# Patient Record
Sex: MALE | Race: WHITE | NOT HISPANIC OR LATINO | Employment: OTHER | URBAN - METROPOLITAN AREA
[De-identification: names, ages, dates, MRNs, and addresses within clinical notes are randomized per-mention and may not be internally consistent; named-entity substitution may affect disease eponyms.]

---

## 2017-04-19 ENCOUNTER — TELEPHONE (OUTPATIENT)
Dept: HEPATOLOGY | Facility: CLINIC | Age: 72
End: 2017-04-19

## 2017-04-19 DIAGNOSIS — B18.2 CHRONIC HEPATITIS C WITHOUT HEPATIC COMA: Primary | ICD-10-CM

## 2017-04-19 NOTE — TELEPHONE ENCOUNTER
"Received call from pt. He states that he tested positive for Hep C antibody and would like to schedule consult. Pt states, "My wife has it, so I probably have it too."  Informed pt that additional lab is needed to confirm active virus. HCV genotype scheduled on 4/24.    Pt's wife, Irene, has also been referred to clinic (No show for consult).   "

## 2017-05-01 ENCOUNTER — TELEPHONE (OUTPATIENT)
Dept: HEPATOLOGY | Facility: CLINIC | Age: 72
End: 2017-05-01

## 2017-05-01 NOTE — TELEPHONE ENCOUNTER
Labs confirm HCV infection - Linda 1a    Please schedule rest of labs / imaging / visit in HCV  Clinic per protocol    thanks

## 2017-05-02 DIAGNOSIS — B18.2 CHRONIC HEPATITIS C WITHOUT HEPATIC COMA: Primary | ICD-10-CM

## 2017-05-02 NOTE — TELEPHONE ENCOUNTER
Attempted to speak with pt. Left message requesting pt call back to schedule. Also tried wife's number.

## 2017-05-05 ENCOUNTER — TELEPHONE (OUTPATIENT)
Dept: HEPATOLOGY | Facility: CLINIC | Age: 72
End: 2017-05-05

## 2017-05-05 NOTE — TELEPHONE ENCOUNTER
----- Message from Terra Camacho sent at 5/4/2017  3:55 PM CDT -----  Contact: pt  Calling to get appt had labs done already and wants results please call him @ # [phone]

## 2017-05-05 NOTE — TELEPHONE ENCOUNTER
Attempt made to reach patient.  LVM asking that he call us back for results and to schedule testing/f/u ordered by provider.

## 2017-05-18 ENCOUNTER — TELEPHONE (OUTPATIENT)
Dept: HEPATOLOGY | Facility: CLINIC | Age: 72
End: 2017-05-18

## 2017-06-16 ENCOUNTER — OFFICE VISIT (OUTPATIENT)
Dept: HEPATOLOGY | Facility: CLINIC | Age: 72
End: 2017-06-16
Payer: MEDICARE

## 2017-06-16 ENCOUNTER — PROCEDURE VISIT (OUTPATIENT)
Dept: HEPATOLOGY | Facility: CLINIC | Age: 72
End: 2017-06-16
Attending: INTERNAL MEDICINE
Payer: MEDICARE

## 2017-06-16 VITALS
TEMPERATURE: 97 F | OXYGEN SATURATION: 97 % | WEIGHT: 178.13 LBS | RESPIRATION RATE: 18 BRPM | HEART RATE: 53 BPM | SYSTOLIC BLOOD PRESSURE: 122 MMHG | BODY MASS INDEX: 25.5 KG/M2 | DIASTOLIC BLOOD PRESSURE: 62 MMHG | HEIGHT: 70 IN

## 2017-06-16 DIAGNOSIS — B18.2 CHRONIC HEPATITIS C WITHOUT HEPATIC COMA: Primary | ICD-10-CM

## 2017-06-16 DIAGNOSIS — R74.8 ABNORMAL TRANSAMINASES: ICD-10-CM

## 2017-06-16 DIAGNOSIS — K74.60 CIRRHOSIS OF LIVER WITHOUT ASCITES, UNSPECIFIED HEPATIC CIRRHOSIS TYPE: ICD-10-CM

## 2017-06-16 DIAGNOSIS — D64.9 ANEMIA, UNSPECIFIED TYPE: ICD-10-CM

## 2017-06-16 DIAGNOSIS — Z86.010 HISTORY OF COLON POLYPS: ICD-10-CM

## 2017-06-16 DIAGNOSIS — B18.2 CHRONIC HEPATITIS C WITHOUT HEPATIC COMA: ICD-10-CM

## 2017-06-16 DIAGNOSIS — Z11.4 ENCOUNTER FOR SCREENING FOR HIV: ICD-10-CM

## 2017-06-16 DIAGNOSIS — D69.6 THROMBOCYTOPENIA: ICD-10-CM

## 2017-06-16 DIAGNOSIS — D64.9 ANEMIA, UNSPECIFIED TYPE: Primary | ICD-10-CM

## 2017-06-16 PROCEDURE — 1126F AMNT PAIN NOTED NONE PRSNT: CPT | Mod: S$GLB,,, | Performed by: PHYSICIAN ASSISTANT

## 2017-06-16 PROCEDURE — 91200 LIVER ELASTOGRAPHY: CPT | Mod: S$GLB,,, | Performed by: PHYSICIAN ASSISTANT

## 2017-06-16 PROCEDURE — 99999 PR PBB SHADOW E&M-EST. PATIENT-LVL IV: CPT | Mod: PBBFAC,,, | Performed by: PHYSICIAN ASSISTANT

## 2017-06-16 PROCEDURE — 99205 OFFICE O/P NEW HI 60 MIN: CPT | Mod: S$GLB,,, | Performed by: PHYSICIAN ASSISTANT

## 2017-06-16 PROCEDURE — 1159F MED LIST DOCD IN RCRD: CPT | Mod: S$GLB,,, | Performed by: PHYSICIAN ASSISTANT

## 2017-06-16 RX ORDER — LOSARTAN POTASSIUM AND HYDROCHLOROTHIAZIDE 25; 100 MG/1; MG/1
1 TABLET ORAL DAILY
COMMUNITY
Start: 2017-03-22 | End: 2020-01-14 | Stop reason: SDUPTHER

## 2017-06-16 RX ORDER — ATORVASTATIN CALCIUM 80 MG/1
80 TABLET, FILM COATED ORAL DAILY
Status: ON HOLD | COMMUNITY
Start: 2017-03-22 | End: 2020-01-10 | Stop reason: SDUPTHER

## 2017-06-16 RX ORDER — METOPROLOL SUCCINATE 100 MG/1
100 TABLET, EXTENDED RELEASE ORAL DAILY
Status: ON HOLD | COMMUNITY
Start: 2017-03-22 | End: 2020-01-02 | Stop reason: HOSPADM

## 2017-06-16 RX ORDER — CLOPIDOGREL BISULFATE 75 MG/1
75 TABLET ORAL DAILY
COMMUNITY
Start: 2017-03-22 | End: 2020-01-14 | Stop reason: SDUPTHER

## 2017-06-16 RX ORDER — LEVOTHYROXINE SODIUM 50 UG/1
50 TABLET ORAL DAILY
COMMUNITY
Start: 2017-03-22 | End: 2020-01-14 | Stop reason: SDUPTHER

## 2017-06-16 NOTE — PROCEDURES
Procedures     Name: Junaid Muñoz  Date of Procedure : 2017   :: Jennifer B Scheuermann, PA  Diagnosis: HCV  Probe: M    Fibroscan readin.9 KPa    IQR/med:13 %    Fibrosis:F 0-1     *Noted pt has liver biopsy documenting cirrhosis from . Since then he has undergone successful HCV treatment in . He was recently reinfected w/ HCV.

## 2017-06-16 NOTE — PROGRESS NOTES
HEPATOLOGY CLINIC VISIT NOTE - HCV clinic    REFERRING PROVIDER: self referral    CHIEF COMPLAINT: Hepatitis C   (here w/ wife - who is also a pt)    HISTORY: This is a 72 y.o. White male who I last saw in 2011. He has a history of biopsy proven HCV Cirrhosis, genotype 3, that was sucessfully treated in 2009, but has returned today due to HCV reinfection. (Regarding his cirrhosis monitoring, he has been lost to f/u since 2011.)    Pt reports hx of IVDA starting in 2015  Underwent medical detox in [place] in early 2/2017  Insurance would not cover inpt rehab so he participated in voluntary outpt rehab program x 3 months at [facility] he has remained clean since his detox in 2/2017.   Now attending AA meetings twice per week with wife (with whom he used drugs).     Feels well   Denies jaundice, dark urine, abdominal distention, hematemesis, melena, slowed mentation.   No abnormal skin rashes. No generalized joint pain.       HCV history:  -- s/p 28 weeks of PegIFN + RBV for Genotype 3a HCV w/ documented SVR in 6/2009    -- requested HCV testing 4/2017 b/c his wife (with whom he used drugs) was diagnosed w/ HCV  -- Genotype 1a (4/24/17)  -- HCV ,000 IU/mL (5/15/17)       Liver staging:  -- Liver biopsy 9/2007 - mild activity and cirrhosis (2 and 2.2cm specimen)    2007 cirrhosis diagnosis was supported by imaging / lab / endoscopy findings of portal hypertensive gastropathy on 2008 EGD, spleen up to 14cm & nodular liver on U/S in 2008, thrombocytopenia in 90s    -- FibroScan today 6/16/17 - kPa 6.9 - F1-2  ?understaging,   ?possible improvement in fibrosis following IFN Rx and SVR    Current labs reveal thrombocytopenia 80s-90s  CMP, INR normal other than intermittent ALT elevation up to 53  Albumin low normal 3.5  U/S scheduled today not done b/c pt didn't arrive in time.      HCC screening - no recent imaging. No recent AFP      Feels well  Denies jaundice, dark urine, abdominal  distention, hematemesis, melena, slowed mentation.   No abnormal skin rashes. No generalized joint pain.                     Past Medical History:   Diagnosis Date    CAD (coronary artery disease)     s/p stent 2005, on plavix    Chronic hepatitis C     Cirrhosis     biopsy proven - 2007    History of colon polyps 06/2008    1 polyp - tubular adenoma    HTN (hypertension)     Hyperlipidemia     Hypothyroidism        Past Surgical History:   Procedure Laterality Date    COLONOSCOPY W/ POLYPECTOMY  06/2008    Dr Wagoner: fair prep, 3mm polyp - tubular adenoma    hydrocele repair  teenager    pyloric stenosis repair  age 1    TONSILLECTOMY         FAMILY HISTORY: Negative for liver disease    SOCIAL HISTORY:     History   Smoking Status    Current Every Day Smoker   Smokeless Tobacco    Not on file     Alcohol - denies  Drugs - IVDA 8968-9028. See above      ROS:   No fever, chills, weight loss, fatigue  No chest pain, palpitations, dyspnea, cough  No abdominal pain, change in bowel pattern, nausea, vomiting, rectal bleeding, GERD, dysphagia  No dysuria, hematuria   No skin rashes   No headaches  No lower extremity edema  No depression or anxiety      PHYSICAL EXAM:  Friendly White male, in no acute distress; alert and oriented to person, place and time  VITALS: reviewed  HEENT: Sclerae anicteric.   NECK: Supple  CVS: Regular rate and rhythm. No murmurs  LUNGS: Normal respiratory effort. Clear bilaterally  ABDOMEN: Flat, soft, nontender. No organomegaly or masses. No ascites or hernias. Good bowel sounds.    SKIN: Warm and dry. No jaundice, No obvious rashes.   EXTREMITIES: No lower extremity edema  NEURO/PSYCH: Normal gate. Memory intact. Thought and speech pattern appropriate. Behavior normal. No depression or anxiety noted.    RECENT LABS:  Lab Results   Component Value Date    WBC 6.30 05/15/2017    HGB 12.7 (L) 05/15/2017    PLT 90 (L) 05/15/2017     Lab Results   Component Value Date    INR  1.0 05/15/2017     Lab Results   Component Value Date    AST 46 05/15/2017    ALT 53 (H) 05/15/2017    BILITOT 0.6 05/15/2017    ALBUMIN 3.7 05/15/2017    ALKPHOS 93 05/15/2017    CREATININE 1.00 05/15/2017    BUN 20 05/15/2017     05/15/2017    K 4.5 05/15/2017         RECENT IMAGING:  U/S abdomen - none to review    ASSESSMENT  72 y.o. White male with Hx of HCV Linda 3a, s/p successful Rx w/ PegIFN + RBV in 2009 w/ documented SVR. Now has been reinfected w/ HCV following IVDA. Had documented cirrhosis (biopsy, labs, imaging and EGD findings) noted prior to his previous HCV rx but current fibroscan suggests more mild F1-2 disease. Still needs updated U/S. Unclear at this time if he experienced some fibrosis regression following SVR and IFN therapy or if fibroscan has understaged his current disease. Regardless, at this time it would be best to follow him for presumed cirrhosis.     1. RECENTLY DIAGNOSED HEPATITIS C, GENOTYPE 1a  -- likely a new infection given recent risk factors (IVDA 3837-2145)  -- FibroScan today 6/16/17 - F1-2 (kPa 6.9)  -- Unknown Immunity to HAV & HBV  2. PREVIOUS HCV INFECTION, GENOTYPE 3A - s/p successful Rx w/ SVR 2009  -- s/p 28 wks PegIFN + RBV  3. WELL COMPENSATED CIRRHOSIS  -- biopsy proven 2007  -- HCC screening - overdue  -- EGD varices screening - overdue. portal hypertensive gastropathy noted on 2008 EGD  -- thrombocytopenia  4. IVDA  -- last use 2/2017 - s/p detox and outpt rehab.   5. NORMOCYTIC ANEMIA  6. HISTORY OF COLON POLYP   -- tubular adenoma - 6/208      EDUCATION:  Discussed need for longterm liver monitoring and liver cancer screening due to prior dx of cirrhosis.   Discussed concern for more rapid HCV progression due to advanced age at time of current infection.    We discussed the increased progression of liver disease secondary to alcohol use; patient was advised to avoid alcohol completely.     Transmission of Hepatitis C was reviewed, including possible sexual  transmission. Sexual contacts should be screened - wife has already screened positive  Patient should avoid sharing personal products such as razors, toothbrushes, etc.     We reviewed that vaccination against Hepatitis A and Hepatitis B is recommended if patient does not already have immunity.    Recommend avoiding raw seafood.  Limit acetaminophen to 2000mg daily.    Discussed ultimate goal of antiviral therapy for HCV        PLAN:  1. Labs today:  · AFP  · HAVAB, HBsAb, HBsAg, HBcAb - vaccinate accordingly  · HIV  · Ferritin, Fe/TIBC, B12, Folate  · Tox screen  2. U/S abdomen  3. EGD (varices screen, anemia) / Colonoscopy (hx of colon polyps, anemia)  4. F/u visit 1-2 months  5. Remain off drugs. Continue AA or attend NA. Return to rehab if needed

## 2017-06-22 ENCOUNTER — TELEPHONE (OUTPATIENT)
Dept: HEPATOLOGY | Facility: CLINIC | Age: 72
End: 2017-06-22

## 2017-06-22 NOTE — TELEPHONE ENCOUNTER
----- Message from Herb Smith sent at 6/20/2017  4:13 PM CDT -----  Contact: jerrell   Would like to change location of lab to [facility] its closer to his house and he doesn't have transportation please call  or

## 2017-06-26 ENCOUNTER — TELEPHONE (OUTPATIENT)
Dept: HEPATOLOGY | Facility: CLINIC | Age: 72
End: 2017-06-26

## 2017-06-26 NOTE — TELEPHONE ENCOUNTER
Wilner Lopez who is the Fibroscan [place] would like to repeat patient Fibroscan Just to see if we are able to get the same score from Medium Probe to XL.     His results showed he had No Fibrosis.   His fibroscan pictured showed white shadows on it that is why he want to repeat it.     He is coming back to see you 7/21/17 and we can repeat his scan that day.     We are not going to schedule the patient for appointment so he won't get charged.     THANK YOU

## 2017-06-30 ENCOUNTER — TELEPHONE (OUTPATIENT)
Dept: HEPATOLOGY | Facility: CLINIC | Age: 72
End: 2017-06-30

## 2017-06-30 NOTE — TELEPHONE ENCOUNTER
6/26/17 labs reviewed:  HIV neg  AFP 10.5 - stable  Vit B12, Folate - normal  Ferritin 67  Fe sat 8% (low)  Fe 26 (low)      Colonoscopy and EGD already ordered  Has f/u scheduled w/ me in 7/2017  _____________________________________________________________  pls tell pt blood work shows his iron levels are low  It is important he proceed w/ EGD and colonoscopy as we discussed in office  Doesn't look like they're scheduled yet  pls give him number to call Endoscopy   Keep f/u visit w/ me    thanks

## 2017-07-03 NOTE — TELEPHONE ENCOUNTER
Spoke to pt's wife. Given message from provider.   She states that she is the the grocery store and cannot take down number for endoscopy. She has clinic number and will call back.   Reminded to keep upcoming visits.

## 2017-07-10 DIAGNOSIS — K74.60 CIRRHOSIS OF LIVER WITHOUT ASCITES, UNSPECIFIED HEPATIC CIRRHOSIS TYPE: Primary | ICD-10-CM

## 2017-07-21 ENCOUNTER — OFFICE VISIT (OUTPATIENT)
Dept: HEPATOLOGY | Facility: CLINIC | Age: 72
End: 2017-07-21
Payer: MEDICARE

## 2017-07-21 VITALS
TEMPERATURE: 98 F | OXYGEN SATURATION: 97 % | HEIGHT: 70 IN | HEART RATE: 63 BPM | WEIGHT: 176.56 LBS | BODY MASS INDEX: 25.28 KG/M2 | SYSTOLIC BLOOD PRESSURE: 157 MMHG | DIASTOLIC BLOOD PRESSURE: 72 MMHG | RESPIRATION RATE: 18 BRPM

## 2017-07-21 DIAGNOSIS — K74.60 CIRRHOSIS OF LIVER WITHOUT ASCITES, UNSPECIFIED HEPATIC CIRRHOSIS TYPE: Primary | ICD-10-CM

## 2017-07-21 PROCEDURE — 99214 OFFICE O/P EST MOD 30 MIN: CPT | Mod: S$GLB,,, | Performed by: PHYSICIAN ASSISTANT

## 2017-07-21 PROCEDURE — 1126F AMNT PAIN NOTED NONE PRSNT: CPT | Mod: S$GLB,,, | Performed by: PHYSICIAN ASSISTANT

## 2017-07-21 PROCEDURE — 99999 PR PBB SHADOW E&M-EST. PATIENT-LVL IV: CPT | Mod: PBBFAC,,, | Performed by: PHYSICIAN ASSISTANT

## 2017-07-21 PROCEDURE — 1159F MED LIST DOCD IN RCRD: CPT | Mod: S$GLB,,, | Performed by: PHYSICIAN ASSISTANT

## 2017-07-21 NOTE — TELEPHONE ENCOUNTER
Name: Junaid Muñoz  Date of Procedure : 2017   :: Jennifer B Scheuermann, PA  Diagnosis: HCV  Probe: XL    Fibroscan readin.8 KPa    IQR/med:10 %    Fibrosis:F 0-1     *again concerned about understaging due to prior evidence of cirrhosis on biopsy, imaging, labs, EGD

## 2017-07-21 NOTE — PROGRESS NOTES
"HEPATOLOGY CLINIC VISIT NOTE - HCV clinic    CHIEF COMPLAINT: HCV Cirrhosis  (here w/ wife - who is also a pt)    HISTORY: This is a 72 y.o. White male who returns for f/u.    Interval history:  Labs to eval anemia revealed Fe deficiency w/ low Fe Sat 8% and low Serum Fe 26  - EGD and Colonoscopy were ordered after last visit but haven't been done yet    AFP stable at 10.5  U/S abdomen 7/10/17 w/ no liver lesions    Labs reveal immunity to HAV   Lacking immunity to HBV - needs vaccine    6/26 - tox screen pos for opiates / morphine  At last visit pt stated he had been clean since 2/2017 when he went through detox & a  4 month outpt rehab program, but tox screen from 6/2017 indicates otherwise.  Today pt tells me his last use (IV morphine) was 6/2017 b/c he "slipped up," although he is attending NA classes 1-2x per week.  Pt states needle was clean & he does not need repeat HIV / HBV screening    Denies jaundice, dark urine, abdominal distention, hematemesis, melena, slowed mentation.   No abnormal skin rashes. No generalized joint pain.       HCV history:  -- Original infection:  Genotype 3a - s/p 28 weeks of PegIFN + RBV w/ documented SVR in 6/2009    -- Recent reinfection through drug use:  Genotype 1a (4/24/17)  HCV ,180IU/mL (6/2017)       Liver staging / Cirrhosis monitoring:  -- Liver biopsy 9/2007 - mild activity and cirrhosis (2 and 2.2cm specimens)    2007 cirrhosis diagnosis was supported by imaging / lab / endoscopy findings of portal hypertensive gastropathy on 2008 EGD, spleen up to 14cm & nodular liver on U/S in 2008, thrombocytopenia in 90s    -- FibroScan today 6/16/17 - kPa 6.9 - F1-2  ?understaging,   ?possible improvement in fibrosis following IFN Rx and SVR    Current labs reveal thrombocytopenia 80s-90s  CMP, INR normal other than intermittent ALT elevation up to 53  Albumin low normal 3.5      HCC screening - up to date w/ U/S 7/2017 and AFP 6/2017  Varices screening - overdue            "           Past Medical History:   Diagnosis Date    CAD (coronary artery disease)     s/p stent 2005, on plavix    Chronic hepatitis C     Cirrhosis     biopsy proven - 2007    History of colon polyps 06/2008    1 polyp - tubular adenoma    HTN (hypertension)     Hyperlipidemia     Hypothyroidism        Past Surgical History:   Procedure Laterality Date    COLONOSCOPY W/ POLYPECTOMY  06/2008    Dr Wagoner: fair prep, 3mm polyp - tubular adenoma    hydrocele repair  teenager    pyloric stenosis repair  age 1    TONSILLECTOMY         FAMILY HISTORY: Negative for liver disease    SOCIAL HISTORY:     History   Smoking Status    Current Every Day Smoker   Smokeless Tobacco    Not on file     Alcohol - denies  Drugs - IVDA 5140-7840.   S/p detox in [place] in early 2/2017  Insurance would not cover inpt rehab so he participated in voluntary outpt rehab program x 3 months at [facility]  Now attending NA meetings 1-2x per week with wife (with whom he used drugs).   Last use 6/2017  (see above)      ROS:   No fever, chills, weight loss, fatigue  No chest pain, palpitations, dyspnea, cough  No abdominal pain, change in bowel pattern, nausea, vomiting, rectal bleeding, GERD, dysphagia  No skin rashes   No lower extremity edema  No depression or anxiety      PHYSICAL EXAM:  Friendly White male, in no acute distress; alert and oriented to person, place and time  VITALS: reviewed  HEENT: Sclerae anicteric.   NECK: Supple  LUNGS: Normal respiratory effort.   ABDOMEN: Flat, soft, nontender.  SKIN: Warm and dry. No jaundice, No obvious rashes.   EXTREMITIES: No lower extremity edema  NEURO/PSYCH: Normal gate. Memory intact. Thought and speech pattern appropriate. Behavior normal. No depression or anxiety noted.    RECENT LABS:  Lab Results   Component Value Date    WBC 8.18 06/16/2017    HGB 12.9 (L) 06/16/2017    PLT 87 (L) 06/16/2017     Lab Results   Component Value Date    INR 1.0  06/16/2017     Lab Results   Component Value Date    AST 36 06/16/2017    ALT 36 06/16/2017    BILITOT 0.7 06/16/2017    ALBUMIN 3.5 06/16/2017    ALKPHOS 124 06/16/2017    CREATININE 1.1 06/16/2017    BUN 22 06/16/2017     06/16/2017    K 3.9 06/16/2017    AFP 10.5 (H) 06/26/2017         RECENT IMAGING:  U/S abdomen - 7/10/17  Narrative   Comparison: April 4, 2012 study demonstrated mildly enlarged spleen stable right renal cyst.    On present study, the visualized pancreas appeared unremarkable and the liver appeared of normal size and texture with no mass nor further abnormality detected and main portal vein appeared patent with velocity of about 19 cm/s obtained.    The gallbladder grossly appears unremarkable with no stones, wall thickening, pericholecystic fluid, nor bile duct dilatation detected with CBD measuring 4.4 mm in diameter.    Both kidneys appeared of normal size,, right measuring 9.6 x 4 cm and left measuring 9.8 x 4.9 cm with a 1.7 x 1.6 per 1.3 cm anechoic cystic lesion seen over the medial lower pole the right kidney consistent with a benign cyst appears unchanged from prior study.    The spleen appeared upper limits in size, being of uncertain significance. The visualized aorta appeared unremarkable.   Impression   Except for benign-appearing right renal cyst as described, and borderline splenic size, of uncertain significance, no definite further abnormality could be detected.    However, clinical correlation and follow up is recommended including CT, etc., if clinically indicated         ASSESSMENT  72 y.o. White male with Hx of HCV Linda 3a, s/p successful Rx w/ PegIFN + RBV in 2009 w/ documented SVR. Now has been reinfected w/ HCV genotype 1a following IVDA.     Had documented cirrhosis (biopsy, labs, imaging and EGD findings) noted prior to his previous HCV rx but current fibroscan suggests more mild F1-2 disease. Although it is possible he had some fibrosis regression following his  "prior IFN therapy, it is also possible his current fibroscan has understaged his fibrosis. At this time it would be best to follow him for presumed cirrhosis.     1. HEPATITIS C, GENOTYPE 1a - REINFECTION  -- likely a "new" infection given recent risk factors (IVDA 8181-5207)  -- (+) Immunity to HAV  -- lacking immunity to HBV  2. PREVIOUS HCV INFECTION, GENOTYPE 3A - s/p successful Rx w/ SVR 2009  -- s/p 28 wks PegIFN + RBV  3. WELL COMPENSATED CIRRHOSIS  -- biopsy proven 2007  -- HCC screening - up to date 7/2017  -- EGD varices screening - overdue. portal hypertensive gastropathy noted on 2008 EGD  -- thrombocytopenia  4. IVDA  -- last use 6/2017   5. NORMOCYTIC ANEMIA / FE DEFICIENCY  -- EGD / Colonoscopy were ordered but not done  6. HISTORY OF COLON POLYP   -- tubular adenoma - 6/2008      EDUCATION:  Discussed importance of discontinuing drug use for overall health, to prevent transmission of other infectious diseases, and so HCV can be treated.  Encouraged pt to continue NA but to also return to rehab.      PLAN:  1. HBV vaccine series - pharmacy states this requires auth. Rx sent to [facility] so they can work on this  2. HCC screening w/ U/S and AFP due 1/2018  3. Proceed w/ EGD (varices screen, anemia) / Colonoscopy (hx of colon polyps, anemia).   4. F/u visit 3 months  5. Remain off drugs. Continue NA. Recommended repeat drug rehab.    Discussed goal of antiviral therapy but pt needs to be off drugs.  Discussed need for long term liver monitoring and HCC Screening every 6 months indefinitely due to cirrhosis    "

## 2017-07-21 NOTE — Clinical Note
I have given pt your office number to call for EGD/Colonoscopy. He has Fe def anemia and hx of colon polyps in 2008. Also has cirrhosis and needs varices screen. Thanks!

## 2017-07-28 ENCOUNTER — TELEPHONE (OUTPATIENT)
Dept: HEPATOLOGY | Facility: CLINIC | Age: 72
End: 2017-07-28

## 2017-07-28 NOTE — TELEPHONE ENCOUNTER
PA Scheuermann not in the office on 10-23-17.  Attempt made to reach patient.  LVM stating that scheduled appt with provider had to be moved to 10-16-17.  A new appt notice mailed along with a note stating change.

## 2017-08-01 ENCOUNTER — TELEPHONE (OUTPATIENT)
Dept: HEPATOLOGY | Facility: CLINIC | Age: 72
End: 2017-08-01

## 2017-08-01 NOTE — TELEPHONE ENCOUNTER
I spoke with patient's wife and provided her with contact info for Dr. Smyth.  Wife states that they need to reschedule colonoscopy appt scheduled with provider.

## 2017-08-01 NOTE — TELEPHONE ENCOUNTER
----- Message from Herb Smith sent at 8/1/2017  8:30 AM CDT -----  Contact: Mrs Muñoz  Requesting return call regarding pt's missing appt

## 2017-08-04 ENCOUNTER — TELEPHONE (OUTPATIENT)
Dept: HEPATOLOGY | Facility: CLINIC | Age: 72
End: 2017-08-04

## 2017-08-04 NOTE — TELEPHONE ENCOUNTER
----- Message from Naima Barrera, PharmD sent at 8/3/2017  6:54 PM CDT -----  Regarding: Recombivax  Good Evening,  I contacted the patient this afternoon regarding the vaccination, and he stated he will be going to a pharmacy closer to home to receive it.  Thanks,  Naima

## 2018-01-11 ENCOUNTER — TELEPHONE (OUTPATIENT)
Dept: HEPATOLOGY | Facility: CLINIC | Age: 73
End: 2018-01-11

## 2018-01-11 DIAGNOSIS — K74.69 OTHER CIRRHOSIS OF LIVER: Primary | ICD-10-CM

## 2018-01-11 DIAGNOSIS — B18.2 CHRONIC HEPATITIS C WITHOUT HEPATIC COMA: ICD-10-CM

## 2018-01-11 NOTE — TELEPHONE ENCOUNTER
1/10/18 U/S abdomen - no liver lesions    pls call pt:  1. U/S showed cirrhosis but no masses in liver  2. Should have had blood work done but I don't see results - please schedule CBC, CMP, INR, AFP  3. He is overdue for visit w/ me - pls schedule visit    He likely needs another u/s in 6 months but I don't know this for certain until after he does the blood work

## 2018-01-11 NOTE — TELEPHONE ENCOUNTER
I spoke with patient and message from PA Scheuermann relayed.  Lab and f/u scheduled 1/18/18; appt notice mailed.

## 2018-01-18 ENCOUNTER — TELEPHONE (OUTPATIENT)
Dept: HEPATOLOGY | Facility: CLINIC | Age: 73
End: 2018-01-18

## 2018-01-18 NOTE — TELEPHONE ENCOUNTER
----- Message from Naima Wang sent at 1/16/2018  5:30 PM CST -----  Contact: Self   Pt is requesting a call back in regards to rescheduling his appt       Pt can be contacted at [phone]

## 2018-01-26 ENCOUNTER — TELEPHONE (OUTPATIENT)
Dept: HEPATOLOGY | Facility: CLINIC | Age: 73
End: 2018-01-26

## 2018-01-26 NOTE — TELEPHONE ENCOUNTER
Patient sent a msg on yesterday stating that he is having transportation issues and would like to be followed by a gastro/hepatologist closer to home.  I left him a VM stating that I would send a referral to Dr. Smyth.  Clinicals with stated request for consult faxed to provider's office.

## 2018-01-31 ENCOUNTER — TELEPHONE (OUTPATIENT)
Dept: HEPATOLOGY | Facility: CLINIC | Age: 73
End: 2018-01-31

## 2018-01-31 NOTE — TELEPHONE ENCOUNTER
S/w pt today he did not have transportation to his appt today he will contact us [facility] when he gets a ride to come in for his appt.

## 2019-09-05 PROBLEM — R07.9 CHEST PAIN: Status: ACTIVE | Noted: 2019-09-05

## 2019-09-05 PROBLEM — R07.9 CHEST PAIN: Status: RESOLVED | Noted: 2019-09-05 | Resolved: 2019-09-05

## 2019-12-06 ENCOUNTER — HOSPITAL ENCOUNTER (INPATIENT)
Facility: HOSPITAL | Age: 74
LOS: 36 days | Discharge: HOME HEALTH | DRG: 459 | End: 2020-01-11
Attending: EMERGENCY MEDICINE | Admitting: EMERGENCY MEDICINE
Payer: MEDICARE

## 2019-12-06 DIAGNOSIS — G06.1 SPINAL EPIDURAL ABSCESS: Primary | ICD-10-CM

## 2019-12-06 DIAGNOSIS — G06.2 EPIDURAL ABSCESS: ICD-10-CM

## 2019-12-06 DIAGNOSIS — R07.9 CHEST PAIN: ICD-10-CM

## 2019-12-06 DIAGNOSIS — I25.10 CAD (CORONARY ARTERY DISEASE): ICD-10-CM

## 2019-12-06 DIAGNOSIS — M46.22 OSTEOMYELITIS OF CERVICAL SPINE: ICD-10-CM

## 2019-12-06 PROBLEM — E78.5 HYPERLIPIDEMIA: Status: ACTIVE | Noted: 2019-12-06

## 2019-12-06 PROBLEM — B18.2 CHRONIC HEPATITIS C WITHOUT HEPATIC COMA: Status: ACTIVE | Noted: 2019-12-06

## 2019-12-06 PROBLEM — I10 ESSENTIAL HYPERTENSION: Status: ACTIVE | Noted: 2019-12-06

## 2019-12-06 PROBLEM — M46.24 ACUTE OSTEOMYELITIS OF THORACIC SPINE: Status: ACTIVE | Noted: 2019-12-06

## 2019-12-06 PROBLEM — E03.8 OTHER SPECIFIED HYPOTHYROIDISM: Status: ACTIVE | Noted: 2019-12-06

## 2019-12-06 PROCEDURE — 99285 EMERGENCY DEPT VISIT HI MDM: CPT | Mod: ,,, | Performed by: PHYSICIAN ASSISTANT

## 2019-12-06 PROCEDURE — 99223 PR INITIAL HOSPITAL CARE,LEVL III: ICD-10-PCS | Mod: ,,, | Performed by: NEUROLOGICAL SURGERY

## 2019-12-06 PROCEDURE — 99223 1ST HOSP IP/OBS HIGH 75: CPT | Mod: ,,, | Performed by: NEUROLOGICAL SURGERY

## 2019-12-06 PROCEDURE — 99285 EMERGENCY DEPT VISIT HI MDM: CPT | Mod: 25

## 2019-12-06 PROCEDURE — 12000002 HC ACUTE/MED SURGE SEMI-PRIVATE ROOM

## 2019-12-06 PROCEDURE — 99285 PR EMERGENCY DEPT VISIT,LEVEL V: ICD-10-PCS | Mod: ,,, | Performed by: PHYSICIAN ASSISTANT

## 2019-12-06 RX ORDER — ONDANSETRON 8 MG/1
8 TABLET, ORALLY DISINTEGRATING ORAL EVERY 8 HOURS PRN
Status: DISCONTINUED | OUTPATIENT
Start: 2019-12-07 | End: 2019-12-21

## 2019-12-06 RX ORDER — ATORVASTATIN CALCIUM 20 MG/1
80 TABLET, FILM COATED ORAL DAILY
Status: DISCONTINUED | OUTPATIENT
Start: 2019-12-07 | End: 2019-12-17

## 2019-12-06 RX ORDER — VANCOMYCIN HCL IN 5 % DEXTROSE 1G/250ML
1000 PLASTIC BAG, INJECTION (ML) INTRAVENOUS
Status: DISCONTINUED | OUTPATIENT
Start: 2019-12-07 | End: 2019-12-07

## 2019-12-06 RX ORDER — NALOXONE HCL 0.4 MG/ML
0.4 VIAL (ML) INJECTION
Status: DISCONTINUED | OUTPATIENT
Start: 2019-12-07 | End: 2020-01-11 | Stop reason: HOSPADM

## 2019-12-06 RX ORDER — ACETAMINOPHEN 325 MG/1
650 TABLET ORAL EVERY 4 HOURS PRN
Status: DISCONTINUED | OUTPATIENT
Start: 2019-12-07 | End: 2019-12-06

## 2019-12-06 RX ORDER — IBUPROFEN 200 MG
1 TABLET ORAL DAILY
Status: DISCONTINUED | OUTPATIENT
Start: 2019-12-07 | End: 2019-12-16

## 2019-12-06 RX ORDER — IBUPROFEN 200 MG
24 TABLET ORAL
Status: DISCONTINUED | OUTPATIENT
Start: 2019-12-07 | End: 2019-12-21

## 2019-12-06 RX ORDER — OXYCODONE HYDROCHLORIDE 5 MG/1
5 TABLET ORAL EVERY 6 HOURS PRN
Status: DISCONTINUED | OUTPATIENT
Start: 2019-12-07 | End: 2019-12-10

## 2019-12-06 RX ORDER — LEVOTHYROXINE SODIUM 50 UG/1
50 TABLET ORAL
Status: DISCONTINUED | OUTPATIENT
Start: 2019-12-07 | End: 2019-12-17

## 2019-12-06 RX ORDER — LOSARTAN POTASSIUM AND HYDROCHLOROTHIAZIDE 25; 100 MG/1; MG/1
1 TABLET ORAL DAILY
Status: DISCONTINUED | OUTPATIENT
Start: 2019-12-07 | End: 2019-12-17

## 2019-12-06 RX ORDER — AMOXICILLIN 250 MG
1 CAPSULE ORAL 2 TIMES DAILY
Status: DISCONTINUED | OUTPATIENT
Start: 2019-12-07 | End: 2019-12-19

## 2019-12-06 RX ORDER — IBUPROFEN 200 MG
16 TABLET ORAL
Status: DISCONTINUED | OUTPATIENT
Start: 2019-12-07 | End: 2019-12-21

## 2019-12-06 RX ORDER — SODIUM CHLORIDE 0.9 % (FLUSH) 0.9 %
10 SYRINGE (ML) INJECTION
Status: DISCONTINUED | OUTPATIENT
Start: 2019-12-07 | End: 2019-12-16

## 2019-12-06 RX ORDER — GLUCAGON 1 MG
1 KIT INJECTION
Status: DISCONTINUED | OUTPATIENT
Start: 2019-12-07 | End: 2019-12-27

## 2019-12-06 RX ORDER — METOPROLOL SUCCINATE 25 MG/1
100 TABLET, EXTENDED RELEASE ORAL DAILY
Status: DISCONTINUED | OUTPATIENT
Start: 2019-12-07 | End: 2019-12-19

## 2019-12-06 RX ORDER — ACETAMINOPHEN 500 MG
1000 TABLET ORAL EVERY 8 HOURS
Status: DISCONTINUED | OUTPATIENT
Start: 2019-12-06 | End: 2019-12-16

## 2019-12-06 RX ORDER — HYDROMORPHONE HYDROCHLORIDE 1 MG/ML
1 INJECTION, SOLUTION INTRAMUSCULAR; INTRAVENOUS; SUBCUTANEOUS EVERY 6 HOURS PRN
Status: DISCONTINUED | OUTPATIENT
Start: 2019-12-07 | End: 2019-12-09

## 2019-12-07 PROBLEM — R74.8 ELEVATED ALKALINE PHOSPHATASE LEVEL: Status: ACTIVE | Noted: 2019-12-07

## 2019-12-07 PROBLEM — Z72.0 TOBACCO ABUSE: Status: ACTIVE | Noted: 2019-12-07

## 2019-12-07 LAB
ALBUMIN SERPL BCP-MCNC: 2.2 G/DL (ref 3.5–5.2)
ALP SERPL-CCNC: 226 U/L (ref 55–135)
ALT SERPL W/O P-5'-P-CCNC: 13 U/L (ref 10–44)
AMPHET+METHAMPHET UR QL: NEGATIVE
ANION GAP SERPL CALC-SCNC: 6 MMOL/L (ref 8–16)
APTT BLDCRRT: 26.5 SEC (ref 21–32)
AST SERPL-CCNC: 24 U/L (ref 10–40)
BARBITURATES UR QL SCN>200 NG/ML: NEGATIVE
BASOPHILS # BLD AUTO: 0.04 K/UL (ref 0–0.2)
BASOPHILS NFR BLD: 0.5 % (ref 0–1.9)
BENZODIAZ UR QL SCN>200 NG/ML: NEGATIVE
BILIRUB SERPL-MCNC: 0.3 MG/DL (ref 0.1–1)
BUN SERPL-MCNC: 17 MG/DL (ref 8–23)
BZE UR QL SCN: NEGATIVE
CALCIUM SERPL-MCNC: 8.2 MG/DL (ref 8.7–10.5)
CANNABINOIDS UR QL SCN: NORMAL
CHLORIDE SERPL-SCNC: 102 MMOL/L (ref 95–110)
CO2 SERPL-SCNC: 26 MMOL/L (ref 23–29)
CREAT SERPL-MCNC: 0.8 MG/DL (ref 0.5–1.4)
CREAT UR-MCNC: 93 MG/DL (ref 23–375)
CRP SERPL-MCNC: 73.2 MG/L (ref 0–8.2)
DIFFERENTIAL METHOD: ABNORMAL
EOSINOPHIL # BLD AUTO: 0.5 K/UL (ref 0–0.5)
EOSINOPHIL NFR BLD: 5.5 % (ref 0–8)
ERYTHROCYTE [DISTWIDTH] IN BLOOD BY AUTOMATED COUNT: 16.5 % (ref 11.5–14.5)
ERYTHROCYTE [SEDIMENTATION RATE] IN BLOOD BY WESTERGREN METHOD: 32 MM/HR (ref 0–23)
EST. GFR  (AFRICAN AMERICAN): >60 ML/MIN/1.73 M^2
EST. GFR  (NON AFRICAN AMERICAN): >60 ML/MIN/1.73 M^2
ETHANOL UR-MCNC: <10 MG/DL
GLUCOSE SERPL-MCNC: 118 MG/DL (ref 70–110)
HCT VFR BLD AUTO: 31.7 % (ref 40–54)
HGB BLD-MCNC: 9.5 G/DL (ref 14–18)
IMM GRANULOCYTES # BLD AUTO: 0.02 K/UL (ref 0–0.04)
IMM GRANULOCYTES NFR BLD AUTO: 0.2 % (ref 0–0.5)
LYMPHOCYTES # BLD AUTO: 1.2 K/UL (ref 1–4.8)
LYMPHOCYTES NFR BLD: 13.8 % (ref 18–48)
MAGNESIUM SERPL-MCNC: 1.8 MG/DL (ref 1.6–2.6)
MCH RBC QN AUTO: 25.9 PG (ref 27–31)
MCHC RBC AUTO-ENTMCNC: 30 G/DL (ref 32–36)
MCV RBC AUTO: 86 FL (ref 82–98)
METHADONE UR QL SCN>300 NG/ML: NEGATIVE
MONOCYTES # BLD AUTO: 0.8 K/UL (ref 0.3–1)
MONOCYTES NFR BLD: 8.9 % (ref 4–15)
NEUTROPHILS # BLD AUTO: 6.2 K/UL (ref 1.8–7.7)
NEUTROPHILS NFR BLD: 71.1 % (ref 38–73)
NRBC BLD-RTO: 0 /100 WBC
OPIATES UR QL SCN: NORMAL
PCP UR QL SCN>25 NG/ML: NEGATIVE
PHOSPHATE SERPL-MCNC: 2.5 MG/DL (ref 2.7–4.5)
PLATELET # BLD AUTO: 148 K/UL (ref 150–350)
PMV BLD AUTO: 10.4 FL (ref 9.2–12.9)
POTASSIUM SERPL-SCNC: 3 MMOL/L (ref 3.5–5.1)
PROCALCITONIN SERPL IA-MCNC: 0.06 NG/ML
PROT SERPL-MCNC: 5.9 G/DL (ref 6–8.4)
RBC # BLD AUTO: 3.67 M/UL (ref 4.6–6.2)
SODIUM SERPL-SCNC: 134 MMOL/L (ref 136–145)
TOXICOLOGY INFORMATION: NORMAL
WBC # BLD AUTO: 8.72 K/UL (ref 3.9–12.7)

## 2019-12-07 PROCEDURE — 25000003 PHARM REV CODE 250: Performed by: STUDENT IN AN ORGANIZED HEALTH CARE EDUCATION/TRAINING PROGRAM

## 2019-12-07 PROCEDURE — 84145 PROCALCITONIN (PCT): CPT

## 2019-12-07 PROCEDURE — 99223 1ST HOSP IP/OBS HIGH 75: CPT | Mod: AI,GC,, | Performed by: HOSPITALIST

## 2019-12-07 PROCEDURE — 80307 DRUG TEST PRSMV CHEM ANLYZR: CPT

## 2019-12-07 PROCEDURE — 99223 PR INITIAL HOSPITAL CARE,LEVL III: ICD-10-PCS | Mod: AI,GC,, | Performed by: HOSPITALIST

## 2019-12-07 PROCEDURE — S4991 NICOTINE PATCH NONLEGEND: HCPCS | Performed by: STUDENT IN AN ORGANIZED HEALTH CARE EDUCATION/TRAINING PROGRAM

## 2019-12-07 PROCEDURE — 85730 THROMBOPLASTIN TIME PARTIAL: CPT

## 2019-12-07 PROCEDURE — 85652 RBC SED RATE AUTOMATED: CPT

## 2019-12-07 PROCEDURE — 85025 COMPLETE CBC W/AUTO DIFF WBC: CPT

## 2019-12-07 PROCEDURE — C9285 PATCH, LIDOCAINE/TETRACAINE: HCPCS | Performed by: STUDENT IN AN ORGANIZED HEALTH CARE EDUCATION/TRAINING PROGRAM

## 2019-12-07 PROCEDURE — A9585 GADOBUTROL INJECTION: HCPCS | Performed by: EMERGENCY MEDICINE

## 2019-12-07 PROCEDURE — 11000001 HC ACUTE MED/SURG PRIVATE ROOM

## 2019-12-07 PROCEDURE — 63600175 PHARM REV CODE 636 W HCPCS: Performed by: STUDENT IN AN ORGANIZED HEALTH CARE EDUCATION/TRAINING PROGRAM

## 2019-12-07 PROCEDURE — 36415 COLL VENOUS BLD VENIPUNCTURE: CPT

## 2019-12-07 PROCEDURE — 80053 COMPREHEN METABOLIC PANEL: CPT

## 2019-12-07 PROCEDURE — 84100 ASSAY OF PHOSPHORUS: CPT

## 2019-12-07 PROCEDURE — 25500020 PHARM REV CODE 255: Performed by: EMERGENCY MEDICINE

## 2019-12-07 PROCEDURE — 86140 C-REACTIVE PROTEIN: CPT

## 2019-12-07 PROCEDURE — 83735 ASSAY OF MAGNESIUM: CPT

## 2019-12-07 RX ORDER — LANOLIN ALCOHOL/MO/W.PET/CERES
400 CREAM (GRAM) TOPICAL ONCE
Status: COMPLETED | OUTPATIENT
Start: 2019-12-07 | End: 2019-12-07

## 2019-12-07 RX ORDER — SODIUM CHLORIDE 0.9 % (FLUSH) 0.9 %
10 SYRINGE (ML) INJECTION
Status: DISCONTINUED | OUTPATIENT
Start: 2019-12-07 | End: 2019-12-16

## 2019-12-07 RX ORDER — POTASSIUM CHLORIDE 20 MEQ/1
40 TABLET, EXTENDED RELEASE ORAL
Status: COMPLETED | OUTPATIENT
Start: 2019-12-07 | End: 2019-12-07

## 2019-12-07 RX ORDER — SODIUM,POTASSIUM PHOSPHATES 280-250MG
2 POWDER IN PACKET (EA) ORAL EVERY 4 HOURS
Status: COMPLETED | OUTPATIENT
Start: 2019-12-07 | End: 2019-12-07

## 2019-12-07 RX ORDER — GADOBUTROL 604.72 MG/ML
9 INJECTION INTRAVENOUS
Status: COMPLETED | OUTPATIENT
Start: 2019-12-07 | End: 2019-12-07

## 2019-12-07 RX ADMIN — LIDOCAINE AND TETRACAINE 1 EACH: 70; 70 PATCH CUTANEOUS at 03:12

## 2019-12-07 RX ADMIN — ACETAMINOPHEN 1000 MG: 500 TABLET ORAL at 02:12

## 2019-12-07 RX ADMIN — POTASSIUM & SODIUM PHOSPHATES POWDER PACK 280-160-250 MG 2 PACKET: 280-160-250 PACK at 02:12

## 2019-12-07 RX ADMIN — CEFTRIAXONE 2 G: 2 INJECTION, SOLUTION INTRAVENOUS at 02:12

## 2019-12-07 RX ADMIN — SENNOSIDES AND DOCUSATE SODIUM 1 TABLET: 8.6; 5 TABLET ORAL at 09:12

## 2019-12-07 RX ADMIN — ACETAMINOPHEN 1000 MG: 500 TABLET ORAL at 09:12

## 2019-12-07 RX ADMIN — SENNOSIDES AND DOCUSATE SODIUM 1 TABLET: 8.6; 5 TABLET ORAL at 08:12

## 2019-12-07 RX ADMIN — GADOBUTROL 9 ML: 604.72 INJECTION INTRAVENOUS at 01:12

## 2019-12-07 RX ADMIN — POTASSIUM & SODIUM PHOSPHATES POWDER PACK 280-160-250 MG 2 PACKET: 280-160-250 PACK at 09:12

## 2019-12-07 RX ADMIN — POTASSIUM CHLORIDE 40 MEQ: 1500 TABLET, EXTENDED RELEASE ORAL at 09:12

## 2019-12-07 RX ADMIN — VANCOMYCIN HYDROCHLORIDE 1000 MG: 1 INJECTION, POWDER, LYOPHILIZED, FOR SOLUTION INTRAVENOUS at 03:12

## 2019-12-07 RX ADMIN — ACETAMINOPHEN 1000 MG: 500 TABLET ORAL at 06:12

## 2019-12-07 RX ADMIN — METOPROLOL SUCCINATE 100 MG: 100 TABLET, EXTENDED RELEASE ORAL at 08:12

## 2019-12-07 RX ADMIN — NICOTINE 1 PATCH: 21 PATCH, EXTENDED RELEASE TRANSDERMAL at 08:12

## 2019-12-07 RX ADMIN — LEVOTHYROXINE SODIUM 50 MCG: 50 TABLET ORAL at 06:12

## 2019-12-07 RX ADMIN — POTASSIUM CHLORIDE 40 MEQ: 1500 TABLET, EXTENDED RELEASE ORAL at 10:12

## 2019-12-07 RX ADMIN — ATORVASTATIN CALCIUM 80 MG: 20 TABLET, FILM COATED ORAL at 08:12

## 2019-12-07 RX ADMIN — OXYCODONE HYDROCHLORIDE 5 MG: 5 TABLET ORAL at 02:12

## 2019-12-07 RX ADMIN — Medication 400 MG: at 09:12

## 2019-12-07 RX ADMIN — LOSARTAN POTASSIUM AND HYDROCHLOROTHIAZIDE 1 TABLET: 100; 25 TABLET, FILM COATED ORAL at 08:12

## 2019-12-07 RX ADMIN — OXYCODONE HYDROCHLORIDE 5 MG: 5 TABLET ORAL at 11:12

## 2019-12-07 RX ADMIN — OXYCODONE HYDROCHLORIDE 5 MG: 5 TABLET ORAL at 09:12

## 2019-12-07 NOTE — SUBJECTIVE & OBJECTIVE
Past Medical History:   Diagnosis Date    CAD (coronary artery disease)     s/p stent 2005, on plavix    Chronic hepatitis C     Cirrhosis     biopsy proven - 2007    History of colon polyps 06/2008    1 polyp - tubular adenoma    HTN (hypertension)     Hyperlipidemia     Hypothyroidism        Past Surgical History:   Procedure Laterality Date    COLONOSCOPY W/ POLYPECTOMY  06/2008    Dr Wagoner: fair prep, 3mm polyp - tubular adenoma    CORONARY ANGIOPLASTY WITH STENT PLACEMENT      hydrocele repair  teenager    pyloric stenosis repair  age 1    TONSILLECTOMY         Review of patient's allergies indicates:   Allergen Reactions    Penicillins Rash       Current Facility-Administered Medications on File Prior to Encounter   Medication    [COMPLETED] HYDROmorphone injection 1 mg    [COMPLETED] sodium chloride 0.9% bolus 1,000 mL    [COMPLETED] vancomycin (VANCOCIN) 2,000 mg in dextrose 5 % 500 mL IVPB    [DISCONTINUED] cefTRIAXone (ROCEPHIN) 2 g in dextrose 5 % 50 mL IVPB    [DISCONTINUED] vancomycin (VANCOCIN) 1000 mg in dextrose 5 % 200 mL IVPB     Current Outpatient Medications on File Prior to Encounter   Medication Sig    atorvastatin (LIPITOR) 80 MG tablet Take 80 mg by mouth once daily at 6am.    clopidogrel (PLAVIX) 75 mg tablet Take 75 mg by mouth once daily at 6am.    ibuprofen (ADVIL,MOTRIN) 600 MG tablet Take 1 tablet (600 mg total) by mouth every 6 (six) hours as needed for Pain.    levothyroxine (SYNTHROID) 50 MCG tablet Take 50 mcg by mouth once daily at 6am.    losartan-hydrochlorothiazide 100-25 mg (HYZAAR) 100-25 mg per tablet Take 1 tablet by mouth once daily at 6am.     metoprolol succinate (TOPROL-XL) 100 MG 24 hr tablet Take 100 mg by mouth once daily at 6am.    traMADol (ULTRAM) 50 mg tablet Take 50 mg by mouth every 6 (six) hours as needed for Pain.     Family History     None        Tobacco Use    Smoking status: Current Every Day Smoker   Substance and Sexual  Activity    Alcohol use: Not on file    Drug use: Yes     Types: IV     Comment: MORPHINE    Sexual activity: Not on file     Review of Systems   Constitutional: Negative for activity change, appetite change, chills, diaphoresis, fatigue, fever and unexpected weight change.   HENT: Negative for congestion, sore throat and trouble swallowing.    Eyes: Negative for photophobia, pain and discharge.   Respiratory: Negative for cough and shortness of breath.    Cardiovascular: Negative for chest pain and palpitations.   Gastrointestinal: Negative for abdominal pain, constipation, diarrhea, nausea, rectal pain and vomiting.   Genitourinary: Negative for decreased urine volume, difficulty urinating and dysuria.   Musculoskeletal: Positive for back pain. Negative for arthralgias, joint swelling, myalgias, neck pain and neck stiffness.   Skin: Negative for pallor and rash.   Neurological: Negative for dizziness, weakness, light-headedness, numbness and headaches.     Objective:     Vital Signs (Most Recent):  Temp: 98.6 °F (37 °C) (12/06/19 1940)  Pulse: 65 (12/06/19 2037)  Resp: 19 (12/06/19 2037)  BP: (!) 165/72 (12/06/19 2037)  SpO2: 96 % (12/06/19 2037) Vital Signs (24h Range):  Temp:  [97.8 °F (36.6 °C)-98.6 °F (37 °C)] 98.6 °F (37 °C)  Pulse:  [57-83] 65  Resp:  [12-19] 19  SpO2:  [96 %-98 %] 96 %  BP: (115-187)/(66-86) 165/72        There is no height or weight on file to calculate BMI.    Physical Exam   Constitutional: He is oriented to person, place, and time. He appears well-developed and well-nourished. No distress.   HENT:   Head: Normocephalic and atraumatic.   Mouth/Throat: Oropharynx is clear and moist and mucous membranes are normal.   Eyes: Pupils are equal, round, and reactive to light. Conjunctivae are normal. No scleral icterus.   Neck: No JVD present.   Cardiovascular: Normal rate, regular rhythm, normal heart sounds and normal pulses.   No murmur heard.  Pulmonary/Chest: Effort normal and breath  sounds normal. No respiratory distress.   Abdominal: Soft. Normal appearance and bowel sounds are normal. He exhibits no distension. There is no tenderness.   Large well healed pyloromyotomy scar on right side of abdomen.    Musculoskeletal: He exhibits no edema.        Cervical back: He exhibits tenderness, bony tenderness and pain. He exhibits normal range of motion, no swelling, no edema and no spasm.        Thoracic back: He exhibits tenderness, bony tenderness and pain. He exhibits no swelling, no edema, no laceration and no spasm.   Neurological: He is alert and oriented to person, place, and time. He has normal strength. He displays no tremor. No cranial nerve deficit or sensory deficit. He exhibits normal muscle tone. He displays no Babinski's sign on the right side. He displays no Babinski's sign on the left side.   Skin: Skin is warm and dry. No bruising and no rash noted.         CRANIAL NERVES     CN III, IV, VI   Pupils are equal, round, and reactive to light.       Significant Labs: All pertinent labs within the past 24 hours have been reviewed.    Significant Imaging: I have reviewed all pertinent imaging results/findings within the past 24 hours.

## 2019-12-07 NOTE — H&P
[facility] Hospital Medicine  History & Physical    Patient Name: Junaid Muñoz  MRN: [id number]  Admission Date: 12/6/2019  Attending Physician: Shannon Priest MD   Primary Care Provider: Oskar Whitman MD         Patient information was obtained from patient, past medical records and ER records.     Subjective:     Principal Problem:Spinal epidural abscess    Chief Complaint:   Chief Complaint   Patient presents with    [place] transfer     presents via EMS for neurosx services r/t osteomyelitis of C6-T2, epidural abscess displacing spinal cord. Pt also has bed bugs per EMS        HPI: This is a 74 year old male presenting with chief complaint of back pain. He has a PMH notable for previous IVDU. He reports one month duration of progressively worsening non-radiating upper back pain radiating between the bilateral shoulders described as intense achiness and 8/10 at its worse. He denies symptom association with fevers, chills, numbness or tingling of extremities, bowel or bladder incontinence, headache, blurry vision, back trauma, prior history of spinal surgeries, chest pain, SOB, or pain with inspiration. Due to symptom progression, he was evaluated by outpatient orthopedist on 12/06 and MRI of thoracic spine was ordered. It was suggestive of osteomyelitis of C6 to T2 associated with epidural abscess with posterior spinal cord displacement. Patient was notified and instructed to report to ED.     He adamantly denies preceding or current IVDU. However, per chart review, outpatient hepatology notes from 2017 mention history of relapsing use of IV morphine.     ED course: He initially presented to [facility] on 12/06. Vital signs were unremarkable. Labs were significant for normal WBC, negative lactate, and elevated alkaline phosphatase (284). UA was unremarkable. He was initiated on Ceftriaxone and Vancomycin and transferred to [facility] for neurosurgery evaluation.  Neurosurgery recommended obtaining CT of thoracic and lumbar spine and cervical MRI. Recommended against intervention as without neurological deficits on exam.     Past Medical History:   Diagnosis Date   · Cirrhosis  · Coronary artery disease  · Hepatitis C  · HTN  · HLD  · Hypothyroidism  · MI (approximately 2004 or 2005)    Past Surgical History:   Procedure Laterality Date    COLONOSCOPY W/ POLYPECTOMY  06/2008    Dr Wagoner: fair prep, 3mm polyp - tubular adenoma    CORONARY ANGIOPLASTY WITH STENT PLACEMENT      Hydrocele repair  teenager    Pyloromyotomy  age 1    TONSILLECTOMY       Review of patient's allergies indicates:   Allergen Reactions    Penicillins Rash     Current Outpatient Medications on File Prior to Encounter   Medication Sig    atorvastatin (LIPITOR) 80 MG tablet Take 80 mg by mouth once daily at 6am.    clopidogrel (PLAVIX) 75 mg tablet Take 75 mg by mouth once daily at 6am.    ibuprofen (ADVIL,MOTRIN) 600 MG tablet Take 1 tablet (600 mg total) by mouth every 6 (six) hours as needed for Pain.    levothyroxine (SYNTHROID) 50 MCG tablet Take 50 mcg by mouth once daily at 6am.    losartan-hydrochlorothiazide 100-25 mg (HYZAAR) 100-25 mg per tablet Take 1 tablet by mouth once daily at 6am.     metoprolol succinate (TOPROL-XL) 100 MG 24 hr tablet Take 100 mg by mouth once daily at 6am.    traMADol (ULTRAM) 50 mg tablet Take 50 mg by mouth every 6 (six) hours as needed for Pain.     Family History     None        Tobacco Use    Smoking status: Current Every Day Smoker   Substance and Sexual Activity    Alcohol use: Not on file    Drug use: Yes     Types: IV     Comment: MORPHINE    Sexual activity: Not on file     Review of Systems   Constitutional: Negative for activity change, appetite change, chills, diaphoresis, fatigue, fever and unexpected weight change.   HENT: Negative for congestion, sore throat and trouble swallowing.    Eyes: Negative for photophobia, pain and discharge.    Respiratory: Negative for cough and shortness of breath.    Cardiovascular: Negative for chest pain and palpitations.   Gastrointestinal: Negative for abdominal pain, constipation, diarrhea, nausea, rectal pain and vomiting.   Genitourinary: Negative for decreased urine volume, difficulty urinating and dysuria.   Musculoskeletal: Positive for back pain. Negative for arthralgias, joint swelling, myalgias, neck pain and neck stiffness.   Skin: Negative for pallor and rash.   Neurological: Negative for dizziness, weakness, light-headedness, numbness and headaches.     Objective:     Vital Signs (Most Recent):  Temp: 98.6 °F (37 °C) (12/06/19 1940)  Pulse: 65 (12/06/19 2037)  Resp: 19 (12/06/19 2037)  BP: (!) 165/72 (12/06/19 2037)  SpO2: 96 % (12/06/19 2037) Vital Signs (24h Range):  Temp:  [97.8 °F (36.6 °C)-98.6 °F (37 °C)] 98.6 °F (37 °C)  Pulse:  [57-83] 65  Resp:  [12-19] 19  SpO2:  [96 %-98 %] 96 %  BP: (115-187)/(66-86) 165/72        There is no height or weight on file to calculate BMI.    Physical Exam   Constitutional: He is oriented to person, place, and time. He appears well-developed and well-nourished. No distress.   HENT:   Head: Normocephalic and atraumatic.   Mouth/Throat: Oropharynx is clear and moist and mucous membranes are normal.   Eyes: Pupils are equal, round, and reactive to light. Conjunctivae are normal. No scleral icterus.   Neck: No JVD present.   Cardiovascular: Normal rate, regular rhythm, normal heart sounds and normal pulses.   No murmur heard.  Pulmonary/Chest: Effort normal and breath sounds normal. No respiratory distress.   Abdominal: Soft. Normal appearance and bowel sounds are normal. He exhibits no distension. There is no tenderness.   Large well healed pyloromyotomy scar on right side of abdomen.    Musculoskeletal: He exhibits no edema.        Cervical back: He exhibits tenderness, bony tenderness and pain. He exhibits normal range of motion, no swelling, no edema and no  spasm.        Thoracic back: He exhibits tenderness, bony tenderness and pain. He exhibits no swelling, no edema, no laceration and no spasm.   Neurological: He is alert and oriented to person, place, and time. He has normal strength. He displays no tremor. No cranial nerve deficit or sensory deficit. He exhibits normal muscle tone. He displays no Babinski's sign on the right side. He displays no Babinski's sign on the left side.   Skin: Skin is warm and dry. No bruising and no rash noted.     CRANIAL NERVES     CN III, IV, VI   Pupils are equal, round, and reactive to light.       Significant Labs: All pertinent labs within the past 24 hours have been reviewed.    Significant Imaging: I have reviewed all pertinent imaging results/findings within the past 24 hours.    Assessment/Plan:     * Spinal epidural abscess  This is a 74 year old  male with PMH notable for prior IVDU who presented on 12/06 with one month duration of upper back pain with work-up at outside hospital significant for MRI suggestive of osteomyelitis of C6-T2 with associated epidural abscess with posterior spinal cord displacement. He has no neurological deficits on exam and without evidence of developing sepsis. He is status post evaluation by neurosurgery on arrival, with no plans for emergent intervention. Etiology of presentation unclear; could be secondary to relapse of IVDU.     Plan:   -Continue empiric coverage with Ceftriaxone and Vancomycin.   -Hold off on initiating Dexamethasone as without neurological deficits on exam.   -Follow-up MRI cervical spine and CT of cervical and thoracic spine ordered here.   -Follow-up neurosurgery recommendations regarding timing of intervention.   -ID consulted for antibiotic recommendations.   -Follow-up blood cultures.   -Follow-up urine toxicology.   -Pain control ordered PRN.   -Neuro checks ordered Q4H.       Elevated alkaline phosphatase level  -Likely secondary to osteomyelitis.        Tobacco abuse  Plan:   -Counseled on importance of smoking cessation.   -Nicotine patch ordered for admission.       Hyperlipidemia  -Continue home Statin.       Other specified hypothyroidism  -Continue home Synthroid.       Essential hypertension  -Home regimen: Metoprolol and combination ARB-HCTZ.     Plan:   -Continue home regimen.   -Trending renal function daily.       Coronary artery disease involving native coronary artery of native heart without angina pectoris  Plan:   -Continue home ASA and Statin.       Chronic hepatitis C without hepatic coma  -Follows with hepatology here; suspected secondary to prior history of IVDU.   -Initially diagnosed with genotype 3a; status post treatment in 2009 with remission achieved.   -Developed genotype 1a in 04/2017 attributed to relapse of IVDU; not treated.   -Most recent abdominal U/S WNL in 07/2017.   -No stigmata of cirrhosis on exam.   -Liver function on admission.       Acute osteomyelitis of thoracic spine  -See assessment for epidural abscess.       Acute osteomyelitis of cervical spine  -See assessment for epidural abscess.         VTE Risk Mitigation (From admission, onward)         Ordered     IP VTE HIGH RISK PATIENT  Once      12/07/19 0219     Place sequential compression device  Until discontinued      12/07/19 0219                   Reg Spears MD  Department of Hospital Medicine   [facility]

## 2019-12-07 NOTE — ED PROVIDER NOTES
Encounter Date: 12/6/2019       History     Chief Complaint   Patient presents with    [facility] transfer     presents via EMS for neurosx services r/t osteomyelitis of C6-T2, epidural abscess displacing spinal cord. Pt also has bed bugs per EMS     Patient is a 74 year old male with PMHX of hepatitis C, CAD on plavix 75 mg, HTN, HLD, hypothyroidism, and cirrhosis. He presents to the ED via EMS for back pain. [place] [facility] [place] He reports having back pain for approximately one month. Describes pain as constant and aching. Rates pain 8/10. Denies urinary/fecal incontinence, paresthesias, or lower extremity weakness. He denies fever,chills, nausea, vomiting, sob, chest pain, abd pain, dysuria, diarrhea, or constipation. He is a non smoker and denies alcohol use. Denies hx of IVDU.    The history is provided by the patient and medical records. No  was used.     Review of patient's allergies indicates:   Allergen Reactions    Penicillins Rash     Past Medical History:   Diagnosis Date    CAD (coronary artery disease)     s/p stent 2005, on plavix    Chronic hepatitis C     Cirrhosis     biopsy proven - 2007    History of colon polyps 06/2008    1 polyp - tubular adenoma    HTN (hypertension)     Hyperlipidemia     Hypothyroidism      Past Surgical History:   Procedure Laterality Date    COLONOSCOPY W/ POLYPECTOMY  06/2008    Dr Wagoner: fair prep, 3mm polyp - tubular adenoma    CORONARY ANGIOPLASTY WITH STENT PLACEMENT      hydrocele repair  teenager    pyloric stenosis repair  age 1    TONSILLECTOMY       History reviewed. No pertinent family history.  Social History     Tobacco Use    Smoking status: Current Every Day Smoker   Substance Use Topics    Alcohol use: Not on file    Drug use: Yes     Types: IV     Comment: MORPHINE     Review of Systems   Constitutional: Negative for fever.   HENT: Negative for sore throat.    Respiratory:  Negative for shortness of breath.    Cardiovascular: Negative for chest pain.   Gastrointestinal: Negative for abdominal pain, nausea and vomiting.   Genitourinary: Negative for dysuria.   Musculoskeletal: Positive for back pain.   Skin: Negative for rash.   Neurological: Negative for weakness.   Hematological: Does not bruise/bleed easily.       Physical Exam     Initial Vitals [12/06/19 1940]   BP Pulse Resp Temp SpO2   121/80 83 18 98.6 °F (37 °C) 98 %      MAP       --         Physical Exam    Vitals reviewed.  Constitutional: He appears well-developed and well-nourished. No distress.   HENT:   Head: Normocephalic.   Eyes: Conjunctivae are normal.   Neck: Normal range of motion.   Cardiovascular: Normal rate and regular rhythm.   No murmur heard.  Pulmonary/Chest: Breath sounds normal. No respiratory distress. He has no wheezes. He has no rales.   Abdominal: Soft. Bowel sounds are normal. He exhibits no distension. There is no tenderness.   Musculoskeletal: Normal range of motion.   Cervical and thoracic midline spinal tenderness.    Neurological: He is alert and oriented to person, place, and time. He has normal strength. No sensory deficit.   Motor strength of b/l UE and LE 5/5. Heel to shin negative.   Skin: Skin is warm and dry. No erythema.         ED Course   Procedures  Labs Reviewed   SEDIMENTATION RATE - Abnormal; Notable for the following components:       Result Value    Sed Rate 32 (*)     All other components within normal limits   C-REACTIVE PROTEIN - Abnormal; Notable for the following components:    CRP 73.2 (*)     All other components within normal limits   PROCALCITONIN   APTT          Imaging Results           MRI Cervical Spine W WO Cont (Final result)  Result time 12/07/19 02:39:08    Final result by Bartolome Jordan MD (12/07/19 02:39:08)                 Impression:      Known discitis osteomyelitis C6 through T3 with epidural phlegmon and abscess producing moderate spinal canal stenosis  C6 through T1.  Epidural phlegmon measures measures 6 x 0.9 x 1.9 cm posteriorly.  Largest abscess component measures 1.9 x 0.6 by 0.6 cm.    Anterior prevertebral abscess and phlegmon as detailed above.    Additional details as above.    This report was flagged in Epic as abnormal.    Electronically signed by resident: Tom Andujar  Date:    12/07/2019  Time:    01:35    Electronically signed by: Bartolome Jordan MD  Date:    12/07/2019  Time:    02:39             Narrative:    EXAMINATION:  MRI CERVICAL SPINE W WO CONTRAST    CLINICAL HISTORY:  epidural abscess;    TECHNIQUE:  Multiplanar, multisequence MR images of the cervical spine were performed without the administration of contrast and also after the administration of 9 mL of Gadavist IV contrast.    COMPARISON:  MRI thoracic spine 12/06/2019.  CT thoracic and cervical spine 12/06/2019.    FINDINGS:  Alignment: Reversal of the normal cervical lordosis C5 through T2 as result of infectious change.    Vertebrae: There is diffuse T1 hypointensity C6 through T3 concerning for discitis/osteomyelitis at these levels.  There is vertebral body height loss C6 through T2.  The endplates appears significantly irregular throughout these levels.  There is extension of T1 hypointensity and abnormal postcontrast enhancement into the pedicles C5 through T3.    Discs: Disc height loss and distal fluid C5 through T3.  Other disc levels appear better preserved.    Cord, epidural space, and anterior prevertebral soft tissues: No STIR hyperintensity to specifically suggest severe spinal canal stenosis.  There is a epidural abscess and phlegmon along the posterior margin of C6 through the inferior margin of T2 resulting in a moderate degree of stenosis at C6-T1.  The abscess component appears multiloculated.  The abscess and phlegmon area measures 6.0  Cm by 0.9 cm by 1.9 cm posteriorly, as seen on postcontrast imaging series 15 image 9.  The largest component of nonenhancing  peripherally encapsulated abscess appears about the C7 level and measures 1.9 x 0.6 by 0.6 cm.    Additionally, there is a anterior component of abscess and phlegmon which extends from the inferior margin of C5 through the anterior margin of T3.    Skull base and craniocervical junction: Normal.    Degenerative findings: Moderate spinal canal stenosis posteriorly from the inferior margin of C6 to the posterior margin of T1.  Mild spinal canal stenosis T1-2.  Mild spinal canal stenosis about the posterior margin of C5-6.    Other degenerative findings concordant with the prior CT report of the same date.    Posterior soft tissues: STIR hyperintensity along the paraspinous musculature with some abnormal enhancement.  This is favored to relate to muscular edema and strain.  No nonenhancing fluid collection is seen.  No osseous abnormality.                                CT Cervical Spine Without Contrast (Final result)  Result time 12/07/19 00:28:46    Final result by Bartolome Jordan MD (12/07/19 00:28:46)                 Impression:      Findings of osteomyelitis as detailed above C5 through T2.  This produces at least moderate spinal canal stenosis at multiple levels, however this process was better evaluated on the prior MRI of the same date.    Large paravertebral phlegmon or abscess extending from C5 through T2.    Left lung base consolidation favored to represent aspiration or pneumonia.    This report was flagged in Epic as abnormal.    Electronically signed by resident: Tom Andujar  Date:    12/06/2019  Time:    23:36    Electronically signed by: Bartolome Jordan MD  Date:    12/07/2019  Time:    00:28             Narrative:    EXAMINATION:  CT THORACIC SPINE WITHOUT CONTRAST; CT CERVICAL SPINE WITHOUT CONTRAST    CLINICAL HISTORY:  Abnormal xray, thoracic spine, DJD;; Abnormal xray, cervical spine, DJD;    TECHNIQUE:  Low-dose axial CT images of the cervical, thoracic, spine were performed without the  administration of contrast.  Multiplanar reformats were reconstructed.    COMPARISON:  MRI spine 12/06/2019    FINDINGS:  CERVICAL and thoracic    Alignment: Cervicothoracic kyphosis related to osseous destructive changes C6 through T2..  Thoracic spinal alignment is preserved.    Vertebrae: Osseous destructive changes likely related to osteomyelitis C5 through T2.    Discs: Disc height loss C5 through T2.  Thoracic discs are otherwise preserved.  No other cervical disc height loss.    Skull base and craniocervical junction: Normal.    Degenerative findings:    C2-C3: Facet arthropathy and uncovertebral joint hypertrophy without high-grade spinal canal stenosis or neural foraminal narrowing.    C3-C4: Facet arthropathy and uncovertebral joint hypertrophy produces no spinal canal stenosis or neural foraminal narrowing    C4-C5: Facet arthropathy and uncovertebral joint hypertrophy produce moderate left neural foraminal narrowing.  No spinal canal stenosis.    C5-C6: Osseous destructive change with posterior soft tissue component produces moderate spinal canal stenosis at this level.    C6-C7: Uncovertebral joint hypertrophy and facet arthropathy produce moderate right and severe left neural foraminal narrowing moderate to severe spinal canal stenosis.    C7-T1: Degenerative and infectious changes combine to produce likely mild spinal canal stenosis.  No neural foraminal narrowing.    T1-2: Definite no spinal canal stenosis or neural foraminal narrowing.  Exam is somewhat degraded by beam hardening artifact.    T2-T12: No definite spinal canal stenosis.    Paraspinous soft tissues: Large paravertebral phlegmon or abscess C5 through T2.    Mild paraseptal emphysema.  Thyroid within normal limits.  Calcific atherosclerosis of the carotid bifurcations bilaterally.    Consolidation with air bronchograms in the left lung base, concerning for aspiration or pneumonia.                                CT Thoracic Spine Without  Contrast (Final result)  Result time 12/07/19 00:28:46    Final result by Bartolome Jordan MD (12/07/19 00:28:46)                 Impression:      Findings of osteomyelitis as detailed above C5 through T2.  This produces at least moderate spinal canal stenosis at multiple levels, however this process was better evaluated on the prior MRI of the same date.    Large paravertebral phlegmon or abscess extending from C5 through T2.    Left lung base consolidation favored to represent aspiration or pneumonia.    This report was flagged in Epic as abnormal.    Electronically signed by resident: Tom Andujar  Date:    12/06/2019  Time:    23:36    Electronically signed by: Bartolome Jordan MD  Date:    12/07/2019  Time:    00:28             Narrative:    EXAMINATION:  CT THORACIC SPINE WITHOUT CONTRAST; CT CERVICAL SPINE WITHOUT CONTRAST    CLINICAL HISTORY:  Abnormal xray, thoracic spine, DJD;; Abnormal xray, cervical spine, DJD;    TECHNIQUE:  Low-dose axial CT images of the cervical, thoracic, spine were performed without the administration of contrast.  Multiplanar reformats were reconstructed.    COMPARISON:  MRI spine 12/06/2019    FINDINGS:  CERVICAL and thoracic    Alignment: Cervicothoracic kyphosis related to osseous destructive changes C6 through T2..  Thoracic spinal alignment is preserved.    Vertebrae: Osseous destructive changes likely related to osteomyelitis C5 through T2.    Discs: Disc height loss C5 through T2.  Thoracic discs are otherwise preserved.  No other cervical disc height loss.    Skull base and craniocervical junction: Normal.    Degenerative findings:    C2-C3: Facet arthropathy and uncovertebral joint hypertrophy without high-grade spinal canal stenosis or neural foraminal narrowing.    C3-C4: Facet arthropathy and uncovertebral joint hypertrophy produces no spinal canal stenosis or neural foraminal narrowing    C4-C5: Facet arthropathy and uncovertebral joint hypertrophy produce moderate  left neural foraminal narrowing.  No spinal canal stenosis.    C5-C6: Osseous destructive change with posterior soft tissue component produces moderate spinal canal stenosis at this level.    C6-C7: Uncovertebral joint hypertrophy and facet arthropathy produce moderate right and severe left neural foraminal narrowing moderate to severe spinal canal stenosis.    C7-T1: Degenerative and infectious changes combine to produce likely mild spinal canal stenosis.  No neural foraminal narrowing.    T1-2: Definite no spinal canal stenosis or neural foraminal narrowing.  Exam is somewhat degraded by beam hardening artifact.    T2-T12: No definite spinal canal stenosis.    Paraspinous soft tissues: Large paravertebral phlegmon or abscess C5 through T2.    Mild paraseptal emphysema.  Thyroid within normal limits.  Calcific atherosclerosis of the carotid bifurcations bilaterally.    Consolidation with air bronchograms in the left lung base, concerning for aspiration or pneumonia.                                 Medical Decision Making:   History:   Old Medical Records: I decided to obtain old medical records.  Old Records Summarized: records from another hospital.       <> Summary of Records: Patient underwent MRI thoracic spine outpatient found to have discovertebral osteomyelitis of C6-T2 with epidural abscess that is posteriorly displacing the cord. Patient received vanc and rocephin IV at previous facility.  Other:   I have discussed this case with another health care provider.       <> Summary of the Discussion: Case discussed with neurosurgery and hospital medicine for admission.        APC / Resident Notes:   Patient is a 74 year old male presents to the ED for emergent evaluation of back pain.     Will consult neurosurgery, awaiting recs. Will continue to monitor.     Differential diagnoses include, but are not limited to: spinal epidural abscess, discitis, cord compression, or electrolyte imbalance.     Appreciate  neurosurgery consult.  They believe there is no emergent neurosurgical intervention    Will admit to medicine.     I have discussed and reviewed with my supervising physician.         Attending Attestation:     Physician Attestation Statement for NP/PA:   I discussed this assessment and plan of this patient with the NP/PA, but I did not personally examine the patient. The face to face encounter was performed by the NP/PA.              Clinical Impression:       ICD-10-CM ICD-9-CM   1. Epidural abscess G06.2 324.9   2. Osteomyelitis of cervical spine M46.22 730.28         Disposition:   Disposition: Admitted  Condition: Serious                     Sonal Dunn PA-C  12/07/19 0820       Sher Mae MD  12/08/19 8219

## 2019-12-07 NOTE — PROGRESS NOTES
[facility]  Neurosurgery  Progress Note    Subjective:     History of Present Illness: Junaid Muñoz is a 74 y.o. male with PMH of HTN, HLD, Hepatitis C, and CAD s/p two stents on plavix who presents with 1 month history of back pain between his shoulders. MRI at OSH demonstrates likely epidural abscess. Pt denies hx of trauma and reports slow onset of symptoms.     Post-Op Info:  * No surgery found *         Interval History: naeon. Pt resting comfortably in bed with only complaint of some subscapular pan unchanged from yesterday    Medications Prior to Admission   Medication Sig Dispense Refill Last Dose    atorvastatin (LIPITOR) 80 MG tablet Take 80 mg by mouth once daily at 6am.   12/6/2019 at Unknown time    clopidogrel (PLAVIX) 75 mg tablet Take 75 mg by mouth once daily at 6am.   12/6/2019 at Unknown time    ibuprofen (ADVIL,MOTRIN) 600 MG tablet Take 1 tablet (600 mg total) by mouth every 6 (six) hours as needed for Pain. 20 tablet 0 Past Week at Unknown time    levothyroxine (SYNTHROID) 50 MCG tablet Take 50 mcg by mouth once daily at 6am.   12/6/2019 at Unknown time    losartan-hydrochlorothiazide 100-25 mg (HYZAAR) 100-25 mg per tablet Take 1 tablet by mouth once daily at 6am.    12/6/2019 at Unknown time    metoprolol succinate (TOPROL-XL) 100 MG 24 hr tablet Take 100 mg by mouth once daily at 6am.   12/6/2019 at Unknown time    traMADol (ULTRAM) 50 mg tablet Take 50 mg by mouth every 6 (six) hours as needed for Pain.   12/6/2019 at Unknown time       Review of patient's allergies indicates:   Allergen Reactions    Penicillins Rash       Past Medical History:   Diagnosis Date    CAD (coronary artery disease)     s/p stent 2005, on plavix    Chronic hepatitis C     Cirrhosis     biopsy proven - 2007    History of colon polyps 06/2008    1 polyp - tubular adenoma    HTN (hypertension)     Hyperlipidemia     Hypothyroidism      Past Surgical History:   Procedure  Laterality Date    COLONOSCOPY W/ POLYPECTOMY  06/2008    Dr Waogner: fair prep, 3mm polyp - tubular adenoma    CORONARY ANGIOPLASTY WITH STENT PLACEMENT      hydrocele repair  teenager    pyloric stenosis repair  age 1    TONSILLECTOMY       Family History     None        Tobacco Use    Smoking status: Current Every Day Smoker   Substance and Sexual Activity    Alcohol use: Not on file    Drug use: Yes     Types: IV     Comment: MORPHINE    Sexual activity: Not on file     Review of Systems   Constitutional: Negative for chills and fever.   HENT: Negative for sinus pressure and sinus pain.    Eyes: Negative for photophobia and visual disturbance.   Respiratory: Negative for cough and shortness of breath.    Cardiovascular: Negative for chest pain and palpitations.   Gastrointestinal: Negative for constipation, diarrhea, nausea and vomiting.   Endocrine: Negative for polydipsia and polyuria.   Genitourinary: Negative for difficulty urinating, frequency and urgency.   Musculoskeletal: Positive for back pain. Negative for myalgias, neck pain and neck stiffness.   Skin: Negative for color change and rash.   Neurological: Negative for tremors, seizures, syncope, facial asymmetry, speech difficulty, weakness, light-headedness, numbness and headaches.   Psychiatric/Behavioral: Negative for agitation and sleep disturbance.     Objective:     Weight: 79.4 kg (175 lb 0.7 oz)  Body mass index is 25.12 kg/m².  Vital Signs (Most Recent):  Temp: 98.1 °F (36.7 °C) (12/07/19 1205)  Pulse: (!) 59 (12/07/19 1205)  Resp: 18 (12/07/19 1205)  BP: (!) 141/71 (12/07/19 1205)  SpO2: (!) 94 % (12/07/19 1205) Vital Signs (24h Range):  Temp:  [98.1 °F (36.7 °C)-98.6 °F (37 °C)] 98.1 °F (36.7 °C)  Pulse:  [51-83] 59  Resp:  [15-19] 18  SpO2:  [94 %-98 %] 94 %  BP: (121-187)/(71-86) 141/71     Date 12/07/19 0700 - 12/08/19 0659   Shift 3336-0710 0577-9479 4721-7117 24 Hour Total   INTAKE   P.O. 120   120   Shift Total(mL/kg) 120(1.5)    120(1.5)   OUTPUT   Shift Total(mL/kg)       Weight (kg) 79.4 79.4 79.4 79.4                        Physical Exam:    Constitutional: He appears well-developed and well-nourished.     Eyes: Pupils are equal, round, and reactive to light. Conjunctivae and EOM are normal.     Cardiovascular: Normal rate, regular rhythm and normal pulses.     Abdominal: Soft.     Psych/Behavior: He is alert. He is oriented to person, place, and time. He has a normal mood and affect.     Musculoskeletal:   Kyphotic cervicothoracic spine  Appropriate tone, no evidence of spasm     Neurological:   GCS [id number]  CN II-XII grossly intact  Strength 5/5 throughout  Sensation intact to light touch  DTRs 2+ and symmetric  Neg Damian's, no clonus       Significant Labs:  Recent Labs   Lab 12/06/19  1235 12/07/19  0643   * 118*    134*   K 3.5 3.0*    102   CO2 26 26   BUN 23* 17   CREATININE 0.90 0.8   CALCIUM 9.1 8.2*   MG 2.0 1.8     Recent Labs   Lab 12/06/19  1235 12/07/19  0643   WBC 9.70 8.72   HGB 11.2* 9.5*   HCT 34.7* 31.7*    148*     Recent Labs   Lab 12/06/19  1235 12/07/19  0013   LABPT 14.4  --    INR 1.1  --    APTT  --  26.5     Microbiology Results (last 7 days)     ** No results found for the last 168 hours. **        All pertinent labs from the last 24 hours have been reviewed.    Significant Diagnostics:    CT cervical and thoracic spine  Findings of osteomyelitis as detailed above C5 through T2.  This produces at least moderate spinal canal stenosis at multiple levels, however this process was better evaluated on the prior MRI of the same date.    Large paravertebral phlegmon or abscess extending from C5 through T2.    Left lung base consolidation favored to represent aspiration or pneumonia.    This report was flagged in Epic as abnormal.    MRI Cervical spine with and without contrast  Known discitis osteomyelitis C6 through T3 with epidural phlegmon and abscess producing moderate spinal canal  stenosis C6 through T1.  Epidural phlegmon measures measures 6 x 0.9 x 1.9 cm posteriorly.  Largest abscess component measures 1.9 x 0.6 by 0.6 cm.    Anterior prevertebral abscess and phlegmon as detailed above.          Assessment/Plan:     * Spinal epidural abscess  74 y.o. male with PMH of HTN, HLD, Hepatitis C, and CAD s/p two stents on plavix who presents with C6-T2 osteomyelitis with suspected epidural abscess.    - No emergent neurosurgical intervention  - MRI and CT spine imaging reviewed.   - Recommend IR consult for biopsy of lesion today  - Recommend  brace fitted by [facility].  - Recommend admission to hospital medicine for infectious workup  - Pain control per primay  - infectious workup per primary        Paul Castellanos MD  Neurosurgery  [facility]

## 2019-12-07 NOTE — ASSESSMENT & PLAN NOTE
-Follows with hepatology here; suspected secondary to prior history of IVDU.   -Initially diagnosed with genotype 3a; status post treatment in 2009 with remission achieved.   -Developed genotype 1a in 04/2017 attributed to relapse of IVDU; not treated.   -Most recent abdominal U/S WNL in 07/2017.   -No stigmata of cirrhosis on exam.   -Liver function on admission.

## 2019-12-07 NOTE — SUBJECTIVE & OBJECTIVE
Interval History: naeon. Pt resting comfortably in bed with only complaint of some subscapular pan unchanged from yesterday    Medications Prior to Admission   Medication Sig Dispense Refill Last Dose    atorvastatin (LIPITOR) 80 MG tablet Take 80 mg by mouth once daily at 6am.   12/6/2019 at Unknown time    clopidogrel (PLAVIX) 75 mg tablet Take 75 mg by mouth once daily at 6am.   12/6/2019 at Unknown time    ibuprofen (ADVIL,MOTRIN) 600 MG tablet Take 1 tablet (600 mg total) by mouth every 6 (six) hours as needed for Pain. 20 tablet 0 Past Week at Unknown time    levothyroxine (SYNTHROID) 50 MCG tablet Take 50 mcg by mouth once daily at 6am.   12/6/2019 at Unknown time    losartan-hydrochlorothiazide 100-25 mg (HYZAAR) 100-25 mg per tablet Take 1 tablet by mouth once daily at 6am.    12/6/2019 at Unknown time    metoprolol succinate (TOPROL-XL) 100 MG 24 hr tablet Take 100 mg by mouth once daily at 6am.   12/6/2019 at Unknown time    traMADol (ULTRAM) 50 mg tablet Take 50 mg by mouth every 6 (six) hours as needed for Pain.   12/6/2019 at Unknown time       Review of patient's allergies indicates:   Allergen Reactions    Penicillins Rash       Past Medical History:   Diagnosis Date    CAD (coronary artery disease)     s/p stent 2005, on plavix    Chronic hepatitis C     Cirrhosis     biopsy proven - 2007    History of colon polyps 06/2008    1 polyp - tubular adenoma    HTN (hypertension)     Hyperlipidemia     Hypothyroidism      Past Surgical History:   Procedure Laterality Date    COLONOSCOPY W/ POLYPECTOMY  06/2008    Dr Wagoner: fair prep, 3mm polyp - tubular adenoma    CORONARY ANGIOPLASTY WITH STENT PLACEMENT      hydrocele repair  teenager    pyloric stenosis repair  age 1    TONSILLECTOMY       Family History     None        Tobacco Use    Smoking status: Current Every Day Smoker   Substance and Sexual Activity    Alcohol use: Not on file    Drug use: Yes     Types: IV     Comment:  MORPHINE    Sexual activity: Not on file     Review of Systems   Constitutional: Negative for chills and fever.   HENT: Negative for sinus pressure and sinus pain.    Eyes: Negative for photophobia and visual disturbance.   Respiratory: Negative for cough and shortness of breath.    Cardiovascular: Negative for chest pain and palpitations.   Gastrointestinal: Negative for constipation, diarrhea, nausea and vomiting.   Endocrine: Negative for polydipsia and polyuria.   Genitourinary: Negative for difficulty urinating, frequency and urgency.   Musculoskeletal: Positive for back pain. Negative for myalgias, neck pain and neck stiffness.   Skin: Negative for color change and rash.   Neurological: Negative for tremors, seizures, syncope, facial asymmetry, speech difficulty, weakness, light-headedness, numbness and headaches.   Psychiatric/Behavioral: Negative for agitation and sleep disturbance.     Objective:     Weight: 79.4 kg (175 lb 0.7 oz)  Body mass index is 25.12 kg/m².  Vital Signs (Most Recent):  Temp: 98.1 °F (36.7 °C) (12/07/19 1205)  Pulse: (!) 59 (12/07/19 1205)  Resp: 18 (12/07/19 1205)  BP: (!) 141/71 (12/07/19 1205)  SpO2: (!) 94 % (12/07/19 1205) Vital Signs (24h Range):  Temp:  [98.1 °F (36.7 °C)-98.6 °F (37 °C)] 98.1 °F (36.7 °C)  Pulse:  [51-83] 59  Resp:  [15-19] 18  SpO2:  [94 %-98 %] 94 %  BP: (121-187)/(71-86) 141/71     Date 12/07/19 0700 - 12/08/19 0659   Shift 3899-2055 3047-6412 6997-8274 24 Hour Total   INTAKE   P.O. 120   120   Shift Total(mL/kg) 120(1.5)   120(1.5)   OUTPUT   Shift Total(mL/kg)       Weight (kg) 79.4 79.4 79.4 79.4                        Physical Exam:    Constitutional: He appears well-developed and well-nourished.     Eyes: Pupils are equal, round, and reactive to light. Conjunctivae and EOM are normal.     Cardiovascular: Normal rate, regular rhythm and normal pulses.     Abdominal: Soft.     Psych/Behavior: He is alert. He is oriented to person, place, and time. He  has a normal mood and affect.     Musculoskeletal:   Kyphotic cervicothoracic spine  Appropriate tone, no evidence of spasm     Neurological:   GCS E4V5M6  CN II-XII grossly intact  Strength 5/5 throughout  Sensation intact to light touch  DTRs 2+ and symmetric  Neg Damian's, no clonus       Significant Labs:  Recent Labs   Lab 12/06/19  1235 12/07/19  0643   * 118*    134*   K 3.5 3.0*    102   CO2 26 26   BUN 23* 17   CREATININE 0.90 0.8   CALCIUM 9.1 8.2*   MG 2.0 1.8     Recent Labs   Lab 12/06/19  1235 12/07/19  0643   WBC 9.70 8.72   HGB 11.2* 9.5*   HCT 34.7* 31.7*    148*     Recent Labs   Lab 12/06/19  1235 12/07/19  0013   LABPT 14.4  --    INR 1.1  --    APTT  --  26.5     Microbiology Results (last 7 days)     ** No results found for the last 168 hours. **        All pertinent labs from the last 24 hours have been reviewed.    Significant Diagnostics:    CT cervical and thoracic spine  Findings of osteomyelitis as detailed above C5 through T2.  This produces at least moderate spinal canal stenosis at multiple levels, however this process was better evaluated on the prior MRI of the same date.    Large paravertebral phlegmon or abscess extending from C5 through T2.    Left lung base consolidation favored to represent aspiration or pneumonia.    This report was flagged in Epic as abnormal.    MRI Cervical spine with and without contrast  Known discitis osteomyelitis C6 through T3 with epidural phlegmon and abscess producing moderate spinal canal stenosis C6 through T1.  Epidural phlegmon measures measures 6 x 0.9 x 1.9 cm posteriorly.  Largest abscess component measures 1.9 x 0.6 by 0.6 cm.    Anterior prevertebral abscess and phlegmon as detailed above.

## 2019-12-07 NOTE — PLAN OF CARE
Recv'd pt from MRI via stretcher.  Transferred pt to the bed without incident.  VS and physical assessment obtained.  See flowsheet.  Food provided per pt request.  Scheduled medication and pain medications given.  Oriented pt to the room and to floor routines.  POC discussed with the pt.  Pt verbalizes agreement and understanding of teaching performed.  Pt is in bed AAOx3, tv on and waiting on pharmacy to bring his medication patches.  Will continue to monitor.     Problem: Adult Inpatient Plan of Care  Goal: Plan of Care Review  Outcome: Ongoing, Progressing  Goal: Patient-Specific Goal (Individualization)  Outcome: Ongoing, Progressing

## 2019-12-07 NOTE — ASSESSMENT & PLAN NOTE
This is a 74 year old  male with PMH notable for prior IVDU who presented on 12/06 with one month duration of upper back pain with work-up at outside hospital significant for MRI suggestive of osteomyelitis of C6-T2 with associated epidural abscess with posterior spinal cord displacement. He has no neurological deficits on exam and without evidence of developing sepsis. He is status post evaluation by neurosurgery on arrival, with no plans for emergent intervention. Etiology of presentation unclear; could be secondary to relapse of IVDU.     Plan:   -Continue empiric coverage with Ceftriaxone and Vancomycin.   -Hold off on initiating Dexamethasone as without neurological deficits on exam.   -Follow-up MRI cervical spine and CT of cervical and thoracic spine ordered here.   -Follow-up neurosurgery recommendations regarding timing of intervention.   -ID consulted for antibiotic recommendations.   -Follow-up blood cultures.   -Follow-up urine toxicology.   -Pain control ordered PRN.   -Neuro checks ordered Q4H.

## 2019-12-07 NOTE — ASSESSMENT & PLAN NOTE
74 y.o. male with PMH of HTN, HLD, Hepatitis C, and CAD s/p two stents on plavix who presents with C6-T2 osteomyelitis with suspected epidural abscess.    - No emergent neurosurgical intervention  - CT C/T spine ordered for further evaluation of bony anatomy  - MRI cervical spine w/wo contrast ordered for evaluation of suspected epidural abscess  - Recommend admission to hospital medicine for infectious workup  - Pain control per primary team  - Further recommendations pending above imaging

## 2019-12-07 NOTE — ASSESSMENT & PLAN NOTE
Plan:   -Counseled on importance of smoking cessation.   -Nicotine patch ordered for admission.

## 2019-12-07 NOTE — ASSESSMENT & PLAN NOTE
-Home regimen: Metoprolol and combination ARB-HCTZ.     Plan:   -Continue home regimen.   -Trending renal function daily.

## 2019-12-07 NOTE — PROGRESS NOTES
Pharmacokinetic Initial Assessment: IV Vancomycin    Assessment/Plan:    Vancomycin 2000 mg given at outside facility.  Continue with vancomycin 1000 mg IVPB every 12 hours.  Desired empiric serum trough concentration is 10 to 20 mcg/mL.  Draw vancomycin trough level prior to 4th dose on 12/08/2019 at 0230.  Pharmacy will continue to follow and monitor vancomycin.      Please contact pharmacy at extension [phone] with any questions regarding this assessment.     Thank you for the consult,   Arian Barajas       Patient brief summary:  Junaid Muñoz is a 74 y.o. male initiated on antimicrobial therapy with IV Vancomycin for treatment of suspected bone/joint infection.    Drug Allergies:   Review of patient's allergies indicates:   Allergen Reactions    Penicillins Rash     Actual Body Weight:   79.4 kg    Renal Function:   Estimated Creatinine Clearance: 74.4 mL/min (based on SCr of 0.9 mg/dL).    CBC (last 72 hours):  Recent Labs   Lab Result Units 12/06/19  1235   WBC K/uL 9.70   Hemoglobin g/dL 11.2*   Hematocrit % 34.7*   Platelets K/uL 186   Gran% % 77.0*   Lymph% % 11.9*   Mono% % 7.0   Eosinophil% % 3.5   Basophil% % 0.6   Differential Method  Automated       Metabolic Panel (last 72 hours):  Recent Labs   Lab Result Units 12/06/19  1235   Sodium mmol/L 139   Potassium mmol/L 3.5   Chloride mmol/L 104   CO2 mmol/L 26   Glucose mg/dL 117*   BUN, Bld mg/dL 23*   Creatinine mg/dL 0.90   Albumin g/dL 3.2*   Total Bilirubin mg/dL 0.8   Alkaline Phosphatase U/L 284*   AST U/L 42   ALT U/L 19   Magnesium mg/dL 2.0   Phosphorus mg/dL 3.00       Drug levels (last 3 results):  No results for input(s): VANCOMYCINRA, VANCOMYCINPE, VANCOMYCINTR in the last 72 hours.    Microbiologic Results:  Microbiology Results (last 7 days)     ** No results found for the last 168 hours. **

## 2019-12-07 NOTE — SUBJECTIVE & OBJECTIVE
(Not in a hospital admission)    Review of patient's allergies indicates:   Allergen Reactions    Penicillins Rash       Past Medical History:   Diagnosis Date    CAD (coronary artery disease)     s/p stent 2005, on plavix    Chronic hepatitis C     Cirrhosis     biopsy proven - 2007    History of colon polyps 06/2008    1 polyp - tubular adenoma    HTN (hypertension)     Hyperlipidemia     Hypothyroidism      Past Surgical History:   Procedure Laterality Date    COLONOSCOPY W/ POLYPECTOMY  06/2008    Dr Wagoner: fair prep, 3mm polyp - tubular adenoma    CORONARY ANGIOPLASTY WITH STENT PLACEMENT      hydrocele repair  teenager    pyloric stenosis repair  age 1    TONSILLECTOMY       Family History     None        Tobacco Use    Smoking status: Current Every Day Smoker   Substance and Sexual Activity    Alcohol use: Not on file    Drug use: Yes     Types: IV     Comment: MORPHINE    Sexual activity: Not on file     Review of Systems   Constitutional: Negative for chills and fever.   HENT: Negative for sinus pressure and sinus pain.    Eyes: Negative for photophobia and visual disturbance.   Respiratory: Negative for cough and shortness of breath.    Cardiovascular: Negative for chest pain and palpitations.   Gastrointestinal: Negative for constipation, diarrhea, nausea and vomiting.   Endocrine: Negative for polydipsia and polyuria.   Genitourinary: Negative for difficulty urinating, frequency and urgency.   Musculoskeletal: Positive for back pain. Negative for myalgias, neck pain and neck stiffness.   Skin: Negative for color change and rash.   Neurological: Negative for tremors, seizures, syncope, facial asymmetry, speech difficulty, weakness, light-headedness, numbness and headaches.   Psychiatric/Behavioral: Negative for agitation and sleep disturbance.     Objective:        There is no height or weight on file to calculate BMI.  Vital Signs (Most Recent):  Temp: 98.6 °F (37 °C) (12/06/19  1940)  Pulse: 65 (12/06/19 2037)  Resp: 19 (12/06/19 2037)  BP: (!) 165/72 (12/06/19 2037)  SpO2: 96 % (12/06/19 2037) Vital Signs (24h Range):  Temp:  [97.8 °F (36.6 °C)-98.6 °F (37 °C)] 98.6 °F (37 °C)  Pulse:  [57-83] 65  Resp:  [12-19] 19  SpO2:  [96 %-98 %] 96 %  BP: (115-187)/(66-86) 165/72                          Physical Exam:    Constitutional: He appears well-developed and well-nourished.     Eyes: Pupils are equal, round, and reactive to light. Conjunctivae and EOM are normal.     Cardiovascular: Normal rate, regular rhythm and normal pulses.     Abdominal: Soft.     Psych/Behavior: He is alert. He is oriented to person, place, and time. He has a normal mood and affect.     Musculoskeletal:   Kyphotic cervicothoracic spine  Appropriate tone, no evidence of spasm     Neurological:   GCS E4V5M6  CN II-XII grossly intact  Strength 5/5 throughout  Sensation intact to light touch  DTRs 2+ and symmetric  Neg Damian's, no clonus       Significant Labs:  Recent Labs   Lab 12/06/19  1235   *      K 3.5      CO2 26   BUN 23*   CREATININE 0.90   CALCIUM 9.1   MG 2.0     Recent Labs   Lab 12/06/19  1235   WBC 9.70   HGB 11.2*   HCT 34.7*        Recent Labs   Lab 12/06/19  1235   LABPT 14.4   INR 1.1     Microbiology Results (last 7 days)     ** No results found for the last 168 hours. **        All pertinent labs from the last 24 hours have been reviewed.    Significant Diagnostics:  I have reviewed all pertinent imaging results/findings within the past 24 hours.

## 2019-12-07 NOTE — CONSULTS
[facility]  Neurosurgery  Consult Note    Consults  Subjective:     Chief Complaint/Reason for Admission: back pain    History of Present Illness: Junaid Muñoz is a 74 y.o. male with PMH of HTN, HLD, Hepatitis C, and CAD s/p two stents on plavix who presents with 1 month history of back pain between his shoulders. MRI at OSH demonstrates likely epidural abscess. Pt denies hx of trauma and reports slow onset of symptoms.       (Not in a hospital admission)    Review of patient's allergies indicates:   Allergen Reactions    Penicillins Rash       Past Medical History:   Diagnosis Date    CAD (coronary artery disease)     s/p stent 2005, on plavix    Chronic hepatitis C     Cirrhosis     biopsy proven - 2007    History of colon polyps 06/2008    1 polyp - tubular adenoma    HTN (hypertension)     Hyperlipidemia     Hypothyroidism      Past Surgical History:   Procedure Laterality Date    COLONOSCOPY W/ POLYPECTOMY  06/2008    Dr Wagoner: fair prep, 3mm polyp - tubular adenoma    CORONARY ANGIOPLASTY WITH STENT PLACEMENT      hydrocele repair  teenager    pyloric stenosis repair  age 1    TONSILLECTOMY       Family History     None        Tobacco Use    Smoking status: Current Every Day Smoker   Substance and Sexual Activity    Alcohol use: Not on file    Drug use: Yes     Types: IV     Comment: MORPHINE    Sexual activity: Not on file     Review of Systems   Constitutional: Negative for chills and fever.   HENT: Negative for sinus pressure and sinus pain.    Eyes: Negative for photophobia and visual disturbance.   Respiratory: Negative for cough and shortness of breath.    Cardiovascular: Negative for chest pain and palpitations.   Gastrointestinal: Negative for constipation, diarrhea, nausea and vomiting.   Endocrine: Negative for polydipsia and polyuria.   Genitourinary: Negative for difficulty urinating, frequency and urgency.   Musculoskeletal: Positive for back pain.  Negative for myalgias, neck pain and neck stiffness.   Skin: Negative for color change and rash.   Neurological: Negative for tremors, seizures, syncope, facial asymmetry, speech difficulty, weakness, light-headedness, numbness and headaches.   Psychiatric/Behavioral: Negative for agitation and sleep disturbance.     Objective:        There is no height or weight on file to calculate BMI.  Vital Signs (Most Recent):  Temp: 98.6 °F (37 °C) (12/06/19 1940)  Pulse: 65 (12/06/19 2037)  Resp: 19 (12/06/19 2037)  BP: (!) 165/72 (12/06/19 2037)  SpO2: 96 % (12/06/19 2037) Vital Signs (24h Range):  Temp:  [97.8 °F (36.6 °C)-98.6 °F (37 °C)] 98.6 °F (37 °C)  Pulse:  [57-83] 65  Resp:  [12-19] 19  SpO2:  [96 %-98 %] 96 %  BP: (115-187)/(66-86) 165/72                          Physical Exam:    Constitutional: He appears well-developed and well-nourished.     Eyes: Pupils are equal, round, and reactive to light. Conjunctivae and EOM are normal.     Cardiovascular: Normal rate, regular rhythm and normal pulses.     Abdominal: Soft.     Psych/Behavior: He is alert. He is oriented to person, place, and time. He has a normal mood and affect.     Musculoskeletal:   Kyphotic cervicothoracic spine  Appropriate tone, no evidence of spasm     Neurological:   GCS E4V5M6  CN II-XII grossly intact  Strength 5/5 throughout  Sensation intact to light touch  DTRs 2+ and symmetric  Neg Damian's, no clonus       Significant Labs:  Recent Labs   Lab 12/06/19  1235   *      K 3.5      CO2 26   BUN 23*   CREATININE 0.90   CALCIUM 9.1   MG 2.0     Recent Labs   Lab 12/06/19  1235   WBC 9.70   HGB 11.2*   HCT 34.7*        Recent Labs   Lab 12/06/19  1235   LABPT 14.4   INR 1.1     Microbiology Results (last 7 days)     ** No results found for the last 168 hours. **        All pertinent labs from the last 24 hours have been reviewed.    Significant Diagnostics:  I have reviewed all pertinent imaging results/findings within  the past 24 hours.    Assessment/Plan:     * Spinal epidural abscess  74 y.o. male with PMH of HTN, HLD, Hepatitis C, and CAD s/p two stents on plavix who presents with C6-T2 osteomyelitis with suspected epidural abscess.    - No emergent neurosurgical intervention  - CT C/T spine ordered for further evaluation of bony anatomy  - MRI cervical spine w/wo contrast ordered for evaluation of suspected epidural abscess  - Recommend admission to hospital medicine for infectious workup  - Pain control per primary team  - Further recommendations pending above imaging        Thank you for your consult. I will follow-up with patient. Please contact us if you have any additional questions.    Korey Umanzor MD  Neurosurgery  [facility]

## 2019-12-07 NOTE — HPI
Junaid Muñoz is a 74 y.o. male with PMH of HTN, HLD, Hepatitis C, and CAD s/p two stents on plavix who presents with 1 month history of back pain between his shoulders. MRI at OSH demonstrates likely epidural abscess. Pt denies hx of trauma and reports slow onset of symptoms.

## 2019-12-07 NOTE — HPI
Mr. Muñoz is a 74yM who was admitted to neurosurgery for spinal epidural abscess and vertebral osteomyelitis. He has a PMH notable for previous IVDU and Hep C (treated) as well as biopsy proven cirrhosis, anemia, HTN, CAD (with stents), HLD, and hypothyroidism. He reported one month duration of progressively worsening upper back pain radiating between the bilateral shoulders. He denies symptom association with fevers, chills, numbness or tingling of extremities, bowel or bladder incontinence, headache, blurry vision, back trauma, prior history of spinal surgeries, chest pain, SOB, or pain with inspiration. Due to symptom progression, he was evaluated by outpatient orthopedist on 12/06 and MRI of thoracic spine was ordered. It was suggestive of osteomyelitis of C6 to T2 associated with epidural abscess with posterior spinal cord displacement. Patient was notified and instructed to report to ED.     He [place] on 12[place]06. Labs were significant for normal WBC, negative lactate, and elevated alkaline phosphatase (284). UA was unremarkable. He was initiated on Ceftriaxone and Vancomycin and transferred to [facility] for neurosurgery evaluation. Neurosurgery recommended obtaining CT of thoracic and lumbar spine and cervical MRI. He was taken to OR for C7-T1 corpectomy on 12/10 and again on 12/17 for posterior fusion with stay in Neuro ICU following surgery and drain management. Stepped down to floor on 12/26. Currently on cefepime 2g Q8 hours for pseudomonas growing in aerobic culture from spine on 12/10 with end date between 1/28 and 2/11/20 with ID follow-up. Blood cultures have remained negative. Also with left toe wound that was being managed by podiatry and wound care. Per neurosurgery notes, patient is currently awaiting SNF placement. Hospital medicine was consulted on 1/2/20 for assistance with new onset elevated ALT/AST with history of Hep C. Per chart review, patient  had been following with Hepatology in 2017 but was lost to follow-up. At that time, Mr. Muñoz appeared to still be actively using IV drugs. Throughout this admission, AST and ALT were wnl until 1/2/20 when they bumped to 54 and 45. His home meds include plavix, amlodipine, losartan-hctz, metoprolol, atorvastatin, levothyroxine however pt states he has not been taking any of them for some time.

## 2019-12-07 NOTE — ASSESSMENT & PLAN NOTE
74 y.o. male with PMH of HTN, HLD, Hepatitis C, and CAD s/p two stents on plavix who presents with C6-T2 osteomyelitis with suspected epidural abscess.    - No emergent neurosurgical intervention  - MRI and CT spine imaging reviewed.   - Recommend IR consult for biopsy of lesion today  - Recommend  brace fitted by [facility].  - Recommend admission to hospital medicine for infectious workup  - Pain control per primay  - infectious workup per primary

## 2019-12-08 LAB
ALBUMIN SERPL BCP-MCNC: 2.3 G/DL (ref 3.5–5.2)
ALP SERPL-CCNC: 247 U/L (ref 55–135)
ALT SERPL W/O P-5'-P-CCNC: 14 U/L (ref 10–44)
ANION GAP SERPL CALC-SCNC: 6 MMOL/L (ref 8–16)
AST SERPL-CCNC: 24 U/L (ref 10–40)
BASOPHILS # BLD AUTO: 0.05 K/UL (ref 0–0.2)
BASOPHILS NFR BLD: 0.7 % (ref 0–1.9)
BILIRUB SERPL-MCNC: 0.4 MG/DL (ref 0.1–1)
BUN SERPL-MCNC: 19 MG/DL (ref 8–23)
CALCIUM SERPL-MCNC: 8.4 MG/DL (ref 8.7–10.5)
CHLORIDE SERPL-SCNC: 110 MMOL/L (ref 95–110)
CO2 SERPL-SCNC: 24 MMOL/L (ref 23–29)
CREAT SERPL-MCNC: 1 MG/DL (ref 0.5–1.4)
DIFFERENTIAL METHOD: ABNORMAL
EOSINOPHIL # BLD AUTO: 0.4 K/UL (ref 0–0.5)
EOSINOPHIL NFR BLD: 5.6 % (ref 0–8)
ERYTHROCYTE [DISTWIDTH] IN BLOOD BY AUTOMATED COUNT: 16.5 % (ref 11.5–14.5)
EST. GFR  (AFRICAN AMERICAN): >60 ML/MIN/1.73 M^2
EST. GFR  (NON AFRICAN AMERICAN): >60 ML/MIN/1.73 M^2
GLUCOSE SERPL-MCNC: 111 MG/DL (ref 70–110)
HCT VFR BLD AUTO: 35.8 % (ref 40–54)
HGB BLD-MCNC: 10.8 G/DL (ref 14–18)
IMM GRANULOCYTES # BLD AUTO: 0.03 K/UL (ref 0–0.04)
IMM GRANULOCYTES NFR BLD AUTO: 0.4 % (ref 0–0.5)
LYMPHOCYTES # BLD AUTO: 1.1 K/UL (ref 1–4.8)
LYMPHOCYTES NFR BLD: 14.9 % (ref 18–48)
MAGNESIUM SERPL-MCNC: 1.7 MG/DL (ref 1.6–2.6)
MCH RBC QN AUTO: 26 PG (ref 27–31)
MCHC RBC AUTO-ENTMCNC: 30.2 G/DL (ref 32–36)
MCV RBC AUTO: 86 FL (ref 82–98)
MONOCYTES # BLD AUTO: 0.6 K/UL (ref 0.3–1)
MONOCYTES NFR BLD: 8.3 % (ref 4–15)
NEUTROPHILS # BLD AUTO: 5 K/UL (ref 1.8–7.7)
NEUTROPHILS NFR BLD: 70.1 % (ref 38–73)
NRBC BLD-RTO: 0 /100 WBC
PHOSPHATE SERPL-MCNC: 3.2 MG/DL (ref 2.7–4.5)
PLATELET # BLD AUTO: 141 K/UL (ref 150–350)
PMV BLD AUTO: 10.2 FL (ref 9.2–12.9)
POTASSIUM SERPL-SCNC: 3.8 MMOL/L (ref 3.5–5.1)
PROT SERPL-MCNC: 6 G/DL (ref 6–8.4)
RBC # BLD AUTO: 4.15 M/UL (ref 4.6–6.2)
SODIUM SERPL-SCNC: 140 MMOL/L (ref 136–145)
VANCOMYCIN TROUGH SERPL-MCNC: 12.6 UG/ML (ref 10–22)
WBC # BLD AUTO: 7.1 K/UL (ref 3.9–12.7)

## 2019-12-08 PROCEDURE — 80202 ASSAY OF VANCOMYCIN: CPT

## 2019-12-08 PROCEDURE — 83735 ASSAY OF MAGNESIUM: CPT

## 2019-12-08 PROCEDURE — 99223 PR INITIAL HOSPITAL CARE,LEVL III: ICD-10-PCS | Mod: ,,, | Performed by: NEUROLOGICAL SURGERY

## 2019-12-08 PROCEDURE — 84100 ASSAY OF PHOSPHORUS: CPT

## 2019-12-08 PROCEDURE — 80053 COMPREHEN METABOLIC PANEL: CPT

## 2019-12-08 PROCEDURE — 11000001 HC ACUTE MED/SURG PRIVATE ROOM

## 2019-12-08 PROCEDURE — 36415 COLL VENOUS BLD VENIPUNCTURE: CPT

## 2019-12-08 PROCEDURE — 25000003 PHARM REV CODE 250: Performed by: STUDENT IN AN ORGANIZED HEALTH CARE EDUCATION/TRAINING PROGRAM

## 2019-12-08 PROCEDURE — 99232 PR SUBSEQUENT HOSPITAL CARE,LEVL II: ICD-10-PCS | Mod: GC,,, | Performed by: HOSPITALIST

## 2019-12-08 PROCEDURE — 99232 SBSQ HOSP IP/OBS MODERATE 35: CPT | Mod: GC,,, | Performed by: HOSPITALIST

## 2019-12-08 PROCEDURE — 99223 1ST HOSP IP/OBS HIGH 75: CPT | Mod: ,,, | Performed by: NEUROLOGICAL SURGERY

## 2019-12-08 PROCEDURE — S4991 NICOTINE PATCH NONLEGEND: HCPCS | Performed by: STUDENT IN AN ORGANIZED HEALTH CARE EDUCATION/TRAINING PROGRAM

## 2019-12-08 PROCEDURE — 85025 COMPLETE CBC W/AUTO DIFF WBC: CPT

## 2019-12-08 RX ADMIN — ACETAMINOPHEN 1000 MG: 500 TABLET ORAL at 02:12

## 2019-12-08 RX ADMIN — LEVOTHYROXINE SODIUM 50 MCG: 50 TABLET ORAL at 05:12

## 2019-12-08 RX ADMIN — SENNOSIDES AND DOCUSATE SODIUM 1 TABLET: 8.6; 5 TABLET ORAL at 10:12

## 2019-12-08 RX ADMIN — METOPROLOL SUCCINATE 100 MG: 100 TABLET, EXTENDED RELEASE ORAL at 08:12

## 2019-12-08 RX ADMIN — NICOTINE 1 PATCH: 21 PATCH, EXTENDED RELEASE TRANSDERMAL at 08:12

## 2019-12-08 RX ADMIN — LOSARTAN POTASSIUM AND HYDROCHLOROTHIAZIDE 1 TABLET: 100; 25 TABLET, FILM COATED ORAL at 08:12

## 2019-12-08 RX ADMIN — ACETAMINOPHEN 1000 MG: 500 TABLET ORAL at 10:12

## 2019-12-08 RX ADMIN — SENNOSIDES AND DOCUSATE SODIUM 1 TABLET: 8.6; 5 TABLET ORAL at 08:12

## 2019-12-08 RX ADMIN — ATORVASTATIN CALCIUM 80 MG: 20 TABLET, FILM COATED ORAL at 08:12

## 2019-12-08 RX ADMIN — OXYCODONE HYDROCHLORIDE 5 MG: 5 TABLET ORAL at 08:12

## 2019-12-08 NOTE — NURSING
POC reviewed with patient.  Discussed with patient needs to be NPO after midnight tonight for procedure tomorrow.  Verbalized understanding.  Bed in lowest position, call light within reach.   Will continue to monitor.

## 2019-12-08 NOTE — SUBJECTIVE & OBJECTIVE
Interval History: naeon. Pt resting comfortably. No complaints this morning    Medications Prior to Admission   Medication Sig Dispense Refill Last Dose    atorvastatin (LIPITOR) 80 MG tablet Take 80 mg by mouth once daily at 6am.   12/6/2019 at Unknown time    clopidogrel (PLAVIX) 75 mg tablet Take 75 mg by mouth once daily at 6am.   12/6/2019 at Unknown time    ibuprofen (ADVIL,MOTRIN) 600 MG tablet Take 1 tablet (600 mg total) by mouth every 6 (six) hours as needed for Pain. 20 tablet 0 Past Week at Unknown time    levothyroxine (SYNTHROID) 50 MCG tablet Take 50 mcg by mouth once daily at 6am.   12/6/2019 at Unknown time    losartan-hydrochlorothiazide 100-25 mg (HYZAAR) 100-25 mg per tablet Take 1 tablet by mouth once daily at 6am.    12/6/2019 at Unknown time    metoprolol succinate (TOPROL-XL) 100 MG 24 hr tablet Take 100 mg by mouth once daily at 6am.   12/6/2019 at Unknown time    traMADol (ULTRAM) 50 mg tablet Take 50 mg by mouth every 6 (six) hours as needed for Pain.   12/6/2019 at Unknown time       Review of patient's allergies indicates:   Allergen Reactions    Penicillins Rash       Past Medical History:   Diagnosis Date    CAD (coronary artery disease)     s/p stent 2005, on plavix    Chronic hepatitis C     Cirrhosis     biopsy proven - 2007    History of colon polyps 06/2008    1 polyp - tubular adenoma    HTN (hypertension)     Hyperlipidemia     Hypothyroidism      Past Surgical History:   Procedure Laterality Date    COLONOSCOPY W/ POLYPECTOMY  06/2008    Dr Wagoner: fair prep, 3mm polyp - tubular adenoma    CORONARY ANGIOPLASTY WITH STENT PLACEMENT      hydrocele repair  teenager    pyloric stenosis repair  age 1    TONSILLECTOMY       Family History     None        Tobacco Use    Smoking status: Current Every Day Smoker   Substance and Sexual Activity    Alcohol use: Not on file    Drug use: Yes     Types: IV     Comment: MORPHINE    Sexual activity: Not on file      Review of Systems   Constitutional: Negative for chills and fever.   HENT: Negative for sinus pressure and sinus pain.    Eyes: Negative for photophobia and visual disturbance.   Respiratory: Negative for cough and shortness of breath.    Cardiovascular: Negative for chest pain and palpitations.   Gastrointestinal: Negative for constipation, diarrhea, nausea and vomiting.   Endocrine: Negative for polydipsia and polyuria.   Genitourinary: Negative for difficulty urinating, frequency and urgency.   Musculoskeletal: Positive for back pain. Negative for myalgias, neck pain and neck stiffness.   Skin: Negative for color change and rash.   Neurological: Negative for tremors, seizures, syncope, facial asymmetry, speech difficulty, weakness, light-headedness, numbness and headaches.   Psychiatric/Behavioral: Negative for agitation and sleep disturbance.     Objective:     Weight: 79.4 kg (175 lb 0.7 oz)  Body mass index is 25.12 kg/m².  Vital Signs (Most Recent):  Temp: 98 °F (36.7 °C) (12/08/19 0400)  Pulse: 60 (12/08/19 0400)  Resp: 14 (12/08/19 0400)  BP: (!) 152/70 (12/08/19 0400)  SpO2: 96 % (12/08/19 0400) Vital Signs (24h Range):  Temp:  [97.4 °F (36.3 °C)-98.8 °F (37.1 °C)] 98 °F (36.7 °C)  Pulse:  [51-66] 60  Resp:  [12-18] 14  SpO2:  [94 %-97 %] 96 %  BP: (141-183)/(69-85) 152/70                          Physical Exam:    Constitutional: He appears well-developed and well-nourished.     Eyes: Pupils are equal, round, and reactive to light. Conjunctivae and EOM are normal.     Cardiovascular: Normal rate, regular rhythm and normal pulses.     Abdominal: Soft.     Psych/Behavior: He is alert. He is oriented to person, place, and time. He has a normal mood and affect.     Musculoskeletal:   Kyphotic cervicothoracic spine  Appropriate tone, no evidence of spasm     Neurological:   GCS [id number]  CN II-XII grossly intact  Strength 5/5 throughout  Sensation intact to light touch  DTRs 2+ and symmetric  Neg Damian's,  no clonus       Significant Labs:  Recent Labs   Lab 12/06/19  1235 12/07/19  0643 12/08/19  0556   * 118* 111*    134* 140   K 3.5 3.0* 3.8    102 110   CO2 26 26 24   BUN 23* 17 19   CREATININE 0.90 0.8 1.0   CALCIUM 9.1 8.2* 8.4*   MG 2.0 1.8 1.7     Recent Labs   Lab 12/06/19  1235 12/07/19  0643 12/08/19  0556   WBC 9.70 8.72 7.10   HGB 11.2* 9.5* 10.8*   HCT 34.7* 31.7* 35.8*    148* 141*     Recent Labs   Lab 12/06/19  1235 12/07/19  0013   LABPT 14.4  --    INR 1.1  --    APTT  --  26.5     Microbiology Results (last 7 days)     ** No results found for the last 168 hours. **        All pertinent labs from the last 24 hours have been reviewed.    Significant Diagnostics:    CT cervical and thoracic spine  Findings of osteomyelitis as detailed above C5 through T2.  This produces at least moderate spinal canal stenosis at multiple levels, however this process was better evaluated on the prior MRI of the same date.    Large paravertebral phlegmon or abscess extending from C5 through T2.    Left lung base consolidation favored to represent aspiration or pneumonia.    This report was flagged in Epic as abnormal.    MRI Cervical spine with and without contrast  Known discitis osteomyelitis C6 through T3 with epidural phlegmon and abscess producing moderate spinal canal stenosis C6 through T1.  Epidural phlegmon measures measures 6 x 0.9 x 1.9 cm posteriorly.  Largest abscess component measures 1.9 x 0.6 by 0.6 cm.    Anterior prevertebral abscess and phlegmon as detailed above.

## 2019-12-08 NOTE — PROGRESS NOTES
[facility]  Neurosurgery  Progress Note    Subjective:     History of Present Illness: Junaid Muñoz is a 74 y.o. male with PMH of HTN, HLD, Hepatitis C, and CAD s/p two stents on plavix who presents with 1 month history of back pain between his shoulders. MRI at OSH demonstrates likely epidural abscess. Pt denies hx of trauma and reports slow onset of symptoms.     Post-Op Info:  * No surgery found *         Interval History: naeon. Pt resting comfortably. No complaints this morning    Medications Prior to Admission   Medication Sig Dispense Refill Last Dose    atorvastatin (LIPITOR) 80 MG tablet Take 80 mg by mouth once daily at 6am.   12/6/2019 at Unknown time    clopidogrel (PLAVIX) 75 mg tablet Take 75 mg by mouth once daily at 6am.   12/6/2019 at Unknown time    ibuprofen (ADVIL,MOTRIN) 600 MG tablet Take 1 tablet (600 mg total) by mouth every 6 (six) hours as needed for Pain. 20 tablet 0 Past Week at Unknown time    levothyroxine (SYNTHROID) 50 MCG tablet Take 50 mcg by mouth once daily at 6am.   12/6/2019 at Unknown time    losartan-hydrochlorothiazide 100-25 mg (HYZAAR) 100-25 mg per tablet Take 1 tablet by mouth once daily at 6am.    12/6/2019 at Unknown time    metoprolol succinate (TOPROL-XL) 100 MG 24 hr tablet Take 100 mg by mouth once daily at 6am.   12/6/2019 at Unknown time    traMADol (ULTRAM) 50 mg tablet Take 50 mg by mouth every 6 (six) hours as needed for Pain.   12/6/2019 at Unknown time       Review of patient's allergies indicates:   Allergen Reactions    Penicillins Rash       Past Medical History:   Diagnosis Date    CAD (coronary artery disease)     s/p stent 2005, on plavix    Chronic hepatitis C     Cirrhosis     biopsy proven - 2007    History of colon polyps 06/2008    1 polyp - tubular adenoma    HTN (hypertension)     Hyperlipidemia     Hypothyroidism      Past Surgical History:   Procedure Laterality Date    COLONOSCOPY W/ POLYPECTOMY   06/2008    Dr Wagoner: fair prep, 3mm polyp - tubular adenoma    CORONARY ANGIOPLASTY WITH STENT PLACEMENT      hydrocele repair  teenager    pyloric stenosis repair  age 1    TONSILLECTOMY       Family History     None        Tobacco Use    Smoking status: Current Every Day Smoker   Substance and Sexual Activity    Alcohol use: Not on file    Drug use: Yes     Types: IV     Comment: MORPHINE    Sexual activity: Not on file     Review of Systems   Constitutional: Negative for chills and fever.   HENT: Negative for sinus pressure and sinus pain.    Eyes: Negative for photophobia and visual disturbance.   Respiratory: Negative for cough and shortness of breath.    Cardiovascular: Negative for chest pain and palpitations.   Gastrointestinal: Negative for constipation, diarrhea, nausea and vomiting.   Endocrine: Negative for polydipsia and polyuria.   Genitourinary: Negative for difficulty urinating, frequency and urgency.   Musculoskeletal: Positive for back pain. Negative for myalgias, neck pain and neck stiffness.   Skin: Negative for color change and rash.   Neurological: Negative for tremors, seizures, syncope, facial asymmetry, speech difficulty, weakness, light-headedness, numbness and headaches.   Psychiatric/Behavioral: Negative for agitation and sleep disturbance.     Objective:     Weight: 79.4 kg (175 lb 0.7 oz)  Body mass index is 25.12 kg/m².  Vital Signs (Most Recent):  Temp: 98 °F (36.7 °C) (12/08/19 0400)  Pulse: 60 (12/08/19 0400)  Resp: 14 (12/08/19 0400)  BP: (!) 152/70 (12/08/19 0400)  SpO2: 96 % (12/08/19 0400) Vital Signs (24h Range):  Temp:  [97.4 °F (36.3 °C)-98.8 °F (37.1 °C)] 98 °F (36.7 °C)  Pulse:  [51-66] 60  Resp:  [12-18] 14  SpO2:  [94 %-97 %] 96 %  BP: (141-183)/(69-85) 152/70                          Physical Exam:    Constitutional: He appears well-developed and well-nourished.     Eyes: Pupils are equal, round, and reactive to light. Conjunctivae and EOM are normal.      Cardiovascular: Normal rate, regular rhythm and normal pulses.     Abdominal: Soft.     Psych/Behavior: He is alert. He is oriented to person, place, and time. He has a normal mood and affect.     Musculoskeletal:   Kyphotic cervicothoracic spine  Appropriate tone, no evidence of spasm     Neurological:   GCS E4V5M6  CN II-XII grossly intact  Strength 5/5 throughout  Sensation intact to light touch  DTRs 2+ and symmetric  Neg Damian's, no clonus       Significant Labs:  Recent Labs   Lab 12/06/19  1235 12/07/19  0643 12/08/19  0556   * 118* 111*    134* 140   K 3.5 3.0* 3.8    102 110   CO2 26 26 24   BUN 23* 17 19   CREATININE 0.90 0.8 1.0   CALCIUM 9.1 8.2* 8.4*   MG 2.0 1.8 1.7     Recent Labs   Lab 12/06/19  1235 12/07/19  0643 12/08/19  0556   WBC 9.70 8.72 7.10   HGB 11.2* 9.5* 10.8*   HCT 34.7* 31.7* 35.8*    148* 141*     Recent Labs   Lab 12/06/19  1235 12/07/19  0013   LABPT 14.4  --    INR 1.1  --    APTT  --  26.5     Microbiology Results (last 7 days)     ** No results found for the last 168 hours. **        All pertinent labs from the last 24 hours have been reviewed.    Significant Diagnostics:    CT cervical and thoracic spine  Findings of osteomyelitis as detailed above C5 through T2.  This produces at least moderate spinal canal stenosis at multiple levels, however this process was better evaluated on the prior MRI of the same date.    Large paravertebral phlegmon or abscess extending from C5 through T2.    Left lung base consolidation favored to represent aspiration or pneumonia.    This report was flagged in Epic as abnormal.    MRI Cervical spine with and without contrast  Known discitis osteomyelitis C6 through T3 with epidural phlegmon and abscess producing moderate spinal canal stenosis C6 through T1.  Epidural phlegmon measures measures 6 x 0.9 x 1.9 cm posteriorly.  Largest abscess component measures 1.9 x 0.6 by 0.6 cm.    Anterior prevertebral abscess and  phlegmon as detailed above.          Assessment/Plan:     * Spinal epidural abscess  74 y.o. male with PMH of HTN, HLD, Hepatitis C, and CAD s/p two stents on plavix who presents with C6-T2 osteomyelitis with suspected epidural abscess.    - No emergent neurosurgical intervention  - MRI and CT spine imaging reviewed.   - Recommend IR consult for biopsy of lesion today  - Recommend  brace fitted by [facility]. Ordered and discussed yesterday.  - Pain control per primay  - infectious workup per primary        Paul Castellanos MD  Neurosurgery  [facility]

## 2019-12-08 NOTE — PROGRESS NOTES
Therapy with vancomycin complete and/or consult discontinued by provider.  Pharmacy will sign off, please re-consult as needed.    Jahaira Jackman, PharmD, BCPS  [id number]

## 2019-12-08 NOTE — ASSESSMENT & PLAN NOTE
-Follows with hepatology here; suspected secondary to prior history of IVDU. Pt states it has been over a year since last IVDU  -Initially diagnosed with genotype 3a; status post treatment in 2009 with remission achieved.   -Developed genotype 1a in 04/2017 attributed to relapse of IVDU; not treated.   -Most recent abdominal U/S WNL in 07/2017.   -No stigmata of cirrhosis on exam.   -Liver function on admission.

## 2019-12-08 NOTE — ASSESSMENT & PLAN NOTE
74 y.o. male with PMH of HTN, HLD, Hepatitis C, and CAD s/p two stents on plavix who presents with C6-T2 osteomyelitis with suspected epidural abscess.    - No emergent neurosurgical intervention  - MRI and CT spine imaging reviewed.   - Recommend IR consult for biopsy of lesion today  - Recommend  brace fitted by [facility]. Ordered and discussed yesterday.  - Pain control per primay  - infectious workup per primary

## 2019-12-08 NOTE — CONSULTS
Radiology Consult    Junaid Muñoz is a 74 y.o. male with a history of IVDU who presents with C6-T2 osteomyelitis with associated epidural abscess. IR has been consulted for bone/disc sampling/biopsy.    Past Medical History:   Diagnosis Date    CAD (coronary artery disease)     s/p stent 2005, on plavix    Chronic hepatitis C     Cirrhosis     biopsy proven - 2007    History of colon polyps 06/2008    1 polyp - tubular adenoma    HTN (hypertension)     Hyperlipidemia     Hypothyroidism      Past Surgical History:   Procedure Laterality Date    COLONOSCOPY W/ POLYPECTOMY  06/2008    Dr Wagoner: fair prep, 3mm polyp - tubular adenoma    CORONARY ANGIOPLASTY WITH STENT PLACEMENT      hydrocele repair  teenager    pyloric stenosis repair  age 1    TONSILLECTOMY         Scheduled Meds:    acetaminophen  1,000 mg Oral Q8H    atorvastatin  80 mg Oral Daily    levothyroxine  50 mcg Oral Before breakfast    losartan-hydrochlorothiazide 100-25 mg  1 tablet Oral Daily    metoprolol succinate  100 mg Oral Daily    nicotine  1 patch Transdermal Daily    senna-docusate 8.6-50 mg  1 tablet Oral BID     Continuous Infusions:   PRN Meds:Dextrose 10% Bolus, Dextrose 10% Bolus, glucagon (human recombinant), glucose, glucose, HYDROmorphone, naloxone, ondansetron, oxyCODONE, sodium chloride 0.9%, sodium chloride 0.9%    Allergies:   Review of patient's allergies indicates:   Allergen Reactions    Penicillins Rash       Labs:  Recent Labs   Lab 12/06/19  1235   INR 1.1       Recent Labs   Lab 12/08/19  0556   WBC 7.10   HGB 10.8*   HCT 35.8*   MCV 86   *      Recent Labs   Lab 12/08/19  0556   *      K 3.8      CO2 24   BUN 19   CREATININE 1.0   CALCIUM 8.4*   MG 1.7   ALT 14   AST 24   ALBUMIN 2.3*   BILITOT 0.4         Vitals (Most Recent):  Temp: 98.2 °F (36.8 °C) (12/08/19 1045)  Pulse: 64 (12/08/19 1045)  Resp: 18 (12/08/19 1045)  BP: (!) 189/90 (12/08/19 1045)  SpO2: 96 % (12/08/19  1045)    Plan:   1. NPO after midnight.  2. Hold anticoagulants.  3. Will plan for bone/disc biopsy at T1-T2 level tomorrow, 12/9/19.    Taj Sanches M.D.  PGY-3  Radiology

## 2019-12-08 NOTE — SUBJECTIVE & OBJECTIVE
Interval History: No acute events overnight. NSGY not offering intervention and recommending IR consult for bone biopsy of lesion.     Review of Systems   Constitutional: Negative for activity change, appetite change, chills, diaphoresis, fatigue, fever and unexpected weight change.   HENT: Negative for congestion, sore throat and trouble swallowing.    Eyes: Negative for photophobia, pain and discharge.   Respiratory: Negative for cough and shortness of breath.    Cardiovascular: Negative for chest pain and palpitations.   Gastrointestinal: Negative for abdominal pain, constipation, diarrhea, nausea, rectal pain and vomiting.   Genitourinary: Negative for decreased urine volume, difficulty urinating and dysuria.   Musculoskeletal: Positive for back pain. Negative for arthralgias, joint swelling, myalgias, neck pain and neck stiffness.   Skin: Negative for pallor and rash.   Neurological: Negative for dizziness, weakness, light-headedness, numbness and headaches.     Objective:     Vital Signs (Most Recent):  Temp: 98.2 °F (36.8 °C) (12/08/19 1045)  Pulse: 64 (12/08/19 1045)  Resp: 18 (12/08/19 1045)  BP: (!) 189/90 (12/08/19 1045)  SpO2: 96 % (12/08/19 1045) Vital Signs (24h Range):  Temp:  [97.4 °F (36.3 °C)-98.8 °F (37.1 °C)] 98.2 °F (36.8 °C)  Pulse:  [59-66] 64  Resp:  [12-18] 18  SpO2:  [94 %-97 %] 96 %  BP: (141-189)/(69-90) 189/90     Weight: 79.4 kg (175 lb 0.7 oz)  Body mass index is 25.12 kg/m².    Intake/Output Summary (Last 24 hours) at 12/8/2019 1120  Last data filed at 12/8/2019 0539  Gross per 24 hour   Intake 960 ml   Output 1100 ml   Net -140 ml      Physical Exam   Constitutional: He is oriented to person, place, and time. He appears well-developed and well-nourished. No distress.   HENT:   Head: Normocephalic and atraumatic.   Mouth/Throat: Oropharynx is clear and moist and mucous membranes are normal.   Eyes: Pupils are equal, round, and reactive to light. Conjunctivae are normal. No scleral  icterus.   Neck: No JVD present.   Cardiovascular: Normal rate, regular rhythm, normal heart sounds and normal pulses.   No murmur heard.  Pulmonary/Chest: Effort normal and breath sounds normal. No respiratory distress.   Abdominal: Soft. Normal appearance and bowel sounds are normal. He exhibits no distension. There is no tenderness.   Large well healed pyloromyotomy scar on right side of abdomen.    Musculoskeletal: He exhibits no edema.        Cervical back: He exhibits tenderness, bony tenderness and pain. He exhibits normal range of motion, no swelling, no edema and no spasm.        Thoracic back: He exhibits tenderness, bony tenderness and pain. He exhibits no swelling, no edema, no laceration and no spasm.   Neurological: He is alert and oriented to person, place, and time. He has normal strength. He displays no tremor. No cranial nerve deficit or sensory deficit. He exhibits normal muscle tone. He displays no Babinski's sign on the right side. He displays no Babinski's sign on the left side.   Skin: Skin is warm and dry. No bruising and no rash noted.       Significant Labs: All pertinent labs within the past 24 hours have been reviewed.    Significant Imaging: I have reviewed and interpreted all pertinent imaging results/findings within the past 24 hours.

## 2019-12-08 NOTE — ASSESSMENT & PLAN NOTE
This is a 74 year old  male with PMH notable for prior IVDU who presented on 12/06 with one month duration of upper back pain with work-up at outside hospital significant for MRI suggestive of osteomyelitis of C6-T2 with associated epidural abscess with posterior spinal cord displacement. He has no neurological deficits on exam and without evidence of developing sepsis. He is status post evaluation by neurosurgery on arrival, with no plans for emergent intervention. Etiology of presentation unclear; could be secondary to relapse of IVDU.     Plan:   -NSGY not offering intervention and recommending IR consult for bone biopsy of lesion  -Hold off on antibiotics for now as pt is not septic. We will wait until cx results from biopsy and treat appropriately  -Hold off on initiating Dexamethasone as without neurological deficits on exam.   -Follow-up blood cultures - NGTD  -Pain control ordered PRN.   -Neuro checks ordered Q4H.

## 2019-12-08 NOTE — PLAN OF CARE
Recv'd pt AAOx4.  Pt c/o minimal pain throughout the day.  Pt requests pain medication with is PM medications.  Pt had a restful evening and remained free from injury  or incidents this shift.  See flow sheets for further information.  Will continue to monitor.    Problem: Fall Injury Risk  Goal: Absence of Fall and Fall-Related Injury  Outcome: Ongoing, Progressing     Problem: Pain Acute  Goal: Optimal Pain Control  Outcome: Ongoing, Progressing

## 2019-12-08 NOTE — SUBJECTIVE & OBJECTIVE
Interval History: naeon. Pt resting comfortably. No complaints this morning. Pt found to have bedbugs and on precautions.    Medications Prior to Admission   Medication Sig Dispense Refill Last Dose    atorvastatin (LIPITOR) 80 MG tablet Take 80 mg by mouth once daily at 6am.   12/6/2019 at Unknown time    clopidogrel (PLAVIX) 75 mg tablet Take 75 mg by mouth once daily at 6am.   12/6/2019 at Unknown time    ibuprofen (ADVIL,MOTRIN) 600 MG tablet Take 1 tablet (600 mg total) by mouth every 6 (six) hours as needed for Pain. 20 tablet 0 Past Week at Unknown time    levothyroxine (SYNTHROID) 50 MCG tablet Take 50 mcg by mouth once daily at 6am.   12/6/2019 at Unknown time    losartan-hydrochlorothiazide 100-25 mg (HYZAAR) 100-25 mg per tablet Take 1 tablet by mouth once daily at 6am.    12/6/2019 at Unknown time    metoprolol succinate (TOPROL-XL) 100 MG 24 hr tablet Take 100 mg by mouth once daily at 6am.   12/6/2019 at Unknown time    traMADol (ULTRAM) 50 mg tablet Take 50 mg by mouth every 6 (six) hours as needed for Pain.   12/6/2019 at Unknown time       Review of patient's allergies indicates:   Allergen Reactions    Penicillins Rash       Past Medical History:   Diagnosis Date    CAD (coronary artery disease)     s/p stent 2005, on plavix    Chronic hepatitis C     Cirrhosis     biopsy proven - 2007    History of colon polyps 06/2008    1 polyp - tubular adenoma    HTN (hypertension)     Hyperlipidemia     Hypothyroidism      Past Surgical History:   Procedure Laterality Date    COLONOSCOPY W/ POLYPECTOMY  06/2008    Dr Wagoner: fair prep, 3mm polyp - tubular adenoma    CORONARY ANGIOPLASTY WITH STENT PLACEMENT      hydrocele repair  teenager    pyloric stenosis repair  age 1    TONSILLECTOMY       Family History     None        Tobacco Use    Smoking status: Current Every Day Smoker   Substance and Sexual Activity    Alcohol use: Not on file    Drug use: Yes     Types: IV     Comment:  MORPHINE    Sexual activity: Not on file     Review of Systems   Constitutional: Negative for chills and fever.   HENT: Negative for sinus pressure and sinus pain.    Eyes: Negative for photophobia and visual disturbance.   Respiratory: Negative for cough and shortness of breath.    Cardiovascular: Negative for chest pain and palpitations.   Gastrointestinal: Negative for constipation, diarrhea, nausea and vomiting.   Endocrine: Negative for polydipsia and polyuria.   Genitourinary: Negative for difficulty urinating, frequency and urgency.   Musculoskeletal: Positive for back pain. Negative for myalgias, neck pain and neck stiffness.   Skin: Negative for color change and rash.   Neurological: Negative for tremors, seizures, syncope, facial asymmetry, speech difficulty, weakness, light-headedness, numbness and headaches.   Psychiatric/Behavioral: Negative for agitation and sleep disturbance.     Objective:     Weight: 79.4 kg (175 lb 0.7 oz)  Body mass index is 25.12 kg/m².  Vital Signs (Most Recent):  Temp: 98.3 °F (36.8 °C) (12/09/19 0900)  Pulse: (!) 56 (12/09/19 0900)  Resp: 18 (12/09/19 0900)  BP: (!) 180/80(morning meds given) (12/09/19 0900)  SpO2: 95 % (12/09/19 0900) Vital Signs (24h Range):  Temp:  [98.2 °F (36.8 °C)-98.6 °F (37 °C)] 98.3 °F (36.8 °C)  Pulse:  [56-65] 56  Resp:  [18] 18  SpO2:  [95 %-97 %] 95 %  BP: (146-180)/(70-88) 180/80     Date 12/09/19 0700 - 12/10/19 0659   Shift 6990-2076 [id number] [id number] 24 Hour Total   INTAKE   P.O. 480   480   Shift Total(mL/kg) 480(6)   480(6)   OUTPUT   Urine(mL/kg/hr) 300   300   Shift Total(mL/kg) 300(3.8)   300(3.8)   Weight (kg) 79.4 79.4 79.4 79.4                        Physical Exam:    Constitutional: He appears well-developed and well-nourished.     Eyes: Pupils are equal, round, and reactive to light. Conjunctivae and EOM are normal.     Cardiovascular: Normal rate, regular rhythm and normal pulses.     Abdominal: Soft.     Psych/Behavior: He is  alert. He is oriented to person, place, and time. He has a normal mood and affect.     Musculoskeletal:   Kyphotic cervicothoracic spine  Appropriate tone, no evidence of spasm     Neurological:   GCS [id number]  CN II-XII grossly intact  Strength 5/5 throughout  Sensation intact to light touch  DTRs 2+ and symmetric  Neg Damian's, no clonus       Significant Labs:  Recent Labs   Lab 12/08/19  0556 12/09/19  0612   * 103    140   K 3.8 3.8    106   CO2 24 26   BUN 19 18   CREATININE 1.0 0.9   CALCIUM 8.4* 9.0   MG 1.7 1.7     Recent Labs   Lab 12/08/19  0556 12/09/19  0613   WBC 7.10 9.39   HGB 10.8* 11.7*   HCT 35.8* 38.5*   * 197     No results for input(s): LABPT, INR, APTT in the last 48 hours.  Microbiology Results (last 7 days)     ** No results found for the last 168 hours. **        All pertinent labs from the last 24 hours have been reviewed.    Significant Diagnostics:    CT cervical and thoracic spine  Findings of osteomyelitis as detailed above C5 through T2.  This produces at least moderate spinal canal stenosis at multiple levels, however this process was better evaluated on the prior MRI of the same date.    Large paravertebral phlegmon or abscess extending from C5 through T2.    Left lung base consolidation favored to represent aspiration or pneumonia.    This report was flagged in Epic as abnormal.    MRI Cervical spine with and without contrast  Known discitis osteomyelitis C6 through T3 with epidural phlegmon and abscess producing moderate spinal canal stenosis C6 through T1.  Epidural phlegmon measures measures 6 x 0.9 x 1.9 cm posteriorly.  Largest abscess component measures 1.9 x 0.6 by 0.6 cm.    Anterior prevertebral abscess and phlegmon as detailed above.

## 2019-12-08 NOTE — PROGRESS NOTES
[facility] Hospital Medicine  Progress Note    Patient Name: Junaid Muñoz  MRN: [id number]  Patient Class: IP- Inpatient   Admission Date: 12/6/2019  Length of Stay: 2 days  Attending Physician: Shannon Priest MD  Primary Care Provider: Oskar Whitman MD    Subjective:     Principal Problem:Spinal epidural abscess    HPI:  This is a 74 year old male presenting with chief complaint of back pain. He has a PMH notable for previous IVDU. He reports one month duration of progressively worsening non-radiating upper back pain radiating between the bilateral shoulders described as intense achiness and 8/10 at its worse. He denies symptom association with fevers, chills, numbness or tingling of extremities, bowel or bladder incontinence, headache, blurry vision, back trauma, prior history of spinal surgeries, chest pain, SOB, or pain with inspiration. Due to symptom progression, he was evaluated by outpatient orthopedist on 12/06 and MRI of thoracic spine was ordered. It was suggestive of osteomyelitis of C6 to T2 associated with epidural abscess with posterior spinal cord displacement. Patient was notified and instructed to report to ED.     He adamantly denies preceding or current IVDU. However, per chart review, outpatient hepatology notes from 2017 mention history of relapsing use of IV morphine.     ED course: He initially presented to [facility] on 12/06. Vital signs were unremarkable. Labs were significant for normal WBC, negative lactate, and elevated alkaline phosphatase (284). UA was unremarkable. He was initiated on Ceftriaxone and Vancomycin and transferred to [facility] for neurosurgery evaluation. Neurosurgery recommended obtaining CT of thoracic and lumbar spine and cervical MRI. Recommended against intervention as without neurological deficits on exam.     Interval History: No acute events overnight. NSGY not offering intervention and recommending IR consult for  bone biopsy of lesion.     Review of Systems   Constitutional: Negative for activity change, appetite change, chills, diaphoresis, fatigue, fever and unexpected weight change.   HENT: Negative for congestion, sore throat and trouble swallowing.    Eyes: Negative for photophobia, pain and discharge.   Respiratory: Negative for cough and shortness of breath.    Cardiovascular: Negative for chest pain and palpitations.   Gastrointestinal: Negative for abdominal pain, constipation, diarrhea, nausea, rectal pain and vomiting.   Genitourinary: Negative for decreased urine volume, difficulty urinating and dysuria.   Musculoskeletal: Positive for back pain. Negative for arthralgias, joint swelling, myalgias, neck pain and neck stiffness.   Skin: Negative for pallor and rash.   Neurological: Negative for dizziness, weakness, light-headedness, numbness and headaches.     Objective:     Vital Signs (Most Recent):  Temp: 98.2 °F (36.8 °C) (12/08/19 1045)  Pulse: 64 (12/08/19 1045)  Resp: 18 (12/08/19 1045)  BP: (!) 189/90 (12/08/19 1045)  SpO2: 96 % (12/08/19 1045) Vital Signs (24h Range):  Temp:  [97.4 °F (36.3 °C)-98.8 °F (37.1 °C)] 98.2 °F (36.8 °C)  Pulse:  [59-66] 64  Resp:  [12-18] 18  SpO2:  [94 %-97 %] 96 %  BP: (141-189)/(69-90) 189/90     Weight: 79.4 kg (175 lb 0.7 oz)  Body mass index is 25.12 kg/m².    Intake/Output Summary (Last 24 hours) at 12/8/2019 1120  Last data filed at 12/8/2019 0539  Gross per 24 hour   Intake 960 ml   Output 1100 ml   Net -140 ml      Physical Exam   Constitutional: He is oriented to person, place, and time. He appears well-developed and well-nourished. No distress.   HENT:   Head: Normocephalic and atraumatic.   Mouth/Throat: Oropharynx is clear and moist and mucous membranes are normal.   Eyes: Pupils are equal, round, and reactive to light. Conjunctivae are normal. No scleral icterus.   Neck: No JVD present.   Cardiovascular: Normal rate, regular rhythm, normal heart sounds and normal  pulses.   No murmur heard.  Pulmonary/Chest: Effort normal and breath sounds normal. No respiratory distress.   Abdominal: Soft. Normal appearance and bowel sounds are normal. He exhibits no distension. There is no tenderness.   Large well healed pyloromyotomy scar on right side of abdomen.    Musculoskeletal: He exhibits no edema.        Cervical back: He exhibits tenderness, bony tenderness and pain. He exhibits normal range of motion, no swelling, no edema and no spasm.        Thoracic back: He exhibits tenderness, bony tenderness and pain. He exhibits no swelling, no edema, no laceration and no spasm.   Neurological: He is alert and oriented to person, place, and time. He has normal strength. He displays no tremor. No cranial nerve deficit or sensory deficit. He exhibits normal muscle tone. He displays no Babinski's sign on the right side. He displays no Babinski's sign on the left side.   Skin: Skin is warm and dry. No bruising and no rash noted.       Significant Labs: All pertinent labs within the past 24 hours have been reviewed.    Significant Imaging: I have reviewed and interpreted all pertinent imaging results/findings within the past 24 hours.      Assessment/Plan:      * Spinal epidural abscess  This is a 74 year old  male with PMH notable for prior IVDU who presented on 12/06 with one month duration of upper back pain with work-up at outside hospital significant for MRI suggestive of osteomyelitis of C6-T2 with associated epidural abscess with posterior spinal cord displacement. He has no neurological deficits on exam and without evidence of developing sepsis. He is status post evaluation by neurosurgery on arrival, with no plans for emergent intervention. Etiology of presentation unclear; could be secondary to relapse of IVDU.     Plan:   -NSGY not offering intervention and recommending IR consult for bone biopsy of lesion  -Hold off on antibiotics for now as pt is not septic. We will wait  until cx results from biopsy and treat appropriately  -Hold off on initiating Dexamethasone as without neurological deficits on exam.   -Follow-up blood cultures - NGTD  -Pain control ordered PRN.   -Neuro checks ordered Q4H.     Elevated alkaline phosphatase level  -Likely secondary to osteomyelitis.       Tobacco abuse  Plan:   -Counseled on importance of smoking cessation.   -Nicotine patch ordered for admission.       Hyperlipidemia  -Continue home Statin.       Other specified hypothyroidism  -Continue home Synthroid.       Essential hypertension  -Home regimen: Metoprolol and combination ARB-HCTZ.     Plan:   -Continue home regimen.   -Trending renal function daily.       Coronary artery disease involving native coronary artery of native heart without angina pectoris  Plan:   -Continue home ASA and Statin.       Chronic hepatitis C without hepatic coma  -Follows with hepatology here; suspected secondary to prior history of IVDU. Pt states it has been over a year since last IVDU  -Initially diagnosed with genotype 3a; status post treatment in 2009 with remission achieved.   -Developed genotype 1a in 04/2017 attributed to relapse of IVDU; not treated.   -Most recent abdominal U/S WNL in 07/2017.   -No stigmata of cirrhosis on exam.   -Liver function on admission.       Acute osteomyelitis of thoracic spine  -See assessment for epidural abscess.       Acute osteomyelitis of cervical spine  -See assessment for epidural abscess.       VTE Risk Mitigation (From admission, onward)         Ordered     IP VTE HIGH RISK PATIENT  Once      12/07/19 0219     Place sequential compression device  Until discontinued      12/07/19 0219                      Wiliam Key MD  Department of Hospital Medicine   [facility]

## 2019-12-09 ENCOUNTER — ANESTHESIA EVENT (OUTPATIENT)
Dept: SURGERY | Facility: HOSPITAL | Age: 74
DRG: 459 | End: 2019-12-09
Payer: MEDICARE

## 2019-12-09 PROBLEM — Z01.818 PRE-OP EVALUATION: Status: ACTIVE | Noted: 2019-12-09

## 2019-12-09 LAB
ABO + RH BLD: NORMAL
ALBUMIN SERPL BCP-MCNC: 2.5 G/DL (ref 3.5–5.2)
ALP SERPL-CCNC: 275 U/L (ref 55–135)
ALT SERPL W/O P-5'-P-CCNC: 15 U/L (ref 10–44)
ANION GAP SERPL CALC-SCNC: 8 MMOL/L (ref 8–16)
AST SERPL-CCNC: 25 U/L (ref 10–40)
BASOPHILS # BLD AUTO: 0.06 K/UL (ref 0–0.2)
BASOPHILS NFR BLD: 0.6 % (ref 0–1.9)
BILIRUB SERPL-MCNC: 0.4 MG/DL (ref 0.1–1)
BLD GP AB SCN CELLS X3 SERPL QL: NORMAL
BUN SERPL-MCNC: 18 MG/DL (ref 8–23)
CALCIUM SERPL-MCNC: 9 MG/DL (ref 8.7–10.5)
CHLORIDE SERPL-SCNC: 106 MMOL/L (ref 95–110)
CO2 SERPL-SCNC: 26 MMOL/L (ref 23–29)
CREAT SERPL-MCNC: 0.9 MG/DL (ref 0.5–1.4)
DIFFERENTIAL METHOD: ABNORMAL
EOSINOPHIL # BLD AUTO: 0.4 K/UL (ref 0–0.5)
EOSINOPHIL NFR BLD: 3.8 % (ref 0–8)
ERYTHROCYTE [DISTWIDTH] IN BLOOD BY AUTOMATED COUNT: 16.5 % (ref 11.5–14.5)
EST. GFR  (AFRICAN AMERICAN): >60 ML/MIN/1.73 M^2
EST. GFR  (NON AFRICAN AMERICAN): >60 ML/MIN/1.73 M^2
GLUCOSE SERPL-MCNC: 103 MG/DL (ref 70–110)
HCT VFR BLD AUTO: 38.5 % (ref 40–54)
HGB BLD-MCNC: 11.7 G/DL (ref 14–18)
IMM GRANULOCYTES # BLD AUTO: 0.04 K/UL (ref 0–0.04)
IMM GRANULOCYTES NFR BLD AUTO: 0.4 % (ref 0–0.5)
LYMPHOCYTES # BLD AUTO: 2.1 K/UL (ref 1–4.8)
LYMPHOCYTES NFR BLD: 22.3 % (ref 18–48)
MAGNESIUM SERPL-MCNC: 1.7 MG/DL (ref 1.6–2.6)
MCH RBC QN AUTO: 26.2 PG (ref 27–31)
MCHC RBC AUTO-ENTMCNC: 30.4 G/DL (ref 32–36)
MCV RBC AUTO: 86 FL (ref 82–98)
MONOCYTES # BLD AUTO: 0.8 K/UL (ref 0.3–1)
MONOCYTES NFR BLD: 8.3 % (ref 4–15)
NEUTROPHILS # BLD AUTO: 6.1 K/UL (ref 1.8–7.7)
NEUTROPHILS NFR BLD: 64.6 % (ref 38–73)
NRBC BLD-RTO: 0 /100 WBC
PHOSPHATE SERPL-MCNC: 3.1 MG/DL (ref 2.7–4.5)
PLATELET # BLD AUTO: 197 K/UL (ref 150–350)
PMV BLD AUTO: 10.3 FL (ref 9.2–12.9)
POTASSIUM SERPL-SCNC: 3.8 MMOL/L (ref 3.5–5.1)
PROT SERPL-MCNC: 6.7 G/DL (ref 6–8.4)
RBC # BLD AUTO: 4.47 M/UL (ref 4.6–6.2)
SODIUM SERPL-SCNC: 140 MMOL/L (ref 136–145)
WBC # BLD AUTO: 9.39 K/UL (ref 3.9–12.7)

## 2019-12-09 PROCEDURE — 11000001 HC ACUTE MED/SURG PRIVATE ROOM

## 2019-12-09 PROCEDURE — 36415 COLL VENOUS BLD VENIPUNCTURE: CPT

## 2019-12-09 PROCEDURE — 80053 COMPREHEN METABOLIC PANEL: CPT

## 2019-12-09 PROCEDURE — 25000003 PHARM REV CODE 250: Performed by: STUDENT IN AN ORGANIZED HEALTH CARE EDUCATION/TRAINING PROGRAM

## 2019-12-09 PROCEDURE — 63600175 PHARM REV CODE 636 W HCPCS: Performed by: STUDENT IN AN ORGANIZED HEALTH CARE EDUCATION/TRAINING PROGRAM

## 2019-12-09 PROCEDURE — 99233 SBSQ HOSP IP/OBS HIGH 50: CPT | Mod: GC,,, | Performed by: HOSPITALIST

## 2019-12-09 PROCEDURE — 99233 PR SUBSEQUENT HOSPITAL CARE,LEVL III: ICD-10-PCS | Mod: GC,,, | Performed by: HOSPITALIST

## 2019-12-09 PROCEDURE — 86850 RBC ANTIBODY SCREEN: CPT

## 2019-12-09 PROCEDURE — 85025 COMPLETE CBC W/AUTO DIFF WBC: CPT

## 2019-12-09 PROCEDURE — 84100 ASSAY OF PHOSPHORUS: CPT

## 2019-12-09 PROCEDURE — S4991 NICOTINE PATCH NONLEGEND: HCPCS | Performed by: STUDENT IN AN ORGANIZED HEALTH CARE EDUCATION/TRAINING PROGRAM

## 2019-12-09 PROCEDURE — 83735 ASSAY OF MAGNESIUM: CPT

## 2019-12-09 RX ORDER — TALC
9 POWDER (GRAM) TOPICAL ONCE
Status: COMPLETED | OUTPATIENT
Start: 2019-12-09 | End: 2019-12-09

## 2019-12-09 RX ORDER — DIAZEPAM 10 MG/2ML
5 INJECTION INTRAMUSCULAR EVERY 4 HOURS PRN
Status: DISCONTINUED | OUTPATIENT
Start: 2019-12-09 | End: 2019-12-09

## 2019-12-09 RX ORDER — AMLODIPINE BESYLATE 5 MG/1
5 TABLET ORAL DAILY
Status: DISCONTINUED | OUTPATIENT
Start: 2019-12-09 | End: 2019-12-17

## 2019-12-09 RX ORDER — HYDROMORPHONE HYDROCHLORIDE 1 MG/ML
1 INJECTION, SOLUTION INTRAMUSCULAR; INTRAVENOUS; SUBCUTANEOUS
Status: DISCONTINUED | OUTPATIENT
Start: 2019-12-09 | End: 2019-12-11

## 2019-12-09 RX ORDER — DIAZEPAM 10 MG/2ML
10 INJECTION INTRAMUSCULAR
Status: DISCONTINUED | OUTPATIENT
Start: 2019-12-09 | End: 2019-12-11

## 2019-12-09 RX ORDER — HYDROMORPHONE HYDROCHLORIDE 1 MG/ML
1 INJECTION, SOLUTION INTRAMUSCULAR; INTRAVENOUS; SUBCUTANEOUS
Status: DISCONTINUED | OUTPATIENT
Start: 2019-12-09 | End: 2019-12-09

## 2019-12-09 RX ORDER — HYDROMORPHONE HYDROCHLORIDE 1 MG/ML
1 INJECTION, SOLUTION INTRAMUSCULAR; INTRAVENOUS; SUBCUTANEOUS EVERY 4 HOURS PRN
Status: DISCONTINUED | OUTPATIENT
Start: 2019-12-09 | End: 2019-12-09

## 2019-12-09 RX ORDER — CHLORHEXIDINE GLUCONATE ORAL RINSE 1.2 MG/ML
15 SOLUTION DENTAL 2 TIMES DAILY
Status: DISCONTINUED | OUTPATIENT
Start: 2019-12-09 | End: 2019-12-10

## 2019-12-09 RX ORDER — FENTANYL CITRATE 50 UG/ML
25 INJECTION, SOLUTION INTRAMUSCULAR; INTRAVENOUS
Status: DISCONTINUED | OUTPATIENT
Start: 2019-12-09 | End: 2019-12-11

## 2019-12-09 RX ORDER — FENTANYL CITRATE 50 UG/ML
25 INJECTION, SOLUTION INTRAMUSCULAR; INTRAVENOUS
Status: DISCONTINUED | OUTPATIENT
Start: 2019-12-09 | End: 2019-12-09

## 2019-12-09 RX ORDER — BACITRACIN 500 [USP'U]/G
OINTMENT TOPICAL 3 TIMES DAILY
Status: DISCONTINUED | OUTPATIENT
Start: 2019-12-09 | End: 2019-12-30

## 2019-12-09 RX ORDER — LIDOCAINE HYDROCHLORIDE AND EPINEPHRINE 10; 10 MG/ML; UG/ML
1 INJECTION, SOLUTION INFILTRATION; PERINEURAL ONCE
Status: COMPLETED | OUTPATIENT
Start: 2019-12-09 | End: 2019-12-09

## 2019-12-09 RX ORDER — HYDROMORPHONE HYDROCHLORIDE 1 MG/ML
1 INJECTION, SOLUTION INTRAMUSCULAR; INTRAVENOUS; SUBCUTANEOUS ONCE
Status: COMPLETED | OUTPATIENT
Start: 2019-12-09 | End: 2019-12-09

## 2019-12-09 RX ORDER — SODIUM CHLORIDE 9 MG/ML
INJECTION, SOLUTION INTRAVENOUS CONTINUOUS
Status: DISCONTINUED | OUTPATIENT
Start: 2019-12-09 | End: 2019-12-24

## 2019-12-09 RX ADMIN — DIAZEPAM 10 MG: 5 INJECTION, SOLUTION INTRAMUSCULAR; INTRAVENOUS at 11:12

## 2019-12-09 RX ADMIN — Medication: at 11:12

## 2019-12-09 RX ADMIN — NICOTINE 1 PATCH: 21 PATCH, EXTENDED RELEASE TRANSDERMAL at 08:12

## 2019-12-09 RX ADMIN — ACETAMINOPHEN 1000 MG: 500 TABLET ORAL at 11:12

## 2019-12-09 RX ADMIN — FENTANYL CITRATE 25 MCG: 50 INJECTION INTRAMUSCULAR; INTRAVENOUS at 05:12

## 2019-12-09 RX ADMIN — ATORVASTATIN CALCIUM 80 MG: 20 TABLET, FILM COATED ORAL at 08:12

## 2019-12-09 RX ADMIN — Medication 9 MG: at 11:12

## 2019-12-09 RX ADMIN — ACETAMINOPHEN 1000 MG: 500 TABLET ORAL at 06:12

## 2019-12-09 RX ADMIN — HYDROMORPHONE HYDROCHLORIDE 1 MG: 1 INJECTION, SOLUTION INTRAMUSCULAR; INTRAVENOUS; SUBCUTANEOUS at 02:12

## 2019-12-09 RX ADMIN — HYDROMORPHONE HYDROCHLORIDE 1 MG: 1 INJECTION, SOLUTION INTRAMUSCULAR; INTRAVENOUS; SUBCUTANEOUS at 05:12

## 2019-12-09 RX ADMIN — LIDOCAINE HYDROCHLORIDE AND EPINEPHRINE 1 ML: 10; 10 INJECTION, SOLUTION INFILTRATION; PERINEURAL at 10:12

## 2019-12-09 RX ADMIN — SENNOSIDES AND DOCUSATE SODIUM 1 TABLET: 8.6; 5 TABLET ORAL at 08:12

## 2019-12-09 RX ADMIN — CHLORHEXIDINE GLUCONATE 0.12% ORAL RINSE 15 ML: 1.2 LIQUID ORAL at 01:12

## 2019-12-09 RX ADMIN — DIAZEPAM 5 MG: 5 INJECTION, SOLUTION INTRAMUSCULAR; INTRAVENOUS at 03:12

## 2019-12-09 RX ADMIN — ACETAMINOPHEN 1000 MG: 500 TABLET ORAL at 01:12

## 2019-12-09 RX ADMIN — AMLODIPINE BESYLATE 5 MG: 5 TABLET ORAL at 11:12

## 2019-12-09 RX ADMIN — METOPROLOL SUCCINATE 100 MG: 100 TABLET, EXTENDED RELEASE ORAL at 08:12

## 2019-12-09 RX ADMIN — LEVOTHYROXINE SODIUM 50 MCG: 50 TABLET ORAL at 06:12

## 2019-12-09 RX ADMIN — LOSARTAN POTASSIUM AND HYDROCHLOROTHIAZIDE 1 TABLET: 100; 25 TABLET, FILM COATED ORAL at 08:12

## 2019-12-09 NOTE — SUBJECTIVE & OBJECTIVE
Interval History: No acute events overnight. IR cancelled biopsy today. NSGY will perform corpectomy tomorrow. NPO at midnight. Cervical traction in place.     Review of Systems   Constitutional: Negative for activity change, appetite change, chills, diaphoresis, fatigue, fever and unexpected weight change.   HENT: Negative for congestion, sore throat and trouble swallowing.    Eyes: Negative for photophobia, pain and discharge.   Respiratory: Negative for cough and shortness of breath.    Cardiovascular: Negative for chest pain and palpitations.   Gastrointestinal: Negative for abdominal pain, constipation, diarrhea, nausea, rectal pain and vomiting.   Genitourinary: Negative for decreased urine volume, difficulty urinating and dysuria.   Musculoskeletal: Positive for back pain. Negative for arthralgias, joint swelling, myalgias, neck pain and neck stiffness.   Skin: Negative for pallor and rash.   Neurological: Negative for dizziness, weakness, light-headedness, numbness and headaches.     Objective:     Vital Signs (Most Recent):  Temp: 98.3 °F (36.8 °C) (12/09/19 1224)  Pulse: (!) 59 (12/09/19 1224)  Resp: 18 (12/09/19 1224)  BP: (!) 163/74 (12/09/19 1224)  SpO2: 98 % (12/09/19 1224) Vital Signs (24h Range):  Temp:  [98.2 °F (36.8 °C)-98.6 °F (37 °C)] 98.3 °F (36.8 °C)  Pulse:  [56-65] 59  Resp:  [18] 18  SpO2:  [95 %-98 %] 98 %  BP: (146-180)/(70-88) 163/74     Weight: 79.4 kg (175 lb 0.7 oz)  Body mass index is 25.12 kg/m².    Intake/Output Summary (Last 24 hours) at 12/9/2019 1508  Last data filed at 12/9/2019 1000  Gross per 24 hour   Intake 600 ml   Output 800 ml   Net -200 ml      Physical Exam   Constitutional: He is oriented to person, place, and time. He appears well-developed and well-nourished. No distress.   HENT:   Head: Normocephalic and atraumatic.   Mouth/Throat: Oropharynx is clear and moist and mucous membranes are normal.   Eyes: Pupils are equal, round, and reactive to light. Conjunctivae are  normal. No scleral icterus.   Neck: No JVD present.   Cardiovascular: Normal rate, regular rhythm, normal heart sounds and normal pulses.   No murmur heard.  Pulmonary/Chest: Effort normal and breath sounds normal. No respiratory distress.   Abdominal: Soft. Normal appearance and bowel sounds are normal. He exhibits no distension. There is no tenderness.   Large well healed pyloromyotomy scar on right side of abdomen.    Musculoskeletal: He exhibits no edema.        Cervical back: He exhibits tenderness, bony tenderness and pain. He exhibits normal range of motion, no swelling, no edema and no spasm.        Thoracic back: He exhibits tenderness, bony tenderness and pain. He exhibits no swelling, no edema, no laceration and no spasm.   Neurological: He is alert and oriented to person, place, and time. He has normal strength. He displays no tremor. No cranial nerve deficit or sensory deficit. He exhibits normal muscle tone. He displays no Babinski's sign on the right side. He displays no Babinski's sign on the left side.   Skin: Skin is warm and dry. No bruising and no rash noted.       Significant Labs: All pertinent labs within the past 24 hours have been reviewed.    Significant Imaging: I have reviewed and interpreted all pertinent imaging results/findings within the past 24 hours.

## 2019-12-09 NOTE — ASSESSMENT & PLAN NOTE
74 y.o. male with PMH of HTN, HLD, Hepatitis C, and CAD s/p two stents on plavix who presents with C6-T2 osteomyelitis with suspected epidural abscess.    - Plan for cervical traction today, OR tomorrow for C7 T1 corpectomy  - MRI and CT spine imaging reviewed.   - brace  - Pain control per primay  - infectious workup per primary  - NPO midnight, preop fluids, T&S, coags

## 2019-12-09 NOTE — ASSESSMENT & PLAN NOTE
-Home regimen: Metoprolol and combination ARB-HCTZ.     Plan:   - add norvasc 5 mg OD  -Continue home regimen.   -Trending renal function daily.

## 2019-12-09 NOTE — ASSESSMENT & PLAN NOTE
This is a 74 year old  male with PMH notable for prior IVDU who presented on 12/06 with one month duration of upper back pain with work-up at outside hospital significant for MRI suggestive of osteomyelitis of C6-T2 with associated epidural abscess with posterior spinal cord displacement. He has no neurological deficits on exam and without evidence of developing sepsis. He is status post evaluation by neurosurgery on arrival, with no plans for emergent intervention. Etiology of presentation unclear; could be secondary to relapse of IVDU.     Plan:   -NSGY will perform C7-T1 corpectomy on 12/10.  -Hold off on antibiotics for now as pt is not septic. We will wait until cx results from biopsy and treat appropriately  -Hold off on initiating Dexamethasone as without neurological deficits on exam.   -Follow-up blood cultures - NGTD  -Pain control ordered PRN.   -Neuro checks ordered Q4H.

## 2019-12-09 NOTE — PHARMACY MED REC
"  Admission Medication Reconciliation - Pharmacy Consult Note    The home medication history was taken by Alisia Pascal.  Based on information gathered and subsequent review by the clinical pharmacist, the items below may need attention.     You may go to "Admission" then "Reconcile Home Medications" tabs to review and/or act upon these items.     Potentially problematic discrepancies with current MAR  o Patient IS taking the following which was not ordered upon admit  o Clopidogrel 75mg qdaily    Potential issues to be addressed PRIOR TO DISCHARGE  o Recommend to resume clopidogrel after corpectomy    Please address this information as you see fit.  Feel free to contact us if you have any questions or require assistance.    Clarke Lord, PharmD  [id number]                .    .            "

## 2019-12-09 NOTE — ASSESSMENT & PLAN NOTE
-- 10 METs at baseline  -- RCRI Class II, which suggests a 0.9% risk of major cardiac event based on the presence of Hx ischemic heart disease  -- Given the above, this patient is 0.9% risk of major cardiac event  risk for the planned procedure. No further testing or optimization is needed at present, other than routine management of the conditions listed below     +1 for each of the following:  High risk surgery?  Hx ischemic heart disease  Hx CHF?  Hx TIA?  Pre-operative treatment with insulin?  Pre-operative creatinine >2?    Class I: 0 points - 0.4% risk of major cardiac event  Class II: 1 points - 0.9% risk of major cardiac event  Class III: 2 points - 6.6% risk of major cardiac event  Class IV: >/= 3 points - 11% risk of major cardiac event    Can take care of self, such as eat, dress, or use the toilet (1 MET)  ?Can walk up a flight of steps or a hill or walk on level ground at 3 to 4 mph (4 METs)  ?Can do heavy work around the house, such as scrubbing floors or lifting or moving heavy furniture, or climb two flights of stairs (between 4 and 10 METs)  ?Can participate in strenuous sports such as swimming, singles tennis, football, basketball, and skiing (>10 METs)    Low vs. high risk: 1% of death    [email or web address]%[email or web address]=see_link

## 2019-12-09 NOTE — PROGRESS NOTES
[facility]  Neurosurgery  Progress Note    Subjective:     History of Present Illness: Junaid Muñoz is a 74 y.o. male with PMH of HTN, HLD, Hepatitis C, and CAD s/p two stents on plavix who presents with 1 month history of back pain between his shoulders. MRI at OSH demonstrates likely epidural abscess. Pt denies hx of trauma and reports slow onset of symptoms.     Post-Op Info:  Procedure(s) (LRB):  CORPECTOMY, SPINE, CERVICAL AND THORACIC, C7 and T1 with Globus (N/A)         Interval History: naeon. Pt resting comfortably. No complaints this morning. Pt found to have bedbugs and on precautions.    Medications Prior to Admission   Medication Sig Dispense Refill Last Dose    atorvastatin (LIPITOR) 80 MG tablet Take 80 mg by mouth once daily at 6am.   12/6/2019 at Unknown time    clopidogrel (PLAVIX) 75 mg tablet Take 75 mg by mouth once daily at 6am.   12/6/2019 at Unknown time    ibuprofen (ADVIL,MOTRIN) 600 MG tablet Take 1 tablet (600 mg total) by mouth every 6 (six) hours as needed for Pain. 20 tablet 0 Past Week at Unknown time    levothyroxine (SYNTHROID) 50 MCG tablet Take 50 mcg by mouth once daily at 6am.   12/6/2019 at Unknown time    losartan-hydrochlorothiazide 100-25 mg (HYZAAR) 100-25 mg per tablet Take 1 tablet by mouth once daily at 6am.    12/6/2019 at Unknown time    metoprolol succinate (TOPROL-XL) 100 MG 24 hr tablet Take 100 mg by mouth once daily at 6am.   12/6/2019 at Unknown time    traMADol (ULTRAM) 50 mg tablet Take 50 mg by mouth every 6 (six) hours as needed for Pain.   12/6/2019 at Unknown time       Review of patient's allergies indicates:   Allergen Reactions    Penicillins Rash       Past Medical History:   Diagnosis Date    CAD (coronary artery disease)     s/p stent 2005, on plavix    Chronic hepatitis C     Cirrhosis     biopsy proven - 2007    History of colon polyps 06/2008    1 polyp - tubular adenoma    HTN (hypertension)      Hyperlipidemia     Hypothyroidism      Past Surgical History:   Procedure Laterality Date    COLONOSCOPY W/ POLYPECTOMY  06/2008    Dr Wagoner: fair prep, 3mm polyp - tubular adenoma    CORONARY ANGIOPLASTY WITH STENT PLACEMENT      hydrocele repair  teenager    pyloric stenosis repair  age 1    TONSILLECTOMY       Family History     None        Tobacco Use    Smoking status: Current Every Day Smoker   Substance and Sexual Activity    Alcohol use: Not on file    Drug use: Yes     Types: IV     Comment: MORPHINE    Sexual activity: Not on file     Review of Systems   Constitutional: Negative for chills and fever.   HENT: Negative for sinus pressure and sinus pain.    Eyes: Negative for photophobia and visual disturbance.   Respiratory: Negative for cough and shortness of breath.    Cardiovascular: Negative for chest pain and palpitations.   Gastrointestinal: Negative for constipation, diarrhea, nausea and vomiting.   Endocrine: Negative for polydipsia and polyuria.   Genitourinary: Negative for difficulty urinating, frequency and urgency.   Musculoskeletal: Positive for back pain. Negative for myalgias, neck pain and neck stiffness.   Skin: Negative for color change and rash.   Neurological: Negative for tremors, seizures, syncope, facial asymmetry, speech difficulty, weakness, light-headedness, numbness and headaches.   Psychiatric/Behavioral: Negative for agitation and sleep disturbance.     Objective:     Weight: 79.4 kg (175 lb 0.7 oz)  Body mass index is 25.12 kg/m².  Vital Signs (Most Recent):  Temp: 98.3 °F (36.8 °C) (12/09/19 0900)  Pulse: (!) 56 (12/09/19 0900)  Resp: 18 (12/09/19 0900)  BP: (!) 180/80(morning meds given) (12/09/19 0900)  SpO2: 95 % (12/09/19 0900) Vital Signs (24h Range):  Temp:  [98.2 °F (36.8 °C)-98.6 °F (37 °C)] 98.3 °F (36.8 °C)  Pulse:  [56-65] 56  Resp:  [18] 18  SpO2:  [95 %-97 %] 95 %  BP: (146-180)/(70-88) 180/80     Date 12/09/19 0700 - 12/10/19 0659   Shift 7071-3999  8646-3389 6980-5957 24 Hour Total   INTAKE   P.O. 480   480   Shift Total(mL/kg) 480(6)   480(6)   OUTPUT   Urine(mL/kg/hr) 300   300   Shift Total(mL/kg) 300(3.8)   300(3.8)   Weight (kg) 79.4 79.4 79.4 79.4                        Physical Exam:    Constitutional: He appears well-developed and well-nourished.     Eyes: Pupils are equal, round, and reactive to light. Conjunctivae and EOM are normal.     Cardiovascular: Normal rate, regular rhythm and normal pulses.     Abdominal: Soft.     Psych/Behavior: He is alert. He is oriented to person, place, and time. He has a normal mood and affect.     Musculoskeletal:   Kyphotic cervicothoracic spine  Appropriate tone, no evidence of spasm     Neurological:   GCS [id number]  CN II-XII grossly intact  Strength 5/5 throughout  Sensation intact to light touch  DTRs 2+ and symmetric  Neg Damian's, no clonus       Significant Labs:  Recent Labs   Lab 12/08/19  0556 12/09/19  0612   * 103    140   K 3.8 3.8    106   CO2 24 26   BUN 19 18   CREATININE 1.0 0.9   CALCIUM 8.4* 9.0   MG 1.7 1.7     Recent Labs   Lab 12/08/19  0556 12/09/19  0613   WBC 7.10 9.39   HGB 10.8* 11.7*   HCT 35.8* 38.5*   * 197     No results for input(s): LABPT, INR, APTT in the last 48 hours.  Microbiology Results (last 7 days)     ** No results found for the last 168 hours. **        All pertinent labs from the last 24 hours have been reviewed.    Significant Diagnostics:    CT cervical and thoracic spine  Findings of osteomyelitis as detailed above C5 through T2.  This produces at least moderate spinal canal stenosis at multiple levels, however this process was better evaluated on the prior MRI of the same date.    Large paravertebral phlegmon or abscess extending from C5 through T2.    Left lung base consolidation favored to represent aspiration or pneumonia.    This report was flagged in Epic as abnormal.    MRI Cervical spine with and without contrast  Known discitis  osteomyelitis C6 through T3 with epidural phlegmon and abscess producing moderate spinal canal stenosis C6 through T1.  Epidural phlegmon measures measures 6 x 0.9 x 1.9 cm posteriorly.  Largest abscess component measures 1.9 x 0.6 by 0.6 cm.    Anterior prevertebral abscess and phlegmon as detailed above.        Assessment/Plan:     * Spinal epidural abscess  74 y.o. male with PMH of HTN, HLD, Hepatitis C, and CAD s/p two stents on plavix who presents with C6-T2 osteomyelitis with suspected epidural abscess.    - Plan for cervical traction today, OR tomorrow for C7 T1 corpectomy  - MRI and CT spine imaging reviewed.   - brace  - Pain control per primay  - infectious workup per primary  - NPO midnight, preop fluids, T&S, coags      Paul Castellanos MD  Neurosurgery  [facility]

## 2019-12-09 NOTE — PROGRESS NOTES
[facility]  Progress Note    Patient Name: Junaid Muñoz  MRN: [id number]  Patient Class: IP- Inpatient   Admission Date: 12/6/2019  Length of Stay: 3 days  Attending Physician: Shannon Priest MD  Primary Care Provider: Oskar Whitman MD        Subjective:     Principal Problem:Spinal epidural abscess        HPI:  This is a 74 year old male presenting with chief complaint of back pain. He has a PMH notable for previous IVDU. He reports one month duration of progressively worsening non-radiating upper back pain radiating between the bilateral shoulders described as intense achiness and 8/10 at its worse. He denies symptom association with fevers, chills, numbness or tingling of extremities, bowel or bladder incontinence, headache, blurry vision, back trauma, prior history of spinal surgeries, chest pain, SOB, or pain with inspiration. Due to symptom progression, he was evaluated by outpatient orthopedist on 12/06 and MRI of thoracic spine was ordered. It was suggestive of osteomyelitis of C6 to T2 associated with epidural abscess with posterior spinal cord displacement. Patient was notified and instructed to report to ED.     He adamantly denies preceding or current IVDU. However, per chart review, outpatient hepatology notes from 2017 mention history of relapsing use of IV morphine.     ED course: He initially presented to [facility] on 12/06. Vital signs were unremarkable. Labs were significant for normal WBC, negative lactate, and elevated alkaline phosphatase (284). UA was unremarkable. He was initiated on Ceftriaxone and Vancomycin and transferred to [facility] for neurosurgery evaluation. Neurosurgery recommended obtaining CT of thoracic and lumbar spine and cervical MRI. Recommended against intervention as without neurological deficits on exam.     Overview/Hospital Course:  Mr Muñoz admitted to 4 for evaluation of mid-back pain x 1 month, found  to have C6-T3 osteomyelitis with epidural abscess.  Pt reports last IVDU was 1 year ago. Pt is hemodynamically stable without neurologic deficits. Given that patient was stable, abx withheld until specimen is obtained for culture. NSGY will do C7-T1 corpectomy  on 12/10/19.     Interval History: No acute events overnight. IR cancelled biopsy today. NSGY will perform corpectomy tomorrow. NPO at midnight. Cervical traction in place.     Review of Systems   Constitutional: Negative for activity change, appetite change, chills, diaphoresis, fatigue, fever and unexpected weight change.   HENT: Negative for congestion, sore throat and trouble swallowing.    Eyes: Negative for photophobia, pain and discharge.   Respiratory: Negative for cough and shortness of breath.    Cardiovascular: Negative for chest pain and palpitations.   Gastrointestinal: Negative for abdominal pain, constipation, diarrhea, nausea, rectal pain and vomiting.   Genitourinary: Negative for decreased urine volume, difficulty urinating and dysuria.   Musculoskeletal: Positive for back pain. Negative for arthralgias, joint swelling, myalgias, neck pain and neck stiffness.   Skin: Negative for pallor and rash.   Neurological: Negative for dizziness, weakness, light-headedness, numbness and headaches.     Objective:     Vital Signs (Most Recent):  Temp: 98.3 °F (36.8 °C) (12/09/19 1224)  Pulse: (!) 59 (12/09/19 1224)  Resp: 18 (12/09/19 1224)  BP: (!) 163/74 (12/09/19 1224)  SpO2: 98 % (12/09/19 1224) Vital Signs (24h Range):  Temp:  [98.2 °F (36.8 °C)-98.6 °F (37 °C)] 98.3 °F (36.8 °C)  Pulse:  [56-65] 59  Resp:  [18] 18  SpO2:  [95 %-98 %] 98 %  BP: (146-180)/(70-88) 163/74     Weight: 79.4 kg (175 lb 0.7 oz)  Body mass index is 25.12 kg/m².    Intake/Output Summary (Last 24 hours) at 12/9/2019 1508  Last data filed at 12/9/2019 1000  Gross per 24 hour   Intake 600 ml   Output 800 ml   Net -200 ml      Physical Exam   Constitutional: He is oriented to  person, place, and time. He appears well-developed and well-nourished. No distress.   HENT:   Head: Normocephalic and atraumatic.   Mouth/Throat: Oropharynx is clear and moist and mucous membranes are normal.   Eyes: Pupils are equal, round, and reactive to light. Conjunctivae are normal. No scleral icterus.   Neck: No JVD present.   Cardiovascular: Normal rate, regular rhythm, normal heart sounds and normal pulses.   No murmur heard.  Pulmonary/Chest: Effort normal and breath sounds normal. No respiratory distress.   Abdominal: Soft. Normal appearance and bowel sounds are normal. He exhibits no distension. There is no tenderness.   Large well healed pyloromyotomy scar on right side of abdomen.    Musculoskeletal: He exhibits no edema.        Cervical back: He exhibits tenderness, bony tenderness and pain. He exhibits normal range of motion, no swelling, no edema and no spasm.        Thoracic back: He exhibits tenderness, bony tenderness and pain. He exhibits no swelling, no edema, no laceration and no spasm.   Neurological: He is alert and oriented to person, place, and time. He has normal strength. He displays no tremor. No cranial nerve deficit or sensory deficit. He exhibits normal muscle tone. He displays no Babinski's sign on the right side. He displays no Babinski's sign on the left side.   Skin: Skin is warm and dry. No bruising and no rash noted.       Significant Labs: All pertinent labs within the past 24 hours have been reviewed.    Significant Imaging: I have reviewed and interpreted all pertinent imaging results/findings within the past 24 hours.      Assessment/Plan:      * Spinal epidural abscess  This is a 74 year old  male with PMH notable for prior IVDU who presented on 12/06 with one month duration of upper back pain with work-up at outside hospital significant for MRI suggestive of osteomyelitis of C6-T2 with associated epidural abscess with posterior spinal cord displacement. He has no  neurological deficits on exam and without evidence of developing sepsis. He is status post evaluation by neurosurgery on arrival, with no plans for emergent intervention. Etiology of presentation unclear; could be secondary to relapse of IVDU.     Plan:   -NSGY will perform C7-T1 corpectomy on 12/10.  -Hold off on antibiotics for now as pt is not septic. We will wait until cx results from biopsy and treat appropriately  -Hold off on initiating Dexamethasone as without neurological deficits on exam.   -Follow-up blood cultures - NGTD  -Pain control ordered PRN.   -Neuro checks ordered Q4H.     Pre-op evaluation  -- 10 METs at baseline  -- RCRI Class II, which suggests a 0.9% risk of major cardiac event based on the presence of Hx ischemic heart disease  -- Given the above, this patient is 0.9% risk of major cardiac event  risk for the planned procedure. No further testing or optimization is needed at present, other than routine management of the conditions listed below     +1 for each of the following:  High risk surgery?  Hx ischemic heart disease  Hx CHF?  Hx TIA?  Pre-operative treatment with insulin?  Pre-operative creatinine >2?    Class I: 0 points - 0.4% risk of major cardiac event  Class II: 1 points - 0.9% risk of major cardiac event  Class III: 2 points - 6.6% risk of major cardiac event  Class IV: >/= 3 points - 11% risk of major cardiac event    Can take care of self, such as eat, dress, or use the toilet (1 MET)  ?Can walk up a flight of steps or a hill or walk on level ground at 3 to 4 mph (4 METs)  ?Can do heavy work around the house, such as scrubbing floors or lifting or moving heavy furniture, or climb two flights of stairs (between 4 and 10 METs)  ?Can participate in strenuous sports such as swimming, singles tennis, football, basketball, and skiing (>10 METs)    Low vs. high risk: 1% of  death    [email or web address]&rank=1~[email or web address]&source=see_link      Elevated alkaline phosphatase level  -Likely secondary to osteomyelitis.       Tobacco abuse  Plan:   -Counseled on importance of smoking cessation.   -Nicotine patch ordered for admission.       Hyperlipidemia  -Continue home Statin.       Other specified hypothyroidism  -Continue home Synthroid.       Essential hypertension  -Home regimen: Metoprolol and combination ARB-HCTZ.     Plan:   - add norvasc 5 mg OD  -Continue home regimen.   -Trending renal function daily.       Coronary artery disease involving native coronary artery of native heart without angina pectoris  Plan:   -Continue home  Statin.   - hold statin till surgery      Chronic hepatitis C without hepatic coma  -Follows with hepatology here; suspected secondary to prior history of IVDU. Pt states it has been over a year since last IVDU  -Initially diagnosed with genotype 3a; status post treatment in 2009 with remission achieved.   -Developed genotype 1a in 04/2017 attributed to relapse of IVDU; not treated.   -Most recent abdominal U/S WNL in 07/2017.   -No stigmata of cirrhosis on exam.   -Liver function on admission.       Acute osteomyelitis of thoracic spine  -See assessment for epidural abscess.       Acute osteomyelitis of cervical spine  -See assessment for epidural abscess.         VTE Risk Mitigation (From admission, onward)         Ordered     IP VTE HIGH RISK PATIENT  Once      12/07/19 0219     Place sequential compression device  Until discontinued      12/07/19 0219                      Shaji Barone MD  Department of Hospital Medicine   [facility]

## 2019-12-09 NOTE — ANESTHESIA PREPROCEDURE EVALUATION
[facility]  Anesthesia Pre-Operative Evaluation         Patient Name: Junaid Muñoz  YOB: 1945  MRN: [id number]    SUBJECTIVE:     Pre-operative evaluation for Procedure(s) (LRB):  CORPECTOMY, SPINE, CERVICAL AND THORACIC, C7 and T1 with Globus (N/A)     12/09/2019    Junaid Muñoz is a 74 y.o. male w/ a significant PMHx of spinal epidural abscess, osteomyelitis of cervical and thoracic spine, chronic hep C, HTN, CAD (s/p 2 stents on plavix), HLD, IVDU and current smoker. Pt presented c/o 1 month hx of upper back pain.    Patient now presents for the above procedure(s).      LDA:        Peripheral IV - Single Lumen 12/06/19 1232 20 G Right Hand (Active)   Site Assessment Clean;Dry;Intact 12/9/2019  8:00 AM   Line Status Saline locked 12/9/2019  8:00 AM   Dressing Status Clean;Dry;Intact 12/9/2019  8:00 AM   Dressing Intervention New dressing 12/6/2019 12:35 PM   Number of days: 3            Peripheral IV - Single Lumen 12/07/19 0013 18 G Left Antecubital (Active)   Site Assessment Clean;Dry;Intact 12/9/2019  8:00 AM   Line Status Saline locked 12/9/2019  8:00 AM   Dressing Status Clean;Dry;Intact 12/9/2019  8:00 AM   Number of days: 2       Prev airway: None documented.    Drips: None documented.      Patient Active Problem List   Diagnosis    Spinal epidural abscess    Acute osteomyelitis of cervical spine    Acute osteomyelitis of thoracic spine    Chronic hepatitis C without hepatic coma    Coronary artery disease involving native coronary artery of native heart without angina pectoris    Essential hypertension    Other specified hypothyroidism    Hyperlipidemia    Tobacco abuse    Elevated alkaline phosphatase level       Review of patient's allergies indicates:   Allergen Reactions    Penicillins Rash       Current Inpatient Medications:   acetaminophen  1,000 mg Oral Q8H    amLODIPine  5 mg Oral Daily    atorvastatin  80 mg Oral Daily    bacitracin    Topical (Top) TID    chlorhexidine  15 mL Mouth/Throat BID    levothyroxine  50 mcg Oral Before breakfast    lidocaine-EPINEPHrine 1%-1:100,000  1 mL Intradermal Once    losartan-hydrochlorothiazide 100-25 mg  1 tablet Oral Daily    metoprolol succinate  100 mg Oral Daily    nicotine  1 patch Transdermal Daily    senna-docusate 8.6-50 mg  1 tablet Oral BID       No current facility-administered medications on file prior to encounter.      Current Outpatient Medications on File Prior to Encounter   Medication Sig Dispense Refill    atorvastatin (LIPITOR) 80 MG tablet Take 80 mg by mouth once daily at 6am.      clopidogrel (PLAVIX) 75 mg tablet Take 75 mg by mouth once daily at 6am.      ibuprofen (ADVIL,MOTRIN) 600 MG tablet Take 1 tablet (600 mg total) by mouth every 6 (six) hours as needed for Pain. 20 tablet 0    levothyroxine (SYNTHROID) 50 MCG tablet Take 50 mcg by mouth once daily at 6am.      losartan-hydrochlorothiazide 100-25 mg (HYZAAR) 100-25 mg per tablet Take 1 tablet by mouth once daily at 6am.       metoprolol succinate (TOPROL-XL) 100 MG 24 hr tablet Take 100 mg by mouth once daily at 6am.      traMADol (ULTRAM) 50 mg tablet Take 50 mg by mouth every 6 (six) hours as needed for Pain.         Past Surgical History:   Procedure Laterality Date    COLONOSCOPY W/ POLYPECTOMY  06/2008    Dr Wagoner: fair prep, 3mm polyp - tubular adenoma    CORONARY ANGIOPLASTY WITH STENT PLACEMENT      hydrocele repair  teenager    pyloric stenosis repair  age 1    TONSILLECTOMY         Social History     Socioeconomic History    Marital status:      Spouse name: Not on file    Number of children: Not on file    Years of education: Not on file    Highest education level: Not on file   Occupational History    Not on file   Social Needs    Financial resource strain: Not on file    Food insecurity:     Worry: Not on file     Inability: Not on file    Transportation needs:     Medical: Not on  file     Non-medical: Not on file   Tobacco Use    Smoking status: Current Every Day Smoker   Substance and Sexual Activity    Alcohol use: Not on file    Drug use: Yes     Types: IV     Comment: MORPHINE    Sexual activity: Not on file   Lifestyle    Physical activity:     Days per week: Not on file     Minutes per session: Not on file    Stress: Not on file   Relationships    Social connections:     Talks on phone: Not on file     Gets together: Not on file     Attends Orthodox service: Not on file     Active member of club or organization: Not on file     Attends meetings of clubs or organizations: Not on file     Relationship status: Not on file   Other Topics Concern    Not on file   Social History Narrative    Not on file       OBJECTIVE:     Vital Signs Range (Last 24H):  Temp:  [36.8 °C (98.2 °F)-37 °C (98.6 °F)]   Pulse:  [56-65]   Resp:  [18]   BP: (146-180)/(70-88)   SpO2:  [95 %-98 %]       Significant Labs:  Lab Results   Component Value Date    WBC 9.39 12/09/2019    HGB 11.7 (L) 12/09/2019    HCT 38.5 (L) 12/09/2019     12/09/2019    ALT 15 12/09/2019    AST 25 12/09/2019     12/09/2019    K 3.8 12/09/2019     12/09/2019    CREATININE 0.9 12/09/2019    BUN 18 12/09/2019    CO2 26 12/09/2019    TSH 2.96 09/18/2008    INR 1.1 12/06/2019       Diagnostic Studies: No relevant studies.    EKG:   Results for orders placed or performed during the hospital encounter of 09/05/19   EKG 12-lead    Collection Time: 09/05/19  1:28 PM    Narrative    Test Reason : R07.9,    Vent. Rate : 060 BPM     Atrial Rate : 060 BPM     P-R Int : 170 ms          QRS Dur : 090 ms      QT Int : 426 ms       P-R-T Axes : 022 -01 007 degrees     QTc Int : 426 ms    Normal sinus rhythm  Normal ECG  When compared with ECG of 23-AUG-2019 17:51,  ST no longer depressed in Anterior-lateral leads  Confirmed by Heladio Snyder MD ([id number]) on 9/5/2019 10:57:36 PM    Referred By: MIRZA BUSH MD           Confirmed  By:Heladio Snyder MD       2D ECHO:  TTE:  No results found for this or any previous visit.    RAMY:  No results found for this or any previous visit.    ASSESSMENT/PLAN:                                                                                                                 12/09/2019  Junaid Muñoz is a 74 y.o., male.    Anesthesia Evaluation    I have reviewed the Patient Summary Reports.     I have reviewed the Medications.     Review of Systems  Anesthesia Hx:  No problems with previous Anesthesia Denies Hx of Anesthetic complications  Denies Family Hx of Anesthesia complications.   Denies Personal Hx of Anesthesia complications.   Social:  Alcohol Use, Smoker IV morphine use   Cardiovascular:   Hypertension CAD      Hepatic/GI:   Liver Disease, Hepatitis, C    Endocrine:   Hypothyroidism        Physical Exam  General:  Well nourished    Airway/Jaw/Neck:  Airway Findings: Mouth Opening: Normal Tongue: Normal  General Airway Assessment: Adult  Mallampati: III  Improves to II with phonation.      Dental:  Dental Findings: Periodontal disease, Severe   Chest/Lungs:  Chest/Lungs Clear    Heart/Vascular:  Heart Findings: Normal Vascular Findings: Normal    Abdomen:  Abdomen Findings: Normal    Musculoskeletal:  Musculoskeletal Findings: Normal    Mental Status:  Mental Status Findings:  Alert and Oriented, Cooperative         Anesthesia Plan  Type of Anesthesia, risks & benefits discussed:  Anesthesia Type:  general  Patient's Preference:   Intra-op Monitoring Plan: arterial line and standard ASA monitors  Intra-op Monitoring Plan Comments:   Post Op Pain Control Plan: IV/PO Opioids PRN, per primary service following discharge from PACU and multimodal analgesia  Post Op Pain Control Plan Comments:   Induction:   IV  Beta Blocker:  Patient is on a Beta-Blocker and has received one dose within the past 24 hours (No further documentation required).       Informed Consent: Patient understands risks and agrees  with Anesthesia plan.  Questions answered. Anesthesia consent signed with patient.  ASA Score: 3     Day of Surgery Review of History & Physical:    H&P update referred to the surgeon.     Anesthesia Plan Notes: Chronic opiate user.        Ready For Surgery From Anesthesia Perspective.

## 2019-12-09 NOTE — DISCHARGE INSTRUCTIONS
Neurosurgery Patient Information. Please follow ONLY the instructions that are checked below.    Activity Restrictions:  [x]  Return to work will be determined on an individual basis.  [x]  No lifting greater than 5-10 pounds.  [x]  Avoid bending and twisting the area of your surgery more than 45 degrees from neutral position in any direction.  [x]  No driving or operating machinery:  [x]  until cleared by your surgeon.  [x]  while taking narcotic pain medications or muscle relaxants.  [x]  Wear your brace at all times except when showering.  [x]  Increase ambulation over the next 2 weeks so that you are walking 2 miles per day at 2 weeks post-operatively.  [x]  Walk on paved surfaces only. It is okay to walk up and down stairs while holding onto a side rail.  [x]  No sexual activity for 6 weeks.    Discharge Medication/Follow-up:  [x]  Please refer to discharge medication reconciliation form.  [x]  Do not take ANY non-steroidal anti-inflammatory drugs (NSAIDS), including the following: ibuprofen, naprosyn, Aleve, Advil, Indocin, Mobic, or Celebrex for 6 weeks after surgery.   [x]  Prescriptions for appropriate medication will be given upon discharge.  [x]  Take docusate (Colace 100 mg): take one capsule a day as needed for constipation. You can get this over the counter.  [x]  Follow-up appointment:  [x]  4-6 weeks with MD:  [x]  with x-rays  [x]  An appointment will be mailed to you.    Wound Care:  [x] OK to shower and get incision wet. OK to wash incision with soap. No vigorous rubbing or scrubbing. Pat incision dry once finished.   [x]  You cannot take a bath or submerge the incision under water until 6 weeks after surgery.      Call your doctor or go to the Emergency Room for any signs of infection, including: increased redness, drainage, pain, or fever (temperature ?101.5 for 24 hours). Call your doctor or go to the Emergency Room if there are any localized neurological changes; problems with speech, vision,  numbness, tingling, weakness, or severe headache; or for other concerns.    Special Instructions:  [x]  No use of tobacco products.  [x]  Diet: Please eat a regular diet as tolerated.      Physicians need 3 days' notice for pain medicine to be refilled. Pain medicine will only be refilled between 8 AM and 5 PM, Monday through Friday, due to Food and Drug Administration regulation of documentation.        [facility] Neurosurgery Office: [phone]              [facility] Neurosurgery Office: [phone]   [place] Neurosurgery Office: [phone]

## 2019-12-09 NOTE — PROCEDURES
Primary Surgeon: Paul Castellanos    Indications, risks, and benefits explained to patient/surrogate decision maker who gave verbal consent to being placed into cervical traction. A time-out was completed verifying correct patient, procedure, and site. All pre-operative labs and imaging were reviewed, and the patient was examined prior to beginning. The traction frame with pulley system was attached to the patient's bed and evaluated for structural integrity. So were placed bilaterally, three fingerbreaths above the external acoustic meatus for placement of pins.The pin sites were prepped with ChloraPrep and infiltrated with 1% Xylocaine with epinephrine [id number]. [employer] tong pins were advanced until the pressure sensor indicated sufficient tightening. An initial 5lb was hung on the pulley-system and the patient was examined directly after weight was applied. An additional 5lb was applied in 60minute intervals and the patient was re-examined after each additional weight was placed for a total of 20lb. No neurological symptoms were described and the patient's exam was consistent with baseline. Pre-procedural, as well as interval X-rays were taken to evaluate cervical spine alignment with each additional weight placed. Adequate pain and muscle spasm medications were used throughout the procedure. Blood loss was minimal, the patient tolerated the procedure well, and there were no complications.

## 2019-12-09 NOTE — HOSPITAL COURSE
Hospital Medicine consulted by Neurosurgery 1/2/20 for rising LFTs. Pt has history of Hep C and was on high intensity statin (atorvastain 80 mg). Advised holding statin in setting of rising AST/ALT. Overnight, LFTs trending down. Hep C serologies pending. Per chart review, pt waiting on SNF placement.

## 2019-12-10 ENCOUNTER — ANESTHESIA (OUTPATIENT)
Dept: SURGERY | Facility: HOSPITAL | Age: 74
DRG: 459 | End: 2019-12-10
Payer: MEDICARE

## 2019-12-10 LAB
ALBUMIN SERPL BCP-MCNC: 2.5 G/DL (ref 3.5–5.2)
ALP SERPL-CCNC: 283 U/L (ref 55–135)
ALT SERPL W/O P-5'-P-CCNC: 18 U/L (ref 10–44)
ANION GAP SERPL CALC-SCNC: 7 MMOL/L (ref 8–16)
AST SERPL-CCNC: 33 U/L (ref 10–40)
BASOPHILS # BLD AUTO: 0.04 K/UL (ref 0–0.2)
BASOPHILS NFR BLD: 0.4 % (ref 0–1.9)
BILIRUB SERPL-MCNC: 0.5 MG/DL (ref 0.1–1)
BUN SERPL-MCNC: 15 MG/DL (ref 8–23)
CALCIUM SERPL-MCNC: 8.9 MG/DL (ref 8.7–10.5)
CHLORIDE SERPL-SCNC: 104 MMOL/L (ref 95–110)
CLARITY CSF: CLEAR
CO2 SERPL-SCNC: 25 MMOL/L (ref 23–29)
COLOR CSF: ABNORMAL
CREAT SERPL-MCNC: 0.8 MG/DL (ref 0.5–1.4)
DIFFERENTIAL METHOD: ABNORMAL
EOSINOPHIL # BLD AUTO: 0.2 K/UL (ref 0–0.5)
EOSINOPHIL NFR BLD: 1.6 % (ref 0–8)
ERYTHROCYTE [DISTWIDTH] IN BLOOD BY AUTOMATED COUNT: 16.3 % (ref 11.5–14.5)
EST. GFR  (AFRICAN AMERICAN): >60 ML/MIN/1.73 M^2
EST. GFR  (NON AFRICAN AMERICAN): >60 ML/MIN/1.73 M^2
GLUCOSE CSF-MCNC: 56 MG/DL (ref 40–70)
GLUCOSE SERPL-MCNC: 118 MG/DL (ref 70–110)
GRAM STN SPEC: NORMAL
HCT VFR BLD AUTO: 36.7 % (ref 40–54)
HGB BLD-MCNC: 11.1 G/DL (ref 14–18)
IMM GRANULOCYTES # BLD AUTO: 0.03 K/UL (ref 0–0.04)
IMM GRANULOCYTES NFR BLD AUTO: 0.3 % (ref 0–0.5)
LYMPHOCYTES # BLD AUTO: 1.3 K/UL (ref 1–4.8)
LYMPHOCYTES NFR BLD: 13.8 % (ref 18–48)
LYMPHOCYTES NFR CSF MANUAL: 100 % (ref 40–80)
MAGNESIUM SERPL-MCNC: 1.6 MG/DL (ref 1.6–2.6)
MCH RBC QN AUTO: 25.7 PG (ref 27–31)
MCHC RBC AUTO-ENTMCNC: 30.2 G/DL (ref 32–36)
MCV RBC AUTO: 85 FL (ref 82–98)
MONOCYTES # BLD AUTO: 0.8 K/UL (ref 0.3–1)
MONOCYTES NFR BLD: 8.7 % (ref 4–15)
NEUTROPHILS # BLD AUTO: 6.9 K/UL (ref 1.8–7.7)
NEUTROPHILS NFR BLD: 75.2 % (ref 38–73)
NRBC BLD-RTO: 0 /100 WBC
PHOSPHATE SERPL-MCNC: 3.6 MG/DL (ref 2.7–4.5)
PLATELET # BLD AUTO: 159 K/UL (ref 150–350)
PMV BLD AUTO: 10 FL (ref 9.2–12.9)
POTASSIUM SERPL-SCNC: 3.9 MMOL/L (ref 3.5–5.1)
PROT CSF-MCNC: 682 MG/DL (ref 15–40)
PROT SERPL-MCNC: 6.6 G/DL (ref 6–8.4)
RBC # BLD AUTO: 4.32 M/UL (ref 4.6–6.2)
RBC # CSF: 14 /CU MM
SODIUM SERPL-SCNC: 136 MMOL/L (ref 136–145)
SPECIMEN VOL CSF: 2 ML
WBC # BLD AUTO: 9.2 K/UL (ref 3.9–12.7)
WBC # CSF: 4 /CU MM (ref 0–5)

## 2019-12-10 PROCEDURE — 36000711: Performed by: NEUROLOGICAL SURGERY

## 2019-12-10 PROCEDURE — 25000003 PHARM REV CODE 250: Performed by: STUDENT IN AN ORGANIZED HEALTH CARE EDUCATION/TRAINING PROGRAM

## 2019-12-10 PROCEDURE — 22854 INSJ BIOMECHANICAL DEVICE: CPT | Mod: ,,, | Performed by: NEUROLOGICAL SURGERY

## 2019-12-10 PROCEDURE — 87206 SMEAR FLUORESCENT/ACID STAI: CPT

## 2019-12-10 PROCEDURE — 87075 CULTR BACTERIA EXCEPT BLOOD: CPT

## 2019-12-10 PROCEDURE — 22554 PR ARTHRODESIS ANT INTERBODY MIN DISCECTOMY, CERVICAL BELOW C2: ICD-10-PCS | Mod: 51,22,, | Performed by: NEUROLOGICAL SURGERY

## 2019-12-10 PROCEDURE — 63600175 PHARM REV CODE 636 W HCPCS: Performed by: NURSE ANESTHETIST, CERTIFIED REGISTERED

## 2019-12-10 PROCEDURE — C1769 GUIDE WIRE: HCPCS | Performed by: NEUROLOGICAL SURGERY

## 2019-12-10 PROCEDURE — 71000039 HC RECOVERY, EACH ADD'L HOUR: Performed by: NEUROLOGICAL SURGERY

## 2019-12-10 PROCEDURE — D9220A PRA ANESTHESIA: Mod: ANES,,, | Performed by: ANESTHESIOLOGY

## 2019-12-10 PROCEDURE — 87205 SMEAR GRAM STAIN: CPT

## 2019-12-10 PROCEDURE — 36415 COLL VENOUS BLD VENIPUNCTURE: CPT

## 2019-12-10 PROCEDURE — 22845 INSERT SPINE FIXATION DEVICE: CPT | Mod: 59,,, | Performed by: NEUROLOGICAL SURGERY

## 2019-12-10 PROCEDURE — 63600175 PHARM REV CODE 636 W HCPCS: Performed by: NEUROLOGICAL SURGERY

## 2019-12-10 PROCEDURE — 27201037 HC PRESSURE MONITORING SET UP

## 2019-12-10 PROCEDURE — 87102 FUNGUS ISOLATION CULTURE: CPT

## 2019-12-10 PROCEDURE — 87116 MYCOBACTERIA CULTURE: CPT | Mod: 59

## 2019-12-10 PROCEDURE — 20000000 HC ICU ROOM

## 2019-12-10 PROCEDURE — 36620 PR INSERT CATH,ART,PERCUT,SHORTTERM: ICD-10-PCS | Mod: 59,,, | Performed by: ANESTHESIOLOGY

## 2019-12-10 PROCEDURE — 71000033 HC RECOVERY, INTIAL HOUR: Performed by: NEUROLOGICAL SURGERY

## 2019-12-10 PROCEDURE — 36620 INSERTION CATHETER ARTERY: CPT | Mod: 59,,, | Performed by: ANESTHESIOLOGY

## 2019-12-10 PROCEDURE — 63300 REMOVE VERT XDRL BODY CRVCL: CPT | Mod: 22,,, | Performed by: NEUROLOGICAL SURGERY

## 2019-12-10 PROCEDURE — 99223 1ST HOSP IP/OBS HIGH 75: CPT | Mod: AI,ICN,, | Performed by: PHYSICIAN ASSISTANT

## 2019-12-10 PROCEDURE — 99223 PR INITIAL HOSPITAL CARE,LEVL III: ICD-10-PCS | Mod: AI,ICN,, | Performed by: PHYSICIAN ASSISTANT

## 2019-12-10 PROCEDURE — 63600175 PHARM REV CODE 636 W HCPCS

## 2019-12-10 PROCEDURE — 22585 PR ARTHRODESIS ANT INTERBODY MIN DISCECTOMY,EA ADDL: ICD-10-PCS | Mod: ,,, | Performed by: NEUROLOGICAL SURGERY

## 2019-12-10 PROCEDURE — 22585 ARTHRD ANT NTRBD MIN DSC EA: CPT | Mod: ,,, | Performed by: NEUROLOGICAL SURGERY

## 2019-12-10 PROCEDURE — C1729 CATH, DRAINAGE: HCPCS | Performed by: NEUROLOGICAL SURGERY

## 2019-12-10 PROCEDURE — 84157 ASSAY OF PROTEIN OTHER: CPT

## 2019-12-10 PROCEDURE — 20930 PR ALLOGRAFT FOR SPINE SURGERY ONLY MORSELIZED: ICD-10-PCS | Mod: ,,, | Performed by: NEUROLOGICAL SURGERY

## 2019-12-10 PROCEDURE — 85025 COMPLETE CBC W/AUTO DIFF WBC: CPT

## 2019-12-10 PROCEDURE — D9220A PRA ANESTHESIA: ICD-10-PCS | Mod: CRNA,,, | Performed by: NURSE ANESTHETIST, CERTIFIED REGISTERED

## 2019-12-10 PROCEDURE — 87070 CULTURE OTHR SPECIMN AEROBIC: CPT | Mod: 59

## 2019-12-10 PROCEDURE — 63600175 PHARM REV CODE 636 W HCPCS: Performed by: STUDENT IN AN ORGANIZED HEALTH CARE EDUCATION/TRAINING PROGRAM

## 2019-12-10 PROCEDURE — 22554 ARTHRD ANT NTRBD MIN DSC CRV: CPT | Mod: 51,22,, | Performed by: NEUROLOGICAL SURGERY

## 2019-12-10 PROCEDURE — 82945 GLUCOSE OTHER FLUID: CPT

## 2019-12-10 PROCEDURE — 87205 SMEAR GRAM STAIN: CPT | Mod: 59

## 2019-12-10 PROCEDURE — 87070 CULTURE OTHR SPECIMN AEROBIC: CPT

## 2019-12-10 PROCEDURE — 84100 ASSAY OF PHOSPHORUS: CPT

## 2019-12-10 PROCEDURE — 36000710: Performed by: NEUROLOGICAL SURGERY

## 2019-12-10 PROCEDURE — 25000003 PHARM REV CODE 250: Performed by: NEUROLOGICAL SURGERY

## 2019-12-10 PROCEDURE — 80053 COMPREHEN METABOLIC PANEL: CPT

## 2019-12-10 PROCEDURE — D9220A PRA ANESTHESIA: Mod: CRNA,,, | Performed by: NURSE ANESTHETIST, CERTIFIED REGISTERED

## 2019-12-10 PROCEDURE — 63600175 PHARM REV CODE 636 W HCPCS: Performed by: HOSPITALIST

## 2019-12-10 PROCEDURE — 25500020 PHARM REV CODE 255: Performed by: NEUROLOGICAL SURGERY

## 2019-12-10 PROCEDURE — 94761 N-INVAS EAR/PLS OXIMETRY MLT: CPT

## 2019-12-10 PROCEDURE — C1762 CONN TISS, HUMAN(INC FASCIA): HCPCS | Performed by: NEUROLOGICAL SURGERY

## 2019-12-10 PROCEDURE — 99233 SBSQ HOSP IP/OBS HIGH 50: CPT | Mod: GC,,, | Performed by: HOSPITALIST

## 2019-12-10 PROCEDURE — C1713 ANCHOR/SCREW BN/BN,TIS/BN: HCPCS | Performed by: NEUROLOGICAL SURGERY

## 2019-12-10 PROCEDURE — D9220A PRA ANESTHESIA: ICD-10-PCS | Mod: ANES,,, | Performed by: ANESTHESIOLOGY

## 2019-12-10 PROCEDURE — 63300 PR REMV VERT,EXDUR,CERV TUMOR: ICD-10-PCS | Mod: 22,,, | Performed by: NEUROLOGICAL SURGERY

## 2019-12-10 PROCEDURE — 27201423 OPTIME MED/SURG SUP & DEVICES STERILE SUPPLY: Performed by: NEUROLOGICAL SURGERY

## 2019-12-10 PROCEDURE — 63308 REMOVE VERTEBRAL BODY ADD-ON: CPT | Mod: ,,, | Performed by: NEUROLOGICAL SURGERY

## 2019-12-10 PROCEDURE — 63308 PR REMV VERT,EACH ADDNL SEGMENT: ICD-10-PCS | Mod: ,,, | Performed by: NEUROLOGICAL SURGERY

## 2019-12-10 PROCEDURE — 62272 THER SPI PNXR DRG CSF: CPT | Mod: 51,,, | Performed by: NEUROLOGICAL SURGERY

## 2019-12-10 PROCEDURE — 87186 SC STD MICRODIL/AGAR DIL: CPT | Mod: 59

## 2019-12-10 PROCEDURE — 89051 BODY FLUID CELL COUNT: CPT

## 2019-12-10 PROCEDURE — 25000003 PHARM REV CODE 250: Performed by: NURSE ANESTHETIST, CERTIFIED REGISTERED

## 2019-12-10 PROCEDURE — 37000008 HC ANESTHESIA 1ST 15 MINUTES: Performed by: NEUROLOGICAL SURGERY

## 2019-12-10 PROCEDURE — 99233 PR SUBSEQUENT HOSPITAL CARE,LEVL III: ICD-10-PCS | Mod: GC,,, | Performed by: HOSPITALIST

## 2019-12-10 PROCEDURE — 62272 PR SPINAL PUNCTURE,THERAPEUTIC DRAINAGE: ICD-10-PCS | Mod: 51,,, | Performed by: NEUROLOGICAL SURGERY

## 2019-12-10 PROCEDURE — 27800903 OPTIME MED/SURG SUP & DEVICES OTHER IMPLANTS: Performed by: NEUROLOGICAL SURGERY

## 2019-12-10 PROCEDURE — 20930 SP BONE ALGRFT MORSEL ADD-ON: CPT | Mod: ,,, | Performed by: NEUROLOGICAL SURGERY

## 2019-12-10 PROCEDURE — 37000009 HC ANESTHESIA EA ADD 15 MINS: Performed by: NEUROLOGICAL SURGERY

## 2019-12-10 PROCEDURE — 83735 ASSAY OF MAGNESIUM: CPT

## 2019-12-10 PROCEDURE — 63600175 PHARM REV CODE 636 W HCPCS: Performed by: ANESTHESIOLOGY

## 2019-12-10 PROCEDURE — 22845 PR ANTERIOR INSTRUMENTATION 2-3 VERTEBRAL SEGMENTS: ICD-10-PCS | Mod: 59,,, | Performed by: NEUROLOGICAL SURGERY

## 2019-12-10 PROCEDURE — 22854 PR INS BIOMECH DEV, VERT CORPECTOMY W/INTRBODY ARTHRODESIS EA INTERSPC: ICD-10-PCS | Mod: ,,, | Performed by: NEUROLOGICAL SURGERY

## 2019-12-10 PROCEDURE — 87077 CULTURE AEROBIC IDENTIFY: CPT | Mod: 59

## 2019-12-10 DEVICE — DURA MATRIX ONLAY PLUS 2X2: Type: IMPLANTABLE DEVICE | Site: SPINE CERVICAL | Status: FUNCTIONAL

## 2019-12-10 DEVICE — SCREW XTEND VA SD 4.2X16MM: Type: IMPLANTABLE DEVICE | Site: SPINE CERVICAL | Status: FUNCTIONAL

## 2019-12-10 DEVICE — SPACER FORTIFY 12MM 33-53MM: Type: IMPLANTABLE DEVICE | Site: SPINE CERVICAL | Status: FUNCTIONAL

## 2019-12-10 RX ORDER — ONDANSETRON 2 MG/ML
4 INJECTION INTRAMUSCULAR; INTRAVENOUS ONCE AS NEEDED
Status: DISCONTINUED | OUTPATIENT
Start: 2019-12-10 | End: 2019-12-10 | Stop reason: HOSPADM

## 2019-12-10 RX ORDER — VANCOMYCIN HYDROCHLORIDE 1 G/20ML
INJECTION, POWDER, LYOPHILIZED, FOR SOLUTION INTRAVENOUS
Status: DISCONTINUED | OUTPATIENT
Start: 2019-12-10 | End: 2019-12-10 | Stop reason: HOSPADM

## 2019-12-10 RX ORDER — CLOPIDOGREL BISULFATE 75 MG/1
75 TABLET ORAL DAILY
Status: DISCONTINUED | OUTPATIENT
Start: 2019-12-11 | End: 2019-12-10

## 2019-12-10 RX ORDER — MEPERIDINE HYDROCHLORIDE 50 MG/ML
12.5 INJECTION INTRAMUSCULAR; INTRAVENOUS; SUBCUTANEOUS ONCE AS NEEDED
Status: DISCONTINUED | OUTPATIENT
Start: 2019-12-10 | End: 2019-12-10 | Stop reason: HOSPADM

## 2019-12-10 RX ORDER — LIDOCAINE HCL/PF 100 MG/5ML
SYRINGE (ML) INTRAVENOUS
Status: DISCONTINUED | OUTPATIENT
Start: 2019-12-10 | End: 2019-12-10

## 2019-12-10 RX ORDER — FENTANYL CITRATE 50 UG/ML
INJECTION, SOLUTION INTRAMUSCULAR; INTRAVENOUS
Status: DISCONTINUED | OUTPATIENT
Start: 2019-12-10 | End: 2019-12-10

## 2019-12-10 RX ORDER — DEXAMETHASONE SODIUM PHOSPHATE 4 MG/ML
INJECTION, SOLUTION INTRA-ARTICULAR; INTRALESIONAL; INTRAMUSCULAR; INTRAVENOUS; SOFT TISSUE
Status: DISCONTINUED | OUTPATIENT
Start: 2019-12-10 | End: 2019-12-10

## 2019-12-10 RX ORDER — FENTANYL CITRATE 50 UG/ML
25 INJECTION, SOLUTION INTRAMUSCULAR; INTRAVENOUS EVERY 5 MIN PRN
Status: DISCONTINUED | OUTPATIENT
Start: 2019-12-10 | End: 2019-12-10 | Stop reason: HOSPADM

## 2019-12-10 RX ORDER — LABETALOL HYDROCHLORIDE 5 MG/ML
INJECTION, SOLUTION INTRAVENOUS
Status: DISCONTINUED | OUTPATIENT
Start: 2019-12-10 | End: 2019-12-10

## 2019-12-10 RX ORDER — BACITRACIN 50000 [IU]/1
INJECTION, POWDER, FOR SOLUTION INTRAMUSCULAR
Status: DISCONTINUED | OUTPATIENT
Start: 2019-12-10 | End: 2019-12-10 | Stop reason: HOSPADM

## 2019-12-10 RX ORDER — HYDRALAZINE HYDROCHLORIDE 20 MG/ML
10 INJECTION INTRAMUSCULAR; INTRAVENOUS ONCE
Status: COMPLETED | OUTPATIENT
Start: 2019-12-10 | End: 2019-12-10

## 2019-12-10 RX ORDER — PROPOFOL 10 MG/ML
VIAL (ML) INTRAVENOUS
Status: DISCONTINUED | OUTPATIENT
Start: 2019-12-10 | End: 2019-12-10

## 2019-12-10 RX ORDER — HYDRALAZINE HYDROCHLORIDE 20 MG/ML
INJECTION INTRAMUSCULAR; INTRAVENOUS
Status: COMPLETED
Start: 2019-12-10 | End: 2019-12-10

## 2019-12-10 RX ORDER — SUCCINYLCHOLINE CHLORIDE 20 MG/ML
INJECTION INTRAMUSCULAR; INTRAVENOUS
Status: DISCONTINUED | OUTPATIENT
Start: 2019-12-10 | End: 2019-12-10

## 2019-12-10 RX ORDER — OXYCODONE HYDROCHLORIDE 5 MG/1
10 TABLET ORAL EVERY 6 HOURS PRN
Status: DISCONTINUED | OUTPATIENT
Start: 2019-12-10 | End: 2019-12-19

## 2019-12-10 RX ORDER — PROPOFOL 10 MG/ML
VIAL (ML) INTRAVENOUS CONTINUOUS PRN
Status: DISCONTINUED | OUTPATIENT
Start: 2019-12-10 | End: 2019-12-10

## 2019-12-10 RX ORDER — LIDOCAINE HYDROCHLORIDE AND EPINEPHRINE 10; 10 MG/ML; UG/ML
INJECTION, SOLUTION INFILTRATION; PERINEURAL
Status: DISCONTINUED | OUTPATIENT
Start: 2019-12-10 | End: 2019-12-10 | Stop reason: HOSPADM

## 2019-12-10 RX ORDER — EPHEDRINE SULFATE 50 MG/ML
INJECTION, SOLUTION INTRAVENOUS
Status: DISCONTINUED | OUTPATIENT
Start: 2019-12-10 | End: 2019-12-10

## 2019-12-10 RX ORDER — HYDROMORPHONE HYDROCHLORIDE 1 MG/ML
0.2 INJECTION, SOLUTION INTRAMUSCULAR; INTRAVENOUS; SUBCUTANEOUS EVERY 5 MIN PRN
Status: COMPLETED | OUTPATIENT
Start: 2019-12-10 | End: 2019-12-10

## 2019-12-10 RX ORDER — PHENYLEPHRINE HYDROCHLORIDE 10 MG/ML
INJECTION INTRAVENOUS
Status: DISCONTINUED | OUTPATIENT
Start: 2019-12-10 | End: 2019-12-10

## 2019-12-10 RX ORDER — ONDANSETRON 2 MG/ML
INJECTION INTRAMUSCULAR; INTRAVENOUS
Status: DISCONTINUED | OUTPATIENT
Start: 2019-12-10 | End: 2019-12-10

## 2019-12-10 RX ORDER — MIDAZOLAM HYDROCHLORIDE 1 MG/ML
INJECTION, SOLUTION INTRAMUSCULAR; INTRAVENOUS
Status: DISCONTINUED | OUTPATIENT
Start: 2019-12-10 | End: 2019-12-10

## 2019-12-10 RX ORDER — DIPHENHYDRAMINE HYDROCHLORIDE 50 MG/ML
25 INJECTION INTRAMUSCULAR; INTRAVENOUS EVERY 6 HOURS PRN
Status: DISCONTINUED | OUTPATIENT
Start: 2019-12-10 | End: 2019-12-10 | Stop reason: HOSPADM

## 2019-12-10 RX ORDER — LANOLIN ALCOHOL/MO/W.PET/CERES
800 CREAM (GRAM) TOPICAL ONCE
Status: DISCONTINUED | OUTPATIENT
Start: 2019-12-10 | End: 2019-12-16

## 2019-12-10 RX ORDER — KETAMINE HCL IN 0.9 % NACL 50 MG/5 ML
SYRINGE (ML) INTRAVENOUS
Status: DISCONTINUED | OUTPATIENT
Start: 2019-12-10 | End: 2019-12-10

## 2019-12-10 RX ADMIN — SODIUM CHLORIDE, SODIUM GLUCONATE, SODIUM ACETATE, POTASSIUM CHLORIDE, MAGNESIUM CHLORIDE, SODIUM PHOSPHATE, DIBASIC, AND POTASSIUM PHOSPHATE: .53; .5; .37; .037; .03; .012; .00082 INJECTION, SOLUTION INTRAVENOUS at 10:12

## 2019-12-10 RX ADMIN — HYDROMORPHONE HYDROCHLORIDE 0.2 MG: 1 INJECTION, SOLUTION INTRAMUSCULAR; INTRAVENOUS; SUBCUTANEOUS at 01:12

## 2019-12-10 RX ADMIN — HYDRALAZINE HYDROCHLORIDE 10 MG: 20 INJECTION INTRAMUSCULAR; INTRAVENOUS at 12:12

## 2019-12-10 RX ADMIN — SODIUM CHLORIDE 0.25 MCG/KG/MIN: 9 INJECTION, SOLUTION INTRAVENOUS at 07:12

## 2019-12-10 RX ADMIN — VANCOMYCIN HYDROCHLORIDE 1 G: 1 INJECTION, POWDER, LYOPHILIZED, FOR SOLUTION INTRAVENOUS at 08:12

## 2019-12-10 RX ADMIN — MIDAZOLAM HYDROCHLORIDE 2 MG: 1 INJECTION, SOLUTION INTRAMUSCULAR; INTRAVENOUS at 07:12

## 2019-12-10 RX ADMIN — DIAZEPAM 10 MG: 5 INJECTION, SOLUTION INTRAMUSCULAR; INTRAVENOUS at 08:12

## 2019-12-10 RX ADMIN — HYDROMORPHONE HYDROCHLORIDE 0.2 MG: 1 INJECTION, SOLUTION INTRAMUSCULAR; INTRAVENOUS; SUBCUTANEOUS at 12:12

## 2019-12-10 RX ADMIN — GENTAMICIN SULFATE 240 MG: 40 INJECTION, SOLUTION INTRAMUSCULAR; INTRAVENOUS at 08:12

## 2019-12-10 RX ADMIN — ACETAMINOPHEN 1000 MG: 500 TABLET ORAL at 01:12

## 2019-12-10 RX ADMIN — FENTANYL CITRATE 50 MCG: 50 INJECTION, SOLUTION INTRAMUSCULAR; INTRAVENOUS at 07:12

## 2019-12-10 RX ADMIN — HYDROMORPHONE HYDROCHLORIDE 0.2 MG: 1 INJECTION, SOLUTION INTRAMUSCULAR; INTRAVENOUS; SUBCUTANEOUS at 03:12

## 2019-12-10 RX ADMIN — LABETALOL HYDROCHLORIDE 5 MG: 5 INJECTION, SOLUTION INTRAVENOUS at 10:12

## 2019-12-10 RX ADMIN — LIDOCAINE HYDROCHLORIDE 100 MG: 20 INJECTION, SOLUTION INTRAVENOUS at 07:12

## 2019-12-10 RX ADMIN — DEXAMETHASONE SODIUM PHOSPHATE 10 MG: 4 INJECTION, SOLUTION INTRAMUSCULAR; INTRAVENOUS at 08:12

## 2019-12-10 RX ADMIN — OXYCODONE HYDROCHLORIDE 10 MG: 5 TABLET ORAL at 01:12

## 2019-12-10 RX ADMIN — PHENYLEPHRINE HYDROCHLORIDE 100 MCG: 10 INJECTION INTRAVENOUS at 07:12

## 2019-12-10 RX ADMIN — SODIUM CHLORIDE: 0.9 INJECTION, SOLUTION INTRAVENOUS at 07:12

## 2019-12-10 RX ADMIN — EPHEDRINE SULFATE 5 MG: 50 INJECTION, SOLUTION INTRAMUSCULAR; INTRAVENOUS; SUBCUTANEOUS at 07:12

## 2019-12-10 RX ADMIN — PROPOFOL 125 MCG/KG/MIN: 10 INJECTION, EMULSION INTRAVENOUS at 07:12

## 2019-12-10 RX ADMIN — SODIUM CHLORIDE, SODIUM GLUCONATE, SODIUM ACETATE, POTASSIUM CHLORIDE, MAGNESIUM CHLORIDE, SODIUM PHOSPHATE, DIBASIC, AND POTASSIUM PHOSPHATE: .53; .5; .37; .037; .03; .012; .00082 INJECTION, SOLUTION INTRAVENOUS at 07:12

## 2019-12-10 RX ADMIN — ACETAMINOPHEN 1000 MG: 500 TABLET ORAL at 09:12

## 2019-12-10 RX ADMIN — REMIFENTANIL HYDROCHLORIDE 0.1 MCG/KG/MIN: 1 INJECTION, POWDER, LYOPHILIZED, FOR SOLUTION INTRAVENOUS at 07:12

## 2019-12-10 RX ADMIN — CEFTRIAXONE 2 G: 2 INJECTION, SOLUTION INTRAVENOUS at 09:12

## 2019-12-10 RX ADMIN — FENTANYL CITRATE 50 MCG: 50 INJECTION, SOLUTION INTRAMUSCULAR; INTRAVENOUS at 10:12

## 2019-12-10 RX ADMIN — SENNOSIDES AND DOCUSATE SODIUM 1 TABLET: 8.6; 5 TABLET ORAL at 09:12

## 2019-12-10 RX ADMIN — VANCOMYCIN HYDROCHLORIDE 1500 MG: 1.5 INJECTION, POWDER, LYOPHILIZED, FOR SOLUTION INTRAVENOUS at 05:12

## 2019-12-10 RX ADMIN — Medication 30 MG: at 08:12

## 2019-12-10 RX ADMIN — FENTANYL CITRATE 50 MCG: 50 INJECTION, SOLUTION INTRAMUSCULAR; INTRAVENOUS at 11:12

## 2019-12-10 RX ADMIN — PROPOFOL 140 MG: 10 INJECTION, EMULSION INTRAVENOUS at 07:12

## 2019-12-10 RX ADMIN — LABETALOL HYDROCHLORIDE 5 MG: 5 INJECTION, SOLUTION INTRAVENOUS at 11:12

## 2019-12-10 RX ADMIN — FENTANYL CITRATE 50 MCG: 50 INJECTION, SOLUTION INTRAMUSCULAR; INTRAVENOUS at 08:12

## 2019-12-10 RX ADMIN — DIAZEPAM 10 MG: 5 INJECTION, SOLUTION INTRAMUSCULAR; INTRAVENOUS at 03:12

## 2019-12-10 RX ADMIN — ONDANSETRON 4 MG: 2 INJECTION INTRAMUSCULAR; INTRAVENOUS at 10:12

## 2019-12-10 RX ADMIN — SUCCINYLCHOLINE CHLORIDE 120 MG: 20 INJECTION, SOLUTION INTRAMUSCULAR; INTRAVENOUS at 07:12

## 2019-12-10 NOTE — PROGRESS NOTES
AL and NIBP were originally coorelating with SBP >190. Pain treated with IV dilaudid and IV hydralazing given. NIBP responded with SBP <180 but AL has not changed. Shirley hoskins/ [facility] came to see pt and said ok to go by NIBP to treat BP to keep SBP <180.

## 2019-12-10 NOTE — TRANSFER OF CARE
"Anesthesia Transfer of Care Note    Patient: Junaid Muñoz    Procedure(s) Performed: Procedure(s) (LRB):  CORPECTOMY, SPINE, CERVICAL AND THORACIC, C7 and T1 with Globus (N/A)    Patient location: PACU    Anesthesia Type: general    Transport from OR: Transported from OR on 6-10 L/min O2 by face mask with adequate spontaneous ventilation    Post pain: adequate analgesia    Post assessment: no apparent anesthetic complications and tolerated procedure well    Level of consciousness: sedated    Nausea/Vomiting: no nausea/vomiting    Complications: none    Transfer of care protocol was followed      Last vitals:   Visit Vitals  BP (!) 192/95 (BP Location: Left arm, Patient Position: Lying)   Pulse (P) 70   Temp 36.9 °C (98.5 °F) (Oral)   Resp (P) 16   Ht 5' 10" (1.778 m)   Wt 79.4 kg (175 lb 0.7 oz)   SpO2 (P) 100%   BMI 25.12 kg/m²     "

## 2019-12-10 NOTE — ASSESSMENT & PLAN NOTE
This is a 74 year old  male with PMH notable for prior IVDU who presented on 12/06 with one month duration of upper back pain with work-up at outside hospital significant for MRI suggestive of osteomyelitis of C6-T2 with associated epidural abscess with posterior spinal cord displacement. He has no neurological deficits on exam and without evidence of developing sepsis. He is status post evaluation by neurosurgery on arrival, with no plans for emergent intervention. Etiology of presentation unclear; could be secondary to relapse of IVDU.     Plan:   -NSGY did C7-T1 corpectomy on 12/10.  - Vancomycin and Rocephin resumed.  - will start plavix tomorrow.  -Hold off on initiating Dexamethasone as without neurological deficits on exam.   -Follow-up blood cultures - NGTD  -Pain control ordered PRN.   -Neuro checks ordered Q4H.

## 2019-12-10 NOTE — SIGNIFICANT EVENT
JODI paged at 2000 that patient had unscrewed his own traction. Upon exam patient was neurologically stable, resting on his side, no bleeding from either pin site.    Discussed w/Dr. Deluca. Informed patient that traction would be required in order for him to proceed with surgery. Patient refused replacement of traction, in addition to option of using less weight.    Patient agreeable to lying flat with rolled towel under C spine. Complete bed rest, may not get up. Witnessed by RN.    Kaelyn Nichole MD  Neurosurgery  [facility]    ------------    X-ray Spine 1 View Any Level    Result Date: 12/9/2019  Poor visualization of the lower cervical spine. No obvious detrimental change when compared with 17:33. Electronically signed by: Avel Kamara MD Date:    12/09/2019 Time:    21:11    X-ray Spine 1 View Any Level    Result Date: 12/9/2019  Incomplete visualization of the lower cervical spine and cervicothoracic junction as detailed above. Probable prevertebral soft tissue swelling within the lower cervical spine. Electronically signed by: Wiliam Muniz MD Date:    12/09/2019 Time:    18:18    X-ray Spine 1 View Any Level    Result Date: 12/9/2019  As above. Electronically signed by: Chidi Lopez Date:    12/09/2019 Time:    15:18.

## 2019-12-10 NOTE — PROGRESS NOTES
[facility] Hospital Medicine  Progress Note    Patient Name: Junaid Muñoz  MRN: [id number]  Patient Class: IP- Inpatient   Admission Date: 12/6/2019  Length of Stay: 4 days  Attending Physician: Junaid Aponte MD  Primary Care Provider: Oskar Whitman MD    [facility] Medicine Team: [facility] MED 4 Shaji Barone MD    Subjective:     Principal Problem:Spinal epidural abscess        HPI:  This is a 74 year old male presenting with chief complaint of back pain. He has a PMH notable for previous IVDU. He reports one month duration of progressively worsening non-radiating upper back pain radiating between the bilateral shoulders described as intense achiness and 8/10 at its worse. He denies symptom association with fevers, chills, numbness or tingling of extremities, bowel or bladder incontinence, headache, blurry vision, back trauma, prior history of spinal surgeries, chest pain, SOB, or pain with inspiration. Due to symptom progression, he was evaluated by outpatient orthopedist on 12/06 and MRI of thoracic spine was ordered. It was suggestive of osteomyelitis of C6 to T2 associated with epidural abscess with posterior spinal cord displacement. Patient was notified and instructed to report to ED.     He adamantly denies preceding or current IVDU. However, per chart review, outpatient hepatology notes from 2017 mention history of relapsing use of IV morphine.     ED course: He initially presented to [facility] on 12/06. Vital signs were unremarkable. Labs were significant for normal WBC, negative lactate, and elevated alkaline phosphatase (284). UA was unremarkable. He was initiated on Ceftriaxone and Vancomycin and transferred to [facility] for neurosurgery evaluation. Neurosurgery recommended obtaining CT of thoracic and lumbar spine and cervical MRI. Recommended against intervention as without neurological deficits on exam.     Overview/Hospital Course:  Mr Muñoz  admitted to 4 for evaluation of mid-back pain x 1 month, found to have C6-T3 osteomyelitis with epidural abscess.  Pt reports last IVDU was 1 year ago. Pt is hemodynamically stable without neurologic deficits. Given that patient was stable, abx withheld until specimen is obtained for culture. NSGY did C7-T1 corpectomy  on 12/10/19. Specimens sent to microbiology. Vanc and rocephin resumed. Patient will be started back on plavix tomorrow.    Interval History: No acute events overnight. IR cancelled biopsy today. NSGY did corpectomy today. Specimens sent to microbiology. Antibiotics and plavix resumed.    Review of Systems   Constitutional: Negative for activity change, appetite change, chills, diaphoresis, fatigue, fever and unexpected weight change.   HENT: Negative for congestion, sore throat and trouble swallowing.    Eyes: Negative for photophobia, pain and discharge.   Respiratory: Negative for cough and shortness of breath.    Cardiovascular: Negative for chest pain and palpitations.   Gastrointestinal: Negative for abdominal pain, constipation, diarrhea, nausea, rectal pain and vomiting.   Genitourinary: Negative for decreased urine volume, difficulty urinating and dysuria.   Musculoskeletal: Positive for back pain. Negative for arthralgias, joint swelling, myalgias, neck pain and neck stiffness.   Skin: Negative for pallor and rash.   Neurological: Negative for dizziness, weakness, light-headedness, numbness and headaches.     Objective:     Vital Signs (Most Recent):  Temp: 97.8 °F (36.6 °C) (12/10/19 1400)  Pulse: 84 (12/10/19 1400)  Resp: 16 (12/10/19 1400)  BP: (!) 155/87 (12/10/19 1400)  SpO2: 98 % (12/10/19 1400) Vital Signs (24h Range):  Temp:  [97.5 °F (36.4 °C)-98.5 °F (36.9 °C)] 97.8 °F (36.6 °C)  Pulse:  [65-85] 84  Resp:  [13-23] 16  SpO2:  [94 %-100 %] 98 %  BP: (152-208)/(74-95) 155/87  Arterial Line BP: (209-221)/(81-89) 218/84     Weight: 79.4 kg (175 lb 0.7 oz)  Body mass index is 25.12  kg/m².    Intake/Output Summary (Last 24 hours) at 12/10/2019 1500  Last data filed at 12/10/2019 1432  Gross per 24 hour   Intake 2260 ml   Output 930 ml   Net 1330 ml      Physical Exam   Constitutional: He is oriented to person, place, and time. He appears well-developed and well-nourished. No distress.   HENT:   Head: Normocephalic and atraumatic.   Mouth/Throat: Oropharynx is clear and moist and mucous membranes are normal.   Eyes: Pupils are equal, round, and reactive to light. Conjunctivae are normal. No scleral icterus.   Neck: No JVD present.   Cardiovascular: Normal rate, regular rhythm, normal heart sounds and normal pulses.   No murmur heard.  Pulmonary/Chest: Effort normal and breath sounds normal. No respiratory distress.   Abdominal: Soft. Normal appearance and bowel sounds are normal. He exhibits no distension. There is no tenderness.   Large well healed pyloromyotomy scar on right side of abdomen.    Musculoskeletal: He exhibits no edema.        Cervical back: He exhibits tenderness, bony tenderness and pain. He exhibits normal range of motion, no swelling, no edema and no spasm.        Thoracic back: He exhibits tenderness, bony tenderness and pain. He exhibits no swelling, no edema, no laceration and no spasm.   Neurological: He is alert and oriented to person, place, and time. He has normal strength. He displays no tremor. No cranial nerve deficit or sensory deficit. He exhibits normal muscle tone. He displays no Babinski's sign on the right side. He displays no Babinski's sign on the left side.   Skin: Skin is warm and dry. No bruising and no rash noted.       Significant Labs: All pertinent labs within the past 24 hours have been reviewed.    Significant Imaging: I have reviewed and interpreted all pertinent imaging results/findings within the past 24 hours.      Assessment/Plan:      * Spinal epidural abscess  This is a 74 year old  male with PMH notable for prior IVDU who presented  on 12/06 with one month duration of upper back pain with work-up at outside hospital significant for MRI suggestive of osteomyelitis of C6-T2 with associated epidural abscess with posterior spinal cord displacement. He has no neurological deficits on exam and without evidence of developing sepsis. He is status post evaluation by neurosurgery on arrival, with no plans for emergent intervention. Etiology of presentation unclear; could be secondary to relapse of IVDU.     Plan:   -NSGY did C7-T1 corpectomy on 12/10.  - Vancomycin and Rocephin resumed.  - will start plavix tomorrow.  -Hold off on initiating Dexamethasone as without neurological deficits on exam.   -Follow-up blood cultures - NGTD  -Pain control ordered PRN.   -Neuro checks ordered Q4H.     Pre-op evaluation  -- 10 METs at baseline  -- RCRI Class II, which suggests a 0.9% risk of major cardiac event based on the presence of Hx ischemic heart disease  -- Given the above, this patient is 0.9% risk of major cardiac event  risk for the planned procedure. No further testing or optimization is needed at present, other than routine management of the conditions listed below     +1 for each of the following:  High risk surgery?  Hx ischemic heart disease  Hx CHF?  Hx TIA?  Pre-operative treatment with insulin?  Pre-operative creatinine >2?    Class I: 0 points - 0.4% risk of major cardiac event  Class II: 1 points - 0.9% risk of major cardiac event  Class III: 2 points - 6.6% risk of major cardiac event  Class IV: >/= 3 points - 11% risk of major cardiac event    Can take care of self, such as eat, dress, or use the toilet (1 MET)  ?Can walk up a flight of steps or a hill or walk on level ground at 3 to 4 mph (4 METs)  ?Can do heavy work around the house, such as scrubbing floors or lifting or moving heavy furniture, or climb two flights of stairs (between 4 and 10 METs)  ?Can participate in strenuous sports such as swimming, singles tennis, football, basketball,  and skiing (>10 METs)    Low vs. high risk: 1% of death    https://[email or web address]/image?imageKey=[email or web address]&topicKey=[email or web address]&search=preoperative%20evaluation&rank=1~150&source=see_link      Elevated alkaline phosphatase level  -Likely secondary to osteomyelitis.       Tobacco abuse  Plan:   -Counseled on importance of smoking cessation.   -Nicotine patch ordered for admission.       Hyperlipidemia  -Continue home Statin.       Other specified hypothyroidism  -Continue home Synthroid.       Essential hypertension  -Home regimen: Metoprolol and combination ARB-HCTZ.     Plan:   - add norvasc 5 mg OD  -Continue home regimen.   -Trending renal function daily.       Coronary artery disease involving native coronary artery of native heart without angina pectoris  Plan:   -Continue home  Statin.   - hold statin till surgery      Chronic hepatitis C without hepatic coma  -Follows with hepatology here; suspected secondary to prior history of IVDU. Pt states it has been over a year since last IVDU  -Initially diagnosed with genotype 3a; status post treatment in 2009 with remission achieved.   -Developed genotype 1a in 04/2017 attributed to relapse of IVDU; not treated.   -Most recent abdominal U/S WNL in 07/2017.   -No stigmata of cirrhosis on exam.   -Liver function on admission.       Acute osteomyelitis of thoracic spine  -See assessment for epidural abscess.       Acute osteomyelitis of cervical spine  -See assessment for epidural abscess.         VTE Risk Mitigation (From admission, onward)         Ordered     IP VTE HIGH RISK PATIENT  Once      12/07/19 0219     Place sequential compression device  Until discontinued      12/07/19 0219                      Shaji Barone MD  Department of Hospital Medicine   [facility]

## 2019-12-10 NOTE — ANESTHESIA PROCEDURE NOTES
Arterial    Diagnosis: back pain    Patient location during procedure: done in OR  Procedure start time: 12/10/2019 8:03 AM  Timeout: 12/10/2019 8:03 AM  Procedure end time: 12/10/2019 8:03 AM    Staffing  Authorizing Provider: Maverick Ceja MD  Performing Provider: Maverick Ceja MD    Anesthesiologist was present at the time of the procedure.    Preanesthetic Checklist  Completed: patient identified, site marked, surgical consent, pre-op evaluation, timeout performed, IV checked, risks and benefits discussed, monitors and equipment checked and anesthesia consent givenArterial  Skin Prep: chlorhexidine gluconate and isopropyl alcohol  Local Infiltration: none  Orientation: left  Location: radial  Catheter Size: 20 G  Catheter placement by Anatomical landmarks. Heme positive aspiration all ports.Insertion Attempts: 1  Assessment  Dressing: secured with tape and tegaderm  Patient: Tolerated well

## 2019-12-10 NOTE — NURSING
Patient removed traction and condom cath per self@ 2000 Neuro called, neuro surgeon notified, visit with patient. Instruct patient to lay straight, supine with neck roll behind neck for support. Portable x-ray ordered and performed. No distress noted. No bleeding. No c/o pain. Isolation maintained. NPO after MN. Cont to monitor.Neuro checks q 2hr.Patient complied until transported to  @ [facility].

## 2019-12-10 NOTE — PROGRESS NOTES
Pharmacokinetic Initial Assessment: IV Vancomycin    Assessment/Plan:    Initiate intravenous vancomycin with loading dose of 1500 mg once followed by a maintenance dose of vancomycin 1250mg IV every 24 hours  Desired empiric serum trough concentration is 15 to 20 mcg/mL  Draw vancomycin trough level 30 min prior to third dose on 12/12 at approximately 1730   Pharmacy will continue to follow and monitor vancomycin.      Please contact pharmacy at extension [phone] with any questions regarding this assessment.     Thank you for the consult,   Clarke Lord       Patient brief summary:  Junaid Muñoz is a 74 y.o. male initiated on antimicrobial therapy with IV Vancomycin for treatment of suspected bone/joint infection    Drug Allergies:   Review of patient's allergies indicates:   Allergen Reactions    Penicillins Rash       Actual Body Weight:   79.4kg    Renal Function:   Estimated Creatinine Clearance: 83.6 mL/min (based on SCr of 0.8 mg/dL).,     Dialysis Method (if applicable):  N/A    CBC (last 72 hours):  Recent Labs   Lab Result Units 12/08/19  0556 12/09/19  0613 12/10/19  0417   WBC K/uL 7.10 9.39 9.20   Hemoglobin g/dL 10.8* 11.7* 11.1*   Hematocrit % 35.8* 38.5* 36.7*   Platelets K/uL 141* 197 159   Gran% % 70.1 64.6 75.2*   Lymph% % 14.9* 22.3 13.8*   Mono% % 8.3 8.3 8.7   Eosinophil% % 5.6 3.8 1.6   Basophil% % 0.7 0.6 0.4   Differential Method  Automated Automated Automated       Metabolic Panel (last 72 hours):  Recent Labs   Lab Result Units 12/08/19  0556 12/09/19  0612 12/10/19  0417 12/10/19  1156   Sodium mmol/L 140 140 136  --    Potassium mmol/L 3.8 3.8 3.9  --    Chloride mmol/L 110 106 104  --    CO2 mmol/L 24 26 25  --    Glucose mg/dL 111* 103 118*  --    Glucose, CSF mg/dL  --   --   --  56   BUN, Bld mg/dL 19 18 15  --    Creatinine mg/dL 1.0 0.9 0.8  --    Albumin g/dL 2.3* 2.5* 2.5*  --    Total Bilirubin mg/dL 0.4 0.4 0.5  --    Alkaline Phosphatase U/L 247* 275* 283*  --    AST U/L  24 25 33  --    ALT U/L 14 15 18  --    Magnesium mg/dL 1.7 1.7 1.6  --    Phosphorus mg/dL 3.2 3.1 3.6  --        Drug levels (last 3 results):  Recent Labs   Lab Result Units 12/08/19  0556   Vancomycin-Trough ug/mL 12.6       Microbiologic Results:  Microbiology Results (last 7 days)       Procedure Component Value Units Date/Time    CSF culture [[id number]] Collected:  12/10/19 1158    Order Status:  Completed Specimen:  CSF (Spinal Fluid) from CSF Tap, Tube 1 Updated:  12/10/19 1412     Gram Stain Result No WBC's      No organisms seen    Narrative:       3) 3) CSF for cell count, differential, culture, gramstain,  protein, and glucose    Gram stain [[id number]] Collected:  12/10/19 1038    Order Status:  Completed Specimen:  Body Fluid from Neck Updated:  12/10/19 1245     Gram Stain Result Rare No WBC's      No organisms seen    Narrative:       2) Free vertebral abscess #2    Gram stain [[id number]] Collected:  12/10/19 1038    Order Status:  Completed Specimen:  Body Fluid from Neck Updated:  12/10/19 1241     Gram Stain Result No WBC's      No organisms seen    Narrative:       1) Free vertebral abscess #1    Fungus culture [[id number]] Collected:  12/10/19 1038    Order Status:  Sent Specimen:  Body Fluid from Neck Updated:  12/10/19 1125    AFB Culture & Smear [[id number]] Collected:  12/10/19 1038    Order Status:  Sent Specimen:  Body Fluid from Neck Updated:  12/10/19 1125    Aerobic culture [[id number]] Collected:  12/10/19 1038    Order Status:  Sent Specimen:  Body Fluid from Neck Updated:  12/10/19 1124    Culture, Anaerobe [[id number]] Collected:  12/10/19 1038    Order Status:  Sent Specimen:  Body Fluid from Neck Updated:  12/10/19 1124    Fungus culture [[id number]] Collected:  12/10/19 1038    Order Status:  Sent Specimen:  Body Fluid from Neck Updated:  12/10/19 1122    AFB Culture & Smear [[id number]] Collected:  12/10/19 1038    Order Status:  Sent Specimen:  Body Fluid from Neck Updated:  12/10/19  1121    Aerobic culture [[id number]] Collected:  12/10/19 1038    Order Status:  Sent Specimen:  Body Fluid from Neck Updated:  12/10/19 1120    Culture, Anaerobe [[id number]] Collected:  12/10/19 1038    Order Status:  Sent Specimen:  Body Fluid from Neck Updated:  12/10/19 1120

## 2019-12-10 NOTE — SUBJECTIVE & OBJECTIVE
Interval History: No acute events overnight. IR cancelled biopsy today. NSGY did corpectomy today. Specimens sent to microbiology. Antibiotics and plavix resumed.    Review of Systems   Constitutional: Negative for activity change, appetite change, chills, diaphoresis, fatigue, fever and unexpected weight change.   HENT: Negative for congestion, sore throat and trouble swallowing.    Eyes: Negative for photophobia, pain and discharge.   Respiratory: Negative for cough and shortness of breath.    Cardiovascular: Negative for chest pain and palpitations.   Gastrointestinal: Negative for abdominal pain, constipation, diarrhea, nausea, rectal pain and vomiting.   Genitourinary: Negative for decreased urine volume, difficulty urinating and dysuria.   Musculoskeletal: Positive for back pain. Negative for arthralgias, joint swelling, myalgias, neck pain and neck stiffness.   Skin: Negative for pallor and rash.   Neurological: Negative for dizziness, weakness, light-headedness, numbness and headaches.     Objective:     Vital Signs (Most Recent):  Temp: 97.8 °F (36.6 °C) (12/10/19 1400)  Pulse: 84 (12/10/19 1400)  Resp: 16 (12/10/19 1400)  BP: (!) 155/87 (12/10/19 1400)  SpO2: 98 % (12/10/19 1400) Vital Signs (24h Range):  Temp:  [97.5 °F (36.4 °C)-98.5 °F (36.9 °C)] 97.8 °F (36.6 °C)  Pulse:  [65-85] 84  Resp:  [13-23] 16  SpO2:  [94 %-100 %] 98 %  BP: (152-208)/(74-95) 155/87  Arterial Line BP: (209-221)/(81-89) 218/84     Weight: 79.4 kg (175 lb 0.7 oz)  Body mass index is 25.12 kg/m².    Intake/Output Summary (Last 24 hours) at 12/10/2019 1500  Last data filed at 12/10/2019 1432  Gross per 24 hour   Intake 2260 ml   Output 930 ml   Net 1330 ml      Physical Exam   Constitutional: He is oriented to person, place, and time. He appears well-developed and well-nourished. No distress.   HENT:   Head: Normocephalic and atraumatic.   Mouth/Throat: Oropharynx is clear and moist and mucous membranes are normal.   Eyes: Pupils  are equal, round, and reactive to light. Conjunctivae are normal. No scleral icterus.   Neck: No JVD present.   Cardiovascular: Normal rate, regular rhythm, normal heart sounds and normal pulses.   No murmur heard.  Pulmonary/Chest: Effort normal and breath sounds normal. No respiratory distress.   Abdominal: Soft. Normal appearance and bowel sounds are normal. He exhibits no distension. There is no tenderness.   Large well healed pyloromyotomy scar on right side of abdomen.    Musculoskeletal: He exhibits no edema.        Cervical back: He exhibits tenderness, bony tenderness and pain. He exhibits normal range of motion, no swelling, no edema and no spasm.        Thoracic back: He exhibits tenderness, bony tenderness and pain. He exhibits no swelling, no edema, no laceration and no spasm.   Neurological: He is alert and oriented to person, place, and time. He has normal strength. He displays no tremor. No cranial nerve deficit or sensory deficit. He exhibits normal muscle tone. He displays no Babinski's sign on the right side. He displays no Babinski's sign on the left side.   Skin: Skin is warm and dry. No bruising and no rash noted.       Significant Labs: All pertinent labs within the past 24 hours have been reviewed.    Significant Imaging: I have reviewed and interpreted all pertinent imaging results/findings within the past 24 hours.

## 2019-12-10 NOTE — H&P
[facility]  Neurocritical Care  History & Physical    Admit Date: 12/6/2019  Service Date: 12/10/2019  Length of Stay: 4    Subjective:     Chief Complaint: Spinal epidural abscess    History of Present Illness: Junaid Muñoz is a 74 year old male with CAD, HTN, HLD, hypothyroidism, tobacco use, and prior history of IVDU who was found to have C6-T3 osteomyelitis with epidural abscess. Patient was neurologically intact and hemodynamically stable on admission.  Now immediately status post phase one of a two part surgery.  Today he had a C7-T1 corpectomy and fusion.  Lumbar drain was placed secondary to intra op CSF leak, and patient admitted to [facility] for post op monitoring and drain maintenance.     Past Medical History:   Diagnosis Date    CAD (coronary artery disease)     s/p stent 2005, on plavix    Chronic hepatitis C     Cirrhosis     biopsy proven - 2007    History of colon polyps 06/2008    1 polyp - tubular adenoma    HTN (hypertension)     Hyperlipidemia     Hypothyroidism      Past Surgical History:   Procedure Laterality Date    COLONOSCOPY W/ POLYPECTOMY  06/2008    Dr Wagoner: fair prep, 3mm polyp - tubular adenoma    CORONARY ANGIOPLASTY WITH STENT PLACEMENT      hydrocele repair  teenager    pyloric stenosis repair  age 1    TONSILLECTOMY         No current facility-administered medications on file prior to encounter.      Current Outpatient Medications on File Prior to Encounter   Medication Sig Dispense Refill    atorvastatin (LIPITOR) 80 MG tablet Take 80 mg by mouth once daily at 6am.      clopidogrel (PLAVIX) 75 mg tablet Take 75 mg by mouth once daily at 6am.      ibuprofen (ADVIL,MOTRIN) 600 MG tablet Take 1 tablet (600 mg total) by mouth every 6 (six) hours as needed for Pain. 20 tablet 0    levothyroxine (SYNTHROID) 50 MCG tablet Take 50 mcg by mouth once daily at 6am.      losartan-hydrochlorothiazide 100-25 mg (HYZAAR) 100-25 mg per tablet Take 1 tablet by  mouth once daily at 6am.       metoprolol succinate (TOPROL-XL) 100 MG 24 hr tablet Take 100 mg by mouth once daily at 6am.       Allergies: Penicillins    History reviewed. No pertinent family history.    Social History     Tobacco Use    Smoking status: Current Every Day Smoker   Substance Use Topics    Alcohol use: Not on file    Drug use: Yes     Types: IV     Comment: MORPHINE      Review of Systems:  Unable to obtain a complete ROS due to level of consciousness.  Examined immediately post op.     Vitals:   Temp: 97.8 °F (36.6 °C)  Pulse: 89  Rhythm: sinus tachycardia  BP: (!) 154/74  MAP (mmHg): 107  Resp: 16  SpO2: 99 %  O2 Device (Oxygen Therapy): room air    Temp  Min: 97.5 °F (36.4 °C)  Max: 98.5 °F (36.9 °C)  Pulse  Min: 65  Max: 89  BP  Min: 152/85  Max: 208/93  MAP (mmHg)  Min: 107  Max: 137  Resp  Min: 13  Max: 23  SpO2  Min: 94 %  Max: 100 %    12/09 0701 - 12/10 0700  In: 480 [P.O.:480]  Out: 500 [Urine:500]   Unmeasured Output  Urine Occurrence: 1  Stool Occurrence: 0     Examination:   Constitutional: Well-nourished and -developed. No apparent distress.   Eyes: Conjunctiva clear, anicteric. Lids no lesions.  Head/Ears/Nose/Mouth/Throat/Neck: Moist mucous membranes. External ears, nose atraumatic.  Anterior cervical incision CDI.  Cardiovascular: Regular rhythm. No murmurs. No leg edema.  Respiratory: Comfortable respirations. Clear to auscultation.  Gastrointestinal: No hernia. Soft, nondistended, nontender. + bowel sounds.    Neurologic:   -[id number]  -Sedated post op.    -Cranial nerves intact  -OSHEA spontaneously but does not follow commands     Today I independently reviewed pertinent medications, lines/drains/airways, imaging, cardiology results, laboratory results, microbiology results,      Assessment/Plan:     Cardiac/Vascular  Hyperlipidemia  Atorvastatin 40 mg daily    Essential hypertension  SBP < 180  Amlodipine 5 mg daily  Losartan-HCTZ 100 mg - 25 mg  Metoprolol  mg  daily    Coronary artery disease involving native coronary artery of native heart without angina pectoris  Clopidogrel 75 mg daily     ID  * Spinal epidural abscess  Cultures sent  Continue vanc and ceftriaxone  Serial neuro exams     Acute osteomyelitis of thoracic spine  See osteo of cervical spine    Acute osteomyelitis of cervical spine  C6-T3 osteomyelitis  POD 0 s/p C7-T1 corpectomy  Hemovac clamped  LD to drain 10 cc/hour   Continue broad spectrum antibiotics    Endocrine  Other specified hypothyroidism  Levothyroxine 50 mcg daily    GI  Chronic hepatitis C without hepatic coma  Monitor LFTs    Other  Tobacco abuse  Nicotine patch          The patient is being Prophylaxed for:  Venous Thromboembolism with: Mechanical  Stress Ulcer with: Not Applicable   Ventilator Pneumonia with: not applicable    Activity Orders          Diet Adult Regular (IDDSI Level 7): Regular starting at 12/10 1136        Full Code    PERRY Schuler-C  Neurocritical Care  [facility]

## 2019-12-10 NOTE — HPI
Junaid Muñoz is a 74 year old male with CAD, HTN, HLD, hypothyroidism, tobacco use, and prior history of IVDU who was found to have C6-T3 osteomyelitis with epidural abscess. Patient was neurologically intact and hemodynamically stable on admission.  Now immediately status post phase one of a two part surgery.  Today he had a C7-T1 corpectomy and C6-T2 anterior fusion.  Lumbar drain was placed secondary to intra op CSF leak, and patient admitted to [facility] for post op monitoring and drain maintenance.

## 2019-12-10 NOTE — PLAN OF CARE
12/10/19 0831   Post-Acute Status   Post-Acute Authorization Home Health/Hospice   Home Health/Hospice Status Awaiting Therapy Documentation

## 2019-12-10 NOTE — OP NOTE
DATE OF SURGERY: 12/10/19 (Stage 1 of 2 planned surgeries)     PREOPERATIVE DIAGNOSIS:  1. C7 and T1 spondylodiscitis and epidural phlegmon  2. Severe spinal cord compression, kyphotic deformity and spinal instability  3. IV drug abuse history, coronary stents on plavix, and hepatitis C     POSTOPERATIVE DIAGNOSIS:  Same     PROCEDURE PERFORMED:  1. Cervical corpectomy, C7 and T1  2. Application of expandable titanium corpectomy cage, C7 and T1 (Globus)  3. Anterior interbody fusion, C6 to T2  4. Application of anterior cervical plate, C6 to T1 (Globus)  5. Use of intraoperative microscope for microdissection  6. Use of neuromonitoring with MEPs  7. Use of intraoperative fluoroscopy  8. Vivigen bone grafting  9. Application and removal of Simba cotto     SURGEON: Elian Deluca M.D.     ASSISTANT: Peter Dela Cruz D.O.     ANESTHESIA: GETA     ESTIMATED BLOOD LOSS: 100mL     COMPLICATIONS: Durotomy over left C8 nerve root s/p water tight repair     DRAINS: One deep     SPECIMENS SENT: Cultures of prevertebral phlegmon     INDICATIONS:     74M with IV drug abuse history presents with progressive neck pain. On exam he was neurologically intact. Imaging shows spondylodiscitis at C7 and T1 with an associated ventral epidural phlegmon compressing the cord. He also had severe kyphotic deformity and myelomalacia. My recommendation was for preop cervical traction, followed by a two stage front-back cervical surgery to decompress, realign and stabilize the cervical spine. Below is the op note for stage 1 which involved a C7 and T1 corpectomy. The R/B/A/I/M of the surgery were reviewed in detail and he wished to proceed.     PROCEDURE:     The patient was brought into the operating room where he was intubated and placed under general anesthesia without difficulty. All lines were placed. The patient was positioned supine onto the operating table with appropriate padding of all pressure points. The head and neck were  placed in extension, resting on a surgical pillow, and Cottrell Wells tongs were placed for 20 pounds of cervical traction. Lateral x-rays were taken for localization and to assess cervical lordosis. Baseline MEPs were run and found to be absent in BLE. The right neck was marked, prepped and draped in the usual sterile fashion. A timeout was performed prior to the procedure. Ten mL of Lidocaine with epinephrine were injected into the skin.     A carotid type linear skin incision was made at the low right neck and Weitlaner retractors were used to widen the incision. The platysma was divided sharply with Metzenbaum scissors. A sub-plastysmal dissection was carried out with Bovie electrocautery. A Wang Pimentel approach to the the anterior cervical spine was performed in the usual fashion. The carotid artery was palpated lateral to the working corridor. The prevertebral soft tissues were bluntly dissected with Kitner dissectors. We cutlured the prevertebral phlegmon that we encountered. A spinal needle was placed into the presumed C6-7 disc space, and was confirmed on lateral x-ray. Blunt dissection with the Kitner was carried distally to the top of T2.AP xrays confirmed levels. Subperiosteal dissection was carried out with Bovie electrocautery from C6 to T2. Two orthogonal Trimline retractors were put into place.     An annulotomy at C6-7 was performed with the 15 blade. Pituitary rongeurs and curettes were used to remove infected disc material. Anterior osteophytes were removed with the rongeur and Kerrison punches. The C6 and T2 end plates were prepared with straight curettes. Next we performed a corpectomy at C7 and T1. During this procedure we encountered a durotomy over the left C8 nerve root which we repaired with Duragen and Eveseal in watertight fashion. Omnipaque was placed into the defect and an AP xray showed that our corpectomy defect was too far left of midline. The microscope was brought into the field  and we widened our defect to the right with the high speed drill. We removed the posterior wall and PLL of C7 and T1 with Kerrison punches. At the end of our corpectomies greater than 50% of each C7 and T1 bodies had been removed. There was residual epidural phlegmon that couldn't be peeled off of the thecal sac but we debrided it with a curette. We finalized our preparation of the C6 and T2 endplates. We irrigated the defect with dilute Betadine solution and normal saline. We placed Vancomycin powder in the defect. Next, we sized and placed an expandable titanium corpectomy cage into the defect, spanning from C6 to T2. We expanded the cage until press fit. AP and lateral xrays showed excellent cage position. We placed an anterior cervical kick plate, with screws into the C6 body. Xrays showed good plate position. Screws were tightened and locked. The cage was packed before placement with Vivigen and packed around the cage in situ with more Vivigen for anterior arthrodesis from C6 to T2.     The wound was copiously irrigated with normal saline and hemostasis was achieved with bipolar electrocautery. Depomedrol was placed over the esophagus and the retropharyngeal space. One gram of Vancomycin powder was placed into the wound. A deep Hemovac was placed. The platysma was reapproximated with interrupted inverted 3.0 Vicryl sutures and a subcuticular stitch was placed with 4.0 Monocryl. Dermabond was placed at the skin.    We elected to place a lumbar drain to assure good durotomy repair. With the patient still intubated but with the GW tongs removed, we repositioned him into right lateral decubitus with the left side up. Entering the mid lumbar region with the spinal needle we placed a lumbar drain in standard fashion using sterile technique. CSF drainage was confirmed and the distal tubing was attached to the lumbar drain bag.     The patient tolerated the procedure well from a hemodynamic and neuromonitoring  standpoint. MEPs were recovered at the end of the case in the LLE. I was present for all critical portions of the case, and at the end of the case all counts were correct. The patient was extubated and sent to the ICU in stable condition for recovery.      JUSTIFICATION OF MODIFIER 22: This was a complex multi-stage operation for cervical spondylodiscitis and cervical deformity in a patient with IV drug abuse, coronary stents on plavix, and a history of bed bugs. It required significantly increased preop planning and management, mental effort, technical skill, and time to perform every aspect.

## 2019-12-10 NOTE — ASSESSMENT & PLAN NOTE
C6-T3 osteomyelitis  POD 0 s/p C7-T1 corpectomy  Hemovac clamped  LD to drain 10 cc/hour   Continue broad spectrum antibiotics

## 2019-12-11 PROBLEM — F12.10 MILD TETRAHYDROCANNABINOL (THC) ABUSE: Status: ACTIVE | Noted: 2019-12-11

## 2019-12-11 PROBLEM — I10 MALIGNANT HYPERTENSION REQUIRING ACUTE INTENSIVE MANAGEMENT: Status: ACTIVE | Noted: 2019-12-11

## 2019-12-11 PROBLEM — A41.50 GRAM NEGATIVE SEPSIS: Status: ACTIVE | Noted: 2019-12-11

## 2019-12-11 LAB
ALBUMIN SERPL BCP-MCNC: 2.2 G/DL (ref 3.5–5.2)
ALLENS TEST: NORMAL
ALP SERPL-CCNC: 225 U/L (ref 55–135)
ALT SERPL W/O P-5'-P-CCNC: 16 U/L (ref 10–44)
ANION GAP SERPL CALC-SCNC: 9 MMOL/L (ref 8–16)
ASCENDING AORTA: 3.36 CM
AST SERPL-CCNC: 26 U/L (ref 10–40)
AV INDEX (PROSTH): 0.53
AV MEAN GRADIENT: 11 MMHG
AV PEAK GRADIENT: 19 MMHG
AV VALVE AREA: 1.77 CM2
AV VELOCITY RATIO: 0.44
BASOPHILS # BLD AUTO: 0.02 K/UL (ref 0–0.2)
BASOPHILS NFR BLD: 0.2 % (ref 0–1.9)
BILIRUB SERPL-MCNC: 0.3 MG/DL (ref 0.1–1)
BSA FOR ECHO PROCEDURE: 1.97 M2
BUN SERPL-MCNC: 12 MG/DL (ref 8–23)
CALCIUM SERPL-MCNC: 8.1 MG/DL (ref 8.7–10.5)
CHLORIDE SERPL-SCNC: 108 MMOL/L (ref 95–110)
CLARITY CSF: CLEAR
CO2 SERPL-SCNC: 20 MMOL/L (ref 23–29)
COLOR CSF: ABNORMAL
CREAT SERPL-MCNC: 0.7 MG/DL (ref 0.5–1.4)
CV ECHO LV RWT: 0.52 CM
DELSYS: NORMAL
DIFFERENTIAL METHOD: ABNORMAL
DOP CALC AO PEAK VEL: 2.19 M/S
DOP CALC AO VTI: 38.15 CM
DOP CALC LVOT AREA: 3.3 CM2
DOP CALC LVOT DIAMETER: 2.06 CM
DOP CALC LVOT PEAK VEL: 0.97 M/S
DOP CALC LVOT STROKE VOLUME: 67.46 CM3
DOP CALCLVOT PEAK VEL VTI: 20.25 CM
E WAVE DECELERATION TIME: 349.37 MSEC
E/A RATIO: 0.64
E/E' RATIO: 7.63 M/S
ECHO LV POSTERIOR WALL: 1.13 CM (ref 0.6–1.1)
EOSINOPHIL # BLD AUTO: 0 K/UL (ref 0–0.5)
EOSINOPHIL NFR BLD: 0 % (ref 0–8)
ERYTHROCYTE [DISTWIDTH] IN BLOOD BY AUTOMATED COUNT: 16.3 % (ref 11.5–14.5)
EST. GFR  (AFRICAN AMERICAN): >60 ML/MIN/1.73 M^2
EST. GFR  (NON AFRICAN AMERICAN): >60 ML/MIN/1.73 M^2
FRACTIONAL SHORTENING: 32 % (ref 28–44)
GLUCOSE CSF-MCNC: 47 MG/DL (ref 40–70)
GLUCOSE SERPL-MCNC: 117 MG/DL (ref 70–110)
HCO3 UR-SCNC: 24.2 MMOL/L (ref 24–28)
HCT VFR BLD AUTO: 31.6 % (ref 40–54)
HGB BLD-MCNC: 9.9 G/DL (ref 14–18)
IMM GRANULOCYTES # BLD AUTO: 0.06 K/UL (ref 0–0.04)
IMM GRANULOCYTES NFR BLD AUTO: 0.5 % (ref 0–0.5)
INTERVENTRICULAR SEPTUM: 1.07 CM (ref 0.6–1.1)
LA MAJOR: 4.97 CM
LA MINOR: 5.27 CM
LA WIDTH: 2.98 CM
LEFT ATRIUM SIZE: 3.25 CM
LEFT ATRIUM VOLUME INDEX: 21.6 ML/M2
LEFT ATRIUM VOLUME: 42.11 CM3
LEFT INTERNAL DIMENSION IN SYSTOLE: 2.94 CM (ref 2.1–4)
LEFT VENTRICLE DIASTOLIC VOLUME INDEX: 43.13 ML/M2
LEFT VENTRICLE DIASTOLIC VOLUME: 84.17 ML
LEFT VENTRICLE MASS INDEX: 84 G/M2
LEFT VENTRICLE SYSTOLIC VOLUME INDEX: 17.1 ML/M2
LEFT VENTRICLE SYSTOLIC VOLUME: 33.43 ML
LEFT VENTRICULAR INTERNAL DIMENSION IN DIASTOLE: 4.32 CM (ref 3.5–6)
LEFT VENTRICULAR MASS: 164.13 G
LV LATERAL E/E' RATIO: 6.78 M/S
LV SEPTAL E/E' RATIO: 8.71 M/S
LYMPHOCYTES # BLD AUTO: 0.9 K/UL (ref 1–4.8)
LYMPHOCYTES NFR BLD: 7.1 % (ref 18–48)
LYMPHOCYTES NFR CSF MANUAL: 26 % (ref 40–80)
MAGNESIUM SERPL-MCNC: 1.6 MG/DL (ref 1.6–2.6)
MCH RBC QN AUTO: 25.9 PG (ref 27–31)
MCHC RBC AUTO-ENTMCNC: 31.3 G/DL (ref 32–36)
MCV RBC AUTO: 83 FL (ref 82–98)
MODE: NORMAL
MONOCYTES # BLD AUTO: 1 K/UL (ref 0.3–1)
MONOCYTES NFR BLD: 8 % (ref 4–15)
MV PEAK A VEL: 0.95 M/S
MV PEAK E VEL: 0.61 M/S
NEUTROPHILS # BLD AUTO: 10.2 K/UL (ref 1.8–7.7)
NEUTROPHILS NFR BLD: 84.2 % (ref 38–73)
NEUTROPHILS NFR CSF MANUAL: 74 % (ref 0–6)
NRBC BLD-RTO: 0 /100 WBC
PCO2 BLDA: 36.1 MMHG (ref 35–45)
PH SMN: 7.43 [PH] (ref 7.35–7.45)
PHOSPHATE SERPL-MCNC: 3.5 MG/DL (ref 2.7–4.5)
PLATELET # BLD AUTO: 167 K/UL (ref 150–350)
PMV BLD AUTO: 9.9 FL (ref 9.2–12.9)
PO2 BLDA: 82 MMHG (ref 80–100)
POC BE: 0 MMOL/L
POC SATURATED O2: 97 % (ref 95–100)
POC TCO2: 25 MMOL/L (ref 23–27)
POTASSIUM SERPL-SCNC: 3.5 MMOL/L (ref 3.5–5.1)
PROT CSF-MCNC: 341 MG/DL (ref 15–40)
PROT SERPL-MCNC: 5.9 G/DL (ref 6–8.4)
RA MAJOR: 3.52 CM
RA PRESSURE: 3 MMHG
RA WIDTH: 3.02 CM
RBC # BLD AUTO: 3.82 M/UL (ref 4.6–6.2)
RBC # CSF: 1070 /CU MM
RIGHT VENTRICULAR END-DIASTOLIC DIMENSION: 3.17 CM
SAMPLE: NORMAL
SINUS: 3.21 CM
SITE: NORMAL
SODIUM SERPL-SCNC: 137 MMOL/L (ref 136–145)
SP02: 98
SPECIMEN VOL CSF: 2 ML
STJ: 3.07 CM
TDI LATERAL: 0.09 M/S
TDI SEPTAL: 0.07 M/S
TDI: 0.08 M/S
TRICUSPID ANNULAR PLANE SYSTOLIC EXCURSION: 2.02 CM
WBC # BLD AUTO: 12.08 K/UL (ref 3.9–12.7)
WBC # CSF: 11 /CU MM (ref 0–5)

## 2019-12-11 PROCEDURE — 85025 COMPLETE CBC W/AUTO DIFF WBC: CPT

## 2019-12-11 PROCEDURE — 87040 BLOOD CULTURE FOR BACTERIA: CPT

## 2019-12-11 PROCEDURE — 99499 NO LOS: ICD-10-PCS | Mod: ,,, | Performed by: PHYSICIAN ASSISTANT

## 2019-12-11 PROCEDURE — 99291 CRITICAL CARE FIRST HOUR: CPT | Mod: GC,,, | Performed by: ANESTHESIOLOGY

## 2019-12-11 PROCEDURE — 99223 PR INITIAL HOSPITAL CARE,LEVL III: ICD-10-PCS | Mod: ,,, | Performed by: INTERNAL MEDICINE

## 2019-12-11 PROCEDURE — 25000003 PHARM REV CODE 250: Performed by: STUDENT IN AN ORGANIZED HEALTH CARE EDUCATION/TRAINING PROGRAM

## 2019-12-11 PROCEDURE — 63600175 PHARM REV CODE 636 W HCPCS: Performed by: STUDENT IN AN ORGANIZED HEALTH CARE EDUCATION/TRAINING PROGRAM

## 2019-12-11 PROCEDURE — 82945 GLUCOSE OTHER FLUID: CPT

## 2019-12-11 PROCEDURE — 83735 ASSAY OF MAGNESIUM: CPT

## 2019-12-11 PROCEDURE — 84100 ASSAY OF PHOSPHORUS: CPT

## 2019-12-11 PROCEDURE — 99223 1ST HOSP IP/OBS HIGH 75: CPT | Mod: ,,, | Performed by: INTERNAL MEDICINE

## 2019-12-11 PROCEDURE — 20000000 HC ICU ROOM

## 2019-12-11 PROCEDURE — S4991 NICOTINE PATCH NONLEGEND: HCPCS | Performed by: STUDENT IN AN ORGANIZED HEALTH CARE EDUCATION/TRAINING PROGRAM

## 2019-12-11 PROCEDURE — 89051 BODY FLUID CELL COUNT: CPT

## 2019-12-11 PROCEDURE — 99291 PR CRITICAL CARE, E/M 30-74 MINUTES: ICD-10-PCS | Mod: GC,,, | Performed by: ANESTHESIOLOGY

## 2019-12-11 PROCEDURE — 63600175 PHARM REV CODE 636 W HCPCS: Performed by: PHYSICIAN ASSISTANT

## 2019-12-11 PROCEDURE — 87205 SMEAR GRAM STAIN: CPT

## 2019-12-11 PROCEDURE — 25000003 PHARM REV CODE 250: Performed by: ANESTHESIOLOGY

## 2019-12-11 PROCEDURE — 99499 UNLISTED E&M SERVICE: CPT | Mod: ,,, | Performed by: PHYSICIAN ASSISTANT

## 2019-12-11 PROCEDURE — 63600175 PHARM REV CODE 636 W HCPCS: Performed by: NURSE PRACTITIONER

## 2019-12-11 PROCEDURE — 84157 ASSAY OF PROTEIN OTHER: CPT

## 2019-12-11 PROCEDURE — 25000003 PHARM REV CODE 250: Performed by: PHYSICIAN ASSISTANT

## 2019-12-11 PROCEDURE — 63600175 PHARM REV CODE 636 W HCPCS: Performed by: ANESTHESIOLOGY

## 2019-12-11 PROCEDURE — 87070 CULTURE OTHR SPECIMN AEROBIC: CPT

## 2019-12-11 PROCEDURE — 80053 COMPREHEN METABOLIC PANEL: CPT

## 2019-12-11 RX ORDER — VANCOMYCIN HCL IN 5 % DEXTROSE 1G/250ML
1000 PLASTIC BAG, INJECTION (ML) INTRAVENOUS
Status: DISCONTINUED | OUTPATIENT
Start: 2019-12-11 | End: 2019-12-12

## 2019-12-11 RX ORDER — DIAZEPAM 10 MG/2ML
10 INJECTION INTRAMUSCULAR EVERY 6 HOURS
Status: DISCONTINUED | OUTPATIENT
Start: 2019-12-11 | End: 2019-12-12

## 2019-12-11 RX ORDER — POLYETHYLENE GLYCOL 3350 17 G/17G
17 POWDER, FOR SOLUTION ORAL DAILY
Status: DISCONTINUED | OUTPATIENT
Start: 2019-12-11 | End: 2019-12-19

## 2019-12-11 RX ORDER — LABETALOL HCL 20 MG/4 ML
10 SYRINGE (ML) INTRAVENOUS EVERY 4 HOURS PRN
Status: DISCONTINUED | OUTPATIENT
Start: 2019-12-11 | End: 2020-01-11 | Stop reason: HOSPADM

## 2019-12-11 RX ORDER — HEPARIN SODIUM 5000 [USP'U]/ML
5000 INJECTION, SOLUTION INTRAVENOUS; SUBCUTANEOUS EVERY 8 HOURS
Status: DISCONTINUED | OUTPATIENT
Start: 2019-12-11 | End: 2019-12-12

## 2019-12-11 RX ORDER — NICARDIPINE HYDROCHLORIDE 0.2 MG/ML
2.5 INJECTION INTRAVENOUS CONTINUOUS
Status: DISCONTINUED | OUTPATIENT
Start: 2019-12-11 | End: 2019-12-12

## 2019-12-11 RX ORDER — MORPHINE SULFATE 2 MG/ML
1 INJECTION, SOLUTION INTRAMUSCULAR; INTRAVENOUS EVERY 6 HOURS PRN
Status: DISCONTINUED | OUTPATIENT
Start: 2019-12-11 | End: 2019-12-19

## 2019-12-11 RX ORDER — CEFEPIME HYDROCHLORIDE 2 G/1
2 INJECTION, POWDER, FOR SOLUTION INTRAVENOUS
Status: DISCONTINUED | OUTPATIENT
Start: 2019-12-11 | End: 2020-01-11 | Stop reason: HOSPADM

## 2019-12-11 RX ADMIN — HEPARIN SODIUM 5000 UNITS: 5000 INJECTION, SOLUTION INTRAVENOUS; SUBCUTANEOUS at 12:12

## 2019-12-11 RX ADMIN — LOSARTAN POTASSIUM AND HYDROCHLOROTHIAZIDE 1 TABLET: 100; 25 TABLET, FILM COATED ORAL at 08:12

## 2019-12-11 RX ADMIN — DIAZEPAM 10 MG: 5 INJECTION, SOLUTION INTRAMUSCULAR; INTRAVENOUS at 06:12

## 2019-12-11 RX ADMIN — DIAZEPAM 10 MG: 5 INJECTION, SOLUTION INTRAMUSCULAR; INTRAVENOUS at 04:12

## 2019-12-11 RX ADMIN — MORPHINE SULFATE 1 MG: 2 INJECTION, SOLUTION INTRAMUSCULAR; INTRAVENOUS at 10:12

## 2019-12-11 RX ADMIN — AMLODIPINE BESYLATE 5 MG: 5 TABLET ORAL at 08:12

## 2019-12-11 RX ADMIN — NICOTINE 1 PATCH: 21 PATCH, EXTENDED RELEASE TRANSDERMAL at 08:12

## 2019-12-11 RX ADMIN — SENNOSIDES AND DOCUSATE SODIUM 1 TABLET: 8.6; 5 TABLET ORAL at 09:12

## 2019-12-11 RX ADMIN — HUMAN ALBUMIN MICROSPHERES AND PERFLUTREN 0.66 MG: 10; .22 INJECTION, SOLUTION INTRAVENOUS at 02:12

## 2019-12-11 RX ADMIN — ACETAMINOPHEN 1000 MG: 500 TABLET ORAL at 09:12

## 2019-12-11 RX ADMIN — LABETALOL HCL IV SOLN PREFILLED SYRINGE 20 MG/4ML (5 MG/ML) 10 MG: 20/4 SOLUTION PREFILLED SYRINGE at 09:12

## 2019-12-11 RX ADMIN — SENNOSIDES AND DOCUSATE SODIUM 1 TABLET: 8.6; 5 TABLET ORAL at 08:12

## 2019-12-11 RX ADMIN — VANCOMYCIN HYDROCHLORIDE 1000 MG: 1 INJECTION, POWDER, LYOPHILIZED, FOR SOLUTION INTRAVENOUS at 02:12

## 2019-12-11 RX ADMIN — ACETAMINOPHEN 1000 MG: 500 TABLET ORAL at 02:12

## 2019-12-11 RX ADMIN — ACETAMINOPHEN 1000 MG: 500 TABLET ORAL at 05:12

## 2019-12-11 RX ADMIN — CEFEPIME 2 G: 2 INJECTION, POWDER, FOR SOLUTION INTRAVENOUS at 05:12

## 2019-12-11 RX ADMIN — POLYETHYLENE GLYCOL 3350 17 G: 17 POWDER, FOR SOLUTION ORAL at 02:12

## 2019-12-11 RX ADMIN — HEPARIN SODIUM 5000 UNITS: 5000 INJECTION, SOLUTION INTRAVENOUS; SUBCUTANEOUS at 09:12

## 2019-12-11 RX ADMIN — DIAZEPAM 10 MG: 5 INJECTION, SOLUTION INTRAMUSCULAR; INTRAVENOUS at 12:12

## 2019-12-11 RX ADMIN — LEVOTHYROXINE SODIUM 50 MCG: 50 TABLET ORAL at 05:12

## 2019-12-11 RX ADMIN — ATORVASTATIN CALCIUM 80 MG: 20 TABLET, FILM COATED ORAL at 08:12

## 2019-12-11 RX ADMIN — DIAZEPAM 10 MG: 5 INJECTION, SOLUTION INTRAMUSCULAR; INTRAVENOUS at 08:12

## 2019-12-11 RX ADMIN — FENTANYL CITRATE 25 MCG: 50 INJECTION INTRAMUSCULAR; INTRAVENOUS at 09:12

## 2019-12-11 RX ADMIN — LABETALOL HCL IV SOLN PREFILLED SYRINGE 20 MG/4ML (5 MG/ML) 10 MG: 20/4 SOLUTION PREFILLED SYRINGE at 05:12

## 2019-12-11 RX ADMIN — OXYCODONE HYDROCHLORIDE 10 MG: 5 TABLET ORAL at 05:12

## 2019-12-11 RX ADMIN — Medication: at 09:12

## 2019-12-11 RX ADMIN — Medication: at 02:12

## 2019-12-11 RX ADMIN — CEFTRIAXONE 2 G: 2 INJECTION, SOLUTION INTRAVENOUS at 08:12

## 2019-12-11 RX ADMIN — METOPROLOL SUCCINATE 100 MG: 100 TABLET, EXTENDED RELEASE ORAL at 08:12

## 2019-12-11 NOTE — PLAN OF CARE
12/11/19 0852   Post-Acute Status   Post-Acute Authorization Placement   Home Health/Hospice Status Awaiting Internal Medical Clearance

## 2019-12-11 NOTE — PLAN OF CARE
POC reviewed with pt and family at 1400. Pt and family verbalized understanding. Questions and concerns addressed. No acute events today. CSF collected by neurosurgery. Pt Karly intermittently works with power flush. Pt progressing toward goals. Will continue to monitor. See flowsheets for full assessment and VS info.

## 2019-12-11 NOTE — PLAN OF CARE
POC reviewed with pt at 0530. Pt verbalized understanding. Questions and concerns addressed. No acute events overnight. Pt progressing toward goals. Will continue to monitor. See flowsheets for full assessment and VS info

## 2019-12-11 NOTE — SUBJECTIVE & OBJECTIVE
Past Medical History:   Diagnosis Date    CAD (coronary artery disease)     s/p stent 2005, on plavix    Chronic hepatitis C     Cirrhosis     biopsy proven - 2007    History of colon polyps 06/2008    1 polyp - tubular adenoma    HTN (hypertension)     Hyperlipidemia     Hypothyroidism        Past Surgical History:   Procedure Laterality Date    COLONOSCOPY W/ POLYPECTOMY  06/2008    Dr Wagoner: fair prep, 3mm polyp - tubular adenoma    CORONARY ANGIOPLASTY WITH STENT PLACEMENT      hydrocele repair  teenager    pyloric stenosis repair  age 1    SURGICAL REMOVAL OF VERTEBRAL BODY OF THORACIC SPINE N/A 12/10/2019    Procedure: CORPECTOMY, SPINE, CERVICAL AND THORACIC, C7 and T1 with Globus;  Surgeon: Elian Deluca MD;  Location: [facility] OR [address] [place] [address];  Service: Neurosurgery;  Laterality: N/A;    TONSILLECTOMY         Review of patient's allergies indicates:   Allergen Reactions    Penicillins Rash       Medications:  Medications Prior to Admission   Medication Sig    atorvastatin (LIPITOR) 80 MG tablet Take 80 mg by mouth once daily at 6am.    clopidogrel (PLAVIX) 75 mg tablet Take 75 mg by mouth once daily at 6am.    ibuprofen (ADVIL,MOTRIN) 600 MG tablet Take 1 tablet (600 mg total) by mouth every 6 (six) hours as needed for Pain.    levothyroxine (SYNTHROID) 50 MCG tablet Take 50 mcg by mouth once daily at 6am.    losartan-hydrochlorothiazide 100-25 mg (HYZAAR) 100-25 mg per tablet Take 1 tablet by mouth once daily at 6am.     metoprolol succinate (TOPROL-XL) 100 MG 24 hr tablet Take 100 mg by mouth once daily at 6am.     Antibiotics (From admission, onward)    Start     Stop Route Frequency Ordered    12/11/19 1800  ceFEPIme injection 2 g      -- IV Every 8 hours (non-standard times) 12/11/19 1657    12/11/19 1330  vancomycin in dextrose 5 % 1 gram/250 mL IVPB 1,000 mg      -- IV Every 12 hours (non-standard times) 12/11/19 1300    12/09/19 1500  bacitracin ointment      -- Top 3 times daily  12/09/19 0914        Antifungals (From admission, onward)    None        Antivirals (From admission, onward)    None           Immunization History   Administered Date(s) Administered    Tdap 06/07/2019       Family History     None        Social History     Socioeconomic History    Marital status:      Spouse name: Not on file    Number of children: Not on file    Years of education: Not on file    Highest education level: Not on file   Occupational History    Not on file   Social Needs    Financial resource strain: Not on file    Food insecurity:     Worry: Not on file     Inability: Not on file    Transportation needs:     Medical: Not on file     Non-medical: Not on file   Tobacco Use    Smoking status: Current Every Day Smoker   Substance and Sexual Activity    Alcohol use: Not on file    Drug use: Yes     Types: IV     Comment: MORPHINE    Sexual activity: Not on file   Lifestyle    Physical activity:     Days per week: Not on file     Minutes per session: Not on file    Stress: Not on file   Relationships    Social connections:     Talks on phone: Not on file     Gets together: Not on file     Attends Sabianism service: Not on file     Active member of club or organization: Not on file     Attends meetings of clubs or organizations: Not on file     Relationship status: Not on file   Other Topics Concern    Not on file   Social History Narrative    Not on file     Review of Systems   Constitutional: Negative for chills, diaphoresis, fatigue and fever.   HENT: Negative for congestion, sore throat and trouble swallowing.    Eyes: Negative for photophobia.   Respiratory: Negative for cough and shortness of breath.    Cardiovascular: Negative for chest pain and palpitations.   Gastrointestinal: Negative for abdominal pain, nausea and vomiting.   Genitourinary: Negative for decreased urine volume, difficulty urinating and dysuria.   Musculoskeletal: Positive for back pain. Negative for  arthralgias, joint swelling, myalgias, neck pain and neck stiffness.   Neurological: Negative for headaches.     Objective:     Vital Signs (Most Recent):  Temp: 97.8 °F (36.6 °C) (12/11/19 1505)  Pulse: 63 (12/11/19 1505)  Resp: 19 (12/11/19 1505)  BP: (!) 183/84 (12/11/19 1505)  SpO2: 98 % (12/11/19 1505) Vital Signs (24h Range):  Temp:  [97.8 °F (36.6 °C)-99.1 °F (37.3 °C)] 97.8 °F (36.6 °C)  Pulse:  [] 63  Resp:  [10-37] 19  SpO2:  [97 %-99 %] 98 %  BP: (147-204)/() 183/84  Arterial Line BP: (152-218)/(77-90) 152/90     Weight: 79.4 kg (175 lb)  Body mass index is 25.84 kg/m².    Estimated Creatinine Clearance: 92.6 mL/min (based on SCr of 0.7 mg/dL).    Physical Exam   Constitutional: He appears well-developed and well-nourished.   HENT:   Head: Normocephalic and atraumatic.   Neck: Normal range of motion.   Cardiovascular: Normal rate and regular rhythm.   No murmur heard.  Pulmonary/Chest: Effort normal. No respiratory distress.   Abdominal: Soft. He exhibits no distension. There is no tenderness.   Neurological: He is alert.       Significant Labs: All pertinent labs within the past 24 hours have been reviewed.    Significant Imaging: I have reviewed all pertinent imaging results/findings within the past 24 hours.

## 2019-12-11 NOTE — CONSULTS
[facility]  Infectious Disease  Consult Note    Patient Name: Junaid Muñoz  MRN: [id number]  Admission Date: 12/6/2019  Hospital Length of Stay: 5 days  Attending Physician: Roque Zhang MD  Primary Care Provider: Oskar Whitman MD     Isolation Status: Contact    Patient information was obtained from patient, past medical records and ER records.      Inpatient consult to Infectious Diseases  Consult performed by: Adilson Spence PA-C  Consult ordered by: Grace Johnson NP        Assessment/Plan:     * Spinal epidural abscess  Pt is a 74 year old male with CAD, HTN, HLD, hypothyroidism, tobacco use, and prior history of IVDU who initially presented for back pain.  Pt was seen in clinic where MRI concerning for C6-T3 osteomyelitis with epidural abscess. CRP-73 ESR-32.  Patient was neurologically intact and hemodynamically stable on admission.  S/P phase one of a two part surgery on 12/10 when he underwent  C7-T1 corpectomy and fusion w/ lumbar drain placed secondary to intra op CSF leak.   Cxs from surgery growing  Pseudomonas Aeruginosa (susceptibility pending)  CSF cxs taken-NGTD.  CSF- Count w/ WBC-11, RBC 1070, 74% segmented neutrophils, glucose 74  Pt afebrile w/o white count.    Plan  -Continue Vancomycin.  -Would discontinue Ceftriaxone and start on Cefepime 2g q8h  -Will follow cxs and susceptibility from Pseudomonas.  -Will follow CSF cxs.  -Anticipate pt will need prolonged course of abx 6-8 weeks.  -ID will continue to follow.      Pt discussed with staff and primary team.  Thank you for your consult. ID will follow-up with patient. Please contact us if you have any additional questions.    Adilson Spence PA-C  Infectious Disease  [facility]    Subjective:     Principal Problem: Spinal epidural abscess    HPI: Pt is a 74 year old male with CAD, HTN, HLD, hypothyroidism, tobacco use, and prior history of IVDU who initially presented for  back pain.  Pt was seen in clinic where MRI concerning for C6-T3 osteomyelitis with epidural abscess. Patient was neurologically intact and hemodynamically stable on admission.  S/P phase one of a two part surgery on 12/10 when he underwent  C7-T1 corpectomy and fusion w/ lumbar drain placed secondary to intra op CSF leak.  Cxs from surgery growing  Pseudomonas Aeruginosa (susceptibility pending)  CSF cxs taken-NGTD.  CSF- Count w/ WBC-11, RBC 1070, 74% segmented neutrophils, glucose 74    Past Medical History:   Diagnosis Date    CAD (coronary artery disease)     s/p stent 2005, on plavix    Chronic hepatitis C     Cirrhosis     biopsy proven - 2007    History of colon polyps 06/2008    1 polyp - tubular adenoma    HTN (hypertension)     Hyperlipidemia     Hypothyroidism        Past Surgical History:   Procedure Laterality Date    COLONOSCOPY W/ POLYPECTOMY  06/2008    Dr Wagoner: fair prep, 3mm polyp - tubular adenoma    CORONARY ANGIOPLASTY WITH STENT PLACEMENT      hydrocele repair  teenager    pyloric stenosis repair  age 1    SURGICAL REMOVAL OF VERTEBRAL BODY OF THORACIC SPINE N/A 12/10/2019    Procedure: CORPECTOMY, SPINE, CERVICAL AND THORACIC, C7 and T1 with Globus;  Surgeon: Elian Deluca MD;  Location: [facility] OR [address] [place] [address];  Service: Neurosurgery;  Laterality: N/A;    TONSILLECTOMY         Review of patient's allergies indicates:   Allergen Reactions    Penicillins Rash       Medications:  Medications Prior to Admission   Medication Sig    atorvastatin (LIPITOR) 80 MG tablet Take 80 mg by mouth once daily at 6am.    clopidogrel (PLAVIX) 75 mg tablet Take 75 mg by mouth once daily at 6am.    ibuprofen (ADVIL,MOTRIN) 600 MG tablet Take 1 tablet (600 mg total) by mouth every 6 (six) hours as needed for Pain.    levothyroxine (SYNTHROID) 50 MCG tablet Take 50 mcg by mouth once daily at 6am.    losartan-hydrochlorothiazide 100-25 mg (HYZAAR) 100-25 mg per tablet Take 1 tablet by mouth once  daily at 6am.     metoprolol succinate (TOPROL-XL) 100 MG 24 hr tablet Take 100 mg by mouth once daily at 6am.     Antibiotics (From admission, onward)    Start     Stop Route Frequency Ordered    12/11/19 1800  ceFEPIme injection 2 g      -- IV Every 8 hours (non-standard times) 12/11/19 1657    12/11/19 1330  vancomycin in dextrose 5 % 1 gram/250 mL IVPB 1,000 mg      -- IV Every 12 hours (non-standard times) 12/11/19 1300    12/09/19 1500  bacitracin ointment      -- Top 3 times daily 12/09/19 0914        Antifungals (From admission, onward)    None        Antivirals (From admission, onward)    None           Immunization History   Administered Date(s) Administered    Tdap 06/07/2019       Family History     None        Social History     Socioeconomic History    Marital status:      Spouse name: Not on file    Number of children: Not on file    Years of education: Not on file    Highest education level: Not on file   Occupational History    Not on file   Social Needs    Financial resource strain: Not on file    Food insecurity:     Worry: Not on file     Inability: Not on file    Transportation needs:     Medical: Not on file     Non-medical: Not on file   Tobacco Use    Smoking status: Current Every Day Smoker   Substance and Sexual Activity    Alcohol use: Not on file    Drug use: Yes     Types: IV     Comment: MORPHINE    Sexual activity: Not on file   Lifestyle    Physical activity:     Days per week: Not on file     Minutes per session: Not on file    Stress: Not on file   Relationships    Social connections:     Talks on phone: Not on file     Gets together: Not on file     Attends Church service: Not on file     Active member of club or organization: Not on file     Attends meetings of clubs or organizations: Not on file     Relationship status: Not on file   Other Topics Concern    Not on file   Social History Narrative    Not on file     Review of Systems   Constitutional:  Negative for chills, diaphoresis, fatigue and fever.   HENT: Negative for congestion, sore throat and trouble swallowing.    Eyes: Negative for photophobia.   Respiratory: Negative for cough and shortness of breath.    Cardiovascular: Negative for chest pain and palpitations.   Gastrointestinal: Negative for abdominal pain, nausea and vomiting.   Genitourinary: Negative for decreased urine volume, difficulty urinating and dysuria.   Musculoskeletal: Positive for back pain. Negative for arthralgias, joint swelling, myalgias, neck pain and neck stiffness.   Neurological: Negative for headaches.     Objective:     Vital Signs (Most Recent):  Temp: 97.8 °F (36.6 °C) (12/11/19 1505)  Pulse: 63 (12/11/19 1505)  Resp: 19 (12/11/19 1505)  BP: (!) 183/84 (12/11/19 1505)  SpO2: 98 % (12/11/19 1505) Vital Signs (24h Range):  Temp:  [97.8 °F (36.6 °C)-99.1 °F (37.3 °C)] 97.8 °F (36.6 °C)  Pulse:  [] 63  Resp:  [10-37] 19  SpO2:  [97 %-99 %] 98 %  BP: (147-204)/() 183/84  Arterial Line BP: (152-218)/(77-90) 152/90     Weight: 79.4 kg (175 lb)  Body mass index is 25.84 kg/m².    Estimated Creatinine Clearance: 92.6 mL/min (based on SCr of 0.7 mg/dL).    Physical Exam   Constitutional: He appears well-developed and well-nourished.   HENT:   Head: Normocephalic and atraumatic.   Neck: Normal range of motion.   Cardiovascular: Normal rate and regular rhythm.   No murmur heard.  Pulmonary/Chest: Effort normal. No respiratory distress.   Abdominal: Soft. He exhibits no distension. There is no tenderness.   Neurological: He is alert.       Significant Labs: All pertinent labs within the past 24 hours have been reviewed.    Significant Imaging: I have reviewed all pertinent imaging results/findings within the past 24 hours.

## 2019-12-11 NOTE — ASSESSMENT & PLAN NOTE
Pt is a 74 year old male with CAD, HTN, HLD, hypothyroidism, tobacco use, and prior history of IVDU who initially presented for back pain.  Pt was seen in clinic where MRI concerning for C6-T3 osteomyelitis with epidural abscess. CRP-73 ESR-32.  Patient was neurologically intact and hemodynamically stable on admission.  S/P phase one of a two part surgery on 12/10 when he underwent  C7-T1 corpectomy and fusion w/ lumbar drain placed secondary to intra op CSF leak.   Cxs from surgery growing  Pseudomonas Aeruginosa (susceptibility pending)  CSF cxs taken-NGTD.  CSF- Count w/ WBC-11, RBC 1070, 74% segmented neutrophils, glucose 74  Pt afebrile w/o white count.    Plan  -Continue Vancomycin.  -Would discontinue Ceftriaxone and start on Cefepime 2g q8h  -Will follow cxs and susceptibility from Pseudomonas.  -Will follow CSF cxs.  -Anticipate pt will need prolonged course of abx 6-8 weeks.  -ID will continue to follow.

## 2019-12-11 NOTE — PROGRESS NOTES
[facility]  Neurosurgery  Progress Note    Subjective:     History of Present Illness: Junaid Muñoz is a 74 y.o. male with PMH of HTN, HLD, Hepatitis C, and CAD s/p two stents on plavix who presents with 1 month history of back pain between his shoulders. MRI at OSH demonstrates likely epidural abscess. Pt denies hx of trauma and reports slow onset of symptoms.     Post-Op Info:  Procedure(s) (LRB):  CORPECTOMY, SPINE, CERVICAL AND THORACIC, C7 and T1 with Globus (N/A)   1 Day Post-Op     Interval History: bi, denies parasthesias in his extremities this am.    Medications:  Continuous Infusions:   sodium chloride 0.9% 100 mL/hr at 12/10/19 2005     Scheduled Meds:   acetaminophen  1,000 mg Oral Q8H    amLODIPine  5 mg Oral Daily    atorvastatin  80 mg Oral Daily    bacitracin   Topical (Top) TID    cefTRIAXone (ROCEPHIN) IVPB  2 g Intravenous Q12H    diazePAM  10 mg Intravenous 6 times per day    levothyroxine  50 mcg Oral Before breakfast    losartan-hydrochlorothiazide 100-25 mg  1 tablet Oral Daily    magnesium oxide  800 mg Oral Once    metoprolol succinate  100 mg Oral Daily    nicotine  1 patch Transdermal Daily    senna-docusate 8.6-50 mg  1 tablet Oral BID    vancomycin (VANCOCIN) IVPB  15 mg/kg Intravenous Q24H     PRN Meds:Dextrose 10% Bolus, Dextrose 10% Bolus, fentaNYL, glucagon (human recombinant), glucose, glucose, HYDROmorphone, labetalol, naloxone, ondansetron, oxyCODONE, sodium chloride 0.9%, sodium chloride 0.9%     Review of Systems  Objective:     Weight: 79.4 kg (175 lb 0.7 oz)  Body mass index is 25.12 kg/m².  Vital Signs (Most Recent):  Temp: 98.9 °F (37.2 °C) (12/11/19 0305)  Pulse: 78 (12/11/19 0505)  Resp: 14 (12/11/19 0505)  BP: (!) 192/88 (12/11/19 0505)  SpO2: 98 % (12/11/19 0505) Vital Signs (24h Range):  Temp:  [97.5 °F (36.4 °C)-99.1 °F (37.3 °C)] 98.9 °F (37.2 °C)  Pulse:  [] 78  Resp:  [10-37] 14  SpO2:  [97 %-100 %] 98 %  BP:  "(147-208)/() 192/88  Arterial Line BP: (203-221)/(77-89) 218/85                          Closed/Suction Drain 12/10/19 1018 Anterior Neck Accordion 10 Fr. (Active)   Site Description Healing 12/11/2019  3:05 AM   Dressing Type No dressing 12/11/2019  3:05 AM   Drainage Serosanguineous 12/10/2019  6:30 PM   Status Clamped 12/11/2019  3:05 AM   Output (mL) 1500 mL 12/11/2019  6:05 AM            Urethral Catheter 12/10/19 0730 Non-latex 16 Fr. (Active)   Site Assessment Clean;Intact 12/11/2019  3:05 AM   Collection Container Urimeter 12/11/2019  3:05 AM   Securement Method secured to top of thigh w/ adhesive device 12/11/2019  3:05 AM   Catheter Care Performed yes 12/11/2019  3:05 AM   Reason for Continuing Urinary Catheterization Post operative 12/11/2019  3:05 AM   CAUTI Prevention Bundle StatLock in place w 1" slack;Intact seal between catheter & drainage tubing;Drainage bag/urimeter off the floor;Green sheeting clip in use;No dependent loops or kinks;Drainage bag/urimeter not overfilled (<2/3 full);Drainage bag/urimeter below bladder 12/10/2019  7:05 PM   Output (mL) 75 mL 12/10/2019  3:30 PM            Lumbar Drain 12/10/19 1056 (Active)   Drain Status Other (Comment) 12/11/2019  3:05 AM   Level to other (see comment) 12/10/2019  2:32 PM   Dressing Status Clean;Dry;Intact 12/11/2019  3:05 AM   Interventions HOB degrees (see comment) 12/10/2019  2:32 PM   Output (mL) 2 mL 12/11/2019  4:05 AM       Neurosurgery Physical Exam   AOX3  5/5 in BUE except 4/5 in left HI  5/5 in BLE  SILT  Incision c/d/i    Significant Labs:  Recent Labs   Lab 12/10/19  0417 12/11/19  0346   * 117*    137   K 3.9 3.5    108   CO2 25 20*   BUN 15 12   CREATININE 0.8 0.7   CALCIUM 8.9 8.1*   MG 1.6 1.6     Recent Labs   Lab 12/10/19  0417 12/11/19  0346   WBC 9.20 12.08   HGB 11.1* 9.9*   HCT 36.7* 31.6*    167     No results for input(s): LABPT, INR, APTT in the last 48 hours.  Microbiology Results (last 7 " days)     Procedure Component Value Units Date/Time    Culture, Anaerobe [[id number]] Collected:  12/10/19 1038    Order Status:  Completed Specimen:  Body Fluid from Neck Updated:  12/11/19 0712     Anaerobic Culture Culture in progress    Narrative:       2) Free vertebral abscess #2    Culture, Anaerobe [[id number]] Collected:  12/10/19 1038    Order Status:  Completed Specimen:  Body Fluid from Neck Updated:  12/11/19 0712     Anaerobic Culture Culture in progress    Narrative:       1) Free vertebral abscess #1    CSF culture [[id number]] Collected:  12/10/19 1158    Order Status:  Completed Specimen:  CSF (Spinal Fluid) from CSF Tap, Tube 1 Updated:  12/10/19 1412     Gram Stain Result No WBC's      No organisms seen    Narrative:       3) 3) CSF for cell count, differential, culture, gramstain,  protein, and glucose    Gram stain [[id number]] Collected:  12/10/19 1038    Order Status:  Completed Specimen:  Body Fluid from Neck Updated:  12/10/19 1245     Gram Stain Result Rare No WBC's      No organisms seen    Narrative:       2) Free vertebral abscess #2    Gram stain [[id number]] Collected:  12/10/19 1038    Order Status:  Completed Specimen:  Body Fluid from Neck Updated:  12/10/19 1241     Gram Stain Result No WBC's      No organisms seen    Narrative:       1) Free vertebral abscess #1    Fungus culture [[id number]] Collected:  12/10/19 1038    Order Status:  Sent Specimen:  Body Fluid from Neck Updated:  12/10/19 1125    AFB Culture & Smear [[id number]] Collected:  12/10/19 1038    Order Status:  Sent Specimen:  Body Fluid from Neck Updated:  12/10/19 1125    Aerobic culture [[id number]] Collected:  12/10/19 1038    Order Status:  Sent Specimen:  Body Fluid from Neck Updated:  12/10/19 1124    Fungus culture [[id number]] Collected:  12/10/19 1038    Order Status:  Sent Specimen:  Body Fluid from Neck Updated:  12/10/19 1122    AFB Culture & Smear [[id number]] Collected:  12/10/19 1038    Order Status:  Sent  Specimen:  Body Fluid from Neck Updated:  12/10/19 1121    Aerobic culture [[id number]] Collected:  12/10/19 1038    Order Status:  Sent Specimen:  Body Fluid from Neck Updated:  12/10/19 1120            Significant Diagnostics:      Assessment/Plan:     * Spinal epidural abscess  74 y.o. male with PMH of HTN, HLD, Hepatitis C, and CAD s/p two stents on plavix who presents with C6-T2 osteomyelitis with suspected epidural abscess.  Now s/p C7/T1 corpectomies.    -q 1 hr neuro checks  -LD open to drain 10cc/hr, no minimum drainage  -Clamp HV drain for 3 days total  -HOB flat for 3 days  -Fu cultures, send CSF daily  - brace when upright  -Ok for SQH today  -Further care per NCC.        Peter Dela Cruz, DO  Neurosurgery  [facility]

## 2019-12-11 NOTE — NURSING TRANSFER
Nursing Transfer Note      12/10/2019     Transfer To: [id number]    Transfer via bed    Transfer with cardiac monitoring    Transported by nurse and pct    Medicines sent: no    Chart send with patient: Yes    Notified: daughter    Upon arrival to floor: cardiac monitor applied, patient oriented to room, call bell in reach and bed in lowest position

## 2019-12-11 NOTE — NURSING
Neck circumference measured q1hr for 4 hrs per MD Yosi. Neck marked. Neck circumference was 49cms for each measurement.

## 2019-12-11 NOTE — ASSESSMENT & PLAN NOTE
74 y.o. male with PMH of HTN, HLD, Hepatitis C, and CAD s/p two stents on plavix who presents with C6-T2 osteomyelitis with suspected epidural abscess.  Now s/p C7/T1 corpectomies.    -q 1 hr neuro checks  -LD open to drain 10cc/hr, no minimum drainage  -Clamp HV drain for 3 days total  -HOB flat for 3 days  -Fu cultures, send CSF daily  - brace when upright  -Ok for SQH today  -Further care per NCC.

## 2019-12-11 NOTE — ANESTHESIA POSTPROCEDURE EVALUATION
Anesthesia Post Evaluation    Patient: Junaid Muñoz    Procedure(s) Performed: Procedure(s) (LRB):  CORPECTOMY, SPINE, CERVICAL AND THORACIC, C7 and T1 with Globus (N/A)    Final Anesthesia Type: general    Patient location during evaluation: PACU  Patient participation: Yes- Able to Participate  Level of consciousness: awake  Post-procedure vital signs: reviewed and stable  Pain management: adequate  Airway patency: patent    PONV status at discharge: No PONV  Anesthetic complications: no      Cardiovascular status: blood pressure returned to baseline  Respiratory status: unassisted  Hydration status: euvolemic  Follow-up not needed.          Vitals Value Taken Time   /89 12/10/2019 11:02 PM   Temp 37.3 °C (99.1 °F) 12/10/2019  7:05 PM   Pulse 93 12/10/2019 11:10 PM   Resp 18 12/10/2019 11:10 PM   SpO2 97 % 12/10/2019 11:10 PM   Vitals shown include unvalidated device data.      Event Time     Out of Recovery 12/10/2019 19:12:42          Pain/Saturnino Score: Pain Rating Prior to Med Admin: 2 (12/10/2019  9:06 PM)  Pain Rating Post Med Admin: 5 (12/10/2019 12:45 PM)  Saturnino Score: 10 (12/10/2019  6:30 PM)

## 2019-12-11 NOTE — PROGRESS NOTES
Neuro Sx paged about amount draining from Lumbar drain. 6 ml of output over 1st 2 hours since returning from surgery. Neurosurgery MD states to leave drain open for 10 min every hour and document output. Output is not to exceed 10ml/hr. Will continue to monitor closely.

## 2019-12-11 NOTE — SUBJECTIVE & OBJECTIVE
Interval History: bi, denies parasthesias in his extremities this am.    Medications:  Continuous Infusions:   sodium chloride 0.9% 100 mL/hr at 12/10/19 2005     Scheduled Meds:   acetaminophen  1,000 mg Oral Q8H    amLODIPine  5 mg Oral Daily    atorvastatin  80 mg Oral Daily    bacitracin   Topical (Top) TID    cefTRIAXone (ROCEPHIN) IVPB  2 g Intravenous Q12H    diazePAM  10 mg Intravenous 6 times per day    levothyroxine  50 mcg Oral Before breakfast    losartan-hydrochlorothiazide 100-25 mg  1 tablet Oral Daily    magnesium oxide  800 mg Oral Once    metoprolol succinate  100 mg Oral Daily    nicotine  1 patch Transdermal Daily    senna-docusate 8.6-50 mg  1 tablet Oral BID    vancomycin (VANCOCIN) IVPB  15 mg/kg Intravenous Q24H     PRN Meds:Dextrose 10% Bolus, Dextrose 10% Bolus, fentaNYL, glucagon (human recombinant), glucose, glucose, HYDROmorphone, labetalol, naloxone, ondansetron, oxyCODONE, sodium chloride 0.9%, sodium chloride 0.9%     Review of Systems  Objective:     Weight: 79.4 kg (175 lb 0.7 oz)  Body mass index is 25.12 kg/m².  Vital Signs (Most Recent):  Temp: 98.9 °F (37.2 °C) (12/11/19 0305)  Pulse: 78 (12/11/19 0505)  Resp: 14 (12/11/19 0505)  BP: (!) 192/88 (12/11/19 0505)  SpO2: 98 % (12/11/19 0505) Vital Signs (24h Range):  Temp:  [97.5 °F (36.4 °C)-99.1 °F (37.3 °C)] 98.9 °F (37.2 °C)  Pulse:  [] 78  Resp:  [10-37] 14  SpO2:  [97 %-100 %] 98 %  BP: (147-208)/() 192/88  Arterial Line BP: (203-221)/(77-89) 218/85                          Closed/Suction Drain 12/10/19 1018 Anterior Neck Accordion 10 Fr. (Active)   Site Description Healing 12/11/2019  3:05 AM   Dressing Type No dressing 12/11/2019  3:05 AM   Drainage Serosanguineous 12/10/2019  6:30 PM   Status Clamped 12/11/2019  3:05 AM   Output (mL) 1500 mL 12/11/2019  6:05 AM            Urethral Catheter 12/10/19 0730 Non-latex 16 Fr. (Active)   Site Assessment Clean;Intact 12/11/2019  3:05 AM   Collection  "Container Urimeter 12/11/2019  3:05 AM   Securement Method secured to top of thigh w/ adhesive device 12/11/2019  3:05 AM   Catheter Care Performed yes 12/11/2019  3:05 AM   Reason for Continuing Urinary Catheterization Post operative 12/11/2019  3:05 AM   CAUTI Prevention Bundle StatLock in place w 1" slack;Intact seal between catheter & drainage tubing;Drainage bag/urimeter off the floor;Green sheeting clip in use;No dependent loops or kinks;Drainage bag/urimeter not overfilled (<2/3 full);Drainage bag/urimeter below bladder 12/10/2019  7:05 PM   Output (mL) 75 mL 12/10/2019  3:30 PM            Lumbar Drain 12/10/19 1056 (Active)   Drain Status Other (Comment) 12/11/2019  3:05 AM   Level to other (see comment) 12/10/2019  2:32 PM   Dressing Status Clean;Dry;Intact 12/11/2019  3:05 AM   Interventions HOB degrees (see comment) 12/10/2019  2:32 PM   Output (mL) 2 mL 12/11/2019  4:05 AM       Neurosurgery Physical Exam   AOX3  5/5 in BUE except 4/5 in left HI  5/5 in BLE  SILT  Incision c/d/i    Significant Labs:  Recent Labs   Lab 12/10/19  0417 12/11/19  0346   * 117*    137   K 3.9 3.5    108   CO2 25 20*   BUN 15 12   CREATININE 0.8 0.7   CALCIUM 8.9 8.1*   MG 1.6 1.6     Recent Labs   Lab 12/10/19  0417 12/11/19  0346   WBC 9.20 12.08   HGB 11.1* 9.9*   HCT 36.7* 31.6*    167     No results for input(s): LABPT, INR, APTT in the last 48 hours.  Microbiology Results (last 7 days)     Procedure Component Value Units Date/Time    Culture, Anaerobe [[id number]] Collected:  12/10/19 1038    Order Status:  Completed Specimen:  Body Fluid from Neck Updated:  12/11/19 0712     Anaerobic Culture Culture in progress    Narrative:       2) Free vertebral abscess #2    Culture, Anaerobe [[id number]] Collected:  12/10/19 1038    Order Status:  Completed Specimen:  Body Fluid from Neck Updated:  12/11/19 0712     Anaerobic Culture Culture in progress    Narrative:       1) Free vertebral abscess #1    " CSF culture [[id number]] Collected:  12/10/19 1158    Order Status:  Completed Specimen:  CSF (Spinal Fluid) from CSF Tap, Tube 1 Updated:  12/10/19 1412     Gram Stain Result No WBC's      No organisms seen    Narrative:       3) 3) CSF for cell count, differential, culture, gramstain,  protein, and glucose    Gram stain [[id number]] Collected:  12/10/19 1038    Order Status:  Completed Specimen:  Body Fluid from Neck Updated:  12/10/19 1245     Gram Stain Result Rare No WBC's      No organisms seen    Narrative:       2) Free vertebral abscess #2    Gram stain [[id number]] Collected:  12/10/19 1038    Order Status:  Completed Specimen:  Body Fluid from Neck Updated:  12/10/19 1241     Gram Stain Result No WBC's      No organisms seen    Narrative:       1) Free vertebral abscess #1    Fungus culture [[id number]] Collected:  12/10/19 1038    Order Status:  Sent Specimen:  Body Fluid from Neck Updated:  12/10/19 1125    AFB Culture & Smear [[id number]] Collected:  12/10/19 1038    Order Status:  Sent Specimen:  Body Fluid from Neck Updated:  12/10/19 1125    Aerobic culture [[id number]] Collected:  12/10/19 1038    Order Status:  Sent Specimen:  Body Fluid from Neck Updated:  12/10/19 1124    Fungus culture [[id number]] Collected:  12/10/19 1038    Order Status:  Sent Specimen:  Body Fluid from Neck Updated:  12/10/19 1122    AFB Culture & Smear [[id number]] Collected:  12/10/19 1038    Order Status:  Sent Specimen:  Body Fluid from Neck Updated:  12/10/19 1121    Aerobic culture [[id number]] Collected:  12/10/19 1038    Order Status:  Sent Specimen:  Body Fluid from Neck Updated:  12/10/19 1120            Significant Diagnostics:

## 2019-12-11 NOTE — PLAN OF CARE
Patient in the Neuro ICU S/p CORPECTOMY, SPINE, CERVICAL AND THORACIC, C7 and T1 with Globus  1 Day Post-Op  Lumbar drain per Neurosurgery.  Not medically stable for discharge.   Awaiting therapy recs for needs.       Payor: [employer] MEDICARE / Plan: Poll Everywhere 65 / Product Type: Medicare Advantage /     Extended Emergency Contact Information  Primary Emergency Contact: Irene Pisano   [place]  Mobile Phone: [phone]  Relation: Spouse         12/11/19 1510   Discharge Reassessment   Assessment Type Discharge Planning Reassessment   Provided patient/caregiver education on the expected discharge date and the discharge plan No   Do you have any problems affording any of your prescribed medications? No   Discharge Plan A Rehab   Discharge Plan B Skilled Nursing Facility   DME Needed Upon Discharge  other (see comments)  (tbd)   Patient choice form signed by patient/caregiver No   Anticipated Discharge Disposition Rehab   Can the patient answer the patient profile reliably? Yes, cognitively intact   How does the patient rate their overall health at the present time? Fair   Describe the patient's ability to walk at the present time. Major restrictions/daily assistance from another person   How often would a person be available to care for the patient? Occasionally   Number of comorbid conditions (as recorded on the chart) Four   Post-Acute Status   Post-Acute Authorization Placement;Home Health/Hospice   Post-Acute Placement Status Awaiting Internal Medical Clearance  (s/p CORPECTOMY, SPINE, CERVICAL AND THORACIC, C7 and T1 with Globus (N/A) )   Discharge Delays None known at this time       Kimberly Carver RN, CCRN-K, [facility]  Neuro-Critical Care   [phone]

## 2019-12-11 NOTE — PROGRESS NOTES
ICU Progress Note  Neurocritical Care    Admit Date: 12/6/2019  LOS: 5    CC: Spinal epidural abscess    Code Status: Full Code     SUBJECTIVE:     Interval History/Significant Events:   Cervical corpectomy for epidural phegmon  resp insuff post-poa  Gram negative sepsis-poa  Epidural abscess  thc abuse  Hypokalemia-poa  hypohnatremia-poa  Malignant hypertension requiring acute intensive management          Medications:  Continuous Infusions:   sodium chloride 0.9% 100 mL/hr at 12/11/19 0805     Scheduled Meds:   acetaminophen  1,000 mg Oral Q8H    amLODIPine  5 mg Oral Daily    atorvastatin  80 mg Oral Daily    bacitracin   Topical (Top) TID    cefTRIAXone (ROCEPHIN) IVPB  2 g Intravenous Q12H    diazePAM  10 mg Intravenous 6 times per day    heparin (porcine)  5,000 Units Subcutaneous Q8H    levothyroxine  50 mcg Oral Before breakfast    losartan-hydrochlorothiazide 100-25 mg  1 tablet Oral Daily    magnesium oxide  800 mg Oral Once    metoprolol succinate  100 mg Oral Daily    nicotine  1 patch Transdermal Daily    senna-docusate 8.6-50 mg  1 tablet Oral BID    vancomycin (VANCOCIN) IVPB  15 mg/kg Intravenous Q24H     PRN Meds:.Dextrose 10% Bolus, Dextrose 10% Bolus, fentaNYL, glucagon (human recombinant), glucose, glucose, HYDROmorphone, labetalol, naloxone, ondansetron, oxyCODONE, sodium chloride 0.9%, sodium chloride 0.9%    OBJECTIVE:   Vital Signs (Most Recent):   Temp: 98.4 °F (36.9 °C) (12/11/19 0705)  Pulse: 72 (12/11/19 0805)  Resp: 17 (12/11/19 0805)  BP: (!) 189/85 (12/11/19 0805)  SpO2: 97 % (12/11/19 0805)    Vital Signs (24h Range):   Temp:  [97.5 °F (36.4 °C)-99.1 °F (37.3 °C)] 98.4 °F (36.9 °C)  Pulse:  [] 72  Resp:  [10-37] 17  SpO2:  [97 %-100 %] 97 %  BP: (147-208)/() 189/85  Arterial Line BP: (203-221)/(77-89) 218/85    ICP/CPP (Last 24h):        I & O (Last 24h):     Intake/Output Summary (Last 24 hours) at 12/11/2019 0942  Last data filed at 12/11/2019 0809  Gross  "per 24 hour   Intake 2360 ml   Output 2239 ml   Net 121 ml     Physical Exam:  GA: Alert, comfortable, no acute distress.   HEENT: No scleral icterus or JVD.   Pulmonary: Clear to auscultation A/P/L. No wheezing, crackles, or rhonchi.  Cardiac: RRR S1 & S2 w/o rubs/murmurs/gallops.   Abdominal: Bowel sounds present x 4. No appreciable hepatosplenomegaly.  Skin: No jaundice, rashes, or visible lesions.  Neuro:    Awake  Alert  ox3  Moves all 4 exts  no change in exam         Lines/Drains/Airway:       a1       Arterial Line 12/10/19 0803 Left Radial (Active)   Site Assessment Clean;Dry;Intact;No redness;No swelling 12/11/2019  7:05 AM   Line Status Pulsatile blood flow 12/11/2019  7:05 AM   Art Line Waveform Whip 12/11/2019  7:05 AM   Arterial Line Interventions Connections checked and tightened;Flushed per protocol;Line pulled back 12/11/2019  7:05 AM   Color/Movement/Sensation Capillary refill less than 3 sec 12/11/2019  7:05 AM   Dressing Type Transparent 12/11/2019  7:05 AM   Dressing Status Clean;Dry;Intact 12/11/2019  7:05 AM   Dressing Intervention Dressing reinforced 12/11/2019  7:05 AM           Closed/Suction Drain 12/10/19 1018 Anterior Neck Accordion 10 Fr. (Active)   Site Description Healing 12/11/2019  7:05 AM   Dressing Type No dressing 12/11/2019  7:05 AM   Drainage Serosanguineous 12/10/2019  6:30 PM   Status Clamped 12/11/2019  7:05 AM   Output (mL) 0 mL 12/10/2019  2:32 PM            Urethral Catheter 12/10/19 0730 Non-latex 16 Fr. (Active)   Site Assessment Clean;Intact 12/11/2019  7:05 AM   Collection Container Urimeter 12/11/2019  7:05 AM   Securement Method secured to top of thigh w/ adhesive device 12/11/2019  7:05 AM   Catheter Care Performed yes 12/11/2019  7:05 AM   Reason for Continuing Urinary Catheterization Post operative 12/11/2019  7:05 AM   CAUTI Prevention Bundle StatLock in place w 1" slack 12/11/2019  7:05 AM   Output (mL) 1500 mL 12/11/2019  6:05 AM            Lumbar Drain " 12/10/19 1056 (Active)   Drain Status Other (Comment) 12/11/2019  7:05 AM   Level to iliac crest 12/11/2019  7:05 AM   Dressing Status Clean;Dry;Intact 12/11/2019  7:05 AM   Interventions Bed controls locked 12/11/2019  7:05 AM   Output (mL) 2 mL 12/11/2019  8:05 AM     Nutrition/Tube Feeds (if NPO state why):   npo    Labs:  ABG: No results for input(s): PH, PO2, PCO2, HCO3, POCSATURATED, BE in the last 24 hours.  BMP:  Recent Labs   Lab 12/11/19  0346      K 3.5      CO2 20*   BUN 12   CREATININE 0.7   *   MG 1.6   PHOS 3.5     LFT:   Lab Results   Component Value Date    AST 26 12/11/2019    ALT 16 12/11/2019    ALKPHOS 225 (H) 12/11/2019    BILITOT 0.3 12/11/2019    ALBUMIN 2.2 (L) 12/11/2019    PROT 5.9 (L) 12/11/2019     CBC:   Lab Results   Component Value Date    WBC 12.08 12/11/2019    HGB 9.9 (L) 12/11/2019    HCT 31.6 (L) 12/11/2019    MCV 83 12/11/2019     12/11/2019     Microbiology x 7d:   Microbiology Results (last 7 days)     Procedure Component Value Units Date/Time    Aerobic culture [[id number]]  (Abnormal) Collected:  12/10/19 1038    Order Status:  Completed Specimen:  Body Fluid from Neck Updated:  12/11/19 0851     Aerobic Bacterial Culture GRAM NEGATIVE DEVIKA  Few  Identification and susceptibility pending      Narrative:       2) Free vertebral abscess #2    Gram stain [[id number]]     Order Status:  No result Specimen:  CSF (Spinal Fluid) from CSF Tap, Tube 3     CSF culture [[id number]]     Order Status:  No result Specimen:  CSF (Spinal Fluid) from CSF Tap, Tube 3     CSF culture [[id number]] Collected:  12/10/19 1158    Order Status:  Completed Specimen:  CSF (Spinal Fluid) from CSF Tap, Tube 1 Updated:  12/11/19 0727     CSF CULTURE No Growth to date     Gram Stain Result No WBC's      No organisms seen    Narrative:       3) 3) CSF for cell count, differential, culture, gramstain,  protein, and glucose    Culture, Anaerobe [[id number]] Collected:  12/10/19 1038     Order Status:  Completed Specimen:  Body Fluid from Neck Updated:  12/11/19 0712     Anaerobic Culture Culture in progress    Narrative:       2) Free vertebral abscess #2    Culture, Anaerobe [[id number]] Collected:  12/10/19 1038    Order Status:  Completed Specimen:  Body Fluid from Neck Updated:  12/11/19 0712     Anaerobic Culture Culture in progress    Narrative:       1) Free vertebral abscess #1    Gram stain [[id number]] Collected:  12/10/19 1038    Order Status:  Completed Specimen:  Body Fluid from Neck Updated:  12/10/19 1245     Gram Stain Result Rare No WBC's      No organisms seen    Narrative:       2) Free vertebral abscess #2    Gram stain [[id number]] Collected:  12/10/19 1038    Order Status:  Completed Specimen:  Body Fluid from Neck Updated:  12/10/19 1241     Gram Stain Result No WBC's      No organisms seen    Narrative:       1) Free vertebral abscess #1    Fungus culture [[id number]] Collected:  12/10/19 1038    Order Status:  Sent Specimen:  Body Fluid from Neck Updated:  12/10/19 1125    AFB Culture & Smear [[id number]] Collected:  12/10/19 1038    Order Status:  Sent Specimen:  Body Fluid from Neck Updated:  12/10/19 1125    Fungus culture [[id number]] Collected:  12/10/19 1038    Order Status:  Sent Specimen:  Body Fluid from Neck Updated:  12/10/19 1122    AFB Culture & Smear [[id number]] Collected:  12/10/19 1038    Order Status:  Sent Specimen:  Body Fluid from Neck Updated:  12/10/19 1121    Aerobic culture [[id number]] Collected:  12/10/19 1038    Order Status:  Sent Specimen:  Body Fluid from Neck Updated:  12/10/19 1120        Imaging:  cxr  Echo    I personally reviewed the above image.    ASSESSMENT/PLAN:     Active Hospital Problems    Diagnosis    *Spinal epidural abscess    Gram negative sepsis    Mild tetrahydrocannabinol (THC) abuse    Pre-op evaluation    Tobacco abuse    Elevated alkaline phosphatase level    Acute osteomyelitis of cervical spine    Acute osteomyelitis  of thoracic spine    Chronic hepatitis C without hepatic coma    Coronary artery disease involving native coronary artery of native heart without angina pectoris    Essential hypertension    Other specified hypothyroidism    Hyperlipidemia          Plan:  abx  Blood cs  cardene gtt  [place]  Follow uo  Duplex ue's    Critical secondary to Patient has a condition that poses threat to life and bodily function: titration of cardene/follow exam/dw allied services/follow abg    Cc time 32 mins     Critical care was time spent personally by me on the following activities: development of treatment plan with patient or surrogate and bedside caregivers, discussions with consultants, evaluation of patient's response to treatment, examination of patient, ordering and performing treatments and interventions, ordering and review of laboratory studies, ordering and review of radiographic studies, pulse oximetry, re-evaluation of patient's condition. This critical care time did not overlap with that of any other provider or involve time for any procedures.

## 2019-12-11 NOTE — HPI
Pt is a 74 year old male with CAD, HTN, HLD, hypothyroidism, tobacco use, and prior history of IVDU who initially presented for back pain.  Pt was seen in clinic where MRI concerning for C6-T3 osteomyelitis with epidural abscess. Patient was neurologically intact and hemodynamically stable on admission.  S/P phase one of a two part surgery on 12/10 when he underwent  C7-T1 corpectomy and fusion w/ lumbar drain placed secondary to intra op CSF leak.  Cxs from surgery growing  Pseudomonas Aeruginosa (susceptibility pending)  CSF cxs taken-NGTD.  CSF- Count w/ WBC-11, RBC 1070, 74% segmented neutrophils, glucose 74

## 2019-12-11 NOTE — PT/OT/SLP PROGRESS
Occupational Therapy      Patient Name:  Junaid Muñoz   MRN:  [id number]    Patient not seen today secondary to Unavailable: Pt remains on bed rest, HOB flat per MD orders. OT to follow up for eval when appropriate. Will follow-up 12/12.    Jacquelyn Sotelo, OT  12/11/2019

## 2019-12-11 NOTE — PT/OT/SLP PROGRESS
Physical Therapy  Consult    Patient Name:  Junaid Muñoz   MRN:  [id number]  Admitting Diagnosis:  Spinal epidural abscess   Recent Surgery: Procedure(s) (LRB):  CORPECTOMY, SPINE, CERVICAL AND THORACIC, C7 and T1 with Globus (N/A) 1 Day Post-Op  Admit Date: 12/6/2019  Length of Stay: 5 days    Physical Therapy orders received and acknowledged. Patient is currently onbed rest with HOB flat orders per MD.  PT to attempt evaluation when Junaid Muñoz is medically appropriate for progressive mobility.    Seda Thornton, PT, DPT  12/11/2019

## 2019-12-12 LAB
ALBUMIN SERPL BCP-MCNC: 2.1 G/DL (ref 3.5–5.2)
ALLENS TEST: ABNORMAL
ALP SERPL-CCNC: 228 U/L (ref 55–135)
ALT SERPL W/O P-5'-P-CCNC: 16 U/L (ref 10–44)
ANION GAP SERPL CALC-SCNC: 5 MMOL/L (ref 8–16)
AST SERPL-CCNC: 27 U/L (ref 10–40)
BACTERIA SPEC AEROBE CULT: ABNORMAL
BASOPHILS # BLD AUTO: 0.06 K/UL (ref 0–0.2)
BASOPHILS NFR BLD: 0.6 % (ref 0–1.9)
BILIRUB SERPL-MCNC: 0.4 MG/DL (ref 0.1–1)
BUN SERPL-MCNC: 12 MG/DL (ref 8–23)
CALCIUM SERPL-MCNC: 8 MG/DL (ref 8.7–10.5)
CHLORIDE SERPL-SCNC: 108 MMOL/L (ref 95–110)
CO2 SERPL-SCNC: 23 MMOL/L (ref 23–29)
CREAT SERPL-MCNC: 0.8 MG/DL (ref 0.5–1.4)
DELSYS: ABNORMAL
DIFFERENTIAL METHOD: ABNORMAL
EOSINOPHIL # BLD AUTO: 0.1 K/UL (ref 0–0.5)
EOSINOPHIL NFR BLD: 1.1 % (ref 0–8)
ERYTHROCYTE [DISTWIDTH] IN BLOOD BY AUTOMATED COUNT: 16.8 % (ref 11.5–14.5)
EST. GFR  (AFRICAN AMERICAN): >60 ML/MIN/1.73 M^2
EST. GFR  (NON AFRICAN AMERICAN): >60 ML/MIN/1.73 M^2
GLUCOSE SERPL-MCNC: 135 MG/DL (ref 70–110)
HCO3 UR-SCNC: 23.6 MMOL/L (ref 24–28)
HCT VFR BLD AUTO: 31.6 % (ref 40–54)
HGB BLD-MCNC: 9.9 G/DL (ref 14–18)
IMM GRANULOCYTES # BLD AUTO: 0.05 K/UL (ref 0–0.04)
IMM GRANULOCYTES NFR BLD AUTO: 0.5 % (ref 0–0.5)
INR PPP: 1.1 (ref 0.8–1.2)
LYMPHOCYTES # BLD AUTO: 1.4 K/UL (ref 1–4.8)
LYMPHOCYTES NFR BLD: 13.4 % (ref 18–48)
MAGNESIUM SERPL-MCNC: 1.5 MG/DL (ref 1.6–2.6)
MAGNESIUM SERPL-MCNC: 1.6 MG/DL (ref 1.6–2.6)
MCH RBC QN AUTO: 26.3 PG (ref 27–31)
MCHC RBC AUTO-ENTMCNC: 31.3 G/DL (ref 32–36)
MCV RBC AUTO: 84 FL (ref 82–98)
MODE: ABNORMAL
MONOCYTES # BLD AUTO: 0.9 K/UL (ref 0.3–1)
MONOCYTES NFR BLD: 8.4 % (ref 4–15)
NEUTROPHILS # BLD AUTO: 7.9 K/UL (ref 1.8–7.7)
NEUTROPHILS NFR BLD: 76 % (ref 38–73)
NRBC BLD-RTO: 0 /100 WBC
PCO2 BLDA: 34.1 MMHG (ref 35–45)
PH SMN: 7.45 [PH] (ref 7.35–7.45)
PHOSPHATE SERPL-MCNC: 2.9 MG/DL (ref 2.7–4.5)
PLATELET # BLD AUTO: 150 K/UL (ref 150–350)
PMV BLD AUTO: 10.1 FL (ref 9.2–12.9)
PO2 BLDA: 82 MMHG (ref 80–100)
POC BE: 0 MMOL/L
POC SATURATED O2: 97 % (ref 95–100)
POC TCO2: 25 MMOL/L (ref 23–27)
POTASSIUM SERPL-SCNC: 3.6 MMOL/L (ref 3.5–5.1)
POTASSIUM SERPL-SCNC: 3.6 MMOL/L (ref 3.5–5.1)
POTASSIUM SERPL-SCNC: 3.7 MMOL/L (ref 3.5–5.1)
PROT SERPL-MCNC: 5.5 G/DL (ref 6–8.4)
PROTHROMBIN TIME: 10.8 SEC (ref 9–12.5)
RBC # BLD AUTO: 3.77 M/UL (ref 4.6–6.2)
SAMPLE: ABNORMAL
SITE: ABNORMAL
SODIUM SERPL-SCNC: 136 MMOL/L (ref 136–145)
VANCOMYCIN TROUGH SERPL-MCNC: 17 UG/ML (ref 10–22)
WBC # BLD AUTO: 10.45 K/UL (ref 3.9–12.7)

## 2019-12-12 PROCEDURE — S4991 NICOTINE PATCH NONLEGEND: HCPCS | Performed by: STUDENT IN AN ORGANIZED HEALTH CARE EDUCATION/TRAINING PROGRAM

## 2019-12-12 PROCEDURE — 82803 BLOOD GASES ANY COMBINATION: CPT

## 2019-12-12 PROCEDURE — 84132 ASSAY OF SERUM POTASSIUM: CPT | Mod: 91

## 2019-12-12 PROCEDURE — 80202 ASSAY OF VANCOMYCIN: CPT

## 2019-12-12 PROCEDURE — 80053 COMPREHEN METABOLIC PANEL: CPT

## 2019-12-12 PROCEDURE — 99233 SBSQ HOSP IP/OBS HIGH 50: CPT | Mod: ,,, | Performed by: NURSE PRACTITIONER

## 2019-12-12 PROCEDURE — 25000003 PHARM REV CODE 250: Performed by: STUDENT IN AN ORGANIZED HEALTH CARE EDUCATION/TRAINING PROGRAM

## 2019-12-12 PROCEDURE — 63600175 PHARM REV CODE 636 W HCPCS: Performed by: NURSE PRACTITIONER

## 2019-12-12 PROCEDURE — 25000003 PHARM REV CODE 250: Performed by: PHYSICIAN ASSISTANT

## 2019-12-12 PROCEDURE — 63600175 PHARM REV CODE 636 W HCPCS: Performed by: PHYSICIAN ASSISTANT

## 2019-12-12 PROCEDURE — 83735 ASSAY OF MAGNESIUM: CPT | Mod: 91

## 2019-12-12 PROCEDURE — 63600175 PHARM REV CODE 636 W HCPCS: Performed by: ANESTHESIOLOGY

## 2019-12-12 PROCEDURE — 36410 VNPNXR 3YR/> PHY/QHP DX/THER: CPT

## 2019-12-12 PROCEDURE — 76937 US GUIDE VASCULAR ACCESS: CPT

## 2019-12-12 PROCEDURE — 63600175 PHARM REV CODE 636 W HCPCS: Performed by: STUDENT IN AN ORGANIZED HEALTH CARE EDUCATION/TRAINING PROGRAM

## 2019-12-12 PROCEDURE — 85025 COMPLETE CBC W/AUTO DIFF WBC: CPT

## 2019-12-12 PROCEDURE — 99233 PR SUBSEQUENT HOSPITAL CARE,LEVL III: ICD-10-PCS | Mod: ,,, | Performed by: NURSE PRACTITIONER

## 2019-12-12 PROCEDURE — 20000000 HC ICU ROOM

## 2019-12-12 PROCEDURE — 99233 PR SUBSEQUENT HOSPITAL CARE,LEVL III: ICD-10-PCS | Mod: ,,, | Performed by: PHYSICIAN ASSISTANT

## 2019-12-12 PROCEDURE — 36600 WITHDRAWAL OF ARTERIAL BLOOD: CPT

## 2019-12-12 PROCEDURE — 99900035 HC TECH TIME PER 15 MIN (STAT)

## 2019-12-12 PROCEDURE — 99233 SBSQ HOSP IP/OBS HIGH 50: CPT | Mod: ,,, | Performed by: PHYSICIAN ASSISTANT

## 2019-12-12 PROCEDURE — C1751 CATH, INF, PER/CENT/MIDLINE: HCPCS

## 2019-12-12 PROCEDURE — 25000003 PHARM REV CODE 250: Performed by: ANESTHESIOLOGY

## 2019-12-12 PROCEDURE — 87040 BLOOD CULTURE FOR BACTERIA: CPT | Mod: 59

## 2019-12-12 PROCEDURE — 84100 ASSAY OF PHOSPHORUS: CPT

## 2019-12-12 PROCEDURE — 85610 PROTHROMBIN TIME: CPT

## 2019-12-12 PROCEDURE — 94761 N-INVAS EAR/PLS OXIMETRY MLT: CPT

## 2019-12-12 RX ORDER — SODIUM,POTASSIUM PHOSPHATES 280-250MG
2 POWDER IN PACKET (EA) ORAL
Status: DISCONTINUED | OUTPATIENT
Start: 2019-12-12 | End: 2019-12-17

## 2019-12-12 RX ORDER — POTASSIUM CHLORIDE 20 MEQ/15ML
40 SOLUTION ORAL
Status: DISCONTINUED | OUTPATIENT
Start: 2019-12-12 | End: 2019-12-17

## 2019-12-12 RX ORDER — LANOLIN ALCOHOL/MO/W.PET/CERES
800 CREAM (GRAM) TOPICAL
Status: DISCONTINUED | OUTPATIENT
Start: 2019-12-12 | End: 2019-12-17

## 2019-12-12 RX ORDER — DIAZEPAM 10 MG/2ML
5 INJECTION INTRAMUSCULAR EVERY 6 HOURS
Status: DISCONTINUED | OUTPATIENT
Start: 2019-12-12 | End: 2019-12-15

## 2019-12-12 RX ADMIN — HEPARIN SODIUM 5000 UNITS: 5000 INJECTION, SOLUTION INTRAVENOUS; SUBCUTANEOUS at 05:12

## 2019-12-12 RX ADMIN — POTASSIUM CHLORIDE 40 MEQ: 20 SOLUTION ORAL at 12:12

## 2019-12-12 RX ADMIN — OXYCODONE HYDROCHLORIDE 10 MG: 5 TABLET ORAL at 10:12

## 2019-12-12 RX ADMIN — DIAZEPAM 10 MG: 5 INJECTION, SOLUTION INTRAMUSCULAR; INTRAVENOUS at 12:12

## 2019-12-12 RX ADMIN — Medication 800 MG: at 08:12

## 2019-12-12 RX ADMIN — CEFEPIME 2 G: 2 INJECTION, POWDER, FOR SOLUTION INTRAVENOUS at 09:12

## 2019-12-12 RX ADMIN — LEVOTHYROXINE SODIUM 50 MCG: 50 TABLET ORAL at 05:12

## 2019-12-12 RX ADMIN — ATORVASTATIN CALCIUM 80 MG: 20 TABLET, FILM COATED ORAL at 08:12

## 2019-12-12 RX ADMIN — VANCOMYCIN HYDROCHLORIDE 1000 MG: 1 INJECTION, POWDER, LYOPHILIZED, FOR SOLUTION INTRAVENOUS at 02:12

## 2019-12-12 RX ADMIN — NICOTINE 1 PATCH: 21 PATCH, EXTENDED RELEASE TRANSDERMAL at 08:12

## 2019-12-12 RX ADMIN — ACETAMINOPHEN 1000 MG: 500 TABLET ORAL at 01:12

## 2019-12-12 RX ADMIN — CEFEPIME 2 G: 2 INJECTION, POWDER, FOR SOLUTION INTRAVENOUS at 01:12

## 2019-12-12 RX ADMIN — VANCOMYCIN HYDROCHLORIDE 1000 MG: 1 INJECTION, POWDER, LYOPHILIZED, FOR SOLUTION INTRAVENOUS at 01:12

## 2019-12-12 RX ADMIN — HEPARIN SODIUM 5000 UNITS: 5000 INJECTION, SOLUTION INTRAVENOUS; SUBCUTANEOUS at 01:12

## 2019-12-12 RX ADMIN — POLYETHYLENE GLYCOL 3350 17 G: 17 POWDER, FOR SOLUTION ORAL at 08:12

## 2019-12-12 RX ADMIN — LOSARTAN POTASSIUM AND HYDROCHLOROTHIAZIDE 1 TABLET: 100; 25 TABLET, FILM COATED ORAL at 08:12

## 2019-12-12 RX ADMIN — METOPROLOL SUCCINATE 100 MG: 100 TABLET, EXTENDED RELEASE ORAL at 08:12

## 2019-12-12 RX ADMIN — Medication: at 10:12

## 2019-12-12 RX ADMIN — DIAZEPAM 10 MG: 5 INJECTION, SOLUTION INTRAMUSCULAR; INTRAVENOUS at 01:12

## 2019-12-12 RX ADMIN — LABETALOL HCL IV SOLN PREFILLED SYRINGE 20 MG/4ML (5 MG/ML) 10 MG: 20/4 SOLUTION PREFILLED SYRINGE at 10:12

## 2019-12-12 RX ADMIN — CEFEPIME 2 G: 2 INJECTION, POWDER, FOR SOLUTION INTRAVENOUS at 05:12

## 2019-12-12 RX ADMIN — Medication: at 08:12

## 2019-12-12 RX ADMIN — DIAZEPAM 5 MG: 5 INJECTION, SOLUTION INTRAMUSCULAR; INTRAVENOUS at 05:12

## 2019-12-12 RX ADMIN — Medication: at 03:12

## 2019-12-12 RX ADMIN — Medication 800 MG: at 05:12

## 2019-12-12 RX ADMIN — ACETAMINOPHEN 1000 MG: 500 TABLET ORAL at 05:12

## 2019-12-12 RX ADMIN — SENNOSIDES AND DOCUSATE SODIUM 1 TABLET: 8.6; 5 TABLET ORAL at 08:12

## 2019-12-12 RX ADMIN — POTASSIUM CHLORIDE 40 MEQ: 20 SOLUTION ORAL at 05:12

## 2019-12-12 RX ADMIN — OXYCODONE HYDROCHLORIDE 10 MG: 5 TABLET ORAL at 05:12

## 2019-12-12 RX ADMIN — MORPHINE SULFATE 1 MG: 2 INJECTION, SOLUTION INTRAMUSCULAR; INTRAVENOUS at 02:12

## 2019-12-12 RX ADMIN — AMLODIPINE BESYLATE 5 MG: 5 TABLET ORAL at 08:12

## 2019-12-12 RX ADMIN — DIAZEPAM 10 MG: 5 INJECTION, SOLUTION INTRAMUSCULAR; INTRAVENOUS at 05:12

## 2019-12-12 RX ADMIN — ACETAMINOPHEN 1000 MG: 500 TABLET ORAL at 10:12

## 2019-12-12 NOTE — PLAN OF CARE
Per MD, patient not medically ready for discharge.  Awaiting post op therapy notes for recs.        12/12/19 5554   Discharge Reassessment   Assessment Type Discharge Planning Reassessment   Provided patient/caregiver education on the expected discharge date and the discharge plan No   Do you have any problems affording any of your prescribed medications? No   Discharge Plan A Rehab   Discharge Plan B Skilled Nursing Facility   DME Needed Upon Discharge  other (see comments)  (vasquez)   Patient choice form signed by patient/caregiver No   Anticipated Discharge Disposition Rehab   Can the patient answer the patient profile reliably? No, cognitively impaired   Describe the patient's ability to walk at the present time. Major restrictions/daily assistance from another person   How often would a person be available to care for the patient? Occasionally   Number of comorbid conditions (as recorded on the chart) Four   Post-Acute Status   Post-Acute Authorization Placement;Home Health/Hospice   Post-Acute Placement Status Awaiting Internal Medical Clearance   Discharge Delays None known at this time       Kimberly Carver RN, CCRN-K, [facility]  Neuro-Critical Care   [phone]

## 2019-12-12 NOTE — PLAN OF CARE
POC reviewed with pt and family at 1700. Pt verbalized understanding. Questions and concerns addressed. No acute events today. Pt progressing toward goals. Patient still complaining of back pain, PRN medications was given. Patient still has the lumbar drain. Patient on NPO and on Normal Saline running at 75ml/hour. Will continue to monitor. See flowsheets for full assessment and VS info.

## 2019-12-12 NOTE — PROGRESS NOTES
Pharmacokinetic Assessment Follow Up: IV Vancomycin    Assessment/Plan:    Vancomycin trough drawn appropriately before third dose, resulted at 17 mcg/mL  Within goal range of 15-20 mcg/mL  Not yet at steady state, anticipate accumulation  Empirically decrease regimen to 750 mg Q12H  Draw trough before fourth dose on 12/14 at 15:00    Drug levels (last 3 results):  Recent Labs   Lab Result Units 12/12/19  1334   Vancomycin-Trough ug/mL 17.0       Pharmacy will continue to follow and monitor vancomycin. Please call for questions regarding this assessment.    Thank you for the consult,   Conchita Mccoy, PharmD, [facility]  Neurocritical Care Pharmacist  [phone]       Patient brief summary:  Junaid Muñoz is a 74 y.o. male initiated on antimicrobial therapy with IV Vancomycin for treatment of bone/joint infection. S/p epidural abscess evac, cultures pending, ID on board    Drug Allergies:   Review of patient's allergies indicates:   Allergen Reactions    Penicillins Rash       Actual Body Weight:   79.4 kg    Renal Function:   Estimated Creatinine Clearance: 81 mL/min (based on SCr of 0.8 mg/dL).,     Dialysis Method (if applicable):  N/A    Microbiologic Results:  Microbiology Results (last 7 days)     Procedure Component Value Units Date/Time    Blood culture [[id number]] Collected:  12/11/19 1005    Order Status:  Completed Specimen:  Blood from Peripheral, Forearm, Right Updated:  12/12/19 1212     Blood Culture, Routine No Growth to date      No Growth to date    Narrative:       Blood cultures x 2 different sites. 4 bottles total. Please  draw cultures before administering antibiotics.    Blood culture [[id number]] Collected:  12/11/19 1000    Order Status:  Completed Specimen:  Blood from Peripheral, Foot, Right Updated:  12/12/19 1212     Blood Culture, Routine No Growth to date      No Growth to date    Narrative:       Blood cultures from 2 different sites. 4 bottles total.  Please draw before starting  antibiotics.    Aerobic culture [[id number]]  (Abnormal) Collected:  12/10/19 1038    Order Status:  Completed Specimen:  Body Fluid from Neck Updated:  12/12/19 1151     Aerobic Bacterial Culture PSEUDOMONAS AERUGINOSA  Rare  Susceptibility pending      Narrative:       1) Free vertebral abscess #1    Aerobic culture [[id number]]  (Abnormal)  (Susceptibility) Collected:  12/10/19 1038    Order Status:  Completed Specimen:  Body Fluid from Neck Updated:  12/12/19 1132     Aerobic Bacterial Culture PSEUDOMONAS AERUGINOSA  Few      Narrative:       2) Free vertebral abscess #2    Gram stain [[id number]]     Order Status:  No result Specimen:  CSF (Spinal Fluid) from CSF Tap, Tube 3     CSF culture [[id number]]     Order Status:  No result Specimen:  CSF (Spinal Fluid) from CSF Tap, Tube 3     Blood culture [[id number]] Collected:  12/12/19 0147    Order Status:  Completed Specimen:  Blood from Wrist, Right Updated:  12/12/19 1115     Blood Culture, Routine No Growth to date    Narrative:       Blood cultures from 2 different sites. 4 bottles total.  Please draw before starting antibiotics.    Blood culture [[id number]] Collected:  12/12/19 0213    Order Status:  Completed Specimen:  Blood from Peripheral, Antecubital, Right Updated:  12/12/19 1115     Blood Culture, Routine No Growth to date    Narrative:       Blood cultures x 2 different sites. 4 bottles total. Please  draw cultures before administering antibiotics.    CSF culture [[id number]]  (Abnormal) Collected:  12/10/19 1158    Order Status:  Completed Specimen:  CSF (Spinal Fluid) from CSF Tap, Tube 1 Updated:  12/12/19 1037     CSF CULTURE Culture has Staphylococcus.      Results called to and read back by:  Leonardo Cook RN 12/12/2019  10:36      STAPHYLOCOCCUS EPIDERMIDIS  Susceptibility pending       Gram Stain Result No WBC's      No organisms seen    Narrative:       3) 3) CSF for cell count, differential, culture, gramstain,  protein, and glucose    Culture,  Anaerobe [[id number]] Collected:  12/10/19 1038    Order Status:  Completed Specimen:  Body Fluid from Neck Updated:  12/12/19 0856     Anaerobic Culture Culture in progress    Narrative:       2) Free vertebral abscess #2    Culture, Anaerobe [[id number]] Collected:  12/10/19 1038    Order Status:  Completed Specimen:  Body Fluid from Neck Updated:  12/12/19 0855     Anaerobic Culture Culture in progress    Narrative:       1) Free vertebral abscess #1    CSF culture [[id number]] Collected:  12/11/19 1247    Order Status:  Completed Specimen:  CSF (Spinal Fluid) from CSF Tap, Tube 3 Updated:  12/12/19 0654     CSF CULTURE No Growth to date     Gram Stain Result Cytospin indicates:      Rare WBC's      No organisms seen    AFB Culture & Smear [[id number]] Collected:  12/10/19 1038    Order Status:  Completed Specimen:  Body Fluid from Neck Updated:  12/11/19 2127     AFB Culture & Smear Culture in progress     AFB CULTURE STAIN No acid fast bacilli seen.    Narrative:       1) Free vertebral abscess #1    AFB Culture & Smear [[id number]] Collected:  12/10/19 1038    Order Status:  Completed Specimen:  Body Fluid from Neck Updated:  12/11/19 2127     AFB Culture & Smear Culture in progress     AFB CULTURE STAIN No acid fast bacilli seen.    Narrative:       2) Free vertebral abscess #2    Gram stain [[id number]] Collected:  12/11/19 1247    Order Status:  Canceled Specimen:  CSF (Spinal Fluid) from CSF Tap, Tube 3     Gram stain [[id number]] Collected:  12/10/19 1038    Order Status:  Completed Specimen:  Body Fluid from Neck Updated:  12/10/19 1245     Gram Stain Result Rare No WBC's      No organisms seen    Narrative:       2) Free vertebral abscess #2    Gram stain [[id number]] Collected:  12/10/19 1038    Order Status:  Completed Specimen:  Body Fluid from Neck Updated:  12/10/19 1241     Gram Stain Result No WBC's      No organisms seen    Narrative:       1) Free vertebral abscess #1    Fungus culture [[id number]]  Collected:  12/10/19 1038    Order Status:  Sent Specimen:  Body Fluid from Neck Updated:  12/10/19 1125    Fungus culture [[id number]] Collected:  12/10/19 1038    Order Status:  Sent Specimen:  Body Fluid from Neck Updated:  12/10/19 1122

## 2019-12-12 NOTE — PT/OT/SLP PROGRESS
Occupational Therapy      Patient Name:  Junaid Muñoz   MRN:  [id number]    Patient not seen today secondary to Unavailable: Pt remains on HOB flat orders, Lumbar drain to be removed tomorrow. OT to follow up when bed rest lifted and drain removed. Will follow-up 12/12.    Jacquelyn Sotelo, OT  12/12/2019

## 2019-12-12 NOTE — PT/OT/SLP PROGRESS
Physical Therapy      Patient Name:  Junaid Muñoz   MRN:  [id number]    Patient not seen today secondary to Unavailable (Comment)(HOB flat orders remain. Medical team plans for lumbar drain removal tomorrow.). Will follow-up 12/13/2019 per pt availability and as medically appropriate..    Seda Thornton, PT

## 2019-12-12 NOTE — ASSESSMENT & PLAN NOTE
Pt is a 74 year old male with CAD, HTN, HLD, hypothyroidism, tobacco use, and prior history of IVDU who initially presented for back pain.  Pt was seen in clinic where MRI concerning for C6-T3 osteomyelitis with epidural abscess. CRP-73 ESR-32.  Patient was neurologically intact and hemodynamically stable on admission.  S/P phase one of a two part surgery on 12/10 when he underwent  C7-T1 corpectomy and fusion w/ lumbar drain placed secondary to intra op CSF leak.     Cxs from surgery growing  Pseudomonas Aeruginosa  CSF cxs taken-+Staph epi. susceptibilities pending.  CSF- Count w/ WBC-11, RBC 1070, 74% segmented neutrophils, glucose 74  Repeat CSF cultures NGTD  Pt afebrile w/o white count.    Plan  -Continue Vancomycin as with Staph epi from CSF fluid. susceptibilities pending. vanc trough goal 15-20. Trough today 17.   -Continue Cefepime 2g q8h  -Will need long term IV abx  -scheduled for fusion 12/17.   -ID will continue to follow.

## 2019-12-12 NOTE — PLAN OF CARE
Plan of care reviewed with Mr. Junaid Muñoz at 0400. Patient verbalized understanding and all questions and concerns addressed. No acute events overnight. Lumbar drain remains in place, draining 1-2 mL/hr. Full bath completed. Patient progressing toward goals. Will continue to monitor. See flowsheets for full assessment and VS info.

## 2019-12-12 NOTE — SUBJECTIVE & OBJECTIVE
Interval History:   CSF cultures +Staph epi  Repeat CSF cultures NGTD  Surgical cultures +pseudomonas   On vancomycin and cefepime      Review of Systems   Constitutional: Negative for chills, diaphoresis, fatigue and fever.   HENT: Negative for congestion, sore throat and trouble swallowing.    Eyes: Negative for photophobia.   Respiratory: Negative for cough and shortness of breath.    Cardiovascular: Negative for chest pain and palpitations.   Gastrointestinal: Negative for abdominal pain, nausea and vomiting.   Genitourinary: Negative for decreased urine volume, difficulty urinating and dysuria.   Musculoskeletal: Positive for back pain. Negative for arthralgias, joint swelling, myalgias, neck pain and neck stiffness.   Neurological: Negative for headaches.     Objective:     Vital Signs (Most Recent):  Temp: 97 °F (36.1 °C) (12/12/19 1101)  Pulse: 66 (12/12/19 1501)  Resp: (!) 26 (12/12/19 1501)  BP: (!) 160/74 (12/12/19 1501)  SpO2: 97 % (12/12/19 1501) Vital Signs (24h Range):  Temp:  [97 °F (36.1 °C)-98.5 °F (36.9 °C)] 97 °F (36.1 °C)  Pulse:  [61-93] 66  Resp:  [13-26] 26  SpO2:  [95 %-98 %] 97 %  BP: (123-190)/() 160/74  Arterial Line BP: (104-185)/() 145/94     Weight: 79.4 kg (175 lb)  Body mass index is 25.84 kg/m².    Estimated Creatinine Clearance: 81 mL/min (based on SCr of 0.8 mg/dL).    Physical Exam   Constitutional: He appears well-developed and well-nourished.   HENT:   Head: Normocephalic and atraumatic.   Surgical site c/d/i  Drains in place   Neck: Normal range of motion.   Cardiovascular: Normal rate and regular rhythm.   No murmur heard.  Pulmonary/Chest: Effort normal. No respiratory distress.   Abdominal: Soft. He exhibits no distension. There is no tenderness.   Musculoskeletal: He exhibits no edema or tenderness.   Neurological: He is alert.   Skin: Skin is warm. No rash noted. No erythema.   Psychiatric: He has a normal mood and affect.       Significant Labs: All pertinent  labs within the past 24 hours have been reviewed.    Significant Imaging: I have reviewed all pertinent imaging results/findings within the past 24 hours.

## 2019-12-12 NOTE — NURSING
0830H- Patient lumbar drain is 1-2ml/hour. Dressing looks soaked. RN paged Neurosurgery.    1050H- Patient was seen by Paul VILLAFUERTE (neuro surgery) examined patient and checked the dressing and stated will check it again this afternoon and patient can elevate head of bed no more than 15 degree when eating meal. Will monitor patient.

## 2019-12-12 NOTE — CONSULTS
Midline placed  in right brachial size [id number] Lot# [id number] Removal date on or before 1/10/2020. Needle advanced into vessel with real time ultrasound guidance. Image recorded and saved. Ou about 2cm.

## 2019-12-12 NOTE — PROGRESS NOTES
[facility]  Infectious Disease  Progress Note    Patient Name: Junaid Muñoz  MRN: [id number]  Admission Date: 12/6/2019  Length of Stay: 6 days  Attending Physician: Roqeu Zhang MD  Primary Care Provider: Oskar Whitman MD    Isolation Status: Contact  Assessment/Plan:      * Spinal epidural abscess  Pt is a 74 year old male with CAD, HTN, HLD, hypothyroidism, tobacco use, and prior history of IVDU who initially presented for back pain.  Pt was seen in clinic where MRI concerning for C6-T3 osteomyelitis with epidural abscess. CRP-73 ESR-32.  Patient was neurologically intact and hemodynamically stable on admission.  S/P phase one of a two part surgery on 12/10 when he underwent  C7-T1 corpectomy and fusion w/ lumbar drain placed secondary to intra op CSF leak.     Cxs from surgery growing  Pseudomonas Aeruginosa  CSF cxs taken-+Staph epi. susceptibilities pending.  CSF- Count w/ WBC-11, RBC 1070, 74% segmented neutrophils, glucose 74  Repeat CSF cultures NGTD  Pt afebrile w/o white count.    Plan  -Continue Vancomycin as with Staph epi from CSF fluid. susceptibilities pending. vanc trough goal 15-20. Trough today 17.   -Continue Cefepime 2g q8h  -Will need long term IV abx  -scheduled for fusion 12/17.   -ID will continue to follow.          Thank you for your consult. I will follow-up with patient. Please contact us if you have any additional questions.    Rubén Guzman PA-C  Infectious Disease  [facility]    Subjective:     Principal Problem:Spinal epidural abscess    HPI: Pt is a 74 year old male with CAD, HTN, HLD, hypothyroidism, tobacco use, and prior history of IVDU who initially presented for back pain.  Pt was seen in clinic where MRI concerning for C6-T3 osteomyelitis with epidural abscess. Patient was neurologically intact and hemodynamically stable on admission.  S/P phase one of a two part surgery on 12/10 when he underwent  C7-T1 corpectomy and fusion  w/ lumbar drain placed secondary to intra op CSF leak.  Cxs from surgery growing  Pseudomonas Aeruginosa (susceptibility pending)  CSF cxs taken-NGTD.  CSF- Count w/ WBC-11, RBC 1070, 74% segmented neutrophils, glucose 74  Interval History:   CSF cultures +Staph epi  Repeat CSF cultures NGTD  Surgical cultures +pseudomonas   On vancomycin and cefepime      Review of Systems   Constitutional: Negative for chills, diaphoresis, fatigue and fever.   HENT: Negative for congestion, sore throat and trouble swallowing.    Eyes: Negative for photophobia.   Respiratory: Negative for cough and shortness of breath.    Cardiovascular: Negative for chest pain and palpitations.   Gastrointestinal: Negative for abdominal pain, nausea and vomiting.   Genitourinary: Negative for decreased urine volume, difficulty urinating and dysuria.   Musculoskeletal: Positive for back pain. Negative for arthralgias, joint swelling, myalgias, neck pain and neck stiffness.   Neurological: Negative for headaches.     Objective:     Vital Signs (Most Recent):  Temp: 97 °F (36.1 °C) (12/12/19 1101)  Pulse: 66 (12/12/19 1501)  Resp: (!) 26 (12/12/19 1501)  BP: (!) 160/74 (12/12/19 1501)  SpO2: 97 % (12/12/19 1501) Vital Signs (24h Range):  Temp:  [97 °F (36.1 °C)-98.5 °F (36.9 °C)] 97 °F (36.1 °C)  Pulse:  [61-93] 66  Resp:  [13-26] 26  SpO2:  [95 %-98 %] 97 %  BP: (123-190)/() 160/74  Arterial Line BP: (104-185)/() 145/94     Weight: 79.4 kg (175 lb)  Body mass index is 25.84 kg/m².    Estimated Creatinine Clearance: 81 mL/min (based on SCr of 0.8 mg/dL).    Physical Exam   Constitutional: He appears well-developed and well-nourished.   HENT:   Head: Normocephalic and atraumatic.   Surgical site c/d/i  Drains in place   Neck: Normal range of motion.   Cardiovascular: Normal rate and regular rhythm.   No murmur heard.  Pulmonary/Chest: Effort normal. No respiratory distress.   Abdominal: Soft. He exhibits no distension. There is no  tenderness.   Musculoskeletal: He exhibits no edema or tenderness.   Neurological: He is alert.   Skin: Skin is warm. No rash noted. No erythema.   Psychiatric: He has a normal mood and affect.       Significant Labs: All pertinent labs within the past 24 hours have been reviewed.    Significant Imaging: I have reviewed all pertinent imaging results/findings within the past 24 hours.

## 2019-12-12 NOTE — ASSESSMENT & PLAN NOTE
74 y.o. male with PMH of HTN, HLD, Hepatitis C, and CAD s/p two stents on plavix who presents with C6-T2 osteomyelitis with suspected epidural abscess.  Now s/p C7/T1 corpectomies.    Continue ICU care  q 1 hr neuro checks  LD open to drain 10cc/hr, no minimum drainage  Clamp HV drain for 3 days total   HOB flat for 3 days  Planning for posterior cervical instrumentation next week  Fu cultures, send CSF daily:  CSF no growth today   brace when upright  SQH  Further care per NCC

## 2019-12-12 NOTE — SUBJECTIVE & OBJECTIVE
Interval History: No acute events overnight.  Exam is stable from yesterday.  Lumbar drain in place with 4 1 cc output.    Medications:  Continuous Infusions:   sodium chloride 0.9% 100 mL/hr at 12/12/19 0701    niCARdipine       Scheduled Meds:   acetaminophen  1,000 mg Oral Q8H    amLODIPine  5 mg Oral Daily    atorvastatin  80 mg Oral Daily    bacitracin   Topical (Top) TID    ceFEPime (MAXIPIME) IVPB  2 g Intravenous Q8H    diazePAM  10 mg Intravenous Q6H    heparin (porcine)  5,000 Units Subcutaneous Q8H    levothyroxine  50 mcg Oral Before breakfast    losartan-hydrochlorothiazide 100-25 mg  1 tablet Oral Daily    magnesium oxide  800 mg Oral Once    metoprolol succinate  100 mg Oral Daily    nicotine  1 patch Transdermal Daily    polyethylene glycol  17 g Oral Daily    senna-docusate 8.6-50 mg  1 tablet Oral BID    vancomycin (VANCOCIN) IVPB  1,000 mg Intravenous Q12H     PRN Meds:Dextrose 10% Bolus, Dextrose 10% Bolus, glucagon (human recombinant), glucose, glucose, labetalol, magnesium oxide, magnesium oxide, morphine, naloxone, ondansetron, oxyCODONE, potassium chloride 10%, potassium chloride 10%, potassium chloride 10%, potassium, sodium phosphates, potassium, sodium phosphates, potassium, sodium phosphates, sodium chloride 0.9%, sodium chloride 0.9%     Review of Systems    Objective:     Weight: 79.4 kg (175 lb)  Body mass index is 25.84 kg/m².  Vital Signs (Most Recent):  Temp: 97.9 °F (36.6 °C) (12/12/19 0701)  Pulse: 65 (12/12/19 0701)  Resp: 18 (12/12/19 0701)  BP: (!) 178/77 (12/12/19 0701)  SpO2: 96 % (12/12/19 0701) Vital Signs (24h Range):  Temp:  [97.8 °F (36.6 °C)-98.5 °F (36.9 °C)] 97.9 °F (36.6 °C)  Pulse:  [61-93] 65  Resp:  [13-26] 18  SpO2:  [96 %-98 %] 96 %  BP: (123-204)/() 178/77  Arterial Line BP: (117-185)/() 137/116     Date 12/12/19 0700 - 12/13/19 0659   Shift 8065-0518 2356-0584 6517-9345 24 Hour Total   INTAKE   I.V.(mL/kg) 100(1.3)   100(1.3)  "  Shift Total(mL/kg) 100(1.3)   100(1.3)   OUTPUT   Urine(mL/kg/hr) 100   100   Drains 3   3   Shift Total(mL/kg) 103(1.3)   103(1.3)   Weight (kg) 79.4 79.4 79.4 79.4                        Closed/Suction Drain 12/10/19 1018 Anterior Neck Accordion 10 Fr. (Active)   Site Description Healing 12/11/2019  3:05 AM   Dressing Type No dressing 12/11/2019  3:05 AM   Drainage Serosanguineous 12/10/2019  6:30 PM   Status Clamped 12/11/2019  3:05 AM   Output (mL) 1500 mL 12/11/2019  6:05 AM            Urethral Catheter 12/10/19 0730 Non-latex 16 Fr. (Active)   Site Assessment Clean;Intact 12/11/2019  3:05 AM   Collection Container Urimeter 12/11/2019  3:05 AM   Securement Method secured to top of thigh w/ adhesive device 12/11/2019  3:05 AM   Catheter Care Performed yes 12/11/2019  3:05 AM   Reason for Continuing Urinary Catheterization Post operative 12/11/2019  3:05 AM   CAUTI Prevention Bundle StatLock in place w 1" slack;Intact seal between catheter & drainage tubing;Drainage bag/urimeter off the floor;Green sheeting clip in use;No dependent loops or kinks;Drainage bag/urimeter not overfilled (<2/3 full);Drainage bag/urimeter below bladder 12/10/2019  7:05 PM   Output (mL) 75 mL 12/10/2019  3:30 PM            Lumbar Drain 12/10/19 1056 (Active)   Drain Status Other (Comment) 12/11/2019  3:05 AM   Level to other (see comment) 12/10/2019  2:32 PM   Dressing Status Clean;Dry;Intact 12/11/2019  3:05 AM   Interventions HOB degrees (see comment) 12/10/2019  2:32 PM   Output (mL) 2 mL 12/11/2019  4:05 AM       Neurosurgery Physical Exam  AOX3  5/5 in BUE except 4/5 in left HI  5/5 in BLE  SILT  Incision c/d/i    Significant Labs:  Recent Labs   Lab 12/11/19  0346 12/12/19  0202   * 135*    136   K 3.5 3.6    108   CO2 20* 23   BUN 12 12   CREATININE 0.7 0.8   CALCIUM 8.1* 8.0*   MG 1.6 1.5*     Recent Labs   Lab 12/11/19  0346 12/12/19  0202   WBC 12.08 10.45   HGB 9.9* 9.9*   HCT 31.6* 31.6*    150 "     Recent Labs   Lab 12/12/19  0202   INR 1.1     Microbiology Results (last 7 days)     Procedure Component Value Units Date/Time    CSF culture [[id number]] Collected:  12/11/19 1247    Order Status:  Completed Specimen:  CSF (Spinal Fluid) from CSF Tap, Tube 3 Updated:  12/12/19 0654     CSF CULTURE No Growth to date     Gram Stain Result Cytospin indicates:      Rare WBC's      No organisms seen    Blood culture [[id number]] Collected:  12/12/19 0147    Order Status:  Sent Specimen:  Blood from Wrist, Right Updated:  12/12/19 0233    Blood culture [[id number]] Collected:  12/12/19 0213    Order Status:  Sent Specimen:  Blood from Peripheral, Antecubital, Right Updated:  12/12/19 0232    AFB Culture & Smear [[id number]] Collected:  12/10/19 1038    Order Status:  Completed Specimen:  Body Fluid from Neck Updated:  12/11/19 2127     AFB Culture & Smear Culture in progress     AFB CULTURE STAIN No acid fast bacilli seen.    Narrative:       1) Free vertebral abscess #1    AFB Culture & Smear [[id number]] Collected:  12/10/19 1038    Order Status:  Completed Specimen:  Body Fluid from Neck Updated:  12/11/19 2127     AFB Culture & Smear Culture in progress     AFB CULTURE STAIN No acid fast bacilli seen.    Narrative:       2) Free vertebral abscess #2    Blood culture [[id number]] Collected:  12/11/19 1000    Order Status:  Completed Specimen:  Blood from Peripheral, Foot, Right Updated:  12/11/19 1715     Blood Culture, Routine No Growth to date    Narrative:       Blood cultures from 2 different sites. 4 bottles total.  Please draw before starting antibiotics.    Blood culture [[id number]] Collected:  12/11/19 1005    Order Status:  Completed Specimen:  Blood from Peripheral, Forearm, Right Updated:  12/11/19 1715     Blood Culture, Routine No Growth to date    Narrative:       Blood cultures x 2 different sites. 4 bottles total. Please  draw cultures before administering antibiotics.    Aerobic culture [[id number]]   (Abnormal) Collected:  12/10/19 1038    Order Status:  Completed Specimen:  Body Fluid from Neck Updated:  12/11/19 1421     Aerobic Bacterial Culture PSEUDOMONAS AERUGINOSA  Rare  Susceptibility pending      Narrative:       1) Free vertebral abscess #1    Aerobic culture [[id number]]  (Abnormal) Collected:  12/10/19 1038    Order Status:  Completed Specimen:  Body Fluid from Neck Updated:  12/11/19 1401     Aerobic Bacterial Culture PSEUDOMONAS AERUGINOSA  Few  Susceptibility pending      Narrative:       2) Free vertebral abscess #2    Gram stain [[id number]] Collected:  12/11/19 1247    Order Status:  Canceled Specimen:  CSF (Spinal Fluid) from CSF Tap, Tube 3     CSF culture [[id number]] Collected:  12/10/19 1158    Order Status:  Completed Specimen:  CSF (Spinal Fluid) from CSF Tap, Tube 1 Updated:  12/11/19 0727     CSF CULTURE No Growth to date     Gram Stain Result No WBC's      No organisms seen    Narrative:       3) 3) CSF for cell count, differential, culture, gramstain,  protein, and glucose    Culture, Anaerobe [[id number]] Collected:  12/10/19 1038    Order Status:  Completed Specimen:  Body Fluid from Neck Updated:  12/11/19 0712     Anaerobic Culture Culture in progress    Narrative:       2) Free vertebral abscess #2    Culture, Anaerobe [[id number]] Collected:  12/10/19 1038    Order Status:  Completed Specimen:  Body Fluid from Neck Updated:  12/11/19 0712     Anaerobic Culture Culture in progress    Narrative:       1) Free vertebral abscess #1    Gram stain [[id number]] Collected:  12/10/19 1038    Order Status:  Completed Specimen:  Body Fluid from Neck Updated:  12/10/19 1245     Gram Stain Result Rare No WBC's      No organisms seen    Narrative:       2) Free vertebral abscess #2    Gram stain [[id number]] Collected:  12/10/19 1038    Order Status:  Completed Specimen:  Body Fluid from Neck Updated:  12/10/19 1241     Gram Stain Result No WBC's      No organisms seen    Narrative:       1) Free  vertebral abscess #1    Fungus culture [[id number]] Collected:  12/10/19 1038    Order Status:  Sent Specimen:  Body Fluid from Neck Updated:  12/10/19 1125    Fungus culture [[id number]] Collected:  12/10/19 1038    Order Status:  Sent Specimen:  Body Fluid from Neck Updated:  12/10/19 1122

## 2019-12-12 NOTE — PROGRESS NOTES
[facility]  Neurosurgery  Progress Note    Subjective:     History of Present Illness: Junaid Muñoz is a 74 y.o. male with PMH of HTN, HLD, Hepatitis C, and CAD s/p two stents on plavix who presents with 1 month history of back pain between his shoulders. MRI at OSH demonstrates likely epidural abscess. Pt denies hx of trauma and reports slow onset of symptoms.     Post-Op Info:  Procedure(s) (LRB):  CORPECTOMY, SPINE, CERVICAL AND THORACIC, C7 and T1 with Globus (N/A)   2 Days Post-Op     Interval History: No acute events overnight.  Exam is stable from yesterday.  Lumbar drain in place with 4 1 cc output.    Medications:  Continuous Infusions:   sodium chloride 0.9% 100 mL/hr at 12/12/19 0701    niCARdipine       Scheduled Meds:   acetaminophen  1,000 mg Oral Q8H    amLODIPine  5 mg Oral Daily    atorvastatin  80 mg Oral Daily    bacitracin   Topical (Top) TID    ceFEPime (MAXIPIME) IVPB  2 g Intravenous Q8H    diazePAM  10 mg Intravenous Q6H    heparin (porcine)  5,000 Units Subcutaneous Q8H    levothyroxine  50 mcg Oral Before breakfast    losartan-hydrochlorothiazide 100-25 mg  1 tablet Oral Daily    magnesium oxide  800 mg Oral Once    metoprolol succinate  100 mg Oral Daily    nicotine  1 patch Transdermal Daily    polyethylene glycol  17 g Oral Daily    senna-docusate 8.6-50 mg  1 tablet Oral BID    vancomycin (VANCOCIN) IVPB  1,000 mg Intravenous Q12H     PRN Meds:Dextrose 10% Bolus, Dextrose 10% Bolus, glucagon (human recombinant), glucose, glucose, labetalol, magnesium oxide, magnesium oxide, morphine, naloxone, ondansetron, oxyCODONE, potassium chloride 10%, potassium chloride 10%, potassium chloride 10%, potassium, sodium phosphates, potassium, sodium phosphates, potassium, sodium phosphates, sodium chloride 0.9%, sodium chloride 0.9%     Review of Systems    Objective:     Weight: 79.4 kg (175 lb)  Body mass index is 25.84 kg/m².  Vital Signs (Most  "Recent):  Temp: 97.9 °F (36.6 °C) (12/12/19 0701)  Pulse: 65 (12/12/19 0701)  Resp: 18 (12/12/19 0701)  BP: (!) 178/77 (12/12/19 0701)  SpO2: 96 % (12/12/19 0701) Vital Signs (24h Range):  Temp:  [97.8 °F (36.6 °C)-98.5 °F (36.9 °C)] 97.9 °F (36.6 °C)  Pulse:  [61-93] 65  Resp:  [13-26] 18  SpO2:  [96 %-98 %] 96 %  BP: (123-204)/() 178/77  Arterial Line BP: (117-185)/() 137/116     Date 12/12/19 0700 - 12/13/19 0659   Shift 8527-9619 7644-6356 2378-5143 24 Hour Total   INTAKE   I.V.(mL/kg) 100(1.3)   100(1.3)   Shift Total(mL/kg) 100(1.3)   100(1.3)   OUTPUT   Urine(mL/kg/hr) 100   100   Drains 3   3   Shift Total(mL/kg) 103(1.3)   103(1.3)   Weight (kg) 79.4 79.4 79.4 79.4                        Closed/Suction Drain 12/10/19 1018 Anterior Neck Accordion 10 Fr. (Active)   Site Description Healing 12/11/2019  3:05 AM   Dressing Type No dressing 12/11/2019  3:05 AM   Drainage Serosanguineous 12/10/2019  6:30 PM   Status Clamped 12/11/2019  3:05 AM   Output (mL) 1500 mL 12/11/2019  6:05 AM            Urethral Catheter 12/10/19 0730 Non-latex 16 Fr. (Active)   Site Assessment Clean;Intact 12/11/2019  3:05 AM   Collection Container Urimeter 12/11/2019  3:05 AM   Securement Method secured to top of thigh w/ adhesive device 12/11/2019  3:05 AM   Catheter Care Performed yes 12/11/2019  3:05 AM   Reason for Continuing Urinary Catheterization Post operative 12/11/2019  3:05 AM   CAUTI Prevention Bundle StatLock in place w 1" slack;Intact seal between catheter & drainage tubing;Drainage bag/urimeter off the floor;Green sheeting clip in use;No dependent loops or kinks;Drainage bag/urimeter not overfilled (<2/3 full);Drainage bag/urimeter below bladder 12/10/2019  7:05 PM   Output (mL) 75 mL 12/10/2019  3:30 PM            Lumbar Drain 12/10/19 1056 (Active)   Drain Status Other (Comment) 12/11/2019  3:05 AM   Level to other (see comment) 12/10/2019  2:32 PM   Dressing Status Clean;Dry;Intact 12/11/2019  3:05 AM "   Interventions HOB degrees (see comment) 12/10/2019  2:32 PM   Output (mL) 2 mL 12/11/2019  4:05 AM       Neurosurgery Physical Exam  AOX3  5/5 in BUE except 4/5 in left HI  5/5 in BLE  SILT  Incision c/d/i    Significant Labs:  Recent Labs   Lab 12/11/19  0346 12/12/19  0202   * 135*    136   K 3.5 3.6    108   CO2 20* 23   BUN 12 12   CREATININE 0.7 0.8   CALCIUM 8.1* 8.0*   MG 1.6 1.5*     Recent Labs   Lab 12/11/19  0346 12/12/19  0202   WBC 12.08 10.45   HGB 9.9* 9.9*   HCT 31.6* 31.6*    150     Recent Labs   Lab 12/12/19  0202   INR 1.1     Microbiology Results (last 7 days)     Procedure Component Value Units Date/Time    CSF culture [[id number]] Collected:  12/11/19 1247    Order Status:  Completed Specimen:  CSF (Spinal Fluid) from CSF Tap, Tube 3 Updated:  12/12/19 0654     CSF CULTURE No Growth to date     Gram Stain Result Cytospin indicates:      Rare WBC's      No organisms seen    Blood culture [[id number]] Collected:  12/12/19 0147    Order Status:  Sent Specimen:  Blood from Wrist, Right Updated:  12/12/19 0233    Blood culture [[id number]] Collected:  12/12/19 0213    Order Status:  Sent Specimen:  Blood from Peripheral, Antecubital, Right Updated:  12/12/19 0232    AFB Culture & Smear [[id number]] Collected:  12/10/19 1038    Order Status:  Completed Specimen:  Body Fluid from Neck Updated:  12/11/19 2127     AFB Culture & Smear Culture in progress     AFB CULTURE STAIN No acid fast bacilli seen.    Narrative:       1) Free vertebral abscess #1    AFB Culture & Smear [[id number]] Collected:  12/10/19 1038    Order Status:  Completed Specimen:  Body Fluid from Neck Updated:  12/11/19 2127     AFB Culture & Smear Culture in progress     AFB CULTURE STAIN No acid fast bacilli seen.    Narrative:       2) Free vertebral abscess #2    Blood culture [[id number]] Collected:  12/11/19 1000    Order Status:  Completed Specimen:  Blood from Peripheral, Foot, Right Updated:  12/11/19  1715     Blood Culture, Routine No Growth to date    Narrative:       Blood cultures from 2 different sites. 4 bottles total.  Please draw before starting antibiotics.    Blood culture [[id number]] Collected:  12/11/19 1005    Order Status:  Completed Specimen:  Blood from Peripheral, Forearm, Right Updated:  12/11/19 1715     Blood Culture, Routine No Growth to date    Narrative:       Blood cultures x 2 different sites. 4 bottles total. Please  draw cultures before administering antibiotics.    Aerobic culture [[id number]]  (Abnormal) Collected:  12/10/19 1038    Order Status:  Completed Specimen:  Body Fluid from Neck Updated:  12/11/19 1421     Aerobic Bacterial Culture PSEUDOMONAS AERUGINOSA  Rare  Susceptibility pending      Narrative:       1) Free vertebral abscess #1    Aerobic culture [[id number]]  (Abnormal) Collected:  12/10/19 1038    Order Status:  Completed Specimen:  Body Fluid from Neck Updated:  12/11/19 1401     Aerobic Bacterial Culture PSEUDOMONAS AERUGINOSA  Few  Susceptibility pending      Narrative:       2) Free vertebral abscess #2    Gram stain [[id number]] Collected:  12/11/19 1247    Order Status:  Canceled Specimen:  CSF (Spinal Fluid) from CSF Tap, Tube 3     CSF culture [[id number]] Collected:  12/10/19 1158    Order Status:  Completed Specimen:  CSF (Spinal Fluid) from CSF Tap, Tube 1 Updated:  12/11/19 0727     CSF CULTURE No Growth to date     Gram Stain Result No WBC's      No organisms seen    Narrative:       3) 3) CSF for cell count, differential, culture, gramstain,  protein, and glucose    Culture, Anaerobe [[id number]] Collected:  12/10/19 1038    Order Status:  Completed Specimen:  Body Fluid from Neck Updated:  12/11/19 0712     Anaerobic Culture Culture in progress    Narrative:       2) Free vertebral abscess #2    Culture, Anaerobe [[id number]] Collected:  12/10/19 1038    Order Status:  Completed Specimen:  Body Fluid from Neck Updated:  12/11/19 0712     Anaerobic Culture  Culture in progress    Narrative:       1) Free vertebral abscess #1    Gram stain [[id number]] Collected:  12/10/19 1038    Order Status:  Completed Specimen:  Body Fluid from Neck Updated:  12/10/19 1245     Gram Stain Result Rare No WBC's      No organisms seen    Narrative:       2) Free vertebral abscess #2    Gram stain [[id number]] Collected:  12/10/19 1038    Order Status:  Completed Specimen:  Body Fluid from Neck Updated:  12/10/19 1241     Gram Stain Result No WBC's      No organisms seen    Narrative:       1) Free vertebral abscess #1    Fungus culture [[id number]] Collected:  12/10/19 1038    Order Status:  Sent Specimen:  Body Fluid from Neck Updated:  12/10/19 1125    Fungus culture [[id number]] Collected:  12/10/19 1038    Order Status:  Sent Specimen:  Body Fluid from Neck Updated:  12/10/19 1122              Assessment/Plan:     * Spinal epidural abscess  74 y.o. male with PMH of HTN, HLD, Hepatitis C, and CAD s/p two stents on plavix who presents with C6-T2 osteomyelitis with suspected epidural abscess.  Now s/p C7/T1 corpectomies.    Continue ICU care  q 1 hr neuro checks  LD open to drain 10cc/hr, no minimum drainage  Clamp HV drain for 3 days total   HOB flat for 3 days  Planning for posterior cervical instrumentation next week  Fu cultures, send CSF daily:  CSF no growth today   brace when upright  SQH  Further care per NCC        Isreal Hawley MD  Neurosurgery  [facility]

## 2019-12-12 NOTE — HOSPITAL COURSE
12/6: [place] [facility] NSGY consulted. MRI cervical spine w/wo contrast ordered for evaluation of suspected epidural abscess. CT C/T spine ordered for further evaluation of bony anatomy. Hospital medicine consulted.   12/7: Admit to HM service. ID consulted for antibiotic recommendations. BCx and toxicology ordered. Home meds restarted.   12/8: No acute events overnight. NSGY not recommending surgery. IR consulted for bone biopsy of lesion. Plan for IR bone biopsy/abscess aspiration tomorrow; pending results, will consult ID for long-term antibiotic management.  12/9: No acute events overnight. IR cancelled biopsy today. NSGY will perform corpectomy tomorrow. NPO at midnight. Cervical traction in place.   12/10: OR for C7-T1 corpectomy and C6-T2 anterior fusion with lumbar drain placed 2/2 CSF leak intra-op. Patient tolerated procedure well. Recovered in the neuro ICU.   12/12:  No significant events over night, per nsgy will keep lumbar drain another day. 2nd stage of sugery to occur next Tuesday. Remains NPO while having to lie flat with  lumbar drain. Lumbar drain in place with 41 cc output. Getting confused after receiving valium decreased dose.  12/13: Restless o/n, c/o back pain. LD dropped to floor by NSGY, 8-10cc/hr. HV drain in place as well. Mild bloody drainage from base of drain site. Lumbar drain with slow flow. Will drop drain to ground level to encourage flow with maximum 10cc/hr and no minimum. Will attempt to resend CSF today. Neurologically stable. AF, no leukocytosis, H/H stable.   12/14: calm at present, slight agitation overnight, can elevate head for swallow trials and po today,pending picc placement for long term abx  12/15: less responsive this am, wean scheduled ativan, begin to advance diet and taper ivfs  12/16: NAEO. Stage II surgery tomorrow for posterior cervical fusion. Leukocytosis increasing, is on cefepime for pseudomonas in CSF, will reach out to ID to see if any  further abx pre-op should be given. Lumbar drain in place with minimal output. Increase normal saline to 100 cc/hr. NPO after midnight.   12/17: OR today for stage II posterior cervical instrumentation. Required 3 units PRBCs and 2 units FFP. 2 hemovacs in place to gravity with large output. Will repeat CBC. Patient still intubated on arrival to ICU with cervical and facial edema, will start decadron. ID recommended repeating blood cultures as patient's WBC increasing. Continue cefepime.   12/18: POD 1 C2-T3 posterior instrumentation. Patient is still intubated, will wean to extubate. Facial and cervical swelling greatly improved. Lumbar drain in place. Hemovac x 2. Hemodynamically stable.  12/19: subQ heparin,d/c howell, NSGY brace for neck, DAPT, HOB restrictions    12/20: HOB clarified with NSGY keep flat, neuro status drowsy, labetolol 2x overnight, hydralazine not effective, respiratory, weaning paramaters on SIMV, wean to extubate, d/c protime INR   12/21: Output is 16 cc and 6cc from HVs, 31cc from LD. Lumbar drain and hemovac pulled today without complication, no more HOB restrictions. Will attempt to wean ventilator and fentanyl. Scheduled oxycodone 5mg q 4h for fentanyl wean.   12/22: Continues to fail weaning trials. Off fentanyl. D/C scheduled valium. Oxycodone PRN. Weaning parameters daily.   12/23: Failed multiple spontaneous trials, attempts to wean ETT, hyponatremia-start salt tabs 1 gm q8h  12/24: no events, attempting SBT today, Na+ increased, NS weaned  12/25: NAEON. Extubated yesterday. Continue to monitor for 24 hours before step down.   12/26: NAEON. Stable for step down to NSGY.  12/27: PT/OT ordered for dispo recs. Diet advanced to dysphagia with thin liquids per ST. Na 138. Decrease Na tabs from TID to BID. Decrease frequency of labs to q72 hours. ESR and CRP ordered for tomorrow. Albumin 2.6. Jose ordered BID to promote wound healing. Alk phos 336 today, was 302. AST/ALT WNL. Will continue  to monitor closely.   12/28 - 12/29: NAEON. Exam stable. Pt pending SNF.  12/30: NAEON. Pain well controlled. Na 140. Salt tabs decreased to 2g daily. Wound care consulted for toe on left foot. Recs pending. Albumin and H&H slowly improving. Pending SNF. Stable for discharge.   12/31: NAEON. Pain remains controlled. Podiatry recommending wound care for left toe. Denies weakness, paresthesias, b/b dysfunction. Tolerating PO. Staples removed from posterior incision today. Pending SNF, medically stable for discharge.   1/1: NAEON. Pending SNF.  1/2: NAEON. LFTs trending up. HM consulted regarding management and work up. Recommend d/c high dose statin, Hep C antibody and viral load, INR, PTT, and ammonia pending. Appreciate their assistance. Pain well controlled. Denies weakness, paresthesias, or bowel/bladder issues.   1/3: NAEON. LFTs improving after d/c statin. Spoke with hepatology, which recommend outpatient follow up, they will schedule the patient. INR/PTT/ammonia WNL. Denies weakness, paresthesias, or bowel/bladder issues. Medically stable for discharge pending SNF  1/4: had fall overnight without brace. XR C spine showed no acute changes. Patient without complaints this morning.  1/5: naeon. Pt without complaint  1/6: NAEON. Patient resting in bed without complaints. Sat up to edge of bed with minimal assistance with therapy on assessment today. Pain controlled. Strength exam stable. Pending placement, medically stable for discharge.  1/7: NAEON. LFTs remain elevated.  Hospital Medicine recommending outpatient follow-up with hepatology.  No further inpatient workup required, patient is medically stable for discharge to SNF.  He has an appointment scheduled 1/29 for hepatology follow-up.  He has been compliant with his  brace.  He denies any pain at this time. BM yesterday, voiding appropriately.  1/8: NAEON. LFT's stable. Anterior and posterior incisions remain well healed. Pending SNF. Stable for discharge.    1/9: OLVIN. LFTs stable. Plt decreasing throughout admission, 93k today. Ranges 108-186k throughout admission, and previously down to 86k per chart review in 2017. Will monitor closely with CBC daily. No active signs of bleeding. Denies back pain. Patient found without  brace sitting upright in bed when I entered the room, states it is too painful to wear. He was agreeable to the  brace after adjustments made. Denies weakness, paresthesias, b/b dysfunction. Tolerating PO. Demonstrates good and independent bed mobility on assessment, medically stable for discharge to SNF.   1/10: OLVIN. LFTs stable. 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 Exam stable. Patient sitting up in chair on exam, tolerated walking in halls with therapy well however demonstrated gait imbalance and required minimum assistance.  brace on. Denies pain, weakness, paresthesias, b/b dysfunction.   1/11: OLVIN. Plan for discharge today w/home abx. Discharge pending delivery of medications to bedside.

## 2019-12-13 LAB
ALBUMIN SERPL BCP-MCNC: 2.3 G/DL (ref 3.5–5.2)
ALLENS TEST: ABNORMAL
ALP SERPL-CCNC: 262 U/L (ref 55–135)
ALT SERPL W/O P-5'-P-CCNC: 18 U/L (ref 10–44)
ANION GAP SERPL CALC-SCNC: 7 MMOL/L (ref 8–16)
AST SERPL-CCNC: 28 U/L (ref 10–40)
BACTERIA CSF CULT: ABNORMAL
BACTERIA SPEC AEROBE CULT: ABNORMAL
BASOPHILS # BLD AUTO: 0.05 K/UL (ref 0–0.2)
BASOPHILS NFR BLD: 0.6 % (ref 0–1.9)
BILIRUB SERPL-MCNC: 0.5 MG/DL (ref 0.1–1)
BUN SERPL-MCNC: 11 MG/DL (ref 8–23)
CALCIUM SERPL-MCNC: 8.7 MG/DL (ref 8.7–10.5)
CHLORIDE SERPL-SCNC: 107 MMOL/L (ref 95–110)
CO2 SERPL-SCNC: 20 MMOL/L (ref 23–29)
CREAT SERPL-MCNC: 0.8 MG/DL (ref 0.5–1.4)
DELSYS: ABNORMAL
DIFFERENTIAL METHOD: ABNORMAL
EOSINOPHIL # BLD AUTO: 0.3 K/UL (ref 0–0.5)
EOSINOPHIL NFR BLD: 3.8 % (ref 0–8)
ERYTHROCYTE [DISTWIDTH] IN BLOOD BY AUTOMATED COUNT: 16.7 % (ref 11.5–14.5)
ERYTHROCYTE [SEDIMENTATION RATE] IN BLOOD BY WESTERGREN METHOD: 14 MM/H
EST. GFR  (AFRICAN AMERICAN): >60 ML/MIN/1.73 M^2
EST. GFR  (NON AFRICAN AMERICAN): >60 ML/MIN/1.73 M^2
FIO2: 21
GLUCOSE SERPL-MCNC: 107 MG/DL (ref 70–110)
GRAM STN SPEC: ABNORMAL
GRAM STN SPEC: ABNORMAL
HCO3 UR-SCNC: 23.9 MMOL/L (ref 24–28)
HCT VFR BLD AUTO: 36.4 % (ref 40–54)
HGB BLD-MCNC: 10.9 G/DL (ref 14–18)
IMM GRANULOCYTES # BLD AUTO: 0.03 K/UL (ref 0–0.04)
IMM GRANULOCYTES NFR BLD AUTO: 0.4 % (ref 0–0.5)
INR PPP: 1.1 (ref 0.8–1.2)
LYMPHOCYTES # BLD AUTO: 1 K/UL (ref 1–4.8)
LYMPHOCYTES NFR BLD: 12 % (ref 18–48)
MAGNESIUM SERPL-MCNC: 1.7 MG/DL (ref 1.6–2.6)
MAGNESIUM SERPL-MCNC: 2 MG/DL (ref 1.6–2.6)
MCH RBC QN AUTO: 25.8 PG (ref 27–31)
MCHC RBC AUTO-ENTMCNC: 29.9 G/DL (ref 32–36)
MCV RBC AUTO: 86 FL (ref 82–98)
MODE: ABNORMAL
MONOCYTES # BLD AUTO: 0.6 K/UL (ref 0.3–1)
MONOCYTES NFR BLD: 6.8 % (ref 4–15)
NEUTROPHILS # BLD AUTO: 6.2 K/UL (ref 1.8–7.7)
NEUTROPHILS NFR BLD: 76.4 % (ref 38–73)
NRBC BLD-RTO: 0 /100 WBC
PCO2 BLDA: 36.7 MMHG (ref 35–45)
PH SMN: 7.42 [PH] (ref 7.35–7.45)
PHOSPHATE SERPL-MCNC: 2.7 MG/DL (ref 2.7–4.5)
PLATELET # BLD AUTO: 139 K/UL (ref 150–350)
PMV BLD AUTO: 9.7 FL (ref 9.2–12.9)
PO2 BLDA: 46 MMHG (ref 40–60)
POC BE: -1 MMOL/L
POC SATURATED O2: 83 % (ref 95–100)
POC TCO2: 25 MMOL/L (ref 24–29)
POTASSIUM SERPL-SCNC: 3.5 MMOL/L (ref 3.5–5.1)
POTASSIUM SERPL-SCNC: 4.1 MMOL/L (ref 3.5–5.1)
PROT SERPL-MCNC: 6.1 G/DL (ref 6–8.4)
PROTHROMBIN TIME: 11 SEC (ref 9–12.5)
RBC # BLD AUTO: 4.23 M/UL (ref 4.6–6.2)
SAMPLE: ABNORMAL
SITE: ABNORMAL
SODIUM SERPL-SCNC: 134 MMOL/L (ref 136–145)
SP02: 99
WBC # BLD AUTO: 8.08 K/UL (ref 3.9–12.7)

## 2019-12-13 PROCEDURE — 99900035 HC TECH TIME PER 15 MIN (STAT)

## 2019-12-13 PROCEDURE — 63600175 PHARM REV CODE 636 W HCPCS: Performed by: STUDENT IN AN ORGANIZED HEALTH CARE EDUCATION/TRAINING PROGRAM

## 2019-12-13 PROCEDURE — 85025 COMPLETE CBC W/AUTO DIFF WBC: CPT

## 2019-12-13 PROCEDURE — 25000003 PHARM REV CODE 250: Performed by: STUDENT IN AN ORGANIZED HEALTH CARE EDUCATION/TRAINING PROGRAM

## 2019-12-13 PROCEDURE — 84100 ASSAY OF PHOSPHORUS: CPT

## 2019-12-13 PROCEDURE — S4991 NICOTINE PATCH NONLEGEND: HCPCS | Performed by: STUDENT IN AN ORGANIZED HEALTH CARE EDUCATION/TRAINING PROGRAM

## 2019-12-13 PROCEDURE — 99233 PR SUBSEQUENT HOSPITAL CARE,LEVL III: ICD-10-PCS | Mod: ,,, | Performed by: PHYSICIAN ASSISTANT

## 2019-12-13 PROCEDURE — 83735 ASSAY OF MAGNESIUM: CPT | Mod: 91

## 2019-12-13 PROCEDURE — 63600175 PHARM REV CODE 636 W HCPCS: Performed by: NURSE PRACTITIONER

## 2019-12-13 PROCEDURE — 25000003 PHARM REV CODE 250: Performed by: ANESTHESIOLOGY

## 2019-12-13 PROCEDURE — 63600175 PHARM REV CODE 636 W HCPCS: Performed by: ANESTHESIOLOGY

## 2019-12-13 PROCEDURE — 20000000 HC ICU ROOM

## 2019-12-13 PROCEDURE — 63600175 PHARM REV CODE 636 W HCPCS: Performed by: PHYSICIAN ASSISTANT

## 2019-12-13 PROCEDURE — 94761 N-INVAS EAR/PLS OXIMETRY MLT: CPT

## 2019-12-13 PROCEDURE — 83735 ASSAY OF MAGNESIUM: CPT

## 2019-12-13 PROCEDURE — 99232 SBSQ HOSP IP/OBS MODERATE 35: CPT | Mod: GC,,, | Performed by: ANESTHESIOLOGY

## 2019-12-13 PROCEDURE — 82803 BLOOD GASES ANY COMBINATION: CPT

## 2019-12-13 PROCEDURE — 84132 ASSAY OF SERUM POTASSIUM: CPT

## 2019-12-13 PROCEDURE — 99233 SBSQ HOSP IP/OBS HIGH 50: CPT | Mod: ,,, | Performed by: PHYSICIAN ASSISTANT

## 2019-12-13 PROCEDURE — 85610 PROTHROMBIN TIME: CPT

## 2019-12-13 PROCEDURE — 25000003 PHARM REV CODE 250: Performed by: PHYSICIAN ASSISTANT

## 2019-12-13 PROCEDURE — 99232 PR SUBSEQUENT HOSPITAL CARE,LEVL II: ICD-10-PCS | Mod: GC,,, | Performed by: ANESTHESIOLOGY

## 2019-12-13 PROCEDURE — 80053 COMPREHEN METABOLIC PANEL: CPT

## 2019-12-13 RX ADMIN — ATORVASTATIN CALCIUM 80 MG: 20 TABLET, FILM COATED ORAL at 08:12

## 2019-12-13 RX ADMIN — ACETAMINOPHEN 1000 MG: 500 TABLET ORAL at 02:12

## 2019-12-13 RX ADMIN — DIAZEPAM 5 MG: 5 INJECTION, SOLUTION INTRAMUSCULAR; INTRAVENOUS at 12:12

## 2019-12-13 RX ADMIN — SENNOSIDES AND DOCUSATE SODIUM 1 TABLET: 8.6; 5 TABLET ORAL at 08:12

## 2019-12-13 RX ADMIN — LEVOTHYROXINE SODIUM 50 MCG: 50 TABLET ORAL at 05:12

## 2019-12-13 RX ADMIN — POLYETHYLENE GLYCOL 3350 17 G: 17 POWDER, FOR SOLUTION ORAL at 08:12

## 2019-12-13 RX ADMIN — Medication: at 08:12

## 2019-12-13 RX ADMIN — LABETALOL HCL IV SOLN PREFILLED SYRINGE 20 MG/4ML (5 MG/ML) 10 MG: 20/4 SOLUTION PREFILLED SYRINGE at 08:12

## 2019-12-13 RX ADMIN — Medication: at 02:12

## 2019-12-13 RX ADMIN — POTASSIUM CHLORIDE 40 MEQ: 20 SOLUTION ORAL at 05:12

## 2019-12-13 RX ADMIN — LABETALOL HCL IV SOLN PREFILLED SYRINGE 20 MG/4ML (5 MG/ML) 10 MG: 20/4 SOLUTION PREFILLED SYRINGE at 09:12

## 2019-12-13 RX ADMIN — LOSARTAN POTASSIUM AND HYDROCHLOROTHIAZIDE 1 TABLET: 100; 25 TABLET, FILM COATED ORAL at 08:12

## 2019-12-13 RX ADMIN — LABETALOL HCL IV SOLN PREFILLED SYRINGE 20 MG/4ML (5 MG/ML) 10 MG: 20/4 SOLUTION PREFILLED SYRINGE at 05:12

## 2019-12-13 RX ADMIN — DIAZEPAM 5 MG: 5 INJECTION, SOLUTION INTRAMUSCULAR; INTRAVENOUS at 05:12

## 2019-12-13 RX ADMIN — OXYCODONE HYDROCHLORIDE 10 MG: 5 TABLET ORAL at 04:12

## 2019-12-13 RX ADMIN — METOPROLOL SUCCINATE 100 MG: 100 TABLET, EXTENDED RELEASE ORAL at 08:12

## 2019-12-13 RX ADMIN — ACETAMINOPHEN 1000 MG: 500 TABLET ORAL at 11:12

## 2019-12-13 RX ADMIN — Medication 800 MG: at 05:12

## 2019-12-13 RX ADMIN — CEFEPIME 2 G: 2 INJECTION, POWDER, FOR SOLUTION INTRAVENOUS at 05:12

## 2019-12-13 RX ADMIN — CEFEPIME 2 G: 2 INJECTION, POWDER, FOR SOLUTION INTRAVENOUS at 10:12

## 2019-12-13 RX ADMIN — NICOTINE 1 PATCH: 21 PATCH, EXTENDED RELEASE TRANSDERMAL at 08:12

## 2019-12-13 RX ADMIN — AMLODIPINE BESYLATE 5 MG: 5 TABLET ORAL at 08:12

## 2019-12-13 RX ADMIN — VANCOMYCIN HYDROCHLORIDE 750 MG: 750 INJECTION, POWDER, LYOPHILIZED, FOR SOLUTION INTRAVENOUS at 03:12

## 2019-12-13 RX ADMIN — SODIUM CHLORIDE: 0.9 INJECTION, SOLUTION INTRAVENOUS at 12:12

## 2019-12-13 RX ADMIN — ACETAMINOPHEN 1000 MG: 500 TABLET ORAL at 05:12

## 2019-12-13 RX ADMIN — MORPHINE SULFATE 1 MG: 2 INJECTION, SOLUTION INTRAMUSCULAR; INTRAVENOUS at 08:12

## 2019-12-13 RX ADMIN — DIAZEPAM 5 MG: 5 INJECTION, SOLUTION INTRAMUSCULAR; INTRAVENOUS at 11:12

## 2019-12-13 RX ADMIN — Medication 800 MG: at 08:12

## 2019-12-13 RX ADMIN — OXYCODONE HYDROCHLORIDE 10 MG: 5 TABLET ORAL at 10:12

## 2019-12-13 RX ADMIN — CEFEPIME 2 G: 2 INJECTION, POWDER, FOR SOLUTION INTRAVENOUS at 01:12

## 2019-12-13 NOTE — SUBJECTIVE & OBJECTIVE
Interval History: Mild bloody drainage from base of drain site. Lumbar drain with slow flow, 43cc output in last 24hr. Will drop drain to ground level to encourage flow with maximum 10cc/hr and no minimum. Will attempt to resend CSF today.    Medications:  Continuous Infusions:   sodium chloride 0.9% 100 mL/hr at 12/13/19 1001     Scheduled Meds:   acetaminophen  1,000 mg Oral Q8H    amLODIPine  5 mg Oral Daily    atorvastatin  80 mg Oral Daily    bacitracin   Topical (Top) TID    ceFEPime (MAXIPIME) IVPB  2 g Intravenous Q8H    diazePAM  5 mg Intravenous Q6H    levothyroxine  50 mcg Oral Before breakfast    losartan-hydrochlorothiazide 100-25 mg  1 tablet Oral Daily    magnesium oxide  800 mg Oral Once    metoprolol succinate  100 mg Oral Daily    nicotine  1 patch Transdermal Daily    polyethylene glycol  17 g Oral Daily    senna-docusate 8.6-50 mg  1 tablet Oral BID    vancomycin (VANCOCIN) IVPB  750 mg Intravenous Q12H     PRN Meds:Dextrose 10% Bolus, Dextrose 10% Bolus, glucagon (human recombinant), glucose, glucose, labetalol, magnesium oxide, magnesium oxide, morphine, naloxone, ondansetron, oxyCODONE, potassium chloride 10%, potassium chloride 10%, potassium chloride 10%, potassium, sodium phosphates, potassium, sodium phosphates, potassium, sodium phosphates, sodium chloride 0.9%, sodium chloride 0.9%     Review of Systems    Objective:     Weight: 79.4 kg (175 lb)  Body mass index is 25.84 kg/m².  Vital Signs (Most Recent):  Temp: 97 °F (36.1 °C) (12/13/19 0701)  Pulse: 70 (12/13/19 1001)  Resp: 17 (12/13/19 1001)  BP: (!) 170/78 (12/13/19 1001)  SpO2: 100 % (12/13/19 1001) Vital Signs (24h Range):  Temp:  [97 °F (36.1 °C)-97.3 °F (36.3 °C)] 97 °F (36.1 °C)  Pulse:  [63-97] 70  Resp:  [12-28] 17  SpO2:  [96 %-100 %] 100 %  BP: (140-201)/() 170/78  Arterial Line BP: (110-145)/(80-94) 145/94     Date 12/13/19 0700 - 12/14/19 0659   Shift 8382-6880 3479-7287 1972-3095 24 Hour Total  "  INTAKE   I.V.(mL/kg) 423.3(5.3)   423.3(5.3)   Shift Total(mL/kg) 423.3(5.3)   423.3(5.3)   OUTPUT   Urine(mL/kg/hr) 350   350   Drains 23   23   Shift Total(mL/kg) 373(4.7)   373(4.7)   Weight (kg) 79.4 79.4 79.4 79.4                        Closed/Suction Drain 12/10/19 1018 Anterior Neck Accordion 10 Fr. (Active)   Site Description Healing 12/11/2019  3:05 AM   Dressing Type No dressing 12/11/2019  3:05 AM   Drainage Serosanguineous 12/10/2019  6:30 PM   Status Clamped 12/11/2019  3:05 AM   Output (mL) 1500 mL 12/11/2019  6:05 AM            Urethral Catheter 12/10/19 0730 Non-latex 16 Fr. (Active)   Site Assessment Clean;Intact 12/11/2019  3:05 AM   Collection Container Urimeter 12/11/2019  3:05 AM   Securement Method secured to top of thigh w/ adhesive device 12/11/2019  3:05 AM   Catheter Care Performed yes 12/11/2019  3:05 AM   Reason for Continuing Urinary Catheterization Post operative 12/11/2019  3:05 AM   CAUTI Prevention Bundle StatLock in place w 1" slack;Intact seal between catheter & drainage tubing;Drainage bag/urimeter off the floor;Green sheeting clip in use;No dependent loops or kinks;Drainage bag/urimeter not overfilled (<2/3 full);Drainage bag/urimeter below bladder 12/10/2019  7:05 PM   Output (mL) 75 mL 12/10/2019  3:30 PM            Lumbar Drain 12/10/19 1056 (Active)   Drain Status Other (Comment) 12/11/2019  3:05 AM   Level to other (see comment) 12/10/2019  2:32 PM   Dressing Status Clean;Dry;Intact 12/11/2019  3:05 AM   Interventions HOB degrees (see comment) 12/10/2019  2:32 PM   Output (mL) 2 mL 12/11/2019  4:05 AM       Neurosurgery Physical Exam  AOX3  5/5 in BUE except 4/5 in left HI  5/5 in BLE  SILT  Incision c/d/i    Significant Labs:  Recent Labs   Lab 12/12/19  0202  12/12/19  1723 12/13/19  0138 12/13/19  0807   *  --   --  107  --      --   --  134*  --    K 3.6   < > 3.6 3.5 4.1     --   --  107  --    CO2 23  --   --  20*  --    BUN 12  --   --  11  --  "   CREATININE 0.8  --   --  0.8  --    CALCIUM 8.0*  --   --  8.7  --    MG 1.5*  --  1.6 1.7  --     < > = values in this interval not displayed.     Recent Labs   Lab 12/12/19 0202 12/13/19 0138   WBC 10.45 8.08   HGB 9.9* 10.9*   HCT 31.6* 36.4*    139*     Recent Labs   Lab 12/12/19 0202 12/13/19 0138   INR 1.1 1.1     Microbiology Results (last 7 days)     Procedure Component Value Units Date/Time    CSF culture [[id number]] Collected:  12/11/19 1247    Order Status:  Completed Specimen:  CSF (Spinal Fluid) from CSF Tap, Tube 3 Updated:  12/13/19 0749     CSF CULTURE No Growth to date     Gram Stain Result Cytospin indicates:      Rare WBC's      No organisms seen    CSF culture [[id number]]  (Abnormal) Collected:  12/10/19 1158    Order Status:  Completed Specimen:  CSF (Spinal Fluid) from CSF Tap, Tube 1 Updated:  12/13/19 0746     CSF CULTURE Culture has Staphylococcus.      Results called to and read back by:  Leonardo Cook RN 12/12/2019  10:36      STAPHYLOCOCCUS EPIDERMIDIS  Susceptibility pending       Gram Stain Result No WBC's      No organisms seen    Narrative:       3) 3) CSF for cell count, differential, culture, gramstain,  protein, and glucose    Blood culture [[id number]] Collected:  12/12/19 0147    Order Status:  Completed Specimen:  Blood from Wrist, Right Updated:  12/13/19 0613     Blood Culture, Routine No Growth to date      No Growth to date    Narrative:       Blood cultures from 2 different sites. 4 bottles total.  Please draw before starting antibiotics.    Blood culture [[id number]] Collected:  12/12/19 0213    Order Status:  Completed Specimen:  Blood from Peripheral, Antecubital, Right Updated:  12/13/19 0613     Blood Culture, Routine No Growth to date      No Growth to date    Narrative:       Blood cultures x 2 different sites. 4 bottles total. Please  draw cultures before administering antibiotics.    Blood culture [[id number]] Collected:  12/11/19 1005    Order Status:   Completed Specimen:  Blood from Peripheral, Forearm, Right Updated:  12/12/19 1212     Blood Culture, Routine No Growth to date      No Growth to date    Narrative:       Blood cultures x 2 different sites. 4 bottles total. Please  draw cultures before administering antibiotics.    Blood culture [[id number]] Collected:  12/11/19 1000    Order Status:  Completed Specimen:  Blood from Peripheral, Foot, Right Updated:  12/12/19 1212     Blood Culture, Routine No Growth to date      No Growth to date    Narrative:       Blood cultures from 2 different sites. 4 bottles total.  Please draw before starting antibiotics.    Aerobic culture [[id number]]  (Abnormal) Collected:  12/10/19 1038    Order Status:  Completed Specimen:  Body Fluid from Neck Updated:  12/12/19 1151     Aerobic Bacterial Culture PSEUDOMONAS AERUGINOSA  Rare  Susceptibility pending      Narrative:       1) Free vertebral abscess #1    Aerobic culture [[id number]]  (Abnormal)  (Susceptibility) Collected:  12/10/19 1038    Order Status:  Completed Specimen:  Body Fluid from Neck Updated:  12/12/19 1132     Aerobic Bacterial Culture PSEUDOMONAS AERUGINOSA  Few      Narrative:       2) Free vertebral abscess #2    Gram stain [[id number]]     Order Status:  Canceled Specimen:  CSF (Spinal Fluid) from CSF Tap, Tube 3     CSF culture [[id number]]     Order Status:  Canceled Specimen:  CSF (Spinal Fluid) from CSF Tap, Tube 3     Culture, Anaerobe [[id number]] Collected:  12/10/19 1038    Order Status:  Completed Specimen:  Body Fluid from Neck Updated:  12/12/19 0856     Anaerobic Culture Culture in progress    Narrative:       2) Free vertebral abscess #2    Culture, Anaerobe [[id number]] Collected:  12/10/19 1038    Order Status:  Completed Specimen:  Body Fluid from Neck Updated:  12/12/19 0855     Anaerobic Culture Culture in progress    Narrative:       1) Free vertebral abscess #1    AFB Culture & Smear [[id number]] Collected:  12/10/19 1038    Order Status:   Completed Specimen:  Body Fluid from Neck Updated:  12/11/19 2127     AFB Culture & Smear Culture in progress     AFB CULTURE STAIN No acid fast bacilli seen.    Narrative:       1) Free vertebral abscess #1    AFB Culture & Smear [[id number]] Collected:  12/10/19 1038    Order Status:  Completed Specimen:  Body Fluid from Neck Updated:  12/11/19 2127     AFB Culture & Smear Culture in progress     AFB CULTURE STAIN No acid fast bacilli seen.    Narrative:       2) Free vertebral abscess #2    Gram stain [[id number]] Collected:  12/11/19 1247    Order Status:  Canceled Specimen:  CSF (Spinal Fluid) from CSF Tap, Tube 3     Gram stain [[id number]] Collected:  12/10/19 1038    Order Status:  Completed Specimen:  Body Fluid from Neck Updated:  12/10/19 1245     Gram Stain Result Rare No WBC's      No organisms seen    Narrative:       2) Free vertebral abscess #2    Gram stain [[id number]] Collected:  12/10/19 1038    Order Status:  Completed Specimen:  Body Fluid from Neck Updated:  12/10/19 1241     Gram Stain Result No WBC's      No organisms seen    Narrative:       1) Free vertebral abscess #1    Fungus culture [[id number]] Collected:  12/10/19 1038    Order Status:  Sent Specimen:  Body Fluid from Neck Updated:  12/10/19 1125    Fungus culture [[id number]] Collected:  12/10/19 1038    Order Status:  Sent Specimen:  Body Fluid from Neck Updated:  12/10/19 1122

## 2019-12-13 NOTE — SUBJECTIVE & OBJECTIVE
Interval History:  Restless o/n, c/o back pain. LD dropped to floor by NSGY, 8-10cc/hr. HV drain in place as well to suction. Neurologically stable. AF, no leukocytosis, H/H stable.     Review of Systems   Constitutional: Negative for chills and fever.   Respiratory: Negative for chest tightness and shortness of breath.    Cardiovascular: Negative for chest pain.   Gastrointestinal: Negative for abdominal pain.   Musculoskeletal: Positive for back pain and neck pain.   Psychiatric/Behavioral: Positive for confusion.     Objective:     Vitals:  Temp: 97 °F (36.1 °C)  Pulse: 66  Rhythm: normal sinus rhythm  BP: (!) 147/68  MAP (mmHg): 98  Resp: 17  SpO2: 99 %  O2 Device (Oxygen Therapy): room air    Temp  Min: 97 °F (36.1 °C)  Max: 97.3 °F (36.3 °C)  Pulse  Min: 63  Max: 97  BP  Min: 140/80  Max: 201/89  MAP (mmHg)  Min: 98  Max: 144  Resp  Min: 12  Max: 28  SpO2  Min: 96 %  Max: 99 %    12/12 0701 - 12/13 0700  In: 3336.7 [P.O.:250; I.V.:2586.7]  Out: 2246 [Urine:2195; Drains:51]   Unmeasured Output  Urine Occurrence: 1  Stool Occurrence: 1       Physical Exam  Constitutional: Well-nourished and -developed. No apparent distress.   Eyes: Conjunctiva clear, anicteric. Lids no lesions.  Head/Ears/Nose/Mouth/Throat/Neck: Moist mucous membranes. External ears, nose atraumatic.   Cardiovascular: Regular rhythm. No murmurs. No leg edema.  Respiratory: Comfortable respirations. Clear to auscultation.  Gastrointestinal: No hernia. Soft, nondistended, nontender. + bowel sounds.     Neurologic:  -GCS [id number]  -opens eyes to voice Oriented to person, place disoriented to time. Speech fluent. Follows commands.  -Cranial nerves II-XII grossly intact PERRL 3+ + cough, gag, corneals  -Motor OSHEA spon.  -Sensation intact       Medications:  Continuous  sodium chloride 0.9% Last Rate: 100 mL/hr at 12/13/19 0901   Scheduled  acetaminophen 1,000 mg Q8H   amLODIPine 5 mg Daily   atorvastatin 80 mg Daily   bacitracin  TID   ceFEPime  (MAXIPIME) IVPB 2 g Q8H   diazePAM 5 mg Q6H   levothyroxine 50 mcg Before breakfast   losartan-hydrochlorothiazide 100-25 mg 1 tablet Daily   magnesium oxide 800 mg Once   metoprolol succinate 100 mg Daily   nicotine 1 patch Daily   polyethylene glycol 17 g Daily   senna-docusate 8.6-50 mg 1 tablet BID   vancomycin (VANCOCIN) IVPB 750 mg Q12H   PRN  Dextrose 10% Bolus 12.5 g PRN   Dextrose 10% Bolus 25 g PRN   glucagon (human recombinant) 1 mg PRN   glucose 16 g PRN   glucose 24 g PRN   labetalol 10 mg Q4H PRN   magnesium oxide 800 mg PRN   magnesium oxide 800 mg PRN   morphine 1 mg Q6H PRN   naloxone 0.4 mg PRN   ondansetron 8 mg Q8H PRN   oxyCODONE 10 mg Q6H PRN   potassium chloride 10% 40 mEq PRN   potassium chloride 10% 40 mEq PRN   potassium chloride 10% 40 mEq PRN   potassium, sodium phosphates 2 packet PRN   potassium, sodium phosphates 2 packet PRN   potassium, sodium phosphates 2 packet PRN   sodium chloride 0.9% 10 mL PRN   sodium chloride 0.9% 10 mL PRN       Diet  Diet NPO  Diet NPO

## 2019-12-13 NOTE — ASSESSMENT & PLAN NOTE
74 y.o. male with PMH of HTN, HLD, Hepatitis C, and CAD s/p two stents on plavix who presents with C6-T2 osteomyelitis with suspected epidural abscess.  Now s/p C7/T1 corpectomies.    - Continue ICU care  - q1hr neuro checks  - LD open to drain maximum 10cc/hr, no minimum drainage  - HV drain clamped with mild bloody drainage around insertion site. Will monitor.  - HOB flat through today  - Planning for posterior cervical instrumentation next week  - Fu cultures, send CSF daily:  CSF no growth today  -  brace when upright  - SQH  - Further care per NCC

## 2019-12-13 NOTE — SUBJECTIVE & OBJECTIVE
Interval History:   Pt w/o complaints.  NAEON.  Afebrile w/o white count.  CSF cultures +Staph epi, Repeat CSF cultures NGTD.  Surgical cultures +pseudomonas at 2 sites.On vancomycin and cefepime      Review of Systems   Constitutional: Negative for chills, diaphoresis, fatigue and fever.   HENT: Negative for congestion, sore throat and trouble swallowing.    Eyes: Negative for photophobia.   Respiratory: Negative for cough and shortness of breath.    Cardiovascular: Negative for chest pain and palpitations.   Gastrointestinal: Negative for abdominal pain, nausea and vomiting.   Genitourinary: Negative for decreased urine volume, difficulty urinating and dysuria.   Musculoskeletal: Positive for back pain. Negative for arthralgias, joint swelling, myalgias, neck pain and neck stiffness.   Neurological: Negative for headaches.     Objective:     Vital Signs (Most Recent):  Temp: 97 °F (36.1 °C) (12/13/19 1101)  Pulse: 68 (12/13/19 1201)  Resp: 17 (12/13/19 1201)  BP: (!) 167/72 (12/13/19 1201)  SpO2: 100 % (12/13/19 1201) Vital Signs (24h Range):  Temp:  [97 °F (36.1 °C)-97.3 °F (36.3 °C)] 97 °F (36.1 °C)  Pulse:  [63-97] 68  Resp:  [12-28] 17  SpO2:  [96 %-100 %] 100 %  BP: (143-201)/() 167/72  Arterial Line BP: (117-145)/(85-94) 145/94     Weight: 79.4 kg (175 lb)  Body mass index is 25.84 kg/m².    Estimated Creatinine Clearance: 81 mL/min (based on SCr of 0.8 mg/dL).    Physical Exam   Constitutional: He appears well-developed and well-nourished.   HENT:   Head: Normocephalic and atraumatic.   Surgical site c/d/i  Drains in place   Neck: Normal range of motion.   Cardiovascular: Normal rate and regular rhythm.   No murmur heard.  Pulmonary/Chest: Effort normal. No respiratory distress.   Abdominal: Soft. He exhibits no distension. There is no tenderness.   Musculoskeletal: He exhibits no edema or tenderness.   Neurological: He is alert.   Skin: Skin is warm. No rash noted. No erythema.   Psychiatric: He has  a normal mood and affect.       Significant Labs: All pertinent labs within the past 24 hours have been reviewed.    Significant Imaging: I have reviewed all pertinent imaging results/findings within the past 24 hours.

## 2019-12-13 NOTE — NURSING
0900H-Patient's drain is between 5-8cc per hour since neurosurgery team dropped the drain. Goal is atleast 10cc/hour. RN paged Neurosurgery    0905H-Received call from Mary Nguyen (neurosurgery) was notified no new order. Will continue to monitor drain

## 2019-12-13 NOTE — PROGRESS NOTES
Notified MD Berkley with neurosurgery of pt's dressing around accordion drain in neck saturated w/ serosanguinous drainage. He stated they'll address it when they round in the AM. WCALL.

## 2019-12-13 NOTE — ASSESSMENT & PLAN NOTE
Cultures: CSF shows staph epidermidis                   Neck abscess shows pseudomonas  Continue cefepime 2g q8h                  Vancomycin 750mg q12h  ID following

## 2019-12-13 NOTE — PLAN OF CARE
POC reviewed with pt and family at 1200. Pt verbalized understanding. Questions and concerns addressed. No acute events today. Pt progressing toward goals. Lumbar drain was placed down to the floor by neurosurgery. Drain has been between 5-8 ml per hour. Neck circumference has been 50.5 cm. Patient still confuse. Patient maintained flat on bed and NPO because of the lumber drain. Patient is still on Normal Saline running at 100 ml per hour. Will continue to monitor. See flowsheets for full assessment and VS info.

## 2019-12-13 NOTE — ASSESSMENT & PLAN NOTE
C6-T3 osteomyelitis  POD 2 s/p C7-T1 corpectomy  Hemovac clamped  LD to drain 10 cc/hour , continue lumbar drain one more day  Continue broad spectrum antibiotics

## 2019-12-13 NOTE — PROGRESS NOTES
[facility]  Neurosurgery  Progress Note    Subjective:     History of Present Illness: Junaid Muñoz is a 74 y.o. male with PMH of HTN, HLD, Hepatitis C, and CAD s/p two stents on plavix who presents with 1 month history of back pain between his shoulders. MRI at OSH demonstrates likely epidural abscess. Pt denies hx of trauma and reports slow onset of symptoms.     Post-Op Info:  Procedure(s) (LRB):  CORPECTOMY, SPINE, CERVICAL AND THORACIC, C7 and T1 with Globus (N/A)   3 Days Post-Op     Interval History: Mild bloody drainage from base of drain site. Lumbar drain with slow flow, 43cc output in last 24hr. Will drop drain to ground level to encourage flow with maximum 10cc/hr and no minimum. Will attempt to resend CSF today.    Medications:  Continuous Infusions:   sodium chloride 0.9% 100 mL/hr at 12/13/19 1001     Scheduled Meds:   acetaminophen  1,000 mg Oral Q8H    amLODIPine  5 mg Oral Daily    atorvastatin  80 mg Oral Daily    bacitracin   Topical (Top) TID    ceFEPime (MAXIPIME) IVPB  2 g Intravenous Q8H    diazePAM  5 mg Intravenous Q6H    levothyroxine  50 mcg Oral Before breakfast    losartan-hydrochlorothiazide 100-25 mg  1 tablet Oral Daily    magnesium oxide  800 mg Oral Once    metoprolol succinate  100 mg Oral Daily    nicotine  1 patch Transdermal Daily    polyethylene glycol  17 g Oral Daily    senna-docusate 8.6-50 mg  1 tablet Oral BID    vancomycin (VANCOCIN) IVPB  750 mg Intravenous Q12H     PRN Meds:Dextrose 10% Bolus, Dextrose 10% Bolus, glucagon (human recombinant), glucose, glucose, labetalol, magnesium oxide, magnesium oxide, morphine, naloxone, ondansetron, oxyCODONE, potassium chloride 10%, potassium chloride 10%, potassium chloride 10%, potassium, sodium phosphates, potassium, sodium phosphates, potassium, sodium phosphates, sodium chloride 0.9%, sodium chloride 0.9%     Review of Systems    Objective:     Weight: 79.4 kg (175 lb)  Body mass index  "is 25.84 kg/m².  Vital Signs (Most Recent):  Temp: 97 °F (36.1 °C) (12/13/19 0701)  Pulse: 70 (12/13/19 1001)  Resp: 17 (12/13/19 1001)  BP: (!) 170/78 (12/13/19 1001)  SpO2: 100 % (12/13/19 1001) Vital Signs (24h Range):  Temp:  [97 °F (36.1 °C)-97.3 °F (36.3 °C)] 97 °F (36.1 °C)  Pulse:  [63-97] 70  Resp:  [12-28] 17  SpO2:  [96 %-100 %] 100 %  BP: (140-201)/() 170/78  Arterial Line BP: (110-145)/(80-94) 145/94     Date 12/13/19 0700 - 12/14/19 0659   Shift 5827-4708 8563-8036 5545-7105 24 Hour Total   INTAKE   I.V.(mL/kg) 423.3(5.3)   423.3(5.3)   Shift Total(mL/kg) 423.3(5.3)   423.3(5.3)   OUTPUT   Urine(mL/kg/hr) 350   350   Drains 23   23   Shift Total(mL/kg) 373(4.7)   373(4.7)   Weight (kg) 79.4 79.4 79.4 79.4                        Closed/Suction Drain 12/10/19 1018 Anterior Neck Accordion 10 Fr. (Active)   Site Description Healing 12/11/2019  3:05 AM   Dressing Type No dressing 12/11/2019  3:05 AM   Drainage Serosanguineous 12/10/2019  6:30 PM   Status Clamped 12/11/2019  3:05 AM   Output (mL) 1500 mL 12/11/2019  6:05 AM            Urethral Catheter 12/10/19 0730 Non-latex 16 Fr. (Active)   Site Assessment Clean;Intact 12/11/2019  3:05 AM   Collection Container Urimeter 12/11/2019  3:05 AM   Securement Method secured to top of thigh w/ adhesive device 12/11/2019  3:05 AM   Catheter Care Performed yes 12/11/2019  3:05 AM   Reason for Continuing Urinary Catheterization Post operative 12/11/2019  3:05 AM   CAUTI Prevention Bundle StatLock in place w 1" slack;Intact seal between catheter & drainage tubing;Drainage bag/urimeter off the floor;Green sheeting clip in use;No dependent loops or kinks;Drainage bag/urimeter not overfilled (<2/3 full);Drainage bag/urimeter below bladder 12/10/2019  7:05 PM   Output (mL) 75 mL 12/10/2019  3:30 PM            Lumbar Drain 12/10/19 1056 (Active)   Drain Status Other (Comment) 12/11/2019  3:05 AM   Level to other (see comment) 12/10/2019  2:32 PM   Dressing Status " Clean;Dry;Intact 12/11/2019  3:05 AM   Interventions HOB degrees (see comment) 12/10/2019  2:32 PM   Output (mL) 2 mL 12/11/2019  4:05 AM       Neurosurgery Physical Exam  AOX3  5/5 in BUE except 4/5 in left HI  5/5 in BLE  SILT  Incision c/d/i    Significant Labs:  Recent Labs   Lab 12/12/19  0202 12/12/19  1723 12/13/19 0138 12/13/19  0807   *  --   --  107  --      --   --  134*  --    K 3.6   < > 3.6 3.5 4.1     --   --  107  --    CO2 23  --   --  20*  --    BUN 12  --   --  11  --    CREATININE 0.8  --   --  0.8  --    CALCIUM 8.0*  --   --  8.7  --    MG 1.5*  --  1.6 1.7  --     < > = values in this interval not displayed.     Recent Labs   Lab 12/12/19 0202 12/13/19 0138   WBC 10.45 8.08   HGB 9.9* 10.9*   HCT 31.6* 36.4*    139*     Recent Labs   Lab 12/12/19 0202 12/13/19 0138   INR 1.1 1.1     Microbiology Results (last 7 days)     Procedure Component Value Units Date/Time    CSF culture [[id number]] Collected:  12/11/19 1247    Order Status:  Completed Specimen:  CSF (Spinal Fluid) from CSF Tap, Tube 3 Updated:  12/13/19 0749     CSF CULTURE No Growth to date     Gram Stain Result Cytospin indicates:      Rare WBC's      No organisms seen    CSF culture [[id number]]  (Abnormal) Collected:  12/10/19 1158    Order Status:  Completed Specimen:  CSF (Spinal Fluid) from CSF Tap, Tube 1 Updated:  12/13/19 0746     CSF CULTURE Culture has Staphylococcus.      Results called to and read back by:  Leonardo Cook RN 12/12/2019  10:36      STAPHYLOCOCCUS EPIDERMIDIS  Susceptibility pending       Gram Stain Result No WBC's      No organisms seen    Narrative:       3) 3) CSF for cell count, differential, culture, gramstain,  protein, and glucose    Blood culture [[id number]] Collected:  12/12/19 0147    Order Status:  Completed Specimen:  Blood from Wrist, Right Updated:  12/13/19 0613     Blood Culture, Routine No Growth to date      No Growth to date    Narrative:       Blood cultures from  2 different sites. 4 bottles total.  Please draw before starting antibiotics.    Blood culture [[id number]] Collected:  12/12/19 0213    Order Status:  Completed Specimen:  Blood from Peripheral, Antecubital, Right Updated:  12/13/19 0613     Blood Culture, Routine No Growth to date      No Growth to date    Narrative:       Blood cultures x 2 different sites. 4 bottles total. Please  draw cultures before administering antibiotics.    Blood culture [[id number]] Collected:  12/11/19 1005    Order Status:  Completed Specimen:  Blood from Peripheral, Forearm, Right Updated:  12/12/19 1212     Blood Culture, Routine No Growth to date      No Growth to date    Narrative:       Blood cultures x 2 different sites. 4 bottles total. Please  draw cultures before administering antibiotics.    Blood culture [[id number]] Collected:  12/11/19 1000    Order Status:  Completed Specimen:  Blood from Peripheral, Foot, Right Updated:  12/12/19 1212     Blood Culture, Routine No Growth to date      No Growth to date    Narrative:       Blood cultures from 2 different sites. 4 bottles total.  Please draw before starting antibiotics.    Aerobic culture [[id number]]  (Abnormal) Collected:  12/10/19 1038    Order Status:  Completed Specimen:  Body Fluid from Neck Updated:  12/12/19 1151     Aerobic Bacterial Culture PSEUDOMONAS AERUGINOSA  Rare  Susceptibility pending      Narrative:       1) Free vertebral abscess #1    Aerobic culture [[id number]]  (Abnormal)  (Susceptibility) Collected:  12/10/19 1038    Order Status:  Completed Specimen:  Body Fluid from Neck Updated:  12/12/19 1132     Aerobic Bacterial Culture PSEUDOMONAS AERUGINOSA  Few      Narrative:       2) Free vertebral abscess #2    Gram stain [[id number]]     Order Status:  Canceled Specimen:  CSF (Spinal Fluid) from CSF Tap, Tube 3     CSF culture [[id number]]     Order Status:  Canceled Specimen:  CSF (Spinal Fluid) from CSF Tap, Tube 3     Culture, Anaerobe [[id number]]  Collected:  12/10/19 1038    Order Status:  Completed Specimen:  Body Fluid from Neck Updated:  12/12/19 0856     Anaerobic Culture Culture in progress    Narrative:       2) Free vertebral abscess #2    Culture, Anaerobe [[id number]] Collected:  12/10/19 1038    Order Status:  Completed Specimen:  Body Fluid from Neck Updated:  12/12/19 0855     Anaerobic Culture Culture in progress    Narrative:       1) Free vertebral abscess #1    AFB Culture & Smear [[id number]] Collected:  12/10/19 1038    Order Status:  Completed Specimen:  Body Fluid from Neck Updated:  12/11/19 2127     AFB Culture & Smear Culture in progress     AFB CULTURE STAIN No acid fast bacilli seen.    Narrative:       1) Free vertebral abscess #1    AFB Culture & Smear [[id number]] Collected:  12/10/19 1038    Order Status:  Completed Specimen:  Body Fluid from Neck Updated:  12/11/19 2127     AFB Culture & Smear Culture in progress     AFB CULTURE STAIN No acid fast bacilli seen.    Narrative:       2) Free vertebral abscess #2    Gram stain [[id number]] Collected:  12/11/19 1247    Order Status:  Canceled Specimen:  CSF (Spinal Fluid) from CSF Tap, Tube 3     Gram stain [[id number]] Collected:  12/10/19 1038    Order Status:  Completed Specimen:  Body Fluid from Neck Updated:  12/10/19 1245     Gram Stain Result Rare No WBC's      No organisms seen    Narrative:       2) Free vertebral abscess #2    Gram stain [[id number]] Collected:  12/10/19 1038    Order Status:  Completed Specimen:  Body Fluid from Neck Updated:  12/10/19 1241     Gram Stain Result No WBC's      No organisms seen    Narrative:       1) Free vertebral abscess #1    Fungus culture [[id number]] Collected:  12/10/19 1038    Order Status:  Sent Specimen:  Body Fluid from Neck Updated:  12/10/19 1125    Fungus culture [[id number]] Collected:  12/10/19 1038    Order Status:  Sent Specimen:  Body Fluid from Neck Updated:  12/10/19 1122              Assessment/Plan:     * Spinal epidural  abscess  74 y.o. male with PMH of HTN, HLD, Hepatitis C, and CAD s/p two stents on plavix who presents with C6-T2 osteomyelitis with suspected epidural abscess.  Now s/p C7/T1 corpectomies.    - Continue ICU care  - q1hr neuro checks  - LD open to drain maximum 10cc/hr, no minimum drainage  - HV drain clamped with mild bloody drainage around insertion site. Will monitor.  - HOB flat through today  - Planning for posterior cervical instrumentation next week  - Fu cultures, send CSF daily:  CSF no growth today  -  brace when upright  - SQH  - Further care per NCC        Korey Umanzor MD  Neurosurgery  [facility]

## 2019-12-13 NOTE — PROGRESS NOTES
[facility]  Infectious Disease  Progress Note    Patient Name: Junaid Muñoz  MRN: [id number]  Admission Date: 12/6/2019  Length of Stay: 7 days  Attending Physician: Roque Zhang MD  Primary Care Provider: Oskar Whitman MD    Isolation Status: Contact  Assessment/Plan:      * Spinal epidural abscess  Pt is a 74 year old male with CAD, HTN, HLD, hypothyroidism, tobacco use, and prior history of IVDU who initially presented for back pain.  Pt was seen in clinic where MRI concerning for C6-T3 osteomyelitis with epidural abscess. CRP-73 ESR-32.  Patient was neurologically intact and hemodynamically stable on admission.  S/P phase one of a two part surgery on 12/10 when he underwent  C7-T1 corpectomy and fusion w/ lumbar drain placed secondary to intra op CSF leak.     Cxs from surgery growing  Pseudomonas Aeruginosa  CSF cxs taken-+Staph epi. susceptibilities pending.  CSF- Count w/ WBC-11, RBC 1070, 74% segmented neutrophils, glucose 74  Repeat CSF cultures NGTD  Pt afebrile w/o white count.    Plan  -Discontinue Vancomycin as Staph epi likely contaminant   -Continue Cefepime 2g q8h  -Recommend continue Cefepime for 6-8 weeks from last surgery.  Anticipate date of surgery 12/17/19 (Estimated end date of therapy 1/28/20-2/11/20).  Likely followed by po suppression as hardware placed while infection present.  -Pt will need ID f/u in 2-3 weeks    Please have the following outpatient labs drawn WEEKLY and faxed to Infectious Diseases clinic at [phone]:  - CBC with diff, CMP, ESR, CRP,     Prior to discharge, please ensure the patient's follow-up has been scheduled.  If there is still no follow-up scheduled in Infectious Diseases clinic, please send an EPIC message to the Infectious Diseases charge nurse Mare Ulloa.  -ID will sign off.        PT discussed with staff.  Thank you for your consult. I will sign off. Please contact us if you have any additional questions.    Adilson MCGRATH  ENIO Spence  Infectious Disease  [facility]    Subjective:     Principal Problem:Spinal epidural abscess    HPI: Pt is a 74 year old male with CAD, HTN, HLD, hypothyroidism, tobacco use, and prior history of IVDU who initially presented for back pain.  Pt was seen in clinic where MRI concerning for C6-T3 osteomyelitis with epidural abscess. Patient was neurologically intact and hemodynamically stable on admission.  S/P phase one of a two part surgery on 12/10 when he underwent  C7-T1 corpectomy and fusion w/ lumbar drain placed secondary to intra op CSF leak.  Cxs from surgery growing  Pseudomonas Aeruginosa (susceptibility pending)  CSF cxs taken-NGTD.  CSF- Count w/ WBC-11, RBC 1070, 74% segmented neutrophils, glucose 74  Interval History:   Pt w/o complaints.  NAEON.  Afebrile w/o white count.  CSF cultures +Staph epi, Repeat CSF cultures NGTD.  Surgical cultures +pseudomonas at 2 sites.On vancomycin and cefepime      Review of Systems   Constitutional: Negative for chills, diaphoresis, fatigue and fever.   HENT: Negative for congestion, sore throat and trouble swallowing.    Eyes: Negative for photophobia.   Respiratory: Negative for cough and shortness of breath.    Cardiovascular: Negative for chest pain and palpitations.   Gastrointestinal: Negative for abdominal pain, nausea and vomiting.   Genitourinary: Negative for decreased urine volume, difficulty urinating and dysuria.   Musculoskeletal: Positive for back pain. Negative for arthralgias, joint swelling, myalgias, neck pain and neck stiffness.   Neurological: Negative for headaches.     Objective:     Vital Signs (Most Recent):  Temp: 97 °F (36.1 °C) (12/13/19 1101)  Pulse: 68 (12/13/19 1201)  Resp: 17 (12/13/19 1201)  BP: (!) 167/72 (12/13/19 1201)  SpO2: 100 % (12/13/19 1201) Vital Signs (24h Range):  Temp:  [97 °F (36.1 °C)-97.3 °F (36.3 °C)] 97 °F (36.1 °C)  Pulse:  [63-97] 68  Resp:  [12-28] 17  SpO2:  [96 %-100 %] 100  %  BP: (143-201)/() 167/72  Arterial Line BP: (117-145)/(85-94) 145/94     Weight: 79.4 kg (175 lb)  Body mass index is 25.84 kg/m².    Estimated Creatinine Clearance: 81 mL/min (based on SCr of 0.8 mg/dL).    Physical Exam   Constitutional: He appears well-developed and well-nourished.   HENT:   Head: Normocephalic and atraumatic.   Surgical site c/d/i  Drains in place   Neck: Normal range of motion.   Cardiovascular: Normal rate and regular rhythm.   No murmur heard.  Pulmonary/Chest: Effort normal. No respiratory distress.   Abdominal: Soft. He exhibits no distension. There is no tenderness.   Musculoskeletal: He exhibits no edema or tenderness.   Neurological: He is alert.   Skin: Skin is warm. No rash noted. No erythema.   Psychiatric: He has a normal mood and affect.       Significant Labs: All pertinent labs within the past 24 hours have been reviewed.    Significant Imaging: I have reviewed all pertinent imaging results/findings within the past 24 hours.

## 2019-12-13 NOTE — PLAN OF CARE
POC reviewed with pt at 0500. Pt verbalized understanding but needs reinforcement. Questions and concerns addressed. Lumbar drain cont to drain only 1-4cc/hr. Accordion neck drain also remains in place. Pain mgmt w/ PRNs throughout shift. NS @ 100cc/hr. K and Mag replaced. Pt progressing toward goals. Will continue to monitor. See flowsheets for full assessment and VS info

## 2019-12-13 NOTE — PROGRESS NOTES
[facility]  Neurocritical Care  Progress Note    Admit Date: 12/6/2019  Service Date: 12/12/2019  Length of Stay: 6    Subjective:     Chief Complaint: Spinal epidural abscess    History of Present Illness: Junaid Muñoz is a 74 year old male with CAD, HTN, HLD, hypothyroidism, tobacco use, and prior history of IVDU who was found to have C6-T3 osteomyelitis with epidural abscess. Patient was neurologically intact and hemodynamically stable on admission.  Now immediately status post phase one of a two part surgery.  Today he had a C7-T1 corpectomy and fusion.  Lumbar drain was placed secondary to intra op CSF leak, and patient admitted to [facility] for post op monitoring and drain maintenance.     Hospital Course: 12/10 admitted post-op C7-T1 corpectomy and fusion with lumbar drain placed 2/2 CSF leak intra-op  12/12  No significant events over night, per nsgy will keep lumbar drain another day. 2nd stage of sugery to occur next Tuesday. Remains NPO while having to lie flat with  lumbar drain. Getting confused after receiving valium decreased dose.    Interval History:No significant events over night, per nsgy will keep lumbar drain another day. 2nd stage of sugery to occur next Tuesday. Remains NPO while having to lie flat with  lumbar drain. Getting confused after receiving valium decreased dose.    Review of Systems:   Constitutional:disoriented to time Denies fevers, weight loss, chills, or weakness.  Eyes: Denies changes in vision.  ENT: Denies dysphagia, nasal discharge, ear pain or discharge.  Cardiovascular: Denies chest pain, palpitations, orthopnea, or claudication.  Respiratory: Denies shortness of breath, cough, hemoptysis, or wheezing.  GI: Denies nausea/vomitting, hematochezia, melena, abd pain, or changes in appetite.  : Denies dysuria, incontinence, or hematuria.  Musculoskeletal: neck and back pain  Denies joint pain or myalgias.  Skin/breast: Denies rashes, lumps, lesions, or  discharge.  Neurologic: Denies headache, dizziness, vertigo, or paresthesias.  Psychiatric: Denies changes in mood or hallucinations.  Endocrine: Denies polyuria, polydipsia, heat/cold intolerance.  Hematologic/Lymph: Denies lymphadenopathy, easy bruising or easy bleeding.  Allergic/Immunologic: Denies rash, rhinitis.   Vitals:   Temp: 97 °F (36.1 °C)  Pulse: 67  Rhythm: normal sinus rhythm  BP: (!) 156/75  MAP (mmHg): 108  Resp: (!) 28  SpO2: 97 %  O2 Device (Oxygen Therapy): room air    Temp  Min: 97 °F (36.1 °C)  Max: 98.5 °F (36.9 °C)  Pulse  Min: 62  Max: 93  BP  Min: 123/81  Max: 190/80  MAP (mmHg)  Min: 98  Max: 141  Resp  Min: 13  Max: 28  SpO2  Min: 95 %  Max: 99 %    12/11 0701 - 12/12 0700  In: 3250 [P.O.:300; I.V.:2400]  Out: 2949 [Urine:2905; Drains:44]   Unmeasured Output  Urine Occurrence: 1  Stool Occurrence: 1     Examination:   Constitutional: Well-nourished and -developed. No apparent distress.   Eyes: Conjunctiva clear, anicteric. Lids no lesions.  Head/Ears/Nose/Mouth/Throat/Neck: Moist mucous membranes. External ears, nose atraumatic.   Cardiovascular: Regular rhythm. No murmurs. No leg edema.  Respiratory: Comfortable respirations. Clear to auscultation.  Gastrointestinal: No hernia. Soft, nondistended, nontender. + bowel sounds.    Neurologic:  -GCS [id number]  -opens eyes to voice Oriented to person, place disoriented to time. Speech fluent. Follows commands.  -Cranial nerves II-XII grossly intact PERRL 3+ + cough, gag, corneals  -Motor OSHEA spon.  -Sensation intact      Medications:   Continuous  sodium chloride 0.9% Last Rate: 100 mL/hr at 12/12/19 1801   Scheduled  acetaminophen 1,000 mg Q8H   amLODIPine 5 mg Daily   atorvastatin 80 mg Daily   bacitracin  TID   ceFEPime (MAXIPIME) IVPB 2 g Q8H   diazePAM 5 mg Q6H   levothyroxine 50 mcg Before breakfast   losartan-hydrochlorothiazide 100-25 mg 1 tablet Daily   magnesium oxide 800 mg Once   metoprolol succinate 100 mg Daily   nicotine 1 patch  Daily   polyethylene glycol 17 g Daily   senna-docusate 8.6-50 mg 1 tablet BID   [START ON 12/13/2019] vancomycin (VANCOCIN) IVPB 750 mg Q12H   PRN  Dextrose 10% Bolus 12.5 g PRN   Dextrose 10% Bolus 25 g PRN   glucagon (human recombinant) 1 mg PRN   glucose 16 g PRN   glucose 24 g PRN   labetalol 10 mg Q4H PRN   magnesium oxide 800 mg PRN   magnesium oxide 800 mg PRN   morphine 1 mg Q6H PRN   naloxone 0.4 mg PRN   ondansetron 8 mg Q8H PRN   oxyCODONE 10 mg Q6H PRN   potassium chloride 10% 40 mEq PRN   potassium chloride 10% 40 mEq PRN   potassium chloride 10% 40 mEq PRN   potassium, sodium phosphates 2 packet PRN   potassium, sodium phosphates 2 packet PRN   potassium, sodium phosphates 2 packet PRN   sodium chloride 0.9% 10 mL PRN   sodium chloride 0.9% 10 mL PRN      Today I independently reviewed pertinent medications, lines/drains/airways, imaging, laboratory results, microbiology results, notably:     ISTAT: Recent Labs   Lab 12/11/19  2352   PH 7.434   PCO2 36.1   PO2 82   POCSATURATED 97   HCO3 24.2   BE 0   POCTCO2 25   SAMPLE ARTERIAL      Chem: Recent Labs   Lab 12/12/19  0202  12/12/19  1723     --   --    K 3.6   < > 3.6     --   --    CO2 23  --   --    *  --   --    BUN 12  --   --    CREATININE 0.8  --   --    ESTGFRAFRICA >60.0  --   --    EGFRNONAA >60.0  --   --    CALCIUM 8.0*  --   --    MG 1.5*  --  1.6   PHOS 2.9  --   --    ANIONGAP 5*  --   --    PROT 5.5*  --   --    ALBUMIN 2.1*  --   --    BILITOT 0.4  --   --    ALKPHOS 228*  --   --    AST 27  --   --    ALT 16  --   --     < > = values in this interval not displayed.     Heme: Recent Labs   Lab 12/12/19  0202   WBC 10.45   HGB 9.9*   HCT 31.6*      INR 1.1     Endo: No results for input(s): POCTGLUCOSE in the last 24 hours.   Assessment/Plan:     Cardiac/Vascular  Hyperlipidemia  Atorvastatin 40 mg daily    Essential hypertension  SBP < 180  Amlodipine 5 mg daily  Losartan-HCTZ 100 mg - 25 mg  Metoprolol XL  100 mg daily    · Echo:Normal left ventricular systolic function. The estimated ejection fraction is 63%  · Concentric left ventricular remodeling.  · No wall motion abnormalities.  · Normal LV diastolic function.  · Normal right ventricular systolic function.  · Mild aortic valve stenosis.  · Aortic valve area is 1.77 cm2; peak velocity is 2.19 m/s; mean gradient is 11 mmHg.  · Normal central venous pressure (3 mm Hg).         ID  * Spinal epidural abscess  Cultures: CSF shows staph epidermidis                   Neck abscess shows pseudomonas  Continue cefepime 2g q8h                  Vancomycin 750mg q12h  ID following    Acute osteomyelitis of cervical spine  C6-T3 osteomyelitis  POD 2 s/p C7-T1 corpectomy  Hemovac clamped  LD to drain 10 cc/hour , continue lumbar drain one more day  Continue broad spectrum antibiotics    Endocrine  Other specified hypothyroidism  Levothyroxine 50 mcg daily    GI  Chronic hepatitis C without hepatic coma  Monitor LFTs    Other  Tobacco abuse  Nicotine patch 21mg daily          The patient is being Prophylaxed for:  Venous Thromboembolism with: Mechanical  Stress Ulcer with: None  Ventilator Pneumonia with: none    Activity Orders          Diet NPO: NPO starting at 12/12 1409        Full Code     I have spent 35 min with this patient, with over 50% of this time spent coordinating care and speaking with the family    Grace Johnson NP  Neurocritical Care  [facility]

## 2019-12-13 NOTE — PT/OT/SLP PROGRESS
Physical Therapy      Patient Name:  Junaid Muñoz   MRN:  [id number]    PT HOLD today 2/2 pt's lumbar drain remains in place, orders for HOB flat. Will follow-up once drain is removed and pt cleared for mobility per NS and NCC.     Margie Loya, PT, DPT   12/13/2019

## 2019-12-13 NOTE — ASSESSMENT & PLAN NOTE
C6-T3 osteomyelitis  POD 3 s/p C7-T1 corpectomy  Hemovac per NSGY  LD to drain 10 cc/hour, continue lumbar drain per NSGY  Continue broad spectrum antibiotics per ID

## 2019-12-13 NOTE — ASSESSMENT & PLAN NOTE
SBP < 180  Amlodipine 5 mg daily  Losartan-HCTZ 100 mg - 25 mg  Metoprolol  mg daily    · Echo:Normal left ventricular systolic function. The estimated ejection fraction is 63%  · Concentric left ventricular remodeling.  · No wall motion abnormalities.  · Normal LV diastolic function.  · Normal right ventricular systolic function.  · Mild aortic valve stenosis.  · Aortic valve area is 1.77 cm2; peak velocity is 2.19 m/s; mean gradient is 11 mmHg.  · Normal central venous pressure (3 mm Hg).

## 2019-12-13 NOTE — PROGRESS NOTES
[facility]  Neurocritical Care  Progress Note    Admit Date: 12/6/2019  Service Date: 12/13/2019  Length of Stay: 7    Subjective:     Chief Complaint: Spinal epidural abscess    History of Present Illness: Junaid Muñoz is a 74 year old male with CAD, HTN, HLD, hypothyroidism, tobacco use, and prior history of IVDU who was found to have C6-T3 osteomyelitis with epidural abscess. Patient was neurologically intact and hemodynamically stable on admission.  Now immediately status post phase one of a two part surgery.  Today he had a C7-T1 corpectomy and fusion.  Lumbar drain was placed secondary to intra op CSF leak, and patient admitted to [facility] for post op monitoring and drain maintenance.     Hospital Course: 12/10 admitted post-op C7-T1 corpectomy and fusion with lumbar drain placed 2/2 CSF leak intra-op  12/12  No significant events over night, per nsgy will keep lumbar drain another day. 2nd stage of sugery to occur next Tuesday. Remains NPO while having to lie flat with  lumbar drain. Getting confused after receiving valium decreased dose.  12/13: Restless o/n, c/o back pain. LD dropped to floor by NSGY, 8-10cc/hr. HV drain in place as well. Neurologically stable. AF, no leukocytosis, H/H stable.     Interval History:  Restless o/n, c/o back pain. LD dropped to floor by NSGY, 8-10cc/hr. HV drain in place as well to suction. Neurologically stable. AF, no leukocytosis, H/H stable.     Review of Systems   Constitutional: Negative for chills and fever.   Respiratory: Negative for chest tightness and shortness of breath.    Cardiovascular: Negative for chest pain.   Gastrointestinal: Negative for abdominal pain.   Musculoskeletal: Positive for back pain and neck pain.   Psychiatric/Behavioral: Positive for confusion.     Objective:     Vitals:  Temp: 97 °F (36.1 °C)  Pulse: 66  Rhythm: normal sinus rhythm  BP: (!) 147/68  MAP (mmHg): 98  Resp: 17  SpO2: 99 %  O2 Device (Oxygen Therapy): room  air    Temp  Min: 97 °F (36.1 °C)  Max: 97.3 °F (36.3 °C)  Pulse  Min: 63  Max: 97  BP  Min: 140/80  Max: 201/89  MAP (mmHg)  Min: 98  Max: 144  Resp  Min: 12  Max: 28  SpO2  Min: 96 %  Max: 99 %    12/12 0701 - 12/13 0700  In: 3336.7 [P.O.:250; I.V.:2586.7]  Out: 2246 [Urine:2195; Drains:51]   Unmeasured Output  Urine Occurrence: 1  Stool Occurrence: 1       Physical Exam  Constitutional: Well-nourished and -developed. No apparent distress.   Eyes: Conjunctiva clear, anicteric. Lids no lesions.  Head/Ears/Nose/Mouth/Throat/Neck: Moist mucous membranes. External ears, nose atraumatic.   Cardiovascular: Regular rhythm. No murmurs. No leg edema.  Respiratory: Comfortable respirations. Clear to auscultation.  Gastrointestinal: No hernia. Soft, nondistended, nontender. + bowel sounds.     Neurologic:  -GCS E3V4M6  -opens eyes to voice Oriented to person, place disoriented to time. Speech fluent. Follows commands.  -Cranial nerves II-XII grossly intact PERRL 3+ + cough, gag, corneals  -Motor OSHEA spon.  -Sensation intact       Medications:  Continuous  sodium chloride 0.9% Last Rate: 100 mL/hr at 12/13/19 0901   Scheduled  acetaminophen 1,000 mg Q8H   amLODIPine 5 mg Daily   atorvastatin 80 mg Daily   bacitracin  TID   ceFEPime (MAXIPIME) IVPB 2 g Q8H   diazePAM 5 mg Q6H   levothyroxine 50 mcg Before breakfast   losartan-hydrochlorothiazide 100-25 mg 1 tablet Daily   magnesium oxide 800 mg Once   metoprolol succinate 100 mg Daily   nicotine 1 patch Daily   polyethylene glycol 17 g Daily   senna-docusate 8.6-50 mg 1 tablet BID   vancomycin (VANCOCIN) IVPB 750 mg Q12H   PRN  Dextrose 10% Bolus 12.5 g PRN   Dextrose 10% Bolus 25 g PRN   glucagon (human recombinant) 1 mg PRN   glucose 16 g PRN   glucose 24 g PRN   labetalol 10 mg Q4H PRN   magnesium oxide 800 mg PRN   magnesium oxide 800 mg PRN   morphine 1 mg Q6H PRN   naloxone 0.4 mg PRN   ondansetron 8 mg Q8H PRN   oxyCODONE 10 mg Q6H PRN   potassium chloride 10% 40 mEq  PRN   potassium chloride 10% 40 mEq PRN   potassium chloride 10% 40 mEq PRN   potassium, sodium phosphates 2 packet PRN   potassium, sodium phosphates 2 packet PRN   potassium, sodium phosphates 2 packet PRN   sodium chloride 0.9% 10 mL PRN   sodium chloride 0.9% 10 mL PRN       Diet  Diet NPO  Diet NPO        Assessment/Plan:     Cardiac/Vascular  Hyperlipidemia  Atorvastatin 40 mg daily    Essential hypertension  SBP < 180  Amlodipine 5 mg daily  Losartan-HCTZ 100 mg - 25 mg  Metoprolol  mg daily    · Echo:Normal left ventricular systolic function. The estimated ejection fraction is 63%  · Concentric left ventricular remodeling.  · No wall motion abnormalities.  · Normal LV diastolic function.  · Normal right ventricular systolic function.  · Mild aortic valve stenosis.  · Aortic valve area is 1.77 cm2; peak velocity is 2.19 m/s; mean gradient is 11 mmHg.  · Normal central venous pressure (3 mm Hg).         Coronary artery disease involving native coronary artery of native heart without angina pectoris  Clopidogrel 75 mg daily     ID  * Spinal epidural abscess  Cultures: CSF shows staph epidermidis                   Neck abscess shows pseudomonas  Continue cefepime 2g q8h                  Vancomycin 750mg q12h  ID following    Acute osteomyelitis of thoracic spine  See osteo of cervical spine    Acute osteomyelitis of cervical spine  C6-T3 osteomyelitis  POD 3 s/p C7-T1 corpectomy  Hemovac per NSGY  LD to drain 10 cc/hour, continue lumbar drain per NSGY  Continue broad spectrum antibiotics per ID    Endocrine  Other specified hypothyroidism  Levothyroxine 50 mcg daily    GI  Chronic hepatitis C without hepatic coma  Monitor LFTs    Other  Tobacco abuse  Nicotine patch 21mg daily          The patient is being Prophylaxed for:  Venous Thromboembolism with: Mechanical  Stress Ulcer with: Not Applicable   Ventilator Pneumonia with: not applicable    Activity Orders          Diet NPO: NPO starting at 12/12 6730         Full Code    Kaelyn Sparrow MD  Neurocritical Care  [facility]

## 2019-12-13 NOTE — CONSULTS
ROXANES consulted for PVA    Unable to obtain due to consult volume    NIAS consulted to replace MIDLINE    Please refer to last note by ID: patient will most likely need a PICC     Please order according

## 2019-12-13 NOTE — PT/OT/SLP PROGRESS
Occupational Therapy      Patient Name:  Junaid Muñoz   MRN:  [id number]    Patient not seen today secondary to Unavailable:Pt's lumbar drain remains in place, RN requesting hold on OT eval today 12/13. Will follow-up 12/14.    Jacquelyn Sotelo, OT  12/13/2019

## 2019-12-13 NOTE — HOSPITAL COURSE
12/10 admitted post-op C7-T1 corpectomy and C6-T2 anterior fusion with lumbar drain placed 2/2 CSF leak intra-op  12/12  No significant events over night, per nsgy will keep lumbar drain another day. 2nd stage of sugery to occur next Tuesday. Remains NPO while having to lie flat with  lumbar drain. Getting confused after receiving valium decreased dose.  12/13: Restless o/n, c/o back pain. LD dropped to floor by NSGY, 8-10cc/hr. HV drain in place as well. Neurologically stable. AF, no leukocytosis, H/H stable.   12/14: calm at present, slight agitation overnight, can elevate head for swallow trials and po today,pending picc placement for long term abx  12/15: less responsive this am, wean scheduled ativan, begin to advance diet and taper ivfs  12/16: NAEO. Stage II surgery tomorrow for posterior cervical fusion. Leukocytosis increasing, is on cefepime for pseudomonas in CSF, will reach out to ID to see if any further abx pre-op should be given. Lumbar drain in place. Increase normal saline to 100 cc/hr. NPO after midnight.   12/17: OR today for stage II posterior cervical instrumentation. Required 3 units PRBCs and 2 units FFP. 2 hemovacs in place to gravity with large output. Will repeat CBC. Patient still intubated on arrival to ICU with cervical and facial edema, will start decadron. ID recommended repeating blood cultures as patient's WBC increasing. Continue cefepime.   12/18: POD 1 C2-T3 posterior instrumentation. Patient is still intubated, will wean to extubate. Facial and cervical swelling greatly improved. Lumbar drain in place. Hemovac x 2. Hemodynamically stable.  12/19/2019 subQ heparin,d/c JODI howell brace for neck, DAPT, HOB restrictions    12/20/2019 HOB clarified with NSGY keep flat, neuro status drowsy, labetolol 2x overnight, hydralazine not effective, respiratory, weaning paramaters on SIMV, wean to extubate, d/c protime INR   12/21/2019 Lumbar drain and hemovac pulled today per neurosurgery, no  more HOB restrictions. Will attempt to wean ventilator and fentanyl. Scheduled oxycodone 5mg q 4h for fentanyl wean.   12/22/2019 Continues to fail weaning trials. Off fentanyl. D/C scheduled valium. Oxycodone PRN. Weaning parameters daily.   12/23/19: Failed multiple spontaneous trials, attempts to wean ETT, hyponatremia-start salt tabs 1 gm q8h  12/24: no events, attempting SBT today, Na+ increased, NS weaned  12/25: NAEON. Extubated yesterday. Continue to monitor for 24 hours before step down.   12/26: NAEON. Stable for step down to

## 2019-12-13 NOTE — ASSESSMENT & PLAN NOTE
Pt is a 74 year old male with CAD, HTN, HLD, hypothyroidism, tobacco use, and prior history of IVDU who initially presented for back pain.  Pt was seen in clinic where MRI concerning for C6-T3 osteomyelitis with epidural abscess. CRP-73 ESR-32.  Patient was neurologically intact and hemodynamically stable on admission.  S/P phase one of a two part surgery on 12/10 when he underwent  C7-T1 corpectomy and fusion w/ lumbar drain placed secondary to intra op CSF leak.     Cxs from surgery growing  Pseudomonas Aeruginosa  CSF cxs taken-+Staph epi. susceptibilities pending.  CSF- Count w/ WBC-11, RBC 1070, 74% segmented neutrophils, glucose 74  Repeat CSF cultures NGTD  Pt afebrile w/o white count.    Plan  -Discontinue Vancomycin as Staph epi likely contaminant   -Continue Cefepime 2g q8h  -Recommend continue Cefepime for 6-8 weeks from last surgery.  Anticipate date of surgery 12/17/19 (Estimated end date of therapy 1/28/20-2/11/20)  -Pt will need ID f/u in 2-3 weeks    Please have the following outpatient labs drawn WEEKLY and faxed to Infectious Diseases clinic at [phone]:  - CBC with diff, CMP, ESR, CRP,     Prior to discharge, please ensure the patient's follow-up has been scheduled.  If there is still no follow-up scheduled in Infectious Diseases clinic, please send an EPIC message to the Infectious Diseases charge nurse Mare Ulloa.  -ID will sign off.

## 2019-12-13 NOTE — PROGRESS NOTES
Therapy with vancomycin complete and/or consult discontinued by provider.  Pharmacy will sign off, please re-consult as needed.    Thanks,      Christopher Mckeon, ElmerD

## 2019-12-14 PROBLEM — R45.1 RESTLESSNESS AND AGITATION: Status: ACTIVE | Noted: 2019-12-14

## 2019-12-14 LAB
ALBUMIN SERPL BCP-MCNC: 2.1 G/DL (ref 3.5–5.2)
ALLENS TEST: ABNORMAL
ALP SERPL-CCNC: 236 U/L (ref 55–135)
ALT SERPL W/O P-5'-P-CCNC: 14 U/L (ref 10–44)
ANION GAP SERPL CALC-SCNC: 6 MMOL/L (ref 8–16)
AST SERPL-CCNC: 25 U/L (ref 10–40)
BASOPHILS # BLD AUTO: 0.05 K/UL (ref 0–0.2)
BASOPHILS NFR BLD: 0.5 % (ref 0–1.9)
BILIRUB SERPL-MCNC: 0.5 MG/DL (ref 0.1–1)
BUN SERPL-MCNC: 11 MG/DL (ref 8–23)
CALCIUM SERPL-MCNC: 8.3 MG/DL (ref 8.7–10.5)
CHLORIDE SERPL-SCNC: 109 MMOL/L (ref 95–110)
CO2 SERPL-SCNC: 22 MMOL/L (ref 23–29)
CREAT SERPL-MCNC: 0.7 MG/DL (ref 0.5–1.4)
DELSYS: ABNORMAL
DIFFERENTIAL METHOD: ABNORMAL
EOSINOPHIL # BLD AUTO: 0.5 K/UL (ref 0–0.5)
EOSINOPHIL NFR BLD: 4.9 % (ref 0–8)
ERYTHROCYTE [DISTWIDTH] IN BLOOD BY AUTOMATED COUNT: 16.5 % (ref 11.5–14.5)
ERYTHROCYTE [SEDIMENTATION RATE] IN BLOOD BY WESTERGREN METHOD: 18 MM/H
EST. GFR  (AFRICAN AMERICAN): >60 ML/MIN/1.73 M^2
EST. GFR  (NON AFRICAN AMERICAN): >60 ML/MIN/1.73 M^2
FIO2: 21
GLUCOSE SERPL-MCNC: 94 MG/DL (ref 70–110)
HCO3 UR-SCNC: 20.3 MMOL/L (ref 24–28)
HCT VFR BLD AUTO: 29.3 % (ref 40–54)
HGB BLD-MCNC: 8.8 G/DL (ref 14–18)
IMM GRANULOCYTES # BLD AUTO: 0.05 K/UL (ref 0–0.04)
IMM GRANULOCYTES NFR BLD AUTO: 0.5 % (ref 0–0.5)
INR PPP: 1.1 (ref 0.8–1.2)
LYMPHOCYTES # BLD AUTO: 1.2 K/UL (ref 1–4.8)
LYMPHOCYTES NFR BLD: 12.7 % (ref 18–48)
MCH RBC QN AUTO: 25.6 PG (ref 27–31)
MCHC RBC AUTO-ENTMCNC: 30 G/DL (ref 32–36)
MCV RBC AUTO: 85 FL (ref 82–98)
MODE: ABNORMAL
MONOCYTES # BLD AUTO: 0.6 K/UL (ref 0.3–1)
MONOCYTES NFR BLD: 6.4 % (ref 4–15)
NEUTROPHILS # BLD AUTO: 6.9 K/UL (ref 1.8–7.7)
NEUTROPHILS NFR BLD: 75 % (ref 38–73)
NRBC BLD-RTO: 0 /100 WBC
PCO2 BLDA: 31.6 MMHG (ref 35–45)
PH SMN: 7.42 [PH] (ref 7.35–7.45)
PLATELET # BLD AUTO: 156 K/UL (ref 150–350)
PMV BLD AUTO: 10.1 FL (ref 9.2–12.9)
PO2 BLDA: 81 MMHG (ref 80–100)
POC BE: -4 MMOL/L
POC SATURATED O2: 96 % (ref 95–100)
POC TCO2: 21 MMOL/L (ref 23–27)
POTASSIUM SERPL-SCNC: 3.5 MMOL/L (ref 3.5–5.1)
PROT SERPL-MCNC: 5.5 G/DL (ref 6–8.4)
PROTHROMBIN TIME: 11.3 SEC (ref 9–12.5)
RBC # BLD AUTO: 3.44 M/UL (ref 4.6–6.2)
SAMPLE: ABNORMAL
SITE: ABNORMAL
SODIUM SERPL-SCNC: 137 MMOL/L (ref 136–145)
SP02: 99
WBC # BLD AUTO: 9.15 K/UL (ref 3.9–12.7)

## 2019-12-14 PROCEDURE — 36600 WITHDRAWAL OF ARTERIAL BLOOD: CPT

## 2019-12-14 PROCEDURE — 82803 BLOOD GASES ANY COMBINATION: CPT

## 2019-12-14 PROCEDURE — 80053 COMPREHEN METABOLIC PANEL: CPT

## 2019-12-14 PROCEDURE — 63600175 PHARM REV CODE 636 W HCPCS: Performed by: PHYSICIAN ASSISTANT

## 2019-12-14 PROCEDURE — A4216 STERILE WATER/SALINE, 10 ML: HCPCS | Performed by: ANESTHESIOLOGY

## 2019-12-14 PROCEDURE — 20000000 HC ICU ROOM

## 2019-12-14 PROCEDURE — S4991 NICOTINE PATCH NONLEGEND: HCPCS | Performed by: STUDENT IN AN ORGANIZED HEALTH CARE EDUCATION/TRAINING PROGRAM

## 2019-12-14 PROCEDURE — 25000003 PHARM REV CODE 250: Performed by: STUDENT IN AN ORGANIZED HEALTH CARE EDUCATION/TRAINING PROGRAM

## 2019-12-14 PROCEDURE — 99900035 HC TECH TIME PER 15 MIN (STAT)

## 2019-12-14 PROCEDURE — 82800 BLOOD PH: CPT

## 2019-12-14 PROCEDURE — 85610 PROTHROMBIN TIME: CPT

## 2019-12-14 PROCEDURE — 63600175 PHARM REV CODE 636 W HCPCS: Performed by: ANESTHESIOLOGY

## 2019-12-14 PROCEDURE — 99233 SBSQ HOSP IP/OBS HIGH 50: CPT | Mod: GC,,, | Performed by: PSYCHIATRY & NEUROLOGY

## 2019-12-14 PROCEDURE — 76937 US GUIDE VASCULAR ACCESS: CPT

## 2019-12-14 PROCEDURE — C1751 CATH, INF, PER/CENT/MIDLINE: HCPCS

## 2019-12-14 PROCEDURE — 94761 N-INVAS EAR/PLS OXIMETRY MLT: CPT

## 2019-12-14 PROCEDURE — 63600175 PHARM REV CODE 636 W HCPCS: Performed by: NURSE PRACTITIONER

## 2019-12-14 PROCEDURE — 63600175 PHARM REV CODE 636 W HCPCS: Performed by: STUDENT IN AN ORGANIZED HEALTH CARE EDUCATION/TRAINING PROGRAM

## 2019-12-14 PROCEDURE — 36573 INSJ PICC RS&I 5 YR+: CPT

## 2019-12-14 PROCEDURE — 99233 PR SUBSEQUENT HOSPITAL CARE,LEVL III: ICD-10-PCS | Mod: GC,,, | Performed by: PSYCHIATRY & NEUROLOGY

## 2019-12-14 PROCEDURE — 25000003 PHARM REV CODE 250: Performed by: ANESTHESIOLOGY

## 2019-12-14 PROCEDURE — 85025 COMPLETE CBC W/AUTO DIFF WBC: CPT

## 2019-12-14 PROCEDURE — 25000003 PHARM REV CODE 250: Performed by: PHYSICIAN ASSISTANT

## 2019-12-14 PROCEDURE — 63600175 PHARM REV CODE 636 W HCPCS: Performed by: UROLOGY

## 2019-12-14 RX ORDER — SODIUM CHLORIDE 0.9 % (FLUSH) 0.9 %
10 SYRINGE (ML) INJECTION
Status: DISCONTINUED | OUTPATIENT
Start: 2019-12-14 | End: 2020-01-11 | Stop reason: HOSPADM

## 2019-12-14 RX ORDER — HYDRALAZINE HYDROCHLORIDE 20 MG/ML
10 INJECTION INTRAMUSCULAR; INTRAVENOUS EVERY 4 HOURS PRN
Status: DISCONTINUED | OUTPATIENT
Start: 2019-12-14 | End: 2019-12-26

## 2019-12-14 RX ORDER — SODIUM CHLORIDE 0.9 % (FLUSH) 0.9 %
10 SYRINGE (ML) INJECTION EVERY 6 HOURS
Status: DISCONTINUED | OUTPATIENT
Start: 2019-12-14 | End: 2020-01-11 | Stop reason: HOSPADM

## 2019-12-14 RX ORDER — HEPARIN SODIUM 5000 [USP'U]/ML
5000 INJECTION, SOLUTION INTRAVENOUS; SUBCUTANEOUS EVERY 8 HOURS
Status: DISCONTINUED | OUTPATIENT
Start: 2019-12-14 | End: 2019-12-16

## 2019-12-14 RX ADMIN — ACETAMINOPHEN 1000 MG: 500 TABLET ORAL at 11:12

## 2019-12-14 RX ADMIN — HEPARIN SODIUM 5000 UNITS: 5000 INJECTION, SOLUTION INTRAVENOUS; SUBCUTANEOUS at 04:12

## 2019-12-14 RX ADMIN — CEFEPIME 2 G: 2 INJECTION, POWDER, FOR SOLUTION INTRAVENOUS at 02:12

## 2019-12-14 RX ADMIN — LABETALOL HCL IV SOLN PREFILLED SYRINGE 20 MG/4ML (5 MG/ML) 10 MG: 20/4 SOLUTION PREFILLED SYRINGE at 10:12

## 2019-12-14 RX ADMIN — Medication: at 09:12

## 2019-12-14 RX ADMIN — ACETAMINOPHEN 1000 MG: 500 TABLET ORAL at 04:12

## 2019-12-14 RX ADMIN — DIAZEPAM 5 MG: 5 INJECTION, SOLUTION INTRAMUSCULAR; INTRAVENOUS at 11:12

## 2019-12-14 RX ADMIN — ATORVASTATIN CALCIUM 80 MG: 20 TABLET, FILM COATED ORAL at 08:12

## 2019-12-14 RX ADMIN — SODIUM CHLORIDE: 0.9 INJECTION, SOLUTION INTRAVENOUS at 09:12

## 2019-12-14 RX ADMIN — METOPROLOL SUCCINATE 100 MG: 100 TABLET, EXTENDED RELEASE ORAL at 08:12

## 2019-12-14 RX ADMIN — Medication: at 08:12

## 2019-12-14 RX ADMIN — LEVOTHYROXINE SODIUM 50 MCG: 50 TABLET ORAL at 05:12

## 2019-12-14 RX ADMIN — CEFEPIME 2 G: 2 INJECTION, POWDER, FOR SOLUTION INTRAVENOUS at 06:12

## 2019-12-14 RX ADMIN — HEPARIN SODIUM 5000 UNITS: 5000 INJECTION, SOLUTION INTRAVENOUS; SUBCUTANEOUS at 09:12

## 2019-12-14 RX ADMIN — MORPHINE SULFATE 1 MG: 2 INJECTION, SOLUTION INTRAMUSCULAR; INTRAVENOUS at 09:12

## 2019-12-14 RX ADMIN — POLYETHYLENE GLYCOL 3350 17 G: 17 POWDER, FOR SOLUTION ORAL at 08:12

## 2019-12-14 RX ADMIN — Medication 10 ML: at 11:12

## 2019-12-14 RX ADMIN — HYDRALAZINE HYDROCHLORIDE 10 MG: 20 INJECTION INTRAMUSCULAR; INTRAVENOUS at 09:12

## 2019-12-14 RX ADMIN — NICOTINE 1 PATCH: 21 PATCH, EXTENDED RELEASE TRANSDERMAL at 08:12

## 2019-12-14 RX ADMIN — ACETAMINOPHEN 1000 MG: 500 TABLET ORAL at 06:12

## 2019-12-14 RX ADMIN — DIAZEPAM 5 MG: 5 INJECTION, SOLUTION INTRAMUSCULAR; INTRAVENOUS at 05:12

## 2019-12-14 RX ADMIN — SENNOSIDES AND DOCUSATE SODIUM 1 TABLET: 8.6; 5 TABLET ORAL at 08:12

## 2019-12-14 RX ADMIN — DIAZEPAM 5 MG: 5 INJECTION, SOLUTION INTRAMUSCULAR; INTRAVENOUS at 06:12

## 2019-12-14 RX ADMIN — Medication 10 ML: at 05:12

## 2019-12-14 RX ADMIN — Medication: at 04:12

## 2019-12-14 RX ADMIN — LOSARTAN POTASSIUM AND HYDROCHLOROTHIAZIDE 1 TABLET: 100; 25 TABLET, FILM COATED ORAL at 08:12

## 2019-12-14 RX ADMIN — AMLODIPINE BESYLATE 5 MG: 5 TABLET ORAL at 08:12

## 2019-12-14 RX ADMIN — OXYCODONE HYDROCHLORIDE 10 MG: 5 TABLET ORAL at 08:12

## 2019-12-14 RX ADMIN — CEFEPIME 2 G: 2 INJECTION, POWDER, FOR SOLUTION INTRAVENOUS at 09:12

## 2019-12-14 RX ADMIN — SENNOSIDES AND DOCUSATE SODIUM 1 TABLET: 8.6; 5 TABLET ORAL at 09:12

## 2019-12-14 NOTE — ASSESSMENT & PLAN NOTE
C6-T3 osteomyelitis  POD 3 s/p C7-T1 corpectomy  Hemovac per NSGY  LD to drain 10 cc/hour, continue lumbar drain per NSGY  Continue broad spectrum antibiotics per ID  12/14: continues with lumbar drain at 10cc/hr

## 2019-12-14 NOTE — PROCEDURES
"Junaid Muñoz is a 74 y.o. male patient.    Temp: 97.2 °F (36.2 °C) (12/14/19 1115)  Pulse: 72 (12/14/19 1115)  Resp: (!) 21 (12/14/19 1115)  BP: (!) 181/85 (12/14/19 1115)  SpO2: 100 % (12/14/19 1115)  Weight: 79.4 kg (175 lb) (12/11/19 1405)  Height: 5' 9" (175.3 cm) (12/11/19 1405)    PICC  Date/Time: 12/14/2019 12:27 PM  Performed by: Fracisco Anders RN  Consent Done: Yes  Time out: Immediately prior to procedure a time out was called to verify the correct patient, procedure, equipment, support staff and site/side marked as required  Indications: med administration and vascular access  Anesthesia: local infiltration  Local anesthetic: lidocaine 1% without epinephrine  Anesthetic Total (mL): 3  Preparation: skin prepped with ChloraPrep  Skin prep agent dried: skin prep agent completely dried prior to procedure  Sterile barriers: all five maximum sterile barriers used - cap, mask, sterile gown, sterile gloves, and large sterile sheet  Hand hygiene: hand hygiene performed prior to central venous catheter insertion  Location details: right basilic  Catheter type: double lumen  Catheter size: 5 Fr  Catheter Length: 37cm    Ultrasound guidance: yes  Vessel Caliber: medium and patent, compressibility normal  Vascular Doppler: not done  Needle advanced into vessel with real time Ultrasound guidance.  Guidewire confirmed in vessel.  Image recorded and saved.  Sterile sheath used.  Number of attempts: 1  Post-procedure: blood return through all ports, chlorhexidine patch and sterile dressing applied  Technical procedures used: 3cg  Specimens: No  Implants: No  Assessment: placement verified by x-ray  Complications: none          Naima Aguillon  12/14/2019  "

## 2019-12-14 NOTE — ASSESSMENT & PLAN NOTE
Cultures: CSF shows staph epidermidis                   Neck abscess shows pseudomonas  Continue cefepime 2g q8h                  Vancomycin 750mg q12h  ID following  12/14: vanc d/c'd, on cefepime 6 week duration of therapy

## 2019-12-14 NOTE — PLAN OF CARE
POC reviewed with pt and family. Pt verbalized understanding. Questions and concerns addressed. No acute events overnight. Lumbar and hemovac drains remain. NS @ 100. Pt progressing toward goals. Will continue to monitor. See flowsheets for full assessment and VS info.

## 2019-12-14 NOTE — ASSESSMENT & PLAN NOTE
74 y.o. male with PMH of HTN, HLD, Hepatitis C, and CAD s/p two stents on plavix who presents with C6-T2 osteomyelitis with suspected epidural abscess.  Now s/p C7/T1 corpectomies.    - Continue ICU care  - q1hr neuro checks  - LD open to drain maximum 10cc/hr, no minimum drainage  - HV drain clamped with mild bloody drainage around insertion site. Will monitor.  - may sit upright today  - Planning for posterior cervical instrumentation next week  - ID consulted: csf sent 12/10 w/ staph epi, 12/11 neg; surgical cx with pseudomonas- cefepime x 8 weeks   -  brace when upright  - SQH  - Further care per NCC

## 2019-12-14 NOTE — SUBJECTIVE & OBJECTIVE
Interval History:      Review of Systems   Constitutional: Negative for chills and fever.   Respiratory: Negative for chest tightness and shortness of breath.    Cardiovascular: Negative for chest pain.   Gastrointestinal: Negative for abdominal pain.   Musculoskeletal: Positive for back pain and neck pain.   Psychiatric/Behavioral: Positive for confusion.     Objective:     Vitals:  Temp: 97.2 °F (36.2 °C)  Pulse: 72  Rhythm: normal sinus rhythm  BP: (!) 181/85  MAP (mmHg): 122  Resp: (!) 21  SpO2: 100 %  O2 Device (Oxygen Therapy): room air    Temp  Min: 96.5 °F (35.8 °C)  Max: 97.3 °F (36.3 °C)  Pulse  Min: 66  Max: 86  BP  Min: 155/73  Max: 186/88  MAP (mmHg)  Min: 105  Max: 127  Resp  Min: 13  Max: 30  SpO2  Min: 97 %  Max: 100 %    12/13 0701 - 12/14 0700  In: 2523.3 [I.V.:2523.3]  Out: 2137 [Urine:2035; Drains:102]   Unmeasured Output  Urine Occurrence: 1  Stool Occurrence: 1       Physical Exam    Constitutional: Well-nourished and -developed. No apparent distress.   Eyes: Conjunctiva clear, anicteric. Lids no lesions.  Head/Ears/Nose/Mouth/Throat/Neck: Moist mucous membranes. External ears, nose atraumatic.   Cardiovascular: Regular rhythm. No murmurs. No leg edema.  Respiratory: Comfortable respirations. Clear to auscultation.  Gastrointestinal: No hernia. Soft, nondistended, nontender. + bowel sounds.     Neurologic:  -GCS E3V4M6  -opens eyes to voice Oriented to person, place disoriented to time. Speech fluent. Follows commands.  -Cranial nerves II-XII grossly intact PERRL 3+ + cough, gag, corneals  -Motor OSHEA spon.  -Sensation intact       Medications:  Continuous    sodium chloride 0.9% Last Rate: 100 mL/hr at 12/14/19 0902   Scheduled    acetaminophen 1,000 mg Q8H   amLODIPine 5 mg Daily   atorvastatin 80 mg Daily   bacitracin  TID   ceFEPime (MAXIPIME) IVPB 2 g Q8H   diazePAM 5 mg Q6H   heparin (porcine) 5,000 Units Q8H   levothyroxine 50 mcg Before breakfast   losartan-hydrochlorothiazide 100-25 mg  1 tablet Daily   magnesium oxide 800 mg Once   metoprolol succinate 100 mg Daily   nicotine 1 patch Daily   polyethylene glycol 17 g Daily   senna-docusate 8.6-50 mg 1 tablet BID   sodium chloride 0.9% 10 mL Q6H   PRN    Dextrose 10% Bolus 12.5 g PRN   Dextrose 10% Bolus 25 g PRN   glucagon (human recombinant) 1 mg PRN   glucose 16 g PRN   glucose 24 g PRN   labetalol 10 mg Q4H PRN   magnesium oxide 800 mg PRN   magnesium oxide 800 mg PRN   morphine 1 mg Q6H PRN   naloxone 0.4 mg PRN   ondansetron 8 mg Q8H PRN   oxyCODONE 10 mg Q6H PRN   potassium chloride 10% 40 mEq PRN   potassium chloride 10% 40 mEq PRN   potassium chloride 10% 40 mEq PRN   potassium, sodium phosphates 2 packet PRN   potassium, sodium phosphates 2 packet PRN   potassium, sodium phosphates 2 packet PRN   sodium chloride 0.9% 10 mL PRN   sodium chloride 0.9% 10 mL PRN   sodium chloride 0.9% 10 mL PRN       Diet  Diet NPO  Diet NPO

## 2019-12-14 NOTE — PROGRESS NOTES
[facility]  Neurocritical Care  Progress Note    Admit Date: 12/6/2019  Service Date: 12/14/2019  Length of Stay: 8    Subjective:     Chief Complaint: Spinal epidural abscess    History of Present Illness: Junaid Muñoz is a 74 year old male with CAD, HTN, HLD, hypothyroidism, tobacco use, and prior history of IVDU who was found to have C6-T3 osteomyelitis with epidural abscess. Patient was neurologically intact and hemodynamically stable on admission.  Now immediately status post phase one of a two part surgery.  Today he had a C7-T1 corpectomy and fusion.  Lumbar drain was placed secondary to intra op CSF leak, and patient admitted to [facility] for post op monitoring and drain maintenance.     Hospital Course: 12/10 admitted post-op C7-T1 corpectomy and fusion with lumbar drain placed 2/2 CSF leak intra-op  12/12  No significant events over night, per nsgy will keep lumbar drain another day. 2nd stage of sugery to occur next Tuesday. Remains NPO while having to lie flat with  lumbar drain. Getting confused after receiving valium decreased dose.  12/13: Restless o/n, c/o back pain. LD dropped to floor by NSGY, 8-10cc/hr. HV drain in place as well. Neurologically stable. AF, no leukocytosis, H/H stable.   12/14: calm at present, slight agitation overnight, can elevate head for swallow trials and po today,pending picc placement for long term abx    Interval History:      Review of Systems   Constitutional: Negative for chills and fever.   Respiratory: Negative for chest tightness and shortness of breath.    Cardiovascular: Negative for chest pain.   Gastrointestinal: Negative for abdominal pain.   Musculoskeletal: Positive for back pain and neck pain.   Psychiatric/Behavioral: Positive for confusion.     Objective:     Vitals:  Temp: 97.2 °F (36.2 °C)  Pulse: 72  Rhythm: normal sinus rhythm  BP: (!) 181/85  MAP (mmHg): 122  Resp: (!) 21  SpO2: 100 %  O2 Device (Oxygen Therapy): room air    Temp  Min:  96.5 °F (35.8 °C)  Max: 97.3 °F (36.3 °C)  Pulse  Min: 66  Max: 86  BP  Min: 155/73  Max: 186/88  MAP (mmHg)  Min: 105  Max: 127  Resp  Min: 13  Max: 30  SpO2  Min: 97 %  Max: 100 %    12/13 0701 - 12/14 0700  In: 2523.3 [I.V.:2523.3]  Out: 2137 [Urine:2035; Drains:102]   Unmeasured Output  Urine Occurrence: 1  Stool Occurrence: 1       Physical Exam    Constitutional: Well-nourished and -developed. No apparent distress.   Eyes: Conjunctiva clear, anicteric. Lids no lesions.  Head/Ears/Nose/Mouth/Throat/Neck: Moist mucous membranes. External ears, nose atraumatic.   Cardiovascular: Regular rhythm. No murmurs. No leg edema.  Respiratory: Comfortable respirations. Clear to auscultation.  Gastrointestinal: No hernia. Soft, nondistended, nontender. + bowel sounds.     Neurologic:  -GCS E3V4M6  -opens eyes to voice Oriented to person, place disoriented to time. Speech fluent. Follows commands.  -Cranial nerves II-XII grossly intact PERRL 3+ + cough, gag, corneals  -Motor OSHEA spon.  -Sensation intact       Medications:  Continuous    sodium chloride 0.9% Last Rate: 100 mL/hr at 12/14/19 0902   Scheduled    acetaminophen 1,000 mg Q8H   amLODIPine 5 mg Daily   atorvastatin 80 mg Daily   bacitracin  TID   ceFEPime (MAXIPIME) IVPB 2 g Q8H   diazePAM 5 mg Q6H   heparin (porcine) 5,000 Units Q8H   levothyroxine 50 mcg Before breakfast   losartan-hydrochlorothiazide 100-25 mg 1 tablet Daily   magnesium oxide 800 mg Once   metoprolol succinate 100 mg Daily   nicotine 1 patch Daily   polyethylene glycol 17 g Daily   senna-docusate 8.6-50 mg 1 tablet BID   sodium chloride 0.9% 10 mL Q6H   PRN    Dextrose 10% Bolus 12.5 g PRN   Dextrose 10% Bolus 25 g PRN   glucagon (human recombinant) 1 mg PRN   glucose 16 g PRN   glucose 24 g PRN   labetalol 10 mg Q4H PRN   magnesium oxide 800 mg PRN   magnesium oxide 800 mg PRN   morphine 1 mg Q6H PRN   naloxone 0.4 mg PRN   ondansetron 8 mg Q8H PRN   oxyCODONE 10 mg Q6H PRN   potassium chloride  10% 40 mEq PRN   potassium chloride 10% 40 mEq PRN   potassium chloride 10% 40 mEq PRN   potassium, sodium phosphates 2 packet PRN   potassium, sodium phosphates 2 packet PRN   potassium, sodium phosphates 2 packet PRN   sodium chloride 0.9% 10 mL PRN   sodium chloride 0.9% 10 mL PRN   sodium chloride 0.9% 10 mL PRN       Diet  Diet NPO  Diet NPO        Assessment/Plan:     Cardiac/Vascular  Essential hypertension  SBP < 180  Amlodipine 5 mg daily  Losartan-HCTZ 100 mg - 25 mg  Metoprolol  mg daily    · Echo:Normal left ventricular systolic function. The estimated ejection fraction is 63%  · Concentric left ventricular remodeling.  · No wall motion abnormalities.  · Normal LV diastolic function.  · Normal right ventricular systolic function.  · Mild aortic valve stenosis.  · Aortic valve area is 1.77 cm2; peak velocity is 2.19 m/s; mean gradient is 11 mmHg.  · Normal central venous pressure (3 mm Hg).         Coronary artery disease involving native coronary artery of native heart without angina pectoris  Clopidogrel 75 mg daily     ID  * Spinal epidural abscess  Cultures: CSF shows staph epidermidis                   Neck abscess shows pseudomonas  Continue cefepime 2g q8h                  Vancomycin 750mg q12h  ID following  12/14: vanc d/c'd, on cefepime 6 week duration of therapy    Acute osteomyelitis of cervical spine  C6-T3 osteomyelitis  POD 3 s/p C7-T1 corpectomy  Hemovac per NSGY  LD to drain 10 cc/hour, continue lumbar drain per NSGY  Continue broad spectrum antibiotics per ID  12/14: continues with lumbar drain at 10cc/hr    Other  Tobacco abuse  Nicotine patch 21mg daily          The patient is being Prophylaxed for:  Venous Thromboembolism with: Chemical  Stress Ulcer with: None  Ventilator Pneumonia with: not applicable    Activity Orders          Diet NPO: NPO starting at 12/12 1409        Full Code    Denys Mcdermott MD  Neurocritical Care  [facility]

## 2019-12-14 NOTE — PT/OT/SLP PROGRESS
Occupational Therapy      Patient Name:  Junaid Muñoz   MRN:  [id number]    Patient not seen today secondary lumbar drain remains in place, continued hold on OT eval. Will follow-up as scheduled and appropriate for evaluation.    KASIA Emmanuel  12/14/2019

## 2019-12-14 NOTE — PROGRESS NOTES
[facility]  Neurosurgery  Progress Note    Subjective:     History of Present Illness: Junaid Muñoz is a 74 y.o. male with PMH of HTN, HLD, Hepatitis C, and CAD s/p two stents on plavix who presents with 1 month history of back pain between his shoulders. MRI at OSH demonstrates likely epidural abscess. Pt denies hx of trauma and reports slow onset of symptoms.     Post-Op Info:  Procedure(s) (LRB):  CORPECTOMY, SPINE, CERVICAL AND THORACIC, C7 and T1 with Globus (N/A)   4 Days Post-Op     Interval History: naeon    Medications:  Continuous Infusions:   sodium chloride 0.9% 100 mL/hr at 12/14/19 0600     Scheduled Meds:   acetaminophen  1,000 mg Oral Q8H    amLODIPine  5 mg Oral Daily    atorvastatin  80 mg Oral Daily    bacitracin   Topical (Top) TID    ceFEPime (MAXIPIME) IVPB  2 g Intravenous Q8H    diazePAM  5 mg Intravenous Q6H    levothyroxine  50 mcg Oral Before breakfast    losartan-hydrochlorothiazide 100-25 mg  1 tablet Oral Daily    magnesium oxide  800 mg Oral Once    metoprolol succinate  100 mg Oral Daily    nicotine  1 patch Transdermal Daily    polyethylene glycol  17 g Oral Daily    senna-docusate 8.6-50 mg  1 tablet Oral BID     PRN Meds:Dextrose 10% Bolus, Dextrose 10% Bolus, glucagon (human recombinant), glucose, glucose, labetalol, magnesium oxide, magnesium oxide, morphine, naloxone, ondansetron, oxyCODONE, potassium chloride 10%, potassium chloride 10%, potassium chloride 10%, potassium, sodium phosphates, potassium, sodium phosphates, potassium, sodium phosphates, sodium chloride 0.9%, sodium chloride 0.9%     Review of Systems    Objective:     Weight: 79.4 kg (175 lb)  Body mass index is 25.84 kg/m².  Vital Signs (Most Recent):  Temp: 96.6 °F (35.9 °C) (12/14/19 0800)  Pulse: 73 (12/14/19 0800)  Resp: (!) 25 (12/14/19 0800)  BP: (!) 175/80 (12/14/19 0800)  SpO2: 99 % (12/14/19 0800) Vital Signs (24h Range):  Temp:  [96.5 °F (35.8 °C)-97.3 °F (36.3 °C)]  "96.6 °F (35.9 °C)  Pulse:  [66-86] 73  Resp:  [13-30] 25  SpO2:  [97 %-100 %] 99 %  BP: (141-186)/(63-90) 175/80     Date 12/14/19 0700 - 12/15/19 0659   Shift 3814-1165 9747-4731 5083-1512 24 Hour Total   INTAKE   I.V.(mL/kg) 200(2.5)   200(2.5)   Shift Total(mL/kg) 200(2.5)   200(2.5)   OUTPUT   Urine(mL/kg/hr) 200   200   Shift Total(mL/kg) 200(2.5)   200(2.5)   Weight (kg) 79.4 79.4 79.4 79.4                        Closed/Suction Drain 12/10/19 1018 Anterior Neck Accordion 10 Fr. (Active)   Site Description Healing 12/11/2019  3:05 AM   Dressing Type No dressing 12/11/2019  3:05 AM   Drainage Serosanguineous 12/10/2019  6:30 PM   Status Clamped 12/11/2019  3:05 AM   Output (mL) 1500 mL 12/11/2019  6:05 AM            Urethral Catheter 12/10/19 0730 Non-latex 16 Fr. (Active)   Site Assessment Clean;Intact 12/11/2019  3:05 AM   Collection Container Urimeter 12/11/2019  3:05 AM   Securement Method secured to top of thigh w/ adhesive device 12/11/2019  3:05 AM   Catheter Care Performed yes 12/11/2019  3:05 AM   Reason for Continuing Urinary Catheterization Post operative 12/11/2019  3:05 AM   CAUTI Prevention Bundle StatLock in place w 1" slack;Intact seal between catheter & drainage tubing;Drainage bag/urimeter off the floor;Green sheeting clip in use;No dependent loops or kinks;Drainage bag/urimeter not overfilled (<2/3 full);Drainage bag/urimeter below bladder 12/10/2019  7:05 PM   Output (mL) 75 mL 12/10/2019  3:30 PM            Lumbar Drain 12/10/19 1056 (Active)   Drain Status Other (Comment) 12/11/2019  3:05 AM   Level to other (see comment) 12/10/2019  2:32 PM   Dressing Status Clean;Dry;Intact 12/11/2019  3:05 AM   Interventions HOB degrees (see comment) 12/10/2019  2:32 PM   Output (mL) 2 mL 12/11/2019  4:05 AM       Neurosurgery Physical Exam  AOX3  5/5 in BUE except 4/5 in left HI  5/5 in BLE  SILT  Incision c/d/i    Significant Labs:  Recent Labs   Lab 12/12/19  1723 12/13/19  0138 12/13/19  0807 " 12/13/19 1748 12/14/19 0228   GLU  --  107  --   --  94   NA  --  134*  --   --  137   K 3.6 3.5 4.1  --  3.5   CL  --  107  --   --  109   CO2  --  20*  --   --  22*   BUN  --  11  --   --  11   CREATININE  --  0.8  --   --  0.7   CALCIUM  --  8.7  --   --  8.3*   MG 1.6 1.7  --  2.0  --      Recent Labs   Lab 12/13/19 0138 12/14/19 0228   WBC 8.08 9.15   HGB 10.9* 8.8*   HCT 36.4* 29.3*   * 156     Recent Labs   Lab 12/13/19 0138 12/14/19 0228   INR 1.1 1.1     Microbiology Results (last 7 days)     Procedure Component Value Units Date/Time    CSF culture [[id number]] Collected:  12/11/19 1247    Order Status:  Completed Specimen:  CSF (Spinal Fluid) from CSF Tap, Tube 3 Updated:  12/14/19 0733     CSF CULTURE No Growth to date     Gram Stain Result Cytospin indicates:      Rare WBC's      No organisms seen    Blood culture [[id number]] Collected:  12/12/19 0213    Order Status:  Completed Specimen:  Blood from Peripheral, Antecubital, Right Updated:  12/14/19 0612     Blood Culture, Routine No Growth to date      No Growth to date      No Growth to date    Narrative:       Blood cultures x 2 different sites. 4 bottles total. Please  draw cultures before administering antibiotics.    Blood culture [[id number]] Collected:  12/12/19 0147    Order Status:  Completed Specimen:  Blood from Wrist, Right Updated:  12/14/19 0612     Blood Culture, Routine No Growth to date      No Growth to date      No Growth to date    Narrative:       Blood cultures from 2 different sites. 4 bottles total.  Please draw before starting antibiotics.    CSF culture [[id number]]  (Abnormal)  (Susceptibility) Collected:  12/10/19 1158    Order Status:  Completed Specimen:  CSF (Spinal Fluid) from CSF Tap, Tube 1 Updated:  12/13/19 1350     CSF CULTURE Culture has Staphylococcus.      Results called to and read back by:  Leonardo Cook RN 12/12/2019  10:36      STAPHYLOCOCCUS EPIDERMIDIS     Gram Stain Result No WBC's      No organisms  seen    Narrative:       3) 3) CSF for cell count, differential, culture, gramstain,  protein, and glucose    Aerobic culture [[id number]]  (Abnormal)  (Susceptibility) Collected:  12/10/19 1038    Order Status:  Completed Specimen:  Body Fluid from Neck Updated:  12/13/19 1240     Aerobic Bacterial Culture PSEUDOMONAS AERUGINOSA  Rare      Narrative:       1) Free vertebral abscess #1    Blood culture [[id number]] Collected:  12/11/19 1000    Order Status:  Completed Specimen:  Blood from Peripheral, Foot, Right Updated:  12/13/19 1212     Blood Culture, Routine No Growth to date      No Growth to date      No Growth to date    Narrative:       Blood cultures from 2 different sites. 4 bottles total.  Please draw before starting antibiotics.    Blood culture [[id number]] Collected:  12/11/19 1005    Order Status:  Completed Specimen:  Blood from Peripheral, Forearm, Right Updated:  12/13/19 1212     Blood Culture, Routine No Growth to date      No Growth to date      No Growth to date    Narrative:       Blood cultures x 2 different sites. 4 bottles total. Please  draw cultures before administering antibiotics.    Fungus culture [[id number]] Collected:  12/10/19 1038    Order Status:  Completed Specimen:  Body Fluid from Neck Updated:  12/13/19 1059     Fungus (Mycology) Culture Culture in progress    Narrative:       2) Free vertebral abscess #2    Fungus culture [[id number]] Collected:  12/10/19 1038    Order Status:  Completed Specimen:  Body Fluid from Neck Updated:  12/13/19 1059     Fungus (Mycology) Culture Culture in progress    Narrative:       1) Free vertebral abscess #1    Aerobic culture [[id number]]  (Abnormal)  (Susceptibility) Collected:  12/10/19 1038    Order Status:  Completed Specimen:  Body Fluid from Neck Updated:  12/12/19 1132     Aerobic Bacterial Culture PSEUDOMONAS AERUGINOSA  Few      Narrative:       2) Free vertebral abscess #2    Gram stain [[id number]]     Order Status:  Canceled Specimen:   CSF (Spinal Fluid) from CSF Tap, Tube 3     CSF culture [[id number]]     Order Status:  Canceled Specimen:  CSF (Spinal Fluid) from CSF Tap, Tube 3     Culture, Anaerobe [[id number]] Collected:  12/10/19 1038    Order Status:  Completed Specimen:  Body Fluid from Neck Updated:  12/12/19 0856     Anaerobic Culture Culture in progress    Narrative:       2) Free vertebral abscess #2    Culture, Anaerobe [[id number]] Collected:  12/10/19 1038    Order Status:  Completed Specimen:  Body Fluid from Neck Updated:  12/12/19 0855     Anaerobic Culture Culture in progress    Narrative:       1) Free vertebral abscess #1    AFB Culture & Smear [[id number]] Collected:  12/10/19 1038    Order Status:  Completed Specimen:  Body Fluid from Neck Updated:  12/11/19 2127     AFB Culture & Smear Culture in progress     AFB CULTURE STAIN No acid fast bacilli seen.    Narrative:       1) Free vertebral abscess #1    AFB Culture & Smear [[id number]] Collected:  12/10/19 1038    Order Status:  Completed Specimen:  Body Fluid from Neck Updated:  12/11/19 2127     AFB Culture & Smear Culture in progress     AFB CULTURE STAIN No acid fast bacilli seen.    Narrative:       2) Free vertebral abscess #2    Gram stain [[id number]] Collected:  12/11/19 1247    Order Status:  Canceled Specimen:  CSF (Spinal Fluid) from CSF Tap, Tube 3     Gram stain [[id number]] Collected:  12/10/19 1038    Order Status:  Completed Specimen:  Body Fluid from Neck Updated:  12/10/19 1245     Gram Stain Result Rare No WBC's      No organisms seen    Narrative:       2) Free vertebral abscess #2    Gram stain [[id number]] Collected:  12/10/19 1038    Order Status:  Completed Specimen:  Body Fluid from Neck Updated:  12/10/19 1241     Gram Stain Result No WBC's      No organisms seen    Narrative:       1) Free vertebral abscess #1              Assessment/Plan:     * Spinal epidural abscess  74 y.o. male with PMH of HTN, HLD, Hepatitis C, and CAD s/p two stents on plavix who  presents with C6-T2 osteomyelitis with suspected epidural abscess.  Now s/p C7/T1 corpectomies.    - Continue ICU care  - q1hr neuro checks  - LD open to drain maximum 10cc/hr, no minimum drainage  - HV drain clamped with mild bloody drainage around insertion site. Will monitor.  - may sit upright today  - Planning for posterior cervical instrumentation next week  - ID consulted: csf sent 12/10 w/ staph epi, 12/11 neg; surgical cx with pseudomonas- cefepime x 8 weeks   -  brace when upright  - SQH  - Further care per [facility]        Damon Pressley MD  Neurosurgery  [facility]

## 2019-12-14 NOTE — SUBJECTIVE & OBJECTIVE
Interval History: naeon    Medications:  Continuous Infusions:   sodium chloride 0.9% 100 mL/hr at 12/14/19 0600     Scheduled Meds:   acetaminophen  1,000 mg Oral Q8H    amLODIPine  5 mg Oral Daily    atorvastatin  80 mg Oral Daily    bacitracin   Topical (Top) TID    ceFEPime (MAXIPIME) IVPB  2 g Intravenous Q8H    diazePAM  5 mg Intravenous Q6H    levothyroxine  50 mcg Oral Before breakfast    losartan-hydrochlorothiazide 100-25 mg  1 tablet Oral Daily    magnesium oxide  800 mg Oral Once    metoprolol succinate  100 mg Oral Daily    nicotine  1 patch Transdermal Daily    polyethylene glycol  17 g Oral Daily    senna-docusate 8.6-50 mg  1 tablet Oral BID     PRN Meds:Dextrose 10% Bolus, Dextrose 10% Bolus, glucagon (human recombinant), glucose, glucose, labetalol, magnesium oxide, magnesium oxide, morphine, naloxone, ondansetron, oxyCODONE, potassium chloride 10%, potassium chloride 10%, potassium chloride 10%, potassium, sodium phosphates, potassium, sodium phosphates, potassium, sodium phosphates, sodium chloride 0.9%, sodium chloride 0.9%     Review of Systems    Objective:     Weight: 79.4 kg (175 lb)  Body mass index is 25.84 kg/m².  Vital Signs (Most Recent):  Temp: 96.6 °F (35.9 °C) (12/14/19 0800)  Pulse: 73 (12/14/19 0800)  Resp: (!) 25 (12/14/19 0800)  BP: (!) 175/80 (12/14/19 0800)  SpO2: 99 % (12/14/19 0800) Vital Signs (24h Range):  Temp:  [96.5 °F (35.8 °C)-97.3 °F (36.3 °C)] 96.6 °F (35.9 °C)  Pulse:  [66-86] 73  Resp:  [13-30] 25  SpO2:  [97 %-100 %] 99 %  BP: (141-186)/(63-90) 175/80     Date 12/14/19 0700 - 12/15/19 0659   Shift 3998-9419 9072-9665 5086-6645 24 Hour Total   INTAKE   I.V.(mL/kg) 200(2.5)   200(2.5)   Shift Total(mL/kg) 200(2.5)   200(2.5)   OUTPUT   Urine(mL/kg/hr) 200   200   Shift Total(mL/kg) 200(2.5)   200(2.5)   Weight (kg) 79.4 79.4 79.4 79.4                        Closed/Suction Drain 12/10/19 1018 Anterior Neck Accordion 10 Fr. (Active)   Site Description  "Healing 12/11/2019  3:05 AM   Dressing Type No dressing 12/11/2019  3:05 AM   Drainage Serosanguineous 12/10/2019  6:30 PM   Status Clamped 12/11/2019  3:05 AM   Output (mL) 1500 mL 12/11/2019  6:05 AM            Urethral Catheter 12/10/19 0730 Non-latex 16 Fr. (Active)   Site Assessment Clean;Intact 12/11/2019  3:05 AM   Collection Container Urimeter 12/11/2019  3:05 AM   Securement Method secured to top of thigh w/ adhesive device 12/11/2019  3:05 AM   Catheter Care Performed yes 12/11/2019  3:05 AM   Reason for Continuing Urinary Catheterization Post operative 12/11/2019  3:05 AM   CAUTI Prevention Bundle StatLock in place w 1" slack;Intact seal between catheter & drainage tubing;Drainage bag/urimeter off the floor;Green sheeting clip in use;No dependent loops or kinks;Drainage bag/urimeter not overfilled (<2/3 full);Drainage bag/urimeter below bladder 12/10/2019  7:05 PM   Output (mL) 75 mL 12/10/2019  3:30 PM            Lumbar Drain 12/10/19 1056 (Active)   Drain Status Other (Comment) 12/11/2019  3:05 AM   Level to other (see comment) 12/10/2019  2:32 PM   Dressing Status Clean;Dry;Intact 12/11/2019  3:05 AM   Interventions HOB degrees (see comment) 12/10/2019  2:32 PM   Output (mL) 2 mL 12/11/2019  4:05 AM       Neurosurgery Physical Exam  AOX3  5/5 in BUE except 4/5 in left HI  5/5 in BLE  SILT  Incision c/d/i    Significant Labs:  Recent Labs   Lab 12/12/19  1723 12/13/19  0138 12/13/19  0807 12/13/19  1748 12/14/19  0228   GLU  --  107  --   --  94   NA  --  134*  --   --  137   K 3.6 3.5 4.1  --  3.5   CL  --  107  --   --  109   CO2  --  20*  --   --  22*   BUN  --  11  --   --  11   CREATININE  --  0.8  --   --  0.7   CALCIUM  --  8.7  --   --  8.3*   MG 1.6 1.7  --  2.0  --      Recent Labs   Lab 12/13/19 0138 12/14/19 0228   WBC 8.08 9.15   HGB 10.9* 8.8*   HCT 36.4* 29.3*   * 156     Recent Labs   Lab 12/13/19 0138 12/14/19 0228   INR 1.1 1.1     Microbiology Results (last 7 days)     " Procedure Component Value Units Date/Time    CSF culture [[id number]] Collected:  12/11/19 1247    Order Status:  Completed Specimen:  CSF (Spinal Fluid) from CSF Tap, Tube 3 Updated:  12/14/19 0733     CSF CULTURE No Growth to date     Gram Stain Result Cytospin indicates:      Rare WBC's      No organisms seen    Blood culture [[id number]] Collected:  12/12/19 0213    Order Status:  Completed Specimen:  Blood from Peripheral, Antecubital, Right Updated:  12/14/19 0612     Blood Culture, Routine No Growth to date      No Growth to date      No Growth to date    Narrative:       Blood cultures x 2 different sites. 4 bottles total. Please  draw cultures before administering antibiotics.    Blood culture [[id number]] Collected:  12/12/19 0147    Order Status:  Completed Specimen:  Blood from Wrist, Right Updated:  12/14/19 0612     Blood Culture, Routine No Growth to date      No Growth to date      No Growth to date    Narrative:       Blood cultures from 2 different sites. 4 bottles total.  Please draw before starting antibiotics.    CSF culture [[id number]]  (Abnormal)  (Susceptibility) Collected:  12/10/19 1158    Order Status:  Completed Specimen:  CSF (Spinal Fluid) from CSF Tap, Tube 1 Updated:  12/13/19 1350     CSF CULTURE Culture has Staphylococcus.      Results called to and read back by:  Leonardo Cook RN 12/12/2019  10:36      STAPHYLOCOCCUS EPIDERMIDIS     Gram Stain Result No WBC's      No organisms seen    Narrative:       3) 3) CSF for cell count, differential, culture, gramstain,  protein, and glucose    Aerobic culture [[id number]]  (Abnormal)  (Susceptibility) Collected:  12/10/19 1038    Order Status:  Completed Specimen:  Body Fluid from Neck Updated:  12/13/19 1240     Aerobic Bacterial Culture PSEUDOMONAS AERUGINOSA  Rare      Narrative:       1) Free vertebral abscess #1    Blood culture [[id number]] Collected:  12/11/19 1000    Order Status:  Completed Specimen:  Blood from Peripheral, Foot, Right  Updated:  12/13/19 1212     Blood Culture, Routine No Growth to date      No Growth to date      No Growth to date    Narrative:       Blood cultures from 2 different sites. 4 bottles total.  Please draw before starting antibiotics.    Blood culture [[id number]] Collected:  12/11/19 1005    Order Status:  Completed Specimen:  Blood from Peripheral, Forearm, Right Updated:  12/13/19 1212     Blood Culture, Routine No Growth to date      No Growth to date      No Growth to date    Narrative:       Blood cultures x 2 different sites. 4 bottles total. Please  draw cultures before administering antibiotics.    Fungus culture [[id number]] Collected:  12/10/19 1038    Order Status:  Completed Specimen:  Body Fluid from Neck Updated:  12/13/19 1059     Fungus (Mycology) Culture Culture in progress    Narrative:       2) Free vertebral abscess #2    Fungus culture [[id number]] Collected:  12/10/19 1038    Order Status:  Completed Specimen:  Body Fluid from Neck Updated:  12/13/19 1059     Fungus (Mycology) Culture Culture in progress    Narrative:       1) Free vertebral abscess #1    Aerobic culture [[id number]]  (Abnormal)  (Susceptibility) Collected:  12/10/19 1038    Order Status:  Completed Specimen:  Body Fluid from Neck Updated:  12/12/19 1132     Aerobic Bacterial Culture PSEUDOMONAS AERUGINOSA  Few      Narrative:       2) Free vertebral abscess #2    Gram stain [[id number]]     Order Status:  Canceled Specimen:  CSF (Spinal Fluid) from CSF Tap, Tube 3     CSF culture [[id number]]     Order Status:  Canceled Specimen:  CSF (Spinal Fluid) from CSF Tap, Tube 3     Culture, Anaerobe [[id number]] Collected:  12/10/19 1038    Order Status:  Completed Specimen:  Body Fluid from Neck Updated:  12/12/19 0856     Anaerobic Culture Culture in progress    Narrative:       2) Free vertebral abscess #2    Culture, Anaerobe [[id number]] Collected:  12/10/19 1038    Order Status:  Completed Specimen:  Body Fluid from Neck Updated:   12/12/19 0855     Anaerobic Culture Culture in progress    Narrative:       1) Free vertebral abscess #1    AFB Culture & Smear [[id number]] Collected:  12/10/19 1038    Order Status:  Completed Specimen:  Body Fluid from Neck Updated:  12/11/19 2127     AFB Culture & Smear Culture in progress     AFB CULTURE STAIN No acid fast bacilli seen.    Narrative:       1) Free vertebral abscess #1    AFB Culture & Smear [[id number]] Collected:  12/10/19 1038    Order Status:  Completed Specimen:  Body Fluid from Neck Updated:  12/11/19 2127     AFB Culture & Smear Culture in progress     AFB CULTURE STAIN No acid fast bacilli seen.    Narrative:       2) Free vertebral abscess #2    Gram stain [[id number]] Collected:  12/11/19 1247    Order Status:  Canceled Specimen:  CSF (Spinal Fluid) from CSF Tap, Tube 3     Gram stain [[id number]] Collected:  12/10/19 1038    Order Status:  Completed Specimen:  Body Fluid from Neck Updated:  12/10/19 1245     Gram Stain Result Rare No WBC's      No organisms seen    Narrative:       2) Free vertebral abscess #2    Gram stain [[id number]] Collected:  12/10/19 1038    Order Status:  Completed Specimen:  Body Fluid from Neck Updated:  12/10/19 1241     Gram Stain Result No WBC's      No organisms seen    Narrative:       1) Free vertebral abscess #1

## 2019-12-14 NOTE — NURSING
1000H- Patient pulled 1 IV on left arm. Bleeding was controlled.Mittens was ordered by the team. Patient remained restless. Will monitor patient.

## 2019-12-15 LAB
ALBUMIN SERPL BCP-MCNC: 2.1 G/DL (ref 3.5–5.2)
ALLENS TEST: ABNORMAL
ALP SERPL-CCNC: 285 U/L (ref 55–135)
ALT SERPL W/O P-5'-P-CCNC: 16 U/L (ref 10–44)
ANION GAP SERPL CALC-SCNC: 5 MMOL/L (ref 8–16)
AST SERPL-CCNC: 23 U/L (ref 10–40)
BASOPHILS # BLD AUTO: 0.06 K/UL (ref 0–0.2)
BASOPHILS NFR BLD: 0.5 % (ref 0–1.9)
BILIRUB SERPL-MCNC: 0.5 MG/DL (ref 0.1–1)
BUN SERPL-MCNC: 14 MG/DL (ref 8–23)
CALCIUM SERPL-MCNC: 8.3 MG/DL (ref 8.7–10.5)
CHLORIDE SERPL-SCNC: 106 MMOL/L (ref 95–110)
CO2 SERPL-SCNC: 22 MMOL/L (ref 23–29)
CREAT SERPL-MCNC: 0.7 MG/DL (ref 0.5–1.4)
DELSYS: ABNORMAL
DIFFERENTIAL METHOD: ABNORMAL
EOSINOPHIL # BLD AUTO: 0.2 K/UL (ref 0–0.5)
EOSINOPHIL NFR BLD: 1.3 % (ref 0–8)
ERYTHROCYTE [DISTWIDTH] IN BLOOD BY AUTOMATED COUNT: 16.5 % (ref 11.5–14.5)
ERYTHROCYTE [SEDIMENTATION RATE] IN BLOOD BY WESTERGREN METHOD: 16 MM/H
EST. GFR  (AFRICAN AMERICAN): >60 ML/MIN/1.73 M^2
EST. GFR  (NON AFRICAN AMERICAN): >60 ML/MIN/1.73 M^2
FIO2: 21
GLUCOSE SERPL-MCNC: 86 MG/DL (ref 70–110)
HCO3 UR-SCNC: 20.4 MMOL/L (ref 24–28)
HCT VFR BLD AUTO: 31.2 % (ref 40–54)
HGB BLD-MCNC: 9.5 G/DL (ref 14–18)
IMM GRANULOCYTES # BLD AUTO: 0.05 K/UL (ref 0–0.04)
IMM GRANULOCYTES NFR BLD AUTO: 0.4 % (ref 0–0.5)
INR PPP: 1.1 (ref 0.8–1.2)
LYMPHOCYTES # BLD AUTO: 1.3 K/UL (ref 1–4.8)
LYMPHOCYTES NFR BLD: 11.7 % (ref 18–48)
MAGNESIUM SERPL-MCNC: 1.7 MG/DL (ref 1.6–2.6)
MCH RBC QN AUTO: 25.7 PG (ref 27–31)
MCHC RBC AUTO-ENTMCNC: 30.4 G/DL (ref 32–36)
MCV RBC AUTO: 85 FL (ref 82–98)
MODE: ABNORMAL
MONOCYTES # BLD AUTO: 0.6 K/UL (ref 0.3–1)
MONOCYTES NFR BLD: 5.2 % (ref 4–15)
NEUTROPHILS # BLD AUTO: 9 K/UL (ref 1.8–7.7)
NEUTROPHILS NFR BLD: 80.9 % (ref 38–73)
NRBC BLD-RTO: 0 /100 WBC
PCO2 BLDA: 29.8 MMHG (ref 35–45)
PH SMN: 7.44 [PH] (ref 7.35–7.45)
PHOSPHATE SERPL-MCNC: 3 MG/DL (ref 2.7–4.5)
PLATELET # BLD AUTO: 162 K/UL (ref 150–350)
PMV BLD AUTO: 9.6 FL (ref 9.2–12.9)
PO2 BLDA: 52 MMHG (ref 40–60)
POC BE: -4 MMOL/L
POC SATURATED O2: 88 % (ref 95–100)
POC TCO2: 21 MMOL/L (ref 24–29)
POTASSIUM SERPL-SCNC: 3.6 MMOL/L (ref 3.5–5.1)
PROT SERPL-MCNC: 5.5 G/DL (ref 6–8.4)
PROTHROMBIN TIME: 11.4 SEC (ref 9–12.5)
RBC # BLD AUTO: 3.69 M/UL (ref 4.6–6.2)
SAMPLE: ABNORMAL
SITE: ABNORMAL
SODIUM SERPL-SCNC: 133 MMOL/L (ref 136–145)
SP02: 100
WBC # BLD AUTO: 11.18 K/UL (ref 3.9–12.7)

## 2019-12-15 PROCEDURE — 94761 N-INVAS EAR/PLS OXIMETRY MLT: CPT

## 2019-12-15 PROCEDURE — 25000003 PHARM REV CODE 250: Performed by: STUDENT IN AN ORGANIZED HEALTH CARE EDUCATION/TRAINING PROGRAM

## 2019-12-15 PROCEDURE — 20000000 HC ICU ROOM

## 2019-12-15 PROCEDURE — 80053 COMPREHEN METABOLIC PANEL: CPT

## 2019-12-15 PROCEDURE — 63600175 PHARM REV CODE 636 W HCPCS: Performed by: UROLOGY

## 2019-12-15 PROCEDURE — 63600175 PHARM REV CODE 636 W HCPCS: Performed by: STUDENT IN AN ORGANIZED HEALTH CARE EDUCATION/TRAINING PROGRAM

## 2019-12-15 PROCEDURE — 92610 EVALUATE SWALLOWING FUNCTION: CPT

## 2019-12-15 PROCEDURE — A4216 STERILE WATER/SALINE, 10 ML: HCPCS | Performed by: ANESTHESIOLOGY

## 2019-12-15 PROCEDURE — 99024 PR POST-OP FOLLOW-UP VISIT: ICD-10-PCS | Mod: GC,,, | Performed by: NEUROLOGICAL SURGERY

## 2019-12-15 PROCEDURE — 84100 ASSAY OF PHOSPHORUS: CPT

## 2019-12-15 PROCEDURE — 63600175 PHARM REV CODE 636 W HCPCS: Performed by: ANESTHESIOLOGY

## 2019-12-15 PROCEDURE — 85610 PROTHROMBIN TIME: CPT

## 2019-12-15 PROCEDURE — 82803 BLOOD GASES ANY COMBINATION: CPT

## 2019-12-15 PROCEDURE — 63600175 PHARM REV CODE 636 W HCPCS: Performed by: NURSE PRACTITIONER

## 2019-12-15 PROCEDURE — S4991 NICOTINE PATCH NONLEGEND: HCPCS | Performed by: STUDENT IN AN ORGANIZED HEALTH CARE EDUCATION/TRAINING PROGRAM

## 2019-12-15 PROCEDURE — 83735 ASSAY OF MAGNESIUM: CPT

## 2019-12-15 PROCEDURE — 99024 POSTOP FOLLOW-UP VISIT: CPT | Mod: GC,,, | Performed by: NEUROLOGICAL SURGERY

## 2019-12-15 PROCEDURE — 85025 COMPLETE CBC W/AUTO DIFF WBC: CPT

## 2019-12-15 PROCEDURE — 63600175 PHARM REV CODE 636 W HCPCS: Performed by: PSYCHIATRY & NEUROLOGY

## 2019-12-15 PROCEDURE — 63600175 PHARM REV CODE 636 W HCPCS: Performed by: PHYSICIAN ASSISTANT

## 2019-12-15 PROCEDURE — 25000003 PHARM REV CODE 250: Performed by: ANESTHESIOLOGY

## 2019-12-15 PROCEDURE — 99232 PR SUBSEQUENT HOSPITAL CARE,LEVL II: ICD-10-PCS | Mod: GC,,, | Performed by: PSYCHIATRY & NEUROLOGY

## 2019-12-15 PROCEDURE — 99232 SBSQ HOSP IP/OBS MODERATE 35: CPT | Mod: GC,,, | Performed by: PSYCHIATRY & NEUROLOGY

## 2019-12-15 PROCEDURE — 99900035 HC TECH TIME PER 15 MIN (STAT)

## 2019-12-15 PROCEDURE — 82800 BLOOD PH: CPT

## 2019-12-15 RX ORDER — DIAZEPAM 10 MG/2ML
5 INJECTION INTRAMUSCULAR EVERY 8 HOURS
Status: DISCONTINUED | OUTPATIENT
Start: 2019-12-15 | End: 2019-12-20

## 2019-12-15 RX ADMIN — HYDRALAZINE HYDROCHLORIDE 10 MG: 20 INJECTION INTRAMUSCULAR; INTRAVENOUS at 04:12

## 2019-12-15 RX ADMIN — OXYCODONE HYDROCHLORIDE 10 MG: 5 TABLET ORAL at 07:12

## 2019-12-15 RX ADMIN — Medication: at 08:12

## 2019-12-15 RX ADMIN — METOPROLOL SUCCINATE 100 MG: 100 TABLET, EXTENDED RELEASE ORAL at 08:12

## 2019-12-15 RX ADMIN — DIAZEPAM 5 MG: 5 INJECTION, SOLUTION INTRAMUSCULAR; INTRAVENOUS at 09:12

## 2019-12-15 RX ADMIN — CEFEPIME 2 G: 2 INJECTION, POWDER, FOR SOLUTION INTRAVENOUS at 10:12

## 2019-12-15 RX ADMIN — HEPARIN SODIUM 5000 UNITS: 5000 INJECTION, SOLUTION INTRAVENOUS; SUBCUTANEOUS at 05:12

## 2019-12-15 RX ADMIN — NICOTINE 1 PATCH: 21 PATCH, EXTENDED RELEASE TRANSDERMAL at 08:12

## 2019-12-15 RX ADMIN — MORPHINE SULFATE 1 MG: 2 INJECTION, SOLUTION INTRAMUSCULAR; INTRAVENOUS at 06:12

## 2019-12-15 RX ADMIN — ACETAMINOPHEN 1000 MG: 500 TABLET ORAL at 11:12

## 2019-12-15 RX ADMIN — ACETAMINOPHEN 1000 MG: 500 TABLET ORAL at 06:12

## 2019-12-15 RX ADMIN — HEPARIN SODIUM 5000 UNITS: 5000 INJECTION, SOLUTION INTRAVENOUS; SUBCUTANEOUS at 09:12

## 2019-12-15 RX ADMIN — LEVOTHYROXINE SODIUM 50 MCG: 50 TABLET ORAL at 05:12

## 2019-12-15 RX ADMIN — Medication 800 MG: at 05:12

## 2019-12-15 RX ADMIN — Medication 10 ML: at 05:12

## 2019-12-15 RX ADMIN — Medication: at 09:12

## 2019-12-15 RX ADMIN — CEFEPIME 2 G: 2 INJECTION, POWDER, FOR SOLUTION INTRAVENOUS at 01:12

## 2019-12-15 RX ADMIN — ATORVASTATIN CALCIUM 80 MG: 20 TABLET, FILM COATED ORAL at 08:12

## 2019-12-15 RX ADMIN — Medication: at 02:12

## 2019-12-15 RX ADMIN — HEPARIN SODIUM 5000 UNITS: 5000 INJECTION, SOLUTION INTRAVENOUS; SUBCUTANEOUS at 02:12

## 2019-12-15 RX ADMIN — POTASSIUM CHLORIDE 40 MEQ: 20 SOLUTION ORAL at 05:12

## 2019-12-15 RX ADMIN — ACETAMINOPHEN 1000 MG: 500 TABLET ORAL at 02:12

## 2019-12-15 RX ADMIN — DIAZEPAM 5 MG: 5 INJECTION, SOLUTION INTRAMUSCULAR; INTRAVENOUS at 02:12

## 2019-12-15 RX ADMIN — CEFEPIME 2 G: 2 INJECTION, POWDER, FOR SOLUTION INTRAVENOUS at 05:12

## 2019-12-15 RX ADMIN — DIAZEPAM 5 MG: 5 INJECTION, SOLUTION INTRAMUSCULAR; INTRAVENOUS at 05:12

## 2019-12-15 RX ADMIN — LOSARTAN POTASSIUM AND HYDROCHLOROTHIAZIDE 1 TABLET: 100; 25 TABLET, FILM COATED ORAL at 08:12

## 2019-12-15 RX ADMIN — SENNOSIDES AND DOCUSATE SODIUM 1 TABLET: 8.6; 5 TABLET ORAL at 09:12

## 2019-12-15 RX ADMIN — AMLODIPINE BESYLATE 5 MG: 5 TABLET ORAL at 08:12

## 2019-12-15 RX ADMIN — Medication 10 ML: at 11:12

## 2019-12-15 NOTE — SUBJECTIVE & OBJECTIVE
Interval History:      Review of Systems   Constitutional: Negative for chills and fever.   Respiratory: Negative for chest tightness and shortness of breath.    Cardiovascular: Negative for chest pain.   Gastrointestinal: Negative for abdominal pain.   Musculoskeletal: Positive for back pain and neck pain.   Psychiatric/Behavioral: Positive for confusion.     Objective:     Vitals:  Temp: 97.1 °F (36.2 °C)  Pulse: 83  Rhythm: normal sinus rhythm  BP: (!) 161/84  MAP (mmHg): 116  Resp: (!) 21  SpO2: 100 %  O2 Device (Oxygen Therapy): room air    Temp  Min: 96.7 °F (35.9 °C)  Max: 97.1 °F (36.2 °C)  Pulse  Min: 70  Max: 102  BP  Min: 149/71  Max: 200/152  MAP (mmHg)  Min: 99  Max: 172  Resp  Min: 14  Max: 29  SpO2  Min: 100 %  Max: 100 %    12/14 0701 - 12/15 0700  In: 2308.3 [I.V.:2308.3]  Out: 1647 [Urine:1615; Drains:32]   Unmeasured Output  Urine Occurrence: 1  Stool Occurrence: 1       Physical Exam    Constitutional: Well-nourished and -developed. No apparent distress.   Eyes: Conjunctiva clear, anicteric. Lids no lesions.  Head/Ears/Nose/Mouth/Throat/Neck: Moist mucous membranes. External ears, nose atraumatic.   Cardiovascular: Regular rhythm. No murmurs. No leg edema.  Respiratory: Comfortable respirations. Clear to auscultation.  Gastrointestinal: No hernia. Soft, nondistended, nontender. + bowel sounds.     Neurologic:  -GCS [id number]  -opens eyes to voice Oriented to person, place disoriented to time. Speech fluent. Follows commands.  -Cranial nerves II-XII grossly intact PERRL 3+ + cough, gag, corneals  -Motor OSHEA spon.  -Sensation intact       Medications:  Continuous    sodium chloride 0.9% Last Rate: 30 mL/hr at 12/15/19 1105   Scheduled    acetaminophen 1,000 mg Q8H   amLODIPine 5 mg Daily   atorvastatin 80 mg Daily   bacitracin  TID   ceFEPime (MAXIPIME) IVPB 2 g Q8H   diazePAM 5 mg Q8H   heparin (porcine) 5,000 Units Q8H   levothyroxine 50 mcg Before breakfast   losartan-hydrochlorothiazide 100-25 mg  1 tablet Daily   magnesium oxide 800 mg Once   metoprolol succinate 100 mg Daily   nicotine 1 patch Daily   polyethylene glycol 17 g Daily   senna-docusate 8.6-50 mg 1 tablet BID   sodium chloride 0.9% 10 mL Q6H   PRN    Dextrose 10% Bolus 12.5 g PRN   Dextrose 10% Bolus 25 g PRN   glucagon (human recombinant) 1 mg PRN   glucose 16 g PRN   glucose 24 g PRN   hydrALAZINE 10 mg Q4H PRN   labetalol 10 mg Q4H PRN   magnesium oxide 800 mg PRN   magnesium oxide 800 mg PRN   morphine 1 mg Q6H PRN   naloxone 0.4 mg PRN   ondansetron 8 mg Q8H PRN   oxyCODONE 10 mg Q6H PRN   potassium chloride 10% 40 mEq PRN   potassium chloride 10% 40 mEq PRN   potassium chloride 10% 40 mEq PRN   potassium, sodium phosphates 2 packet PRN   potassium, sodium phosphates 2 packet PRN   potassium, sodium phosphates 2 packet PRN   sodium chloride 0.9% 10 mL PRN   sodium chloride 0.9% 10 mL PRN   sodium chloride 0.9% 10 mL PRN       Diet  Diet Dysphagia Mechanical Soft (IDDSI Level 5) Thin  Diet Dysphagia Mechanical Soft (IDDSI Level 5) Thin

## 2019-12-15 NOTE — PLAN OF CARE
Clinical Swallow Evaluation completed.  REC:  pt resume po intake with puree diet with thin liquids, oral meds crushed in puree, No Straws, feeding assistance for meals, aspiration precautions.  Recs reviewed w/ RN.  Bienvenido ST per POC.    Norma Cottrell CCC-SLP  12/15/2019

## 2019-12-15 NOTE — PT/OT/SLP EVAL
Speech Language Pathology Evaluation  Bedside Swallow    Patient Name:  Junaid Muñoz   MRN:  [id number]  Admitting Diagnosis: Spinal epidural abscess    Recommendations:                 General Recommendations:  Dysphagia therapy  Diet recommendations:  Puree, Thin   Aspiration Precautions: 1 bite/sip at a time, Alternating bites/sips, Assistance with meals, Feed only when awake/alert, HOB to 90 degrees, Meds crushed in puree, Monitor for s/s of aspiration, No straws, Small bites/sips and Standard aspiration precautions   General Precautions: Standard, aspiration, fall  Communication strategies:  provide increased time to answer    History:     Past Medical History:   Diagnosis Date    CAD (coronary artery disease)     s/p stent 2005, on plavix    Chronic hepatitis C     Cirrhosis     biopsy proven - 2007    History of colon polyps 06/2008    1 polyp - tubular adenoma    HTN (hypertension)     Hyperlipidemia     Hypothyroidism        Past Surgical History:   Procedure Laterality Date    COLONOSCOPY W/ POLYPECTOMY  06/2008    Dr Wagoner: fair prep, 3mm polyp - tubular adenoma    CORONARY ANGIOPLASTY WITH STENT PLACEMENT      hydrocele repair  teenager    pyloric stenosis repair  age 1    SURGICAL REMOVAL OF VERTEBRAL BODY OF THORACIC SPINE N/A 12/10/2019    Procedure: CORPECTOMY, SPINE, CERVICAL AND THORACIC, C7 and T1 with Globus;  Surgeon: Elian Deluca MD;  Location: [facility] OR [address] [place] [address];  Service: Neurosurgery;  Laterality: N/A;    TONSILLECTOMY         Social History: unknown.    Prior Intubation HX:  Pt intubated and extubated 12/10/19    Modified Barium Swallow: none this admission    Chest X-Rays: 12/14/19:  Right PICC catheter tip projects over the distal SVC.  The cardiomediastinal silhouette is not enlarged, noting calcification and some tortuosity of the aorta..  There is no pleural effusion.  The trachea is midline.  The lungs are symmetrically expanded bilaterally with minimally coarse  central hilar interstitial attenuation, likely accentuated by shallow inspiratory effort.  There is bandlike atelectasis or scarring projected over the right mid lung zone..  No large focal consolidation seen.  There is no pneumothorax.  The osseous structures are remarkable for degenerative changes noting cervical spinal fusion..    Prior diet: unknown.    Occupation/hobbies/homemaking: none expressed.    Subjective     Pt was resting upon SLP presentation. He was easily arousable and agreed to evaluation.  Patient goals: none expressed     Pain/Comfort:  · Pain Rating 1: 0/10  · Pain Rating Post-Intervention 1: 0/10    Objective:     Oral Musculature Evaluation  · Oral Musculature: general weakness  · Dentition: edentulous  · Secretion Management: adequate  · Mucosal Quality: adequate  · Mandibular Strength and Mobility: WFL  · Oral Labial Strength and Mobility: WFL  · Lingual Strength and Mobility: WFL  · Buccal Strength and Mobility: WFL  · Volitional Cough: adequate  · Volitional Swallow: timely  · Voice Prior to PO Intake: clear    Bedside Swallow Eval:   Consistencies Assessed:  · Thin liquids tsp, cup and straw sip  · Puree tsp bites  · Solids clinician fed bite of solid moistened in water     Oral Phase:   · Prolonged mastication:  D/t missing dentition  · Lingual residue : Mild-moderate on tongue body with soft solids cleared with multiple liquid washes    Pharyngeal Phase:   · Coughing/choking:  With straw sip only  · decreased hyolaryngeal excursion to palpation:  Pt with anterior edema below larynx    Compensatory Strategies  · alternating bites/sips    Treatment: Education was provided to pt re: SLP role, evaluation results, diet recs, aspiration precautions and SLP POC.  He indicated limited understanding.  Pt;s white board was updated.    Assessment:     Junaid Muñoz is a 74 y.o. male with an SLP diagnosis of Dysphagia.  He presents with mild oral dysphagia characterized by prolonged mastication  d/t limited/missing dentition.  He would benefit from ongoing monitoring of diet tolerance with Skilled Speech services for assessment of readiness for diet advancement.    Goals:   Multidisciplinary Problems     SLP Goals        Problem: SLP Goal    Goal Priority Disciplines Outcome   SLP Goal     SLP Ongoing, Progressing   Description:  Speech Language Pathology Goals  Goals expected to be met by 12/22/19:    1.  Pt will tolerate puree diet with thin liquids with adequate oral clearance and no overt signs of aspiration.  2.  Pt will tolerate trials of soft solids with adequate oral clearance and no overt signs of aspiration.                        Plan:     · Patient to be seen:  4 x/week   · Plan of Care expires:  01/13/20  · Plan of Care reviewed with:  patient   · SLP Follow-Up:  Yes       Discharge recommendations:  other (see comments)(pending pt/ot recs)   Barriers to Discharge:  Inaccessible Home Environment    Time Tracking:     SLP Treatment Date:   12/15/19  Speech Start Time:  1243  Speech Stop Time:  1255     Speech Total Time (min):  12 min    Billable Minutes: Eval Swallow and Oral Function 12    Norma Cottrell CCC-SLP  12/15/2019

## 2019-12-15 NOTE — PROGRESS NOTES
[facility]  Neurosurgery  Progress Note    Subjective:     History of Present Illness: Junaid Muñoz is a 74 y.o. male with PMH of HTN, HLD, Hepatitis C, and CAD s/p two stents on plavix who presents with 1 month history of back pain between his shoulders. MRI at OSH demonstrates likely epidural abscess. Pt denies hx of trauma and reports slow onset of symptoms.     Post-Op Info:  Procedure(s) (LRB):  CORPECTOMY, SPINE, CERVICAL AND THORACIC, C7 and T1 with Globus (N/A)   5 Days Post-Op     Interval History: naeon    Medications:  Continuous Infusions:   sodium chloride 0.9% 100 mL/hr at 12/15/19 0905     Scheduled Meds:   acetaminophen  1,000 mg Oral Q8H    amLODIPine  5 mg Oral Daily    atorvastatin  80 mg Oral Daily    bacitracin   Topical (Top) TID    ceFEPime (MAXIPIME) IVPB  2 g Intravenous Q8H    diazePAM  5 mg Intravenous Q6H    heparin (porcine)  5,000 Units Subcutaneous Q8H    levothyroxine  50 mcg Oral Before breakfast    losartan-hydrochlorothiazide 100-25 mg  1 tablet Oral Daily    magnesium oxide  800 mg Oral Once    metoprolol succinate  100 mg Oral Daily    nicotine  1 patch Transdermal Daily    polyethylene glycol  17 g Oral Daily    senna-docusate 8.6-50 mg  1 tablet Oral BID    sodium chloride 0.9%  10 mL Intravenous Q6H     PRN Meds:Dextrose 10% Bolus, Dextrose 10% Bolus, glucagon (human recombinant), glucose, glucose, hydrALAZINE, labetalol, magnesium oxide, magnesium oxide, morphine, naloxone, ondansetron, oxyCODONE, potassium chloride 10%, potassium chloride 10%, potassium chloride 10%, potassium, sodium phosphates, potassium, sodium phosphates, potassium, sodium phosphates, sodium chloride 0.9%, sodium chloride 0.9%, Flushing PICC Protocol **AND** sodium chloride 0.9% **AND** sodium chloride 0.9%     Review of Systems    Objective:     Weight: 79.4 kg (175 lb)  Body mass index is 25.84 kg/m².  Vital Signs (Most Recent):  Temp: 96.9 °F (36.1 °C)  "(12/15/19 0705)  Pulse: 95 (12/15/19 0900)  Resp: (!) 25 (12/15/19 0900)  BP: (!) 173/85 (12/15/19 0900)  SpO2: 100 % (12/15/19 0900) Vital Signs (24h Range):  Temp:  [96.7 °F (35.9 °C)-97.2 °F (36.2 °C)] 96.9 °F (36.1 °C)  Pulse:  [] 95  Resp:  [14-29] 25  SpO2:  [100 %] 100 %  BP: (149-200)/() 173/85     Date 12/15/19 0700 - 12/16/19 0659   Shift 1181-7312 9508-7832 5870-1660 24 Hour Total   INTAKE   I.V.(mL/kg) 300(3.8)   300(3.8)   Shift Total(mL/kg) 300(3.8)   300(3.8)   OUTPUT   Urine(mL/kg/hr) 185   185   Drains 0   0   Shift Total(mL/kg) 185(2.3)   185(2.3)   Weight (kg) 79.4 79.4 79.4 79.4                        Closed/Suction Drain 12/10/19 1018 Anterior Neck Accordion 10 Fr. (Active)   Site Description Healing 12/11/2019  3:05 AM   Dressing Type No dressing 12/11/2019  3:05 AM   Drainage Serosanguineous 12/10/2019  6:30 PM   Status Clamped 12/11/2019  3:05 AM   Output (mL) 1500 mL 12/11/2019  6:05 AM            Urethral Catheter 12/10/19 0730 Non-latex 16 Fr. (Active)   Site Assessment Clean;Intact 12/11/2019  3:05 AM   Collection Container Urimeter 12/11/2019  3:05 AM   Securement Method secured to top of thigh w/ adhesive device 12/11/2019  3:05 AM   Catheter Care Performed yes 12/11/2019  3:05 AM   Reason for Continuing Urinary Catheterization Post operative 12/11/2019  3:05 AM   CAUTI Prevention Bundle StatLock in place w 1" slack;Intact seal between catheter & drainage tubing;Drainage bag/urimeter off the floor;Green sheeting clip in use;No dependent loops or kinks;Drainage bag/urimeter not overfilled (<2/3 full);Drainage bag/urimeter below bladder 12/10/2019  7:05 PM   Output (mL) 75 mL 12/10/2019  3:30 PM            Lumbar Drain 12/10/19 1056 (Active)   Drain Status Other (Comment) 12/11/2019  3:05 AM   Level to other (see comment) 12/10/2019  2:32 PM   Dressing Status Clean;Dry;Intact 12/11/2019  3:05 AM   Interventions HOB degrees (see comment) 12/10/2019  2:32 PM   Output (mL) 2 mL " 12/11/2019  4:05 AM       Neurosurgery Physical Exam  AOX3  5/5 in BUE except 4/5 in left HF  5/5 in BLE  SILT  Incision c/d/i    Significant Labs:  Recent Labs   Lab 12/13/19  1748 12/14/19  0228 12/15/19  0130   GLU  --  94 86   NA  --  137 133*   K  --  3.5 3.6   CL  --  109 106   CO2  --  22* 22*   BUN  --  11 14   CREATININE  --  0.7 0.7   CALCIUM  --  8.3* 8.3*   MG 2.0  --  1.7     Recent Labs   Lab 12/14/19  0228 12/15/19  0130   WBC 9.15 11.18   HGB 8.8* 9.5*   HCT 29.3* 31.2*    162     Recent Labs   Lab 12/14/19  0228 12/15/19  0130   INR 1.1 1.1     Microbiology Results (last 7 days)     Procedure Component Value Units Date/Time    CSF culture [[id number]] Collected:  12/11/19 1247    Order Status:  Completed Specimen:  CSF (Spinal Fluid) from CSF Tap, Tube 3 Updated:  12/15/19 0711     CSF CULTURE No Growth to date     Gram Stain Result Cytospin indicates:      Rare WBC's      No organisms seen    Blood culture [[id number]] Collected:  12/12/19 0147    Order Status:  Completed Specimen:  Blood from Wrist, Right Updated:  12/15/19 0612     Blood Culture, Routine No Growth to date      No Growth to date      No Growth to date      No Growth to date    Narrative:       Blood cultures from 2 different sites. 4 bottles total.  Please draw before starting antibiotics.    Blood culture [[id number]] Collected:  12/12/19 0213    Order Status:  Completed Specimen:  Blood from Peripheral, Antecubital, Right Updated:  12/15/19 0612     Blood Culture, Routine No Growth to date      No Growth to date      No Growth to date      No Growth to date    Narrative:       Blood cultures x 2 different sites. 4 bottles total. Please  draw cultures before administering antibiotics.    Blood culture [[id number]] Collected:  12/11/19 1005    Order Status:  Completed Specimen:  Blood from Peripheral, Forearm, Right Updated:  12/14/19 1212     Blood Culture, Routine No Growth to date      No Growth to date      No Growth to  date      No Growth to date    Narrative:       Blood cultures x 2 different sites. 4 bottles total. Please  draw cultures before administering antibiotics.    Blood culture [[id number]] Collected:  12/11/19 1000    Order Status:  Completed Specimen:  Blood from Peripheral, Foot, Right Updated:  12/14/19 1212     Blood Culture, Routine No Growth to date      No Growth to date      No Growth to date      No Growth to date    Narrative:       Blood cultures from 2 different sites. 4 bottles total.  Please draw before starting antibiotics.    CSF culture [[id number]]  (Abnormal)  (Susceptibility) Collected:  12/10/19 1158    Order Status:  Completed Specimen:  CSF (Spinal Fluid) from CSF Tap, Tube 1 Updated:  12/13/19 1350     CSF CULTURE Culture has Staphylococcus.      Results called to and read back by:  Leonardo Cook RN 12/12/2019  10:36      STAPHYLOCOCCUS EPIDERMIDIS     Gram Stain Result No WBC's      No organisms seen    Narrative:       3) 3) CSF for cell count, differential, culture, gramstain,  protein, and glucose    Aerobic culture [[id number]]  (Abnormal)  (Susceptibility) Collected:  12/10/19 1038    Order Status:  Completed Specimen:  Body Fluid from Neck Updated:  12/13/19 1240     Aerobic Bacterial Culture PSEUDOMONAS AERUGINOSA  Rare      Narrative:       1) Free vertebral abscess #1    Fungus culture [[id number]] Collected:  12/10/19 1038    Order Status:  Completed Specimen:  Body Fluid from Neck Updated:  12/13/19 1059     Fungus (Mycology) Culture Culture in progress    Narrative:       2) Free vertebral abscess #2    Fungus culture [[id number]] Collected:  12/10/19 1038    Order Status:  Completed Specimen:  Body Fluid from Neck Updated:  12/13/19 1059     Fungus (Mycology) Culture Culture in progress    Narrative:       1) Free vertebral abscess #1    Aerobic culture [[id number]]  (Abnormal)  (Susceptibility) Collected:  12/10/19 1038    Order Status:  Completed Specimen:  Body Fluid from Neck Updated:   12/12/19 1132     Aerobic Bacterial Culture PSEUDOMONAS AERUGINOSA  Few      Narrative:       2) Free vertebral abscess #2    Gram stain [[id number]]     Order Status:  Canceled Specimen:  CSF (Spinal Fluid) from CSF Tap, Tube 3     CSF culture [[id number]]     Order Status:  Canceled Specimen:  CSF (Spinal Fluid) from CSF Tap, Tube 3     Culture, Anaerobe [[id number]] Collected:  12/10/19 1038    Order Status:  Completed Specimen:  Body Fluid from Neck Updated:  12/12/19 0856     Anaerobic Culture Culture in progress    Narrative:       2) Free vertebral abscess #2    Culture, Anaerobe [[id number]] Collected:  12/10/19 1038    Order Status:  Completed Specimen:  Body Fluid from Neck Updated:  12/12/19 0855     Anaerobic Culture Culture in progress    Narrative:       1) Free vertebral abscess #1    AFB Culture & Smear [[id number]] Collected:  12/10/19 1038    Order Status:  Completed Specimen:  Body Fluid from Neck Updated:  12/11/19 2127     AFB Culture & Smear Culture in progress     AFB CULTURE STAIN No acid fast bacilli seen.    Narrative:       1) Free vertebral abscess #1    AFB Culture & Smear [[id number]] Collected:  12/10/19 1038    Order Status:  Completed Specimen:  Body Fluid from Neck Updated:  12/11/19 2127     AFB Culture & Smear Culture in progress     AFB CULTURE STAIN No acid fast bacilli seen.    Narrative:       2) Free vertebral abscess #2    Gram stain [[id number]] Collected:  12/11/19 1247    Order Status:  Canceled Specimen:  CSF (Spinal Fluid) from CSF Tap, Tube 3     Gram stain [[id number]] Collected:  12/10/19 1038    Order Status:  Completed Specimen:  Body Fluid from Neck Updated:  12/10/19 1245     Gram Stain Result Rare No WBC's      No organisms seen    Narrative:       2) Free vertebral abscess #2    Gram stain [[id number]] Collected:  12/10/19 1038    Order Status:  Completed Specimen:  Body Fluid from Neck Updated:  12/10/19 1241     Gram Stain Result No WBC's      No organisms seen     Narrative:       1) Free vertebral abscess #1              Assessment/Plan:     * Spinal epidural abscess  74 y.o. male with PMH of HTN, HLD, Hepatitis C, and CAD s/p two stents on plavix who presents with C6-T2 osteomyelitis with suspected epidural abscess.  Now s/p C7/T1 corpectomies.    -still awaiting xrays   - Continue ICU care  - q1hr neuro checks  - LD open to drain maximum 10cc/hr, no minimum drainage  - HV drain clamped. Will monitor.  - may sit upright   - Planning for posterior cervical instrumentation this week  - ID consulted: csf sent 12/10 w/ staph epi, 12/11 neg; surgical cx with pseudomonas- cefepime x 8 weeks   -  brace when upright  - SQH  - Further care per [facility]        Damon Pressley MD  Neurosurgery  [facility]

## 2019-12-15 NOTE — SUBJECTIVE & OBJECTIVE
Interval History: naeon    Medications:  Continuous Infusions:   sodium chloride 0.9% 100 mL/hr at 12/15/19 0905     Scheduled Meds:   acetaminophen  1,000 mg Oral Q8H    amLODIPine  5 mg Oral Daily    atorvastatin  80 mg Oral Daily    bacitracin   Topical (Top) TID    ceFEPime (MAXIPIME) IVPB  2 g Intravenous Q8H    diazePAM  5 mg Intravenous Q6H    heparin (porcine)  5,000 Units Subcutaneous Q8H    levothyroxine  50 mcg Oral Before breakfast    losartan-hydrochlorothiazide 100-25 mg  1 tablet Oral Daily    magnesium oxide  800 mg Oral Once    metoprolol succinate  100 mg Oral Daily    nicotine  1 patch Transdermal Daily    polyethylene glycol  17 g Oral Daily    senna-docusate 8.6-50 mg  1 tablet Oral BID    sodium chloride 0.9%  10 mL Intravenous Q6H     PRN Meds:Dextrose 10% Bolus, Dextrose 10% Bolus, glucagon (human recombinant), glucose, glucose, hydrALAZINE, labetalol, magnesium oxide, magnesium oxide, morphine, naloxone, ondansetron, oxyCODONE, potassium chloride 10%, potassium chloride 10%, potassium chloride 10%, potassium, sodium phosphates, potassium, sodium phosphates, potassium, sodium phosphates, sodium chloride 0.9%, sodium chloride 0.9%, Flushing PICC Protocol **AND** sodium chloride 0.9% **AND** sodium chloride 0.9%     Review of Systems    Objective:     Weight: 79.4 kg (175 lb)  Body mass index is 25.84 kg/m².  Vital Signs (Most Recent):  Temp: 96.9 °F (36.1 °C) (12/15/19 0705)  Pulse: 95 (12/15/19 0900)  Resp: (!) 25 (12/15/19 0900)  BP: (!) 173/85 (12/15/19 0900)  SpO2: 100 % (12/15/19 0900) Vital Signs (24h Range):  Temp:  [96.7 °F (35.9 °C)-97.2 °F (36.2 °C)] 96.9 °F (36.1 °C)  Pulse:  [] 95  Resp:  [14-29] 25  SpO2:  [100 %] 100 %  BP: (149-200)/() 173/85     Date 12/15/19 0700 - 12/16/19 0659   Shift 7772-6338 1253-2189 1842-4660 24 Hour Total   INTAKE   I.V.(mL/kg) 300(3.8)   300(3.8)   Shift Total(mL/kg) 300(3.8)   300(3.8)   OUTPUT   Urine(mL/kg/hr) 185   185  "  Drains 0   0   Shift Total(mL/kg) 185(2.3)   185(2.3)   Weight (kg) 79.4 79.4 79.4 79.4                        Closed/Suction Drain 12/10/19 1018 Anterior Neck Accordion 10 Fr. (Active)   Site Description Healing 12/11/2019  3:05 AM   Dressing Type No dressing 12/11/2019  3:05 AM   Drainage Serosanguineous 12/10/2019  6:30 PM   Status Clamped 12/11/2019  3:05 AM   Output (mL) 1500 mL 12/11/2019  6:05 AM            Urethral Catheter 12/10/19 0730 Non-latex 16 Fr. (Active)   Site Assessment Clean;Intact 12/11/2019  3:05 AM   Collection Container Urimeter 12/11/2019  3:05 AM   Securement Method secured to top of thigh w/ adhesive device 12/11/2019  3:05 AM   Catheter Care Performed yes 12/11/2019  3:05 AM   Reason for Continuing Urinary Catheterization Post operative 12/11/2019  3:05 AM   CAUTI Prevention Bundle StatLock in place w 1" slack;Intact seal between catheter & drainage tubing;Drainage bag/urimeter off the floor;Green sheeting clip in use;No dependent loops or kinks;Drainage bag/urimeter not overfilled (<2/3 full);Drainage bag/urimeter below bladder 12/10/2019  7:05 PM   Output (mL) 75 mL 12/10/2019  3:30 PM            Lumbar Drain 12/10/19 1056 (Active)   Drain Status Other (Comment) 12/11/2019  3:05 AM   Level to other (see comment) 12/10/2019  2:32 PM   Dressing Status Clean;Dry;Intact 12/11/2019  3:05 AM   Interventions HOB degrees (see comment) 12/10/2019  2:32 PM   Output (mL) 2 mL 12/11/2019  4:05 AM       Neurosurgery Physical Exam  AOX3  5/5 in BUE except 4/5 in left HF  5/5 in BLE  SILT  Incision c/d/i    Significant Labs:  Recent Labs   Lab 12/13/19  1748 12/14/19  0228 12/15/19  0130   GLU  --  94 86   NA  --  137 133*   K  --  3.5 3.6   CL  --  109 106   CO2  --  22* 22*   BUN  --  11 14   CREATININE  --  0.7 0.7   CALCIUM  --  8.3* 8.3*   MG 2.0  --  1.7     Recent Labs   Lab 12/14/19  0228 12/15/19  0130   WBC 9.15 11.18   HGB 8.8* 9.5*   HCT 29.3* 31.2*    162     Recent Labs   Lab " 12/14/19  0228 12/15/19  0130   INR 1.1 1.1     Microbiology Results (last 7 days)     Procedure Component Value Units Date/Time    CSF culture [[id number]] Collected:  12/11/19 1247    Order Status:  Completed Specimen:  CSF (Spinal Fluid) from CSF Tap, Tube 3 Updated:  12/15/19 0711     CSF CULTURE No Growth to date     Gram Stain Result Cytospin indicates:      Rare WBC's      No organisms seen    Blood culture [[id number]] Collected:  12/12/19 0147    Order Status:  Completed Specimen:  Blood from Wrist, Right Updated:  12/15/19 0612     Blood Culture, Routine No Growth to date      No Growth to date      No Growth to date      No Growth to date    Narrative:       Blood cultures from 2 different sites. 4 bottles total.  Please draw before starting antibiotics.    Blood culture [[id number]] Collected:  12/12/19 0213    Order Status:  Completed Specimen:  Blood from Peripheral, Antecubital, Right Updated:  12/15/19 0612     Blood Culture, Routine No Growth to date      No Growth to date      No Growth to date      No Growth to date    Narrative:       Blood cultures x 2 different sites. 4 bottles total. Please  draw cultures before administering antibiotics.    Blood culture [[id number]] Collected:  12/11/19 1005    Order Status:  Completed Specimen:  Blood from Peripheral, Forearm, Right Updated:  12/14/19 1212     Blood Culture, Routine No Growth to date      No Growth to date      No Growth to date      No Growth to date    Narrative:       Blood cultures x 2 different sites. 4 bottles total. Please  draw cultures before administering antibiotics.    Blood culture [[id number]] Collected:  12/11/19 1000    Order Status:  Completed Specimen:  Blood from Peripheral, Foot, Right Updated:  12/14/19 1212     Blood Culture, Routine No Growth to date      No Growth to date      No Growth to date      No Growth to date    Narrative:       Blood cultures from 2 different sites. 4 bottles total.  Please draw before starting  antibiotics.    CSF culture [[id number]]  (Abnormal)  (Susceptibility) Collected:  12/10/19 1158    Order Status:  Completed Specimen:  CSF (Spinal Fluid) from CSF Tap, Tube 1 Updated:  12/13/19 1350     CSF CULTURE Culture has Staphylococcus.      Results called to and read back by:  Leonardo Cook RN 12/12/2019  10:36      STAPHYLOCOCCUS EPIDERMIDIS     Gram Stain Result No WBC's      No organisms seen    Narrative:       3) 3) CSF for cell count, differential, culture, gramstain,  protein, and glucose    Aerobic culture [[id number]]  (Abnormal)  (Susceptibility) Collected:  12/10/19 1038    Order Status:  Completed Specimen:  Body Fluid from Neck Updated:  12/13/19 1240     Aerobic Bacterial Culture PSEUDOMONAS AERUGINOSA  Rare      Narrative:       1) Free vertebral abscess #1    Fungus culture [[id number]] Collected:  12/10/19 1038    Order Status:  Completed Specimen:  Body Fluid from Neck Updated:  12/13/19 1059     Fungus (Mycology) Culture Culture in progress    Narrative:       2) Free vertebral abscess #2    Fungus culture [[id number]] Collected:  12/10/19 1038    Order Status:  Completed Specimen:  Body Fluid from Neck Updated:  12/13/19 1059     Fungus (Mycology) Culture Culture in progress    Narrative:       1) Free vertebral abscess #1    Aerobic culture [[id number]]  (Abnormal)  (Susceptibility) Collected:  12/10/19 1038    Order Status:  Completed Specimen:  Body Fluid from Neck Updated:  12/12/19 1132     Aerobic Bacterial Culture PSEUDOMONAS AERUGINOSA  Few      Narrative:       2) Free vertebral abscess #2    Gram stain [[id number]]     Order Status:  Canceled Specimen:  CSF (Spinal Fluid) from CSF Tap, Tube 3     CSF culture [[id number]]     Order Status:  Canceled Specimen:  CSF (Spinal Fluid) from CSF Tap, Tube 3     Culture, Anaerobe [[id number]] Collected:  12/10/19 1038    Order Status:  Completed Specimen:  Body Fluid from Neck Updated:  12/12/19 0856     Anaerobic Culture Culture in progress     Narrative:       2) Free vertebral abscess #2    Culture, Anaerobe [[id number]] Collected:  12/10/19 1038    Order Status:  Completed Specimen:  Body Fluid from Neck Updated:  12/12/19 0855     Anaerobic Culture Culture in progress    Narrative:       1) Free vertebral abscess #1    AFB Culture & Smear [[id number]] Collected:  12/10/19 1038    Order Status:  Completed Specimen:  Body Fluid from Neck Updated:  12/11/19 2127     AFB Culture & Smear Culture in progress     AFB CULTURE STAIN No acid fast bacilli seen.    Narrative:       1) Free vertebral abscess #1    AFB Culture & Smear [[id number]] Collected:  12/10/19 1038    Order Status:  Completed Specimen:  Body Fluid from Neck Updated:  12/11/19 2127     AFB Culture & Smear Culture in progress     AFB CULTURE STAIN No acid fast bacilli seen.    Narrative:       2) Free vertebral abscess #2    Gram stain [[id number]] Collected:  12/11/19 1247    Order Status:  Canceled Specimen:  CSF (Spinal Fluid) from CSF Tap, Tube 3     Gram stain [[id number]] Collected:  12/10/19 1038    Order Status:  Completed Specimen:  Body Fluid from Neck Updated:  12/10/19 1245     Gram Stain Result Rare No WBC's      No organisms seen    Narrative:       2) Free vertebral abscess #2    Gram stain [[id number]] Collected:  12/10/19 1038    Order Status:  Completed Specimen:  Body Fluid from Neck Updated:  12/10/19 1241     Gram Stain Result No WBC's      No organisms seen    Narrative:       1) Free vertebral abscess #1

## 2019-12-15 NOTE — PROGRESS NOTES
[facility]  Neurocritical Care  Progress Note    Admit Date: 12/6/2019  Service Date: 12/15/2019  Length of Stay: 9    Subjective:     Chief Complaint: Spinal epidural abscess    History of Present Illness: Junaid Muñoz is a 74 year old male with CAD, HTN, HLD, hypothyroidism, tobacco use, and prior history of IVDU who was found to have C6-T3 osteomyelitis with epidural abscess. Patient was neurologically intact and hemodynamically stable on admission.  Now immediately status post phase one of a two part surgery.  Today he had a C7-T1 corpectomy and fusion.  Lumbar drain was placed secondary to intra op CSF leak, and patient admitted to [facility] for post op monitoring and drain maintenance.     Hospital Course: 12/10 admitted post-op C7-T1 corpectomy and fusion with lumbar drain placed 2/2 CSF leak intra-op  12/12  No significant events over night, per nsgy will keep lumbar drain another day. 2nd stage of sugery to occur next Tuesday. Remains NPO while having to lie flat with  lumbar drain. Getting confused after receiving valium decreased dose.  12/13: Restless o/n, c/o back pain. LD dropped to floor by NSGY, 8-10cc/hr. HV drain in place as well. Neurologically stable. AF, no leukocytosis, H/H stable.   12/14: calm at present, slight agitation overnight, can elevate head for swallow trials and po today,pending picc placement for long term abx  12/15: less responsive this am, wean scheduled ativan, begin to advance diet and taper ivfs    Interval History:      Review of Systems   Constitutional: Negative for chills and fever.   Respiratory: Negative for chest tightness and shortness of breath.    Cardiovascular: Negative for chest pain.   Gastrointestinal: Negative for abdominal pain.   Musculoskeletal: Positive for back pain and neck pain.   Psychiatric/Behavioral: Positive for confusion.     Objective:     Vitals:  Temp: 97.1 °F (36.2 °C)  Pulse: 83  Rhythm: normal sinus rhythm  BP: (!)  161/84  MAP (mmHg): 116  Resp: (!) 21  SpO2: 100 %  O2 Device (Oxygen Therapy): room air    Temp  Min: 96.7 °F (35.9 °C)  Max: 97.1 °F (36.2 °C)  Pulse  Min: 70  Max: 102  BP  Min: 149/71  Max: 200/152  MAP (mmHg)  Min: 99  Max: 172  Resp  Min: 14  Max: 29  SpO2  Min: 100 %  Max: 100 %    12/14 0701 - 12/15 0700  In: 2308.3 [I.V.:2308.3]  Out: 1647 [Urine:1615; Drains:32]   Unmeasured Output  Urine Occurrence: 1  Stool Occurrence: 1       Physical Exam    Constitutional: Well-nourished and -developed. No apparent distress.   Eyes: Conjunctiva clear, anicteric. Lids no lesions.  Head/Ears/Nose/Mouth/Throat/Neck: Moist mucous membranes. External ears, nose atraumatic.   Cardiovascular: Regular rhythm. No murmurs. No leg edema.  Respiratory: Comfortable respirations. Clear to auscultation.  Gastrointestinal: No hernia. Soft, nondistended, nontender. + bowel sounds.     Neurologic:  -GCS [id number]  -opens eyes to voice Oriented to person, place disoriented to time. Speech fluent. Follows commands.  -Cranial nerves II-XII grossly intact PERRL 3+ + cough, gag, corneals  -Motor OSHEA spon.  -Sensation intact       Medications:  Continuous    sodium chloride 0.9% Last Rate: 30 mL/hr at 12/15/19 1105   Scheduled    acetaminophen 1,000 mg Q8H   amLODIPine 5 mg Daily   atorvastatin 80 mg Daily   bacitracin  TID   ceFEPime (MAXIPIME) IVPB 2 g Q8H   diazePAM 5 mg Q8H   heparin (porcine) 5,000 Units Q8H   levothyroxine 50 mcg Before breakfast   losartan-hydrochlorothiazide 100-25 mg 1 tablet Daily   magnesium oxide 800 mg Once   metoprolol succinate 100 mg Daily   nicotine 1 patch Daily   polyethylene glycol 17 g Daily   senna-docusate 8.6-50 mg 1 tablet BID   sodium chloride 0.9% 10 mL Q6H   PRN    Dextrose 10% Bolus 12.5 g PRN   Dextrose 10% Bolus 25 g PRN   glucagon (human recombinant) 1 mg PRN   glucose 16 g PRN   glucose 24 g PRN   hydrALAZINE 10 mg Q4H PRN   labetalol 10 mg Q4H PRN   magnesium oxide 800 mg PRN   magnesium oxide  800 mg PRN   morphine 1 mg Q6H PRN   naloxone 0.4 mg PRN   ondansetron 8 mg Q8H PRN   oxyCODONE 10 mg Q6H PRN   potassium chloride 10% 40 mEq PRN   potassium chloride 10% 40 mEq PRN   potassium chloride 10% 40 mEq PRN   potassium, sodium phosphates 2 packet PRN   potassium, sodium phosphates 2 packet PRN   potassium, sodium phosphates 2 packet PRN   sodium chloride 0.9% 10 mL PRN   sodium chloride 0.9% 10 mL PRN   sodium chloride 0.9% 10 mL PRN       Diet  Diet Dysphagia Mechanical Soft (IDDSI Level 5) Thin  Diet Dysphagia Mechanical Soft (IDDSI Level 5) Thin        Assessment/Plan:     Psychiatric  Restlessness and agitation  Decrease scheduled valium    Cardiac/Vascular  Essential hypertension  SBP < 180  Amlodipine 5 mg daily  Losartan-HCTZ 100 mg - 25 mg  Metoprolol  mg daily    · Echo:Normal left ventricular systolic function. The estimated ejection fraction is 63%  · Concentric left ventricular remodeling.  · No wall motion abnormalities.  · Normal LV diastolic function.  · Normal right ventricular systolic function.  · Mild aortic valve stenosis.  · Aortic valve area is 1.77 cm2; peak velocity is 2.19 m/s; mean gradient is 11 mmHg.  · Normal central venous pressure (3 mm Hg).         Coronary artery disease involving native coronary artery of native heart without angina pectoris  Clopidogrel 75 mg daily     ID  * Spinal epidural abscess  Cultures: CSF shows staph epidermidis                   Neck abscess shows pseudomonas  Continue cefepime 2g q8h                  Vancomycin 750mg q12h  ID following  12/14: vanc d/c'd, on cefepime 6 week duration of therapy    Acute osteomyelitis of cervical spine  C6-T3 osteomyelitis  POD 3 s/p C7-T1 corpectomy  Hemovac per NSGY  LD to drain 10 cc/hour, continue lumbar drain per NSGY  Continue broad spectrum antibiotics per ID  12/14: continues with lumbar drain at 10cc/hr          The patient is being Prophylaxed for:  Venous Thromboembolism with: Chemical  Stress  Ulcer with: None  Ventilator Pneumonia with: not applicable    Activity Orders          Diet Dysphagia Mechanical Soft (IDDSI Level 5) Thin: Dysphagia 2 (Mechanical Soft Ground) starting at 12/15 1016        Full Code    Denys Mcdermott MD  Neurocritical Care  [facility]

## 2019-12-15 NOTE — PLAN OF CARE
POC reviewed with pt at 0500. Questions and concerns addressed with wife over the phone. Lumbar drain open to drainage with little to no output overnight. Hemovac remains clamped, some drainage around site. HOB flat except with oral intake. Back pain controlled with prn meds. Bath given. Potassium and magnesium replaced. Wound care consult placed for breakdown to elbows. Will continue to monitor. See flowsheets for full assessment and VS info

## 2019-12-15 NOTE — ASSESSMENT & PLAN NOTE
74 y.o. male with PMH of HTN, HLD, Hepatitis C, and CAD s/p two stents on plavix who presents with C6-T2 osteomyelitis with suspected epidural abscess.  Now s/p C7/T1 corpectomies.    -still awaiting xrays   - Continue ICU care  - q1hr neuro checks  - LD open to drain maximum 10cc/hr, no minimum drainage  - HV drain clamped. Will monitor.  - may sit upright   - Planning for posterior cervical instrumentation this week  - ID consulted: csf sent 12/10 w/ staph epi, 12/11 neg; surgical cx with pseudomonas- cefepime x 8 weeks   -  brace when upright  - SQH  - Further care per NCC

## 2019-12-16 ENCOUNTER — ANESTHESIA EVENT (OUTPATIENT)
Dept: SURGERY | Facility: HOSPITAL | Age: 74
DRG: 459 | End: 2019-12-16
Payer: MEDICARE

## 2019-12-16 LAB
ABO + RH BLD: NORMAL
ALBUMIN SERPL BCP-MCNC: 2 G/DL (ref 3.5–5.2)
ALLENS TEST: ABNORMAL
ALP SERPL-CCNC: 268 U/L (ref 55–135)
ALT SERPL W/O P-5'-P-CCNC: 13 U/L (ref 10–44)
ANION GAP SERPL CALC-SCNC: 6 MMOL/L (ref 8–16)
APTT BLDCRRT: 30.7 SEC (ref 21–32)
AST SERPL-CCNC: 21 U/L (ref 10–40)
BACTERIA BLD CULT: NORMAL
BACTERIA BLD CULT: NORMAL
BACTERIA CSF CULT: NO GROWTH
BACTERIA SPEC ANAEROBE CULT: NORMAL
BACTERIA SPEC ANAEROBE CULT: NORMAL
BASOPHILS # BLD AUTO: 0.05 K/UL (ref 0–0.2)
BASOPHILS NFR BLD: 0.4 % (ref 0–1.9)
BILIRUB SERPL-MCNC: 0.5 MG/DL (ref 0.1–1)
BLD GP AB SCN CELLS X3 SERPL QL: NORMAL
BUN SERPL-MCNC: 15 MG/DL (ref 8–23)
CALCIUM SERPL-MCNC: 8.2 MG/DL (ref 8.7–10.5)
CHLORIDE SERPL-SCNC: 105 MMOL/L (ref 95–110)
CO2 SERPL-SCNC: 23 MMOL/L (ref 23–29)
CREAT SERPL-MCNC: 0.7 MG/DL (ref 0.5–1.4)
DELSYS: ABNORMAL
DIFFERENTIAL METHOD: ABNORMAL
EOSINOPHIL # BLD AUTO: 0.3 K/UL (ref 0–0.5)
EOSINOPHIL NFR BLD: 1.8 % (ref 0–8)
ERYTHROCYTE [DISTWIDTH] IN BLOOD BY AUTOMATED COUNT: 17 % (ref 11.5–14.5)
ERYTHROCYTE [SEDIMENTATION RATE] IN BLOOD BY WESTERGREN METHOD: 20 MM/H
EST. GFR  (AFRICAN AMERICAN): >60 ML/MIN/1.73 M^2
EST. GFR  (NON AFRICAN AMERICAN): >60 ML/MIN/1.73 M^2
FIO2: 21
GLUCOSE SERPL-MCNC: 80 MG/DL (ref 70–110)
GRAM STN SPEC: NORMAL
HCO3 UR-SCNC: 23 MMOL/L (ref 24–28)
HCT VFR BLD AUTO: 29.2 % (ref 40–54)
HGB BLD-MCNC: 9.1 G/DL (ref 14–18)
IMM GRANULOCYTES # BLD AUTO: 0.07 K/UL (ref 0–0.04)
IMM GRANULOCYTES NFR BLD AUTO: 0.5 % (ref 0–0.5)
INR PPP: 1.1 (ref 0.8–1.2)
INR PPP: 1.1 (ref 0.8–1.2)
LYMPHOCYTES # BLD AUTO: 1.4 K/UL (ref 1–4.8)
LYMPHOCYTES NFR BLD: 9.8 % (ref 18–48)
MAGNESIUM SERPL-MCNC: 1.7 MG/DL (ref 1.6–2.6)
MCH RBC QN AUTO: 26.1 PG (ref 27–31)
MCHC RBC AUTO-ENTMCNC: 31.2 G/DL (ref 32–36)
MCV RBC AUTO: 84 FL (ref 82–98)
MODE: ABNORMAL
MONOCYTES # BLD AUTO: 0.9 K/UL (ref 0.3–1)
MONOCYTES NFR BLD: 6.2 % (ref 4–15)
NEUTROPHILS # BLD AUTO: 11.5 K/UL (ref 1.8–7.7)
NEUTROPHILS NFR BLD: 81.3 % (ref 38–73)
NRBC BLD-RTO: 0 /100 WBC
PCO2 BLDA: 32.2 MMHG (ref 35–45)
PH SMN: 7.46 [PH] (ref 7.35–7.45)
PHOSPHATE SERPL-MCNC: 2.8 MG/DL (ref 2.7–4.5)
PLATELET # BLD AUTO: 172 K/UL (ref 150–350)
PMV BLD AUTO: 9.6 FL (ref 9.2–12.9)
PO2 BLDA: 83 MMHG (ref 80–100)
POC BE: -1 MMOL/L
POC SATURATED O2: 97 % (ref 95–100)
POC TCO2: 24 MMOL/L (ref 23–27)
POTASSIUM SERPL-SCNC: 3.7 MMOL/L (ref 3.5–5.1)
PROT SERPL-MCNC: 5.5 G/DL (ref 6–8.4)
PROTHROMBIN TIME: 11 SEC (ref 9–12.5)
PROTHROMBIN TIME: 11.2 SEC (ref 9–12.5)
RBC # BLD AUTO: 3.48 M/UL (ref 4.6–6.2)
SAMPLE: ABNORMAL
SITE: ABNORMAL
SODIUM SERPL-SCNC: 134 MMOL/L (ref 136–145)
SP02: 100
WBC # BLD AUTO: 14.13 K/UL (ref 3.9–12.7)

## 2019-12-16 PROCEDURE — 80053 COMPREHEN METABOLIC PANEL: CPT

## 2019-12-16 PROCEDURE — 84100 ASSAY OF PHOSPHORUS: CPT

## 2019-12-16 PROCEDURE — 63600175 PHARM REV CODE 636 W HCPCS: Performed by: PHYSICIAN ASSISTANT

## 2019-12-16 PROCEDURE — 99233 SBSQ HOSP IP/OBS HIGH 50: CPT | Mod: ,,, | Performed by: PHYSICIAN ASSISTANT

## 2019-12-16 PROCEDURE — A4216 STERILE WATER/SALINE, 10 ML: HCPCS | Performed by: ANESTHESIOLOGY

## 2019-12-16 PROCEDURE — 85730 THROMBOPLASTIN TIME PARTIAL: CPT

## 2019-12-16 PROCEDURE — 20000000 HC ICU ROOM

## 2019-12-16 PROCEDURE — 63600175 PHARM REV CODE 636 W HCPCS: Performed by: STUDENT IN AN ORGANIZED HEALTH CARE EDUCATION/TRAINING PROGRAM

## 2019-12-16 PROCEDURE — 25000003 PHARM REV CODE 250: Performed by: STUDENT IN AN ORGANIZED HEALTH CARE EDUCATION/TRAINING PROGRAM

## 2019-12-16 PROCEDURE — 36600 WITHDRAWAL OF ARTERIAL BLOOD: CPT

## 2019-12-16 PROCEDURE — 82803 BLOOD GASES ANY COMBINATION: CPT

## 2019-12-16 PROCEDURE — 83735 ASSAY OF MAGNESIUM: CPT

## 2019-12-16 PROCEDURE — 92526 ORAL FUNCTION THERAPY: CPT

## 2019-12-16 PROCEDURE — 85025 COMPLETE CBC W/AUTO DIFF WBC: CPT

## 2019-12-16 PROCEDURE — 86920 COMPATIBILITY TEST SPIN: CPT

## 2019-12-16 PROCEDURE — 85610 PROTHROMBIN TIME: CPT | Mod: 91

## 2019-12-16 PROCEDURE — 99233 PR SUBSEQUENT HOSPITAL CARE,LEVL III: ICD-10-PCS | Mod: ,,, | Performed by: PHYSICIAN ASSISTANT

## 2019-12-16 PROCEDURE — 63600175 PHARM REV CODE 636 W HCPCS: Performed by: ANESTHESIOLOGY

## 2019-12-16 PROCEDURE — 99900035 HC TECH TIME PER 15 MIN (STAT)

## 2019-12-16 PROCEDURE — 85610 PROTHROMBIN TIME: CPT

## 2019-12-16 PROCEDURE — 63600175 PHARM REV CODE 636 W HCPCS: Performed by: PSYCHIATRY & NEUROLOGY

## 2019-12-16 PROCEDURE — 25000003 PHARM REV CODE 250: Performed by: ANESTHESIOLOGY

## 2019-12-16 PROCEDURE — 86901 BLOOD TYPING SEROLOGIC RH(D): CPT

## 2019-12-16 PROCEDURE — S4991 NICOTINE PATCH NONLEGEND: HCPCS | Performed by: STUDENT IN AN ORGANIZED HEALTH CARE EDUCATION/TRAINING PROGRAM

## 2019-12-16 PROCEDURE — 82800 BLOOD PH: CPT

## 2019-12-16 PROCEDURE — 94761 N-INVAS EAR/PLS OXIMETRY MLT: CPT

## 2019-12-16 RX ORDER — ACETAMINOPHEN 500 MG
500 TABLET ORAL EVERY 8 HOURS PRN
Status: DISCONTINUED | OUTPATIENT
Start: 2019-12-16 | End: 2019-12-21

## 2019-12-16 RX ORDER — IBUPROFEN 200 MG
1 TABLET ORAL DAILY
Status: DISCONTINUED | OUTPATIENT
Start: 2019-12-17 | End: 2019-12-17

## 2019-12-16 RX ORDER — IBUPROFEN 200 MG
1 TABLET ORAL DAILY
Status: DISCONTINUED | OUTPATIENT
Start: 2019-12-17 | End: 2019-12-22

## 2019-12-16 RX ADMIN — POTASSIUM CHLORIDE 40 MEQ: 20 SOLUTION ORAL at 05:12

## 2019-12-16 RX ADMIN — DIAZEPAM 5 MG: 5 INJECTION, SOLUTION INTRAMUSCULAR; INTRAVENOUS at 05:12

## 2019-12-16 RX ADMIN — ATORVASTATIN CALCIUM 80 MG: 20 TABLET, FILM COATED ORAL at 09:12

## 2019-12-16 RX ADMIN — POLYETHYLENE GLYCOL 3350 17 G: 17 POWDER, FOR SOLUTION ORAL at 09:12

## 2019-12-16 RX ADMIN — SENNOSIDES AND DOCUSATE SODIUM 1 TABLET: 8.6; 5 TABLET ORAL at 09:12

## 2019-12-16 RX ADMIN — Medication 10 ML: at 11:12

## 2019-12-16 RX ADMIN — Medication 10 ML: at 05:12

## 2019-12-16 RX ADMIN — NICOTINE 1 PATCH: 21 PATCH, EXTENDED RELEASE TRANSDERMAL at 09:12

## 2019-12-16 RX ADMIN — AMLODIPINE BESYLATE 5 MG: 5 TABLET ORAL at 09:12

## 2019-12-16 RX ADMIN — MORPHINE SULFATE 1 MG: 2 INJECTION, SOLUTION INTRAMUSCULAR; INTRAVENOUS at 02:12

## 2019-12-16 RX ADMIN — DIAZEPAM 5 MG: 5 INJECTION, SOLUTION INTRAMUSCULAR; INTRAVENOUS at 09:12

## 2019-12-16 RX ADMIN — Medication 800 MG: at 09:12

## 2019-12-16 RX ADMIN — LEVOTHYROXINE SODIUM 50 MCG: 50 TABLET ORAL at 05:12

## 2019-12-16 RX ADMIN — DIAZEPAM 5 MG: 5 INJECTION, SOLUTION INTRAMUSCULAR; INTRAVENOUS at 02:12

## 2019-12-16 RX ADMIN — OXYCODONE HYDROCHLORIDE 10 MG: 5 TABLET ORAL at 05:12

## 2019-12-16 RX ADMIN — Medication: at 09:12

## 2019-12-16 RX ADMIN — HEPARIN SODIUM 5000 UNITS: 5000 INJECTION, SOLUTION INTRAVENOUS; SUBCUTANEOUS at 05:12

## 2019-12-16 RX ADMIN — ACETAMINOPHEN 1000 MG: 500 TABLET ORAL at 06:12

## 2019-12-16 RX ADMIN — Medication: at 02:12

## 2019-12-16 RX ADMIN — CEFEPIME 2 G: 2 INJECTION, POWDER, FOR SOLUTION INTRAVENOUS at 01:12

## 2019-12-16 RX ADMIN — METOPROLOL SUCCINATE 100 MG: 100 TABLET, EXTENDED RELEASE ORAL at 09:12

## 2019-12-16 RX ADMIN — CEFEPIME 2 G: 2 INJECTION, POWDER, FOR SOLUTION INTRAVENOUS at 05:12

## 2019-12-16 RX ADMIN — Medication 800 MG: at 05:12

## 2019-12-16 RX ADMIN — LOSARTAN POTASSIUM AND HYDROCHLOROTHIAZIDE 1 TABLET: 100; 25 TABLET, FILM COATED ORAL at 09:12

## 2019-12-16 RX ADMIN — ACETAMINOPHEN 1000 MG: 500 TABLET ORAL at 03:12

## 2019-12-16 RX ADMIN — CEFEPIME 2 G: 2 INJECTION, POWDER, FOR SOLUTION INTRAVENOUS at 09:12

## 2019-12-16 NOTE — PROGRESS NOTES
Dr. Nichole with neurosurgery notified that neck circumference increased from 51.5 inches to 54 inches and no drainage from lumbar drain for entire shift. Dr. Nichole reported to bedside to assess patient and drain. No new orders at present time. Will continue to monitor closely.

## 2019-12-16 NOTE — PROGRESS NOTES
NSNATALIA VILLAFUERTE notified of lumbar drain insertion site dressing saturated with blood. MD to come to bedside to assess. WCTM.

## 2019-12-16 NOTE — ANESTHESIA PREPROCEDURE EVALUATION
[facility]  Anesthesia Pre-Operative Evaluation         Patient Name: Junaid Muñoz  YOB: 1945  MRN: [id number]    SUBJECTIVE:     Pre-operative evaluation for Procedure(s) (LRB):  FUSION, SPINE, CERVICAL, POSTERIOR APPROACH C2-T4 posterior instrumented fusion, globus, neuromonitoring (N/A)     12/16/2019    Junaid Muñoz is a 74 y.o. male w/ a significant PMHx of spinal epidural abscess, osteomyelitis of cervical and thoracic spine, chronic Hep C, HTN, CAD (s/p 2 stents on Plavix), HLD, IVDU and current smoker. Pt presented c/o 1 month hx of upper back pain, found to have C6-T3 osteomyelitis with epidural abscess (s/p drain)    LDA:  Right PICC    Patient now presents for the above procedure(s).      LDA:        PICC Double Lumen 12/14/19 1226 right basilic (Active)   Site Assessment Clean;Dry;Intact;No redness;No swelling 12/16/2019  3:05 AM   Lumen 1 Status Infusing 12/16/2019  3:05 AM   Lumen 2 Status Normal saline locked 12/16/2019  3:05 AM   Length brianna (cm) 37 cm 12/14/2019 12:26 PM   Current Exposed Catheter (cm) 0 cm 12/14/2019 12:26 PM   Extremity Circumference (cm) 32 cm 12/14/2019 12:26 PM   Dressing Type Transparent 12/16/2019  3:05 AM   Dressing Status Biopatch in place;Clean;Dry;Intact 12/16/2019  3:05 AM   Dressing Intervention Dressing reinforced 12/16/2019  3:05 AM   Dressing Change Due 12/21/19 12/16/2019  3:05 AM   Daily Line Review Performed 12/16/2019  3:05 AM   Number of days: 1            Peripheral IV - Single Lumen 12/13/19 1134 20 G;1 3/4 in Left Forearm (Active)   Site Assessment Clean;Dry;Intact;No redness;No swelling 12/16/2019  3:05 AM   Line Status Flushed;Saline locked 12/16/2019  3:05 AM   Dressing Status Clean;Dry;Intact 12/16/2019  3:05 AM   Dressing Intervention Dressing reinforced 12/16/2019  3:05 AM   Dressing Change Due 12/17/19 12/16/2019  3:05 AM   Site Change Due 12/17/19 12/14/2019  7:05 PM   Reason Not Rotated Not due 12/16/2019   "3:05 AM   Number of days: 2            Closed/Suction Drain 12/10/19 1018 Anterior Neck Accordion 10 Fr. (Active)   Site Description Leaking at site 12/16/2019  3:05 AM   Dressing Type No dressing 12/16/2019  3:05 AM   Drainage Serosanguineous 12/16/2019  3:05 AM   Status Clamped 12/16/2019  3:05 AM   Output (mL) 0 mL 12/16/2019  6:05 AM   Number of days: 5            Urethral Catheter 12/10/19 0730 Non-latex 16 Fr. (Active)   Site Assessment Clean;Intact 12/16/2019  3:05 AM   Collection Container Urimeter 12/16/2019  3:05 AM   Securement Method secured to top of thigh w/ adhesive device 12/16/2019  3:05 AM   Catheter Care Performed yes 12/16/2019  3:05 AM   Reason for Continuing Urinary Catheterization Critically ill in ICU requiring intensive monitoring 12/16/2019  3:05 AM   CAUTI Prevention Bundle StatLock in place w 1" slack;Intact seal between catheter & drainage tubing;Drainage bag/urimeter off the floor;Green sheeting clip in use;No dependent loops or kinks;Drainage bag/urimeter not overfilled (<2/3 full);Drainage bag/urimeter below bladder 12/15/2019  7:05 PM   Output (mL) 30 mL 12/16/2019  8:00 AM   Number of days: 6            Lumbar Drain 12/10/19 1056 (Active)   Drain Status Open 12/16/2019  3:05 AM   Level to iliac crest 12/15/2019  3:05 PM   CSF Color Clear 12/16/2019  3:05 AM   Site Description Unable to view 12/16/2019  3:05 AM   Dressing Status New drainage 12/16/2019  3:05 AM   Interventions HOB degrees (see comment);Bed controls locked 12/15/2019  3:05 AM   Output (mL) 1 mL 12/16/2019  8:00 AM   Number of days: 5       Prev airway:  Placement Date: 12/10/19; Placement Time: 0734; Method of Intubation: Romano; Inserted by: CRNA; Airway Device: Endotracheal Tube; Mask Ventilation: Not Attempted; Intubated: Postinduction; Blade: Raheel #3; Airway Device Size: 7.5; Style: Cuffed; Cuff Inflation: Minimal occlusive pressure; Inflation Amount: 7; Placement Verified By: Auscultation, Capnometry, ETT " Condensation; Grade:  (Good view with Romano.  Head maintained in neutral position.); Complicating Factors: Poor neck/head extension; Intubation Findings: Positive EtCO2, Bilateral breath sounds, Atraumatic/Condition of teeth unchanged;  Depth of Insertion: 23; Securment: Lips; Complications: None; Breath Sounds: Equal Bilateral; Insertion Attempts: 1;    Drips: None documented.      Patient Active Problem List   Diagnosis    Spinal epidural abscess    Acute osteomyelitis of cervical spine    Acute osteomyelitis of thoracic spine    Chronic hepatitis C without hepatic coma    Coronary artery disease involving native coronary artery of native heart without angina pectoris    Essential hypertension    Other specified hypothyroidism    Hyperlipidemia    Tobacco abuse    Elevated alkaline phosphatase level    Pre-op evaluation    Gram negative sepsis    Mild tetrahydrocannabinol (THC) abuse    Malignant hypertension requiring acute intensive management    Restlessness and agitation       Review of patient's allergies indicates:   Allergen Reactions    Penicillins Rash       Current Inpatient Medications:      Current Facility-Administered Medications on File Prior to Encounter   Medication Dose Route Frequency Provider Last Rate Last Dose    0.9%  NaCl infusion   Intravenous Continuous Denys Cordero MD 30 mL/hr at 12/16/19 0800      acetaminophen tablet 1,000 mg  1,000 mg Oral Q8H Reg Spears MD   1,000 mg at 12/16/19 0601    amLODIPine tablet 5 mg  5 mg Oral Daily Shaji Barone MD   5 mg at 12/15/19 0852    atorvastatin tablet 80 mg  80 mg Oral Daily Reg Spears MD   80 mg at 12/15/19 0852    bacitracin ointment   Topical (Top) TID Paul Castellanos MD        ceFEPIme injection 2 g  2 g Intravenous Q8H Adilson Spence PA-C   2 g at 12/16/19 0119    dextrose 10% (D10W) Bolus  12.5 g Intravenous PRN Reg Spears MD        dextrose 10% (D10W) Bolus   25 g Intravenous PRN Reg Spears MD        diazePAM injection 5 mg  5 mg Intravenous Q8H Denys Cordero MD   5 mg at 12/16/19 0509    glucagon (human recombinant) injection 1 mg  1 mg Intramuscular PRN Reg Spears MD        glucose chewable tablet 16 g  16 g Oral PRN Reg Spears MD        glucose chewable tablet 24 g  24 g Oral PRN Reg Spears MD        heparin (porcine) injection 5,000 Units  5,000 Units Subcutaneous Q8H Damon Pressley MD   5,000 Units at 12/16/19 0510    hydrALAZINE injection 10 mg  10 mg Intravenous Q4H PRN Denny Nath MD   10 mg at 12/15/19 0405    labetalol 20 mg/4 mL (5 mg/mL) IV syring  10 mg Intravenous Q4H PRN Marily Brooks PA-C   10 mg at 12/14/19 1005    levothyroxine tablet 50 mcg  50 mcg Oral Before breakfast Reg Spears MD   50 mcg at 12/16/19 0510    losartan-hydrochlorothiazide 100-25 mg per tablet 1 tablet  1 tablet Oral Daily Reg Spears MD   1 tablet at 12/15/19 0852    magnesium oxide tablet 800 mg  800 mg Oral Once Shaji Barone MD        magnesium oxide tablet 800 mg  800 mg Oral PRN Roque Zhang MD   800 mg at 12/16/19 0509    magnesium oxide tablet 800 mg  800 mg Oral PRN Roque Zhang MD        metoprolol succinate (TOPROL-XL) 24 hr tablet 100 mg  100 mg Oral Daily Reg Spears MD   100 mg at 12/15/19 0852    morphine injection 1 mg  1 mg Intravenous Q6H PRN Roque Zhang MD   1 mg at 12/16/19 0245    naloxone 0.4 mg/mL injection 0.4 mg  0.4 mg Intravenous PRN Reg Spears MD        nicotine 21 mg/24 hr 1 patch  1 patch Transdermal Daily Reg Spears MD   1 patch at 12/15/19 0854    ondansetron disintegrating tablet 8 mg  8 mg Oral Q8H PRN Reg Spears MD        oxyCODONE immediate release tablet 10 mg  10 mg Oral Q6H PRN Peter Dela Cruz DO   10 mg at 12/16/19 0551    polyethylene glycol packet 17 g  17 g Oral Daily  Roque Zhang MD   17 g at 12/14/19 0808    potassium chloride 10% oral solution 40 mEq  40 mEq Oral PRN Roque Zhang MD   40 mEq at 12/16/19 0510    potassium chloride 10% oral solution 40 mEq  40 mEq Oral PRN Roque Zhang MD        potassium chloride 10% oral solution 40 mEq  40 mEq Oral PRN Roque Zhang MD        potassium, sodium phosphates 280-160-250 mg packet 2 packet  2 packet Oral PRN Roque Zhang MD        potassium, sodium phosphates 280-160-250 mg packet 2 packet  2 packet Oral PRN Roque Zhang MD        potassium, sodium phosphates 280-160-250 mg packet 2 packet  2 packet Oral PRN Roque Zhang MD        senna-docusate 8.6-50 mg per tablet 1 tablet  1 tablet Oral BID Reg Spears MD   1 tablet at 12/15/19 0900    sodium chloride 0.9% flush 10 mL  10 mL Intravenous PRN Sonal Dunn PA-C        sodium chloride 0.9% flush 10 mL  10 mL Intravenous PRN Reg Spears MD        sodium chloride 0.9% flush 10 mL  10 mL Intravenous Q6H Roque Zhang MD   10 mL at 12/16/19 0510    And    sodium chloride 0.9% flush 10 mL  10 mL Intravenous PRN Roque Zhang MD         Current Outpatient Medications on File Prior to Encounter   Medication Sig Dispense Refill    atorvastatin (LIPITOR) 80 MG tablet Take 80 mg by mouth once daily at 6am.      clopidogrel (PLAVIX) 75 mg tablet Take 75 mg by mouth once daily at 6am.      ibuprofen (ADVIL,MOTRIN) 600 MG tablet Take 1 tablet (600 mg total) by mouth every 6 (six) hours as needed for Pain. 20 tablet 0    levothyroxine (SYNTHROID) 50 MCG tablet Take 50 mcg by mouth once daily at 6am.      losartan-hydrochlorothiazide 100-25 mg (HYZAAR) 100-25 mg per tablet Take 1 tablet by mouth once daily at 6am.       metoprolol succinate (TOPROL-XL) 100 MG 24 hr tablet Take 100 mg by mouth once daily at 6am.         Past Surgical History:   Procedure Laterality Date    COLONOSCOPY W/ POLYPECTOMY  06/2008      Girgrah: fair prep, 3mm polyp - tubular adenoma    CORONARY ANGIOPLASTY WITH STENT PLACEMENT      hydrocele repair  teenager    pyloric stenosis repair  age 1    SURGICAL REMOVAL OF VERTEBRAL BODY OF THORACIC SPINE N/A 12/10/2019    Procedure: CORPECTOMY, SPINE, CERVICAL AND THORACIC, C7 and T1 with Globus;  Surgeon: Elian Deluca MD;  Location: [facility] OR [address] [place] [address];  Service: Neurosurgery;  Laterality: N/A;    TONSILLECTOMY         Social History     Socioeconomic History    Marital status:      Spouse name: Not on file    Number of children: Not on file    Years of education: Not on file    Highest education level: Not on file   Occupational History    Not on file   Social Needs    Financial resource strain: Not on file    Food insecurity:     Worry: Not on file     Inability: Not on file    Transportation needs:     Medical: Not on file     Non-medical: Not on file   Tobacco Use    Smoking status: Current Every Day Smoker   Substance and Sexual Activity    Alcohol use: Not on file    Drug use: Yes     Types: IV     Comment: MORPHINE    Sexual activity: Not on file   Lifestyle    Physical activity:     Days per week: Not on file     Minutes per session: Not on file    Stress: Not on file   Relationships    Social connections:     Talks on phone: Not on file     Gets together: Not on file     Attends Buddhism service: Not on file     Active member of club or organization: Not on file     Attends meetings of clubs or organizations: Not on file     Relationship status: Not on file   Other Topics Concern    Not on file   Social History Narrative    Not on file       OBJECTIVE:     Vital Signs Range (Last 24H):  Temp:  [36.1 °C (97 °F)-36.7 °C (98.1 °F)]   Pulse:  [77-99]   Resp:  [11-25]   BP: (129-203)/()   SpO2:  [99 %-100 %]       Significant Labs:  Lab Results   Component Value Date    WBC 14.13 (H) 12/16/2019    HGB 9.1 (L) 12/16/2019    HCT 29.2 (L) 12/16/2019      12/16/2019    ALT 13 12/16/2019    AST 21 12/16/2019     (L) 12/16/2019    K 3.7 12/16/2019     12/16/2019    CREATININE 0.7 12/16/2019    BUN 15 12/16/2019    CO2 23 12/16/2019    TSH 2.96 09/18/2008    INR 1.1 12/16/2019       Diagnostic Studies:   EXAMINATION:  XR CHEST 1 VIEW    CLINICAL HISTORY:  Evaluate PICC line placement;    TECHNIQUE:  Single frontal view of the chest was performed.    COMPARISON:  12/06/2019    FINDINGS:  Right PICC catheter tip projects over the distal SVC.  The cardiomediastinal silhouette is not enlarged, noting calcification and some tortuosity of the aorta..  There is no pleural effusion.  The trachea is midline.  The lungs are symmetrically expanded bilaterally with minimally coarse central hilar interstitial attenuation, likely accentuated by shallow inspiratory effort.  There is bandlike atelectasis or scarring projected over the right mid lung zone..  No large focal consolidation seen.  There is no pneumothorax.  The osseous structures are remarkable for degenerative changes noting cervical spinal fusion..      Impression       As above      Electronically signed by: Huey Gonzalez MD  Date: 12/14/2019  Time: 14:35         EKG:   Results for orders placed or performed during the hospital encounter of 09/05/19   EKG 12-lead    Collection Time: 09/05/19  1:28 PM    Narrative    Test Reason : R07.9,    Vent. Rate : 060 BPM     Atrial Rate : 060 BPM     P-R Int : 170 ms          QRS Dur : 090 ms      QT Int : 426 ms       P-R-T Axes : 022 -01 007 degrees     QTc Int : 426 ms    Normal sinus rhythm  Normal ECG  When compared with ECG of 23-AUG-2019 17:51,  ST no longer depressed in Anterior-lateral leads  Confirmed by Heladio Snyder MD ([id number]) on 9/5/2019 10:57:36 PM    Referred By: MIRZA BUSH MD           Confirmed By:Heladio Snyder MD       2D ECHO:  TTE:  Results for orders placed or performed during the hospital encounter of 12/06/19   Echo Color Flow Doppler? Yes;  Bubble Contrast? Yes   Result Value Ref Range    Ascending aorta 3.36 cm    STJ 3.07 cm    AV mean gradient 11 mmHg    Ao peak torito 2.19 m/s    Ao VTI 38.15 cm    IVS 1.07 0.6 - 1.1 cm    LA size 3.25 cm    Left Atrium Major Axis 4.97 cm    Left Atrium Minor Axis 5.27 cm    LVIDD 4.32 3.5 - 6.0 cm    LVIDS 2.94 2.1 - 4.0 cm    LVOT diameter 2.06 cm    LVOT peak VTI 20.25 cm    PW 1.13 (A) 0.6 - 1.1 cm    MV Peak A Torito 0.95 m/s    E wave decelartion time 349.37 msec    MV Peak E Torito 0.61 m/s    RA Major Axis 3.52 cm    RA Width 3.02 cm    RVDD 3.17 cm    Sinus 3.21 cm    TAPSE 2.02 cm    TDI LATERAL 0.09 m/s    TDI SEPTAL 0.07 m/s    LA WIDTH 2.98 cm    LV Diastolic Volume 84.17 mL    LV Systolic Volume 33.43 mL    LVOT peak torito 0.97 m/s    LV LATERAL E/E' RATIO 6.78 m/s    LV SEPTAL E/E' RATIO 8.71 m/s    FS 32 %    LA volume 42.11 cm3    LV mass 164.13 g    Left Ventricle Relative Wall Thickness 0.52 cm    AV valve area 1.77 cm2    AV Velocity Ratio 0.44     AV index (prosthetic) 0.53     E/A ratio 0.64     Mean e' 0.08 m/s    LVOT area 3.3 cm2    LVOT stroke volume 67.46 cm3    AV peak gradient 19 mmHg    E/E' ratio 7.63 m/s    LV Systolic Volume Index 17.1 mL/m2    LV Diastolic Volume Index 43.13 mL/m2    LA Volume Index 21.6 mL/m2    LV Mass Index 84 g/m2    BSA 1.97 m2    Right Atrial Pressure (from IVC) 3 mmHg    Narrative    · Normal left ventricular systolic function. The estimated ejection   fraction is 63%  · Concentric left ventricular remodeling.  · No wall motion abnormalities.  · Normal LV diastolic function.  · Normal right ventricular systolic function.  · Mild aortic valve stenosis.  · Aortic valve area is 1.77 cm2; peak velocity is 2.19 m/s; mean gradient   is 11 mmHg.  · Normal central venous pressure (3 mm Hg).          RAMY:  No results found for this or any previous visit.    ASSESSMENT/PLAN:                                                                                                                 12/16/2019  Junaid Muñoz is a 74 y.o., male.    Anesthesia Evaluation    I have reviewed the Patient Summary Reports.     I have reviewed the Medications.     Review of Systems  Anesthesia Hx:  No problems with previous Anesthesia Denies Hx of Anesthetic complications  Denies Family Hx of Anesthesia complications.   Denies Personal Hx of Anesthesia complications.   Social:  Alcohol Use, Smoker IV morphine use   Hematology/Oncology:         -- Anemia: Hematology Comments: 9.1 / 29.2     Cardiovascular:   Hypertension CAD      Hepatic/GI:   Liver Disease, Hepatitis, C    Endocrine:   Hypothyroidism        Physical Exam  General:  Well nourished    Airway/Jaw/Neck:  Airway Findings: Mouth Opening: Normal Tongue: Normal  General Airway Assessment: Adult  Mallampati: III  Improves to II with phonation.      Dental:  Dental Findings: Periodontal disease, Severe   Chest/Lungs:  Chest/Lungs Findings: Clear to auscultation, Normal Respiratory Rate     Heart/Vascular:  Heart Findings: Rate: Normal  Rhythm: Regular Rhythm  Vascular Findings: Normal    Abdomen:  Abdomen Findings: Normal    Musculoskeletal:  Musculoskeletal Findings: Normal    Mental Status:  Mental Status Findings:  Alert and Oriented, Cooperative         Anesthesia Plan  Type of Anesthesia, risks & benefits discussed:  Anesthesia Type:  general  Patient's Preference:   Intra-op Monitoring Plan: standard ASA monitors and arterial line  Intra-op Monitoring Plan Comments:   Post Op Pain Control Plan: IV/PO Opioids PRN, per primary service following discharge from PACU and multimodal analgesia  Post Op Pain Control Plan Comments:   Induction:   IV  Beta Blocker:  Patient is on a Beta-Blocker and has received one dose within the past 24 hours (No further documentation required).       Informed Consent: Patient representative understands risks and agrees with Anesthesia plan.  Questions answered. Anesthesia consent signed with patient representative.  ASA  Score: 4     Day of Surgery Review of History & Physical:    H&P update referred to the surgeon.         Ready For Surgery From Anesthesia Perspective.

## 2019-12-16 NOTE — PLAN OF CARE
POC reviewed with pt at 1400. Pt verbalized understanding. Questions and concerns addressed. No acute events today. Pt progressing toward goals. Continuing to taper benzos down per MD orders. Will continue to monitor. See flowsheets for full assessment and VS info.

## 2019-12-16 NOTE — PT/OT/SLP DISCHARGE
Occupational Therapy Discharge Summary    Junaid Muñoz  MRN: [id number]   Principal Problem: Spinal epidural abscess      Patient Discharged from acute Occupational Therapy on 12/16.  OT unable to complete referral for OT Nikko 2* MD order for bed rest, HOB flat and lumbar drain still in place.  Part II of patient's back surgery scheduled for tomorrow, please reconsult OT when patient is medically appropriate.       Plan:   Please reconsult when medically appropriate.        Jacquelyn Sotelo, OT  12/16/2019

## 2019-12-16 NOTE — CONSULTS
Wound consult received on patient's bilateral elbows. Right elbow intact. Left lateral elbow with blanchable redness, currently padded with foam dressing.  No wound care intervention needed. Nursing to continue care. Re-consult wound care if needed.      Left lateral elbow

## 2019-12-16 NOTE — ASSESSMENT & PLAN NOTE
Cultures: CSF +staph epidermidis, likely contaminant                    Neck abscess +pseudomonas  12/14: vanc d/c'd, on cefepime 2g q8h for 6 week duration of therapy  -ID signed off, will re-consult as WBC increasing and patient going for Stage II surgery tomorrow morning.

## 2019-12-16 NOTE — PLAN OF CARE
Per MD: Planning for posterior cervical instrumentation tomorrow.  Patient with lumbar drain.  Not medically stable for discharge.          12/16/19 1701   Discharge Reassessment   Assessment Type Discharge Planning Reassessment   Provided patient/caregiver education on the expected discharge date and the discharge plan No   Do you have any problems affording any of your prescribed medications? No   Discharge Plan A Rehab   Discharge Plan B Skilled Nursing Facility   DME Needed Upon Discharge  other (see comments)  (tbd)   Patient choice form signed by patient/caregiver No   Anticipated Discharge Disposition Rehab   Can the patient answer the patient profile reliably? No, cognitively impaired   How does the patient rate their overall health at the present time?   (vasquez)   Describe the patient's ability to walk at the present time. Does not walk or unable to take any steps at all   How often would a person be available to care for the patient? Occasionally   Number of comorbid conditions (as recorded on the chart) Four   During the past month, has the patient often been bothered by feeling down, depressed or hopeless?   (vasquez)   During the past month, has the patient often been bothered by little interest or pleasure in doing things?   (vasquez)   Post-Acute Status   Post-Acute Authorization Placement;Other   Post-Acute Placement Status Awaiting Internal Medical Clearance   Discharge Delays (!) Procedure Scheduling (IR, OR, Labs, Echo, Cath, Echo, EEG)  (Per MD:  Planning for posterior cervical instrumentation tomorrow)       Kimberly Carver RN, CCRN-K, [facility]  Neuro-Critical Care   X [phone]

## 2019-12-16 NOTE — PT/OT/SLP PROGRESS
"Speech Language Pathology Treatment    Patient Name:  Junaid Muñoz   MRN:  [id number]  Admitting Diagnosis: Spinal epidural abscess    Recommendations:                 General Recommendations:  Dysphagia therapy  Diet recommendations:  Mechanical soft, Liquid Diet Level: Thin   Aspiration Precautions: 1 bite/sip at a time, Alternating bites/sips, Assistance with meals, Feed only when awake/alert, HOB to 90 degrees, Bury or crush meds in puree if difficulty noted, Monitor for s/s of aspiration, No straws, Small bites/sips and Standard aspiration precautions .  General Precautions: Standard, aspiration, fall  Communication strategies:  provide increased time to answer and go to room if call light pushed    Subjective   "It's pretty good" (pt stated of breakfast tray this morning)    Pain/Comfort:  · Pain Rating 1: 0/10  · Pain Rating Post-Intervention 1: 0/10    Objective:     Has the patient been evaluated by SLP for swallowing?   Yes  Keep patient NPO? No   Current Respiratory Status: room air      Pt seen this am with no family present.  Nursing indicated no difficulty with po intake had been reported/observed.  ST recommendations post assessment on 12/15 were for a puree diet with thin liquids.  Mechanical Soft Diet is currently ordered.  [facility] soft breakfast tray was present at bedside and was utilized for ongoing assessment during session.  Mr. Muñoz was roused and participated in session.  Assistance with intake was required due to reduced pt initiation of self-feeding at times as well as reduced coordination of getting boluses on utensil and to mouth.  Observed pt with intake of scrambled eggs, chopped sausage, grits and thin liquid via cup.  Oral phase was c/b increased mastication time and mild oral stasis post swallow.  Given increased time for mastication and liquid rinse post bites, pt able to clear oral cavity effectively.  Initiation of pharyngeal phase was mildly delayed and laryngeal elevation " does appear mildly reduced.  Pt denied globus sensation or pain with swallowing.  Pt exhibited one cough throughout entire meal.  Cough occurred when pt allowed to take several bites in a row.  Cough was dry and no significant change in vocal quality evident.  Following, a liquid rinse was provided post each bite and no other s/s difficulty were observed.  Education provided to pt regarding ST role, ongoing swallow monitor, s/s aspiration, precautions/strategies to reduce risk and poc.  Nursing was present and recommendations were reviewed.  Whiteboard was updated.     Assessment:     Junaid Muñoz is a 74 y.o. male with an SLP diagnosis of Dysphagia.     Goals:   Multidisciplinary Problems     SLP Goals        Problem: SLP Goal    Goal Priority Disciplines Outcome   SLP Goal     SLP Ongoing, Progressing   Description:  Speech Language Pathology Goals  Goals expected to be met by 12/22/19:    1.  Pt will tolerate puree diet with thin liquids with adequate oral clearance and no overt signs of aspiration.  2.  Pt will tolerate trials of soft solids with adequate oral clearance and no overt signs of aspiration.                        Plan:     · Patient to be seen:  4 x/week   · Plan of Care expires:  01/13/20  · Plan of Care reviewed with:  patient   · SLP Follow-Up:  Yes       Discharge recommendations:  (Pending pt progress and any PT/OT recs)      Time Tracking:     SLP Treatment Date:   12/16/19  Speech Start Time:  0853  Speech Stop Time:  0922     Speech Total Time (min):  29 min    Billable Minutes: Treatment Swallowing Dysfunction 29    TAMMY Sandhu, CCC-SLP  Speech Language Pathologist  [phone]

## 2019-12-16 NOTE — PLAN OF CARE
Problem: SLP Goal  Goal: SLP Goal  Description  Speech Language Pathology Goals  Goals expected to be met by 12/22/19:    1.  Pt will tolerate puree diet with thin liquids with adequate oral clearance and no overt signs of aspiration.  2.  Pt will tolerate trials of soft solids with adequate oral clearance and no overt signs of aspiration.       Outcome: Ongoing, Progressing    Pt participated well with ST session.  Benefits from assistance with meals.  Full session note / recommendations to follow.     TAMMY Sandhu, CCC-SLP  Speech Language Pathologist  [phone]

## 2019-12-16 NOTE — PT/OT/SLP DISCHARGE
Physical Therapy Discharge Summary    Name: Junaid Muñoz  MRN: [id number]   Principal Problem: Spinal epidural abscess     PT orders D/C on 12/16/2019. PT Evaluation unable to be completed 2/2 lumbar drain remaining open and HOB flat orders. Spinal  Surgery planned for tomorrow. New orders needed post surgery when pt medically appropriate for progressive mobility.     Plan:     Spinal fusion surgery planned for tomorrow per neurosurgery note. New orders needed post surgery when pt medically appropriate for progressive mobility.    Seda Thornton, PT  12/16/2019

## 2019-12-16 NOTE — PROGRESS NOTES
"  [facility]  Adult Nutrition  Consult Note    SUMMARY     Recommendations    Continue regular diet; mechanical soft texture per SLP     Goals: Pt to continue meeting EEN and EPN by RD follow-up  Nutrition Goal Status: new  Communication of RD Recs: other (comment)(POC)    Reason for Assessment    Reason For Assessment: length of stay  Diagnosis: other (see comments)(Spinal epidural abscess)  Relevant Medical History: HTN, THC use, nicotine use, HLD, hypothyroidism, CAD, losartan  Interdisciplinary Rounds: did not attend  General Information Comments: Pt was lying in bed with no family members at bedside. Unable to report UBW but states no recent wt loss and fair appetite. Pt was able to eat 50% of breakfast per RN. NFPE completed at bedside, pt has mild fat/muscle depletion but does not meet criteria for malnutrition due to fair PO intake and stable wt.     Nutrition Risk Screen    Nutrition Risk Screen: no indicators present    Nutrition/Diet History    Spiritual, Cultural Beliefs, Congregation Practices, Values that Affect Care: no    Anthropometrics    Temp: 98.3 °F (36.8 °C)  Height: 5' 9" (175.3 cm)  Height (inches): 69 in  Weight Method: Bed Scale  Weight: 79.4 kg (175 lb 0.7 oz)  Weight (lb): 175.05 lb  Ideal Body Weight (IBW), Male: 160 lb  % Ideal Body Weight, Male (lb): 109.38 lb  BMI (Calculated): 25.8       Lab/Procedures/Meds    Pertinent Labs Reviewed: reviewed  Pertinent Labs Comments: Na 134, Ca 8.2, Alk phos 268, Alb 2  Pertinent Medications Reviewed: reviewed  Pertinent Medications Comments: statin, D10W, heparin, hydralazine, labetalol, levothyroxine, losartan, mg oxide, metaprolol, naloxone, nicotine, oxycodone         Estimated/Assessed Needs    Weight Used For Calorie Calculations: 79.4 kg (175 lb 0.7 oz)  Energy Calorie Requirements (kcal): 1,828  Energy Need Method: [place](x 1.2 PAL)  Protein Requirements: 64 - 80 g/day(0.8 - 1.0 g/kg)  Weight Used For Protein " Calculations: 79.4 kg (175 lb 0.7 oz)  Fluid Requirements (mL): 1 ml/kcal or per MD     RDA Method (mL): 1         Nutrition Prescription Ordered    Current Diet Order: Regular  Nutrition Order Comments: Mechanical Soft    Evaluation of Received Nutrient/Fluid Intake    Energy Calories Required: meeting needs  Protein Required: meeting needs  Fluid Required: meeting needs  Comments: LBM 12/15  Tolerance: tolerating  % Intake of Estimated Energy Needs: 50 - 75 %  % Meal Intake: 50 - 75 %    Nutrition Risk    Level of Risk/Frequency of Follow-up: low     Assessment and Plan    Nutrition Problem  Swallowing difficulty      Related to (etiology):   Dysphagia      Signs and Symptoms (as evidenced by):   Mechanical soft diet order  Assistant with intake required      Interventions(treatment strategy):  Collaboration of care with providers     Nutrition Diagnosis Status:   New       Monitor and Evaluation    Food and Nutrient Intake: energy intake, food and beverage intake  Food and Nutrient Adminstration: diet order  Knowledge/Beliefs/Attitudes: food and nutrition knowledge/skill  Anthropometric Measurements: weight change, weight, body mass index  Biochemical Data, Medical Tests and Procedures: gastrointestinal profile, glucose/endocrine profile, electrolyte and renal panel, inflammatory profile, lipid profile  Nutrition-Focused Physical Findings: overall appearance     Malnutrition Assessment  Energy Intake: moderate energy intake              Orbital Region (Subcutaneous Fat Loss): mild depletion  Upper Arm Region (Subcutaneous Fat Loss): mild depletion   [place] Region (Muscle Loss): mild depletion  Clavicle Bone Region (Muscle Loss): well nourished  Clavicle and Acromion Bone Region (Muscle Loss): well nourished  Dorsal Hand (Muscle Loss): mild depletion                 Nutrition Follow-Up    RD Follow-up?: Yes

## 2019-12-16 NOTE — ASSESSMENT & PLAN NOTE
74 y.o. male with PMH of HTN, HLD, Hepatitis C, and CAD s/p two stents on plavix who presents with C6-T2 osteomyelitis with suspected epidural abscess.  Now s/p C7/T1 corpectomies.    - Continue ICU care  - q1hr neuro checks  - LD open to drain maximum 10cc/hr, no minimum drainage. Will consult IR for lumbar drain replacement  - HV drain clamped. Will monitor.  - May sit upright  -  brace when upright  - Planning for posterior cervical instrumentation tomorrow  - Will obtain consent  - Keep NPO at midnight  - ID consulted: csf sent 12/10 w/ staph epi, 12/11 neg; surgical cx with pseudomonas- cefepime x 8 weeks   - SQH  - Further care per NCC

## 2019-12-16 NOTE — SUBJECTIVE & OBJECTIVE
Interval History: lumbar drain with minimal output.  Exam is stable.    Medications:  Continuous Infusions:   sodium chloride 0.9% 100 mL/hr at 12/16/19 1100     Scheduled Meds:   acetaminophen  1,000 mg Oral Q8H    amLODIPine  5 mg Oral Daily    atorvastatin  80 mg Oral Daily    bacitracin   Topical (Top) TID    ceFEPime (MAXIPIME) IVPB  2 g Intravenous Q8H    diazePAM  5 mg Intravenous Q8H    heparin (porcine)  5,000 Units Subcutaneous Q8H    levothyroxine  50 mcg Oral Before breakfast    losartan-hydrochlorothiazide 100-25 mg  1 tablet Oral Daily    magnesium oxide  800 mg Oral Once    metoprolol succinate  100 mg Oral Daily    nicotine  1 patch Transdermal Daily    polyethylene glycol  17 g Oral Daily    senna-docusate 8.6-50 mg  1 tablet Oral BID    sodium chloride 0.9%  10 mL Intravenous Q6H     PRN Meds:Dextrose 10% Bolus, Dextrose 10% Bolus, glucagon (human recombinant), glucose, glucose, hydrALAZINE, labetalol, magnesium oxide, magnesium oxide, morphine, naloxone, ondansetron, oxyCODONE, potassium chloride 10%, potassium chloride 10%, potassium chloride 10%, potassium, sodium phosphates, potassium, sodium phosphates, potassium, sodium phosphates, sodium chloride 0.9%, sodium chloride 0.9%, Flushing PICC Protocol **AND** sodium chloride 0.9% **AND** sodium chloride 0.9%     Review of Systems    Objective:     Weight: 79.4 kg (175 lb 0.7 oz)  Body mass index is 25.85 kg/m².  Vital Signs (Most Recent):  Temp: 98.3 °F (36.8 °C) (12/16/19 1101)  Pulse: 81 (12/16/19 1101)  Resp: 13 (12/16/19 1101)  BP: 138/74 (12/16/19 1101)  SpO2: 100 % (12/16/19 1101) Vital Signs (24h Range):  Temp:  [97 °F (36.1 °C)-98.3 °F (36.8 °C)] 98.3 °F (36.8 °C)  Pulse:  [80-99] 81  Resp:  [10-25] 13  SpO2:  [99 %-100 %] 100 %  BP: (129-203)/() 138/74     Date 12/16/19 0700 - 12/17/19 0659   Shift 8059-2907 1070-5228 1296-5429 24 Hour Total   INTAKE   I.V.(mL/kg) 150(1.9)   150(1.9)   Shift Total(mL/kg) 150(1.9)    "150(1.9)   OUTPUT   Urine(mL/kg/hr) 265   265   Drains 3   3   Shift Total(mL/kg) 268(3.4)   268(3.4)   Weight (kg) 79.4 79.4 79.4 79.4                        Closed/Suction Drain 12/10/19 1018 Anterior Neck Accordion 10 Fr. (Active)   Site Description Healing 12/11/2019  3:05 AM   Dressing Type No dressing 12/11/2019  3:05 AM   Drainage Serosanguineous 12/10/2019  6:30 PM   Status Clamped 12/11/2019  3:05 AM   Output (mL) 1500 mL 12/11/2019  6:05 AM            Urethral Catheter 12/10/19 0730 Non-latex 16 Fr. (Active)   Site Assessment Clean;Intact 12/11/2019  3:05 AM   Collection Container Urimeter 12/11/2019  3:05 AM   Securement Method secured to top of thigh w/ adhesive device 12/11/2019  3:05 AM   Catheter Care Performed yes 12/11/2019  3:05 AM   Reason for Continuing Urinary Catheterization Post operative 12/11/2019  3:05 AM   CAUTI Prevention Bundle StatLock in place w 1" slack;Intact seal between catheter & drainage tubing;Drainage bag/urimeter off the floor;Green sheeting clip in use;No dependent loops or kinks;Drainage bag/urimeter not overfilled (<2/3 full);Drainage bag/urimeter below bladder 12/10/2019  7:05 PM   Output (mL) 75 mL 12/10/2019  3:30 PM            Lumbar Drain 12/10/19 1056 (Active)   Drain Status Other (Comment) 12/11/2019  3:05 AM   Level to other (see comment) 12/10/2019  2:32 PM   Dressing Status Clean;Dry;Intact 12/11/2019  3:05 AM   Interventions HOB degrees (see comment) 12/10/2019  2:32 PM   Output (mL) 2 mL 12/11/2019  4:05 AM       Neurosurgery Physical Exam  AOX3  5/5 in BUE except 4/5 in left HF  5/5 in BLE  SILT  Incision c/d/i    Significant Labs:  Recent Labs   Lab 12/15/19  0130 12/16/19  0114   GLU 86 80   * 134*   K 3.6 3.7    105   CO2 22* 23   BUN 14 15   CREATININE 0.7 0.7   CALCIUM 8.3* 8.2*   MG 1.7 1.7     Recent Labs   Lab 12/15/19  0130 12/16/19  0114   WBC 11.18 14.13*   HGB 9.5* 9.1*   HCT 31.2* 29.2*    172     Recent Labs   Lab 12/15/19  0130 " 12/16/19  0114   INR 1.1 1.1     Microbiology Results (last 7 days)     Procedure Component Value Units Date/Time    Culture, Anaerobe [[id number]] Collected:  12/10/19 1038    Order Status:  Completed Specimen:  Body Fluid from Neck Updated:  12/16/19 0854     Anaerobic Culture No anaerobes isolated    Narrative:       2) Free vertebral abscess #2    Culture, Anaerobe [[id number]] Collected:  12/10/19 1038    Order Status:  Completed Specimen:  Body Fluid from Neck Updated:  12/16/19 0854     Anaerobic Culture No anaerobes isolated    Narrative:       1) Free vertebral abscess #1    CSF culture [[id number]] Collected:  12/11/19 1247    Order Status:  Completed Specimen:  CSF (Spinal Fluid) from CSF Tap, Tube 3 Updated:  12/16/19 0711     CSF CULTURE No Growth     Gram Stain Result Cytospin indicates:      Rare WBC's      No organisms seen    Blood culture [[id number]] Collected:  12/12/19 0147    Order Status:  Completed Specimen:  Blood from Wrist, Right Updated:  12/16/19 0612     Blood Culture, Routine No Growth to date      No Growth to date      No Growth to date      No Growth to date      No Growth to date    Narrative:       Blood cultures from 2 different sites. 4 bottles total.  Please draw before starting antibiotics.    Blood culture [[id number]] Collected:  12/12/19 0213    Order Status:  Completed Specimen:  Blood from Peripheral, Antecubital, Right Updated:  12/16/19 0612     Blood Culture, Routine No Growth to date      No Growth to date      No Growth to date      No Growth to date      No Growth to date    Narrative:       Blood cultures x 2 different sites. 4 bottles total. Please  draw cultures before administering antibiotics.    Blood culture [[id number]] Collected:  12/11/19 1000    Order Status:  Completed Specimen:  Blood from Peripheral, Foot, Right Updated:  12/15/19 1212     Blood Culture, Routine No Growth to date      No Growth to date      No Growth to date      No Growth to date      No  Growth to date    Narrative:       Blood cultures from 2 different sites. 4 bottles total.  Please draw before starting antibiotics.    Blood culture [[id number]] Collected:  12/11/19 1005    Order Status:  Completed Specimen:  Blood from Peripheral, Forearm, Right Updated:  12/15/19 1212     Blood Culture, Routine No Growth to date      No Growth to date      No Growth to date      No Growth to date      No Growth to date    Narrative:       Blood cultures x 2 different sites. 4 bottles total. Please  draw cultures before administering antibiotics.    CSF culture [[id number]]  (Abnormal)  (Susceptibility) Collected:  12/10/19 1158    Order Status:  Completed Specimen:  CSF (Spinal Fluid) from CSF Tap, Tube 1 Updated:  12/13/19 1350     CSF CULTURE Culture has Staphylococcus.      Results called to and read back by:  Leonardo Cook RN 12/12/2019  10:36      STAPHYLOCOCCUS EPIDERMIDIS     Gram Stain Result No WBC's      No organisms seen    Narrative:       3) 3) CSF for cell count, differential, culture, gramstain,  protein, and glucose    Aerobic culture [[id number]]  (Abnormal)  (Susceptibility) Collected:  12/10/19 1038    Order Status:  Completed Specimen:  Body Fluid from Neck Updated:  12/13/19 1240     Aerobic Bacterial Culture PSEUDOMONAS AERUGINOSA  Rare      Narrative:       1) Free vertebral abscess #1    Fungus culture [[id number]] Collected:  12/10/19 1038    Order Status:  Completed Specimen:  Body Fluid from Neck Updated:  12/13/19 1059     Fungus (Mycology) Culture Culture in progress    Narrative:       2) Free vertebral abscess #2    Fungus culture [[id number]] Collected:  12/10/19 1038    Order Status:  Completed Specimen:  Body Fluid from Neck Updated:  12/13/19 1059     Fungus (Mycology) Culture Culture in progress    Narrative:       1) Free vertebral abscess #1    Aerobic culture [[id number]]  (Abnormal)  (Susceptibility) Collected:  12/10/19 1038    Order Status:  Completed Specimen:  Body Fluid from  Neck Updated:  12/12/19 1132     Aerobic Bacterial Culture PSEUDOMONAS AERUGINOSA  Few      Narrative:       2) Free vertebral abscess #2    Gram stain [[id number]]     Order Status:  Canceled Specimen:  CSF (Spinal Fluid) from CSF Tap, Tube 3     CSF culture [[id number]]     Order Status:  Canceled Specimen:  CSF (Spinal Fluid) from CSF Tap, Tube 3     AFB Culture & Smear [[id number]] Collected:  12/10/19 1038    Order Status:  Completed Specimen:  Body Fluid from Neck Updated:  12/11/19 2127     AFB Culture & Smear Culture in progress     AFB CULTURE STAIN No acid fast bacilli seen.    Narrative:       1) Free vertebral abscess #1    AFB Culture & Smear [[id number]] Collected:  12/10/19 1038    Order Status:  Completed Specimen:  Body Fluid from Neck Updated:  12/11/19 2127     AFB Culture & Smear Culture in progress     AFB CULTURE STAIN No acid fast bacilli seen.    Narrative:       2) Free vertebral abscess #2    Gram stain [[id number]] Collected:  12/11/19 1247    Order Status:  Canceled Specimen:  CSF (Spinal Fluid) from CSF Tap, Tube 3     Gram stain [[id number]] Collected:  12/10/19 1038    Order Status:  Completed Specimen:  Body Fluid from Neck Updated:  12/10/19 1245     Gram Stain Result Rare No WBC's      No organisms seen    Narrative:       2) Free vertebral abscess #2    Gram stain [[id number]] Collected:  12/10/19 1038    Order Status:  Completed Specimen:  Body Fluid from Neck Updated:  12/10/19 1241     Gram Stain Result No WBC's      No organisms seen    Narrative:       1) Free vertebral abscess #1

## 2019-12-16 NOTE — PROGRESS NOTES
[facility]  Neurocritical Care  Progress Note    Admit Date: 12/6/2019  Service Date: 12/16/2019  Length of Stay: 10    Subjective:     Chief Complaint: Spinal epidural abscess    History of Present Illness: Junaid Muñoz is a 74 year old male with CAD, HTN, HLD, hypothyroidism, tobacco use, and prior history of IVDU who was found to have C6-T3 osteomyelitis with epidural abscess. Patient was neurologically intact and hemodynamically stable on admission.  Now immediately status post phase one of a two part surgery.  Today he had a C7-T1 corpectomy and C6-T2 anterior fusion.  Lumbar drain was placed secondary to intra op CSF leak, and patient admitted to [facility] for post op monitoring and drain maintenance.     Hospital Course: 12/10 admitted post-op C7-T1 corpectomy and C6-T2 anterior fusion with lumbar drain placed 2/2 CSF leak intra-op  12/12  No significant events over night, per nsgy will keep lumbar drain another day. 2nd stage of sugery to occur next Tuesday. Remains NPO while having to lie flat with  lumbar drain. Getting confused after receiving valium decreased dose.  12/13: Restless o/n, c/o back pain. LD dropped to floor by NSGY, 8-10cc/hr. HV drain in place as well. Neurologically stable. AF, no leukocytosis, H/H stable.   12/14: calm at present, slight agitation overnight, can elevate head for swallow trials and po today,pending picc placement for long term abx  12/15: less responsive this am, wean scheduled ativan, begin to advance diet and taper ivfs  12/16: NAEO. Stage II surgery tomorrow for posterior cervical fusion. Leukocytosis increasing, is on cefepime for pseudomonas in CSF, will reach out to ID to see if any further abx pre-op should be given. Lumbar drain in place. Increase normal saline to 100 cc/hr. NPO after midnight.     Interval History: NAEO. Stage II surgery tomorrow for posterior cervical fusion. Leukocytosis increasing, is on cefepime for pseudomonas in CSF, will  reach out to ID to see if any further abx pre-op should be given. Lumbar drain in place. Increase normal saline to 100 cc/hr. NPO after midnight.    Review of Systems:Review of Systems   Constitutional: Negative for chills and fever.   Eyes: Negative for photophobia.   Respiratory: Negative for cough and shortness of breath.    Cardiovascular: Negative for chest pain.   Gastrointestinal: Negative for nausea and vomiting.   Musculoskeletal: Positive for back pain and neck pain.   Neurological: Positive for headaches. Negative for tingling and weakness.         Vitals:   Temp: 98.7 °F (37.1 °C)  Pulse: 84  Rhythm: normal sinus rhythm  BP: (!) 181/80  MAP (mmHg): 115  Resp: 20  SpO2: 99 %  O2 Device (Oxygen Therapy): room air    Temp  Min: 97 °F (36.1 °C)  Max: 98.7 °F (37.1 °C)  Pulse  Min: 78  Max: 99  BP  Min: 129/70  Max: 189/120  MAP (mmHg)  Min: 84  Max: 140  Resp  Min: 10  Max: 25  SpO2  Min: 99 %  Max: 100 %    12/15 0701 - 12/16 0700  In: 1533.7 [P.O.:450; I.V.:1083.7]  Out: 2139 [Urine:2130; Drains:9]   Unmeasured Output  Urine Occurrence: 1  Stool Occurrence: 0     Examination:   Constitutional: No apparent distress.   Eyes: Conjunctiva clear  Head/Ears/Nose/Mouth/Throat/Neck: Moist mucous membranes. Surgical incision is C/D/I   Cardiovascular: Regular rhythm. .  Respiratory: Comfortable respirations. .  Gastrointestinal: Soft, nondistended, nontender. + bowel sounds.    Neurologic:  -Alert. Oriented to person, place, and time. Speech fluent. Follows commands.  -PERRL, EOMI, face symmetric   -Cranial nerves II-XII grossly intact  -left HF 4/5, otherwise 5/5 strength in BUE and BLE  -Sensation intact to light touch in all extremities  -Neg hoffmans; neg clonus       Medications:   Continuous  sodium chloride 0.9% Last Rate: 100 mL/hr at 12/16/19 1500   Scheduled  acetaminophen 1,000 mg Q8H   amLODIPine 5 mg Daily   atorvastatin 80 mg Daily   bacitracin  TID   ceFEPime (MAXIPIME) IVPB 2 g Q8H   diazePAM 5 mg  Q8H   levothyroxine 50 mcg Before breakfast   losartan-hydrochlorothiazide 100-25 mg 1 tablet Daily   magnesium oxide 800 mg Once   metoprolol succinate 100 mg Daily   nicotine 1 patch Daily   polyethylene glycol 17 g Daily   senna-docusate 8.6-50 mg 1 tablet BID   sodium chloride 0.9% 10 mL Q6H   PRN  Dextrose 10% Bolus 12.5 g PRN   Dextrose 10% Bolus 25 g PRN   glucagon (human recombinant) 1 mg PRN   glucose 16 g PRN   glucose 24 g PRN   hydrALAZINE 10 mg Q4H PRN   labetalol 10 mg Q4H PRN   magnesium oxide 800 mg PRN   magnesium oxide 800 mg PRN   morphine 1 mg Q6H PRN   naloxone 0.4 mg PRN   ondansetron 8 mg Q8H PRN   oxyCODONE 10 mg Q6H PRN   potassium chloride 10% 40 mEq PRN   potassium chloride 10% 40 mEq PRN   potassium chloride 10% 40 mEq PRN   potassium, sodium phosphates 2 packet PRN   potassium, sodium phosphates 2 packet PRN   potassium, sodium phosphates 2 packet PRN   sodium chloride 0.9% 10 mL PRN   sodium chloride 0.9% 10 mL PRN   sodium chloride 0.9% 10 mL PRN      Today I independently reviewed pertinent medications, lines/drains/airways, imaging, cardiology results, laboratory results, microbiology results,     ISTAT: Recent Labs   Lab 12/16/19  0516   PH 7.462*   PCO2 32.2*   PO2 83   POCSATURATED 97   HCO3 23.0*   BE -1   POCTCO2 24   SAMPLE ARTERIAL      Chem: Recent Labs   Lab 12/16/19  0114   *   K 3.7      CO2 23   GLU 80   BUN 15   CREATININE 0.7   ESTGFRAFRICA >60.0   EGFRNONAA >60.0   CALCIUM 8.2*   MG 1.7   PHOS 2.8   ANIONGAP 6*   PROT 5.5*   ALBUMIN 2.0*   BILITOT 0.5   ALKPHOS 268*   AST 21   ALT 13     Heme: Recent Labs   Lab 12/16/19  0114 12/16/19  1302   WBC 14.13*  --    HGB 9.1*  --    HCT 29.2*  --      --    INR 1.1 1.1     Endo: No results for input(s): POCTGLUCOSE in the last 24 hours.   Assessment/Plan:     Cardiac/Vascular  Hyperlipidemia  Atorvastatin 40 mg daily  Monitor LFTs    Essential hypertension  SBP < 180  Amlodipine 5 mg daily  Losartan-HCTZ  100 mg - 25 mg  Metoprolol  mg daily    · Echo:Normal left ventricular systolic function. The estimated ejection fraction is 63%  · Concentric left ventricular remodeling.  · No wall motion abnormalities.  · Normal LV diastolic function.  · Normal right ventricular systolic function.  · Mild aortic valve stenosis.  · Aortic valve area is 1.77 cm2; peak velocity is 2.19 m/s; mean gradient is 11 mmHg.  · Normal central venous pressure (3 mm Hg).         Coronary artery disease involving native coronary artery of native heart without angina pectoris  Clopidogrel 75 mg daily     ID  * Spinal epidural abscess  Cultures: CSF +staph epidermidis, likely contaminant                    Neck abscess +pseudomonas  12/14: vanc d/c'd, on cefepime 2g q8h for 6 week duration of therapy  -ID signed off, will re-consult as WBC increasing and patient going for Stage II surgery tomorrow morning.     Acute osteomyelitis of thoracic spine  See osteo of cervical spine    Acute osteomyelitis of cervical spine  C6-T3 osteomyelitis with epidural abscess    12/10: C7-T1 corpectomy with C6-T2 anterior fusion and lumbar drain placement   LD to drain 10 cc/hour, continue lumbar drain per NSGY  +pseudomanonas from surgical cultures  +staph epidermis from CSF cultures, most likely contaminant   Continue cefepime 2g q8h x 6 weeks per ID recommendations   Will go for stage II (posteror cervical instrumentation) 12/17 am      Endocrine  Other specified hypothyroidism  Levothyroxine 50 mcg daily    GI  Chronic hepatitis C without hepatic coma  Monitor LFTs    Other  Tobacco abuse  Nicotine patch 21mg daily          The patient is being Prophylaxed for:  Venous Thromboembolism with: Chemical  Stress Ulcer with: PPI  Ventilator Pneumonia with: not applicable    Activity Orders          Diet NPO: NPO starting at 12/17 0001    Diet Dysphagia Mechanical Soft (IDDSI Level 5) Thin: Dysphagia 2 (Mechanical Soft Ground) starting at 12/15 1016        Full  Code     Level 3 care    Marily Brooks PA-C  Neurocritical Care  [facility]

## 2019-12-16 NOTE — ASSESSMENT & PLAN NOTE
C6-T3 osteomyelitis with epidural abscess    12/10: C7-T1 corpectomy with C6-T2 anterior fusion and lumbar drain placement   LD to drain 10 cc/hour, continue lumbar drain per NSGY  +pseudomanonas from surgical cultures  +staph epidermis from CSF cultures, most likely contaminant   Continue cefepime 2g q8h x 6 weeks per ID recommendations   Will go for stage II (posteror cervical instrumentation) 12/17 am

## 2019-12-16 NOTE — PLAN OF CARE
POC reviewed with pt at 0430. Lumbar drain remains open with minimal hourly output. Hemovac clamped with moderate amount of drainage at insertion site. Neck circumference measured hourly. Bath given. Back pain managed with prn meds. NS @ 30 ml/hr. AvaSys telesitter in room. Pt progressing toward goals. Will continue to monitor. See flowsheets for full assessment and VS info

## 2019-12-16 NOTE — PROGRESS NOTES
[facility]  Neurosurgery  Progress Note    Subjective:     History of Present Illness: Junaid Muñoz is a 74 y.o. male with PMH of HTN, HLD, Hepatitis C, and CAD s/p two stents on plavix who presents with 1 month history of back pain between his shoulders. MRI at OSH demonstrates likely epidural abscess. Pt denies hx of trauma and reports slow onset of symptoms.     Post-Op Info:  Procedure(s) (LRB):  CORPECTOMY, SPINE, CERVICAL AND THORACIC, C7 and T1 with Globus (N/A)   6 Days Post-Op     Interval History: lumbar drain with minimal output.  Exam is stable.    Medications:  Continuous Infusions:   sodium chloride 0.9% 100 mL/hr at 12/16/19 1100     Scheduled Meds:   acetaminophen  1,000 mg Oral Q8H    amLODIPine  5 mg Oral Daily    atorvastatin  80 mg Oral Daily    bacitracin   Topical (Top) TID    ceFEPime (MAXIPIME) IVPB  2 g Intravenous Q8H    diazePAM  5 mg Intravenous Q8H    heparin (porcine)  5,000 Units Subcutaneous Q8H    levothyroxine  50 mcg Oral Before breakfast    losartan-hydrochlorothiazide 100-25 mg  1 tablet Oral Daily    magnesium oxide  800 mg Oral Once    metoprolol succinate  100 mg Oral Daily    nicotine  1 patch Transdermal Daily    polyethylene glycol  17 g Oral Daily    senna-docusate 8.6-50 mg  1 tablet Oral BID    sodium chloride 0.9%  10 mL Intravenous Q6H     PRN Meds:Dextrose 10% Bolus, Dextrose 10% Bolus, glucagon (human recombinant), glucose, glucose, hydrALAZINE, labetalol, magnesium oxide, magnesium oxide, morphine, naloxone, ondansetron, oxyCODONE, potassium chloride 10%, potassium chloride 10%, potassium chloride 10%, potassium, sodium phosphates, potassium, sodium phosphates, potassium, sodium phosphates, sodium chloride 0.9%, sodium chloride 0.9%, Flushing PICC Protocol **AND** sodium chloride 0.9% **AND** sodium chloride 0.9%     Review of Systems    Objective:     Weight: 79.4 kg (175 lb 0.7 oz)  Body mass index is 25.85 kg/m².  Vital  "Signs (Most Recent):  Temp: 98.3 °F (36.8 °C) (12/16/19 1101)  Pulse: 81 (12/16/19 1101)  Resp: 13 (12/16/19 1101)  BP: 138/74 (12/16/19 1101)  SpO2: 100 % (12/16/19 1101) Vital Signs (24h Range):  Temp:  [97 °F (36.1 °C)-98.3 °F (36.8 °C)] 98.3 °F (36.8 °C)  Pulse:  [80-99] 81  Resp:  [10-25] 13  SpO2:  [99 %-100 %] 100 %  BP: (129-203)/() 138/74     Date 12/16/19 0700 - 12/17/19 0659   Shift 8961-4424 3092-9855 9722-2523 24 Hour Total   INTAKE   I.V.(mL/kg) 150(1.9)   150(1.9)   Shift Total(mL/kg) 150(1.9)   150(1.9)   OUTPUT   Urine(mL/kg/hr) 265   265   Drains 3   3   Shift Total(mL/kg) 268(3.4)   268(3.4)   Weight (kg) 79.4 79.4 79.4 79.4                        Closed/Suction Drain 12/10/19 1018 Anterior Neck Accordion 10 Fr. (Active)   Site Description Healing 12/11/2019  3:05 AM   Dressing Type No dressing 12/11/2019  3:05 AM   Drainage Serosanguineous 12/10/2019  6:30 PM   Status Clamped 12/11/2019  3:05 AM   Output (mL) 1500 mL 12/11/2019  6:05 AM            Urethral Catheter 12/10/19 0730 Non-latex 16 Fr. (Active)   Site Assessment Clean;Intact 12/11/2019  3:05 AM   Collection Container Urimeter 12/11/2019  3:05 AM   Securement Method secured to top of thigh w/ adhesive device 12/11/2019  3:05 AM   Catheter Care Performed yes 12/11/2019  3:05 AM   Reason for Continuing Urinary Catheterization Post operative 12/11/2019  3:05 AM   CAUTI Prevention Bundle StatLock in place w 1" slack;Intact seal between catheter & drainage tubing;Drainage bag/urimeter off the floor;Green sheeting clip in use;No dependent loops or kinks;Drainage bag/urimeter not overfilled (<2/3 full);Drainage bag/urimeter below bladder 12/10/2019  7:05 PM   Output (mL) 75 mL 12/10/2019  3:30 PM            Lumbar Drain 12/10/19 1056 (Active)   Drain Status Other (Comment) 12/11/2019  3:05 AM   Level to other (see comment) 12/10/2019  2:32 PM   Dressing Status Clean;Dry;Intact 12/11/2019  3:05 AM   Interventions HOB degrees (see comment) " 12/10/2019  2:32 PM   Output (mL) 2 mL 12/11/2019  4:05 AM       Neurosurgery Physical Exam  AOX3  5/5 in BUE except 4/5 in left HF  5/5 in BLE  SILT  Incision c/d/i    Significant Labs:  Recent Labs   Lab 12/15/19  0130 12/16/19  0114   GLU 86 80   * 134*   K 3.6 3.7    105   CO2 22* 23   BUN 14 15   CREATININE 0.7 0.7   CALCIUM 8.3* 8.2*   MG 1.7 1.7     Recent Labs   Lab 12/15/19  0130 12/16/19  0114   WBC 11.18 14.13*   HGB 9.5* 9.1*   HCT 31.2* 29.2*    172     Recent Labs   Lab 12/15/19  0130 12/16/19  0114   INR 1.1 1.1     Microbiology Results (last 7 days)     Procedure Component Value Units Date/Time    Culture, Anaerobe [[id number]] Collected:  12/10/19 1038    Order Status:  Completed Specimen:  Body Fluid from Neck Updated:  12/16/19 0854     Anaerobic Culture No anaerobes isolated    Narrative:       2) Free vertebral abscess #2    Culture, Anaerobe [[id number]] Collected:  12/10/19 1038    Order Status:  Completed Specimen:  Body Fluid from Neck Updated:  12/16/19 0854     Anaerobic Culture No anaerobes isolated    Narrative:       1) Free vertebral abscess #1    CSF culture [[id number]] Collected:  12/11/19 1247    Order Status:  Completed Specimen:  CSF (Spinal Fluid) from CSF Tap, Tube 3 Updated:  12/16/19 0711     CSF CULTURE No Growth     Gram Stain Result Cytospin indicates:      Rare WBC's      No organisms seen    Blood culture [[id number]] Collected:  12/12/19 0147    Order Status:  Completed Specimen:  Blood from Wrist, Right Updated:  12/16/19 0612     Blood Culture, Routine No Growth to date      No Growth to date      No Growth to date      No Growth to date      No Growth to date    Narrative:       Blood cultures from 2 different sites. 4 bottles total.  Please draw before starting antibiotics.    Blood culture [[id number]] Collected:  12/12/19 0213    Order Status:  Completed Specimen:  Blood from Peripheral, Antecubital, Right Updated:  12/16/19 0612     Blood Culture,  Routine No Growth to date      No Growth to date      No Growth to date      No Growth to date      No Growth to date    Narrative:       Blood cultures x 2 different sites. 4 bottles total. Please  draw cultures before administering antibiotics.    Blood culture [[id number]] Collected:  12/11/19 1000    Order Status:  Completed Specimen:  Blood from Peripheral, Foot, Right Updated:  12/15/19 1212     Blood Culture, Routine No Growth to date      No Growth to date      No Growth to date      No Growth to date      No Growth to date    Narrative:       Blood cultures from 2 different sites. 4 bottles total.  Please draw before starting antibiotics.    Blood culture [[id number]] Collected:  12/11/19 1005    Order Status:  Completed Specimen:  Blood from Peripheral, Forearm, Right Updated:  12/15/19 1212     Blood Culture, Routine No Growth to date      No Growth to date      No Growth to date      No Growth to date      No Growth to date    Narrative:       Blood cultures x 2 different sites. 4 bottles total. Please  draw cultures before administering antibiotics.    CSF culture [[id number]]  (Abnormal)  (Susceptibility) Collected:  12/10/19 1158    Order Status:  Completed Specimen:  CSF (Spinal Fluid) from CSF Tap, Tube 1 Updated:  12/13/19 1350     CSF CULTURE Culture has Staphylococcus.      Results called to and read back by:  Leonardo Cook RN 12/12/2019  10:36      STAPHYLOCOCCUS EPIDERMIDIS     Gram Stain Result No WBC's      No organisms seen    Narrative:       3) 3) CSF for cell count, differential, culture, gramstain,  protein, and glucose    Aerobic culture [[id number]]  (Abnormal)  (Susceptibility) Collected:  12/10/19 1038    Order Status:  Completed Specimen:  Body Fluid from Neck Updated:  12/13/19 1240     Aerobic Bacterial Culture PSEUDOMONAS AERUGINOSA  Rare      Narrative:       1) Free vertebral abscess #1    Fungus culture [[id number]] Collected:  12/10/19 1038    Order Status:  Completed Specimen:  Body  Fluid from Neck Updated:  12/13/19 1059     Fungus (Mycology) Culture Culture in progress    Narrative:       2) Free vertebral abscess #2    Fungus culture [[id number]] Collected:  12/10/19 1038    Order Status:  Completed Specimen:  Body Fluid from Neck Updated:  12/13/19 1059     Fungus (Mycology) Culture Culture in progress    Narrative:       1) Free vertebral abscess #1    Aerobic culture [[id number]]  (Abnormal)  (Susceptibility) Collected:  12/10/19 1038    Order Status:  Completed Specimen:  Body Fluid from Neck Updated:  12/12/19 1132     Aerobic Bacterial Culture PSEUDOMONAS AERUGINOSA  Few      Narrative:       2) Free vertebral abscess #2    Gram stain [[id number]]     Order Status:  Canceled Specimen:  CSF (Spinal Fluid) from CSF Tap, Tube 3     CSF culture [[id number]]     Order Status:  Canceled Specimen:  CSF (Spinal Fluid) from CSF Tap, Tube 3     AFB Culture & Smear [[id number]] Collected:  12/10/19 1038    Order Status:  Completed Specimen:  Body Fluid from Neck Updated:  12/11/19 2127     AFB Culture & Smear Culture in progress     AFB CULTURE STAIN No acid fast bacilli seen.    Narrative:       1) Free vertebral abscess #1    AFB Culture & Smear [[id number]] Collected:  12/10/19 1038    Order Status:  Completed Specimen:  Body Fluid from Neck Updated:  12/11/19 2127     AFB Culture & Smear Culture in progress     AFB CULTURE STAIN No acid fast bacilli seen.    Narrative:       2) Free vertebral abscess #2    Gram stain [[id number]] Collected:  12/11/19 1247    Order Status:  Canceled Specimen:  CSF (Spinal Fluid) from CSF Tap, Tube 3     Gram stain [[id number]] Collected:  12/10/19 1038    Order Status:  Completed Specimen:  Body Fluid from Neck Updated:  12/10/19 1245     Gram Stain Result Rare No WBC's      No organisms seen    Narrative:       2) Free vertebral abscess #2    Gram stain [[id number]] Collected:  12/10/19 1038    Order Status:  Completed Specimen:  Body Fluid from Neck Updated:   12/10/19 1241     Gram Stain Result No WBC's      No organisms seen    Narrative:       1) Free vertebral abscess #1              Assessment/Plan:     * Spinal epidural abscess  74 y.o. male with PMH of HTN, HLD, Hepatitis C, and CAD s/p two stents on plavix who presents with C6-T2 osteomyelitis with suspected epidural abscess.  Now s/p C7/T1 corpectomies.    - Continue ICU care  - q1hr neuro checks  - LD open to drain maximum 10cc/hr, no minimum drainage. Will consult IR for lumbar drain replacement  - HV drain clamped. Will monitor.  - May sit upright  -  brace when upright  - Planning for posterior cervical instrumentation tomorrow  - Will obtain consent  - Keep NPO at midnight  - ID consulted: csf sent 12/10 w/ staph epi, 12/11 neg; surgical cx with pseudomonas- cefepime x 8 weeks   - SQH  - Further care per [employer]        Isreal Hawley MD  Neurosurgery  [facility]

## 2019-12-17 ENCOUNTER — ANESTHESIA (OUTPATIENT)
Dept: SURGERY | Facility: HOSPITAL | Age: 74
DRG: 459 | End: 2019-12-17
Payer: MEDICARE

## 2019-12-17 PROBLEM — D62 ACUTE BLOOD LOSS ANEMIA: Status: ACTIVE | Noted: 2019-12-17

## 2019-12-17 PROBLEM — D72.829 LEUKOCYTOSIS: Status: ACTIVE | Noted: 2019-12-17

## 2019-12-17 LAB
ALBUMIN SERPL BCP-MCNC: 2.2 G/DL (ref 3.5–5.2)
ALLENS TEST: ABNORMAL
ALP SERPL-CCNC: 338 U/L (ref 55–135)
ALT SERPL W/O P-5'-P-CCNC: 12 U/L (ref 10–44)
ANION GAP SERPL CALC-SCNC: 6 MMOL/L (ref 8–16)
AST SERPL-CCNC: 22 U/L (ref 10–40)
BACTERIA BLD CULT: NORMAL
BACTERIA BLD CULT: NORMAL
BASOPHILS # BLD AUTO: 0.04 K/UL (ref 0–0.2)
BASOPHILS # BLD AUTO: 0.06 K/UL (ref 0–0.2)
BASOPHILS # BLD AUTO: 0.08 K/UL (ref 0–0.2)
BASOPHILS NFR BLD: 0.3 % (ref 0–1.9)
BASOPHILS NFR BLD: 0.3 % (ref 0–1.9)
BASOPHILS NFR BLD: 0.5 % (ref 0–1.9)
BILIRUB SERPL-MCNC: 0.6 MG/DL (ref 0.1–1)
BLD PROD TYP BPU: NORMAL
BLD PROD TYP BPU: NORMAL
BLOOD UNIT EXPIRATION DATE: NORMAL
BLOOD UNIT EXPIRATION DATE: NORMAL
BLOOD UNIT TYPE CODE: 6200
BLOOD UNIT TYPE CODE: 6200
BLOOD UNIT TYPE: NORMAL
BLOOD UNIT TYPE: NORMAL
BNP SERPL-MCNC: 97 PG/ML (ref 0–99)
BUN SERPL-MCNC: 16 MG/DL (ref 8–23)
CALCIUM SERPL-MCNC: 8.2 MG/DL (ref 8.7–10.5)
CHLORIDE SERPL-SCNC: 102 MMOL/L (ref 95–110)
CO2 SERPL-SCNC: 23 MMOL/L (ref 23–29)
CODING SYSTEM: NORMAL
CODING SYSTEM: NORMAL
CREAT SERPL-MCNC: 0.7 MG/DL (ref 0.5–1.4)
DELSYS: ABNORMAL
DIFFERENTIAL METHOD: ABNORMAL
DISPENSE STATUS: NORMAL
DISPENSE STATUS: NORMAL
EOSINOPHIL # BLD AUTO: 0.1 K/UL (ref 0–0.5)
EOSINOPHIL # BLD AUTO: 0.4 K/UL (ref 0–0.5)
EOSINOPHIL # BLD AUTO: 0.4 K/UL (ref 0–0.5)
EOSINOPHIL NFR BLD: 1 % (ref 0–8)
EOSINOPHIL NFR BLD: 1.9 % (ref 0–8)
EOSINOPHIL NFR BLD: 2.3 % (ref 0–8)
ERYTHROCYTE [DISTWIDTH] IN BLOOD BY AUTOMATED COUNT: 15.3 % (ref 11.5–14.5)
ERYTHROCYTE [DISTWIDTH] IN BLOOD BY AUTOMATED COUNT: 15.5 % (ref 11.5–14.5)
ERYTHROCYTE [DISTWIDTH] IN BLOOD BY AUTOMATED COUNT: 17.2 % (ref 11.5–14.5)
EST. GFR  (AFRICAN AMERICAN): >60 ML/MIN/1.73 M^2
EST. GFR  (NON AFRICAN AMERICAN): >60 ML/MIN/1.73 M^2
FIBRINOGEN PPP-MCNC: 387 MG/DL (ref 182–366)
GLUCOSE SERPL-MCNC: 82 MG/DL (ref 70–110)
GLUCOSE SERPL-MCNC: 89 MG/DL (ref 70–110)
HCO3 UR-SCNC: 19.8 MMOL/L (ref 24–28)
HCO3 UR-SCNC: 22.2 MMOL/L (ref 24–28)
HCT VFR BLD AUTO: 30.8 % (ref 40–54)
HCT VFR BLD AUTO: 31.7 % (ref 40–54)
HCT VFR BLD AUTO: 33.1 % (ref 40–54)
HCT VFR BLD CALC: 27 %PCV (ref 36–54)
HGB BLD-MCNC: 10.2 G/DL (ref 14–18)
HGB BLD-MCNC: 10.6 G/DL (ref 14–18)
HGB BLD-MCNC: 9.5 G/DL (ref 14–18)
IMM GRANULOCYTES # BLD AUTO: 0.09 K/UL (ref 0–0.04)
IMM GRANULOCYTES # BLD AUTO: 0.13 K/UL (ref 0–0.04)
IMM GRANULOCYTES # BLD AUTO: 0.14 K/UL (ref 0–0.04)
IMM GRANULOCYTES NFR BLD AUTO: 0.5 % (ref 0–0.5)
IMM GRANULOCYTES NFR BLD AUTO: 0.7 % (ref 0–0.5)
IMM GRANULOCYTES NFR BLD AUTO: 0.9 % (ref 0–0.5)
INR PPP: 1.1 (ref 0.8–1.2)
INR PPP: 1.1 (ref 0.8–1.2)
INR PPP: 1.2 (ref 0.8–1.2)
LYMPHOCYTES # BLD AUTO: 0.8 K/UL (ref 1–4.8)
LYMPHOCYTES # BLD AUTO: 1.1 K/UL (ref 1–4.8)
LYMPHOCYTES # BLD AUTO: 1.9 K/UL (ref 1–4.8)
LYMPHOCYTES NFR BLD: 11 % (ref 18–48)
LYMPHOCYTES NFR BLD: 5.5 % (ref 18–48)
LYMPHOCYTES NFR BLD: 5.6 % (ref 18–48)
MAGNESIUM SERPL-MCNC: 1.7 MG/DL (ref 1.6–2.6)
MAGNESIUM SERPL-MCNC: 2.3 MG/DL (ref 1.6–2.6)
MCH RBC QN AUTO: 25.5 PG (ref 27–31)
MCH RBC QN AUTO: 27.5 PG (ref 27–31)
MCH RBC QN AUTO: 27.6 PG (ref 27–31)
MCHC RBC AUTO-ENTMCNC: 30.8 G/DL (ref 32–36)
MCHC RBC AUTO-ENTMCNC: 32 G/DL (ref 32–36)
MCHC RBC AUTO-ENTMCNC: 32.2 G/DL (ref 32–36)
MCV RBC AUTO: 83 FL (ref 82–98)
MCV RBC AUTO: 86 FL (ref 82–98)
MCV RBC AUTO: 86 FL (ref 82–98)
MONOCYTES # BLD AUTO: 0.9 K/UL (ref 0.3–1)
MONOCYTES # BLD AUTO: 1.2 K/UL (ref 0.3–1)
MONOCYTES # BLD AUTO: 1.2 K/UL (ref 0.3–1)
MONOCYTES NFR BLD: 6.5 % (ref 4–15)
MONOCYTES NFR BLD: 6.6 % (ref 4–15)
MONOCYTES NFR BLD: 7.1 % (ref 4–15)
NEUTROPHILS # BLD AUTO: 12.2 K/UL (ref 1.8–7.7)
NEUTROPHILS # BLD AUTO: 13.2 K/UL (ref 1.8–7.7)
NEUTROPHILS # BLD AUTO: 16 K/UL (ref 1.8–7.7)
NEUTROPHILS NFR BLD: 78.6 % (ref 38–73)
NEUTROPHILS NFR BLD: 84.9 % (ref 38–73)
NEUTROPHILS NFR BLD: 85.8 % (ref 38–73)
NRBC BLD-RTO: 0 /100 WBC
PCO2 BLDA: 32.3 MMHG (ref 35–45)
PCO2 BLDA: 43 MMHG (ref 35–45)
PH SMN: 7.27 [PH] (ref 7.35–7.45)
PH SMN: 7.45 [PH] (ref 7.35–7.45)
PHOSPHATE SERPL-MCNC: 2 MG/DL (ref 2.7–4.5)
PHOSPHATE SERPL-MCNC: 3.7 MG/DL (ref 2.7–4.5)
PLATELET # BLD AUTO: 108 K/UL (ref 150–350)
PLATELET # BLD AUTO: 115 K/UL (ref 150–350)
PLATELET # BLD AUTO: 183 K/UL (ref 150–350)
PMV BLD AUTO: 9.5 FL (ref 9.2–12.9)
PMV BLD AUTO: 9.6 FL (ref 9.2–12.9)
PMV BLD AUTO: 9.6 FL (ref 9.2–12.9)
PO2 BLDA: 379 MMHG (ref 80–100)
PO2 BLDA: 79 MMHG (ref 80–100)
POC BE: -2 MMOL/L
POC BE: -7 MMOL/L
POC IONIZED CALCIUM: 0.9 MMOL/L (ref 1.06–1.42)
POC SATURATED O2: 100 % (ref 95–100)
POC SATURATED O2: 96 % (ref 95–100)
POC TCO2: 21 MMOL/L (ref 23–27)
POC TCO2: 23 MMOL/L (ref 23–27)
POTASSIUM BLD-SCNC: 3.3 MMOL/L (ref 3.5–5.1)
POTASSIUM SERPL-SCNC: 3.4 MMOL/L (ref 3.5–5.1)
POTASSIUM SERPL-SCNC: 3.6 MMOL/L (ref 3.5–5.1)
PROT SERPL-MCNC: 6 G/DL (ref 6–8.4)
PROTHROMBIN TIME: 11.4 SEC (ref 9–12.5)
PROTHROMBIN TIME: 11.6 SEC (ref 9–12.5)
PROTHROMBIN TIME: 11.8 SEC (ref 9–12.5)
RBC # BLD AUTO: 3.69 M/UL (ref 4.6–6.2)
RBC # BLD AUTO: 3.72 M/UL (ref 4.6–6.2)
RBC # BLD AUTO: 3.86 M/UL (ref 4.6–6.2)
SAMPLE: ABNORMAL
SAMPLE: ABNORMAL
SITE: ABNORMAL
SODIUM BLD-SCNC: 137 MMOL/L (ref 136–145)
SODIUM SERPL-SCNC: 131 MMOL/L (ref 136–145)
TRANS ERYTHROCYTES VOL PATIENT: NORMAL ML
TRANS ERYTHROCYTES VOL PATIENT: NORMAL ML
WBC # BLD AUTO: 14.23 K/UL (ref 3.9–12.7)
WBC # BLD AUTO: 16.84 K/UL (ref 3.9–12.7)
WBC # BLD AUTO: 18.89 K/UL (ref 3.9–12.7)

## 2019-12-17 PROCEDURE — 85025 COMPLETE CBC W/AUTO DIFF WBC: CPT | Mod: 91

## 2019-12-17 PROCEDURE — P9021 RED BLOOD CELLS UNIT: HCPCS

## 2019-12-17 PROCEDURE — 99291 CRITICAL CARE FIRST HOUR: CPT | Mod: ,,, | Performed by: PHYSICIAN ASSISTANT

## 2019-12-17 PROCEDURE — 63600175 PHARM REV CODE 636 W HCPCS: Performed by: NURSE PRACTITIONER

## 2019-12-17 PROCEDURE — 25000003 PHARM REV CODE 250: Performed by: STUDENT IN AN ORGANIZED HEALTH CARE EDUCATION/TRAINING PROGRAM

## 2019-12-17 PROCEDURE — 83880 ASSAY OF NATRIURETIC PEPTIDE: CPT

## 2019-12-17 PROCEDURE — 37000009 HC ANESTHESIA EA ADD 15 MINS: Performed by: NEUROLOGICAL SURGERY

## 2019-12-17 PROCEDURE — 25000003 PHARM REV CODE 250: Performed by: PHYSICIAN ASSISTANT

## 2019-12-17 PROCEDURE — 22600 PR ARTHRODESIS, POST/POSTLAT, SNGL INTERSPACE, CERVICAL BELOW C2: ICD-10-PCS | Mod: 58,62,, | Performed by: ORTHOPAEDIC SURGERY

## 2019-12-17 PROCEDURE — 80053 COMPREHEN METABOLIC PANEL: CPT

## 2019-12-17 PROCEDURE — 22843 PR POSTERIOR SEGMENTAL INSTRUMENTATION 7-12 VRT SEG: ICD-10-PCS | Mod: ,,, | Performed by: NEUROLOGICAL SURGERY

## 2019-12-17 PROCEDURE — 63709 REPAIR SPINAL FLUID LEAKAGE: CPT | Mod: 58,62,59, | Performed by: NEUROLOGICAL SURGERY

## 2019-12-17 PROCEDURE — 63600175 PHARM REV CODE 636 W HCPCS: Performed by: STUDENT IN AN ORGANIZED HEALTH CARE EDUCATION/TRAINING PROGRAM

## 2019-12-17 PROCEDURE — 37000008 HC ANESTHESIA 1ST 15 MINUTES: Performed by: NEUROLOGICAL SURGERY

## 2019-12-17 PROCEDURE — 63600175 PHARM REV CODE 636 W HCPCS: Performed by: PHYSICIAN ASSISTANT

## 2019-12-17 PROCEDURE — 25000003 PHARM REV CODE 250: Performed by: NEUROLOGICAL SURGERY

## 2019-12-17 PROCEDURE — 63709 REPAIR SPINAL FLUID LEAKAGE: CPT | Mod: 58,62,59, | Performed by: ORTHOPAEDIC SURGERY

## 2019-12-17 PROCEDURE — 36600 WITHDRAWAL OF ARTERIAL BLOOD: CPT

## 2019-12-17 PROCEDURE — 22600 ARTHRD PST TQ 1NTRSPC CRV: CPT | Mod: 58,62,, | Performed by: NEUROLOGICAL SURGERY

## 2019-12-17 PROCEDURE — 22600 PR ARTHRODESIS, POST/POSTLAT, SNGL INTERSPACE, CERVICAL BELOW C2: ICD-10-PCS | Mod: 58,62,, | Performed by: NEUROLOGICAL SURGERY

## 2019-12-17 PROCEDURE — 27201423 OPTIME MED/SURG SUP & DEVICES STERILE SUPPLY: Performed by: NEUROLOGICAL SURGERY

## 2019-12-17 PROCEDURE — C1713 ANCHOR/SCREW BN/BN,TIS/BN: HCPCS | Performed by: NEUROLOGICAL SURGERY

## 2019-12-17 PROCEDURE — 63600175 PHARM REV CODE 636 W HCPCS: Performed by: PSYCHIATRY & NEUROLOGY

## 2019-12-17 PROCEDURE — 22843 INSERT SPINE FIXATION DEVICE: CPT | Mod: 82,,, | Performed by: ORTHOPAEDIC SURGERY

## 2019-12-17 PROCEDURE — 63709 PR REPR,DURAL/CSF LEAK,W/LAMINECTOMY: ICD-10-PCS | Mod: 58,62,59, | Performed by: NEUROLOGICAL SURGERY

## 2019-12-17 PROCEDURE — 94002 VENT MGMT INPAT INIT DAY: CPT

## 2019-12-17 PROCEDURE — 84100 ASSAY OF PHOSPHORUS: CPT | Mod: 91

## 2019-12-17 PROCEDURE — 22843 PR POSTERIOR SEGMENTAL INSTRUMENTATION 7-12 VRT SEG: ICD-10-PCS | Mod: 82,,, | Performed by: ORTHOPAEDIC SURGERY

## 2019-12-17 PROCEDURE — 36620 PR INSERT CATH,ART,PERCUT,SHORTTERM: ICD-10-PCS | Mod: 59,,, | Performed by: ANESTHESIOLOGY

## 2019-12-17 PROCEDURE — 22614 ARTHRD PST TQ 1NTRSPC EA ADD: CPT | Mod: 62,,, | Performed by: ORTHOPAEDIC SURGERY

## 2019-12-17 PROCEDURE — 22600 ARTHRD PST TQ 1NTRSPC CRV: CPT | Mod: 58,62,, | Performed by: ORTHOPAEDIC SURGERY

## 2019-12-17 PROCEDURE — C1762 CONN TISS, HUMAN(INC FASCIA): HCPCS | Performed by: NEUROLOGICAL SURGERY

## 2019-12-17 PROCEDURE — 20000000 HC ICU ROOM

## 2019-12-17 PROCEDURE — 36620 INSERTION CATHETER ARTERY: CPT | Mod: 59,,, | Performed by: ANESTHESIOLOGY

## 2019-12-17 PROCEDURE — 99900035 HC TECH TIME PER 15 MIN (STAT)

## 2019-12-17 PROCEDURE — D9220A PRA ANESTHESIA: ICD-10-PCS | Mod: ,,, | Performed by: ANESTHESIOLOGY

## 2019-12-17 PROCEDURE — 22843 INSERT SPINE FIXATION DEVICE: CPT | Mod: ,,, | Performed by: NEUROLOGICAL SURGERY

## 2019-12-17 PROCEDURE — 83735 ASSAY OF MAGNESIUM: CPT

## 2019-12-17 PROCEDURE — 27000221 HC OXYGEN, UP TO 24 HOURS

## 2019-12-17 PROCEDURE — 99291 PR CRITICAL CARE, E/M 30-74 MINUTES: ICD-10-PCS | Mod: ,,, | Performed by: PHYSICIAN ASSISTANT

## 2019-12-17 PROCEDURE — 27201037 HC PRESSURE MONITORING SET UP

## 2019-12-17 PROCEDURE — P9017 PLASMA 1 DONOR FRZ W/IN 8 HR: HCPCS

## 2019-12-17 PROCEDURE — 85384 FIBRINOGEN ACTIVITY: CPT

## 2019-12-17 PROCEDURE — 99900026 HC AIRWAY MAINTENANCE (STAT)

## 2019-12-17 PROCEDURE — 94761 N-INVAS EAR/PLS OXIMETRY MLT: CPT

## 2019-12-17 PROCEDURE — 63709 PR REPR,DURAL/CSF LEAK,W/LAMINECTOMY: ICD-10-PCS | Mod: 58,62,59, | Performed by: ORTHOPAEDIC SURGERY

## 2019-12-17 PROCEDURE — 85610 PROTHROMBIN TIME: CPT | Mod: 91

## 2019-12-17 PROCEDURE — 22614 ARTHRD PST TQ 1NTRSPC EA ADD: CPT | Mod: 62,,, | Performed by: NEUROLOGICAL SURGERY

## 2019-12-17 PROCEDURE — 84100 ASSAY OF PHOSPHORUS: CPT

## 2019-12-17 PROCEDURE — 27800903 OPTIME MED/SURG SUP & DEVICES OTHER IMPLANTS: Performed by: NEUROLOGICAL SURGERY

## 2019-12-17 PROCEDURE — S0028 INJECTION, FAMOTIDINE, 20 MG: HCPCS | Performed by: PHYSICIAN ASSISTANT

## 2019-12-17 PROCEDURE — 63600175 PHARM REV CODE 636 W HCPCS: Performed by: ANESTHESIOLOGY

## 2019-12-17 PROCEDURE — 63600175 PHARM REV CODE 636 W HCPCS: Performed by: NEUROLOGICAL SURGERY

## 2019-12-17 PROCEDURE — P9045 ALBUMIN (HUMAN), 5%, 250 ML: HCPCS | Mod: JG | Performed by: STUDENT IN AN ORGANIZED HEALTH CARE EDUCATION/TRAINING PROGRAM

## 2019-12-17 PROCEDURE — 83735 ASSAY OF MAGNESIUM: CPT | Mod: 91

## 2019-12-17 PROCEDURE — 36000711: Performed by: NEUROLOGICAL SURGERY

## 2019-12-17 PROCEDURE — 36000710: Performed by: NEUROLOGICAL SURGERY

## 2019-12-17 PROCEDURE — 25000003 PHARM REV CODE 250: Performed by: ANESTHESIOLOGY

## 2019-12-17 PROCEDURE — 25000003 PHARM REV CODE 250: Performed by: NURSE PRACTITIONER

## 2019-12-17 PROCEDURE — D9220A PRA ANESTHESIA: Mod: ,,, | Performed by: ANESTHESIOLOGY

## 2019-12-17 PROCEDURE — 22614 PR ARTHRODESIS, POST/POSTLAT, SNGL INTERSPACE, EA ADDTL: ICD-10-PCS | Mod: 62,,, | Performed by: NEUROLOGICAL SURGERY

## 2019-12-17 PROCEDURE — 85610 PROTHROMBIN TIME: CPT

## 2019-12-17 PROCEDURE — 63600175 PHARM REV CODE 636 W HCPCS: Performed by: UROLOGY

## 2019-12-17 PROCEDURE — A4216 STERILE WATER/SALINE, 10 ML: HCPCS | Performed by: ANESTHESIOLOGY

## 2019-12-17 PROCEDURE — 87040 BLOOD CULTURE FOR BACTERIA: CPT

## 2019-12-17 PROCEDURE — 20930 PR ALLOGRAFT FOR SPINE SURGERY ONLY MORSELIZED: ICD-10-PCS | Mod: ,,, | Performed by: NEUROLOGICAL SURGERY

## 2019-12-17 PROCEDURE — 20930 SP BONE ALGRFT MORSEL ADD-ON: CPT | Mod: ,,, | Performed by: NEUROLOGICAL SURGERY

## 2019-12-17 PROCEDURE — 84132 ASSAY OF SERUM POTASSIUM: CPT

## 2019-12-17 PROCEDURE — 22614 PR ARTHRODESIS, POST/POSTLAT, SNGL INTERSPACE, EA ADDTL: ICD-10-PCS | Mod: 62,,, | Performed by: ORTHOPAEDIC SURGERY

## 2019-12-17 DEVICE — NUT SPINAL OUTER CONNECTOR: Type: IMPLANTABLE DEVICE | Site: SPINE CERVICAL | Status: FUNCTIONAL

## 2019-12-17 DEVICE — IMPLANTABLE DEVICE: Type: IMPLANTABLE DEVICE | Site: SPINE CERVICAL | Status: FUNCTIONAL

## 2019-12-17 DEVICE — ROD 3.5 MOUNTAINEER 200MM: Type: IMPLANTABLE DEVICE | Site: SPINE CERVICAL | Status: FUNCTIONAL

## 2019-12-17 DEVICE — SET SCREW SPINAL INNER NUT: Type: IMPLANTABLE DEVICE | Site: SPINE CERVICAL | Status: FUNCTIONAL

## 2019-12-17 DEVICE — DURA MATRIX ONLAY PLUS 3X3: Type: IMPLANTABLE DEVICE | Site: SPINE CERVICAL | Status: FUNCTIONAL

## 2019-12-17 DEVICE — MATRIX BONE CELLR VIVIGEN 5CC: Type: IMPLANTABLE DEVICE | Site: SPINE CERVICAL | Status: FUNCTIONAL

## 2019-12-17 DEVICE — SCREW BONE SPINAL 4.35 X 30MM: Type: IMPLANTABLE DEVICE | Site: SPINE CERVICAL | Status: FUNCTIONAL

## 2019-12-17 DEVICE — PUTTY BONE GRAFTON DBM 10CC: Type: IMPLANTABLE DEVICE | Site: SPINE CERVICAL | Status: FUNCTIONAL

## 2019-12-17 DEVICE — DURAMATRIX SUTURABLE 3X3: Type: IMPLANTABLE DEVICE | Site: SPINE CERVICAL | Status: FUNCTIONAL

## 2019-12-17 DEVICE — SET SCREW BONE SPINAL CROSS: Type: IMPLANTABLE DEVICE | Site: SPINE CERVICAL | Status: FUNCTIONAL

## 2019-12-17 DEVICE — SCREW BONE SPINAL 3.5 X 14MM: Type: IMPLANTABLE DEVICE | Site: SPINE CERVICAL | Status: FUNCTIONAL

## 2019-12-17 RX ORDER — POTASSIUM CHLORIDE 29.8 MG/ML
40 INJECTION INTRAVENOUS
Status: DISCONTINUED | OUTPATIENT
Start: 2019-12-17 | End: 2019-12-27

## 2019-12-17 RX ORDER — LOSARTAN POTASSIUM AND HYDROCHLOROTHIAZIDE 25; 100 MG/1; MG/1
1 TABLET ORAL DAILY
Status: DISCONTINUED | OUTPATIENT
Start: 2019-12-18 | End: 2019-12-25

## 2019-12-17 RX ORDER — VANCOMYCIN HYDROCHLORIDE 1 G/20ML
INJECTION, POWDER, LYOPHILIZED, FOR SOLUTION INTRAVENOUS
Status: DISCONTINUED | OUTPATIENT
Start: 2019-12-17 | End: 2019-12-18

## 2019-12-17 RX ORDER — ROCURONIUM BROMIDE 10 MG/ML
INJECTION, SOLUTION INTRAVENOUS
Status: DISCONTINUED | OUTPATIENT
Start: 2019-12-17 | End: 2019-12-17

## 2019-12-17 RX ORDER — MAGNESIUM SULFATE HEPTAHYDRATE 40 MG/ML
4 INJECTION, SOLUTION INTRAVENOUS
Status: DISCONTINUED | OUTPATIENT
Start: 2019-12-17 | End: 2019-12-27

## 2019-12-17 RX ORDER — PROPOFOL 10 MG/ML
VIAL (ML) INTRAVENOUS CONTINUOUS PRN
Status: DISCONTINUED | OUTPATIENT
Start: 2019-12-17 | End: 2019-12-17

## 2019-12-17 RX ORDER — PROPOFOL 10 MG/ML
VIAL (ML) INTRAVENOUS
Status: DISCONTINUED | OUTPATIENT
Start: 2019-12-17 | End: 2019-12-17

## 2019-12-17 RX ORDER — KETAMINE HCL IN 0.9 % NACL 50 MG/5 ML
SYRINGE (ML) INTRAVENOUS
Status: DISCONTINUED | OUTPATIENT
Start: 2019-12-17 | End: 2019-12-17

## 2019-12-17 RX ORDER — LIDOCAINE HCL/PF 100 MG/5ML
SYRINGE (ML) INTRAVENOUS
Status: DISCONTINUED | OUTPATIENT
Start: 2019-12-17 | End: 2019-12-17

## 2019-12-17 RX ORDER — MIDAZOLAM HYDROCHLORIDE 1 MG/ML
INJECTION, SOLUTION INTRAMUSCULAR; INTRAVENOUS
Status: DISCONTINUED | OUTPATIENT
Start: 2019-12-17 | End: 2019-12-17

## 2019-12-17 RX ORDER — CHLORHEXIDINE GLUCONATE ORAL RINSE 1.2 MG/ML
15 SOLUTION DENTAL 2 TIMES DAILY
Status: DISCONTINUED | OUTPATIENT
Start: 2019-12-17 | End: 2019-12-25

## 2019-12-17 RX ORDER — ALBUMIN HUMAN 50 G/1000ML
SOLUTION INTRAVENOUS CONTINUOUS PRN
Status: DISCONTINUED | OUTPATIENT
Start: 2019-12-17 | End: 2019-12-17

## 2019-12-17 RX ORDER — PHENYLEPHRINE HYDROCHLORIDE 10 MG/ML
INJECTION INTRAVENOUS
Status: DISCONTINUED | OUTPATIENT
Start: 2019-12-17 | End: 2019-12-17

## 2019-12-17 RX ORDER — CEFAZOLIN SODIUM 1 G/3ML
INJECTION, POWDER, FOR SOLUTION INTRAMUSCULAR; INTRAVENOUS
Status: DISCONTINUED | OUTPATIENT
Start: 2019-12-17 | End: 2019-12-17

## 2019-12-17 RX ORDER — EPHEDRINE SULFATE 50 MG/ML
INJECTION, SOLUTION INTRAVENOUS
Status: DISCONTINUED | OUTPATIENT
Start: 2019-12-17 | End: 2019-12-17

## 2019-12-17 RX ORDER — FENTANYL CITRATE 50 UG/ML
INJECTION, SOLUTION INTRAMUSCULAR; INTRAVENOUS
Status: DISCONTINUED | OUTPATIENT
Start: 2019-12-17 | End: 2019-12-17

## 2019-12-17 RX ORDER — MAGNESIUM SULFATE HEPTAHYDRATE 40 MG/ML
2 INJECTION, SOLUTION INTRAVENOUS
Status: DISCONTINUED | OUTPATIENT
Start: 2019-12-17 | End: 2019-12-27

## 2019-12-17 RX ORDER — AMLODIPINE BESYLATE 5 MG/1
5 TABLET ORAL DAILY
Status: DISCONTINUED | OUTPATIENT
Start: 2019-12-18 | End: 2019-12-21

## 2019-12-17 RX ORDER — FENTANYL CITRATE 50 UG/ML
50 INJECTION, SOLUTION INTRAMUSCULAR; INTRAVENOUS
Status: COMPLETED | OUTPATIENT
Start: 2019-12-17 | End: 2019-12-18

## 2019-12-17 RX ORDER — REMIFENTANIL HYDROCHLORIDE 1 MG/ML
INJECTION, POWDER, LYOPHILIZED, FOR SOLUTION INTRAVENOUS CONTINUOUS PRN
Status: DISCONTINUED | OUTPATIENT
Start: 2019-12-17 | End: 2019-12-17

## 2019-12-17 RX ORDER — LEVOTHYROXINE SODIUM 50 UG/1
50 TABLET ORAL
Status: DISCONTINUED | OUTPATIENT
Start: 2019-12-18 | End: 2019-12-25

## 2019-12-17 RX ORDER — FAMOTIDINE 10 MG/ML
20 INJECTION INTRAVENOUS 2 TIMES DAILY
Status: DISCONTINUED | OUTPATIENT
Start: 2019-12-17 | End: 2019-12-21

## 2019-12-17 RX ORDER — ATORVASTATIN CALCIUM 20 MG/1
80 TABLET, FILM COATED ORAL DAILY
Status: DISCONTINUED | OUTPATIENT
Start: 2019-12-18 | End: 2019-12-25

## 2019-12-17 RX ORDER — POTASSIUM CHLORIDE 14.9 MG/ML
60 INJECTION INTRAVENOUS
Status: DISCONTINUED | OUTPATIENT
Start: 2019-12-17 | End: 2019-12-27

## 2019-12-17 RX ORDER — FENTANYL CITRATE 50 UG/ML
50 INJECTION, SOLUTION INTRAMUSCULAR; INTRAVENOUS
Status: DISCONTINUED | OUTPATIENT
Start: 2019-12-20 | End: 2019-12-19

## 2019-12-17 RX ORDER — LIDOCAINE HYDROCHLORIDE AND EPINEPHRINE 10; 10 MG/ML; UG/ML
INJECTION, SOLUTION INFILTRATION; PERINEURAL
Status: DISCONTINUED | OUTPATIENT
Start: 2019-12-17 | End: 2019-12-17 | Stop reason: HOSPADM

## 2019-12-17 RX ORDER — BACITRACIN ZINC 500 UNIT/G
OINTMENT (GRAM) TOPICAL
Status: DISCONTINUED | OUTPATIENT
Start: 2019-12-17 | End: 2019-12-17 | Stop reason: HOSPADM

## 2019-12-17 RX ORDER — POTASSIUM CHLORIDE 29.8 MG/ML
80 INJECTION INTRAVENOUS
Status: DISCONTINUED | OUTPATIENT
Start: 2019-12-17 | End: 2019-12-27

## 2019-12-17 RX ORDER — BACITRACIN 50000 [IU]/1
INJECTION, POWDER, FOR SOLUTION INTRAMUSCULAR
Status: DISCONTINUED | OUTPATIENT
Start: 2019-12-17 | End: 2019-12-17 | Stop reason: HOSPADM

## 2019-12-17 RX ADMIN — Medication 10 ML: at 06:12

## 2019-12-17 RX ADMIN — PROPOFOL 120 MG: 10 INJECTION, EMULSION INTRAVENOUS at 07:12

## 2019-12-17 RX ADMIN — PHENYLEPHRINE HYDROCHLORIDE 200 MCG: 10 INJECTION INTRAVENOUS at 10:12

## 2019-12-17 RX ADMIN — ROCURONIUM BROMIDE 5 MG: 10 INJECTION, SOLUTION INTRAVENOUS at 07:12

## 2019-12-17 RX ADMIN — EPHEDRINE SULFATE 15 MG: 50 INJECTION, SOLUTION INTRAMUSCULAR; INTRAVENOUS; SUBCUTANEOUS at 08:12

## 2019-12-17 RX ADMIN — CHLORHEXIDINE GLUCONATE 0.12% ORAL RINSE 15 ML: 1.2 LIQUID ORAL at 09:12

## 2019-12-17 RX ADMIN — MORPHINE SULFATE 1 MG: 2 INJECTION, SOLUTION INTRAMUSCULAR; INTRAVENOUS at 02:12

## 2019-12-17 RX ADMIN — EPHEDRINE SULFATE 10 MG: 50 INJECTION, SOLUTION INTRAMUSCULAR; INTRAVENOUS; SUBCUTANEOUS at 09:12

## 2019-12-17 RX ADMIN — DEXAMETHASONE SODIUM PHOSPHATE 4 MG: 4 INJECTION, SOLUTION INTRAMUSCULAR; INTRAVENOUS at 04:12

## 2019-12-17 RX ADMIN — SENNOSIDES AND DOCUSATE SODIUM 1 TABLET: 8.6; 5 TABLET ORAL at 09:12

## 2019-12-17 RX ADMIN — FAMOTIDINE 20 MG: 10 INJECTION, SOLUTION INTRAVENOUS at 09:12

## 2019-12-17 RX ADMIN — FENTANYL CITRATE 50 MCG: 50 INJECTION INTRAMUSCULAR; INTRAVENOUS at 04:12

## 2019-12-17 RX ADMIN — SODIUM PHOSPHATE, MONOBASIC, MONOHYDRATE 20 MMOL: 276; 142 INJECTION, SOLUTION INTRAVENOUS at 08:12

## 2019-12-17 RX ADMIN — Medication: at 03:12

## 2019-12-17 RX ADMIN — ROCURONIUM BROMIDE 10 MG: 10 INJECTION, SOLUTION INTRAVENOUS at 08:12

## 2019-12-17 RX ADMIN — PHENYLEPHRINE HYDROCHLORIDE 200 MCG: 10 INJECTION INTRAVENOUS at 07:12

## 2019-12-17 RX ADMIN — OXYCODONE HYDROCHLORIDE 10 MG: 5 TABLET ORAL at 01:12

## 2019-12-17 RX ADMIN — CEFEPIME 2 G: 2 INJECTION, POWDER, FOR SOLUTION INTRAVENOUS at 06:12

## 2019-12-17 RX ADMIN — EPHEDRINE SULFATE 10 MG: 50 INJECTION, SOLUTION INTRAMUSCULAR; INTRAVENOUS; SUBCUTANEOUS at 07:12

## 2019-12-17 RX ADMIN — DIAZEPAM 5 MG: 5 INJECTION, SOLUTION INTRAMUSCULAR; INTRAVENOUS at 05:12

## 2019-12-17 RX ADMIN — MIDAZOLAM HYDROCHLORIDE 2 MG: 1 INJECTION, SOLUTION INTRAMUSCULAR; INTRAVENOUS at 07:12

## 2019-12-17 RX ADMIN — SODIUM CHLORIDE: 0.9 INJECTION, SOLUTION INTRAVENOUS at 07:12

## 2019-12-17 RX ADMIN — SODIUM PHOSPHATE, MONOBASIC, MONOHYDRATE 20.01 MMOL: 276; 142 INJECTION, SOLUTION INTRAVENOUS at 04:12

## 2019-12-17 RX ADMIN — EPHEDRINE SULFATE 5 MG: 50 INJECTION, SOLUTION INTRAMUSCULAR; INTRAVENOUS; SUBCUTANEOUS at 09:12

## 2019-12-17 RX ADMIN — LEVOTHYROXINE SODIUM 50 MCG: 50 TABLET ORAL at 05:12

## 2019-12-17 RX ADMIN — Medication: at 09:12

## 2019-12-17 RX ADMIN — Medication 10 MG: at 11:12

## 2019-12-17 RX ADMIN — ROCURONIUM BROMIDE 30 MG: 10 INJECTION, SOLUTION INTRAVENOUS at 09:12

## 2019-12-17 RX ADMIN — Medication 20 MG: at 07:12

## 2019-12-17 RX ADMIN — MAGNESIUM SULFATE IN WATER 2 G: 40 INJECTION, SOLUTION INTRAVENOUS at 03:12

## 2019-12-17 RX ADMIN — PHENYLEPHRINE HYDROCHLORIDE 100 MCG: 10 INJECTION INTRAVENOUS at 09:12

## 2019-12-17 RX ADMIN — PROPOFOL 150 MCG/KG/MIN: 10 INJECTION, EMULSION INTRAVENOUS at 07:12

## 2019-12-17 RX ADMIN — PHENYLEPHRINE HYDROCHLORIDE 200 MCG: 10 INJECTION INTRAVENOUS at 08:12

## 2019-12-17 RX ADMIN — ROCURONIUM BROMIDE 200 MG: 10 INJECTION, SOLUTION INTRAVENOUS at 07:12

## 2019-12-17 RX ADMIN — FENTANYL CITRATE 50 MCG: 50 INJECTION INTRAMUSCULAR; INTRAVENOUS at 06:12

## 2019-12-17 RX ADMIN — POTASSIUM CHLORIDE 40 MEQ: 400 INJECTION, SOLUTION INTRAVENOUS at 04:12

## 2019-12-17 RX ADMIN — SUGAMMADEX 400 MG: 100 INJECTION, SOLUTION INTRAVENOUS at 09:12

## 2019-12-17 RX ADMIN — SODIUM CHLORIDE, SODIUM GLUCONATE, SODIUM ACETATE, POTASSIUM CHLORIDE, MAGNESIUM CHLORIDE, SODIUM PHOSPHATE, DIBASIC, AND POTASSIUM PHOSPHATE: .53; .5; .37; .037; .03; .012; .00082 INJECTION, SOLUTION INTRAVENOUS at 08:12

## 2019-12-17 RX ADMIN — EPHEDRINE SULFATE 15 MG: 50 INJECTION, SOLUTION INTRAMUSCULAR; INTRAVENOUS; SUBCUTANEOUS at 10:12

## 2019-12-17 RX ADMIN — FENTANYL CITRATE 100 MCG: 50 INJECTION, SOLUTION INTRAMUSCULAR; INTRAVENOUS at 07:12

## 2019-12-17 RX ADMIN — DIAZEPAM 5 MG: 5 INJECTION, SOLUTION INTRAMUSCULAR; INTRAVENOUS at 03:12

## 2019-12-17 RX ADMIN — LABETALOL HCL IV SOLN PREFILLED SYRINGE 20 MG/4ML (5 MG/ML) 10 MG: 20/4 SOLUTION PREFILLED SYRINGE at 03:12

## 2019-12-17 RX ADMIN — CEFEPIME 2 G: 2 INJECTION, POWDER, FOR SOLUTION INTRAVENOUS at 01:12

## 2019-12-17 RX ADMIN — Medication 10 MG: at 08:12

## 2019-12-17 RX ADMIN — Medication 10 ML: at 05:12

## 2019-12-17 RX ADMIN — LIDOCAINE HYDROCHLORIDE 60 MG: 20 INJECTION, SOLUTION INTRAVENOUS at 07:12

## 2019-12-17 RX ADMIN — SODIUM CHLORIDE, SODIUM GLUCONATE, SODIUM ACETATE, POTASSIUM CHLORIDE, MAGNESIUM CHLORIDE, SODIUM PHOSPHATE, DIBASIC, AND POTASSIUM PHOSPHATE: .53; .5; .37; .037; .03; .012; .00082 INJECTION, SOLUTION INTRAVENOUS at 09:12

## 2019-12-17 RX ADMIN — Medication 10 ML: at 12:12

## 2019-12-17 RX ADMIN — ALBUMIN (HUMAN): 12.5 SOLUTION INTRAVENOUS at 10:12

## 2019-12-17 RX ADMIN — DIAZEPAM 5 MG: 5 INJECTION, SOLUTION INTRAMUSCULAR; INTRAVENOUS at 09:12

## 2019-12-17 RX ADMIN — POTASSIUM CHLORIDE 60 MEQ: 14.9 INJECTION, SOLUTION INTRAVENOUS at 03:12

## 2019-12-17 RX ADMIN — Medication 0.1 MCG/KG/MIN: at 07:12

## 2019-12-17 RX ADMIN — SODIUM CHLORIDE: 0.9 INJECTION, SOLUTION INTRAVENOUS at 03:12

## 2019-12-17 RX ADMIN — CEFAZOLIN 2 G: 330 INJECTION, POWDER, FOR SOLUTION INTRAMUSCULAR; INTRAVENOUS at 08:12

## 2019-12-17 RX ADMIN — HYDRALAZINE HYDROCHLORIDE 10 MG: 20 INJECTION INTRAMUSCULAR; INTRAVENOUS at 02:12

## 2019-12-17 RX ADMIN — EPHEDRINE SULFATE 10 MG: 50 INJECTION, SOLUTION INTRAMUSCULAR; INTRAVENOUS; SUBCUTANEOUS at 10:12

## 2019-12-17 RX ADMIN — Medication 10 MG: at 10:12

## 2019-12-17 RX ADMIN — SODIUM CHLORIDE 0.5 MCG: 9 INJECTION, SOLUTION INTRAVENOUS at 08:12

## 2019-12-17 NOTE — ASSESSMENT & PLAN NOTE
74 y.o. male with PMH of HTN, HLD, Hepatitis C, and CAD s/p two stents on plavix who presents with C6-T2 osteomyelitis with suspected epidural abscess.  Now s/p C7/T1 corpectomies.    - Posterior cervical instrumented fusion today

## 2019-12-17 NOTE — ASSESSMENT & PLAN NOTE
C6-T3 osteomyelitis with epidural abscess    12/10: C7-T1 corpectomy with C6-T2 anterior fusion and lumbar drain placement   Cultures: CSF +staph epidermidis, likely contaminant                    Neck abscess +pseudomonas    12/14: vanc d/c'd, on cefepime 2g q8h for 6 week duration of therapy  -ID consulted for increasing WBC while on cefepime, recommended repeat blood cultures and repeat surgical cultures    12/17: OR with neurosurgery for stage II, posterior instrumentation C2-T3  -lumbar drain in place, drain 10cc/hr  -decadron 4mg q6h x 24h for cervical and facial edema  -transfused 3 units PRBCs and 2 units FFP intraoperatively, repeat CBC and transfuse <7  -intubated, WTE

## 2019-12-17 NOTE — ASSESSMENT & PLAN NOTE
Transfused 3 units PRBCs and 2 units FFP intraoperatively  Recheck CBC and transfuse < 7   Monitor hemovac x 2 output

## 2019-12-17 NOTE — PLAN OF CARE
POC reviewed with pt and family at 1400. Pt consistently oriented to self and following commands throughout the day. Lumbar drain remains open with 0-1 cc/hr clear CSF output. Hemovac drain remains clamped. Cervical x-ray complete. SBP <180 maintained today. Pt tolerating mechanical soft diet well. Pt to be NPO at midnight for surgery tomorrow. Pain controlled with scheduled medications and PRNs. NS infusing at 100 cc/hr. No acute events today. Pt progressing toward goals. Will continue to monitor. See flowsheets for full assessment and VS info.

## 2019-12-17 NOTE — CARE UPDATE
Received patient from OR intubated with a 7.5 ETT.  ETT is 25cm at the lip and is secure and patent. Patient is on ventilator at documented settings. Will continue to monitor.

## 2019-12-17 NOTE — ANESTHESIA POSTPROCEDURE EVALUATION
Anesthesia Post Evaluation    Patient: Junaid Muñoz    Procedure(s) Performed: Procedure(s) (LRB):  FUSION, SPINE, CERVICAL, POSTERIOR APPROACH C2-T3 posterior instrumented fusion (N/A)    Final Anesthesia Type: general    Patient location during evaluation: ICU  Patient participation: No - Unable to Participate, Intubation  Level of consciousness: sedated  Post-procedure vital signs: reviewed and stable  Pain management: adequate  Airway patency: patent    PONV status at discharge: No PONV  Anesthetic complications: no      Cardiovascular status: hemodynamically stable  Respiratory status: intubated, ETT and ventilator  Hydration status: euvolemic  Follow-up not needed.          Vitals Value Taken Time   /62 12/17/2019 12:32 PM   Temp 36.9 °C (98.5 °F) 12/17/2019  7:02 AM   Pulse 75 12/17/2019 12:43 PM   Resp 21 12/17/2019 12:33 PM   SpO2 99 % 12/17/2019 12:43 PM   Vitals shown include unvalidated device data.      No case tracking events are documented in the log.      Pain/Saturnino Score: Pain Rating Prior to Med Admin: 6 (12/17/2019  2:08 AM)  Pain Rating Post Med Admin: 0 (12/16/2019  6:16 PM)

## 2019-12-17 NOTE — SUBJECTIVE & OBJECTIVE
Interval History: naeon    Medications:  Continuous Infusions:   sodium chloride 0.9% 100 mL/hr at 12/17/19 0605     Scheduled Meds:   amLODIPine  5 mg Oral Daily    atorvastatin  80 mg Oral Daily    bacitracin   Topical (Top) TID    ceFEPime (MAXIPIME) IVPB  2 g Intravenous Q8H    diazePAM  5 mg Intravenous Q8H    levothyroxine  50 mcg Oral Before breakfast    losartan-hydrochlorothiazide 100-25 mg  1 tablet Oral Daily    metoprolol succinate  100 mg Oral Daily    nicotine  1 patch Transdermal Daily    nicotine  1 patch Transdermal Daily    polyethylene glycol  17 g Oral Daily    senna-docusate 8.6-50 mg  1 tablet Oral BID    sodium chloride 0.9%  10 mL Intravenous Q6H     PRN Meds:acetaminophen, Dextrose 10% Bolus, Dextrose 10% Bolus, glucagon (human recombinant), glucose, glucose, hydrALAZINE, labetalol, magnesium sulfate IVPB, magnesium sulfate IVPB, morphine, naloxone, ondansetron, oxyCODONE, potassium chloride in water **AND** potassium chloride in water **AND** potassium chloride in water, Flushing PICC Protocol **AND** sodium chloride 0.9% **AND** sodium chloride 0.9%, sodium phosphate IVPB, sodium phosphate IVPB, sodium phosphate IVPB     Review of Systems  Objective:     Weight: 79.4 kg (175 lb 0.7 oz)  Body mass index is 25.85 kg/m².  Vital Signs (Most Recent):  Temp: 98.9 °F (37.2 °C) (12/17/19 0305)  Pulse: 80 (12/17/19 0605)  Resp: 18 (12/17/19 0605)  BP: 135/62 (12/17/19 0605)  SpO2: 99 % (12/17/19 0605) Vital Signs (24h Range):  Temp:  [97.9 °F (36.6 °C)-98.9 °F (37.2 °C)] 98.9 °F (37.2 °C)  Pulse:  [] 80  Resp:  [10-22] 18  SpO2:  [99 %-100 %] 99 %  BP: (129-181)/(62-85) 135/62                          Closed/Suction Drain 12/10/19 1018 Anterior Neck Accordion 10 Fr. (Active)   Site Description Leaking at site 12/17/2019  3:05 AM   Dressing Type No dressing 12/17/2019  3:05 AM   Drainage Serosanguineous 12/17/2019  3:05 AM   Status Clamped 12/17/2019  3:05 AM   Output (mL) 0 mL  "12/16/2019  6:05 AM            Urethral Catheter 12/10/19 0730 Non-latex 16 Fr. (Active)   Site Assessment Clean;Intact 12/17/2019  3:05 AM   Collection Container Urimeter 12/17/2019  3:05 AM   Securement Method secured to top of thigh w/ adhesive device 12/17/2019  3:05 AM   Catheter Care Performed yes 12/17/2019  3:05 AM   Reason for Continuing Urinary Catheterization Critically ill in ICU requiring intensive monitoring 12/17/2019  3:05 AM   CAUTI Prevention Bundle StatLock in place w 1" slack;Intact seal between catheter & drainage tubing;Drainage bag/urimeter off the floor;Drainage bag/urimeter not overfilled (<2/3 full);Green sheeting clip in use;No dependent loops or kinks;Drainage bag/urimeter below bladder 12/17/2019  3:05 AM   Output (mL) 100 mL 12/17/2019  6:05 AM            Lumbar Drain 12/10/19 1056 (Active)   Drain Status Open 12/17/2019  3:05 AM   Level to iliac crest 12/15/2019  3:05 PM   CSF Color Clear 12/17/2019  3:05 AM   Site Description Unable to view 12/17/2019  3:05 AM   Dressing Status New drainage 12/17/2019  3:05 AM   Interventions HOB degrees (see comment);Other (see comment) 12/17/2019  3:05 AM   Output (mL) 0 mL 12/17/2019  6:05 AM       Neurosurgery Physical Exam   AOX3  5/5 in BUE except 4/5 in left HF  5/5 in BLE  SILT  Incision c/d/i    Significant Labs:  Recent Labs   Lab 12/16/19 0114 12/17/19 0143   GLU 80 82   * 131*   K 3.7 3.4*    102   CO2 23 23   BUN 15 16   CREATININE 0.7 0.7   CALCIUM 8.2* 8.2*   MG 1.7 1.7     Recent Labs   Lab 12/16/19  0114 12/17/19 0143   WBC 14.13* 16.84*   HGB 9.1* 9.5*   HCT 29.2* 30.8*    183     Recent Labs   Lab 12/16/19  0114 12/16/19  1302 12/17/19 0143   INR 1.1 1.1 1.2   APTT  --  30.7  --      Microbiology Results (last 7 days)     Procedure Component Value Units Date/Time    Blood culture [[id number]] Collected:  12/12/19 0147    Order Status:  Completed Specimen:  Blood from Wrist, Right Updated:  12/17/19 0612     " Blood Culture, Routine No growth after 5 days.    Narrative:       Blood cultures from 2 different sites. 4 bottles total.  Please draw before starting antibiotics.    Blood culture [[id number]] Collected:  12/12/19 0213    Order Status:  Completed Specimen:  Blood from Peripheral, Antecubital, Right Updated:  12/17/19 0612     Blood Culture, Routine No growth after 5 days.    Narrative:       Blood cultures x 2 different sites. 4 bottles total. Please  draw cultures before administering antibiotics.    Blood culture [[id number]] Collected:  12/11/19 1000    Order Status:  Completed Specimen:  Blood from Peripheral, Foot, Right Updated:  12/16/19 1212     Blood Culture, Routine No growth after 5 days.    Narrative:       Blood cultures from 2 different sites. 4 bottles total.  Please draw before starting antibiotics.    Blood culture [[id number]] Collected:  12/11/19 1005    Order Status:  Completed Specimen:  Blood from Peripheral, Forearm, Right Updated:  12/16/19 1212     Blood Culture, Routine No growth after 5 days.    Narrative:       Blood cultures x 2 different sites. 4 bottles total. Please  draw cultures before administering antibiotics.    Culture, Anaerobe [[id number]] Collected:  12/10/19 1038    Order Status:  Completed Specimen:  Body Fluid from Neck Updated:  12/16/19 0854     Anaerobic Culture No anaerobes isolated    Narrative:       2) Free vertebral abscess #2    Culture, Anaerobe [[id number]] Collected:  12/10/19 1038    Order Status:  Completed Specimen:  Body Fluid from Neck Updated:  12/16/19 0854     Anaerobic Culture No anaerobes isolated    Narrative:       1) Free vertebral abscess #1    CSF culture [[id number]] Collected:  12/11/19 1247    Order Status:  Completed Specimen:  CSF (Spinal Fluid) from CSF Tap, Tube 3 Updated:  12/16/19 0711     CSF CULTURE No Growth     Gram Stain Result Cytospin indicates:      Rare WBC's      No organisms seen    CSF culture [[id number]]  (Abnormal)   (Susceptibility) Collected:  12/10/19 1158    Order Status:  Completed Specimen:  CSF (Spinal Fluid) from CSF Tap, Tube 1 Updated:  12/13/19 1350     CSF CULTURE Culture has Staphylococcus.      Results called to and read back by:  Leonardo Cook RN 12/12/2019  10:36      STAPHYLOCOCCUS EPIDERMIDIS     Gram Stain Result No WBC's      No organisms seen    Narrative:       3) 3) CSF for cell count, differential, culture, gramstain,  protein, and glucose    Aerobic culture [[id number]]  (Abnormal)  (Susceptibility) Collected:  12/10/19 1038    Order Status:  Completed Specimen:  Body Fluid from Neck Updated:  12/13/19 1240     Aerobic Bacterial Culture PSEUDOMONAS AERUGINOSA  Rare      Narrative:       1) Free vertebral abscess #1    Fungus culture [[id number]] Collected:  12/10/19 1038    Order Status:  Completed Specimen:  Body Fluid from Neck Updated:  12/13/19 1059     Fungus (Mycology) Culture Culture in progress    Narrative:       2) Free vertebral abscess #2    Fungus culture [[id number]] Collected:  12/10/19 1038    Order Status:  Completed Specimen:  Body Fluid from Neck Updated:  12/13/19 1059     Fungus (Mycology) Culture Culture in progress    Narrative:       1) Free vertebral abscess #1    Aerobic culture [[id number]]  (Abnormal)  (Susceptibility) Collected:  12/10/19 1038    Order Status:  Completed Specimen:  Body Fluid from Neck Updated:  12/12/19 1132     Aerobic Bacterial Culture PSEUDOMONAS AERUGINOSA  Few      Narrative:       2) Free vertebral abscess #2    Gram stain [[id number]]     Order Status:  Canceled Specimen:  CSF (Spinal Fluid) from CSF Tap, Tube 3     CSF culture [[id number]]     Order Status:  Canceled Specimen:  CSF (Spinal Fluid) from CSF Tap, Tube 3     AFB Culture & Smear [[id number]] Collected:  12/10/19 1038    Order Status:  Completed Specimen:  Body Fluid from Neck Updated:  12/11/19 2127     AFB Culture & Smear Culture in progress     AFB CULTURE STAIN No acid fast bacilli seen.     Narrative:       1) Free vertebral abscess #1    AFB Culture & Smear [[id number]] Collected:  12/10/19 1038    Order Status:  Completed Specimen:  Body Fluid from Neck Updated:  12/11/19 2127     AFB Culture & Smear Culture in progress     AFB CULTURE STAIN No acid fast bacilli seen.    Narrative:       2) Free vertebral abscess #2    Gram stain [[id number]] Collected:  12/11/19 1247    Order Status:  Canceled Specimen:  CSF (Spinal Fluid) from CSF Tap, Tube 3     Gram stain [[id number]] Collected:  12/10/19 1038    Order Status:  Completed Specimen:  Body Fluid from Neck Updated:  12/10/19 1245     Gram Stain Result Rare No WBC's      No organisms seen    Narrative:       2) Free vertebral abscess #2    Gram stain [[id number]] Collected:  12/10/19 1038    Order Status:  Completed Specimen:  Body Fluid from Neck Updated:  12/10/19 1241     Gram Stain Result No WBC's      No organisms seen    Narrative:       1) Free vertebral abscess #1            Significant Diagnostics:  C spine xrays reviewed

## 2019-12-17 NOTE — CONSULTS
Attempted to see patient but he was off the floor in OR for fusion.    Given rising WBC count, recommend repeat blood cultures x 2.   If c/f infection in the OR, please repeat cultures.    Continue cefepime for now for prior pseudomonas infection.    Will follow up with pt.  Thanks Mine Edouard PA-C  Pager: [phone]

## 2019-12-17 NOTE — ANESTHESIA PROCEDURE NOTES
Arterial    Diagnosis: CAD, spinal abscess    Patient location during procedure: done in OR  Procedure start time: 12/17/2019 7:46 AM  Timeout: 12/17/2019 7:45 AM  Procedure end time: 12/17/2019 8:05 AM    Staffing  Authorizing Provider: Dm Lan Jr., MD  Performing Provider: Billie Padron MD    Anesthesiologist was present at the time of the procedure.    Preanesthetic Checklist  Completed: patient identified, site marked, surgical consent, pre-op evaluation, timeout performed, IV checked, risks and benefits discussed, monitors and equipment checked and anesthesia consent givenArterial  Skin Prep: chlorhexidine gluconate  Local Infiltration: none  Orientation: right  Location: radial  Catheter Size: 20 G  Catheter placement by Anatomical landmarks. Heme positive aspiration all ports.Insertion Attempts: 2  Assessment  Dressing: secured with tape and tegaderm  Patient: Tolerated well

## 2019-12-17 NOTE — PROGRESS NOTES
[facility]  Neurosurgery  Progress Note    Subjective:     History of Present Illness: Junaid Muñoz is a 74 y.o. male with PMH of HTN, HLD, Hepatitis C, and CAD s/p two stents on plavix who presents with 1 month history of back pain between his shoulders. MRI at OSH demonstrates likely epidural abscess. Pt denies hx of trauma and reports slow onset of symptoms.     Post-Op Info:  Procedure(s) (LRB):  FUSION, SPINE, CERVICAL, POSTERIOR APPROACH C2-T4 posterior instrumented fusion, globus, neuromonitoring (N/A)   Day of Surgery     Interval History: naeon    Medications:  Continuous Infusions:   sodium chloride 0.9% 100 mL/hr at 12/17/19 0605     Scheduled Meds:   amLODIPine  5 mg Oral Daily    atorvastatin  80 mg Oral Daily    bacitracin   Topical (Top) TID    ceFEPime (MAXIPIME) IVPB  2 g Intravenous Q8H    diazePAM  5 mg Intravenous Q8H    levothyroxine  50 mcg Oral Before breakfast    losartan-hydrochlorothiazide 100-25 mg  1 tablet Oral Daily    metoprolol succinate  100 mg Oral Daily    nicotine  1 patch Transdermal Daily    nicotine  1 patch Transdermal Daily    polyethylene glycol  17 g Oral Daily    senna-docusate 8.6-50 mg  1 tablet Oral BID    sodium chloride 0.9%  10 mL Intravenous Q6H     PRN Meds:acetaminophen, Dextrose 10% Bolus, Dextrose 10% Bolus, glucagon (human recombinant), glucose, glucose, hydrALAZINE, labetalol, magnesium sulfate IVPB, magnesium sulfate IVPB, morphine, naloxone, ondansetron, oxyCODONE, potassium chloride in water **AND** potassium chloride in water **AND** potassium chloride in water, Flushing PICC Protocol **AND** sodium chloride 0.9% **AND** sodium chloride 0.9%, sodium phosphate IVPB, sodium phosphate IVPB, sodium phosphate IVPB     Review of Systems  Objective:     Weight: 79.4 kg (175 lb 0.7 oz)  Body mass index is 25.85 kg/m².  Vital Signs (Most Recent):  Temp: 98.9 °F (37.2 °C) (12/17/19 0305)  Pulse: 80 (12/17/19 0605)  Resp: 18  "(12/17/19 0605)  BP: 135/62 (12/17/19 0605)  SpO2: 99 % (12/17/19 0605) Vital Signs (24h Range):  Temp:  [97.9 °F (36.6 °C)-98.9 °F (37.2 °C)] 98.9 °F (37.2 °C)  Pulse:  [] 80  Resp:  [10-22] 18  SpO2:  [99 %-100 %] 99 %  BP: (129-181)/(62-85) 135/62                          Closed/Suction Drain 12/10/19 1018 Anterior Neck Accordion 10 Fr. (Active)   Site Description Leaking at site 12/17/2019  3:05 AM   Dressing Type No dressing 12/17/2019  3:05 AM   Drainage Serosanguineous 12/17/2019  3:05 AM   Status Clamped 12/17/2019  3:05 AM   Output (mL) 0 mL 12/16/2019  6:05 AM            Urethral Catheter 12/10/19 0730 Non-latex 16 Fr. (Active)   Site Assessment Clean;Intact 12/17/2019  3:05 AM   Collection Container Urimeter 12/17/2019  3:05 AM   Securement Method secured to top of thigh w/ adhesive device 12/17/2019  3:05 AM   Catheter Care Performed yes 12/17/2019  3:05 AM   Reason for Continuing Urinary Catheterization Critically ill in ICU requiring intensive monitoring 12/17/2019  3:05 AM   CAUTI Prevention Bundle StatLock in place w 1" slack;Intact seal between catheter & drainage tubing;Drainage bag/urimeter off the floor;Drainage bag/urimeter not overfilled (<2/3 full);Green sheeting clip in use;No dependent loops or kinks;Drainage bag/urimeter below bladder 12/17/2019  3:05 AM   Output (mL) 100 mL 12/17/2019  6:05 AM            Lumbar Drain 12/10/19 1056 (Active)   Drain Status Open 12/17/2019  3:05 AM   Level to iliac crest 12/15/2019  3:05 PM   CSF Color Clear 12/17/2019  3:05 AM   Site Description Unable to view 12/17/2019  3:05 AM   Dressing Status New drainage 12/17/2019  3:05 AM   Interventions HOB degrees (see comment);Other (see comment) 12/17/2019  3:05 AM   Output (mL) 0 mL 12/17/2019  6:05 AM       Neurosurgery Physical Exam   AOX3  5/5 in BUE except 4/5 in left HF  5/5 in BLE  SILT  Incision c/d/i    Significant Labs:  Recent Labs   Lab 12/16/19  0114 12/17/19  0143   GLU 80 82   * 131* "   K 3.7 3.4*    102   CO2 23 23   BUN 15 16   CREATININE 0.7 0.7   CALCIUM 8.2* 8.2*   MG 1.7 1.7     Recent Labs   Lab 12/16/19  0114 12/17/19  0143   WBC 14.13* 16.84*   HGB 9.1* 9.5*   HCT 29.2* 30.8*    183     Recent Labs   Lab 12/16/19  0114 12/16/19  1302 12/17/19  0143   INR 1.1 1.1 1.2   APTT  --  30.7  --      Microbiology Results (last 7 days)     Procedure Component Value Units Date/Time    Blood culture [[id number]] Collected:  12/12/19 0147    Order Status:  Completed Specimen:  Blood from Wrist, Right Updated:  12/17/19 0612     Blood Culture, Routine No growth after 5 days.    Narrative:       Blood cultures from 2 different sites. 4 bottles total.  Please draw before starting antibiotics.    Blood culture [[id number]] Collected:  12/12/19 0213    Order Status:  Completed Specimen:  Blood from Peripheral, Antecubital, Right Updated:  12/17/19 0612     Blood Culture, Routine No growth after 5 days.    Narrative:       Blood cultures x 2 different sites. 4 bottles total. Please  draw cultures before administering antibiotics.    Blood culture [[id number]] Collected:  12/11/19 1000    Order Status:  Completed Specimen:  Blood from Peripheral, Foot, Right Updated:  12/16/19 1212     Blood Culture, Routine No growth after 5 days.    Narrative:       Blood cultures from 2 different sites. 4 bottles total.  Please draw before starting antibiotics.    Blood culture [[id number]] Collected:  12/11/19 1005    Order Status:  Completed Specimen:  Blood from Peripheral, Forearm, Right Updated:  12/16/19 1212     Blood Culture, Routine No growth after 5 days.    Narrative:       Blood cultures x 2 different sites. 4 bottles total. Please  draw cultures before administering antibiotics.    Culture, Anaerobe [[id number]] Collected:  12/10/19 1038    Order Status:  Completed Specimen:  Body Fluid from Neck Updated:  12/16/19 0854     Anaerobic Culture No anaerobes isolated    Narrative:       2) Free vertebral  abscess #2    Culture, Anaerobe [[id number]] Collected:  12/10/19 1038    Order Status:  Completed Specimen:  Body Fluid from Neck Updated:  12/16/19 0854     Anaerobic Culture No anaerobes isolated    Narrative:       1) Free vertebral abscess #1    CSF culture [[id number]] Collected:  12/11/19 1247    Order Status:  Completed Specimen:  CSF (Spinal Fluid) from CSF Tap, Tube 3 Updated:  12/16/19 0711     CSF CULTURE No Growth     Gram Stain Result Cytospin indicates:      Rare WBC's      No organisms seen    CSF culture [[id number]]  (Abnormal)  (Susceptibility) Collected:  12/10/19 1158    Order Status:  Completed Specimen:  CSF (Spinal Fluid) from CSF Tap, Tube 1 Updated:  12/13/19 1350     CSF CULTURE Culture has Staphylococcus.      Results called to and read back by:  Leonardo Cook RN 12/12/2019  10:36      STAPHYLOCOCCUS EPIDERMIDIS     Gram Stain Result No WBC's      No organisms seen    Narrative:       3) 3) CSF for cell count, differential, culture, gramstain,  protein, and glucose    Aerobic culture [[id number]]  (Abnormal)  (Susceptibility) Collected:  12/10/19 1038    Order Status:  Completed Specimen:  Body Fluid from Neck Updated:  12/13/19 1240     Aerobic Bacterial Culture PSEUDOMONAS AERUGINOSA  Rare      Narrative:       1) Free vertebral abscess #1    Fungus culture [[id number]] Collected:  12/10/19 1038    Order Status:  Completed Specimen:  Body Fluid from Neck Updated:  12/13/19 1059     Fungus (Mycology) Culture Culture in progress    Narrative:       2) Free vertebral abscess #2    Fungus culture [[id number]] Collected:  12/10/19 1038    Order Status:  Completed Specimen:  Body Fluid from Neck Updated:  12/13/19 1059     Fungus (Mycology) Culture Culture in progress    Narrative:       1) Free vertebral abscess #1    Aerobic culture [[id number]]  (Abnormal)  (Susceptibility) Collected:  12/10/19 1038    Order Status:  Completed Specimen:  Body Fluid from Neck Updated:  12/12/19 1132     Aerobic  Bacterial Culture PSEUDOMONAS AERUGINOSA  Few      Narrative:       2) Free vertebral abscess #2    Gram stain [[id number]]     Order Status:  Canceled Specimen:  CSF (Spinal Fluid) from CSF Tap, Tube 3     CSF culture [[id number]]     Order Status:  Canceled Specimen:  CSF (Spinal Fluid) from CSF Tap, Tube 3     AFB Culture & Smear [[id number]] Collected:  12/10/19 1038    Order Status:  Completed Specimen:  Body Fluid from Neck Updated:  12/11/19 2127     AFB Culture & Smear Culture in progress     AFB CULTURE STAIN No acid fast bacilli seen.    Narrative:       1) Free vertebral abscess #1    AFB Culture & Smear [[id number]] Collected:  12/10/19 1038    Order Status:  Completed Specimen:  Body Fluid from Neck Updated:  12/11/19 2127     AFB Culture & Smear Culture in progress     AFB CULTURE STAIN No acid fast bacilli seen.    Narrative:       2) Free vertebral abscess #2    Gram stain [[id number]] Collected:  12/11/19 1247    Order Status:  Canceled Specimen:  CSF (Spinal Fluid) from CSF Tap, Tube 3     Gram stain [[id number]] Collected:  12/10/19 1038    Order Status:  Completed Specimen:  Body Fluid from Neck Updated:  12/10/19 1245     Gram Stain Result Rare No WBC's      No organisms seen    Narrative:       2) Free vertebral abscess #2    Gram stain [[id number]] Collected:  12/10/19 1038    Order Status:  Completed Specimen:  Body Fluid from Neck Updated:  12/10/19 1241     Gram Stain Result No WBC's      No organisms seen    Narrative:       1) Free vertebral abscess #1            Significant Diagnostics:  C spine xrays reviewed    Assessment/Plan:     * Spinal epidural abscess  74 y.o. male with PMH of HTN, HLD, Hepatitis C, and CAD s/p two stents on plavix who presents with C6-T2 osteomyelitis with suspected epidural abscess.  Now s/p C7/T1 corpectomies.    - Posterior cervical instrumented fusion today        Peter Dela Cruz,   Neurosurgery  [facility]

## 2019-12-17 NOTE — ANESTHESIA PROCEDURE NOTES
Intubation  Performed by: Billie Padron MD  Authorized by: Dm Lan Jr., MD     Intubation:     Induction:  Intravenous    Intubated:  Postinduction    Mask Ventilation:  Easy mask    Attempts:  1    Attempted By:  Resident anesthesiologist    Method of Intubation:  Direct    Blade:  Álvarez 2    Laryngeal View Grade: Grade I - full view of chords      Difficult Airway Encountered?: No      Complications:  None    Airway Device:  Oral endotracheal tube    Airway Device Size:  7.5    Style/Cuff Inflation:  Cuffed (inflated to minimal occlusive pressure)    Tube secured:  23    Secured at:  The lips    Placement Verified By:  Capnometry and Revisualization with laryngoscopy    Complicating Factors:  None    Findings Post-Intubation:  BS equal bilateral

## 2019-12-17 NOTE — PROGRESS NOTES
[facility]  Neurocritical Care  Progress Note    Admit Date: 12/6/2019  Service Date: 12/17/2019  Length of Stay: 11    Subjective:     Chief Complaint: Spinal epidural abscess    History of Present Illness: Junaid Muñoz is a 74 year old male with CAD, HTN, HLD, hypothyroidism, tobacco use, and prior history of IVDU who was found to have C6-T3 osteomyelitis with epidural abscess. Patient was neurologically intact and hemodynamically stable on admission.  Now immediately status post phase one of a two part surgery.  Today he had a C7-T1 corpectomy and C6-T2 anterior fusion.  Lumbar drain was placed secondary to intra op CSF leak, and patient admitted to [facility] for post op monitoring and drain maintenance.     Hospital Course: 12/10 admitted post-op C7-T1 corpectomy and C6-T2 anterior fusion with lumbar drain placed 2/2 CSF leak intra-op  12/12  No significant events over night, per nsgy will keep lumbar drain another day. 2nd stage of sugery to occur next Tuesday. Remains NPO while having to lie flat with  lumbar drain. Getting confused after receiving valium decreased dose.  12/13: Restless o/n, c/o back pain. LD dropped to floor by NSGY, 8-10cc/hr. HV drain in place as well. Neurologically stable. AF, no leukocytosis, H/H stable.   12/14: calm at present, slight agitation overnight, can elevate head for swallow trials and po today,pending picc placement for long term abx  12/15: less responsive this am, wean scheduled ativan, begin to advance diet and taper ivfs  12/16: NAEO. Stage II surgery tomorrow for posterior cervical fusion. Leukocytosis increasing, is on cefepime for pseudomonas in CSF, will reach out to ID to see if any further abx pre-op should be given. Lumbar drain in place. Increase normal saline to 100 cc/hr. NPO after midnight.   12/17: OR today for stage II posterior cervical instrumentation. Required 3 units PRBCs and 2 units FFP. 2 hemovacs in place to gravity with large  output. Will repeat CBC. Patient still intubated on arrival to ICU with cervical and facial edema, will start decadron. ID recommended repeating blood cultures as patient's WBC increasing. Continue cefepime.     Interval History: OR today for stage II posterior cervical instrumentation. Required 3 units PRBCs and 2 units FFP. 2 hemovacs in place to gravity with large output. Will repeat CBC. Patient still intubated on arrival to ICU with cervical and facial edema, will start decadron. ID recommended repeating blood cultures as patient's WBC increasing. Continue cefepime.     Review of Systems: Unable to obtain a complete ROS due to patient is intubated and sedated    Vitals:   Temp: 98.5 °F (36.9 °C)  Pulse: 77  Rhythm: normal sinus rhythm  BP: (!) 186/67  MAP (mmHg): 118  Resp: 20  SpO2: 100 %  Oxygen Concentration (%): 100  O2 Device (Oxygen Therapy): ventilator  Vent Mode: A/C  Set Rate: 16 bmp  Vt Set: 420 mL  PEEP/CPAP: 5 cmH20  Peak Airway Pressure: 19 cmH2O  Mean Airway Pressure: 7.3 cmH20  Plateau Pressure: 0 cmH20    Temp  Min: 97.9 °F (36.6 °C)  Max: 98.9 °F (37.2 °C)  Pulse  Min: 74  Max: 100  BP  Min: 133/77  Max: 186/67  MAP (mmHg)  Min: 89  Max: 118  Resp  Min: 11  Max: 26  SpO2  Min: 99 %  Max: 100 %  Oxygen Concentration (%)  Min: 100  Max: 100    12/16 0701 - 12/17 0700  In: 2028.3 [I.V.:2028.3]  Out: 2952 [Urine:2940; Drains:12]   Unmeasured Output  Urine Occurrence: 1  Stool Occurrence: 0     Examination:   Constitutional: No apparent distress.   Eyes: Conjunctiva clear  Head/Ears/Nose/Mouth/Throat/Neck: Moist mucous membranes. Surgical incision is C/D/I   Cardiovascular: Regular rhythm.   Respiratory: Comfortable respirations. Intubated .  Gastrointestinal: Soft, nondistended     Neurologic:  -Intubated and sedated post operatively, pupils 2mm and brisk  -moves all extremities x 4 spontaneously with good effort, localized to pain in extremities x 4    Medications:   Continuous  sodium chloride  0.9% Last Rate: 100 mL/hr at 12/17/19 0702   Scheduled  amLODIPine 5 mg Daily   atorvastatin 80 mg Daily   bacitracin  TID   ceFEPime (MAXIPIME) IVPB 2 g Q8H   dexamethasone (DECADRON) IVPB 4 mg Q6H   diazePAM 5 mg Q8H   levothyroxine 50 mcg Before breakfast   losartan-hydrochlorothiazide 100-25 mg 1 tablet Daily   metoprolol succinate 100 mg Daily   nicotine 1 patch Daily   polyethylene glycol 17 g Daily   senna-docusate 8.6-50 mg 1 tablet BID   sodium chloride 0.9% 10 mL Q6H   PRN  acetaminophen 500 mg Q8H PRN   Dextrose 10% Bolus 12.5 g PRN   Dextrose 10% Bolus 25 g PRN   fentaNYL 50 mcg Q2H PRN   Followed by     [START ON 12/20/2019] fentaNYL 50 mcg Q1H PRN   fibrin sealant (human) kit  PRN   glucagon (human recombinant) 1 mg PRN   glucose 16 g PRN   glucose 24 g PRN   hydrALAZINE 10 mg Q4H PRN   labetalol 10 mg Q4H PRN   magnesium sulfate IVPB 2 g PRN   magnesium sulfate IVPB 4 g PRN   morphine 1 mg Q6H PRN   naloxone 0.4 mg PRN   ondansetron 8 mg Q8H PRN   oxyCODONE 10 mg Q6H PRN   potassium chloride in water 40 mEq PRN   And     potassium chloride in water 60 mEq PRN   And     potassium chloride in water 80 mEq PRN   sodium chloride 0.9% 10 mL PRN   sodium phosphate IVPB 15 mmol PRN   sodium phosphate IVPB 20.01 mmol PRN   sodium phosphate IVPB 30 mmol PRN   vancomycin  PRN      Today I independently reviewed pertinent medications, lines/drains/airways, imaging, cardiology results, laboratory results, microbiology results,     ISTAT: Recent Labs   Lab 12/17/19  0357   PH 7.446   PCO2 32.3*   PO2 79*   POCSATURATED 96   HCO3 22.2*   BE -2   POCTCO2 23   SAMPLE ARTERIAL      Chem: Recent Labs   Lab 12/17/19  0143 12/17/19  1332   *  --    K 3.4* 3.6     --    CO2 23  --    GLU 82  --    BUN 16  --    CREATININE 0.7  --    ESTGFRAFRICA >60.0  --    EGFRNONAA >60.0  --    CALCIUM 8.2*  --    MG 1.7 2.3   PHOS 2.0* 3.7   ANIONGAP 6*  --    PROT 6.0  --    ALBUMIN 2.2*  --    BILITOT 0.6  --    ALKPHOS  338*  --    AST 22  --    ALT 12  --      Heme: Recent Labs   Lab 12/17/19  0143 12/17/19  1332   WBC 16.84* 18.89*   HGB 9.5* 10.2*   HCT 30.8* 31.7*    115*   INR 1.2  --      Endo: No results for input(s): POCTGLUCOSE in the last 24 hours.   Assessment/Plan:     Cardiac/Vascular  Hyperlipidemia  Atorvastatin 40 mg daily  Monitor LFTs    Essential hypertension  SBP < 180  Amlodipine 5 mg daily  Losartan-HCTZ 100 mg - 25 mg  Metoprolol  mg daily    · Echo:Normal left ventricular systolic function. The estimated ejection fraction is 63%  · Concentric left ventricular remodeling.  · No wall motion abnormalities.  · Normal LV diastolic function.  · Normal right ventricular systolic function.  · Mild aortic valve stenosis.  · Aortic valve area is 1.77 cm2; peak velocity is 2.19 m/s; mean gradient is 11 mmHg.  · Normal central venous pressure (3 mm Hg).         Coronary artery disease involving native coronary artery of native heart without angina pectoris  Clopidogrel 75 mg daily     ID  * Spinal epidural abscess  C6-T3 osteomyelitis with epidural abscess    12/10: C7-T1 corpectomy with C6-T2 anterior fusion and lumbar drain placement   Cultures: CSF +staph epidermidis, likely contaminant                    Neck abscess +pseudomonas    12/14: vanc d/c'd, on cefepime 2g q8h for 6 week duration of therapy  -ID consulted for increasing WBC while on cefepime, recommended repeat blood cultures and repeat surgical cultures    12/17: OR with neurosurgery for stage II, posterior instrumentation C2-T3  -lumbar drain in place, drain 10cc/hr  -decadron 4mg q6h x 24h for cervical and facial edema  -transfused 3 units PRBCs and 2 units FFP intraoperatively, repeat CBC and transfuse <7  -intubated, WTE    Acute osteomyelitis of thoracic spine  See spinal epidural abscess    Acute osteomyelitis of cervical spine  See spinal epidural abscess      Oncology  Acute blood loss anemia  Transfused 3 units PRBCs and 2 units FFP  intraoperatively  Recheck CBC and transfuse < 7   Monitor hemovac x 2 output     Endocrine  Other specified hypothyroidism  Levothyroxine 50 mcg daily    GI  Chronic hepatitis C without hepatic coma  Monitor LFTs    Other  Tobacco abuse  Nicotine patch           The patient is being Prophylaxed for:  Venous Thromboembolism with: Mechanical  Stress Ulcer with: PPI  Ventilator Pneumonia with: chlorhexidine oral care    Activity Orders          Diet NPO: NPO starting at 12/17 0001        Full Code   CCT 35 min    Marily Brooks PA-C  Neurocritical Care  [facility]

## 2019-12-17 NOTE — PLAN OF CARE
POC reviewed with pt at 0400. Pt verbalized understanding. Questions and concerns addressed. Lumbar drain open and hemovac remains clamped. Pt NPO since midnight for surgery today. IV electrolyte replacements ordered and started. Chlorhexidine bath given. Prn pain meds given for back pain. No acute events overnight. Pt progressing toward goals. Will continue to monitor. See flowsheets for full assessment and VS info

## 2019-12-17 NOTE — TRANSFER OF CARE
"Anesthesia Transfer of Care Note    Patient: Junaid Muñoz    Procedure(s) Performed: Procedure(s) (LRB):  FUSION, SPINE, CERVICAL, POSTERIOR APPROACH C2-T3 posterior instrumented fusion (N/A)    Patient location: PACU    Anesthesia Type: general    Transport from OR: Transported from OR intubated on 100% O2 by AMBU with assisted ventilation    Post pain: adequate analgesia    Post assessment: no apparent anesthetic complications    Post vital signs: stable    Level of consciousness: sedated    Nausea/Vomiting: no nausea/vomiting    Complications: none    Transfer of care protocol was followed      Last vitals:   Visit Vitals  BP (!) 147/77 (BP Location: Left arm, Patient Position: Lying)   Pulse 75   Temp 36.9 °C (98.5 °F) (Oral)   Resp (!) 26   Ht 5' 9" (1.753 m)   Wt 79.4 kg (175 lb 0.7 oz)   SpO2 100%   BMI 25.85 kg/m²     "

## 2019-12-17 NOTE — PT/OT/SLP PROGRESS
Speech Language Pathology  Discharge    Junaid Muñoz  MRN: [id number]    Patient not seen today secondary to OR for fusion. Please re-consult post-op with new orders if ST warranted.     Denice Dumont CCC-SLP  12/17/2019

## 2019-12-18 PROBLEM — A49.8 PSEUDOMONAS INFECTION: Status: ACTIVE | Noted: 2019-12-18

## 2019-12-18 PROBLEM — Z99.11 ON MECHANICALLY ASSISTED VENTILATION: Status: ACTIVE | Noted: 2019-12-18

## 2019-12-18 LAB
ALBUMIN SERPL BCP-MCNC: 2.5 G/DL (ref 3.5–5.2)
ALLENS TEST: ABNORMAL
ALP SERPL-CCNC: 216 U/L (ref 55–135)
ALT SERPL W/O P-5'-P-CCNC: 12 U/L (ref 10–44)
ANION GAP SERPL CALC-SCNC: 7 MMOL/L (ref 8–16)
AST SERPL-CCNC: 30 U/L (ref 10–40)
BASOPHILS # BLD AUTO: 0.01 K/UL (ref 0–0.2)
BASOPHILS # BLD AUTO: 0.02 K/UL (ref 0–0.2)
BASOPHILS # BLD AUTO: 0.02 K/UL (ref 0–0.2)
BASOPHILS NFR BLD: 0.1 % (ref 0–1.9)
BASOPHILS NFR BLD: 0.2 % (ref 0–1.9)
BASOPHILS NFR BLD: 0.2 % (ref 0–1.9)
BILIRUB SERPL-MCNC: 1.2 MG/DL (ref 0.1–1)
BLD PROD TYP BPU: NORMAL
BLD PROD TYP BPU: NORMAL
BLOOD UNIT EXPIRATION DATE: NORMAL
BLOOD UNIT EXPIRATION DATE: NORMAL
BLOOD UNIT TYPE CODE: 6200
BLOOD UNIT TYPE CODE: 6200
BLOOD UNIT TYPE: NORMAL
BLOOD UNIT TYPE: NORMAL
BUN SERPL-MCNC: 15 MG/DL (ref 8–23)
CALCIUM SERPL-MCNC: 8.1 MG/DL (ref 8.7–10.5)
CHLORIDE SERPL-SCNC: 107 MMOL/L (ref 95–110)
CO2 SERPL-SCNC: 20 MMOL/L (ref 23–29)
CODING SYSTEM: NORMAL
CODING SYSTEM: NORMAL
CREAT SERPL-MCNC: 0.7 MG/DL (ref 0.5–1.4)
DELSYS: ABNORMAL
DIFFERENTIAL METHOD: ABNORMAL
DISPENSE STATUS: NORMAL
DISPENSE STATUS: NORMAL
EOSINOPHIL # BLD AUTO: 0 K/UL (ref 0–0.5)
EOSINOPHIL NFR BLD: 0 % (ref 0–8)
EOSINOPHIL NFR BLD: 0 % (ref 0–8)
EOSINOPHIL NFR BLD: 0.1 % (ref 0–8)
ERYTHROCYTE [DISTWIDTH] IN BLOOD BY AUTOMATED COUNT: 15.3 % (ref 11.5–14.5)
ERYTHROCYTE [DISTWIDTH] IN BLOOD BY AUTOMATED COUNT: 15.5 % (ref 11.5–14.5)
ERYTHROCYTE [DISTWIDTH] IN BLOOD BY AUTOMATED COUNT: 15.7 % (ref 11.5–14.5)
ERYTHROCYTE [SEDIMENTATION RATE] IN BLOOD BY WESTERGREN METHOD: 16 MM/H
EST. GFR  (AFRICAN AMERICAN): >60 ML/MIN/1.73 M^2
EST. GFR  (NON AFRICAN AMERICAN): >60 ML/MIN/1.73 M^2
FIO2: 50
GLUCOSE SERPL-MCNC: 102 MG/DL (ref 70–110)
GLUCOSE SERPL-MCNC: 131 MG/DL (ref 70–110)
HCO3 UR-SCNC: 20.1 MMOL/L (ref 24–28)
HCO3 UR-SCNC: 23.5 MMOL/L (ref 24–28)
HCT VFR BLD AUTO: 30.5 % (ref 40–54)
HCT VFR BLD AUTO: 31.5 % (ref 40–54)
HCT VFR BLD AUTO: 33.3 % (ref 40–54)
HCT VFR BLD CALC: 21 %PCV (ref 36–54)
HGB BLD-MCNC: 10.1 G/DL (ref 14–18)
HGB BLD-MCNC: 10.3 G/DL (ref 14–18)
HGB BLD-MCNC: 10.7 G/DL (ref 14–18)
IMM GRANULOCYTES # BLD AUTO: 0.07 K/UL (ref 0–0.04)
IMM GRANULOCYTES # BLD AUTO: 0.11 K/UL (ref 0–0.04)
IMM GRANULOCYTES # BLD AUTO: 0.13 K/UL (ref 0–0.04)
IMM GRANULOCYTES NFR BLD AUTO: 0.6 % (ref 0–0.5)
IMM GRANULOCYTES NFR BLD AUTO: 0.9 % (ref 0–0.5)
IMM GRANULOCYTES NFR BLD AUTO: 1 % (ref 0–0.5)
INR PPP: 1.1 (ref 0.8–1.2)
LYMPHOCYTES # BLD AUTO: 0.5 K/UL (ref 1–4.8)
LYMPHOCYTES NFR BLD: 4 % (ref 18–48)
LYMPHOCYTES NFR BLD: 4.2 % (ref 18–48)
LYMPHOCYTES NFR BLD: 4.6 % (ref 18–48)
MAGNESIUM SERPL-MCNC: 2 MG/DL (ref 1.6–2.6)
MCH RBC QN AUTO: 27.2 PG (ref 27–31)
MCH RBC QN AUTO: 27.5 PG (ref 27–31)
MCH RBC QN AUTO: 28.2 PG (ref 27–31)
MCHC RBC AUTO-ENTMCNC: 32.1 G/DL (ref 32–36)
MCHC RBC AUTO-ENTMCNC: 32.1 G/DL (ref 32–36)
MCHC RBC AUTO-ENTMCNC: 33.8 G/DL (ref 32–36)
MCV RBC AUTO: 84 FL (ref 82–98)
MCV RBC AUTO: 85 FL (ref 82–98)
MCV RBC AUTO: 86 FL (ref 82–98)
MODE: ABNORMAL
MONOCYTES # BLD AUTO: 0.4 K/UL (ref 0.3–1)
MONOCYTES # BLD AUTO: 0.5 K/UL (ref 0.3–1)
MONOCYTES # BLD AUTO: 0.6 K/UL (ref 0.3–1)
MONOCYTES NFR BLD: 2.9 % (ref 4–15)
MONOCYTES NFR BLD: 4.6 % (ref 4–15)
MONOCYTES NFR BLD: 5.1 % (ref 4–15)
NEUTROPHILS # BLD AUTO: 11.2 K/UL (ref 1.8–7.7)
NEUTROPHILS # BLD AUTO: 11.4 K/UL (ref 1.8–7.7)
NEUTROPHILS # BLD AUTO: 9.9 K/UL (ref 1.8–7.7)
NEUTROPHILS NFR BLD: 89.8 % (ref 38–73)
NEUTROPHILS NFR BLD: 90 % (ref 38–73)
NEUTROPHILS NFR BLD: 91.7 % (ref 38–73)
NRBC BLD-RTO: 0 /100 WBC
NUM UNITS TRANS FFP: NORMAL
NUM UNITS TRANS FFP: NORMAL
PCO2 BLDA: 34.1 MMHG (ref 35–45)
PCO2 BLDA: 44.2 MMHG (ref 35–45)
PEEP: 5
PH SMN: 7.33 [PH] (ref 7.35–7.45)
PH SMN: 7.38 [PH] (ref 7.35–7.45)
PHOSPHATE SERPL-MCNC: 2.6 MG/DL (ref 2.7–4.5)
PIP: 15
PLATELET # BLD AUTO: 116 K/UL (ref 150–350)
PLATELET # BLD AUTO: 119 K/UL (ref 150–350)
PLATELET # BLD AUTO: 143 K/UL (ref 150–350)
PMV BLD AUTO: 10.6 FL (ref 9.2–12.9)
PMV BLD AUTO: 9.8 FL (ref 9.2–12.9)
PMV BLD AUTO: 9.9 FL (ref 9.2–12.9)
PO2 BLDA: 258 MMHG (ref 80–100)
PO2 BLDA: 508 MMHG (ref 80–100)
POC BE: -2 MMOL/L
POC BE: -5 MMOL/L
POC IONIZED CALCIUM: 1.07 MMOL/L (ref 1.06–1.42)
POC SATURATED O2: 100 % (ref 95–100)
POC SATURATED O2: 100 % (ref 95–100)
POC TCO2: 21 MMOL/L (ref 23–27)
POC TCO2: 25 MMOL/L (ref 23–27)
POTASSIUM BLD-SCNC: 3.6 MMOL/L (ref 3.5–5.1)
POTASSIUM SERPL-SCNC: 4.6 MMOL/L (ref 3.5–5.1)
PROT SERPL-MCNC: 5.6 G/DL (ref 6–8.4)
PROTHROMBIN TIME: 11.4 SEC (ref 9–12.5)
PS: 10
RBC # BLD AUTO: 3.65 M/UL (ref 4.6–6.2)
RBC # BLD AUTO: 3.71 M/UL (ref 4.6–6.2)
RBC # BLD AUTO: 3.89 M/UL (ref 4.6–6.2)
SAMPLE: ABNORMAL
SAMPLE: ABNORMAL
SITE: ABNORMAL
SODIUM BLD-SCNC: 133 MMOL/L (ref 136–145)
SODIUM SERPL-SCNC: 134 MMOL/L (ref 136–145)
SP02: 100
WBC # BLD AUTO: 10.97 K/UL (ref 3.9–12.7)
WBC # BLD AUTO: 12.18 K/UL (ref 3.9–12.7)
WBC # BLD AUTO: 12.66 K/UL (ref 3.9–12.7)

## 2019-12-18 PROCEDURE — 94003 VENT MGMT INPAT SUBQ DAY: CPT

## 2019-12-18 PROCEDURE — 82803 BLOOD GASES ANY COMBINATION: CPT

## 2019-12-18 PROCEDURE — 99900035 HC TECH TIME PER 15 MIN (STAT)

## 2019-12-18 PROCEDURE — 85025 COMPLETE CBC W/AUTO DIFF WBC: CPT | Mod: 91

## 2019-12-18 PROCEDURE — 20000000 HC ICU ROOM

## 2019-12-18 PROCEDURE — 80053 COMPREHEN METABOLIC PANEL: CPT

## 2019-12-18 PROCEDURE — 94761 N-INVAS EAR/PLS OXIMETRY MLT: CPT

## 2019-12-18 PROCEDURE — 99900026 HC AIRWAY MAINTENANCE (STAT)

## 2019-12-18 PROCEDURE — 82800 BLOOD PH: CPT

## 2019-12-18 PROCEDURE — 63600175 PHARM REV CODE 636 W HCPCS: Performed by: NURSE PRACTITIONER

## 2019-12-18 PROCEDURE — 99233 PR SUBSEQUENT HOSPITAL CARE,LEVL III: ICD-10-PCS | Mod: ,,, | Performed by: INTERNAL MEDICINE

## 2019-12-18 PROCEDURE — 25000003 PHARM REV CODE 250: Performed by: ANESTHESIOLOGY

## 2019-12-18 PROCEDURE — A4216 STERILE WATER/SALINE, 10 ML: HCPCS | Performed by: ANESTHESIOLOGY

## 2019-12-18 PROCEDURE — 63600175 PHARM REV CODE 636 W HCPCS: Performed by: PSYCHIATRY & NEUROLOGY

## 2019-12-18 PROCEDURE — 63600175 PHARM REV CODE 636 W HCPCS: Performed by: PHYSICIAN ASSISTANT

## 2019-12-18 PROCEDURE — 37799 UNLISTED PX VASCULAR SURGERY: CPT

## 2019-12-18 PROCEDURE — 25000003 PHARM REV CODE 250: Performed by: PHYSICIAN ASSISTANT

## 2019-12-18 PROCEDURE — 63600175 PHARM REV CODE 636 W HCPCS: Performed by: ANESTHESIOLOGY

## 2019-12-18 PROCEDURE — 25000003 PHARM REV CODE 250: Performed by: STUDENT IN AN ORGANIZED HEALTH CARE EDUCATION/TRAINING PROGRAM

## 2019-12-18 PROCEDURE — S4991 NICOTINE PATCH NONLEGEND: HCPCS | Performed by: NURSE PRACTITIONER

## 2019-12-18 PROCEDURE — 27000221 HC OXYGEN, UP TO 24 HOURS

## 2019-12-18 PROCEDURE — 99291 CRITICAL CARE FIRST HOUR: CPT | Mod: ,,, | Performed by: PHYSICIAN ASSISTANT

## 2019-12-18 PROCEDURE — 85610 PROTHROMBIN TIME: CPT

## 2019-12-18 PROCEDURE — 99233 SBSQ HOSP IP/OBS HIGH 50: CPT | Mod: ,,, | Performed by: INTERNAL MEDICINE

## 2019-12-18 PROCEDURE — 83735 ASSAY OF MAGNESIUM: CPT

## 2019-12-18 PROCEDURE — 25000003 PHARM REV CODE 250: Performed by: NURSE PRACTITIONER

## 2019-12-18 PROCEDURE — 99291 PR CRITICAL CARE, E/M 30-74 MINUTES: ICD-10-PCS | Mod: ,,, | Performed by: PHYSICIAN ASSISTANT

## 2019-12-18 PROCEDURE — 84100 ASSAY OF PHOSPHORUS: CPT

## 2019-12-18 PROCEDURE — S0028 INJECTION, FAMOTIDINE, 20 MG: HCPCS | Performed by: PHYSICIAN ASSISTANT

## 2019-12-18 PROCEDURE — 63600175 PHARM REV CODE 636 W HCPCS: Performed by: UROLOGY

## 2019-12-18 RX ADMIN — FAMOTIDINE 20 MG: 10 INJECTION, SOLUTION INTRAVENOUS at 09:12

## 2019-12-18 RX ADMIN — Medication 10 ML: at 11:12

## 2019-12-18 RX ADMIN — SODIUM PHOSPHATE, MONOBASIC, MONOHYDRATE 15 MMOL: 276; 142 INJECTION, SOLUTION INTRAVENOUS at 06:12

## 2019-12-18 RX ADMIN — DIAZEPAM 5 MG: 5 INJECTION, SOLUTION INTRAMUSCULAR; INTRAVENOUS at 09:12

## 2019-12-18 RX ADMIN — DEXAMETHASONE SODIUM PHOSPHATE 4 MG: 4 INJECTION, SOLUTION INTRAMUSCULAR; INTRAVENOUS at 12:12

## 2019-12-18 RX ADMIN — DIAZEPAM 5 MG: 5 INJECTION, SOLUTION INTRAMUSCULAR; INTRAVENOUS at 05:12

## 2019-12-18 RX ADMIN — ATORVASTATIN CALCIUM 80 MG: 20 TABLET, FILM COATED ORAL at 08:12

## 2019-12-18 RX ADMIN — AMLODIPINE BESYLATE 5 MG: 5 TABLET ORAL at 08:12

## 2019-12-18 RX ADMIN — Medication 10 ML: at 06:12

## 2019-12-18 RX ADMIN — HYDRALAZINE HYDROCHLORIDE 10 MG: 20 INJECTION INTRAMUSCULAR; INTRAVENOUS at 04:12

## 2019-12-18 RX ADMIN — LABETALOL HCL IV SOLN PREFILLED SYRINGE 20 MG/4ML (5 MG/ML) 10 MG: 20/4 SOLUTION PREFILLED SYRINGE at 09:12

## 2019-12-18 RX ADMIN — SENNOSIDES AND DOCUSATE SODIUM 1 TABLET: 8.6; 5 TABLET ORAL at 09:12

## 2019-12-18 RX ADMIN — SENNOSIDES AND DOCUSATE SODIUM 1 TABLET: 8.6; 5 TABLET ORAL at 08:12

## 2019-12-18 RX ADMIN — Medication 1 PATCH: at 08:12

## 2019-12-18 RX ADMIN — METOPROLOL SUCCINATE 100 MG: 100 TABLET, EXTENDED RELEASE ORAL at 08:12

## 2019-12-18 RX ADMIN — POLYETHYLENE GLYCOL 3350 17 G: 17 POWDER, FOR SOLUTION ORAL at 08:12

## 2019-12-18 RX ADMIN — CHLORHEXIDINE GLUCONATE 0.12% ORAL RINSE 15 ML: 1.2 LIQUID ORAL at 08:12

## 2019-12-18 RX ADMIN — HYDRALAZINE HYDROCHLORIDE 10 MG: 20 INJECTION INTRAMUSCULAR; INTRAVENOUS at 12:12

## 2019-12-18 RX ADMIN — MORPHINE SULFATE 1 MG: 2 INJECTION, SOLUTION INTRAMUSCULAR; INTRAVENOUS at 10:12

## 2019-12-18 RX ADMIN — LEVOTHYROXINE SODIUM 50 MCG: 50 TABLET ORAL at 05:12

## 2019-12-18 RX ADMIN — CEFEPIME 2 G: 2 INJECTION, POWDER, FOR SOLUTION INTRAVENOUS at 10:12

## 2019-12-18 RX ADMIN — LABETALOL HCL IV SOLN PREFILLED SYRINGE 20 MG/4ML (5 MG/ML) 10 MG: 20/4 SOLUTION PREFILLED SYRINGE at 10:12

## 2019-12-18 RX ADMIN — OXYCODONE HYDROCHLORIDE 10 MG: 5 TABLET ORAL at 10:12

## 2019-12-18 RX ADMIN — DEXAMETHASONE SODIUM PHOSPHATE 4 MG: 4 INJECTION, SOLUTION INTRAMUSCULAR; INTRAVENOUS at 05:12

## 2019-12-18 RX ADMIN — LABETALOL HCL IV SOLN PREFILLED SYRINGE 20 MG/4ML (5 MG/ML) 10 MG: 20/4 SOLUTION PREFILLED SYRINGE at 01:12

## 2019-12-18 RX ADMIN — Medication 10 ML: at 12:12

## 2019-12-18 RX ADMIN — CEFEPIME 2 G: 2 INJECTION, POWDER, FOR SOLUTION INTRAVENOUS at 06:12

## 2019-12-18 RX ADMIN — FAMOTIDINE 20 MG: 10 INJECTION, SOLUTION INTRAVENOUS at 08:12

## 2019-12-18 RX ADMIN — Medication: at 08:12

## 2019-12-18 RX ADMIN — CHLORHEXIDINE GLUCONATE 0.12% ORAL RINSE 15 ML: 1.2 LIQUID ORAL at 09:12

## 2019-12-18 RX ADMIN — LOSARTAN POTASSIUM AND HYDROCHLOROTHIAZIDE 1 TABLET: 100; 25 TABLET, FILM COATED ORAL at 08:12

## 2019-12-18 RX ADMIN — Medication 10 ML: at 05:12

## 2019-12-18 RX ADMIN — DIAZEPAM 5 MG: 5 INJECTION, SOLUTION INTRAMUSCULAR; INTRAVENOUS at 01:12

## 2019-12-18 RX ADMIN — Medication: at 09:12

## 2019-12-18 RX ADMIN — FENTANYL CITRATE 50 MCG: 50 INJECTION INTRAMUSCULAR; INTRAVENOUS at 06:12

## 2019-12-18 RX ADMIN — OXYCODONE HYDROCHLORIDE 10 MG: 5 TABLET ORAL at 04:12

## 2019-12-18 RX ADMIN — FENTANYL CITRATE 50 MCG: 50 INJECTION INTRAMUSCULAR; INTRAVENOUS at 04:12

## 2019-12-18 RX ADMIN — CEFEPIME 2 G: 2 INJECTION, POWDER, FOR SOLUTION INTRAVENOUS at 02:12

## 2019-12-18 RX ADMIN — FENTANYL CITRATE 50 MCG: 50 INJECTION INTRAMUSCULAR; INTRAVENOUS at 12:12

## 2019-12-18 RX ADMIN — LABETALOL HCL IV SOLN PREFILLED SYRINGE 20 MG/4ML (5 MG/ML) 10 MG: 20/4 SOLUTION PREFILLED SYRINGE at 06:12

## 2019-12-18 RX ADMIN — Medication: at 02:12

## 2019-12-18 NOTE — SUBJECTIVE & OBJECTIVE
Past Medical History:   Diagnosis Date    CAD (coronary artery disease)     s/p stent 2005, on plavix    Chronic hepatitis C     Cirrhosis     biopsy proven - 2007    History of colon polyps 06/2008    1 polyp - tubular adenoma    HTN (hypertension)     Hyperlipidemia     Hypothyroidism        Past Surgical History:   Procedure Laterality Date    COLONOSCOPY W/ POLYPECTOMY  06/2008    Dr Wagoner: fair prep, 3mm polyp - tubular adenoma    CORONARY ANGIOPLASTY WITH STENT PLACEMENT      FUSION OF CERVICAL SPINE BY POSTERIOR APPROACH N/A 12/17/2019    Procedure: FUSION, SPINE, CERVICAL, POSTERIOR APPROACH C2-T3 posterior instrumented fusion;  Surgeon: Elian Deluca MD;  Location: [facility] OR [address] [place] [address];  Service: Neurosurgery;  Laterality: N/A;    hydrocele repair  teenager    pyloric stenosis repair  age 1    SURGICAL REMOVAL OF VERTEBRAL BODY OF THORACIC SPINE N/A 12/10/2019    Procedure: CORPECTOMY, SPINE, CERVICAL AND THORACIC, C7 and T1 with Globus;  Surgeon: Elian Deluca MD;  Location: [facility] OR [address] [place] [address];  Service: Neurosurgery;  Laterality: N/A;    TONSILLECTOMY         Review of patient's allergies indicates:   Allergen Reactions    Penicillins Rash     Tolerated cefepime December 2019       Medications:  Medications Prior to Admission   Medication Sig    atorvastatin (LIPITOR) 80 MG tablet Take 80 mg by mouth once daily at 6am.    clopidogrel (PLAVIX) 75 mg tablet Take 75 mg by mouth once daily at 6am.    ibuprofen (ADVIL,MOTRIN) 600 MG tablet Take 1 tablet (600 mg total) by mouth every 6 (six) hours as needed for Pain.    levothyroxine (SYNTHROID) 50 MCG tablet Take 50 mcg by mouth once daily at 6am.    losartan-hydrochlorothiazide 100-25 mg (HYZAAR) 100-25 mg per tablet Take 1 tablet by mouth once daily at 6am.     metoprolol succinate (TOPROL-XL) 100 MG 24 hr tablet Take 100 mg by mouth once daily at 6am.     Antibiotics (From admission, onward)    Start     Stop Route Frequency Ordered     12/11/19 1800  ceFEPIme injection 2 g      -- IV Every 8 hours (non-standard times) 12/11/19 1657    12/09/19 1500  bacitracin ointment      -- Top 3 times daily 12/09/19 0914        Antifungals (From admission, onward)    None        Antivirals (From admission, onward)    None           Immunization History   Administered Date(s) Administered    Tdap 06/07/2019       Family History     None        Social History     Socioeconomic History    Marital status:      Spouse name: Not on file    Number of children: Not on file    Years of education: Not on file    Highest education level: Not on file   Occupational History    Not on file   Social Needs    Financial resource strain: Not on file    Food insecurity:     Worry: Not on file     Inability: Not on file    Transportation needs:     Medical: Not on file     Non-medical: Not on file   Tobacco Use    Smoking status: Current Every Day Smoker   Substance and Sexual Activity    Alcohol use: Not on file    Drug use: Yes     Types: IV     Comment: MORPHINE    Sexual activity: Not on file   Lifestyle    Physical activity:     Days per week: Not on file     Minutes per session: Not on file    Stress: Not on file   Relationships    Social connections:     Talks on phone: Not on file     Gets together: Not on file     Attends [employer] service: Not on file     Active member of club or organization: Not on file     Attends meetings of clubs or organizations: Not on file     Relationship status: Not on file   Other Topics Concern    Not on file   Social History Narrative    Not on file     Review of Systems   Unable to perform ROS: Intubated     Objective:     Vital Signs (Most Recent):  Temp: 98.5 °F (36.9 °C) (12/18/19 1101)  Pulse: 105 (12/18/19 1325)  Resp: 15 (12/18/19 1325)  BP: (!) 173/79 (12/18/19 1301)  SpO2: 100 % (12/18/19 1325) Vital Signs (24h Range):  Temp:  [96.2 °F (35.7 °C)-98.6 °F (37 °C)] 98.5 °F (36.9 °C)  Pulse:  []  105  Resp:  [5-33] 15  SpO2:  [99 %-100 %] 100 %  BP: (141-186)/(67-94) 173/79  Arterial Line BP: (147-189)/(55-75) 186/69     Weight: 79.4 kg (175 lb 0.7 oz)  Body mass index is 25.85 kg/m².    Estimated Creatinine Clearance: 92.6 mL/min (based on SCr of 0.7 mg/dL).    Physical Exam   Constitutional: He is sedated and intubated.   Cardiovascular: Normal rate and regular rhythm.   No murmur heard.  Pulmonary/Chest: Effort normal and breath sounds normal. He is intubated.   anteriorly       Genitourinary:   Genitourinary Comments: Mcdonough catheter   Skin:        Restraints in place       Significant Labs:   Blood Culture:   Recent Labs   Lab 12/11/19  1000 12/11/19  1005 12/12/19  0147 12/12/19  0213 12/17/19  1505   LABBLOO No growth after 5 days. No growth after 5 days. No growth after 5 days. No growth after 5 days. No Growth to date     CBC:   Recent Labs   Lab 12/17/19  1540 12/18/19  0027 12/18/19  1219   WBC 14.23* 12.18 10.97   HGB 10.6* 10.7* 10.3*   HCT 33.1* 33.3* 30.5*   * 119* 116*     CMP:   Recent Labs   Lab 12/17/19  0143 12/17/19  1332 12/18/19  0227   *  --  134*   K 3.4* 3.6 4.6     --  107   CO2 23  --  20*   GLU 82  --  131*   BUN 16  --  15   CREATININE 0.7  --  0.7   CALCIUM 8.2*  --  8.1*   PROT 6.0  --  5.6*   ALBUMIN 2.2*  --  2.5*   BILITOT 0.6  --  1.2*   ALKPHOS 338*  --  216*   AST 22  --  30   ALT 12  --  12   ANIONGAP 6*  --  7*   EGFRNONAA >60.0  --  >60.0     Wound Culture:   Recent Labs   Lab 12/10/19  1038   LABAERO PSEUDOMONAS AERUGINOSA  Rare  *  PSEUDOMONAS AERUGINOSA  Few  *       Significant Imaging: I have reviewed all pertinent imaging results/findings within the past 24 hours.

## 2019-12-18 NOTE — ASSESSMENT & PLAN NOTE
74 y.o. male with PMH of HTN, HLD, Hepatitis C, and CAD s/p two stents on plavix who presents with C6-T2 osteomyelitis with suspected epidural abscess.  Now s/p C7/T1 corpectomies and C2-T2  posterior instrumented fusion 12/18    Continue ICU care and neuro check  Continue drains in place  Continue antibiotic while drain in place  Keep head of bed flat  Wean to extubate  Goal normotensive  Follow-up cultures: growing Pseudomonas.  Final antibiotics per ID team  Likely will need long-term IV antibiotics and PICC line   Further care per ICU team  Will continue to follow, please cover the questions

## 2019-12-18 NOTE — NURSING
Upon 0600 assessment the patient's neck appeared to be swollen on both sides. [facility] NP notified. NSGY paged awaiting call. Will continue to monitor.

## 2019-12-18 NOTE — PROGRESS NOTES
[facility]  Neurosurgery  Progress Note    Subjective:     History of Present Illness: Junaid Muñoz is a 74 y.o. male with PMH of HTN, HLD, Hepatitis C, and CAD s/p two stents on plavix who presents with 1 month history of back pain between his shoulders. MRI at OSH demonstrates likely epidural abscess. Pt denies hx of trauma and reports slow onset of symptoms.     Post-Op Info:  Procedure(s) (LRB):  FUSION, SPINE, CERVICAL, POSTERIOR APPROACH C2-T3 posterior instrumented fusion (N/A)   1 Day Post-Op     Interval History: Underwent posterior cervical instrumentation.  No acute event overnight.  No issues with the nurse.  Drains in place    Medications:  Continuous Infusions:   sodium chloride 0.9% 100 mL/hr at 12/18/19 1000     Scheduled Meds:   amLODIPine  5 mg Per OG tube Daily    atorvastatin  80 mg Per OG tube Daily    bacitracin   Topical (Top) TID    ceFEPime (MAXIPIME) IVPB  2 g Intravenous Q8H    chlorhexidine  15 mL Mouth/Throat BID    dexamethasone (DECADRON) IVPB  4 mg Intravenous Q6H    diazePAM  5 mg Intravenous Q8H    famotidine (PF)  20 mg Intravenous BID    levothyroxine  50 mcg Per OG tube Before breakfast    losartan-hydrochlorothiazide 100-25 mg  1 tablet Per OG tube Daily    metoprolol succinate  100 mg Oral Daily    nicotine  1 patch Transdermal Daily    polyethylene glycol  17 g Oral Daily    senna-docusate 8.6-50 mg  1 tablet Oral BID    sodium chloride 0.9%  10 mL Intravenous Q6H     PRN Meds:acetaminophen, Dextrose 10% Bolus, Dextrose 10% Bolus, [COMPLETED] fentaNYL **FOLLOWED BY** [START ON 12/20/2019] fentaNYL, glucagon (human recombinant), glucose, glucose, hydrALAZINE, labetalol, magnesium sulfate IVPB, magnesium sulfate IVPB, morphine, naloxone, ondansetron, oxyCODONE, potassium chloride in water **AND** potassium chloride in water **AND** potassium chloride in water, Flushing PICC Protocol **AND** sodium chloride 0.9% **AND** sodium chloride  "0.9%, sodium phosphate IVPB, sodium phosphate IVPB, sodium phosphate IVPB     Review of Systems    Objective:     Weight: 79.4 kg (175 lb 0.7 oz)  Body mass index is 25.85 kg/m².  Vital Signs (Most Recent):  Temp: 98.6 °F (37 °C) (12/18/19 0701)  Pulse: 89 (12/18/19 1001)  Resp: (!) 25 (12/18/19 1001)  BP: (!) 177/82 (12/18/19 1001)  SpO2: 100 % (12/18/19 1001) Vital Signs (24h Range):  Temp:  [96.2 °F (35.7 °C)-98.6 °F (37 °C)] 98.6 °F (37 °C)  Pulse:  [] 89  Resp:  [5-33] 25  SpO2:  [100 %] 100 %  BP: (141-186)/(67-94) 177/82  Arterial Line BP: (147-189)/(55-75) 175/75     Date 12/18/19 0700 - 12/19/19 0659   Shift 0517-5159 4463-3957 8398-1407 24 Hour Total   INTAKE   I.V.(mL/kg) 400(5)   400(5)   NG/GT 60   60   Shift Total(mL/kg) 460(5.8)   460(5.8)   OUTPUT   Urine(mL/kg/hr) 345   345   Drains 0   0   Shift Total(mL/kg) 345(4.3)   345(4.3)   Weight (kg) 79.4 79.4 79.4 79.4              Vent Mode: SIMV  Oxygen Concentration (%):  [] 40  Resp Rate Total:  [8.5 br/min-25 br/min] 15 br/min  Vt Set:  [420 mL] 420 mL  PEEP/CPAP:  [5 cmH20] 5 cmH20  Pressure Support:  [10 cmH20] 10 cmH20  Mean Airway Pressure:  [7.3 cmH20-10 cmH20] 10 cmH20         Closed/Suction Drain 12/10/19 1018 Anterior Neck Accordion 10 Fr. (Active)   Site Description Healing 12/11/2019  3:05 AM   Dressing Type No dressing 12/11/2019  3:05 AM   Drainage Serosanguineous 12/10/2019  6:30 PM   Status Clamped 12/11/2019  3:05 AM   Output (mL) 1500 mL 12/11/2019  6:05 AM            Urethral Catheter 12/10/19 0730 Non-latex 16 Fr. (Active)   Site Assessment Clean;Intact 12/11/2019  3:05 AM   Collection Container Urimeter 12/11/2019  3:05 AM   Securement Method secured to top of thigh w/ adhesive device 12/11/2019  3:05 AM   Catheter Care Performed yes 12/11/2019  3:05 AM   Reason for Continuing Urinary Catheterization Post operative 12/11/2019  3:05 AM   CAUTI Prevention Bundle StatLock in place w 1" slack;Intact seal between catheter & " drainage tubing;Drainage bag/urimeter off the floor;Green sheeting clip in use;No dependent loops or kinks;Drainage bag/urimeter not overfilled (<2/3 full);Drainage bag/urimeter below bladder 12/10/2019  7:05 PM   Output (mL) 75 mL 12/10/2019  3:30 PM            Lumbar Drain 12/10/19 1056 (Active)   Drain Status Other (Comment) 12/11/2019  3:05 AM   Level to other (see comment) 12/10/2019  2:32 PM   Dressing Status Clean;Dry;Intact 12/11/2019  3:05 AM   Interventions HOB degrees (see comment) 12/10/2019  2:32 PM   Output (mL) 2 mL 12/11/2019  4:05 AM       Neurosurgery Physical Exam  Opening eyes spontaneously  Nodding   Intubated  5/5 in BUE  5/5 in BLE  SILT  Incision c/d/i, neck with stable swelling    Significant Labs:  Recent Labs   Lab 12/17/19  0143 12/17/19  1332 12/18/19  0227   GLU 82  --  131*   *  --  134*   K 3.4* 3.6 4.6     --  107   CO2 23  --  20*   BUN 16  --  15   CREATININE 0.7  --  0.7   CALCIUM 8.2*  --  8.1*   MG 1.7 2.3 2.0     Recent Labs   Lab 12/17/19  1332 12/17/19  1540 12/18/19  0027   WBC 18.89* 14.23* 12.18   HGB 10.2* 10.6* 10.7*   HCT 31.7* 33.1* 33.3*   * 108* 119*     Recent Labs   Lab 12/16/19  1302  12/17/19  1540 12/17/19  1837 12/18/19  0227   INR 1.1   < > 1.1 1.1 1.1   APTT 30.7  --   --   --   --     < > = values in this interval not displayed.     Microbiology Results (last 7 days)     Procedure Component Value Units Date/Time    Blood culture [[id number]] Collected:  12/17/19 1505    Order Status:  Completed Specimen:  Blood from Line, Arterial, Right Updated:  12/18/19 0115     Blood Culture, Routine No Growth to date    Blood culture [[id number]]     Order Status:  No result Specimen:  Blood     Blood culture [[id number]] Collected:  12/12/19 0147    Order Status:  Completed Specimen:  Blood from Wrist, Right Updated:  12/17/19 0612     Blood Culture, Routine No growth after 5 days.    Narrative:       Blood cultures from 2 different sites. 4 bottles  total.  Please draw before starting antibiotics.    Blood culture [[id number]] Collected:  12/12/19 0213    Order Status:  Completed Specimen:  Blood from Peripheral, Antecubital, Right Updated:  12/17/19 0612     Blood Culture, Routine No growth after 5 days.    Narrative:       Blood cultures x 2 different sites. 4 bottles total. Please  draw cultures before administering antibiotics.    Blood culture [[id number]] Collected:  12/11/19 1000    Order Status:  Completed Specimen:  Blood from Peripheral, Foot, Right Updated:  12/16/19 1212     Blood Culture, Routine No growth after 5 days.    Narrative:       Blood cultures from 2 different sites. 4 bottles total.  Please draw before starting antibiotics.    Blood culture [[id number]] Collected:  12/11/19 1005    Order Status:  Completed Specimen:  Blood from Peripheral, Forearm, Right Updated:  12/16/19 1212     Blood Culture, Routine No growth after 5 days.    Narrative:       Blood cultures x 2 different sites. 4 bottles total. Please  draw cultures before administering antibiotics.    Culture, Anaerobe [[id number]] Collected:  12/10/19 1038    Order Status:  Completed Specimen:  Body Fluid from Neck Updated:  12/16/19 0854     Anaerobic Culture No anaerobes isolated    Narrative:       2) Free vertebral abscess #2    Culture, Anaerobe [[id number]] Collected:  12/10/19 1038    Order Status:  Completed Specimen:  Body Fluid from Neck Updated:  12/16/19 0854     Anaerobic Culture No anaerobes isolated    Narrative:       1) Free vertebral abscess #1    CSF culture [[id number]] Collected:  12/11/19 1247    Order Status:  Completed Specimen:  CSF (Spinal Fluid) from CSF Tap, Tube 3 Updated:  12/16/19 0711     CSF CULTURE No Growth     Gram Stain Result Cytospin indicates:      Rare WBC's      No organisms seen    CSF culture [[id number]]  (Abnormal)  (Susceptibility) Collected:  12/10/19 1158    Order Status:  Completed Specimen:  CSF (Spinal Fluid) from CSF Tap, Tube 1  Updated:  12/13/19 1350     CSF CULTURE Culture has Staphylococcus.      Results called to and read back by:  Leonardo Cook RN 12/12/2019  10:36      STAPHYLOCOCCUS EPIDERMIDIS     Gram Stain Result No WBC's      No organisms seen    Narrative:       3) 3) CSF for cell count, differential, culture, gramstain,  protein, and glucose    Aerobic culture [[id number]]  (Abnormal)  (Susceptibility) Collected:  12/10/19 1038    Order Status:  Completed Specimen:  Body Fluid from Neck Updated:  12/13/19 1240     Aerobic Bacterial Culture PSEUDOMONAS AERUGINOSA  Rare      Narrative:       1) Free vertebral abscess #1    Fungus culture [[id number]] Collected:  12/10/19 1038    Order Status:  Completed Specimen:  Body Fluid from Neck Updated:  12/13/19 1059     Fungus (Mycology) Culture Culture in progress    Narrative:       2) Free vertebral abscess #2    Fungus culture [[id number]] Collected:  12/10/19 1038    Order Status:  Completed Specimen:  Body Fluid from Neck Updated:  12/13/19 1059     Fungus (Mycology) Culture Culture in progress    Narrative:       1) Free vertebral abscess #1    Aerobic culture [[id number]]  (Abnormal)  (Susceptibility) Collected:  12/10/19 1038    Order Status:  Completed Specimen:  Body Fluid from Neck Updated:  12/12/19 1132     Aerobic Bacterial Culture PSEUDOMONAS AERUGINOSA  Few      Narrative:       2) Free vertebral abscess #2    Gram stain [[id number]]     Order Status:  Canceled Specimen:  CSF (Spinal Fluid) from CSF Tap, Tube 3     CSF culture [[id number]]     Order Status:  Canceled Specimen:  CSF (Spinal Fluid) from CSF Tap, Tube 3     AFB Culture & Smear [[id number]] Collected:  12/10/19 1038    Order Status:  Completed Specimen:  Body Fluid from Neck Updated:  12/11/19 2127     AFB Culture & Smear Culture in progress     AFB CULTURE STAIN No acid fast bacilli seen.    Narrative:       1) Free vertebral abscess #1    AFB Culture & Smear [[id number]] Collected:  12/10/19 1038    Order Status:   Completed Specimen:  Body Fluid from Neck Updated:  12/11/19 2127     AFB Culture & Smear Culture in progress     AFB CULTURE STAIN No acid fast bacilli seen.    Narrative:       2) Free vertebral abscess #2    Gram stain [[id number]] Collected:  12/11/19 5957    Order Status:  Canceled Specimen:  CSF (Spinal Fluid) from CSF Tap, Tube 3               Assessment/Plan:     * Spinal epidural abscess  74 y.o. male with PMH of HTN, HLD, Hepatitis C, and CAD s/p two stents on plavix who presents with C6-T2 osteomyelitis with suspected epidural abscess.  Now s/p C7/T1 corpectomies and C2-T2  posterior instrumented fusion 12/18    Continue ICU care and neuro check  Continue drains in place  Continue antibiotic while drain in place  Keep head of bed flat  Wean to extubate  Goal normotensive  Follow-up cultures: growing Pseudomonas.  Final antibiotics per ID team  Likely will need long-term IV antibiotics and PICC line   Subcu heparin for DVT prophylaxis  Further care per ICU team  Will continue to follow, please cover the questions        Isreal Hawley MD  Neurosurgery  [facility]

## 2019-12-18 NOTE — ASSESSMENT & PLAN NOTE
Pt is a 74 year old male with CAD, HTN, HLD, hypothyroidism, tobacco use, and prior history of IVDU who initially presented for back pain.  Pt was seen in clinic where MRI was concerning for C6-T3 osteomyelitis with epidural abscess. CRP-73 ESR-32.  Patient was neurologically intact and hemodynamically stable on admission.  S/P phase one of a two part surgery on 12/10 when he underwent  C7-T1 corpectomy and fusion w/ lumbar drain placed secondary to intra op CSF leak. He is now s/p stage 2 12/17 posterior fusion. Previous cultures grew pseudomonas. He has been on IV cefepime. CSF cultures also grew staph epi- received vancomycin but was stopped as thought to be a contaminant. Repeat CSF cultures were negative.     ID is now consulted for leukocytosis. Had t-max WBC count up to 18K- has now trended back down to 10K. Repeat blood cultures were obtained 12/17 NGTD.     Plan  1. Continue cefepime 2 gram IV q 8 hours for prior pseudomonas to complete 6 week course from date of most recent surgery with estimated end date: 1/28/20; continue to check weekly CBC, CMP, ESR, and CRP; upon discharge, have results faxed to ID at [phone]; needs ID f/u on discharge  2. Hold off on any additional antibiotics at this time unless pt spikes fever or decompensates, could add vancomycin  3. If WBC goes back up, consider removal of PICC/other lines  4. ID will sign off

## 2019-12-18 NOTE — CONSULTS
[facility]  Infectious Disease  Consult Note    Patient Name: Junaid Muñoz  MRN: [id number]  Admission Date: 12/6/2019  Hospital Length of Stay: 12 days  Attending Physician: Tam Sims MD  Primary Care Provider: Oskar Whitman MD     Isolation Status: Contact    Patient information was obtained from patient and past medical records.      Consults  Assessment/Plan:     * Spinal epidural abscess  Pt is a 74 year old male with CAD, HTN, HLD, hypothyroidism, tobacco use, and prior history of IVDU who initially presented for back pain.  Pt was seen in clinic where MRI was concerning for C6-T3 osteomyelitis with epidural abscess. CRP-73 ESR-32.  Patient was neurologically intact and hemodynamically stable on admission.  S/P phase one of a two part surgery on 12/10 when he underwent  C7-T1 corpectomy and fusion w/ lumbar drain placed secondary to intra op CSF leak. He is now s/p stage 2 12/17 posterior fusion. Previous cultures grew pseudomonas. He has been on IV cefepime. CSF cultures also grew staph epi- received vancomycin but was stopped as thought to be a contaminant. Repeat CSF cultures were negative.     ID is now consulted for leukocytosis. Had t-max WBC count up to 18K- has now trended back down to 10K. Repeat blood cultures were obtained 12/17 NGTD.     Plan  1. Continue cefepime 2 gram IV q 8 hours for prior pseudomonas to complete 6 week course from date of most recent surgery with estimated end date: 1/28/20; continue to check weekly CBC, CMP, ESR, and CRP; upon discharge, have results faxed to ID at [phone]; needs ID f/u on discharge  2. Hold off on any additional antibiotics at this time unless pt spikes fever or decompensates, could add vancomycin  3. If WBC goes back up, consider removal of PICC/other lines  4. ID will sign off          Thank you for your consult. I will sign off. Please contact us if you have any additional questions.    PERRY Pedro Pager:  [phone]  Infectious Disease  [facility]    Subjective:     Principal Problem: Spinal epidural abscess    HPI: Pt is a 74 year old male with CAD, HTN, HLD, hypothyroidism, tobacco use, and prior history of IVDU who initially presented for back pain.  Pt was seen in clinic where MRI concerning for C6-T3 osteomyelitis with epidural abscess. Patient was neurologically intact and hemodynamically stable on admission.  S/P phase one of a two part surgery on 12/10 when he underwent  C7-T1 corpectomy and fusion w/ lumbar drain placed secondary to intra op CSF leak.  Cxs from surgery growing  Pseudomonas Aeruginosa (susceptibility pending)  CSF cxs taken-NGTD.  CSF- Count w/ WBC-11, RBC 1070, 74% segmented neutrophils, glucose 74    Past Medical History:   Diagnosis Date    CAD (coronary artery disease)     s/p stent 2005, on plavix    Chronic hepatitis C     Cirrhosis     biopsy proven - 2007    History of colon polyps 06/2008    1 polyp - tubular adenoma    HTN (hypertension)     Hyperlipidemia     Hypothyroidism        Past Surgical History:   Procedure Laterality Date    COLONOSCOPY W/ POLYPECTOMY  06/2008    Dr Wagoner: fair prep, 3mm polyp - tubular adenoma    CORONARY ANGIOPLASTY WITH STENT PLACEMENT      FUSION OF CERVICAL SPINE BY POSTERIOR APPROACH N/A 12/17/2019    Procedure: FUSION, SPINE, CERVICAL, POSTERIOR APPROACH C2-T3 posterior instrumented fusion;  Surgeon: Elian Deluca MD;  Location: [facility] OR [address] [place] [address];  Service: Neurosurgery;  Laterality: N/A;    hydrocele repair  teenager    pyloric stenosis repair  age 1    SURGICAL REMOVAL OF VERTEBRAL BODY OF THORACIC SPINE N/A 12/10/2019    Procedure: CORPECTOMY, SPINE, CERVICAL AND THORACIC, C7 and T1 with Globus;  Surgeon: Elian Deluca MD;  Location: [facility] OR [address] [place] [address];  Service: Neurosurgery;  Laterality: N/A;    TONSILLECTOMY         Review of patient's allergies indicates:   Allergen Reactions    Penicillins Rash     Tolerated  cefepime December 2019       Medications:  Medications Prior to Admission   Medication Sig    atorvastatin (LIPITOR) 80 MG tablet Take 80 mg by mouth once daily at 6am.    clopidogrel (PLAVIX) 75 mg tablet Take 75 mg by mouth once daily at 6am.    ibuprofen (ADVIL,MOTRIN) 600 MG tablet Take 1 tablet (600 mg total) by mouth every 6 (six) hours as needed for Pain.    levothyroxine (SYNTHROID) 50 MCG tablet Take 50 mcg by mouth once daily at 6am.    losartan-hydrochlorothiazide 100-25 mg (HYZAAR) 100-25 mg per tablet Take 1 tablet by mouth once daily at 6am.     metoprolol succinate (TOPROL-XL) 100 MG 24 hr tablet Take 100 mg by mouth once daily at 6am.     Antibiotics (From admission, onward)    Start     Stop Route Frequency Ordered    12/11/19 1800  ceFEPIme injection 2 g      -- IV Every 8 hours (non-standard times) 12/11/19 1657    12/09/19 1500  bacitracin ointment      -- Top 3 times daily 12/09/19 0914        Antifungals (From admission, onward)    None        Antivirals (From admission, onward)    None           Immunization History   Administered Date(s) Administered    Tdap 06/07/2019       Family History     None        Social History     Socioeconomic History    Marital status:      Spouse name: Not on file    Number of children: Not on file    Years of education: Not on file    Highest education level: Not on file   Occupational History    Not on file   Social Needs    Financial resource strain: Not on file    Food insecurity:     Worry: Not on file     Inability: Not on file    Transportation needs:     Medical: Not on file     Non-medical: Not on file   Tobacco Use    Smoking status: Current Every Day Smoker   Substance and Sexual Activity    Alcohol use: Not on file    Drug use: Yes     Types: IV     Comment: MORPHINE    Sexual activity: Not on file   Lifestyle    Physical activity:     Days per week: Not on file     Minutes per session: Not on file    Stress: Not on file    Relationships    Social connections:     Talks on phone: Not on file     Gets together: Not on file     Attends Sabianist service: Not on file     Active member of club or organization: Not on file     Attends meetings of clubs or organizations: Not on file     Relationship status: Not on file   Other Topics Concern    Not on file   Social History Narrative    Not on file     Review of Systems   Unable to perform ROS: Intubated     Objective:     Vital Signs (Most Recent):  Temp: 98.5 °F (36.9 °C) (12/18/19 1101)  Pulse: 105 (12/18/19 1325)  Resp: 15 (12/18/19 1325)  BP: (!) 173/79 (12/18/19 1301)  SpO2: 100 % (12/18/19 1325) Vital Signs (24h Range):  Temp:  [96.2 °F (35.7 °C)-98.6 °F (37 °C)] 98.5 °F (36.9 °C)  Pulse:  [] 105  Resp:  [5-33] 15  SpO2:  [99 %-100 %] 100 %  BP: (141-186)/(67-94) 173/79  Arterial Line BP: (147-189)/(55-75) 186/69     Weight: 79.4 kg (175 lb 0.7 oz)  Body mass index is 25.85 kg/m².    Estimated Creatinine Clearance: 92.6 mL/min (based on SCr of 0.7 mg/dL).    Physical Exam   Constitutional: He is sedated and intubated.   Cardiovascular: Normal rate and regular rhythm.   No murmur heard.  Pulmonary/Chest: Effort normal and breath sounds normal. He is intubated.   anteriorly       Genitourinary:   Genitourinary Comments: Mcdonough catheter   Skin:        Restraints in place       Significant Labs:   Blood Culture:   Recent Labs   Lab 12/11/19  1000 12/11/19  1005 12/12/19  0147 12/12/19  0213 12/17/19  1505   LABBLOO No growth after 5 days. No growth after 5 days. No growth after 5 days. No growth after 5 days. No Growth to date     CBC:   Recent Labs   Lab 12/17/19  1540 12/18/19  0027 12/18/19  1219   WBC 14.23* 12.18 10.97   HGB 10.6* 10.7* 10.3*   HCT 33.1* 33.3* 30.5*   * 119* 116*     CMP:   Recent Labs   Lab 12/17/19  0143 12/17/19  1332 12/18/19  0227   *  --  134*   K 3.4* 3.6 4.6     --  107   CO2 23  --  20*   GLU 82  --  131*   BUN 16  --  15    CREATININE 0.7  --  0.7   CALCIUM 8.2*  --  8.1*   PROT 6.0  --  5.6*   ALBUMIN 2.2*  --  2.5*   BILITOT 0.6  --  1.2*   ALKPHOS 338*  --  216*   AST 22  --  30   ALT 12  --  12   ANIONGAP 6*  --  7*   EGFRNONAA >60.0  --  >60.0     Wound Culture:   Recent Labs   Lab 12/10/19  1038   LABAERO PSEUDOMONAS AERUGINOSA  Rare  *  PSEUDOMONAS AERUGINOSA  Few  *       Significant Imaging: I have reviewed all pertinent imaging results/findings within the past 24 hours.

## 2019-12-18 NOTE — PLAN OF CARE
POC reviewed with pt at 1400. Pt unable to verbalize understanding. Pt had posterior fusion surgery today.  Fentanyl given x2 for pain.  Labetalol and hydralazine given x1 for SBP >180.   HOB remained flat.  OGT placed.  Blood culture sent.  Potassium replaced.  Pt progressing toward goals. Will continue to monitor. See flowsheets for full assessment and VS info.

## 2019-12-18 NOTE — PHYSICIAN QUERY
PT Name: Junaid Muñoz  MR #: [id number]    Physician Query Form - Relationship to Procedure Clarification     CDS: Sabi Norton RN, CCDS       Contact information: [email or web address]    This form is a permanent document in the medical record.     Query Date: December 18, 2019      By submitting this query, we are merely seeking further clarification of documentation. Please utilize your independent clinical judgment when addressing the question(s) below.    The Medical record contains the following:  Supporting Clinical Findings Location in Medical Record   COMPLICATIONS: Durotomy over left C8 nerve root s/p water tight repair    During this procedure we encountered a durotomy over the left C8 nerve root which we repaired with Duragen and Eveseal in watertight fashion.     We elected to place a lumbar drain to assure good durotomy repair    JUSTIFICATION OF MODIFIER 22: This was a complex multi-stage operation for cervical spondylodiscitis and cervical deformity in a patient with IV drug abuse, coronary stents on plavix, and a history of bed bugs. It required significantly increased preop planning and management, mental effort, technical skill, and time to perform every aspect. 12/10-Op Note       Please clarify if _____Durotomy_____ is      [  ] Inherent/Integral to procedure   [  ] Present, but not a complication of the procedure   [X  ] Complication of procedure   [  ] Other (please specify): __________________   [  ] Clinically Undetermined

## 2019-12-18 NOTE — PLAN OF CARE
POC reviewed with pt and family at 1400. Pt unable to verbalize due to understanding understanding. Weaning parameters attempted for extubation; pt placed on spontaneous vent setting, had several apenic episodes. Pt following simple commands. SBP<180 maintained with PRN Hydralazine and Labetalol. PRN oxycodone given for pain. Questions and concerns addressed. No acute events today. Pt progressing toward goals. Will continue to monitor. See flowsheets for full assessment and VS info.

## 2019-12-18 NOTE — ASSESSMENT & PLAN NOTE
Transfused 3 units PRBCs and 2 units FFP intraoperatively  transfuse < 7   Monitor hemovac x 2 output

## 2019-12-18 NOTE — CARE UPDATE
Patient placed on spontaneous mode @ 12 pm.  Placed patient back on a rate due to several apnea episodes. Will continue to monitor.

## 2019-12-18 NOTE — ASSESSMENT & PLAN NOTE
C6-T3 osteomyelitis with epidural abscess    12/10: C7-T1 corpectomy with C6-T2 anterior fusion and lumbar drain placement   Cultures: CSF +staph epidermidis, likely contaminant                    Neck abscess +pseudomonas    12/14: vanc d/c'd, on cefepime 2g q8h for 6 week duration of therapy  -ID consulted for increasing WBC while on cefepime, recommended repeat blood cultures and repeat surgical cultures    12/17: OR with neurosurgery for stage II, posterior instrumentation C2-T3  -lumbar drain in place, drain 10cc/hr  -valium, oxycodone, and morphine prns   -decadron 4mg q6h x 24h for cervical and facial edema-- greatly improved   -transfused 3 units PRBCs and 2 units FFP intraoperatively, repeat CBC and transfuse <7-- hemodynamically stable  -intubated, WTE  - when OOB

## 2019-12-18 NOTE — SUBJECTIVE & OBJECTIVE
Interval History: Underwent posterior cervical instrumentation.  No acute event overnight.  No issues with the nurse.  Drains in place    Medications:  Continuous Infusions:   sodium chloride 0.9% 100 mL/hr at 12/18/19 1000     Scheduled Meds:   amLODIPine  5 mg Per OG tube Daily    atorvastatin  80 mg Per OG tube Daily    bacitracin   Topical (Top) TID    ceFEPime (MAXIPIME) IVPB  2 g Intravenous Q8H    chlorhexidine  15 mL Mouth/Throat BID    dexamethasone (DECADRON) IVPB  4 mg Intravenous Q6H    diazePAM  5 mg Intravenous Q8H    famotidine (PF)  20 mg Intravenous BID    levothyroxine  50 mcg Per OG tube Before breakfast    losartan-hydrochlorothiazide 100-25 mg  1 tablet Per OG tube Daily    metoprolol succinate  100 mg Oral Daily    nicotine  1 patch Transdermal Daily    polyethylene glycol  17 g Oral Daily    senna-docusate 8.6-50 mg  1 tablet Oral BID    sodium chloride 0.9%  10 mL Intravenous Q6H     PRN Meds:acetaminophen, Dextrose 10% Bolus, Dextrose 10% Bolus, [COMPLETED] fentaNYL **FOLLOWED BY** [START ON 12/20/2019] fentaNYL, glucagon (human recombinant), glucose, glucose, hydrALAZINE, labetalol, magnesium sulfate IVPB, magnesium sulfate IVPB, morphine, naloxone, ondansetron, oxyCODONE, potassium chloride in water **AND** potassium chloride in water **AND** potassium chloride in water, Flushing PICC Protocol **AND** sodium chloride 0.9% **AND** sodium chloride 0.9%, sodium phosphate IVPB, sodium phosphate IVPB, sodium phosphate IVPB     Review of Systems    Objective:     Weight: 79.4 kg (175 lb 0.7 oz)  Body mass index is 25.85 kg/m².  Vital Signs (Most Recent):  Temp: 98.6 °F (37 °C) (12/18/19 0701)  Pulse: 89 (12/18/19 1001)  Resp: (!) 25 (12/18/19 1001)  BP: (!) 177/82 (12/18/19 1001)  SpO2: 100 % (12/18/19 1001) Vital Signs (24h Range):  Temp:  [96.2 °F (35.7 °C)-98.6 °F (37 °C)] 98.6 °F (37 °C)  Pulse:  [] 89  Resp:  [5-33] 25  SpO2:  [100 %] 100 %  BP: (141-186)/(67-94)  "177/82  Arterial Line BP: (147-189)/(55-75) 175/75     Date 12/18/19 0700 - 12/19/19 0659   Shift 6421-5246 1347-2261 7916-8280 24 Hour Total   INTAKE   I.V.(mL/kg) 400(5)   400(5)   NG/GT 60   60   Shift Total(mL/kg) 460(5.8)   460(5.8)   OUTPUT   Urine(mL/kg/hr) 345   345   Drains 0   0   Shift Total(mL/kg) 345(4.3)   345(4.3)   Weight (kg) 79.4 79.4 79.4 79.4              Vent Mode: SIMV  Oxygen Concentration (%):  [] 40  Resp Rate Total:  [8.5 br/min-25 br/min] 15 br/min  Vt Set:  [420 mL] 420 mL  PEEP/CPAP:  [5 cmH20] 5 cmH20  Pressure Support:  [10 cmH20] 10 cmH20  Mean Airway Pressure:  [7.3 cmH20-10 cmH20] 10 cmH20         Closed/Suction Drain 12/10/19 1018 Anterior Neck Accordion 10 Fr. (Active)   Site Description Healing 12/11/2019  3:05 AM   Dressing Type No dressing 12/11/2019  3:05 AM   Drainage Serosanguineous 12/10/2019  6:30 PM   Status Clamped 12/11/2019  3:05 AM   Output (mL) 1500 mL 12/11/2019  6:05 AM            Urethral Catheter 12/10/19 0730 Non-latex 16 Fr. (Active)   Site Assessment Clean;Intact 12/11/2019  3:05 AM   Collection Container Urimeter 12/11/2019  3:05 AM   Securement Method secured to top of thigh w/ adhesive device 12/11/2019  3:05 AM   Catheter Care Performed yes 12/11/2019  3:05 AM   Reason for Continuing Urinary Catheterization Post operative 12/11/2019  3:05 AM   CAUTI Prevention Bundle StatLock in place w 1" slack;Intact seal between catheter & drainage tubing;Drainage bag/urimeter off the floor;Green sheeting clip in use;No dependent loops or kinks;Drainage bag/urimeter not overfilled (<2/3 full);Drainage bag/urimeter below bladder 12/10/2019  7:05 PM   Output (mL) 75 mL 12/10/2019  3:30 PM            Lumbar Drain 12/10/19 1056 (Active)   Drain Status Other (Comment) 12/11/2019  3:05 AM   Level to other (see comment) 12/10/2019  2:32 PM   Dressing Status Clean;Dry;Intact 12/11/2019  3:05 AM   Interventions HOB degrees (see comment) 12/10/2019  2:32 PM   Output (mL) 2 " mL 12/11/2019  4:05 AM       Neurosurgery Physical Exam  Opening eyes spontaneously  Nodding   Intubated  5/5 in BUE  5/5 in BLE  SILT  Incision c/d/i, neck with stable swelling    Significant Labs:  Recent Labs   Lab 12/17/19  0143 12/17/19  1332 12/18/19  0227   GLU 82  --  131*   *  --  134*   K 3.4* 3.6 4.6     --  107   CO2 23  --  20*   BUN 16  --  15   CREATININE 0.7  --  0.7   CALCIUM 8.2*  --  8.1*   MG 1.7 2.3 2.0     Recent Labs   Lab 12/17/19  1332 12/17/19  1540 12/18/19  0027   WBC 18.89* 14.23* 12.18   HGB 10.2* 10.6* 10.7*   HCT 31.7* 33.1* 33.3*   * 108* 119*     Recent Labs   Lab 12/16/19  1302  12/17/19  1540 12/17/19  1837 12/18/19  0227   INR 1.1   < > 1.1 1.1 1.1   APTT 30.7  --   --   --   --     < > = values in this interval not displayed.     Microbiology Results (last 7 days)     Procedure Component Value Units Date/Time    Blood culture [[id number]] Collected:  12/17/19 1505    Order Status:  Completed Specimen:  Blood from Line, Arterial, Right Updated:  12/18/19 0115     Blood Culture, Routine No Growth to date    Blood culture [[id number]]     Order Status:  No result Specimen:  Blood     Blood culture [[id number]] Collected:  12/12/19 0147    Order Status:  Completed Specimen:  Blood from Wrist, Right Updated:  12/17/19 0612     Blood Culture, Routine No growth after 5 days.    Narrative:       Blood cultures from 2 different sites. 4 bottles total.  Please draw before starting antibiotics.    Blood culture [[id number]] Collected:  12/12/19 0213    Order Status:  Completed Specimen:  Blood from Peripheral, Antecubital, Right Updated:  12/17/19 0612     Blood Culture, Routine No growth after 5 days.    Narrative:       Blood cultures x 2 different sites. 4 bottles total. Please  draw cultures before administering antibiotics.    Blood culture [[id number]] Collected:  12/11/19 1000    Order Status:  Completed Specimen:  Blood from Peripheral, Foot, Right Updated:  12/16/19  1212     Blood Culture, Routine No growth after 5 days.    Narrative:       Blood cultures from 2 different sites. 4 bottles total.  Please draw before starting antibiotics.    Blood culture [[id number]] Collected:  12/11/19 1005    Order Status:  Completed Specimen:  Blood from Peripheral, Forearm, Right Updated:  12/16/19 1212     Blood Culture, Routine No growth after 5 days.    Narrative:       Blood cultures x 2 different sites. 4 bottles total. Please  draw cultures before administering antibiotics.    Culture, Anaerobe [[id number]] Collected:  12/10/19 1038    Order Status:  Completed Specimen:  Body Fluid from Neck Updated:  12/16/19 0854     Anaerobic Culture No anaerobes isolated    Narrative:       2) Free vertebral abscess #2    Culture, Anaerobe [[id number]] Collected:  12/10/19 1038    Order Status:  Completed Specimen:  Body Fluid from Neck Updated:  12/16/19 0854     Anaerobic Culture No anaerobes isolated    Narrative:       1) Free vertebral abscess #1    CSF culture [[id number]] Collected:  12/11/19 1247    Order Status:  Completed Specimen:  CSF (Spinal Fluid) from CSF Tap, Tube 3 Updated:  12/16/19 0711     CSF CULTURE No Growth     Gram Stain Result Cytospin indicates:      Rare WBC's      No organisms seen    CSF culture [[id number]]  (Abnormal)  (Susceptibility) Collected:  12/10/19 1158    Order Status:  Completed Specimen:  CSF (Spinal Fluid) from CSF Tap, Tube 1 Updated:  12/13/19 1350     CSF CULTURE Culture has Staphylococcus.      Results called to and read back by:  Leonardo Cook RN 12/12/2019  10:36      STAPHYLOCOCCUS EPIDERMIDIS     Gram Stain Result No WBC's      No organisms seen    Narrative:       3) 3) CSF for cell count, differential, culture, gramstain,  protein, and glucose    Aerobic culture [[id number]]  (Abnormal)  (Susceptibility) Collected:  12/10/19 1038    Order Status:  Completed Specimen:  Body Fluid from Neck Updated:  12/13/19 1240     Aerobic Bacterial Culture PSEUDOMONAS  AERUGINOSA  Rare      Narrative:       1) Free vertebral abscess #1    Fungus culture [[id number]] Collected:  12/10/19 1038    Order Status:  Completed Specimen:  Body Fluid from Neck Updated:  12/13/19 1059     Fungus (Mycology) Culture Culture in progress    Narrative:       2) Free vertebral abscess #2    Fungus culture [[id number]] Collected:  12/10/19 1038    Order Status:  Completed Specimen:  Body Fluid from Neck Updated:  12/13/19 1059     Fungus (Mycology) Culture Culture in progress    Narrative:       1) Free vertebral abscess #1    Aerobic culture [[id number]]  (Abnormal)  (Susceptibility) Collected:  12/10/19 1038    Order Status:  Completed Specimen:  Body Fluid from Neck Updated:  12/12/19 1132     Aerobic Bacterial Culture PSEUDOMONAS AERUGINOSA  Few      Narrative:       2) Free vertebral abscess #2    Gram stain [[id number]]     Order Status:  Canceled Specimen:  CSF (Spinal Fluid) from CSF Tap, Tube 3     CSF culture [[id number]]     Order Status:  Canceled Specimen:  CSF (Spinal Fluid) from CSF Tap, Tube 3     AFB Culture & Smear [[id number]] Collected:  12/10/19 1038    Order Status:  Completed Specimen:  Body Fluid from Neck Updated:  12/11/19 2127     AFB Culture & Smear Culture in progress     AFB CULTURE STAIN No acid fast bacilli seen.    Narrative:       1) Free vertebral abscess #1    AFB Culture & Smear [[id number]] Collected:  12/10/19 1038    Order Status:  Completed Specimen:  Body Fluid from Neck Updated:  12/11/19 2127     AFB Culture & Smear Culture in progress     AFB CULTURE STAIN No acid fast bacilli seen.    Narrative:       2) Free vertebral abscess #2    Gram stain [[id number]] Collected:  12/11/19 1247    Order Status:  Canceled Specimen:  CSF (Spinal Fluid) from CSF Tap, Tube 3

## 2019-12-19 LAB
ALBUMIN SERPL BCP-MCNC: 2.4 G/DL (ref 3.5–5.2)
ALLENS TEST: ABNORMAL
ALP SERPL-CCNC: 220 U/L (ref 55–135)
ALT SERPL W/O P-5'-P-CCNC: 13 U/L (ref 10–44)
ANION GAP SERPL CALC-SCNC: 7 MMOL/L (ref 8–16)
AST SERPL-CCNC: 27 U/L (ref 10–40)
BASOPHILS # BLD AUTO: 0.01 K/UL (ref 0–0.2)
BASOPHILS # BLD AUTO: 0.02 K/UL (ref 0–0.2)
BASOPHILS NFR BLD: 0.1 % (ref 0–1.9)
BASOPHILS NFR BLD: 0.1 % (ref 0–1.9)
BILIRUB SERPL-MCNC: 0.7 MG/DL (ref 0.1–1)
BUN SERPL-MCNC: 17 MG/DL (ref 8–23)
CALCIUM SERPL-MCNC: 8.6 MG/DL (ref 8.7–10.5)
CHLORIDE SERPL-SCNC: 107 MMOL/L (ref 95–110)
CO2 SERPL-SCNC: 21 MMOL/L (ref 23–29)
CREAT SERPL-MCNC: 0.7 MG/DL (ref 0.5–1.4)
DELSYS: ABNORMAL
DIFFERENTIAL METHOD: ABNORMAL
DIFFERENTIAL METHOD: ABNORMAL
EOSINOPHIL # BLD AUTO: 0 K/UL (ref 0–0.5)
EOSINOPHIL # BLD AUTO: 0 K/UL (ref 0–0.5)
EOSINOPHIL NFR BLD: 0 % (ref 0–8)
EOSINOPHIL NFR BLD: 0.1 % (ref 0–8)
ERYTHROCYTE [DISTWIDTH] IN BLOOD BY AUTOMATED COUNT: 15.9 % (ref 11.5–14.5)
ERYTHROCYTE [DISTWIDTH] IN BLOOD BY AUTOMATED COUNT: 16.3 % (ref 11.5–14.5)
ERYTHROCYTE [SEDIMENTATION RATE] IN BLOOD BY WESTERGREN METHOD: 16 MM/H
EST. GFR  (AFRICAN AMERICAN): >60 ML/MIN/1.73 M^2
EST. GFR  (NON AFRICAN AMERICAN): >60 ML/MIN/1.73 M^2
FIO2: 40
GLUCOSE SERPL-MCNC: 129 MG/DL (ref 70–110)
HCO3 UR-SCNC: 20.9 MMOL/L (ref 24–28)
HCT VFR BLD AUTO: 27.5 % (ref 40–54)
HCT VFR BLD AUTO: 33 % (ref 40–54)
HGB BLD-MCNC: 10.5 G/DL (ref 14–18)
HGB BLD-MCNC: 8.9 G/DL (ref 14–18)
IMM GRANULOCYTES # BLD AUTO: 0.1 K/UL (ref 0–0.04)
IMM GRANULOCYTES # BLD AUTO: 0.15 K/UL (ref 0–0.04)
IMM GRANULOCYTES NFR BLD AUTO: 0.7 % (ref 0–0.5)
IMM GRANULOCYTES NFR BLD AUTO: 1.4 % (ref 0–0.5)
INR PPP: 1.1 (ref 0.8–1.2)
LYMPHOCYTES # BLD AUTO: 0.6 K/UL (ref 1–4.8)
LYMPHOCYTES # BLD AUTO: 1.1 K/UL (ref 1–4.8)
LYMPHOCYTES NFR BLD: 6 % (ref 18–48)
LYMPHOCYTES NFR BLD: 7.9 % (ref 18–48)
MAGNESIUM SERPL-MCNC: 1.8 MG/DL (ref 1.6–2.6)
MCH RBC QN AUTO: 27.3 PG (ref 27–31)
MCH RBC QN AUTO: 27.7 PG (ref 27–31)
MCHC RBC AUTO-ENTMCNC: 31.8 G/DL (ref 32–36)
MCHC RBC AUTO-ENTMCNC: 32.4 G/DL (ref 32–36)
MCV RBC AUTO: 86 FL (ref 82–98)
MCV RBC AUTO: 86 FL (ref 82–98)
MODE: ABNORMAL
MONOCYTES # BLD AUTO: 0.8 K/UL (ref 0.3–1)
MONOCYTES # BLD AUTO: 1.4 K/UL (ref 0.3–1)
MONOCYTES NFR BLD: 8 % (ref 4–15)
MONOCYTES NFR BLD: 9.5 % (ref 4–15)
NEUTROPHILS # BLD AUTO: 11.7 K/UL (ref 1.8–7.7)
NEUTROPHILS # BLD AUTO: 8.8 K/UL (ref 1.8–7.7)
NEUTROPHILS NFR BLD: 81.7 % (ref 38–73)
NEUTROPHILS NFR BLD: 84.5 % (ref 38–73)
NRBC BLD-RTO: 0 /100 WBC
NRBC BLD-RTO: 0 /100 WBC
PCO2 BLDA: 26.4 MMHG (ref 35–45)
PEEP: 5
PH SMN: 7.51 [PH] (ref 7.35–7.45)
PHOSPHATE SERPL-MCNC: 2.5 MG/DL (ref 2.7–4.5)
PIP: 15
PLATELET # BLD AUTO: 126 K/UL (ref 150–350)
PLATELET # BLD AUTO: 214 K/UL (ref 150–350)
PMV BLD AUTO: 9.3 FL (ref 9.2–12.9)
PMV BLD AUTO: 9.4 FL (ref 9.2–12.9)
PO2 BLDA: 211 MMHG (ref 80–100)
POC BE: -2 MMOL/L
POC SATURATED O2: 100 % (ref 95–100)
POC TCO2: 22 MMOL/L (ref 23–27)
POTASSIUM SERPL-SCNC: 3.9 MMOL/L (ref 3.5–5.1)
PROT SERPL-MCNC: 5.7 G/DL (ref 6–8.4)
PROTHROMBIN TIME: 11.2 SEC (ref 9–12.5)
PS: 10
RBC # BLD AUTO: 3.21 M/UL (ref 4.6–6.2)
RBC # BLD AUTO: 3.84 M/UL (ref 4.6–6.2)
SAMPLE: ABNORMAL
SITE: ABNORMAL
SODIUM SERPL-SCNC: 135 MMOL/L (ref 136–145)
SP02: 100
WBC # BLD AUTO: 10.44 K/UL (ref 3.9–12.7)
WBC # BLD AUTO: 14.36 K/UL (ref 3.9–12.7)

## 2019-12-19 PROCEDURE — 99900026 HC AIRWAY MAINTENANCE (STAT)

## 2019-12-19 PROCEDURE — 99900035 HC TECH TIME PER 15 MIN (STAT)

## 2019-12-19 PROCEDURE — 94761 N-INVAS EAR/PLS OXIMETRY MLT: CPT

## 2019-12-19 PROCEDURE — 82803 BLOOD GASES ANY COMBINATION: CPT

## 2019-12-19 PROCEDURE — 25000003 PHARM REV CODE 250: Performed by: NURSE PRACTITIONER

## 2019-12-19 PROCEDURE — 63600175 PHARM REV CODE 636 W HCPCS: Performed by: PHYSICIAN ASSISTANT

## 2019-12-19 PROCEDURE — 25000003 PHARM REV CODE 250: Performed by: STUDENT IN AN ORGANIZED HEALTH CARE EDUCATION/TRAINING PROGRAM

## 2019-12-19 PROCEDURE — A4216 STERILE WATER/SALINE, 10 ML: HCPCS | Performed by: ANESTHESIOLOGY

## 2019-12-19 PROCEDURE — 25000003 PHARM REV CODE 250: Performed by: ANESTHESIOLOGY

## 2019-12-19 PROCEDURE — 85025 COMPLETE CBC W/AUTO DIFF WBC: CPT | Mod: 91

## 2019-12-19 PROCEDURE — 99233 PR SUBSEQUENT HOSPITAL CARE,LEVL III: ICD-10-PCS | Mod: ,,, | Performed by: PSYCHIATRY & NEUROLOGY

## 2019-12-19 PROCEDURE — 94003 VENT MGMT INPAT SUBQ DAY: CPT

## 2019-12-19 PROCEDURE — 25000003 PHARM REV CODE 250: Performed by: PHYSICIAN ASSISTANT

## 2019-12-19 PROCEDURE — S0028 INJECTION, FAMOTIDINE, 20 MG: HCPCS | Performed by: PHYSICIAN ASSISTANT

## 2019-12-19 PROCEDURE — 25000003 PHARM REV CODE 250: Performed by: PSYCHIATRY & NEUROLOGY

## 2019-12-19 PROCEDURE — 20000000 HC ICU ROOM

## 2019-12-19 PROCEDURE — 99233 SBSQ HOSP IP/OBS HIGH 50: CPT | Mod: ,,, | Performed by: PSYCHIATRY & NEUROLOGY

## 2019-12-19 PROCEDURE — 63600175 PHARM REV CODE 636 W HCPCS: Performed by: NURSE PRACTITIONER

## 2019-12-19 PROCEDURE — 84100 ASSAY OF PHOSPHORUS: CPT

## 2019-12-19 PROCEDURE — 80053 COMPREHEN METABOLIC PANEL: CPT

## 2019-12-19 PROCEDURE — 37799 UNLISTED PX VASCULAR SURGERY: CPT

## 2019-12-19 PROCEDURE — S4991 NICOTINE PATCH NONLEGEND: HCPCS | Performed by: NURSE PRACTITIONER

## 2019-12-19 PROCEDURE — 27000221 HC OXYGEN, UP TO 24 HOURS

## 2019-12-19 PROCEDURE — 63600175 PHARM REV CODE 636 W HCPCS: Performed by: UROLOGY

## 2019-12-19 PROCEDURE — 63600175 PHARM REV CODE 636 W HCPCS: Performed by: PSYCHIATRY & NEUROLOGY

## 2019-12-19 PROCEDURE — 85610 PROTHROMBIN TIME: CPT

## 2019-12-19 PROCEDURE — 82800 BLOOD PH: CPT

## 2019-12-19 PROCEDURE — 83735 ASSAY OF MAGNESIUM: CPT

## 2019-12-19 RX ORDER — FENTANYL CITRATE 50 UG/ML
50 INJECTION, SOLUTION INTRAMUSCULAR; INTRAVENOUS
Status: DISCONTINUED | OUTPATIENT
Start: 2019-12-19 | End: 2019-12-23

## 2019-12-19 RX ORDER — POLYETHYLENE GLYCOL 3350 17 G/17G
17 POWDER, FOR SOLUTION ORAL DAILY
Status: DISCONTINUED | OUTPATIENT
Start: 2019-12-20 | End: 2019-12-25

## 2019-12-19 RX ORDER — OXYCODONE HYDROCHLORIDE 5 MG/1
10 TABLET ORAL EVERY 8 HOURS PRN
Status: DISCONTINUED | OUTPATIENT
Start: 2019-12-19 | End: 2019-12-19

## 2019-12-19 RX ORDER — HEPARIN SODIUM 5000 [USP'U]/ML
5000 INJECTION, SOLUTION INTRAVENOUS; SUBCUTANEOUS EVERY 8 HOURS
Status: DISCONTINUED | OUTPATIENT
Start: 2019-12-19 | End: 2020-01-11 | Stop reason: HOSPADM

## 2019-12-19 RX ORDER — OXYCODONE HYDROCHLORIDE 5 MG/1
10 TABLET ORAL EVERY 8 HOURS PRN
Status: DISCONTINUED | OUTPATIENT
Start: 2019-12-19 | End: 2019-12-21

## 2019-12-19 RX ORDER — AMOXICILLIN 250 MG
1 CAPSULE ORAL 2 TIMES DAILY
Status: DISCONTINUED | OUTPATIENT
Start: 2019-12-19 | End: 2019-12-25

## 2019-12-19 RX ORDER — METOPROLOL TARTRATE 50 MG/1
50 TABLET ORAL 2 TIMES DAILY
Status: DISCONTINUED | OUTPATIENT
Start: 2019-12-19 | End: 2019-12-25

## 2019-12-19 RX ADMIN — FENTANYL CITRATE 50 MCG: 50 INJECTION INTRAMUSCULAR; INTRAVENOUS at 02:12

## 2019-12-19 RX ADMIN — FAMOTIDINE 20 MG: 10 INJECTION, SOLUTION INTRAVENOUS at 09:12

## 2019-12-19 RX ADMIN — SENNOSIDES AND DOCUSATE SODIUM 1 TABLET: 8.6; 5 TABLET ORAL at 09:12

## 2019-12-19 RX ADMIN — FENTANYL CITRATE 50 MCG: 50 INJECTION INTRAMUSCULAR; INTRAVENOUS at 07:12

## 2019-12-19 RX ADMIN — HEPARIN SODIUM 5000 UNITS: 5000 INJECTION, SOLUTION INTRAVENOUS; SUBCUTANEOUS at 09:12

## 2019-12-19 RX ADMIN — CEFEPIME 2 G: 2 INJECTION, POWDER, FOR SOLUTION INTRAVENOUS at 05:12

## 2019-12-19 RX ADMIN — DIAZEPAM 5 MG: 5 INJECTION, SOLUTION INTRAMUSCULAR; INTRAVENOUS at 09:12

## 2019-12-19 RX ADMIN — ATORVASTATIN CALCIUM 80 MG: 20 TABLET, FILM COATED ORAL at 09:12

## 2019-12-19 RX ADMIN — DIAZEPAM 5 MG: 5 INJECTION, SOLUTION INTRAMUSCULAR; INTRAVENOUS at 05:12

## 2019-12-19 RX ADMIN — Medication 1 PATCH: at 09:12

## 2019-12-19 RX ADMIN — FENTANYL CITRATE 50 MCG: 50 INJECTION INTRAMUSCULAR; INTRAVENOUS at 04:12

## 2019-12-19 RX ADMIN — POLYETHYLENE GLYCOL 3350 17 G: 17 POWDER, FOR SOLUTION ORAL at 09:12

## 2019-12-19 RX ADMIN — METOPROLOL SUCCINATE 100 MG: 100 TABLET, EXTENDED RELEASE ORAL at 09:12

## 2019-12-19 RX ADMIN — Medication 10 ML: at 12:12

## 2019-12-19 RX ADMIN — Medication 10 ML: at 05:12

## 2019-12-19 RX ADMIN — DIAZEPAM 5 MG: 5 INJECTION, SOLUTION INTRAMUSCULAR; INTRAVENOUS at 02:12

## 2019-12-19 RX ADMIN — CEFEPIME 2 G: 2 INJECTION, POWDER, FOR SOLUTION INTRAVENOUS at 02:12

## 2019-12-19 RX ADMIN — CEFEPIME 2 G: 2 INJECTION, POWDER, FOR SOLUTION INTRAVENOUS at 09:12

## 2019-12-19 RX ADMIN — METOPROLOL TARTRATE 50 MG: 50 TABLET ORAL at 09:12

## 2019-12-19 RX ADMIN — Medication: at 02:12

## 2019-12-19 RX ADMIN — SODIUM CHLORIDE: 0.9 INJECTION, SOLUTION INTRAVENOUS at 02:12

## 2019-12-19 RX ADMIN — SODIUM PHOSPHATE, MONOBASIC, MONOHYDRATE 15 MMOL: 276; 142 INJECTION, SOLUTION INTRAVENOUS at 08:12

## 2019-12-19 RX ADMIN — AMLODIPINE BESYLATE 5 MG: 5 TABLET ORAL at 09:12

## 2019-12-19 RX ADMIN — Medication: at 09:12

## 2019-12-19 RX ADMIN — LOSARTAN POTASSIUM AND HYDROCHLOROTHIAZIDE 1 TABLET: 100; 25 TABLET, FILM COATED ORAL at 09:12

## 2019-12-19 RX ADMIN — MAGNESIUM SULFATE IN WATER 2 G: 40 INJECTION, SOLUTION INTRAVENOUS at 05:12

## 2019-12-19 RX ADMIN — CHLORHEXIDINE GLUCONATE 0.12% ORAL RINSE 15 ML: 1.2 LIQUID ORAL at 09:12

## 2019-12-19 RX ADMIN — HYDRALAZINE HYDROCHLORIDE 10 MG: 20 INJECTION INTRAMUSCULAR; INTRAVENOUS at 07:12

## 2019-12-19 RX ADMIN — LABETALOL HCL IV SOLN PREFILLED SYRINGE 20 MG/4ML (5 MG/ML) 10 MG: 20/4 SOLUTION PREFILLED SYRINGE at 04:12

## 2019-12-19 RX ADMIN — ACETAMINOPHEN 500 MG: 500 TABLET ORAL at 09:12

## 2019-12-19 RX ADMIN — LEVOTHYROXINE SODIUM 50 MCG: 50 TABLET ORAL at 05:12

## 2019-12-19 RX ADMIN — HEPARIN SODIUM 5000 UNITS: 5000 INJECTION, SOLUTION INTRAVENOUS; SUBCUTANEOUS at 02:12

## 2019-12-19 RX ADMIN — LABETALOL HCL IV SOLN PREFILLED SYRINGE 20 MG/4ML (5 MG/ML) 10 MG: 20/4 SOLUTION PREFILLED SYRINGE at 07:12

## 2019-12-19 NOTE — PLAN OF CARE
Patient intubated on vent.  Per MD, wean to extubate today as tolerated.    Per Neurosurgery:  Likely will need long-term IV antibiotics and PICC line.   Not medically stable for discharge.          12/19/19 1347   Discharge Reassessment   Assessment Type Discharge Planning Reassessment   Provided patient/caregiver education on the expected discharge date and the discharge plan No   Do you have any problems affording any of your prescribed medications? No   Discharge Plan A Rehab   Discharge Plan B Skilled Nursing Facility   DME Needed Upon Discharge  other (see comments)  (tbd)   Patient choice form signed by patient/caregiver No   Anticipated Discharge Disposition Rehab   Can the patient answer the patient profile reliably? No, cognitively impaired   How does the patient rate their overall health at the present time?   (vasquez)   Describe the patient's ability to walk at the present time. Does not walk or unable to take any steps at all   How often would a person be available to care for the patient? Occasionally   Number of comorbid conditions (as recorded on the chart) Four   During the past month, has the patient often been bothered by feeling down, depressed or hopeless?   (vasquez)   During the past month, has the patient often been bothered by little interest or pleasure in doing things?   (vasquez)   Post-Acute Status   Post-Acute Authorization Placement;Other   Post-Acute Placement Status Awaiting Internal Medical Clearance   Discharge Delays None known at this time     Kimberly Carver RN, CCRN-K, [facility]  Neuro-Critical Care   [phone]

## 2019-12-19 NOTE — PROGRESS NOTES
[facility]  Neurocritical Care  Progress Note    Admit Date: 12/6/2019  Service Date: 12/18/2019  Length of Stay: 12    Subjective:     Chief Complaint: Spinal epidural abscess    History of Present Illness: Junaid Muñoz is a 74 year old male with CAD, HTN, HLD, hypothyroidism, tobacco use, and prior history of IVDU who was found to have C6-T3 osteomyelitis with epidural abscess. Patient was neurologically intact and hemodynamically stable on admission.  Now immediately status post phase one of a two part surgery.  Today he had a C7-T1 corpectomy and C6-T2 anterior fusion.  Lumbar drain was placed secondary to intra op CSF leak, and patient admitted to [facility] for post op monitoring and drain maintenance.     Hospital Course: 12/10 admitted post-op C7-T1 corpectomy and C6-T2 anterior fusion with lumbar drain placed 2/2 CSF leak intra-op  12/12  No significant events over night, per nsgy will keep lumbar drain another day. 2nd stage of sugery to occur next Tuesday. Remains NPO while having to lie flat with  lumbar drain. Getting confused after receiving valium decreased dose.  12/13: Restless o/n, c/o back pain. LD dropped to floor by NSGY, 8-10cc/hr. HV drain in place as well. Neurologically stable. AF, no leukocytosis, H/H stable.   12/14: calm at present, slight agitation overnight, can elevate head for swallow trials and po today,pending picc placement for long term abx  12/15: less responsive this am, wean scheduled ativan, begin to advance diet and taper ivfs  12/16: NAEO. Stage II surgery tomorrow for posterior cervical fusion. Leukocytosis increasing, is on cefepime for pseudomonas in CSF, will reach out to ID to see if any further abx pre-op should be given. Lumbar drain in place. Increase normal saline to 100 cc/hr. NPO after midnight.   12/17: OR today for stage II posterior cervical instrumentation. Required 3 units PRBCs and 2 units FFP. 2 hemovacs in place to gravity with large  output. Will repeat CBC. Patient still intubated on arrival to ICU with cervical and facial edema, will start decadron. ID recommended repeating blood cultures as patient's WBC increasing. Continue cefepime.   12/18: POD 1 C2-T3 posterior instrumentation. Patient is still intubated, will wean to extubate. Facial and cervical swelling greatly improved. Lumbar drain in place. Hemovac x 2. Hemodynamically stable.    Interval History: POD 1 C2-T3 posterior instrumentation. Patient is still intubated, will wean to extubate. Facial and cervical swelling greatly improved. Lumbar drain in place. Hemovac x 2. Hemodynamically stable.    Review of Systems:Unable to obtain a complete ROS due to level of consciousness.     Vitals:   Temp: 98.7 °F (37.1 °C)  Pulse: 84  Rhythm: normal sinus rhythm  BP: (!) 149/70  MAP (mmHg): 101  Resp: 16  SpO2: 100 %  Oxygen Concentration (%): 40  O2 Device (Oxygen Therapy): ventilator  Vent Mode: SIMV  Set Rate: 16 bmp  Pressure Support: 10 cmH20  PEEP/CPAP: 5 cmH20  Peak Airway Pressure: 20 cmH2O  Mean Airway Pressure: 9.2 cmH20  Plateau Pressure: 0 cmH20    Temp  Min: 96.9 °F (36.1 °C)  Max: 98.7 °F (37.1 °C)  Pulse  Min: 82  Max: 110  BP  Min: 141/71  Max: 185/86  MAP (mmHg)  Min: 99  Max: 126  Resp  Min: 5  Max: 33  SpO2  Min: 99 %  Max: 100 %  Oxygen Concentration (%)  Min: 40  Max: 50    12/17 0701 - 12/18 0700  In: 6688 [I.V.:5020]  Out: 3439 [Urine:2550; Drains:239]   Unmeasured Output  Urine Occurrence: 1  Stool Occurrence: 1     Examination:   Constitutional: No apparent distress.   Eyes: Conjunctiva clear  Head/Ears/Nose/Mouth/Throat/Neck: Moist mucous membranes. Surgical incision is C/D/I   Cardiovascular: Regular rhythm.   Respiratory: Comfortable respirations. Intubated .  Gastrointestinal: Soft, nondistended     Neurologic:  -Intubated with sedation paused, pupils 2mm and brisk. Follows commands. Pupils equal and reactive, EOMI, Face symmetric. Facial and cervical swelling minor.  hemovacs intact x 2 with bloody output x 1 (other with no output since surgery) anterior and posterior cervical incisions are C/D/I  -moves all extremities x 4 spontaneously with good effort, follows commands x 4, sensation intact    Medications:   Continuous  sodium chloride 0.9% Last Rate: 100 mL/hr at 12/18/19 1700   Scheduled  amLODIPine 5 mg Daily   atorvastatin 80 mg Daily   bacitracin  TID   ceFEPime (MAXIPIME) IVPB 2 g Q8H   chlorhexidine 15 mL BID   diazePAM 5 mg Q8H   famotidine (PF) 20 mg BID   levothyroxine 50 mcg Before breakfast   losartan-hydrochlorothiazide 100-25 mg 1 tablet Daily   metoprolol succinate 100 mg Daily   nicotine 1 patch Daily   polyethylene glycol 17 g Daily   senna-docusate 8.6-50 mg 1 tablet BID   sodium chloride 0.9% 10 mL Q6H   PRN  acetaminophen 500 mg Q8H PRN   Dextrose 10% Bolus 12.5 g PRN   Dextrose 10% Bolus 25 g PRN   [START ON 12/20/2019] fentaNYL 50 mcg Q1H PRN   glucagon (human recombinant) 1 mg PRN   glucose 16 g PRN   glucose 24 g PRN   hydrALAZINE 10 mg Q4H PRN   labetalol 10 mg Q4H PRN   magnesium sulfate IVPB 2 g PRN   magnesium sulfate IVPB 4 g PRN   morphine 1 mg Q6H PRN   naloxone 0.4 mg PRN   ondansetron 8 mg Q8H PRN   oxyCODONE 10 mg Q6H PRN   potassium chloride in water 40 mEq PRN   And     potassium chloride in water 60 mEq PRN   And     potassium chloride in water 80 mEq PRN   sodium chloride 0.9% 10 mL PRN   sodium phosphate IVPB 15 mmol PRN   sodium phosphate IVPB 20.01 mmol PRN   sodium phosphate IVPB 30 mmol PRN      Today I independently reviewed pertinent medications, lines/drains/airways, imaging, cardiology results, laboratory results, microbiology results,     ISTAT: Recent Labs   Lab 12/18/19  0449   PH 7.378   PCO2 34.1*   PO2 258*   POCSATURATED 100   HCO3 20.1*   BE -5   POCTCO2 21*   SAMPLE ARTERIAL      Chem: Recent Labs   Lab 12/18/19  0227   *   K 4.6      CO2 20*   *   BUN 15   CREATININE 0.7   ESTGFRAFRICA >60.0    EGFRNONAA >60.0   CALCIUM 8.1*   MG 2.0   PHOS 2.6*   ANIONGAP 7*   PROT 5.6*   ALBUMIN 2.5*   BILITOT 1.2*   ALKPHOS 216*   AST 30   ALT 12     Heme: Recent Labs   Lab 12/17/19  1837  12/18/19  0227 12/18/19  1219   WBC  --    < >  --  10.97   HGB  --    < >  --  10.3*   HCT  --    < >  --  30.5*   PLT  --    < >  --  116*   INR 1.1  --  1.1  --    FIBRINOGEN 387*  --   --   --     < > = values in this interval not displayed.     Endo: No results for input(s): POCTGLUCOSE in the last 24 hours.   Assessment/Plan:     Cardiac/Vascular  Hyperlipidemia  Atorvastatin 40 mg daily  Monitor LFTs    Coronary artery disease involving native coronary artery of native heart without angina pectoris  Clopidogrel 75 mg daily     Essential hypertension  SBP < 180  Amlodipine 5 mg daily  Losartan-HCTZ 100 mg - 25 mg  Metoprolol  mg daily    · Echo:Normal left ventricular systolic function. The estimated ejection fraction is 63%  · Concentric left ventricular remodeling.  · No wall motion abnormalities.  · Normal LV diastolic function.  · Normal right ventricular systolic function.  · Mild aortic valve stenosis.  · Aortic valve area is 1.77 cm2; peak velocity is 2.19 m/s; mean gradient is 11 mmHg.  · Normal central venous pressure (3 mm Hg).         ID  * Spinal epidural abscess  C6-T3 osteomyelitis with epidural abscess    12/10: C7-T1 corpectomy with C6-T2 anterior fusion and lumbar drain placement   Cultures: CSF +staph epidermidis, likely contaminant                    Neck abscess +pseudomonas    12/14: vanc d/c'd, on cefepime 2g q8h for 6 week duration of therapy  -ID consulted for increasing WBC while on cefepime, recommended repeat blood cultures and repeat surgical cultures    12/17: OR with neurosurgery for stage II, posterior instrumentation C2-T3  -lumbar drain in place, drain 10cc/hr  -valium, oxycodone, and morphine prns   -decadron 4mg q6h x 24h for cervical and facial edema-- greatly improved   -transfused 3  units PRBCs and 2 units FFP intraoperatively, repeat CBC and transfuse <7-- hemodynamically stable  -intubated, WTE  - when OOB    Pseudomonas infection  C3-T3 osteomyelitis with epidural abscess s/p C7-T1 corpectomy & C6-T2 anterior fusion (12/6 stage 1) with tissue cultures +pseudomonas  -ID following, appreciate recommendations : cefepime 2g IV q8h for 6 weeks (tentative end date 1/28/20)  -Patient with WBC increasing despite abx, ID reconsulted and recommended repeat blood cultures which are pending   -POD 1 C2-T3 posterior instrumentation (12/17 stage 2)       Acute osteomyelitis of thoracic spine  See spinal epidural abscess    Acute osteomyelitis of cervical spine  See spinal epidural abscess      Oncology  Acute blood loss anemia  Transfused 3 units PRBCs and 2 units FFP intraoperatively  transfuse < 7   Monitor hemovac x 2 output     Leukocytosis  See pseudomonas infection/epidural abscess/osteomyelitis     Endocrine  Other specified hypothyroidism  Levothyroxine 50 mcg daily    GI  Chronic hepatitis C without hepatic coma  Monitor LFTs    Other  Tobacco abuse  Nicotine patch         The patient is being Prophylaxed for:  Venous Thromboembolism with: Mechanical or Chemical  Stress Ulcer with: H2B  Ventilator Pneumonia with: chlorhexidine oral care    Activity Orders          Diet NPO: NPO starting at 12/17 0001        Full Code   CCT 35 min    Marily Brooks PA-C  Neurocritical Care  [facility]

## 2019-12-19 NOTE — ASSESSMENT & PLAN NOTE
C3-T3 osteomyelitis with epidural abscess s/p C7-T1 corpectomy & C6-T2 anterior fusion (12/6 stage 1) with tissue cultures +pseudomonas  -ID following, appreciate recommendations : cefepime 2g IV q8h for 6 weeks (tentative end date 1/28/20)  -Patient with WBC increasing despite abx, ID reconsulted and recommended repeat blood cultures which are pending   -POD 1 C2-T3 posterior instrumentation (12/17 stage 2)

## 2019-12-19 NOTE — PLAN OF CARE
POC reviewed with pt at 0500. Pt unable to verbalize understanding.No change in exam overnight. Night bath given. Contact precautions maintained. Replacing mag and phos.  Questions and concerns addressed. No acute events overnight. Pt progressing toward goals. Will continue to monitor. See flowsheets for full assessment and VS info

## 2019-12-19 NOTE — PROGRESS NOTES
[facility]  Neurosurgery  Progress Note    Subjective:     History of Present Illness: Junaid Muñoz is a 74 y.o. male with PMH of HTN, HLD, Hepatitis C, and CAD s/p two stents on plavix who presents with 1 month history of back pain between his shoulders. MRI at OSH demonstrates likely epidural abscess. Pt denies hx of trauma and reports slow onset of symptoms.     Post-Op Info:  Procedure(s) (LRB):  FUSION, SPINE, CERVICAL, POSTERIOR APPROACH C2-T3 posterior instrumented fusion (N/A)   2 Days Post-Op     Interval History: NAEON. Pt still intubated this morning from procedure yesterday, to be extubated today.    Medications:  Continuous Infusions:   sodium chloride 0.9% 100 mL/hr at 12/19/19 1102     Scheduled Meds:   amLODIPine  5 mg Per OG tube Daily    atorvastatin  80 mg Per OG tube Daily    bacitracin   Topical (Top) TID    ceFEPime (MAXIPIME) IVPB  2 g Intravenous Q8H    chlorhexidine  15 mL Mouth/Throat BID    diazePAM  5 mg Intravenous Q8H    famotidine (PF)  20 mg Intravenous BID    heparin (porcine)  5,000 Units Subcutaneous Q8H    levothyroxine  50 mcg Per OG tube Before breakfast    losartan-hydrochlorothiazide 100-25 mg  1 tablet Per OG tube Daily    metoprolol succinate  100 mg Oral Daily    nicotine  1 patch Transdermal Daily    polyethylene glycol  17 g Oral Daily    senna-docusate 8.6-50 mg  1 tablet Oral BID    sodium chloride 0.9%  10 mL Intravenous Q6H     PRN Meds:acetaminophen, Dextrose 10% Bolus, Dextrose 10% Bolus, fentaNYL, glucagon (human recombinant), glucose, glucose, hydrALAZINE, labetalol, magnesium sulfate IVPB, magnesium sulfate IVPB, naloxone, ondansetron, oxyCODONE, potassium chloride in water **AND** potassium chloride in water **AND** potassium chloride in water, Flushing PICC Protocol **AND** sodium chloride 0.9% **AND** sodium chloride 0.9%, sodium phosphate IVPB, sodium phosphate IVPB, sodium phosphate IVPB     Review of  "Systems    Objective:     Weight: 79.4 kg (175 lb 0.7 oz)  Body mass index is 25.85 kg/m².  Vital Signs (Most Recent):  Temp: 96.1 °F (35.6 °C) (12/19/19 1102)  Pulse: 70 (12/19/19 1102)  Resp: 13 (12/19/19 1102)  BP: (!) 147/72 (12/19/19 1102)  SpO2: 100 % (12/19/19 1102) Vital Signs (24h Range):  Temp:  [96 °F (35.6 °C)-99 °F (37.2 °C)] 96.1 °F (35.6 °C)  Pulse:  [] 70  Resp:  [8-25] 13  SpO2:  [99 %-100 %] 100 %  BP: (102-185)/(55-86) 147/72  Arterial Line BP: (126-186)/(51-75) 161/67     Date 12/19/19 0700 - 12/20/19 0659   Shift 2223-3701 1336-7522 2094-7820 24 Hour Total   INTAKE   I.V.(mL/kg) 15(0.2)   15(0.2)   IV Piggyback 250   250   Shift Total(mL/kg) 265(3.3)   265(3.3)   OUTPUT   Urine(mL/kg/hr) 425   425   Shift Total(mL/kg) 425(5.4)   425(5.4)   Weight (kg) 79.4 79.4 79.4 79.4              Vent Mode: Spont  Oxygen Concentration (%):  [40] 40  Resp Rate Total:  [8.2 br/min-23 br/min] 16 br/min  PEEP/CPAP:  [5 cmH20] 5 cmH20  Pressure Support:  [5 cmH20-10 cmH20] 10 cmH20  Mean Airway Pressure:  [6.4 cmH20-9.9 cmH20] 7.6 cmH20         Closed/Suction Drain 12/10/19 1018 Anterior Neck Accordion 10 Fr. (Active)   Site Description Healing 12/11/2019  3:05 AM   Dressing Type No dressing 12/11/2019  3:05 AM   Drainage Serosanguineous 12/10/2019  6:30 PM   Status Clamped 12/11/2019  3:05 AM   Output (mL) 1500 mL 12/11/2019  6:05 AM            Urethral Catheter 12/10/19 0730 Non-latex 16 Fr. (Active)   Site Assessment Clean;Intact 12/11/2019  3:05 AM   Collection Container Urimeter 12/11/2019  3:05 AM   Securement Method secured to top of thigh w/ adhesive device 12/11/2019  3:05 AM   Catheter Care Performed yes 12/11/2019  3:05 AM   Reason for Continuing Urinary Catheterization Post operative 12/11/2019  3:05 AM   CAUTI Prevention Bundle StatLock in place w 1" slack;Intact seal between catheter & drainage tubing;Drainage bag/urimeter off the floor;Green sheeting clip in use;No dependent loops or " kinks;Drainage bag/urimeter not overfilled (<2/3 full);Drainage bag/urimeter below bladder 12/10/2019  7:05 PM   Output (mL) 75 mL 12/10/2019  3:30 PM            Lumbar Drain 12/10/19 1056 (Active)   Drain Status Other (Comment) 12/11/2019  3:05 AM   Level to other (see comment) 12/10/2019  2:32 PM   Dressing Status Clean;Dry;Intact 12/11/2019  3:05 AM   Interventions HOB degrees (see comment) 12/10/2019  2:32 PM   Output (mL) 2 mL 12/11/2019  4:05 AM       Neurosurgery Physical Exam  E4VTM6, FCx4, PERLLA, EOMI  Nods appropriately to questions    Significant Labs:  Recent Labs   Lab 12/17/19  1332 12/18/19  0227 12/19/19  0238   GLU  --  131* 129*   NA  --  134* 135*   K 3.6 4.6 3.9   CL  --  107 107   CO2  --  20* 21*   BUN  --  15 17   CREATININE  --  0.7 0.7   CALCIUM  --  8.1* 8.6*   MG 2.3 2.0 1.8     Recent Labs   Lab 12/18/19  1219 12/18/19 2015 12/19/19  0837   WBC 10.97 12.66 10.44   HGB 10.3* 10.1* 8.9*   HCT 30.5* 31.5* 27.5*   * 143* 126*     Recent Labs   Lab 12/17/19  1837 12/18/19  0227 12/19/19  0238   INR 1.1 1.1 1.1     Microbiology Results (last 7 days)     Procedure Component Value Units Date/Time    Blood culture [[id number]] Collected:  12/17/19 1505    Order Status:  Completed Specimen:  Blood from Line, Arterial, Right Updated:  12/18/19 1812     Blood Culture, Routine No Growth to date      No Growth to date    Blood culture [[id number]]     Order Status:  No result Specimen:  Blood     Blood culture [[id number]] Collected:  12/12/19 0147    Order Status:  Completed Specimen:  Blood from Wrist, Right Updated:  12/17/19 0612     Blood Culture, Routine No growth after 5 days.    Narrative:       Blood cultures from 2 different sites. 4 bottles total.  Please draw before starting antibiotics.    Blood culture [[id number]] Collected:  12/12/19 0213    Order Status:  Completed Specimen:  Blood from Peripheral, Antecubital, Right Updated:  12/17/19 0612     Blood Culture, Routine No growth  after 5 days.    Narrative:       Blood cultures x 2 different sites. 4 bottles total. Please  draw cultures before administering antibiotics.    Blood culture [[id number]] Collected:  12/11/19 1000    Order Status:  Completed Specimen:  Blood from Peripheral, Foot, Right Updated:  12/16/19 1212     Blood Culture, Routine No growth after 5 days.    Narrative:       Blood cultures from 2 different sites. 4 bottles total.  Please draw before starting antibiotics.    Blood culture [[id number]] Collected:  12/11/19 1005    Order Status:  Completed Specimen:  Blood from Peripheral, Forearm, Right Updated:  12/16/19 1212     Blood Culture, Routine No growth after 5 days.    Narrative:       Blood cultures x 2 different sites. 4 bottles total. Please  draw cultures before administering antibiotics.    Culture, Anaerobe [[id number]] Collected:  12/10/19 1038    Order Status:  Completed Specimen:  Body Fluid from Neck Updated:  12/16/19 0854     Anaerobic Culture No anaerobes isolated    Narrative:       2) Free vertebral abscess #2    Culture, Anaerobe [[id number]] Collected:  12/10/19 1038    Order Status:  Completed Specimen:  Body Fluid from Neck Updated:  12/16/19 0854     Anaerobic Culture No anaerobes isolated    Narrative:       1) Free vertebral abscess #1    CSF culture [[id number]] Collected:  12/11/19 1247    Order Status:  Completed Specimen:  CSF (Spinal Fluid) from CSF Tap, Tube 3 Updated:  12/16/19 0711     CSF CULTURE No Growth     Gram Stain Result Cytospin indicates:      Rare WBC's      No organisms seen    CSF culture [[id number]]  (Abnormal)  (Susceptibility) Collected:  12/10/19 1158    Order Status:  Completed Specimen:  CSF (Spinal Fluid) from CSF Tap, Tube 1 Updated:  12/13/19 1350     CSF CULTURE Culture has Staphylococcus.      Results called to and read back by:  Leonardo Cook RN 12/12/2019  10:36      STAPHYLOCOCCUS EPIDERMIDIS     Gram Stain Result No WBC's      No organisms seen    Narrative:       3) 3)  CSF for cell count, differential, culture, gramstain,  protein, and glucose    Aerobic culture [[id number]]  (Abnormal)  (Susceptibility) Collected:  12/10/19 1038    Order Status:  Completed Specimen:  Body Fluid from Neck Updated:  12/13/19 1240     Aerobic Bacterial Culture PSEUDOMONAS AERUGINOSA  Rare      Narrative:       1) Free vertebral abscess #1    Fungus culture [[id number]] Collected:  12/10/19 1038    Order Status:  Completed Specimen:  Body Fluid from Neck Updated:  12/13/19 1059     Fungus (Mycology) Culture Culture in progress    Narrative:       2) Free vertebral abscess #2    Fungus culture [[id number]] Collected:  12/10/19 1038    Order Status:  Completed Specimen:  Body Fluid from Neck Updated:  12/13/19 1059     Fungus (Mycology) Culture Culture in progress    Narrative:       1) Free vertebral abscess #1    Aerobic culture [[id number]]  (Abnormal)  (Susceptibility) Collected:  12/10/19 1038    Order Status:  Completed Specimen:  Body Fluid from Neck Updated:  12/12/19 1132     Aerobic Bacterial Culture PSEUDOMONAS AERUGINOSA  Few      Narrative:       2) Free vertebral abscess #2              Assessment/Plan:     * Spinal epidural abscess  74 y.o. male with PMH of HTN, HLD, Hepatitis C, and CAD s/p two stents on plavix who presents with C6-T2 osteomyelitis with suspected epidural abscess.  Now s/p C7/T1 corpectomies and C2-T2  posterior instrumented fusion 12/18    - Continue ICU care and neuro checks q1h  - Continue drains  - Continue antibiotic while drain in place  - Keep head of bed flat  - Wean to extubate  - Follow-up cultures: growing Pseudomonas.  Final antibiotics per ID team  - Likely will need long-term IV antibiotics and PICC line   - Further care per ICU team        Korey Umanzor MD  Neurosurgery  [facility]

## 2019-12-19 NOTE — SUBJECTIVE & OBJECTIVE
Interval History: NAEON. Pt still intubated this morning from procedure yesterday, to be extubated today.    Medications:  Continuous Infusions:   sodium chloride 0.9% 100 mL/hr at 12/19/19 1102     Scheduled Meds:   amLODIPine  5 mg Per OG tube Daily    atorvastatin  80 mg Per OG tube Daily    bacitracin   Topical (Top) TID    ceFEPime (MAXIPIME) IVPB  2 g Intravenous Q8H    chlorhexidine  15 mL Mouth/Throat BID    diazePAM  5 mg Intravenous Q8H    famotidine (PF)  20 mg Intravenous BID    heparin (porcine)  5,000 Units Subcutaneous Q8H    levothyroxine  50 mcg Per OG tube Before breakfast    losartan-hydrochlorothiazide 100-25 mg  1 tablet Per OG tube Daily    metoprolol succinate  100 mg Oral Daily    nicotine  1 patch Transdermal Daily    polyethylene glycol  17 g Oral Daily    senna-docusate 8.6-50 mg  1 tablet Oral BID    sodium chloride 0.9%  10 mL Intravenous Q6H     PRN Meds:acetaminophen, Dextrose 10% Bolus, Dextrose 10% Bolus, fentaNYL, glucagon (human recombinant), glucose, glucose, hydrALAZINE, labetalol, magnesium sulfate IVPB, magnesium sulfate IVPB, naloxone, ondansetron, oxyCODONE, potassium chloride in water **AND** potassium chloride in water **AND** potassium chloride in water, Flushing PICC Protocol **AND** sodium chloride 0.9% **AND** sodium chloride 0.9%, sodium phosphate IVPB, sodium phosphate IVPB, sodium phosphate IVPB     Review of Systems    Objective:     Weight: 79.4 kg (175 lb 0.7 oz)  Body mass index is 25.85 kg/m².  Vital Signs (Most Recent):  Temp: 96.1 °F (35.6 °C) (12/19/19 1102)  Pulse: 70 (12/19/19 1102)  Resp: 13 (12/19/19 1102)  BP: (!) 147/72 (12/19/19 1102)  SpO2: 100 % (12/19/19 1102) Vital Signs (24h Range):  Temp:  [96 °F (35.6 °C)-99 °F (37.2 °C)] 96.1 °F (35.6 °C)  Pulse:  [] 70  Resp:  [8-25] 13  SpO2:  [99 %-100 %] 100 %  BP: (102-185)/(55-86) 147/72  Arterial Line BP: (126-186)/(51-75) 161/67     Date 12/19/19 0700 - 12/20/19 0659   Shift  "4660-1211 2870-6857 3567-0891 24 Hour Total   INTAKE   I.V.(mL/kg) 15(0.2)   15(0.2)   IV Piggyback 250   250   Shift Total(mL/kg) 265(3.3)   265(3.3)   OUTPUT   Urine(mL/kg/hr) 425   425   Shift Total(mL/kg) 425(5.4)   425(5.4)   Weight (kg) 79.4 79.4 79.4 79.4              Vent Mode: Spont  Oxygen Concentration (%):  [40] 40  Resp Rate Total:  [8.2 br/min-23 br/min] 16 br/min  PEEP/CPAP:  [5 cmH20] 5 cmH20  Pressure Support:  [5 cmH20-10 cmH20] 10 cmH20  Mean Airway Pressure:  [6.4 cmH20-9.9 cmH20] 7.6 cmH20         Closed/Suction Drain 12/10/19 1018 Anterior Neck Accordion 10 Fr. (Active)   Site Description Healing 12/11/2019  3:05 AM   Dressing Type No dressing 12/11/2019  3:05 AM   Drainage Serosanguineous 12/10/2019  6:30 PM   Status Clamped 12/11/2019  3:05 AM   Output (mL) 1500 mL 12/11/2019  6:05 AM            Urethral Catheter 12/10/19 0730 Non-latex 16 Fr. (Active)   Site Assessment Clean;Intact 12/11/2019  3:05 AM   Collection Container Urimeter 12/11/2019  3:05 AM   Securement Method secured to top of thigh w/ adhesive device 12/11/2019  3:05 AM   Catheter Care Performed yes 12/11/2019  3:05 AM   Reason for Continuing Urinary Catheterization Post operative 12/11/2019  3:05 AM   CAUTI Prevention Bundle StatLock in place w 1" slack;Intact seal between catheter & drainage tubing;Drainage bag/urimeter off the floor;Green sheeting clip in use;No dependent loops or kinks;Drainage bag/urimeter not overfilled (<2/3 full);Drainage bag/urimeter below bladder 12/10/2019  7:05 PM   Output (mL) 75 mL 12/10/2019  3:30 PM            Lumbar Drain 12/10/19 1056 (Active)   Drain Status Other (Comment) 12/11/2019  3:05 AM   Level to other (see comment) 12/10/2019  2:32 PM   Dressing Status Clean;Dry;Intact 12/11/2019  3:05 AM   Interventions HOB degrees (see comment) 12/10/2019  2:32 PM   Output (mL) 2 mL 12/11/2019  4:05 AM       Neurosurgery Physical Exam  E4VTM6, FCx4, PERLLA, EOMI  Nods appropriately to " questions    Significant Labs:  Recent Labs   Lab 12/17/19  1332 12/18/19  0227 12/19/19  0238   GLU  --  131* 129*   NA  --  134* 135*   K 3.6 4.6 3.9   CL  --  107 107   CO2  --  20* 21*   BUN  --  15 17   CREATININE  --  0.7 0.7   CALCIUM  --  8.1* 8.6*   MG 2.3 2.0 1.8     Recent Labs   Lab 12/18/19  1219 12/18/19 2015 12/19/19  0837   WBC 10.97 12.66 10.44   HGB 10.3* 10.1* 8.9*   HCT 30.5* 31.5* 27.5*   * 143* 126*     Recent Labs   Lab 12/17/19  1837 12/18/19 0227 12/19/19 0238   INR 1.1 1.1 1.1     Microbiology Results (last 7 days)     Procedure Component Value Units Date/Time    Blood culture [[id number]] Collected:  12/17/19 1505    Order Status:  Completed Specimen:  Blood from Line, Arterial, Right Updated:  12/18/19 1812     Blood Culture, Routine No Growth to date      No Growth to date    Blood culture [[id number]]     Order Status:  No result Specimen:  Blood     Blood culture [[id number]] Collected:  12/12/19 0147    Order Status:  Completed Specimen:  Blood from Wrist, Right Updated:  12/17/19 0612     Blood Culture, Routine No growth after 5 days.    Narrative:       Blood cultures from 2 different sites. 4 bottles total.  Please draw before starting antibiotics.    Blood culture [[id number]] Collected:  12/12/19 0213    Order Status:  Completed Specimen:  Blood from Peripheral, Antecubital, Right Updated:  12/17/19 0612     Blood Culture, Routine No growth after 5 days.    Narrative:       Blood cultures x 2 different sites. 4 bottles total. Please  draw cultures before administering antibiotics.    Blood culture [[id number]] Collected:  12/11/19 1000    Order Status:  Completed Specimen:  Blood from Peripheral, Foot, Right Updated:  12/16/19 1212     Blood Culture, Routine No growth after 5 days.    Narrative:       Blood cultures from 2 different sites. 4 bottles total.  Please draw before starting antibiotics.    Blood culture [[id number]] Collected:  12/11/19 1005    Order Status:   Completed Specimen:  Blood from Peripheral, Forearm, Right Updated:  12/16/19 1212     Blood Culture, Routine No growth after 5 days.    Narrative:       Blood cultures x 2 different sites. 4 bottles total. Please  draw cultures before administering antibiotics.    Culture, Anaerobe [[id number]] Collected:  12/10/19 1038    Order Status:  Completed Specimen:  Body Fluid from Neck Updated:  12/16/19 0854     Anaerobic Culture No anaerobes isolated    Narrative:       2) Free vertebral abscess #2    Culture, Anaerobe [[id number]] Collected:  12/10/19 1038    Order Status:  Completed Specimen:  Body Fluid from Neck Updated:  12/16/19 0854     Anaerobic Culture No anaerobes isolated    Narrative:       1) Free vertebral abscess #1    CSF culture [[id number]] Collected:  12/11/19 1247    Order Status:  Completed Specimen:  CSF (Spinal Fluid) from CSF Tap, Tube 3 Updated:  12/16/19 0711     CSF CULTURE No Growth     Gram Stain Result Cytospin indicates:      Rare WBC's      No organisms seen    CSF culture [[id number]]  (Abnormal)  (Susceptibility) Collected:  12/10/19 1158    Order Status:  Completed Specimen:  CSF (Spinal Fluid) from CSF Tap, Tube 1 Updated:  12/13/19 1350     CSF CULTURE Culture has Staphylococcus.      Results called to and read back by:  Leonardo Cook RN 12/12/2019  10:36      STAPHYLOCOCCUS EPIDERMIDIS     Gram Stain Result No WBC's      No organisms seen    Narrative:       3) 3) CSF for cell count, differential, culture, gramstain,  protein, and glucose    Aerobic culture [[id number]]  (Abnormal)  (Susceptibility) Collected:  12/10/19 1038    Order Status:  Completed Specimen:  Body Fluid from Neck Updated:  12/13/19 1240     Aerobic Bacterial Culture PSEUDOMONAS AERUGINOSA  Rare      Narrative:       1) Free vertebral abscess #1    Fungus culture [[id number]] Collected:  12/10/19 1038    Order Status:  Completed Specimen:  Body Fluid from Neck Updated:  12/13/19 1059     Fungus (Mycology) Culture Culture in  progress    Narrative:       2) Free vertebral abscess #2    Fungus culture [[id number]] Collected:  12/10/19 1038    Order Status:  Completed Specimen:  Body Fluid from Neck Updated:  12/13/19 1059     Fungus (Mycology) Culture Culture in progress    Narrative:       1) Free vertebral abscess #1    Aerobic culture [[id number]]  (Abnormal)  (Susceptibility) Collected:  12/10/19 1038    Order Status:  Completed Specimen:  Body Fluid from Neck Updated:  12/12/19 1132     Aerobic Bacterial Culture PSEUDOMONAS AERUGINOSA  Few      Narrative:       2) Free vertebral abscess #2

## 2019-12-19 NOTE — ASSESSMENT & PLAN NOTE
74 y.o. male with PMH of HTN, HLD, Hepatitis C, and CAD s/p two stents on plavix who presents with C6-T2 osteomyelitis with suspected epidural abscess.  Now s/p C7/T1 corpectomies and C2-T2  posterior instrumented fusion 12/18    - Continue ICU care and neuro checks q1h  - Continue drains  - Continue antibiotic while drain in place  - Keep head of bed flat  - Wean to extubate  - Follow-up cultures: growing Pseudomonas.  Final antibiotics per ID team  - Likely will need long-term IV antibiotics and PICC line   - Further care per ICU team

## 2019-12-20 LAB
ALBUMIN SERPL BCP-MCNC: 2.4 G/DL (ref 3.5–5.2)
ALLENS TEST: ABNORMAL
ALP SERPL-CCNC: 266 U/L (ref 55–135)
ALT SERPL W/O P-5'-P-CCNC: 13 U/L (ref 10–44)
ANION GAP SERPL CALC-SCNC: 8 MMOL/L (ref 8–16)
AST SERPL-CCNC: 31 U/L (ref 10–40)
BASOPHILS # BLD AUTO: 0.01 K/UL (ref 0–0.2)
BASOPHILS # BLD AUTO: 0.01 K/UL (ref 0–0.2)
BASOPHILS NFR BLD: 0.1 % (ref 0–1.9)
BASOPHILS NFR BLD: 0.1 % (ref 0–1.9)
BILIRUB SERPL-MCNC: 0.7 MG/DL (ref 0.1–1)
BLD PROD TYP BPU: NORMAL
BLOOD UNIT EXPIRATION DATE: NORMAL
BLOOD UNIT TYPE CODE: 6200
BLOOD UNIT TYPE: NORMAL
BUN SERPL-MCNC: 19 MG/DL (ref 8–23)
CALCIUM SERPL-MCNC: 8.4 MG/DL (ref 8.7–10.5)
CHLORIDE SERPL-SCNC: 107 MMOL/L (ref 95–110)
CO2 SERPL-SCNC: 20 MMOL/L (ref 23–29)
CODING SYSTEM: NORMAL
CREAT SERPL-MCNC: 0.7 MG/DL (ref 0.5–1.4)
DELSYS: ABNORMAL
DIFFERENTIAL METHOD: ABNORMAL
DIFFERENTIAL METHOD: ABNORMAL
DISPENSE STATUS: NORMAL
EOSINOPHIL # BLD AUTO: 0 K/UL (ref 0–0.5)
EOSINOPHIL # BLD AUTO: 0 K/UL (ref 0–0.5)
EOSINOPHIL NFR BLD: 0.2 % (ref 0–8)
EOSINOPHIL NFR BLD: 0.4 % (ref 0–8)
ERYTHROCYTE [DISTWIDTH] IN BLOOD BY AUTOMATED COUNT: 16.5 % (ref 11.5–14.5)
ERYTHROCYTE [DISTWIDTH] IN BLOOD BY AUTOMATED COUNT: 16.5 % (ref 11.5–14.5)
ERYTHROCYTE [SEDIMENTATION RATE] IN BLOOD BY WESTERGREN METHOD: 16 MM/H
EST. GFR  (AFRICAN AMERICAN): >60 ML/MIN/1.73 M^2
EST. GFR  (NON AFRICAN AMERICAN): >60 ML/MIN/1.73 M^2
FIO2: 40
GLUCOSE SERPL-MCNC: 97 MG/DL (ref 70–110)
HCO3 UR-SCNC: 21.2 MMOL/L (ref 24–28)
HCT VFR BLD AUTO: 28.8 % (ref 40–54)
HCT VFR BLD AUTO: 30.7 % (ref 40–54)
HGB BLD-MCNC: 10.2 G/DL (ref 14–18)
HGB BLD-MCNC: 9.2 G/DL (ref 14–18)
IMM GRANULOCYTES # BLD AUTO: 0.05 K/UL (ref 0–0.04)
IMM GRANULOCYTES # BLD AUTO: 0.06 K/UL (ref 0–0.04)
IMM GRANULOCYTES NFR BLD AUTO: 0.5 % (ref 0–0.5)
IMM GRANULOCYTES NFR BLD AUTO: 0.6 % (ref 0–0.5)
INR PPP: 1.1 (ref 0.8–1.2)
LYMPHOCYTES # BLD AUTO: 0.9 K/UL (ref 1–4.8)
LYMPHOCYTES # BLD AUTO: 1.1 K/UL (ref 1–4.8)
LYMPHOCYTES NFR BLD: 11.3 % (ref 18–48)
LYMPHOCYTES NFR BLD: 8.4 % (ref 18–48)
MAGNESIUM SERPL-MCNC: 1.9 MG/DL (ref 1.6–2.6)
MCH RBC QN AUTO: 27.5 PG (ref 27–31)
MCH RBC QN AUTO: 28.7 PG (ref 27–31)
MCHC RBC AUTO-ENTMCNC: 31.9 G/DL (ref 32–36)
MCHC RBC AUTO-ENTMCNC: 33.2 G/DL (ref 32–36)
MCV RBC AUTO: 86 FL (ref 82–98)
MCV RBC AUTO: 86 FL (ref 82–98)
MODE: ABNORMAL
MONOCYTES # BLD AUTO: 0.9 K/UL (ref 0.3–1)
MONOCYTES # BLD AUTO: 1.1 K/UL (ref 0.3–1)
MONOCYTES NFR BLD: 10.2 % (ref 4–15)
MONOCYTES NFR BLD: 9.6 % (ref 4–15)
NEUTROPHILS # BLD AUTO: 7.2 K/UL (ref 1.8–7.7)
NEUTROPHILS # BLD AUTO: 8.5 K/UL (ref 1.8–7.7)
NEUTROPHILS NFR BLD: 78.1 % (ref 38–73)
NEUTROPHILS NFR BLD: 80.5 % (ref 38–73)
NRBC BLD-RTO: 0 /100 WBC
NRBC BLD-RTO: 0 /100 WBC
PCO2 BLDA: 30.3 MMHG (ref 35–45)
PEEP: 5
PH SMN: 7.45 [PH] (ref 7.35–7.45)
PHOSPHATE SERPL-MCNC: 2 MG/DL (ref 2.7–4.5)
PLATELET # BLD AUTO: 137 K/UL (ref 150–350)
PLATELET # BLD AUTO: 149 K/UL (ref 150–350)
PMV BLD AUTO: 9.5 FL (ref 9.2–12.9)
PMV BLD AUTO: 9.8 FL (ref 9.2–12.9)
PO2 BLDA: 196 MMHG (ref 80–100)
POC BE: -3 MMOL/L
POC SATURATED O2: 100 % (ref 95–100)
POC TCO2: 22 MMOL/L (ref 23–27)
POTASSIUM SERPL-SCNC: 3.4 MMOL/L (ref 3.5–5.1)
PROT SERPL-MCNC: 5.8 G/DL (ref 6–8.4)
PROTHROMBIN TIME: 11 SEC (ref 9–12.5)
PS: 7
RBC # BLD AUTO: 3.34 M/UL (ref 4.6–6.2)
RBC # BLD AUTO: 3.56 M/UL (ref 4.6–6.2)
SAMPLE: ABNORMAL
SITE: ABNORMAL
SODIUM SERPL-SCNC: 135 MMOL/L (ref 136–145)
SP02: 100
TRANS ERYTHROCYTES VOL PATIENT: NORMAL ML
WBC # BLD AUTO: 10.54 K/UL (ref 3.9–12.7)
WBC # BLD AUTO: 9.27 K/UL (ref 3.9–12.7)

## 2019-12-20 PROCEDURE — 85025 COMPLETE CBC W/AUTO DIFF WBC: CPT

## 2019-12-20 PROCEDURE — 63600175 PHARM REV CODE 636 W HCPCS: Performed by: PHYSICIAN ASSISTANT

## 2019-12-20 PROCEDURE — 27000221 HC OXYGEN, UP TO 24 HOURS

## 2019-12-20 PROCEDURE — 25000003 PHARM REV CODE 250: Performed by: NURSE PRACTITIONER

## 2019-12-20 PROCEDURE — 85610 PROTHROMBIN TIME: CPT

## 2019-12-20 PROCEDURE — 80053 COMPREHEN METABOLIC PANEL: CPT

## 2019-12-20 PROCEDURE — 99233 SBSQ HOSP IP/OBS HIGH 50: CPT | Mod: ,,, | Performed by: PSYCHIATRY & NEUROLOGY

## 2019-12-20 PROCEDURE — 99900026 HC AIRWAY MAINTENANCE (STAT)

## 2019-12-20 PROCEDURE — 37799 UNLISTED PX VASCULAR SURGERY: CPT

## 2019-12-20 PROCEDURE — 99900035 HC TECH TIME PER 15 MIN (STAT)

## 2019-12-20 PROCEDURE — 83735 ASSAY OF MAGNESIUM: CPT

## 2019-12-20 PROCEDURE — 94003 VENT MGMT INPAT SUBQ DAY: CPT

## 2019-12-20 PROCEDURE — 25000003 PHARM REV CODE 250: Performed by: ANESTHESIOLOGY

## 2019-12-20 PROCEDURE — 84100 ASSAY OF PHOSPHORUS: CPT

## 2019-12-20 PROCEDURE — S4991 NICOTINE PATCH NONLEGEND: HCPCS | Performed by: NURSE PRACTITIONER

## 2019-12-20 PROCEDURE — 20000000 HC ICU ROOM

## 2019-12-20 PROCEDURE — A4216 STERILE WATER/SALINE, 10 ML: HCPCS | Performed by: ANESTHESIOLOGY

## 2019-12-20 PROCEDURE — S0028 INJECTION, FAMOTIDINE, 20 MG: HCPCS | Performed by: PHYSICIAN ASSISTANT

## 2019-12-20 PROCEDURE — 63600175 PHARM REV CODE 636 W HCPCS: Performed by: PSYCHIATRY & NEUROLOGY

## 2019-12-20 PROCEDURE — 25000003 PHARM REV CODE 250: Performed by: PHYSICIAN ASSISTANT

## 2019-12-20 PROCEDURE — 63600175 PHARM REV CODE 636 W HCPCS: Performed by: NURSE PRACTITIONER

## 2019-12-20 PROCEDURE — 82803 BLOOD GASES ANY COMBINATION: CPT

## 2019-12-20 PROCEDURE — 63600175 PHARM REV CODE 636 W HCPCS: Performed by: UROLOGY

## 2019-12-20 PROCEDURE — 25000003 PHARM REV CODE 250: Performed by: PSYCHIATRY & NEUROLOGY

## 2019-12-20 PROCEDURE — 94761 N-INVAS EAR/PLS OXIMETRY MLT: CPT

## 2019-12-20 PROCEDURE — 82800 BLOOD PH: CPT

## 2019-12-20 PROCEDURE — 99233 PR SUBSEQUENT HOSPITAL CARE,LEVL III: ICD-10-PCS | Mod: ,,, | Performed by: PSYCHIATRY & NEUROLOGY

## 2019-12-20 RX ORDER — MODAFINIL 100 MG/1
100 TABLET ORAL DAILY
Status: DISCONTINUED | OUTPATIENT
Start: 2019-12-20 | End: 2019-12-21

## 2019-12-20 RX ORDER — NICARDIPINE HYDROCHLORIDE 0.2 MG/ML
2.5 INJECTION INTRAVENOUS CONTINUOUS
Status: DISCONTINUED | OUTPATIENT
Start: 2019-12-20 | End: 2019-12-25

## 2019-12-20 RX ORDER — DIAZEPAM 5 MG/1
5 TABLET ORAL EVERY 12 HOURS
Status: DISCONTINUED | OUTPATIENT
Start: 2019-12-20 | End: 2019-12-21

## 2019-12-20 RX ADMIN — HYDRALAZINE HYDROCHLORIDE 10 MG: 20 INJECTION INTRAMUSCULAR; INTRAVENOUS at 07:12

## 2019-12-20 RX ADMIN — FAMOTIDINE 20 MG: 10 INJECTION, SOLUTION INTRAVENOUS at 08:12

## 2019-12-20 RX ADMIN — SENNOSIDES AND DOCUSATE SODIUM 1 TABLET: 8.6; 5 TABLET ORAL at 08:12

## 2019-12-20 RX ADMIN — SODIUM PHOSPHATE, MONOBASIC, MONOHYDRATE 20.01 MMOL: 276; 142 INJECTION, SOLUTION INTRAVENOUS at 07:12

## 2019-12-20 RX ADMIN — CEFEPIME 2 G: 2 INJECTION, POWDER, FOR SOLUTION INTRAVENOUS at 02:12

## 2019-12-20 RX ADMIN — LABETALOL HCL IV SOLN PREFILLED SYRINGE 20 MG/4ML (5 MG/ML) 10 MG: 20/4 SOLUTION PREFILLED SYRINGE at 03:12

## 2019-12-20 RX ADMIN — HEPARIN SODIUM 5000 UNITS: 5000 INJECTION, SOLUTION INTRAVENOUS; SUBCUTANEOUS at 09:12

## 2019-12-20 RX ADMIN — Medication 10 ML: at 11:12

## 2019-12-20 RX ADMIN — CHLORHEXIDINE GLUCONATE 0.12% ORAL RINSE 15 ML: 1.2 LIQUID ORAL at 08:12

## 2019-12-20 RX ADMIN — SODIUM CHLORIDE: 0.9 INJECTION, SOLUTION INTRAVENOUS at 09:12

## 2019-12-20 RX ADMIN — AMLODIPINE BESYLATE 5 MG: 5 TABLET ORAL at 08:12

## 2019-12-20 RX ADMIN — Medication 10 ML: at 12:12

## 2019-12-20 RX ADMIN — POTASSIUM CHLORIDE 60 MEQ: 14.9 INJECTION, SOLUTION INTRAVENOUS at 05:12

## 2019-12-20 RX ADMIN — DIAZEPAM 5 MG: 5 TABLET ORAL at 05:12

## 2019-12-20 RX ADMIN — Medication 1 PATCH: at 08:12

## 2019-12-20 RX ADMIN — Medication: at 08:12

## 2019-12-20 RX ADMIN — Medication 10 ML: at 05:12

## 2019-12-20 RX ADMIN — ATORVASTATIN CALCIUM 80 MG: 20 TABLET, FILM COATED ORAL at 08:12

## 2019-12-20 RX ADMIN — LABETALOL HCL IV SOLN PREFILLED SYRINGE 20 MG/4ML (5 MG/ML) 10 MG: 20/4 SOLUTION PREFILLED SYRINGE at 05:12

## 2019-12-20 RX ADMIN — METOPROLOL TARTRATE 50 MG: 50 TABLET ORAL at 08:12

## 2019-12-20 RX ADMIN — NICARDIPINE HYDROCHLORIDE 5 MG/HR: 0.2 INJECTION, SOLUTION INTRAVENOUS at 11:12

## 2019-12-20 RX ADMIN — FENTANYL CITRATE 50 MCG: 50 INJECTION INTRAMUSCULAR; INTRAVENOUS at 04:12

## 2019-12-20 RX ADMIN — MODAFINIL 100 MG: 100 TABLET ORAL at 11:12

## 2019-12-20 RX ADMIN — LABETALOL HCL IV SOLN PREFILLED SYRINGE 20 MG/4ML (5 MG/ML) 10 MG: 20/4 SOLUTION PREFILLED SYRINGE at 01:12

## 2019-12-20 RX ADMIN — Medication: at 02:12

## 2019-12-20 RX ADMIN — DIAZEPAM 5 MG: 5 INJECTION, SOLUTION INTRAMUSCULAR; INTRAVENOUS at 05:12

## 2019-12-20 RX ADMIN — HEPARIN SODIUM 5000 UNITS: 5000 INJECTION, SOLUTION INTRAVENOUS; SUBCUTANEOUS at 05:12

## 2019-12-20 RX ADMIN — LOSARTAN POTASSIUM AND HYDROCHLOROTHIAZIDE 1 TABLET: 100; 25 TABLET, FILM COATED ORAL at 08:12

## 2019-12-20 RX ADMIN — HEPARIN SODIUM 5000 UNITS: 5000 INJECTION, SOLUTION INTRAVENOUS; SUBCUTANEOUS at 01:12

## 2019-12-20 RX ADMIN — CEFEPIME 2 G: 2 INJECTION, POWDER, FOR SOLUTION INTRAVENOUS at 09:12

## 2019-12-20 RX ADMIN — Medication: at 09:12

## 2019-12-20 RX ADMIN — LEVOTHYROXINE SODIUM 50 MCG: 50 TABLET ORAL at 05:12

## 2019-12-20 RX ADMIN — POLYETHYLENE GLYCOL 3350 17 G: 17 POWDER, FOR SOLUTION ORAL at 08:12

## 2019-12-20 RX ADMIN — FENTANYL CITRATE 50 MCG: 50 INJECTION INTRAMUSCULAR; INTRAVENOUS at 11:12

## 2019-12-20 RX ADMIN — CEFEPIME 2 G: 2 INJECTION, POWDER, FOR SOLUTION INTRAVENOUS at 05:12

## 2019-12-20 RX ADMIN — NICARDIPINE HYDROCHLORIDE 2.5 MG/HR: 0.2 INJECTION, SOLUTION INTRAVENOUS at 09:12

## 2019-12-20 RX ADMIN — FENTANYL CITRATE 50 MCG: 50 INJECTION INTRAMUSCULAR; INTRAVENOUS at 07:12

## 2019-12-20 NOTE — ASSESSMENT & PLAN NOTE
C3-T3 osteomyelitis with epidural abscess s/p C7-T1 corpectomy & C6-T2 anterior fusion (12/6 stage 1) with tissue cultures +pseudomonas  -ID following, appreciate recommendations : cefepime 2g IV q8h for 6 weeks (tentative end date 1/28/20)  -Patient with WBC increasing despite abx, ID reconsulted and recommended repeat blood cultures which are pending   -POD 1 C2-T3 posterior instrumentation (12/17 stage 2)   -ID signed off (12/19)

## 2019-12-20 NOTE — PLAN OF CARE
POC reviewed with pt, wife, and son throughout shift. Pt intubated, unable to verbalize understanding. Questions and concerns addressed. No acute events today. Pt progressing toward goals. Will continue to monitor. See flowsheets for full assessment and VS info.     -pt following commands  -attempted SBT, unsuccessful  -removed howell, placed external condom catheter  -drains to remain intact until pt is extubated per Dr. Deluca  -NS @ 100

## 2019-12-20 NOTE — PROGRESS NOTES
[facility]  Neurocritical Care  Progress Note    Admit Date: 12/6/2019  Service Date: 12/20/2019  Length of Stay: 14    Subjective:     Chief Complaint: Spinal epidural abscess    History of Present Illness: Junaid Muñoz is a 74 year old male with CAD, HTN, HLD, hypothyroidism, tobacco use, and prior history of IVDU who was found to have C6-T3 osteomyelitis with epidural abscess. Patient was neurologically intact and hemodynamically stable on admission.  Now immediately status post phase one of a two part surgery.  Today he had a C7-T1 corpectomy and C6-T2 anterior fusion.  Lumbar drain was placed secondary to intra op CSF leak, and patient admitted to [facility] for post op monitoring and drain maintenance.     Hospital Course: 12/10 admitted post-op C7-T1 corpectomy and C6-T2 anterior fusion with lumbar drain placed 2/2 CSF leak intra-op  12/12  No significant events over night, per nsgy will keep lumbar drain another day. 2nd stage of sugery to occur next Tuesday. Remains NPO while having to lie flat with  lumbar drain. Getting confused after receiving valium decreased dose.  12/13: Restless o/n, c/o back pain. LD dropped to floor by NSGY, 8-10cc/hr. HV drain in place as well. Neurologically stable. AF, no leukocytosis, H/H stable.   12/14: calm at present, slight agitation overnight, can elevate head for swallow trials and po today,pending picc placement for long term abx  12/15: less responsive this am, wean scheduled ativan, begin to advance diet and taper ivfs  12/16: NAEO. Stage II surgery tomorrow for posterior cervical fusion. Leukocytosis increasing, is on cefepime for pseudomonas in CSF, will reach out to ID to see if any further abx pre-op should be given. Lumbar drain in place. Increase normal saline to 100 cc/hr. NPO after midnight.   12/17: OR today for stage II posterior cervical instrumentation. Required 3 units PRBCs and 2 units FFP. 2 hemovacs in place to gravity with large  output. Will repeat CBC. Patient still intubated on arrival to ICU with cervical and facial edema, will start decadron. ID recommended repeating blood cultures as patient's WBC increasing. Continue cefepime.   12/18: POD 1 C2-T3 posterior instrumentation. Patient is still intubated, will wean to extubate. Facial and cervical swelling greatly improved. Lumbar drain in place. Hemovac x 2. Hemodynamically stable.  12/19/2019 subQ heparin,d/c howell, NSGY brace for neck, DAPT, HOB restrictions    12/20/2019 HOB clarified with NSGY keep flat, neuro status drowsy, labetolol 2x overnight, hydralazine not effective, respiratory, weaning paramaters on SIMV, wean to extubate, d/c protime INR             Interval History:    Neuro exam drowsy   Wean to extubate, weaning parameters   DAPT query NSGy when to resume   NSGY query for HOB restrictions for HOB   2 episodes of HTN overnight treated with labetalol   HOB keep flat per NSGY   Review of Systems   Unable to perform ROS: Intubated     Objective:     Vitals:  Temp: 98.1 °F (36.7 °C)  Pulse: 70  Rhythm: normal sinus rhythm  BP: (!) 142/71  MAP (mmHg): 101  Resp: 16  SpO2: 100 %  Oxygen Concentration (%): 40  O2 Device (Oxygen Therapy): ventilator  Vent Mode: SIMV  Set Rate: 16 bmp  Pressure Support: 7 cmH20  PEEP/CPAP: 5 cmH20  Peak Airway Pressure: 18 cmH2O  Mean Airway Pressure: 8.6 cmH20  Plateau Pressure: 0 cmH20    Temp  Min: 96.1 °F (35.6 °C)  Max: 98.1 °F (36.7 °C)  Pulse  Min: 70  Max: 106  BP  Min: 130/65  Max: 193/84  MAP (mmHg)  Min: 92  Max: 127  Resp  Min: 13  Max: 24  SpO2  Min: 98 %  Max: 100 %  Oxygen Concentration (%)  Min: 40  Max: 40    12/19 0701 - 12/20 0700  In: 3370 [I.V.:2420]  Out: 1848 [Urine:1775; Drains:73]   Unmeasured Output  Urine Occurrence: 1  Stool Occurrence: 0       Physical Exam   Constitutional: He appears well-developed and well-nourished. He is intubated.   HENT:   Head: Normocephalic and atraumatic.   Cardiovascular: Normal rate and  regular rhythm.   Pulmonary/Chest: He is intubated.   Abdominal: Soft. Normal appearance.   Neurological:   Drowsy, follows command intermittently   E3V(T1)M5 GCS (8)   PERRLA  EOMi   Cough, gag, and corneals   Drowsy   Lumbar drain in place   OSHEA spontaneously   Sensation intact          Medications:  Continuous  sodium chloride 0.9% Last Rate: 100 mL/hr at 12/20/19 0957   Scheduled  amLODIPine 5 mg Daily   atorvastatin 80 mg Daily   bacitracin  TID   ceFEPime (MAXIPIME) IVPB 2 g Q8H   chlorhexidine 15 mL BID   diazePAM 5 mg Q8H   famotidine (PF) 20 mg BID   heparin (porcine) 5,000 Units Q8H   levothyroxine 50 mcg Before breakfast   losartan-hydrochlorothiazide 100-25 mg 1 tablet Daily   metoprolol tartrate 50 mg BID   nicotine 1 patch Daily   polyethylene glycol 17 g Daily   senna-docusate 8.6-50 mg 1 tablet BID   sodium chloride 0.9% 10 mL Q6H   PRN  acetaminophen 500 mg Q8H PRN   Dextrose 10% Bolus 12.5 g PRN   Dextrose 10% Bolus 25 g PRN   fentaNYL 50 mcg Q2H PRN   glucagon (human recombinant) 1 mg PRN   glucose 16 g PRN   glucose 24 g PRN   hydrALAZINE 10 mg Q4H PRN   labetalol 10 mg Q4H PRN   magnesium sulfate IVPB 2 g PRN   magnesium sulfate IVPB 4 g PRN   naloxone 0.4 mg PRN   ondansetron 8 mg Q8H PRN   oxyCODONE 10 mg Q8H PRN   potassium chloride in water 40 mEq PRN   And     potassium chloride in water 60 mEq PRN   And     potassium chloride in water 80 mEq PRN   sodium chloride 0.9% 10 mL PRN   sodium phosphate IVPB 15 mmol PRN   sodium phosphate IVPB 20.01 mmol PRN   sodium phosphate IVPB 30 mmol PRN     Today I personally reviewed pertinent medications, lines/drains/airways, imaging, cardiology results, laboratory results, microbiology results, notably:    Diet  Diet NPO  Diet NPO        Assessment/Plan:     Pulmonary  On mechanically assisted ventilation  ABG  HOB restriction to remain flat per NSGY   Weaning to extubation slowly patient drowsy   On modafonil   Vent Mode: SIMV  Oxygen Concentration  (%):  [40] 40  Resp Rate Total:  [6.6 br/min-21 br/min] 14 br/min  PEEP/CPAP:  [5 cmH20] 5 cmH20  Pressure Support:  [7 cmH20] 7 cmH20  Mean Airway Pressure:  [6.4 cmH20-10 cmH20] 8.6 cmH20      Cardiac/Vascular  Hyperlipidemia  Atorvastatin 80 mg daily  Monitor LFTs    Essential hypertension  SBP < 180  Amlodipine 5 mg daily  Losartan-HCTZ 100 mg - 25 mg  Metoprolol XL 50 mg BID       ID  * Spinal epidural abscess  C6-T3 osteomyelitis with epidural abscess    12/10: C7-T1 corpectomy with C6-T2 anterior fusion and lumbar drain placement   Cultures: CSF +staph epidermidis, likely contaminant                    Neck abscess +pseudomonas    12/14: vanc d/c'd, on cefepime 2g q8h for 6 week duration of therapy  -ID consulted for increasing WBC while on cefepime, recommended repeat blood cultures and repeat surgical cultures    12/17: OR with neurosurgery for stage II, posterior instrumentation C2-T3  -lumbar drain in place, drain 10cc/hr  -valium, oxycodone, and morphine prns   -decadron 4mg q6h x 24h for cervical and facial edema-- greatly improved   -transfused 3 units PRBCs and 2 units FFP intraoperatively, repeat CBC and transfuse <7-- hemodynamically stable  -intubated, WTE  - when OOB    12/19:   ID following   Hold any additional ABx unless fever spike or decompensates, consider vanc   Continue current ABX, cefepime 2g for pseudomonas 6wk course   ID signed off     Pseudomonas infection  C3-T3 osteomyelitis with epidural abscess s/p C7-T1 corpectomy & C6-T2 anterior fusion (12/6 stage 1) with tissue cultures +pseudomonas  -ID following, appreciate recommendations : cefepime 2g IV q8h for 6 weeks (tentative end date 1/28/20)  -Patient with WBC increasing despite abx, ID reconsulted and recommended repeat blood cultures which are pending   -POD 1 C2-T3 posterior instrumentation (12/17 stage 2)   -ID signed off (12/19)         Acute osteomyelitis of thoracic spine  See spinal epidural abscess  Lumber drain in  place   On ABx while drain in place       Acute osteomyelitis of cervical spine  See spinal epidural abscess      Oncology  Leukocytosis  See pseudomonas infection/epidural abscess/osteomyelitis     Endocrine  Other specified hypothyroidism  Levothyroxine 50 mcg daily    GI  Chronic hepatitis C without hepatic coma  Monitor LFTs    Other  Tobacco abuse  Nicotine patch           The patient is being Prophylaxed for:  Venous Thromboembolism with: Mechanical or Chemical  Stress Ulcer with: H2B  Ventilator Pneumonia with: chlorhexidine oral care    Activity Orders          Diet NPO: NPO starting at 12/17 0001        Full Code   I have spent 35 min with this patient, with over 50% of this time spent coordinating care and speaking with the family      Lamont Lira PA-C  Neurocritical Care  [facility]

## 2019-12-20 NOTE — SUBJECTIVE & OBJECTIVE
Interval History:    Review of Systems   Unable to perform ROS: Intubated     Objective:     Vitals:  Temp: 97.8 °F (36.6 °C)  Pulse: 91  Rhythm: normal sinus rhythm  BP: (!) 180/76  MAP (mmHg): 105  Resp: 16  SpO2: 100 %  Oxygen Concentration (%): 40  O2 Device (Oxygen Therapy): ventilator  Vent Mode: SIMV  Set Rate: 16 bmp  Pressure Support: 7 cmH20  PEEP/CPAP: 5 cmH20  Peak Airway Pressure: 18 cmH2O  Mean Airway Pressure: 9.2 cmH20  Plateau Pressure: 0 cmH20    Temp  Min: 96 °F (35.6 °C)  Max: 99 °F (37.2 °C)  Pulse  Min: 70  Max: 103  BP  Min: 102/55  Max: 185/86  MAP (mmHg)  Min: 75  Max: 123  Resp  Min: 8  Max: 25  SpO2  Min: 99 %  Max: 100 %  Oxygen Concentration (%)  Min: 40  Max: 40    12/18 0701 - 12/19 0700  In: 2510 [I.V.:2400]  Out: 2689 [Urine:2555; Drains:134]   Unmeasured Output  Urine Occurrence: 1  Stool Occurrence: 0       Physical Exam   Constitutional: He appears well-developed and well-nourished. He is intubated.   Cardiovascular: Normal rate and regular rhythm.   Pulmonary/Chest: He is intubated.   Abdominal: Soft. Normal appearance.   Neurological:   Intubated and alert, follow commands   E4V(T1)M6 GCS (10)  PERRLA   EOMi   Cough, Gag, and Corneals intact   In Cervical Collar with drains in place   OSHEA spontaneously and against gravity               Medications:  Continuous  sodium chloride 0.9% Last Rate: 100 mL/hr at 12/19/19 1802   Scheduled  amLODIPine 5 mg Daily   atorvastatin 80 mg Daily   bacitracin  TID   ceFEPime (MAXIPIME) IVPB 2 g Q8H   chlorhexidine 15 mL BID   diazePAM 5 mg Q8H   famotidine (PF) 20 mg BID   heparin (porcine) 5,000 Units Q8H   levothyroxine 50 mcg Before breakfast   losartan-hydrochlorothiazide 100-25 mg 1 tablet Daily   metoprolol succinate 100 mg Daily   nicotine 1 patch Daily   [START ON 12/20/2019] polyethylene glycol 17 g Daily   senna-docusate 8.6-50 mg 1 tablet BID   sodium chloride 0.9% 10 mL Q6H   PRN  acetaminophen 500 mg Q8H PRN   Dextrose 10% Bolus  12.5 g PRN   Dextrose 10% Bolus 25 g PRN   fentaNYL 50 mcg Q2H PRN   glucagon (human recombinant) 1 mg PRN   glucose 16 g PRN   glucose 24 g PRN   hydrALAZINE 10 mg Q4H PRN   labetalol 10 mg Q4H PRN   magnesium sulfate IVPB 2 g PRN   magnesium sulfate IVPB 4 g PRN   naloxone 0.4 mg PRN   ondansetron 8 mg Q8H PRN   oxyCODONE 10 mg Q8H PRN   potassium chloride in water 40 mEq PRN   And     potassium chloride in water 60 mEq PRN   And     potassium chloride in water 80 mEq PRN   sodium chloride 0.9% 10 mL PRN   sodium phosphate IVPB 15 mmol PRN   sodium phosphate IVPB 20.01 mmol PRN   sodium phosphate IVPB 30 mmol PRN     Today I personally reviewed pertinent medications, lines/drains/airways, imaging, cardiology results, laboratory results, microbiology results, notably:    Diet  Diet NPO  Diet NPO

## 2019-12-20 NOTE — PLAN OF CARE
POC reviewed with pt at 0500. Pt unable to verbalize understanding. No change in exam overnight. Night bath given. Currently replacing potassium and phos   Questions and concerns addressed. No acute events overnight. Pt progressing toward goals. Will continue to monitor. See flowsheets for full assessment and VS info

## 2019-12-20 NOTE — ASSESSMENT & PLAN NOTE
C6-T3 osteomyelitis with epidural abscess    12/10: C7-T1 corpectomy with C6-T2 anterior fusion and lumbar drain placement   Cultures: CSF +staph epidermidis, likely contaminant                    Neck abscess +pseudomonas    12/14: vanc d/c'd, on cefepime 2g q8h for 6 week duration of therapy  -ID consulted for increasing WBC while on cefepime, recommended repeat blood cultures and repeat surgical cultures    12/17: OR with neurosurgery for stage II, posterior instrumentation C2-T3  -lumbar drain in place, drain 10cc/hr  -valium, oxycodone, and morphine prns   -decadron 4mg q6h x 24h for cervical and facial edema-- greatly improved   -transfused 3 units PRBCs and 2 units FFP intraoperatively, repeat CBC and transfuse <7-- hemodynamically stable  -intubated, WTE  - when OOB    12/19:   ID following   Hold any additional ABx unless fever spike or decompensates, consider vanc   Continue current ABX, cefepime 2g for pseudomonas 6wk course   ID signed off

## 2019-12-20 NOTE — SUBJECTIVE & OBJECTIVE
Interval History: NAEON. Pt still intubated this morning from procedure yesterday, to be extubated today.    Medications:  Continuous Infusions:   sodium chloride 0.9% 100 mL/hr at 12/20/19 1305     Scheduled Meds:   amLODIPine  5 mg Per OG tube Daily    atorvastatin  80 mg Per OG tube Daily    bacitracin   Topical (Top) TID    ceFEPime (MAXIPIME) IVPB  2 g Intravenous Q8H    chlorhexidine  15 mL Mouth/Throat BID    diazePAM  5 mg Per NG tube Q12H    famotidine (PF)  20 mg Intravenous BID    heparin (porcine)  5,000 Units Subcutaneous Q8H    levothyroxine  50 mcg Per OG tube Before breakfast    losartan-hydrochlorothiazide 100-25 mg  1 tablet Per OG tube Daily    metoprolol tartrate  50 mg Per OG tube BID    modafinil  100 mg Per NG tube Daily    nicotine  1 patch Transdermal Daily    polyethylene glycol  17 g Per NG tube Daily    senna-docusate 8.6-50 mg  1 tablet Per OG tube BID    sodium chloride 0.9%  10 mL Intravenous Q6H     PRN Meds:acetaminophen, Dextrose 10% Bolus, Dextrose 10% Bolus, fentaNYL, glucagon (human recombinant), glucose, glucose, hydrALAZINE, labetalol, magnesium sulfate IVPB, magnesium sulfate IVPB, naloxone, ondansetron, oxyCODONE, potassium chloride in water **AND** potassium chloride in water **AND** potassium chloride in water, Flushing PICC Protocol **AND** sodium chloride 0.9% **AND** sodium chloride 0.9%, sodium phosphate IVPB, sodium phosphate IVPB, sodium phosphate IVPB     Review of Systems    Objective:     Weight: 79.4 kg (175 lb 0.7 oz)  Body mass index is 25.85 kg/m².  Vital Signs (Most Recent):  Temp: 97.9 °F (36.6 °C) (12/20/19 1105)  Pulse: 80 (12/20/19 1305)  Resp: (!) 22 (12/20/19 1305)  BP: (!) 194/93 (12/20/19 1305)  SpO2: 100 % (12/20/19 1305) Vital Signs (24h Range):  Temp:  [97.7 °F (36.5 °C)-98.1 °F (36.7 °C)] 97.9 °F (36.6 °C)  Pulse:  [] 80  Resp:  [15-24] 22  SpO2:  [98 %-100 %] 100 %  BP: (130-194)/(65-93) 194/93  Arterial Line BP:  "(124-193)/(50-75) 174/63     Date 12/20/19 0700 - 12/21/19 0659   Shift 5219-6060 6978-8996 6294-6126 24 Hour Total   INTAKE   I.V.(mL/kg) 708.3(8.9)   708.3(8.9)   IV Piggyback 749.9   749.9   Shift Total(mL/kg) 1458.2(18.4)   1458.2(18.4)   OUTPUT   Urine(mL/kg/hr) 1310   1310   Drains 12   12   Shift Total(mL/kg) 1322(16.7)   1322(16.7)   Weight (kg) 79.4 79.4 79.4 79.4              Vent Mode: SIMV  Oxygen Concentration (%):  [40] 40  Resp Rate Total:  [13 br/min-21 br/min] 20 br/min  PEEP/CPAP:  [5 cmH20] 5 cmH20  Pressure Support:  [7 cmH20] 7 cmH20  Mean Airway Pressure:  [8.3 cmH20-10 cmH20] 8.3 cmH20         Closed/Suction Drain 12/10/19 1018 Anterior Neck Accordion 10 Fr. (Active)   Site Description Healing 12/11/2019  3:05 AM   Dressing Type No dressing 12/11/2019  3:05 AM   Drainage Serosanguineous 12/10/2019  6:30 PM   Status Clamped 12/11/2019  3:05 AM   Output (mL) 1500 mL 12/11/2019  6:05 AM            Urethral Catheter 12/10/19 0730 Non-latex 16 Fr. (Active)   Site Assessment Clean;Intact 12/11/2019  3:05 AM   Collection Container Urimeter 12/11/2019  3:05 AM   Securement Method secured to top of thigh w/ adhesive device 12/11/2019  3:05 AM   Catheter Care Performed yes 12/11/2019  3:05 AM   Reason for Continuing Urinary Catheterization Post operative 12/11/2019  3:05 AM   CAUTI Prevention Bundle StatLock in place w 1" slack;Intact seal between catheter & drainage tubing;Drainage bag/urimeter off the floor;Green sheeting clip in use;No dependent loops or kinks;Drainage bag/urimeter not overfilled (<2/3 full);Drainage bag/urimeter below bladder 12/10/2019  7:05 PM   Output (mL) 75 mL 12/10/2019  3:30 PM            Lumbar Drain 12/10/19 1056 (Active)   Drain Status Other (Comment) 12/11/2019  3:05 AM   Level to other (see comment) 12/10/2019  2:32 PM   Dressing Status Clean;Dry;Intact 12/11/2019  3:05 AM   Interventions HOB degrees (see comment) 12/10/2019  2:32 PM   Output (mL) 2 mL 12/11/2019  4:05 " AM       Neurosurgery Physical Exam  E4VTM6, FCx4, PERLLA, EOMI  Nods appropriately to questions    Significant Labs:  Recent Labs   Lab 12/19/19  0238 12/20/19  0306   * 97   * 135*   K 3.9 3.4*    107   CO2 21* 20*   BUN 17 19   CREATININE 0.7 0.7   CALCIUM 8.6* 8.4*   MG 1.8 1.9     Recent Labs   Lab 12/19/19  0837 12/19/19  1930 12/20/19  0826   WBC 10.44 14.36* 10.54   HGB 8.9* 10.5* 9.2*   HCT 27.5* 33.0* 28.8*   * 214 137*     Recent Labs   Lab 12/19/19  0238 12/20/19  0306   INR 1.1 1.1     Microbiology Results (last 7 days)     Procedure Component Value Units Date/Time    Blood culture [[id number]] Collected:  12/17/19 1505    Order Status:  Completed Specimen:  Blood from Line, Arterial, Right Updated:  12/19/19 1812     Blood Culture, Routine No Growth to date      No Growth to date      No Growth to date    Blood culture [[id number]]     Order Status:  No result Specimen:  Blood     Blood culture [[id number]] Collected:  12/12/19 0147    Order Status:  Completed Specimen:  Blood from Wrist, Right Updated:  12/17/19 0612     Blood Culture, Routine No growth after 5 days.    Narrative:       Blood cultures from 2 different sites. 4 bottles total.  Please draw before starting antibiotics.    Blood culture [[id number]] Collected:  12/12/19 0213    Order Status:  Completed Specimen:  Blood from Peripheral, Antecubital, Right Updated:  12/17/19 0612     Blood Culture, Routine No growth after 5 days.    Narrative:       Blood cultures x 2 different sites. 4 bottles total. Please  draw cultures before administering antibiotics.    Blood culture [[id number]] Collected:  12/11/19 1000    Order Status:  Completed Specimen:  Blood from Peripheral, Foot, Right Updated:  12/16/19 1212     Blood Culture, Routine No growth after 5 days.    Narrative:       Blood cultures from 2 different sites. 4 bottles total.  Please draw before starting antibiotics.    Blood culture [[id number]] Collected:   12/11/19 1005    Order Status:  Completed Specimen:  Blood from Peripheral, Forearm, Right Updated:  12/16/19 1212     Blood Culture, Routine No growth after 5 days.    Narrative:       Blood cultures x 2 different sites. 4 bottles total. Please  draw cultures before administering antibiotics.    Culture, Anaerobe [[id number]] Collected:  12/10/19 1038    Order Status:  Completed Specimen:  Body Fluid from Neck Updated:  12/16/19 0854     Anaerobic Culture No anaerobes isolated    Narrative:       2) Free vertebral abscess #2    Culture, Anaerobe [[id number]] Collected:  12/10/19 1038    Order Status:  Completed Specimen:  Body Fluid from Neck Updated:  12/16/19 0854     Anaerobic Culture No anaerobes isolated    Narrative:       1) Free vertebral abscess #1    CSF culture [[id number]] Collected:  12/11/19 1247    Order Status:  Completed Specimen:  CSF (Spinal Fluid) from CSF Tap, Tube 3 Updated:  12/16/19 0711     CSF CULTURE No Growth     Gram Stain Result Cytospin indicates:      Rare WBC's      No organisms seen    CSF culture [[id number]]  (Abnormal)  (Susceptibility) Collected:  12/10/19 1158    Order Status:  Completed Specimen:  CSF (Spinal Fluid) from CSF Tap, Tube 1 Updated:  12/13/19 1350     CSF CULTURE Culture has Staphylococcus.      Results called to and read back by:  Leonardo Cook RN 12/12/2019  10:36      STAPHYLOCOCCUS EPIDERMIDIS     Gram Stain Result No WBC's      No organisms seen    Narrative:       3) 3) CSF for cell count, differential, culture, gramstain,  protein, and glucose

## 2019-12-20 NOTE — PLAN OF CARE
POC reviewed with pt and family at 1400. Pt unable to verbalized understanding d/t intubated, but nodded head in understanding.  K and Phos replaced.  Attempted to get parameters for extubation, but pt unable to follow instructions. Questions and concerns addressed. No acute events today. Pt progressing toward goals. Will continue to monitor. See flowsheets for full assessment and VS info.

## 2019-12-20 NOTE — PROGRESS NOTES
[facility]  Neurosurgery  Progress Note    Subjective:     History of Present Illness: Junaid Mñuoz is a 74 y.o. male with PMH of HTN, HLD, Hepatitis C, and CAD s/p two stents on plavix who presents with 1 month history of back pain between his shoulders. MRI at OSH demonstrates likely epidural abscess. Pt denies hx of trauma and reports slow onset of symptoms.     Post-Op Info:  Procedure(s) (LRB):  FUSION, SPINE, CERVICAL, POSTERIOR APPROACH C2-T3 posterior instrumented fusion (N/A)   3 Days Post-Op     Interval History: NAEON. Pt still intubated this morning from procedure yesterday, to be extubated today.    Medications:  Continuous Infusions:   sodium chloride 0.9% 100 mL/hr at 12/20/19 1305     Scheduled Meds:   amLODIPine  5 mg Per OG tube Daily    atorvastatin  80 mg Per OG tube Daily    bacitracin   Topical (Top) TID    ceFEPime (MAXIPIME) IVPB  2 g Intravenous Q8H    chlorhexidine  15 mL Mouth/Throat BID    diazePAM  5 mg Per NG tube Q12H    famotidine (PF)  20 mg Intravenous BID    heparin (porcine)  5,000 Units Subcutaneous Q8H    levothyroxine  50 mcg Per OG tube Before breakfast    losartan-hydrochlorothiazide 100-25 mg  1 tablet Per OG tube Daily    metoprolol tartrate  50 mg Per OG tube BID    modafinil  100 mg Per NG tube Daily    nicotine  1 patch Transdermal Daily    polyethylene glycol  17 g Per NG tube Daily    senna-docusate 8.6-50 mg  1 tablet Per OG tube BID    sodium chloride 0.9%  10 mL Intravenous Q6H     PRN Meds:acetaminophen, Dextrose 10% Bolus, Dextrose 10% Bolus, fentaNYL, glucagon (human recombinant), glucose, glucose, hydrALAZINE, labetalol, magnesium sulfate IVPB, magnesium sulfate IVPB, naloxone, ondansetron, oxyCODONE, potassium chloride in water **AND** potassium chloride in water **AND** potassium chloride in water, Flushing PICC Protocol **AND** sodium chloride 0.9% **AND** sodium chloride 0.9%, sodium phosphate IVPB, sodium phosphate  "IVPB, sodium phosphate IVPB     Review of Systems    Objective:     Weight: 79.4 kg (175 lb 0.7 oz)  Body mass index is 25.85 kg/m².  Vital Signs (Most Recent):  Temp: 97.9 °F (36.6 °C) (12/20/19 1105)  Pulse: 80 (12/20/19 1305)  Resp: (!) 22 (12/20/19 1305)  BP: (!) 194/93 (12/20/19 1305)  SpO2: 100 % (12/20/19 1305) Vital Signs (24h Range):  Temp:  [97.7 °F (36.5 °C)-98.1 °F (36.7 °C)] 97.9 °F (36.6 °C)  Pulse:  [] 80  Resp:  [15-24] 22  SpO2:  [98 %-100 %] 100 %  BP: (130-194)/(65-93) 194/93  Arterial Line BP: (124-193)/(50-75) 174/63     Date 12/20/19 0700 - 12/21/19 0659   Shift 1593-3642 9707-9678 7026-4693 24 Hour Total   INTAKE   I.V.(mL/kg) 708.3(8.9)   708.3(8.9)   IV Piggyback 749.9   749.9   Shift Total(mL/kg) 1458.2(18.4)   1458.2(18.4)   OUTPUT   Urine(mL/kg/hr) 1310   1310   Drains 12   12   Shift Total(mL/kg) 1322(16.7)   1322(16.7)   Weight (kg) 79.4 79.4 79.4 79.4              Vent Mode: SIMV  Oxygen Concentration (%):  [40] 40  Resp Rate Total:  [13 br/min-21 br/min] 20 br/min  PEEP/CPAP:  [5 cmH20] 5 cmH20  Pressure Support:  [7 cmH20] 7 cmH20  Mean Airway Pressure:  [8.3 cmH20-10 cmH20] 8.3 cmH20         Closed/Suction Drain 12/10/19 1018 Anterior Neck Accordion 10 Fr. (Active)   Site Description Healing 12/11/2019  3:05 AM   Dressing Type No dressing 12/11/2019  3:05 AM   Drainage Serosanguineous 12/10/2019  6:30 PM   Status Clamped 12/11/2019  3:05 AM   Output (mL) 1500 mL 12/11/2019  6:05 AM            Urethral Catheter 12/10/19 0730 Non-latex 16 Fr. (Active)   Site Assessment Clean;Intact 12/11/2019  3:05 AM   Collection Container Urimeter 12/11/2019  3:05 AM   Securement Method secured to top of thigh w/ adhesive device 12/11/2019  3:05 AM   Catheter Care Performed yes 12/11/2019  3:05 AM   Reason for Continuing Urinary Catheterization Post operative 12/11/2019  3:05 AM   CAUTI Prevention Bundle StatLock in place w 1" slack;Intact seal between catheter & drainage tubing;Drainage " bag/urimeter off the floor;Green sheeting clip in use;No dependent loops or kinks;Drainage bag/urimeter not overfilled (<2/3 full);Drainage bag/urimeter below bladder 12/10/2019  7:05 PM   Output (mL) 75 mL 12/10/2019  3:30 PM            Lumbar Drain 12/10/19 1056 (Active)   Drain Status Other (Comment) 12/11/2019  3:05 AM   Level to other (see comment) 12/10/2019  2:32 PM   Dressing Status Clean;Dry;Intact 12/11/2019  3:05 AM   Interventions HOB degrees (see comment) 12/10/2019  2:32 PM   Output (mL) 2 mL 12/11/2019  4:05 AM       Neurosurgery Physical Exam  E4VTM6, FCx4, PERLLA, EOMI  Nods appropriately to questions    Significant Labs:  Recent Labs   Lab 12/19/19  0238 12/20/19  0306   * 97   * 135*   K 3.9 3.4*    107   CO2 21* 20*   BUN 17 19   CREATININE 0.7 0.7   CALCIUM 8.6* 8.4*   MG 1.8 1.9     Recent Labs   Lab 12/19/19  0837 12/19/19  1930 12/20/19  0826   WBC 10.44 14.36* 10.54   HGB 8.9* 10.5* 9.2*   HCT 27.5* 33.0* 28.8*   * 214 137*     Recent Labs   Lab 12/19/19  0238 12/20/19  0306   INR 1.1 1.1     Microbiology Results (last 7 days)     Procedure Component Value Units Date/Time    Blood culture [[id number]] Collected:  12/17/19 1505    Order Status:  Completed Specimen:  Blood from Line, Arterial, Right Updated:  12/19/19 1812     Blood Culture, Routine No Growth to date      No Growth to date      No Growth to date    Blood culture [[id number]]     Order Status:  No result Specimen:  Blood     Blood culture [[id number]] Collected:  12/12/19 0147    Order Status:  Completed Specimen:  Blood from Wrist, Right Updated:  12/17/19 0612     Blood Culture, Routine No growth after 5 days.    Narrative:       Blood cultures from 2 different sites. 4 bottles total.  Please draw before starting antibiotics.    Blood culture [[id number]] Collected:  12/12/19 0213    Order Status:  Completed Specimen:  Blood from Peripheral, Antecubital, Right Updated:  12/17/19 0612     Blood Culture,  Routine No growth after 5 days.    Narrative:       Blood cultures x 2 different sites. 4 bottles total. Please  draw cultures before administering antibiotics.    Blood culture [[id number]] Collected:  12/11/19 1000    Order Status:  Completed Specimen:  Blood from Peripheral, Foot, Right Updated:  12/16/19 1212     Blood Culture, Routine No growth after 5 days.    Narrative:       Blood cultures from 2 different sites. 4 bottles total.  Please draw before starting antibiotics.    Blood culture [[id number]] Collected:  12/11/19 1005    Order Status:  Completed Specimen:  Blood from Peripheral, Forearm, Right Updated:  12/16/19 1212     Blood Culture, Routine No growth after 5 days.    Narrative:       Blood cultures x 2 different sites. 4 bottles total. Please  draw cultures before administering antibiotics.    Culture, Anaerobe [[id number]] Collected:  12/10/19 1038    Order Status:  Completed Specimen:  Body Fluid from Neck Updated:  12/16/19 0854     Anaerobic Culture No anaerobes isolated    Narrative:       2) Free vertebral abscess #2    Culture, Anaerobe [[id number]] Collected:  12/10/19 1038    Order Status:  Completed Specimen:  Body Fluid from Neck Updated:  12/16/19 0854     Anaerobic Culture No anaerobes isolated    Narrative:       1) Free vertebral abscess #1    CSF culture [[id number]] Collected:  12/11/19 1247    Order Status:  Completed Specimen:  CSF (Spinal Fluid) from CSF Tap, Tube 3 Updated:  12/16/19 0711     CSF CULTURE No Growth     Gram Stain Result Cytospin indicates:      Rare WBC's      No organisms seen    CSF culture [[id number]]  (Abnormal)  (Susceptibility) Collected:  12/10/19 1158    Order Status:  Completed Specimen:  CSF (Spinal Fluid) from CSF Tap, Tube 1 Updated:  12/13/19 1350     CSF CULTURE Culture has Staphylococcus.      Results called to and read back by:  Leonardo Cook RN 12/12/2019  10:36      STAPHYLOCOCCUS EPIDERMIDIS     Gram Stain Result No WBC's      No organisms seen     Narrative:       3) 3) CSF for cell count, differential, culture, gramstain,  protein, and glucose              Assessment/Plan:     * Spinal epidural abscess  74 y.o. male with PMH of HTN, HLD, Hepatitis C, and CAD s/p two stents on plavix who presents with C6-T2 osteomyelitis with suspected epidural abscess.  Now s/p C7/T1 corpectomies and C2-T2  posterior instrumented fusion 12/18    - Continue ICU care and neuro checks q1h  - Continue drains  - Continue antibiotic while drain in place  - Keep head of bed flat  - Wean to extubate  - Follow-up cultures: growing Pseudomonas.  Final antibiotics per ID team  - Likely will need long-term IV antibiotics and PICC line   - Further care per ICU team        Paul Castellanos MD  Neurosurgery  [facility]

## 2019-12-20 NOTE — PROGRESS NOTES
"[facility]  Adult Nutrition  Progress Note    SUMMARY       Recommendations    Recommendation/Intervention:   1. As medically able, recommend starting TF.  Isosource 1.5 @ goal rate 50mL/hr.   - Initiate @ 10mL/hr and increase by 10mL q4hrs, or as tolerated, until goal rate is reached.   - Provides 1800kcals, 82g protein and 917mL free water.     2. If able to extubate and advance diet, recommend Regular with texture per SLP recommendations.     RD to monitor.    Goals: Pt to continue meeting EEN and EPN by RD follow-up  Nutrition Goal Status: goal not met  Communication of RD Recs: other (comment)(POC)    Reason for Assessment    Reason For Assessment: RD follow-up  Diagnosis: other (see comments)(Spinal epidural abscess)  Relevant Medical History: HTN, THC use, nicotine use, HLD, hypothyroidism, CAD, losartan  Interdisciplinary Rounds: attended  General Information Comments: Pt s/p fusion, now intubated. No TF started at this time. Previously tolerating 50% of meals. NFPE completed 12/16 noting mild fat/muscle depletion but does not meet criteria for malnutrition due to fair PO intake and stable wt.   Nutrition Discharge Planning: unable to determine at this time    Nutrition Risk Screen    Nutrition Risk Screen: dysphagia or difficulty swallowing, tube feeding or parenteral nutrition    Nutrition/Diet History    Spiritual, Cultural Beliefs, Rastafarian Practices, Values that Affect Care: no  Factors Affecting Nutritional Intake: NPO, on mechanical ventilation    Anthropometrics    Temp: 98.1 °F (36.7 °C)  Height: 5' 9" (175.3 cm)  Height (inches): 69 in  Weight Method: Bed Scale  Weight: 79.4 kg (175 lb 0.7 oz)  Weight (lb): 175.05 lb  Ideal Body Weight (IBW), Male: 160 lb  % Ideal Body Weight, Male (lb): 109.38 %  BMI (Calculated): 25.8  BMI Grade: 25 - 29.9 - overweight       Lab/Procedures/Meds    Pertinent Labs Reviewed: reviewed  Pertinent Labs Comments: noted  Pertinent Medications " Reviewed: reviewed  Pertinent Medications Comments: heparin, IVF    Estimated/Assessed Needs    Weight Used For Calorie Calculations: 79.4 kg (175 lb 0.7 oz)  Energy Calorie Requirements (kcal): 1849  Energy Need Method: [place]  Protein Requirements: 80-95g(1.0-1.2g/kg)  Weight Used For Protein Calculations: 79.4 kg (175 lb 0.7 oz)  Fluid Requirements (mL): 1 ml/kcal or per MD     RDA Method (mL): 1849         Nutrition Prescription Ordered    Current Diet Order: NPO    Evaluation of Received Nutrient/Fluid Intake    IV Fluid (mL): 2400    % Intake of Estimated Energy Needs: 0 - 25 %  % Meal Intake: NPO    Nutrition Risk    Level of Risk/Frequency of Follow-up: high(f/u 2x/week)     Assessment and Plan    Nutrition Problem  Inadequate energy intake    Related to (etiology):   NPO    Signs and Symptoms (as evidenced by):   Pt receiving <85% EEN and EPN.     Interventions(treatment strategy):  Collaboration of care with providers    Nutrition Diagnosis Status:   New         Monitor and Evaluation    Food and Nutrient Intake: energy intake, food and beverage intake, enteral nutrition intake  Food and Nutrient Adminstration: diet order, enteral and parenteral nutrition administration  Knowledge/Beliefs/Attitudes: food and nutrition knowledge/skill  Anthropometric Measurements: weight change, weight, body mass index  Biochemical Data, Medical Tests and Procedures: gastrointestinal profile, glucose/endocrine profile, electrolyte and renal panel, inflammatory profile, lipid profile  Nutrition-Focused Physical Findings: overall appearance     Malnutrition Assessment  Energy Intake: moderate energy intake              Orbital Region (Subcutaneous Fat Loss): mild depletion  Upper Arm Region (Subcutaneous Fat Loss): mild depletion   [place] Region (Muscle Loss): mild depletion  Clavicle Bone Region (Muscle Loss): well nourished  Clavicle and Acromion Bone Region (Muscle Loss): well nourished  Dorsal Hand (Muscle Loss): mild  depletion                 Nutrition Follow-Up    RD Follow-up?: Yes

## 2019-12-20 NOTE — ASSESSMENT & PLAN NOTE
ABG  HOB restriction to remain flat per NSGY   Weaning to extubation slowly patient drowsy   On modafonil   Vent Mode: SIMV  Oxygen Concentration (%):  [40] 40  Resp Rate Total:  [6.6 br/min-21 br/min] 14 br/min  PEEP/CPAP:  [5 cmH20] 5 cmH20  Pressure Support:  [7 cmH20] 7 cmH20  Mean Airway Pressure:  [6.4 cmH20-10 cmH20] 8.6 cmH20

## 2019-12-20 NOTE — PROGRESS NOTES
[facility]  Neurocritical Care  Progress Note    Admit Date: 12/6/2019  Service Date: 12/19/2019  Length of Stay: 13    Subjective:     Chief Complaint: Spinal epidural abscess    History of Present Illness: Junaid Muñoz is a 74 year old male with CAD, HTN, HLD, hypothyroidism, tobacco use, and prior history of IVDU who was found to have C6-T3 osteomyelitis with epidural abscess. Patient was neurologically intact and hemodynamically stable on admission.  Now immediately status post phase one of a two part surgery.  Today he had a C7-T1 corpectomy and C6-T2 anterior fusion.  Lumbar drain was placed secondary to intra op CSF leak, and patient admitted to [facility] for post op monitoring and drain maintenance.     Hospital Course: 12/10 admitted post-op C7-T1 corpectomy and C6-T2 anterior fusion with lumbar drain placed 2/2 CSF leak intra-op  12/12  No significant events over night, per nsgy will keep lumbar drain another day. 2nd stage of sugery to occur next Tuesday. Remains NPO while having to lie flat with  lumbar drain. Getting confused after receiving valium decreased dose.  12/13: Restless o/n, c/o back pain. LD dropped to floor by NSGY, 8-10cc/hr. HV drain in place as well. Neurologically stable. AF, no leukocytosis, H/H stable.   12/14: calm at present, slight agitation overnight, can elevate head for swallow trials and po today,pending picc placement for long term abx  12/15: less responsive this am, wean scheduled ativan, begin to advance diet and taper ivfs  12/16: NAEO. Stage II surgery tomorrow for posterior cervical fusion. Leukocytosis increasing, is on cefepime for pseudomonas in CSF, will reach out to ID to see if any further abx pre-op should be given. Lumbar drain in place. Increase normal saline to 100 cc/hr. NPO after midnight.   12/17: OR today for stage II posterior cervical instrumentation. Required 3 units PRBCs and 2 units FFP. 2 hemovacs in place to gravity with large  output. Will repeat CBC. Patient still intubated on arrival to ICU with cervical and facial edema, will start decadron. ID recommended repeating blood cultures as patient's WBC increasing. Continue cefepime.   12/18: POD 1 C2-T3 posterior instrumentation. Patient is still intubated, will wean to extubate. Facial and cervical swelling greatly improved. Lumbar drain in place. Hemovac x 2. Hemodynamically stable.  12/19/2019 subQ heparin,d/c howell, NSGY brace for neck, DAPT, HOB restrictions          Interval History:    Review of Systems   Unable to perform ROS: Intubated     Objective:     Vitals:  Temp: 97.8 °F (36.6 °C)  Pulse: 91  Rhythm: normal sinus rhythm  BP: (!) 180/76  MAP (mmHg): 105  Resp: 16  SpO2: 100 %  Oxygen Concentration (%): 40  O2 Device (Oxygen Therapy): ventilator  Vent Mode: SIMV  Set Rate: 16 bmp  Pressure Support: 7 cmH20  PEEP/CPAP: 5 cmH20  Peak Airway Pressure: 18 cmH2O  Mean Airway Pressure: 9.2 cmH20  Plateau Pressure: 0 cmH20    Temp  Min: 96 °F (35.6 °C)  Max: 99 °F (37.2 °C)  Pulse  Min: 70  Max: 103  BP  Min: 102/55  Max: 185/86  MAP (mmHg)  Min: 75  Max: 123  Resp  Min: 8  Max: 25  SpO2  Min: 99 %  Max: 100 %  Oxygen Concentration (%)  Min: 40  Max: 40    12/18 0701 - 12/19 0700  In: 2510 [I.V.:2400]  Out: 2689 [Urine:2555; Drains:134]   Unmeasured Output  Urine Occurrence: 1  Stool Occurrence: 0       Physical Exam   Constitutional: He appears well-developed and well-nourished. He is intubated.   Cardiovascular: Normal rate and regular rhythm.   Pulmonary/Chest: He is intubated.   Abdominal: Soft. Normal appearance.   Neurological:   Intubated and alert, follow commands   E4V(T1)M6 GCS (10)  PERRLA   EOMi   Cough, Gag, and Corneals intact   In Cervical Collar with drains in place   OSHEA spontaneously and against gravity               Medications:  Continuous  sodium chloride 0.9% Last Rate: 100 mL/hr at 12/19/19 1802   Scheduled  amLODIPine 5 mg Daily   atorvastatin 80 mg Daily    bacitracin  TID   ceFEPime (MAXIPIME) IVPB 2 g Q8H   chlorhexidine 15 mL BID   diazePAM 5 mg Q8H   famotidine (PF) 20 mg BID   heparin (porcine) 5,000 Units Q8H   levothyroxine 50 mcg Before breakfast   losartan-hydrochlorothiazide 100-25 mg 1 tablet Daily   metoprolol succinate 100 mg Daily   nicotine 1 patch Daily   [START ON 12/20/2019] polyethylene glycol 17 g Daily   senna-docusate 8.6-50 mg 1 tablet BID   sodium chloride 0.9% 10 mL Q6H   PRN  acetaminophen 500 mg Q8H PRN   Dextrose 10% Bolus 12.5 g PRN   Dextrose 10% Bolus 25 g PRN   fentaNYL 50 mcg Q2H PRN   glucagon (human recombinant) 1 mg PRN   glucose 16 g PRN   glucose 24 g PRN   hydrALAZINE 10 mg Q4H PRN   labetalol 10 mg Q4H PRN   magnesium sulfate IVPB 2 g PRN   magnesium sulfate IVPB 4 g PRN   naloxone 0.4 mg PRN   ondansetron 8 mg Q8H PRN   oxyCODONE 10 mg Q8H PRN   potassium chloride in water 40 mEq PRN   And     potassium chloride in water 60 mEq PRN   And     potassium chloride in water 80 mEq PRN   sodium chloride 0.9% 10 mL PRN   sodium phosphate IVPB 15 mmol PRN   sodium phosphate IVPB 20.01 mmol PRN   sodium phosphate IVPB 30 mmol PRN     Today I personally reviewed pertinent medications, lines/drains/airways, imaging, cardiology results, laboratory results, microbiology results, notably:    Diet  Diet NPO  Diet NPO        Assessment/Plan:     Cardiac/Vascular  Hyperlipidemia  Atorvastatin 80 mg daily  Monitor LFTs    Essential hypertension  SBP < 180  Amlodipine 5 mg daily  Losartan-HCTZ 100 mg - 25 mg  Metoprolol XL 50 mg BID       ID  * Spinal epidural abscess  C6-T3 osteomyelitis with epidural abscess    12/10: C7-T1 corpectomy with C6-T2 anterior fusion and lumbar drain placement   Cultures: CSF +staph epidermidis, likely contaminant                    Neck abscess +pseudomonas    12/14: vanc d/c'd, on cefepime 2g q8h for 6 week duration of therapy  -ID consulted for increasing WBC while on cefepime, recommended repeat blood cultures  and repeat surgical cultures    12/17: OR with neurosurgery for stage II, posterior instrumentation C2-T3  -lumbar drain in place, drain 10cc/hr  -valium, oxycodone, and morphine prns   -decadron 4mg q6h x 24h for cervical and facial edema-- greatly improved   -transfused 3 units PRBCs and 2 units FFP intraoperatively, repeat CBC and transfuse <7-- hemodynamically stable  -intubated, WTE  - when OOB    12/19:   ID following   Hold any additional ABx unless fever spike or decompensates, consider vanc   Continue current ABX, cefepime 2g for pseudomonas 6wk course   ID signed off     Pseudomonas infection  C3-T3 osteomyelitis with epidural abscess s/p C7-T1 corpectomy & C6-T2 anterior fusion (12/6 stage 1) with tissue cultures +pseudomonas  -ID following, appreciate recommendations : cefepime 2g IV q8h for 6 weeks (tentative end date 1/28/20)  -Patient with WBC increasing despite abx, ID reconsulted and recommended repeat blood cultures which are pending   -POD 1 C2-T3 posterior instrumentation (12/17 stage 2)   -ID signed off (12/19)         Acute osteomyelitis of cervical spine  See spinal epidural abscess      Oncology  Leukocytosis  See pseudomonas infection/epidural abscess/osteomyelitis     Endocrine  Other specified hypothyroidism  Levothyroxine 50 mcg daily    GI  Chronic hepatitis C without hepatic coma  Monitor LFTs    Other  Tobacco abuse  Nicotine patch           The patient is being Prophylaxed for:  Venous Thromboembolism with: Mechanical or Chemical  Stress Ulcer with: H2B  Ventilator Pneumonia with: chlorhexidine oral care    Activity Orders          Diet NPO: NPO starting at 12/17 0001        Full Code  I have spent 35 min with this patient, with over 50% of this time spent coordinating care and speaking with the family    Lamont Lira PA-C  Neurocritical Care  [facility]

## 2019-12-20 NOTE — SUBJECTIVE & OBJECTIVE
Interval History:    Neuro exam drowsy   Wean to extubate, weaning parameters   DAPT query NSGy when to resume   NSGY query for HOB restrictions for HOB   2 episodes of HTN overnight treated with labetalol   HOB keep flat per NSGY   Review of Systems   Unable to perform ROS: Intubated     Objective:     Vitals:  Temp: 98.1 °F (36.7 °C)  Pulse: 70  Rhythm: normal sinus rhythm  BP: (!) 142/71  MAP (mmHg): 101  Resp: 16  SpO2: 100 %  Oxygen Concentration (%): 40  O2 Device (Oxygen Therapy): ventilator  Vent Mode: SIMV  Set Rate: 16 bmp  Pressure Support: 7 cmH20  PEEP/CPAP: 5 cmH20  Peak Airway Pressure: 18 cmH2O  Mean Airway Pressure: 8.6 cmH20  Plateau Pressure: 0 cmH20    Temp  Min: 96.1 °F (35.6 °C)  Max: 98.1 °F (36.7 °C)  Pulse  Min: 70  Max: 106  BP  Min: 130/65  Max: 193/84  MAP (mmHg)  Min: 92  Max: 127  Resp  Min: 13  Max: 24  SpO2  Min: 98 %  Max: 100 %  Oxygen Concentration (%)  Min: 40  Max: 40    12/19 0701 - 12/20 0700  In: 3370 [I.V.:2420]  Out: 1848 [Urine:1775; Drains:73]   Unmeasured Output  Urine Occurrence: 1  Stool Occurrence: 0       Physical Exam   Constitutional: He appears well-developed and well-nourished. He is intubated.   HENT:   Head: Normocephalic and atraumatic.   Cardiovascular: Normal rate and regular rhythm.   Pulmonary/Chest: He is intubated.   Abdominal: Soft. Normal appearance.   Neurological:   Drowsy, follows command intermittently   E3V(T1)M5 GCS (8)   PERRLA  EOMi   Cough, gag, and corneals   Drowsy   Lumbar drain in place   OSHEA spontaneously   Sensation intact          Medications:  Continuous  sodium chloride 0.9% Last Rate: 100 mL/hr at 12/20/19 0957   Scheduled  amLODIPine 5 mg Daily   atorvastatin 80 mg Daily   bacitracin  TID   ceFEPime (MAXIPIME) IVPB 2 g Q8H   chlorhexidine 15 mL BID   diazePAM 5 mg Q8H   famotidine (PF) 20 mg BID   heparin (porcine) 5,000 Units Q8H   levothyroxine 50 mcg Before breakfast   losartan-hydrochlorothiazide 100-25 mg 1 tablet Daily    metoprolol tartrate 50 mg BID   nicotine 1 patch Daily   polyethylene glycol 17 g Daily   senna-docusate 8.6-50 mg 1 tablet BID   sodium chloride 0.9% 10 mL Q6H   PRN  acetaminophen 500 mg Q8H PRN   Dextrose 10% Bolus 12.5 g PRN   Dextrose 10% Bolus 25 g PRN   fentaNYL 50 mcg Q2H PRN   glucagon (human recombinant) 1 mg PRN   glucose 16 g PRN   glucose 24 g PRN   hydrALAZINE 10 mg Q4H PRN   labetalol 10 mg Q4H PRN   magnesium sulfate IVPB 2 g PRN   magnesium sulfate IVPB 4 g PRN   naloxone 0.4 mg PRN   ondansetron 8 mg Q8H PRN   oxyCODONE 10 mg Q8H PRN   potassium chloride in water 40 mEq PRN   And     potassium chloride in water 60 mEq PRN   And     potassium chloride in water 80 mEq PRN   sodium chloride 0.9% 10 mL PRN   sodium phosphate IVPB 15 mmol PRN   sodium phosphate IVPB 20.01 mmol PRN   sodium phosphate IVPB 30 mmol PRN     Today I personally reviewed pertinent medications, lines/drains/airways, imaging, cardiology results, laboratory results, microbiology results, notably:    Diet  Diet NPO  Diet NPO

## 2019-12-21 LAB
ALBUMIN SERPL BCP-MCNC: 2.3 G/DL (ref 3.5–5.2)
ALLENS TEST: ABNORMAL
ALP SERPL-CCNC: 334 U/L (ref 55–135)
ALT SERPL W/O P-5'-P-CCNC: 14 U/L (ref 10–44)
ANION GAP SERPL CALC-SCNC: 7 MMOL/L (ref 8–16)
AST SERPL-CCNC: 29 U/L (ref 10–40)
BASOPHILS # BLD AUTO: 0.02 K/UL (ref 0–0.2)
BASOPHILS # BLD AUTO: 0.02 K/UL (ref 0–0.2)
BASOPHILS NFR BLD: 0.2 % (ref 0–1.9)
BASOPHILS NFR BLD: 0.2 % (ref 0–1.9)
BILIRUB SERPL-MCNC: 0.8 MG/DL (ref 0.1–1)
BUN SERPL-MCNC: 13 MG/DL (ref 8–23)
CALCIUM SERPL-MCNC: 8.3 MG/DL (ref 8.7–10.5)
CHLORIDE SERPL-SCNC: 106 MMOL/L (ref 95–110)
CO2 SERPL-SCNC: 20 MMOL/L (ref 23–29)
CREAT SERPL-MCNC: 0.7 MG/DL (ref 0.5–1.4)
DIFFERENTIAL METHOD: ABNORMAL
DIFFERENTIAL METHOD: ABNORMAL
EOSINOPHIL # BLD AUTO: 0.1 K/UL (ref 0–0.5)
EOSINOPHIL # BLD AUTO: 0.1 K/UL (ref 0–0.5)
EOSINOPHIL NFR BLD: 0.5 % (ref 0–8)
EOSINOPHIL NFR BLD: 1 % (ref 0–8)
ERYTHROCYTE [DISTWIDTH] IN BLOOD BY AUTOMATED COUNT: 16.3 % (ref 11.5–14.5)
ERYTHROCYTE [DISTWIDTH] IN BLOOD BY AUTOMATED COUNT: 16.4 % (ref 11.5–14.5)
EST. GFR  (AFRICAN AMERICAN): >60 ML/MIN/1.73 M^2
EST. GFR  (NON AFRICAN AMERICAN): >60 ML/MIN/1.73 M^2
GLUCOSE SERPL-MCNC: 104 MG/DL (ref 70–110)
HCO3 UR-SCNC: 20.8 MMOL/L (ref 24–28)
HCT VFR BLD AUTO: 30.6 % (ref 40–54)
HCT VFR BLD AUTO: 31.9 % (ref 40–54)
HGB BLD-MCNC: 10 G/DL (ref 14–18)
HGB BLD-MCNC: 10 G/DL (ref 14–18)
HIV1+2 IGG SERPL QL IA.RAPID: NEGATIVE
IMM GRANULOCYTES # BLD AUTO: 0.05 K/UL (ref 0–0.04)
IMM GRANULOCYTES # BLD AUTO: 0.05 K/UL (ref 0–0.04)
IMM GRANULOCYTES NFR BLD AUTO: 0.4 % (ref 0–0.5)
IMM GRANULOCYTES NFR BLD AUTO: 0.5 % (ref 0–0.5)
LYMPHOCYTES # BLD AUTO: 0.9 K/UL (ref 1–4.8)
LYMPHOCYTES # BLD AUTO: 1 K/UL (ref 1–4.8)
LYMPHOCYTES NFR BLD: 8 % (ref 18–48)
LYMPHOCYTES NFR BLD: 8.7 % (ref 18–48)
MAGNESIUM SERPL-MCNC: 1.7 MG/DL (ref 1.6–2.6)
MAGNESIUM SERPL-MCNC: 2 MG/DL (ref 1.6–2.6)
MCH RBC QN AUTO: 27.3 PG (ref 27–31)
MCH RBC QN AUTO: 28.2 PG (ref 27–31)
MCHC RBC AUTO-ENTMCNC: 31.3 G/DL (ref 32–36)
MCHC RBC AUTO-ENTMCNC: 32.7 G/DL (ref 32–36)
MCV RBC AUTO: 86 FL (ref 82–98)
MCV RBC AUTO: 87 FL (ref 82–98)
MONOCYTES # BLD AUTO: 0.9 K/UL (ref 0.3–1)
MONOCYTES # BLD AUTO: 1 K/UL (ref 0.3–1)
MONOCYTES NFR BLD: 7.7 % (ref 4–15)
MONOCYTES NFR BLD: 8.9 % (ref 4–15)
NEUTROPHILS # BLD AUTO: 8.8 K/UL (ref 1.8–7.7)
NEUTROPHILS # BLD AUTO: 9.6 K/UL (ref 1.8–7.7)
NEUTROPHILS NFR BLD: 81.2 % (ref 38–73)
NEUTROPHILS NFR BLD: 82.7 % (ref 38–73)
NRBC BLD-RTO: 0 /100 WBC
NRBC BLD-RTO: 0 /100 WBC
PCO2 BLDA: 31.4 MMHG (ref 35–45)
PH SMN: 7.43 [PH] (ref 7.35–7.45)
PHOSPHATE SERPL-MCNC: 2.2 MG/DL (ref 2.7–4.5)
PHOSPHATE SERPL-MCNC: 2.4 MG/DL (ref 2.7–4.5)
PLATELET # BLD AUTO: 138 K/UL (ref 150–350)
PLATELET # BLD AUTO: 149 K/UL (ref 150–350)
PMV BLD AUTO: 9.5 FL (ref 9.2–12.9)
PMV BLD AUTO: 9.6 FL (ref 9.2–12.9)
PO2 BLDA: 193 MMHG (ref 80–100)
POC BE: -4 MMOL/L
POC SATURATED O2: 100 % (ref 95–100)
POC TCO2: 22 MMOL/L (ref 23–27)
POTASSIUM SERPL-SCNC: 3.7 MMOL/L (ref 3.5–5.1)
POTASSIUM SERPL-SCNC: 3.9 MMOL/L (ref 3.5–5.1)
POTASSIUM SERPL-SCNC: 4.7 MMOL/L (ref 3.5–5.1)
PROT SERPL-MCNC: 5.7 G/DL (ref 6–8.4)
RBC # BLD AUTO: 3.55 M/UL (ref 4.6–6.2)
RBC # BLD AUTO: 3.66 M/UL (ref 4.6–6.2)
SAMPLE: ABNORMAL
SITE: ABNORMAL
SODIUM SERPL-SCNC: 133 MMOL/L (ref 136–145)
WBC # BLD AUTO: 10.88 K/UL (ref 3.9–12.7)
WBC # BLD AUTO: 11.63 K/UL (ref 3.9–12.7)

## 2019-12-21 PROCEDURE — A4216 STERILE WATER/SALINE, 10 ML: HCPCS | Performed by: ANESTHESIOLOGY

## 2019-12-21 PROCEDURE — 63600175 PHARM REV CODE 636 W HCPCS: Performed by: PSYCHIATRY & NEUROLOGY

## 2019-12-21 PROCEDURE — 84100 ASSAY OF PHOSPHORUS: CPT

## 2019-12-21 PROCEDURE — 25000003 PHARM REV CODE 250: Performed by: PHYSICIAN ASSISTANT

## 2019-12-21 PROCEDURE — S4991 NICOTINE PATCH NONLEGEND: HCPCS | Performed by: NURSE PRACTITIONER

## 2019-12-21 PROCEDURE — 84132 ASSAY OF SERUM POTASSIUM: CPT | Mod: 91

## 2019-12-21 PROCEDURE — 63600175 PHARM REV CODE 636 W HCPCS: Performed by: NURSE PRACTITIONER

## 2019-12-21 PROCEDURE — 25000003 PHARM REV CODE 250: Performed by: PSYCHIATRY & NEUROLOGY

## 2019-12-21 PROCEDURE — 99291 PR CRITICAL CARE, E/M 30-74 MINUTES: ICD-10-PCS | Mod: ,,, | Performed by: PHYSICIAN ASSISTANT

## 2019-12-21 PROCEDURE — 94761 N-INVAS EAR/PLS OXIMETRY MLT: CPT

## 2019-12-21 PROCEDURE — 99900035 HC TECH TIME PER 15 MIN (STAT)

## 2019-12-21 PROCEDURE — 63600175 PHARM REV CODE 636 W HCPCS: Performed by: PHYSICIAN ASSISTANT

## 2019-12-21 PROCEDURE — 80053 COMPREHEN METABOLIC PANEL: CPT

## 2019-12-21 PROCEDURE — 36415 COLL VENOUS BLD VENIPUNCTURE: CPT

## 2019-12-21 PROCEDURE — 84132 ASSAY OF SERUM POTASSIUM: CPT

## 2019-12-21 PROCEDURE — 83735 ASSAY OF MAGNESIUM: CPT

## 2019-12-21 PROCEDURE — 99291 CRITICAL CARE FIRST HOUR: CPT | Mod: ,,, | Performed by: PHYSICIAN ASSISTANT

## 2019-12-21 PROCEDURE — 86703 HIV-1/HIV-2 1 RESULT ANTBDY: CPT

## 2019-12-21 PROCEDURE — 84100 ASSAY OF PHOSPHORUS: CPT | Mod: 91

## 2019-12-21 PROCEDURE — 25000003 PHARM REV CODE 250: Performed by: ANESTHESIOLOGY

## 2019-12-21 PROCEDURE — 37799 UNLISTED PX VASCULAR SURGERY: CPT

## 2019-12-21 PROCEDURE — 25000003 PHARM REV CODE 250: Performed by: STUDENT IN AN ORGANIZED HEALTH CARE EDUCATION/TRAINING PROGRAM

## 2019-12-21 PROCEDURE — 82803 BLOOD GASES ANY COMBINATION: CPT

## 2019-12-21 PROCEDURE — 63600175 PHARM REV CODE 636 W HCPCS: Performed by: UROLOGY

## 2019-12-21 PROCEDURE — 83735 ASSAY OF MAGNESIUM: CPT | Mod: 91

## 2019-12-21 PROCEDURE — 94003 VENT MGMT INPAT SUBQ DAY: CPT

## 2019-12-21 PROCEDURE — 85025 COMPLETE CBC W/AUTO DIFF WBC: CPT | Mod: 91

## 2019-12-21 PROCEDURE — 25000003 PHARM REV CODE 250: Performed by: NURSE PRACTITIONER

## 2019-12-21 PROCEDURE — 20000000 HC ICU ROOM

## 2019-12-21 PROCEDURE — 27000221 HC OXYGEN, UP TO 24 HOURS

## 2019-12-21 PROCEDURE — 99900026 HC AIRWAY MAINTENANCE (STAT)

## 2019-12-21 PROCEDURE — S0028 INJECTION, FAMOTIDINE, 20 MG: HCPCS | Performed by: PHYSICIAN ASSISTANT

## 2019-12-21 RX ORDER — MODAFINIL 100 MG/1
200 TABLET ORAL DAILY
Status: DISCONTINUED | OUTPATIENT
Start: 2019-12-22 | End: 2019-12-25

## 2019-12-21 RX ORDER — ACETAMINOPHEN 500 MG
500 TABLET ORAL EVERY 8 HOURS PRN
Status: DISCONTINUED | OUTPATIENT
Start: 2019-12-21 | End: 2019-12-25

## 2019-12-21 RX ORDER — OXYCODONE HYDROCHLORIDE 5 MG/1
10 TABLET ORAL EVERY 8 HOURS
Status: DISCONTINUED | OUTPATIENT
Start: 2019-12-21 | End: 2019-12-22

## 2019-12-21 RX ORDER — AMLODIPINE BESYLATE 5 MG/1
5 TABLET ORAL DAILY
Status: DISCONTINUED | OUTPATIENT
Start: 2019-12-21 | End: 2019-12-21

## 2019-12-21 RX ORDER — ONDANSETRON 8 MG/1
8 TABLET, ORALLY DISINTEGRATING ORAL EVERY 8 HOURS PRN
Status: DISCONTINUED | OUTPATIENT
Start: 2019-12-21 | End: 2019-12-25

## 2019-12-21 RX ORDER — FAMOTIDINE 20 MG/1
20 TABLET, FILM COATED ORAL 2 TIMES DAILY
Status: DISCONTINUED | OUTPATIENT
Start: 2019-12-21 | End: 2019-12-25

## 2019-12-21 RX ORDER — AMLODIPINE BESYLATE 5 MG/1
5 TABLET ORAL ONCE
Status: DISCONTINUED | OUTPATIENT
Start: 2019-12-21 | End: 2019-12-22

## 2019-12-21 RX ORDER — IBUPROFEN 200 MG
24 TABLET ORAL
Status: DISCONTINUED | OUTPATIENT
Start: 2019-12-21 | End: 2019-12-25

## 2019-12-21 RX ORDER — DIAZEPAM 5 MG/1
5 TABLET ORAL EVERY 24 HOURS
Status: DISCONTINUED | OUTPATIENT
Start: 2019-12-22 | End: 2019-12-22

## 2019-12-21 RX ORDER — IBUPROFEN 200 MG
16 TABLET ORAL
Status: DISCONTINUED | OUTPATIENT
Start: 2019-12-21 | End: 2019-12-25

## 2019-12-21 RX ORDER — MODAFINIL 100 MG/1
100 TABLET ORAL DAILY
Status: DISCONTINUED | OUTPATIENT
Start: 2019-12-21 | End: 2019-12-25

## 2019-12-21 RX ORDER — AMLODIPINE BESYLATE 10 MG/1
10 TABLET ORAL DAILY
Status: DISCONTINUED | OUTPATIENT
Start: 2019-12-21 | End: 2019-12-25

## 2019-12-21 RX ADMIN — HEPARIN SODIUM 5000 UNITS: 5000 INJECTION, SOLUTION INTRAVENOUS; SUBCUTANEOUS at 01:12

## 2019-12-21 RX ADMIN — Medication: at 02:12

## 2019-12-21 RX ADMIN — SENNOSIDES AND DOCUSATE SODIUM 1 TABLET: 8.6; 5 TABLET ORAL at 09:12

## 2019-12-21 RX ADMIN — POTASSIUM CHLORIDE 40 MEQ: 400 INJECTION, SOLUTION INTRAVENOUS at 05:12

## 2019-12-21 RX ADMIN — ATORVASTATIN CALCIUM 80 MG: 20 TABLET, FILM COATED ORAL at 08:12

## 2019-12-21 RX ADMIN — HEPARIN SODIUM 5000 UNITS: 5000 INJECTION, SOLUTION INTRAVENOUS; SUBCUTANEOUS at 06:12

## 2019-12-21 RX ADMIN — FAMOTIDINE 20 MG: 10 INJECTION, SOLUTION INTRAVENOUS at 08:12

## 2019-12-21 RX ADMIN — AMLODIPINE BESYLATE 5 MG: 10 TABLET ORAL at 10:12

## 2019-12-21 RX ADMIN — MODAFINIL 100 MG: 100 TABLET ORAL at 06:12

## 2019-12-21 RX ADMIN — AMLODIPINE BESYLATE 5 MG: 5 TABLET ORAL at 08:12

## 2019-12-21 RX ADMIN — OXYCODONE HYDROCHLORIDE 10 MG: 5 TABLET ORAL at 10:12

## 2019-12-21 RX ADMIN — Medication: at 09:12

## 2019-12-21 RX ADMIN — OXYCODONE HYDROCHLORIDE 10 MG: 5 TABLET ORAL at 09:12

## 2019-12-21 RX ADMIN — CEFEPIME 2 G: 2 INJECTION, POWDER, FOR SOLUTION INTRAVENOUS at 09:12

## 2019-12-21 RX ADMIN — Medication 1 PATCH: at 08:12

## 2019-12-21 RX ADMIN — SENNOSIDES AND DOCUSATE SODIUM 1 TABLET: 8.6; 5 TABLET ORAL at 08:12

## 2019-12-21 RX ADMIN — DIAZEPAM 5 MG: 5 TABLET ORAL at 06:12

## 2019-12-21 RX ADMIN — LOSARTAN POTASSIUM AND HYDROCHLOROTHIAZIDE 1 TABLET: 100; 25 TABLET, FILM COATED ORAL at 08:12

## 2019-12-21 RX ADMIN — CEFEPIME 2 G: 2 INJECTION, POWDER, FOR SOLUTION INTRAVENOUS at 01:12

## 2019-12-21 RX ADMIN — CEFEPIME 2 G: 2 INJECTION, POWDER, FOR SOLUTION INTRAVENOUS at 06:12

## 2019-12-21 RX ADMIN — Medication 10 ML: at 01:12

## 2019-12-21 RX ADMIN — LEVOTHYROXINE SODIUM 50 MCG: 50 TABLET ORAL at 06:12

## 2019-12-21 RX ADMIN — MODAFINIL 100 MG: 100 TABLET ORAL at 01:12

## 2019-12-21 RX ADMIN — METOPROLOL TARTRATE 50 MG: 50 TABLET ORAL at 08:12

## 2019-12-21 RX ADMIN — FAMOTIDINE 20 MG: 20 TABLET ORAL at 09:12

## 2019-12-21 RX ADMIN — HEPARIN SODIUM 5000 UNITS: 5000 INJECTION, SOLUTION INTRAVENOUS; SUBCUTANEOUS at 09:12

## 2019-12-21 RX ADMIN — Medication: at 08:12

## 2019-12-21 RX ADMIN — POLYETHYLENE GLYCOL 3350 17 G: 17 POWDER, FOR SOLUTION ORAL at 08:12

## 2019-12-21 RX ADMIN — LABETALOL HCL IV SOLN PREFILLED SYRINGE 20 MG/4ML (5 MG/ML) 10 MG: 20/4 SOLUTION PREFILLED SYRINGE at 06:12

## 2019-12-21 RX ADMIN — HYDRALAZINE HYDROCHLORIDE 10 MG: 20 INJECTION INTRAMUSCULAR; INTRAVENOUS at 07:12

## 2019-12-21 RX ADMIN — POTASSIUM CHLORIDE 40 MEQ: 400 INJECTION, SOLUTION INTRAVENOUS at 01:12

## 2019-12-21 RX ADMIN — CHLORHEXIDINE GLUCONATE 0.12% ORAL RINSE 15 ML: 1.2 LIQUID ORAL at 08:12

## 2019-12-21 RX ADMIN — MAGNESIUM SULFATE IN WATER 2 G: 40 INJECTION, SOLUTION INTRAVENOUS at 05:12

## 2019-12-21 RX ADMIN — CHLORHEXIDINE GLUCONATE 0.12% ORAL RINSE 15 ML: 1.2 LIQUID ORAL at 09:12

## 2019-12-21 RX ADMIN — METOPROLOL TARTRATE 50 MG: 50 TABLET ORAL at 09:12

## 2019-12-21 RX ADMIN — SODIUM PHOSPHATE, MONOBASIC, MONOHYDRATE 20.01 MMOL: 276; 142 INJECTION, SOLUTION INTRAVENOUS at 05:12

## 2019-12-21 NOTE — CONSULTS
Pt currently has PICC and nurse states needs second access, explained PICC is adequate access. Notified charge nurse.

## 2019-12-21 NOTE — PLAN OF CARE
POC reviewed with pt at 1400. Pt unable to verbalized understanding, but nodded appropriately.  Lumbar drain and anterior hemovac drain pulled by neurosurgery today.  No restrictions on HOB.  Oxycodone is now scheduled.  Valium decreased to Qday.  Modafinil and Norvasc doses increased for BP control.  Tube feedings restarted.  Plan for extubation in AM and to stop tube feedings in AM.  NS @ 100.  Cardene on/off throughout shift.  Questions and concerns addressed. No acute events today. Pt progressing toward goals. Will continue to monitor. See flowsheets for full assessment and VS info.

## 2019-12-21 NOTE — PROGRESS NOTES
Notified PERRY Galan of 20-30 difference in SBP b/t Art line and Cuff. Verbal orders to go by cuff pressure. WCTM.

## 2019-12-21 NOTE — PROGRESS NOTES
Notified PERRY Miguel of inability to control pt's BP (SBP <180) w/ PRNs (Labetalol, Hydralzine, 50 mcg Fentanyl). Asked about Cardene. PA at bedside. He stated he'll put in order for another dose of Labetalol. ADRIANO.

## 2019-12-21 NOTE — PROGRESS NOTES
[facility]  Neurosurgery  Progress Note    Subjective:     History of Present Illness: Junaid Muñoz is a 74 y.o. male with PMH of HTN, HLD, Hepatitis C, and CAD s/p two stents on plavix who presents with 1 month history of back pain between his shoulders. MRI at OSH demonstrates likely epidural abscess. Pt denies hx of trauma and reports slow onset of symptoms.     Post-Op Info:  Procedure(s) (LRB):  FUSION, SPINE, CERVICAL, POSTERIOR APPROACH C2-T3 posterior instrumented fusion (N/A)   4 Days Post-Op     Interval History: 12/21: Plan to pull all 3 drains today and mobilize patient. Minimal output overnight. 16/6cc from HVs, 31cc from LD. WTE per icu.     Medications:  Continuous Infusions:   sodium chloride 0.9% 100 mL/hr at 12/21/19 1005    niCARdipine Stopped (12/21/19 0805)     Scheduled Meds:   amLODIPine  5 mg Per OG tube Daily    atorvastatin  80 mg Per OG tube Daily    bacitracin   Topical (Top) TID    ceFEPime (MAXIPIME) IVPB  2 g Intravenous Q8H    chlorhexidine  15 mL Mouth/Throat BID    diazePAM  5 mg Per NG tube Q12H    famotidine (PF)  20 mg Intravenous BID    heparin (porcine)  5,000 Units Subcutaneous Q8H    levothyroxine  50 mcg Per OG tube Before breakfast    losartan-hydrochlorothiazide 100-25 mg  1 tablet Per OG tube Daily    metoprolol tartrate  50 mg Per OG tube BID    modafinil  100 mg Per NG tube Daily    nicotine  1 patch Transdermal Daily    polyethylene glycol  17 g Per NG tube Daily    senna-docusate 8.6-50 mg  1 tablet Per OG tube BID    sodium chloride 0.9%  10 mL Intravenous Q6H     PRN Meds:acetaminophen, Dextrose 10% Bolus, Dextrose 10% Bolus, fentaNYL, glucagon (human recombinant), glucose, glucose, hydrALAZINE, labetalol, magnesium sulfate IVPB, magnesium sulfate IVPB, naloxone, ondansetron, oxyCODONE, potassium chloride in water **AND** potassium chloride in water **AND** potassium chloride in water, Flushing PICC Protocol **AND** sodium  chloride 0.9% **AND** sodium chloride 0.9%, sodium phosphate IVPB, sodium phosphate IVPB, sodium phosphate IVPB     Review of Systems  Objective:     Weight: 79.4 kg (175 lb 0.7 oz)  Body mass index is 25.85 kg/m².  Vital Signs (Most Recent):  Temp: 97.2 °F (36.2 °C) (12/21/19 0705)  Pulse: 79 (12/21/19 1005)  Resp: (!) 21 (12/21/19 1005)  BP: (!) 166/80 (12/21/19 1005)  SpO2: 100 % (12/21/19 1005) Vital Signs (24h Range):  Temp:  [97.2 °F (36.2 °C)-97.9 °F (36.6 °C)] 97.2 °F (36.2 °C)  Pulse:  [] 79  Resp:  [13-26] 21  SpO2:  [100 %] 100 %  BP: (140-208)/() 166/80  Arterial Line BP: (156-196)/(58-76) 172/72     Date 12/21/19 0700 - 12/22/19 0659   Shift 4617-1322 4401-8137 4101-1574 24 Hour Total   INTAKE   I.V.(mL/kg) 412.5(5.2)   412.5(5.2)   Shift Total(mL/kg) 412.5(5.2)   412.5(5.2)   OUTPUT   Urine(mL/kg/hr) 550   550   Drains 5   5   Shift Total(mL/kg) 555(7)   555(7)   Weight (kg) 79.4 79.4 79.4 79.4              Vent Mode: SIMV  Oxygen Concentration (%):  [40] 40  Resp Rate Total:  [11 br/min-24 br/min] 22 br/min  PEEP/CPAP:  [5 cmH20] 5 cmH20  Pressure Support:  [7 cmH20] 7 cmH20  Mean Airway Pressure:  [8.1 cmH20-8.8 cmH20] 8.6 cmH20         Closed/Suction Drain 12/10/19 1018 Anterior Neck Accordion 10 Fr. (Active)   Site Description Reddened 12/21/2019  7:05 AM   Dressing Type No dressing 12/21/2019  7:05 AM   Dressing Status Old drainage 12/20/2019  3:05 PM   Drainage Serosanguineous 12/21/2019  7:05 AM   Status Open to gravity drainage 12/21/2019  7:05 AM   Output (mL) 5 mL 12/21/2019  6:05 AM            Closed/Suction Drain 12/17/19 1111 Posterior Neck Accordion 10 Fr. (Active)   Site Description Unable to view 12/21/2019  7:05 AM   Dressing Type [place] 12/21/2019  7:05 AM   Dressing Status Clean;Dry;Intact 12/21/2019  7:05 AM   Dressing Intervention Dressing changed 12/20/2019  3:01 AM   Drainage None 12/21/2019  7:05 AM   Status Open to gravity drainage 12/21/2019  7:05 AM   Output  (mL) 15 mL 12/21/2019  6:05 AM            NG/OG Tube 12/17/19 1700 (Active)   Placement Check placement verified by aspirate characteristics 12/21/2019  7:05 AM   Tolerance no signs/symptoms of discomfort 12/21/2019  7:05 AM   Securement secured to commercial device 12/21/2019  7:05 AM   Clamp Status/Tolerance clamped 12/21/2019  7:05 AM   Insertion Site Appearance no redness, warmth, tenderness, skin breakdown, drainage 12/21/2019  7:05 AM   Drainage None 12/21/2019  3:05 AM   Flush/Irrigation flushed w/;water;no resistance met 12/21/2019  3:05 AM   Feeding Action feeding held 12/21/2019  7:05 AM   Intake (mL) 100 mL 12/19/2019  9:02 AM   Residual Amount (ml) 0 ml 12/20/2019  7:05 PM       Male External Urinary Catheter 12/19/19 1200 Small (Active)   Collection Container Urimeter 12/21/2019  7:05 AM   Securement Method secured to top of thigh w/ adhesive device 12/21/2019  7:05 AM   Skin no redness;no breakdown 12/21/2019  7:05 AM   Tolerance no signs/symptoms of discomfort 12/21/2019  7:05 AM   Output (mL) 125 mL 12/21/2019 10:05 AM   Catheter Change Date 12/18/19 12/21/2019  3:05 AM   Catheter Change Time 1900 12/21/2019  3:05 AM            Lumbar Drain 12/10/19 1056 (Active)   Drain Status Open 12/21/2019  7:05 AM   Level to iliac crest 12/20/2019  3:01 AM   CSF Color Clear 12/21/2019  7:05 AM   Site Description Unable to view 12/21/2019  7:05 AM   Dressing Status Dry;Intact;Old drainage 12/21/2019  7:05 AM   Interventions HOB degrees (see comment);Bed controls locked 12/21/2019  7:05 AM   Output (mL) 1 mL 12/21/2019 10:05 AM       Neurosurgery Physical Exam  E4VTM6  FCx4  PERRL, EOMI  FC x 4, AG x 4; in restraints  Nods appropriately to questions    Significant Labs:  Recent Labs   Lab 12/20/19  0306 12/21/19  0149   GLU 97 104   * 133*   K 3.4* 3.7    106   CO2 20* 20*   BUN 19 13   CREATININE 0.7 0.7   CALCIUM 8.4* 8.3*   MG 1.9 1.7     Recent Labs   Lab 12/20/19  0826 12/20/19 2020  12/21/19  0809   WBC 10.54 9.27 10.88   HGB 9.2* 10.2* 10.0*   HCT 28.8* 30.7* 31.9*   * 149* 138*     Recent Labs   Lab 12/20/19  0306   INR 1.1     Microbiology Results (last 7 days)     Procedure Component Value Units Date/Time    Blood culture [[id number]] Collected:  12/17/19 1505    Order Status:  Completed Specimen:  Blood from Line, Arterial, Right Updated:  12/20/19 1812     Blood Culture, Routine No Growth to date      No Growth to date      No Growth to date      No Growth to date    Blood culture [[id number]]     Order Status:  No result Specimen:  Blood     Blood culture [[id number]] Collected:  12/12/19 0147    Order Status:  Completed Specimen:  Blood from Wrist, Right Updated:  12/17/19 0612     Blood Culture, Routine No growth after 5 days.    Narrative:       Blood cultures from 2 different sites. 4 bottles total.  Please draw before starting antibiotics.    Blood culture [[id number]] Collected:  12/12/19 0213    Order Status:  Completed Specimen:  Blood from Peripheral, Antecubital, Right Updated:  12/17/19 0612     Blood Culture, Routine No growth after 5 days.    Narrative:       Blood cultures x 2 different sites. 4 bottles total. Please  draw cultures before administering antibiotics.    Blood culture [[id number]] Collected:  12/11/19 1000    Order Status:  Completed Specimen:  Blood from Peripheral, Foot, Right Updated:  12/16/19 1212     Blood Culture, Routine No growth after 5 days.    Narrative:       Blood cultures from 2 different sites. 4 bottles total.  Please draw before starting antibiotics.    Blood culture [[id number]] Collected:  12/11/19 1005    Order Status:  Completed Specimen:  Blood from Peripheral, Forearm, Right Updated:  12/16/19 1212     Blood Culture, Routine No growth after 5 days.    Narrative:       Blood cultures x 2 different sites. 4 bottles total. Please  draw cultures before administering antibiotics.    Culture, Anaerobe [[id number]] Collected:  12/10/19 1038     Order Status:  Completed Specimen:  Body Fluid from Neck Updated:  12/16/19 0854     Anaerobic Culture No anaerobes isolated    Narrative:       2) Free vertebral abscess #2    Culture, Anaerobe [[id number]] Collected:  12/10/19 1038    Order Status:  Completed Specimen:  Body Fluid from Neck Updated:  12/16/19 0854     Anaerobic Culture No anaerobes isolated    Narrative:       1) Free vertebral abscess #1    CSF culture [[id number]] Collected:  12/11/19 1247    Order Status:  Completed Specimen:  CSF (Spinal Fluid) from CSF Tap, Tube 3 Updated:  12/16/19 0711     CSF CULTURE No Growth     Gram Stain Result Cytospin indicates:      Rare WBC's      No organisms seen        Recent Lab Results       12/21/19  0809   12/21/19  0441   12/21/19  0149   12/20/19 2020        Albumin     2.3       Alkaline Phosphatase     334       Allens Test   N/A         ALT     14       Anion Gap     7       AST     29       Baso # 0.02     0.01     Basophil% 0.2     0.1     BILIRUBIN TOTAL     0.8  Comment:  For infants and newborns, interpretation of results should be based  on gestational age, weight and in agreement with clinical  observations.  Premature Infant recommended reference ranges:  Up to 24 hours.............<8.0 mg/dL  Up to 48 hours............<12.0 mg/dL  3-5 days..................<15.0 mg/dL  6-29 days.................<15.0 mg/dL         [facility]         BUN, Bld     13       Calcium     8.3       Chloride     106       CO2     20       Creatinine     0.7       Differential Method Automated     Automated     eGFR if      >60.0       eGFR if non      >60.0  Comment:  Calculation used to obtain the estimated glomerular filtration  rate (eGFR) is the CKD-EPI equation.          Eos # 0.1     0.0     Eosinophil% 0.5     0.4     Glucose     104       Gran # (ANC) 8.8     7.2     Gran% 81.2     78.1     Hematocrit 31.9     30.7     Hemoglobin 10.0     10.2     Immature Grans (Abs)  0.05  Comment:  Mild elevation in immature granulocytes is non specific and   can be seen in a variety of conditions including stress response,   acute inflammation, trauma and pregnancy. Correlation with other   laboratory and clinical findings is essential.       0.05  Comment:  Mild elevation in immature granulocytes is non specific and   can be seen in a variety of conditions including stress response,   acute inflammation, trauma and pregnancy. Correlation with other   laboratory and clinical findings is essential.       Immature Granulocytes 0.5     0.5     Lymph # 1.0     1.1     Lymph% 8.7     11.3     Magnesium     1.7       MCH 27.3     28.7     MCHC 31.3     33.2     MCV 87     86     Mono # 1.0     0.9     Mono% 8.9     9.6     MPV 9.6     9.5     nRBC 0     0     Phosphorus     2.2       Platelets 138     149     POC BE   -4         POC HCO3   20.8         POC PCO2   31.4         POC PH   7.429         POC PO2   193         POC SATURATED O2   100         POC TCO2   22         Potassium     3.7       PROTEIN TOTAL     5.7       RBC 3.66     3.56     RDW 16.4     16.5     Sample   ARTERIAL         Sodium     133       WBC 10.88     9.27           Significant Diagnostics:  CT: No results found in the last 24 hours.  Echoencephalography: No results found in the last 24 hours.  MRI: No results found in the last 24 hours.    Assessment/Plan:     * Spinal epidural abscess  74 y.o. male with PMH of HTN, HLD, Hepatitis C, and CAD s/p two stents on plavix who presents with C6-T2 osteomyelitis with suspected epidural abscess.  Now s/p C7/T1 corpectomies and C2-T2  posterior instrumented fusion 12/18    POD 4.     - Continue ICU care and neuro checks q1h  - Pulled anterior and lumbar drain today, Flat 2 hours s/p LD pull, then allow patient to mobilize. Maintaining posterior cervical drain for now open to gravity  - Continue cefepime per ID Recs x 8 weeks.   - Keep head of bed flat while drains in place, no HOB  restrictions after drains removed.   - Wean to extubate per ICU  - Follow-up cultures: growing Pseudomonas.  Final antibiotics per ID team  - Likely will need long-term IV antibiotics and PICC line   - Further care per ICU team        Juloi César Werner MD  Neurosurgery  [facility]

## 2019-12-21 NOTE — SUBJECTIVE & OBJECTIVE
Interval History: 12/21: Plan to pull all 3 drains today and mobilize patient. Minimal output overnight. 16/6cc from HVs, 31cc from LD. WTE per icu.     Medications:  Continuous Infusions:   sodium chloride 0.9% 100 mL/hr at 12/21/19 1005    niCARdipine Stopped (12/21/19 0805)     Scheduled Meds:   amLODIPine  5 mg Per OG tube Daily    atorvastatin  80 mg Per OG tube Daily    bacitracin   Topical (Top) TID    ceFEPime (MAXIPIME) IVPB  2 g Intravenous Q8H    chlorhexidine  15 mL Mouth/Throat BID    diazePAM  5 mg Per NG tube Q12H    famotidine (PF)  20 mg Intravenous BID    heparin (porcine)  5,000 Units Subcutaneous Q8H    levothyroxine  50 mcg Per OG tube Before breakfast    losartan-hydrochlorothiazide 100-25 mg  1 tablet Per OG tube Daily    metoprolol tartrate  50 mg Per OG tube BID    modafinil  100 mg Per NG tube Daily    nicotine  1 patch Transdermal Daily    polyethylene glycol  17 g Per NG tube Daily    senna-docusate 8.6-50 mg  1 tablet Per OG tube BID    sodium chloride 0.9%  10 mL Intravenous Q6H     PRN Meds:acetaminophen, Dextrose 10% Bolus, Dextrose 10% Bolus, fentaNYL, glucagon (human recombinant), glucose, glucose, hydrALAZINE, labetalol, magnesium sulfate IVPB, magnesium sulfate IVPB, naloxone, ondansetron, oxyCODONE, potassium chloride in water **AND** potassium chloride in water **AND** potassium chloride in water, Flushing PICC Protocol **AND** sodium chloride 0.9% **AND** sodium chloride 0.9%, sodium phosphate IVPB, sodium phosphate IVPB, sodium phosphate IVPB     Review of Systems  Objective:     Weight: 79.4 kg (175 lb 0.7 oz)  Body mass index is 25.85 kg/m².  Vital Signs (Most Recent):  Temp: 97.2 °F (36.2 °C) (12/21/19 0705)  Pulse: 79 (12/21/19 1005)  Resp: (!) 21 (12/21/19 1005)  BP: (!) 166/80 (12/21/19 1005)  SpO2: 100 % (12/21/19 1005) Vital Signs (24h Range):  Temp:  [97.2 °F (36.2 °C)-97.9 °F (36.6 °C)] 97.2 °F (36.2 °C)  Pulse:  [] 79  Resp:  [13-26]  21  SpO2:  [100 %] 100 %  BP: (140-208)/() 166/80  Arterial Line BP: (156-196)/(58-76) 172/72     Date 12/21/19 0700 - 12/22/19 0659   Shift 8467-4574 3223-4269 6695-9852 24 Hour Total   INTAKE   I.V.(mL/kg) 412.5(5.2)   412.5(5.2)   Shift Total(mL/kg) 412.5(5.2)   412.5(5.2)   OUTPUT   Urine(mL/kg/hr) 550   550   Drains 5   5   Shift Total(mL/kg) 555(7)   555(7)   Weight (kg) 79.4 79.4 79.4 79.4              Vent Mode: SIMV  Oxygen Concentration (%):  [40] 40  Resp Rate Total:  [11 br/min-24 br/min] 22 br/min  PEEP/CPAP:  [5 cmH20] 5 cmH20  Pressure Support:  [7 cmH20] 7 cmH20  Mean Airway Pressure:  [8.1 cmH20-8.8 cmH20] 8.6 cmH20         Closed/Suction Drain 12/10/19 1018 Anterior Neck Accordion 10 Fr. (Active)   Site Description Reddened 12/21/2019  7:05 AM   Dressing Type No dressing 12/21/2019  7:05 AM   Dressing Status Old drainage 12/20/2019  3:05 PM   Drainage Serosanguineous 12/21/2019  7:05 AM   Status Open to gravity drainage 12/21/2019  7:05 AM   Output (mL) 5 mL 12/21/2019  6:05 AM            Closed/Suction Drain 12/17/19 1111 Posterior Neck Accordion 10 Fr. (Active)   Site Description Unable to view 12/21/2019  7:05 AM   Dressing Type [place] 12/21/2019  7:05 AM   Dressing Status Clean;Dry;Intact 12/21/2019  7:05 AM   Dressing Intervention Dressing changed 12/20/2019  3:01 AM   Drainage None 12/21/2019  7:05 AM   Status Open to gravity drainage 12/21/2019  7:05 AM   Output (mL) 15 mL 12/21/2019  6:05 AM            NG/OG Tube 12/17/19 1700 (Active)   Placement Check placement verified by aspirate characteristics 12/21/2019  7:05 AM   Tolerance no signs/symptoms of discomfort 12/21/2019  7:05 AM   Securement secured to commercial device 12/21/2019  7:05 AM   Clamp Status/Tolerance clamped 12/21/2019  7:05 AM   Insertion Site Appearance no redness, warmth, tenderness, skin breakdown, drainage 12/21/2019  7:05 AM   Drainage None 12/21/2019  3:05 AM   Flush/Irrigation flushed w/;water;no  resistance met 12/21/2019  3:05 AM   Feeding Action feeding held 12/21/2019  7:05 AM   Intake (mL) 100 mL 12/19/2019  9:02 AM   Residual Amount (ml) 0 ml 12/20/2019  7:05 PM       Male External Urinary Catheter 12/19/19 1200 Small (Active)   Collection Container Urimeter 12/21/2019  7:05 AM   Securement Method secured to top of thigh w/ adhesive device 12/21/2019  7:05 AM   Skin no redness;no breakdown 12/21/2019  7:05 AM   Tolerance no signs/symptoms of discomfort 12/21/2019  7:05 AM   Output (mL) 125 mL 12/21/2019 10:05 AM   Catheter Change Date 12/18/19 12/21/2019  3:05 AM   Catheter Change Time 1900 12/21/2019  3:05 AM            Lumbar Drain 12/10/19 1056 (Active)   Drain Status Open 12/21/2019  7:05 AM   Level to iliac crest 12/20/2019  3:01 AM   CSF Color Clear 12/21/2019  7:05 AM   Site Description Unable to view 12/21/2019  7:05 AM   Dressing Status Dry;Intact;Old drainage 12/21/2019  7:05 AM   Interventions HOB degrees (see comment);Bed controls locked 12/21/2019  7:05 AM   Output (mL) 1 mL 12/21/2019 10:05 AM       Neurosurgery Physical Exam  E4VTM6  FCx4  PERRL, EOMI  FC x 4, AG x 4; in restraints  Nods appropriately to questions    Significant Labs:  Recent Labs   Lab 12/20/19  0306 12/21/19  0149   GLU 97 104   * 133*   K 3.4* 3.7    106   CO2 20* 20*   BUN 19 13   CREATININE 0.7 0.7   CALCIUM 8.4* 8.3*   MG 1.9 1.7     Recent Labs   Lab 12/20/19  0826 12/20/19 2020 12/21/19  0809   WBC 10.54 9.27 10.88   HGB 9.2* 10.2* 10.0*   HCT 28.8* 30.7* 31.9*   * 149* 138*     Recent Labs   Lab 12/20/19  0306   INR 1.1     Microbiology Results (last 7 days)     Procedure Component Value Units Date/Time    Blood culture [[id number]] Collected:  12/17/19 1505    Order Status:  Completed Specimen:  Blood from Line, Arterial, Right Updated:  12/20/19 1812     Blood Culture, Routine No Growth to date      No Growth to date      No Growth to date      No Growth to date    Blood culture [[id number]]      Order Status:  No result Specimen:  Blood     Blood culture [[id number]] Collected:  12/12/19 0147    Order Status:  Completed Specimen:  Blood from Wrist, Right Updated:  12/17/19 0612     Blood Culture, Routine No growth after 5 days.    Narrative:       Blood cultures from 2 different sites. 4 bottles total.  Please draw before starting antibiotics.    Blood culture [[id number]] Collected:  12/12/19 0213    Order Status:  Completed Specimen:  Blood from Peripheral, Antecubital, Right Updated:  12/17/19 0612     Blood Culture, Routine No growth after 5 days.    Narrative:       Blood cultures x 2 different sites. 4 bottles total. Please  draw cultures before administering antibiotics.    Blood culture [[id number]] Collected:  12/11/19 1000    Order Status:  Completed Specimen:  Blood from Peripheral, Foot, Right Updated:  12/16/19 1212     Blood Culture, Routine No growth after 5 days.    Narrative:       Blood cultures from 2 different sites. 4 bottles total.  Please draw before starting antibiotics.    Blood culture [[id number]] Collected:  12/11/19 1005    Order Status:  Completed Specimen:  Blood from Peripheral, Forearm, Right Updated:  12/16/19 1212     Blood Culture, Routine No growth after 5 days.    Narrative:       Blood cultures x 2 different sites. 4 bottles total. Please  draw cultures before administering antibiotics.    Culture, Anaerobe [[id number]] Collected:  12/10/19 1038    Order Status:  Completed Specimen:  Body Fluid from Neck Updated:  12/16/19 0854     Anaerobic Culture No anaerobes isolated    Narrative:       2) Free vertebral abscess #2    Culture, Anaerobe [[id number]] Collected:  12/10/19 1038    Order Status:  Completed Specimen:  Body Fluid from Neck Updated:  12/16/19 0854     Anaerobic Culture No anaerobes isolated    Narrative:       1) Free vertebral abscess #1    CSF culture [[id number]] Collected:  12/11/19 1247    Order Status:  Completed Specimen:  CSF (Spinal Fluid) from CSF  Tap, Tube 3 Updated:  12/16/19 0711     CSF CULTURE No Growth     Gram Stain Result Cytospin indicates:      Rare WBC's      No organisms seen        Recent Lab Results       12/21/19  0809   12/21/19  0441   12/21/19  0149   12/20/19  2020        Albumin     2.3       Alkaline Phosphatase     334       Allens Test   N/A         ALT     14       Anion Gap     7       AST     29       Baso # 0.02     0.01     Basophil% 0.2     0.1     BILIRUBIN TOTAL     0.8  Comment:  For infants and newborns, interpretation of results should be based  on gestational age, weight and in agreement with clinical  observations.  Premature Infant recommended reference ranges:  Up to 24 hours.............<8.0 mg/dL  Up to 48 hours............<12.0 mg/dL  3-5 days..................<15.0 mg/dL  6-29 days.................<15.0 mg/dL         Site   [facility]         BUN, Bld     13       Calcium     8.3       Chloride     106       CO2     20       Creatinine     0.7       Differential Method Automated     Automated     eGFR if      >60.0       eGFR if non      >60.0  Comment:  Calculation used to obtain the estimated glomerular filtration  rate (eGFR) is the CKD-EPI equation.          Eos # 0.1     0.0     Eosinophil% 0.5     0.4     Glucose     104       Gran # (ANC) 8.8     7.2     Gran% 81.2     78.1     Hematocrit 31.9     30.7     Hemoglobin 10.0     10.2     Immature Grans (Abs) 0.05  Comment:  Mild elevation in immature granulocytes is non specific and   can be seen in a variety of conditions including stress response,   acute inflammation, trauma and pregnancy. Correlation with other   laboratory and clinical findings is essential.       0.05  Comment:  Mild elevation in immature granulocytes is non specific and   can be seen in a variety of conditions including stress response,   acute inflammation, trauma and pregnancy. Correlation with other   laboratory and clinical findings is essential.        Immature Granulocytes 0.5     0.5     Lymph # 1.0     1.1     Lymph% 8.7     11.3     Magnesium     1.7       MCH 27.3     28.7     MCHC 31.3     33.2     MCV 87     86     Mono # 1.0     0.9     Mono% 8.9     9.6     MPV 9.6     9.5     nRBC 0     0     Phosphorus     2.2       Platelets 138     149     POC BE   -4         POC HCO3   20.8         POC PCO2   31.4         POC PH   7.429         POC PO2   193         POC SATURATED O2   100         POC TCO2   22         Potassium     3.7       PROTEIN TOTAL     5.7       RBC 3.66     3.56     RDW 16.4     16.5     Sample   ARTERIAL         Sodium     133       WBC 10.88     9.27           Significant Diagnostics:  CT: No results found in the last 24 hours.  Echoencephalography: No results found in the last 24 hours.  MRI: No results found in the last 24 hours.

## 2019-12-21 NOTE — ASSESSMENT & PLAN NOTE
74 y.o. male with PMH of HTN, HLD, Hepatitis C, and CAD s/p two stents on plavix who presents with C6-T2 osteomyelitis with suspected epidural abscess.  Now s/p C7/T1 corpectomies and C2-T2  posterior instrumented fusion 12/18    POD 4.     - Continue ICU care and neuro checks q1h  - Will pull all 3 drains today, Flat 2 hours s/p LD pull, then allow patient to mobilize.   - Continue cefepime per ID Recs x 8 weeks.   - Keep head of bed flat while drains in place, no HOB restrictions after drains removed.   - Wean to extubate per ICU  - Follow-up cultures: growing Pseudomonas.  Final antibiotics per ID team  - Likely will need long-term IV antibiotics and PICC line   - Further care per ICU team

## 2019-12-21 NOTE — PLAN OF CARE
POC reviewed with pt at 0500. Pt nodded to verbalize understanding but needs reinforcement. No acute events overnight. Pt following basic commands. Lumbar drain draining 1-2cc/hr. Hemovacs x2 w/ minimal drainage. Cardene gtt titrated per order to maintain SBP <180. NS gtt @ 75cc/hr. K, Mag, and Phos replaced. All tubing changed. Night bath completed. Plans to extubate today. HOB remains flat per neurosurgery.Will continue to monitor. See flowsheets for full assessment and VS info

## 2019-12-22 LAB
ALBUMIN SERPL BCP-MCNC: 2.1 G/DL (ref 3.5–5.2)
ALLENS TEST: ABNORMAL
ALP SERPL-CCNC: 285 U/L (ref 55–135)
ALT SERPL W/O P-5'-P-CCNC: 14 U/L (ref 10–44)
ANION GAP SERPL CALC-SCNC: 6 MMOL/L (ref 8–16)
AST SERPL-CCNC: 25 U/L (ref 10–40)
BACTERIA BLD CULT: NORMAL
BASOPHILS # BLD AUTO: 0.02 K/UL (ref 0–0.2)
BASOPHILS # BLD AUTO: 0.03 K/UL (ref 0–0.2)
BASOPHILS NFR BLD: 0.2 % (ref 0–1.9)
BASOPHILS NFR BLD: 0.3 % (ref 0–1.9)
BILIRUB SERPL-MCNC: 0.7 MG/DL (ref 0.1–1)
BUN SERPL-MCNC: 11 MG/DL (ref 8–23)
CALCIUM SERPL-MCNC: 8.3 MG/DL (ref 8.7–10.5)
CHLORIDE SERPL-SCNC: 106 MMOL/L (ref 95–110)
CO2 SERPL-SCNC: 22 MMOL/L (ref 23–29)
CREAT SERPL-MCNC: 0.6 MG/DL (ref 0.5–1.4)
DIFFERENTIAL METHOD: ABNORMAL
DIFFERENTIAL METHOD: ABNORMAL
EOSINOPHIL # BLD AUTO: 0.1 K/UL (ref 0–0.5)
EOSINOPHIL # BLD AUTO: 0.2 K/UL (ref 0–0.5)
EOSINOPHIL NFR BLD: 0.9 % (ref 0–8)
EOSINOPHIL NFR BLD: 1.6 % (ref 0–8)
ERYTHROCYTE [DISTWIDTH] IN BLOOD BY AUTOMATED COUNT: 16.5 % (ref 11.5–14.5)
ERYTHROCYTE [DISTWIDTH] IN BLOOD BY AUTOMATED COUNT: 16.5 % (ref 11.5–14.5)
EST. GFR  (AFRICAN AMERICAN): >60 ML/MIN/1.73 M^2
EST. GFR  (NON AFRICAN AMERICAN): >60 ML/MIN/1.73 M^2
GLUCOSE SERPL-MCNC: 109 MG/DL (ref 70–110)
HCO3 UR-SCNC: 22.8 MMOL/L (ref 24–28)
HCT VFR BLD AUTO: 27 % (ref 40–54)
HCT VFR BLD AUTO: 29.9 % (ref 40–54)
HGB BLD-MCNC: 8.3 G/DL (ref 14–18)
HGB BLD-MCNC: 9.4 G/DL (ref 14–18)
IMM GRANULOCYTES # BLD AUTO: 0.02 K/UL (ref 0–0.04)
IMM GRANULOCYTES # BLD AUTO: 0.07 K/UL (ref 0–0.04)
IMM GRANULOCYTES NFR BLD AUTO: 0.2 % (ref 0–0.5)
IMM GRANULOCYTES NFR BLD AUTO: 0.6 % (ref 0–0.5)
LYMPHOCYTES # BLD AUTO: 0.8 K/UL (ref 1–4.8)
LYMPHOCYTES # BLD AUTO: 1.1 K/UL (ref 1–4.8)
LYMPHOCYTES NFR BLD: 8.4 % (ref 18–48)
LYMPHOCYTES NFR BLD: 9.2 % (ref 18–48)
MAGNESIUM SERPL-MCNC: 1.8 MG/DL (ref 1.6–2.6)
MAGNESIUM SERPL-MCNC: 2.2 MG/DL (ref 1.6–2.6)
MCH RBC QN AUTO: 27.3 PG (ref 27–31)
MCH RBC QN AUTO: 27.3 PG (ref 27–31)
MCHC RBC AUTO-ENTMCNC: 30.7 G/DL (ref 32–36)
MCHC RBC AUTO-ENTMCNC: 31.4 G/DL (ref 32–36)
MCV RBC AUTO: 87 FL (ref 82–98)
MCV RBC AUTO: 89 FL (ref 82–98)
MONOCYTES # BLD AUTO: 0.9 K/UL (ref 0.3–1)
MONOCYTES # BLD AUTO: 1 K/UL (ref 0.3–1)
MONOCYTES NFR BLD: 8.7 % (ref 4–15)
MONOCYTES NFR BLD: 9.1 % (ref 4–15)
NEUTROPHILS # BLD AUTO: 8 K/UL (ref 1.8–7.7)
NEUTROPHILS # BLD AUTO: 9.1 K/UL (ref 1.8–7.7)
NEUTROPHILS NFR BLD: 79.2 % (ref 38–73)
NEUTROPHILS NFR BLD: 81.6 % (ref 38–73)
NRBC BLD-RTO: 0 /100 WBC
NRBC BLD-RTO: 0 /100 WBC
PCO2 BLDA: 37.5 MMHG (ref 35–45)
PH SMN: 7.39 [PH] (ref 7.35–7.45)
PHOSPHATE SERPL-MCNC: 2.6 MG/DL (ref 2.7–4.5)
PHOSPHATE SERPL-MCNC: 2.6 MG/DL (ref 2.7–4.5)
PLATELET # BLD AUTO: 127 K/UL (ref 150–350)
PLATELET # BLD AUTO: 163 K/UL (ref 150–350)
PMV BLD AUTO: 9.7 FL (ref 9.2–12.9)
PMV BLD AUTO: 9.8 FL (ref 9.2–12.9)
PO2 BLDA: 184 MMHG (ref 80–100)
POC BE: -2 MMOL/L
POC SATURATED O2: 100 % (ref 95–100)
POC TCO2: 24 MMOL/L (ref 23–27)
POTASSIUM SERPL-SCNC: 4.2 MMOL/L (ref 3.5–5.1)
PROT SERPL-MCNC: 5.4 G/DL (ref 6–8.4)
RBC # BLD AUTO: 3.04 M/UL (ref 4.6–6.2)
RBC # BLD AUTO: 3.44 M/UL (ref 4.6–6.2)
SAMPLE: ABNORMAL
SITE: ABNORMAL
SODIUM SERPL-SCNC: 134 MMOL/L (ref 136–145)
WBC # BLD AUTO: 11.47 K/UL (ref 3.9–12.7)
WBC # BLD AUTO: 9.76 K/UL (ref 3.9–12.7)

## 2019-12-22 PROCEDURE — 25000003 PHARM REV CODE 250: Performed by: PSYCHIATRY & NEUROLOGY

## 2019-12-22 PROCEDURE — 82803 BLOOD GASES ANY COMBINATION: CPT

## 2019-12-22 PROCEDURE — 94640 AIRWAY INHALATION TREATMENT: CPT

## 2019-12-22 PROCEDURE — 25000003 PHARM REV CODE 250: Performed by: STUDENT IN AN ORGANIZED HEALTH CARE EDUCATION/TRAINING PROGRAM

## 2019-12-22 PROCEDURE — 85025 COMPLETE CBC W/AUTO DIFF WBC: CPT

## 2019-12-22 PROCEDURE — 27200966 HC CLOSED SUCTION SYSTEM

## 2019-12-22 PROCEDURE — 25000003 PHARM REV CODE 250: Performed by: ANESTHESIOLOGY

## 2019-12-22 PROCEDURE — 80053 COMPREHEN METABOLIC PANEL: CPT

## 2019-12-22 PROCEDURE — 83735 ASSAY OF MAGNESIUM: CPT

## 2019-12-22 PROCEDURE — 63600175 PHARM REV CODE 636 W HCPCS: Performed by: PSYCHIATRY & NEUROLOGY

## 2019-12-22 PROCEDURE — 83735 ASSAY OF MAGNESIUM: CPT | Mod: 91

## 2019-12-22 PROCEDURE — 99900035 HC TECH TIME PER 15 MIN (STAT)

## 2019-12-22 PROCEDURE — 63600175 PHARM REV CODE 636 W HCPCS: Performed by: NURSE PRACTITIONER

## 2019-12-22 PROCEDURE — 63600175 PHARM REV CODE 636 W HCPCS: Performed by: PHYSICIAN ASSISTANT

## 2019-12-22 PROCEDURE — 94761 N-INVAS EAR/PLS OXIMETRY MLT: CPT

## 2019-12-22 PROCEDURE — 84100 ASSAY OF PHOSPHORUS: CPT

## 2019-12-22 PROCEDURE — 99291 PR CRITICAL CARE, E/M 30-74 MINUTES: ICD-10-PCS | Mod: ,,, | Performed by: PHYSICIAN ASSISTANT

## 2019-12-22 PROCEDURE — 94003 VENT MGMT INPAT SUBQ DAY: CPT

## 2019-12-22 PROCEDURE — 84100 ASSAY OF PHOSPHORUS: CPT | Mod: 91

## 2019-12-22 PROCEDURE — 27000221 HC OXYGEN, UP TO 24 HOURS

## 2019-12-22 PROCEDURE — 99900026 HC AIRWAY MAINTENANCE (STAT)

## 2019-12-22 PROCEDURE — A4216 STERILE WATER/SALINE, 10 ML: HCPCS | Performed by: ANESTHESIOLOGY

## 2019-12-22 PROCEDURE — 25000003 PHARM REV CODE 250: Performed by: PHYSICIAN ASSISTANT

## 2019-12-22 PROCEDURE — 20000000 HC ICU ROOM

## 2019-12-22 PROCEDURE — 99291 CRITICAL CARE FIRST HOUR: CPT | Mod: ,,, | Performed by: PHYSICIAN ASSISTANT

## 2019-12-22 PROCEDURE — 25000242 PHARM REV CODE 250 ALT 637 W/ HCPCS: Performed by: NURSE PRACTITIONER

## 2019-12-22 PROCEDURE — S4991 NICOTINE PATCH NONLEGEND: HCPCS | Performed by: PSYCHIATRY & NEUROLOGY

## 2019-12-22 PROCEDURE — 25000003 PHARM REV CODE 250: Performed by: NURSE PRACTITIONER

## 2019-12-22 PROCEDURE — 37799 UNLISTED PX VASCULAR SURGERY: CPT

## 2019-12-22 RX ORDER — IPRATROPIUM BROMIDE AND ALBUTEROL SULFATE 2.5; .5 MG/3ML; MG/3ML
3 SOLUTION RESPIRATORY (INHALATION) EVERY 4 HOURS
Status: DISCONTINUED | OUTPATIENT
Start: 2019-12-22 | End: 2019-12-26

## 2019-12-22 RX ORDER — CLOPIDOGREL BISULFATE 75 MG/1
75 TABLET ORAL DAILY
Status: DISCONTINUED | OUTPATIENT
Start: 2019-12-23 | End: 2019-12-25

## 2019-12-22 RX ORDER — OXYCODONE HYDROCHLORIDE 5 MG/1
10 TABLET ORAL EVERY 6 HOURS PRN
Status: DISCONTINUED | OUTPATIENT
Start: 2019-12-22 | End: 2019-12-25

## 2019-12-22 RX ORDER — SODIUM CHLORIDE FOR INHALATION 3 %
4 VIAL, NEBULIZER (ML) INHALATION EVERY 4 HOURS
Status: DISCONTINUED | OUTPATIENT
Start: 2019-12-22 | End: 2019-12-26

## 2019-12-22 RX ORDER — SODIUM CHLORIDE FOR INHALATION 3 %
4 VIAL, NEBULIZER (ML) INHALATION ONCE
Status: DISCONTINUED | OUTPATIENT
Start: 2019-12-22 | End: 2019-12-22 | Stop reason: SDUPTHER

## 2019-12-22 RX ADMIN — SODIUM PHOSPHATE, MONOBASIC, MONOHYDRATE 15 MMOL: 276; 142 INJECTION, SOLUTION INTRAVENOUS at 05:12

## 2019-12-22 RX ADMIN — HEPARIN SODIUM 5000 UNITS: 5000 INJECTION, SOLUTION INTRAVENOUS; SUBCUTANEOUS at 09:12

## 2019-12-22 RX ADMIN — Medication 10 ML: at 01:12

## 2019-12-22 RX ADMIN — IPRATROPIUM BROMIDE AND ALBUTEROL SULFATE 3 ML: .5; 3 SOLUTION RESPIRATORY (INHALATION) at 12:12

## 2019-12-22 RX ADMIN — SODIUM CHLORIDE SOLN NEBU 3% 4 ML: 3 NEBU SOLN at 08:12

## 2019-12-22 RX ADMIN — IPRATROPIUM BROMIDE AND ALBUTEROL SULFATE 3 ML: .5; 3 SOLUTION RESPIRATORY (INHALATION) at 04:12

## 2019-12-22 RX ADMIN — AMLODIPINE BESYLATE 10 MG: 10 TABLET ORAL at 09:12

## 2019-12-22 RX ADMIN — LOSARTAN POTASSIUM AND HYDROCHLOROTHIAZIDE 1 TABLET: 100; 25 TABLET, FILM COATED ORAL at 09:12

## 2019-12-22 RX ADMIN — SENNOSIDES AND DOCUSATE SODIUM 1 TABLET: 8.6; 5 TABLET ORAL at 08:12

## 2019-12-22 RX ADMIN — Medication 10 ML: at 06:12

## 2019-12-22 RX ADMIN — LEVOTHYROXINE SODIUM 50 MCG: 50 TABLET ORAL at 06:12

## 2019-12-22 RX ADMIN — Medication 10 ML: at 11:12

## 2019-12-22 RX ADMIN — SODIUM CHLORIDE SOLN NEBU 3% 4 ML: 3 NEBU SOLN at 11:12

## 2019-12-22 RX ADMIN — CHLORHEXIDINE GLUCONATE 0.12% ORAL RINSE 15 ML: 1.2 LIQUID ORAL at 09:12

## 2019-12-22 RX ADMIN — CEFEPIME 2 G: 2 INJECTION, POWDER, FOR SOLUTION INTRAVENOUS at 01:12

## 2019-12-22 RX ADMIN — HEPARIN SODIUM 5000 UNITS: 5000 INJECTION, SOLUTION INTRAVENOUS; SUBCUTANEOUS at 01:12

## 2019-12-22 RX ADMIN — MODAFINIL 100 MG: 100 TABLET ORAL at 01:12

## 2019-12-22 RX ADMIN — FAMOTIDINE 20 MG: 20 TABLET ORAL at 09:12

## 2019-12-22 RX ADMIN — METOPROLOL TARTRATE 50 MG: 50 TABLET ORAL at 09:12

## 2019-12-22 RX ADMIN — METOPROLOL TARTRATE 50 MG: 50 TABLET ORAL at 08:12

## 2019-12-22 RX ADMIN — Medication: at 09:12

## 2019-12-22 RX ADMIN — CEFEPIME 2 G: 2 INJECTION, POWDER, FOR SOLUTION INTRAVENOUS at 09:12

## 2019-12-22 RX ADMIN — FENTANYL CITRATE 50 MCG: 50 INJECTION INTRAMUSCULAR; INTRAVENOUS at 11:12

## 2019-12-22 RX ADMIN — CHLORHEXIDINE GLUCONATE 0.12% ORAL RINSE 15 ML: 1.2 LIQUID ORAL at 08:12

## 2019-12-22 RX ADMIN — SODIUM CHLORIDE SOLN NEBU 3% 4 ML: 3 NEBU SOLN at 04:12

## 2019-12-22 RX ADMIN — SENNOSIDES AND DOCUSATE SODIUM 1 TABLET: 8.6; 5 TABLET ORAL at 09:12

## 2019-12-22 RX ADMIN — IPRATROPIUM BROMIDE AND ALBUTEROL SULFATE 3 ML: .5; 3 SOLUTION RESPIRATORY (INHALATION) at 08:12

## 2019-12-22 RX ADMIN — FENTANYL CITRATE 50 MCG: 50 INJECTION INTRAMUSCULAR; INTRAVENOUS at 03:12

## 2019-12-22 RX ADMIN — MODAFINIL 200 MG: 100 TABLET ORAL at 06:12

## 2019-12-22 RX ADMIN — Medication 1 PATCH: at 08:12

## 2019-12-22 RX ADMIN — FAMOTIDINE 20 MG: 20 TABLET ORAL at 08:12

## 2019-12-22 RX ADMIN — CEFEPIME 2 G: 2 INJECTION, POWDER, FOR SOLUTION INTRAVENOUS at 05:12

## 2019-12-22 RX ADMIN — Medication: at 02:12

## 2019-12-22 RX ADMIN — POLYETHYLENE GLYCOL 3350 17 G: 17 POWDER, FOR SOLUTION ORAL at 08:12

## 2019-12-22 RX ADMIN — OXYCODONE HYDROCHLORIDE 10 MG: 5 TABLET ORAL at 06:12

## 2019-12-22 RX ADMIN — IPRATROPIUM BROMIDE AND ALBUTEROL SULFATE 3 ML: .5; 3 SOLUTION RESPIRATORY (INHALATION) at 11:12

## 2019-12-22 RX ADMIN — ATORVASTATIN CALCIUM 80 MG: 20 TABLET, FILM COATED ORAL at 08:12

## 2019-12-22 RX ADMIN — FENTANYL CITRATE 50 MCG: 50 INJECTION INTRAMUSCULAR; INTRAVENOUS at 09:12

## 2019-12-22 RX ADMIN — HEPARIN SODIUM 5000 UNITS: 5000 INJECTION, SOLUTION INTRAVENOUS; SUBCUTANEOUS at 06:12

## 2019-12-22 RX ADMIN — SODIUM CHLORIDE SOLN NEBU 3% 4 ML: 3 NEBU SOLN at 12:12

## 2019-12-22 RX ADMIN — MAGNESIUM SULFATE IN WATER 2 G: 40 INJECTION, SOLUTION INTRAVENOUS at 06:12

## 2019-12-22 RX ADMIN — Medication: at 10:12

## 2019-12-22 NOTE — ASSESSMENT & PLAN NOTE
C6-T3 osteomyelitis with epidural abscess    -12/10: C7-T1 corpectomy with C6-T2 anterior fusion and lumbar drain placement   -Cultures: (12/10)Neck abscess +pseudomonas --cefepime 2g q8h for 6 week duration of therapy  12/17: OR with neurosurgery for stage II, posterior instrumentation C2-T3  -lumbar drain in place- removed today per neurosurgery, no HOB goal, monitor for signs of CSF leak   -scheduled oxycodone, wean fentanyl, valium prn   -intubated, WTE  - when OOB

## 2019-12-22 NOTE — PROGRESS NOTES
[facility]  Neurocritical Care  Progress Note    Admit Date: 12/6/2019  Service Date: 12/21/2019  Length of Stay: 15    Subjective:     Chief Complaint: Spinal epidural abscess    History of Present Illness: Junaid Muñoz is a 74 year old male with CAD, HTN, HLD, hypothyroidism, tobacco use, and prior history of IVDU who was found to have C6-T3 osteomyelitis with epidural abscess. Patient was neurologically intact and hemodynamically stable on admission.  Now immediately status post phase one of a two part surgery.  Today he had a C7-T1 corpectomy and C6-T2 anterior fusion.  Lumbar drain was placed secondary to intra op CSF leak, and patient admitted to [facility] for post op monitoring and drain maintenance.     Hospital Course: 12/10 admitted post-op C7-T1 corpectomy and C6-T2 anterior fusion with lumbar drain placed 2/2 CSF leak intra-op  12/12  No significant events over night, per nsgy will keep lumbar drain another day. 2nd stage of sugery to occur next Tuesday. Remains NPO while having to lie flat with  lumbar drain. Getting confused after receiving valium decreased dose.  12/13: Restless o/n, c/o back pain. LD dropped to floor by NSGY, 8-10cc/hr. HV drain in place as well. Neurologically stable. AF, no leukocytosis, H/H stable.   12/14: calm at present, slight agitation overnight, can elevate head for swallow trials and po today,pending picc placement for long term abx  12/15: less responsive this am, wean scheduled ativan, begin to advance diet and taper ivfs  12/16: NAEO. Stage II surgery tomorrow for posterior cervical fusion. Leukocytosis increasing, is on cefepime for pseudomonas in CSF, will reach out to ID to see if any further abx pre-op should be given. Lumbar drain in place. Increase normal saline to 100 cc/hr. NPO after midnight.   12/17: OR today for stage II posterior cervical instrumentation. Required 3 units PRBCs and 2 units FFP. 2 hemovacs in place to gravity with large  output. Will repeat CBC. Patient still intubated on arrival to ICU with cervical and facial edema, will start decadron. ID recommended repeating blood cultures as patient's WBC increasing. Continue cefepime.   12/18: POD 1 C2-T3 posterior instrumentation. Patient is still intubated, will wean to extubate. Facial and cervical swelling greatly improved. Lumbar drain in place. Hemovac x 2. Hemodynamically stable.  12/19/2019 subQ heparin,d/c howell, NSGY brace for neck, DAPT, HOB restrictions    12/20/2019 HOB clarified with NSGY keep flat, neuro status drowsy, labetolol 2x overnight, hydralazine not effective, respiratory, weaning paramaters on SIMV, wean to extubate, d/c protime INR   12/21/2019 Lumbar drain and hemovac pulled today per neurosurgery, no more HOB restrictions. Will attempt to wean ventilator and fentanyl. Scheduled oxycodone 5mg q 4h for fentanyl wean.             Interval History: Lumbar drain and hemovac pulled today per neurosurgery, no more HOB restrictions. Will attempt to wean ventilator and fentanyl. Scheduled oxycodone 5mg q 4h for fentanyl wean. Increased norvasc to 10    Review of Systems: Unable to obtain a complete ROS due to patient is mechanically ventilated     Vitals:   Temp: 97.4 °F (36.3 °C)  Pulse: 104  Rhythm: normal sinus rhythm  BP: (!) 165/84  MAP (mmHg): 117  Resp: 17  SpO2: 100 %  Oxygen Concentration (%): 40  O2 Device (Oxygen Therapy): ventilator  Vent Mode: SIMV  Set Rate: 8 bmp  Pressure Support: 10 cmH20  PEEP/CPAP: 5 cmH20  Peak Airway Pressure: 18 cmH2O  Mean Airway Pressure: 8.7 cmH20  Plateau Pressure: 0 cmH20    Temp  Min: 97.2 °F (36.2 °C)  Max: 97.7 °F (36.5 °C)  Pulse  Min: 74  Max: 106  BP  Min: 135/71  Max: 204/94  MAP (mmHg)  Min: 96  Max: 135  Resp  Min: 12  Max: 26  SpO2  Min: 100 %  Max: 100 %  Oxygen Concentration (%)  Min: 40  Max: 40    12/20 0701 - 12/21 0700  In: 3772.4 [I.V.:2672.5]  Out: 3888 [Urine:3835; Drains:53]   Unmeasured Output  Urine  Occurrence: 1  Stool Occurrence: 1     Examination:   Constitutional: No apparent distress.   Eyes: Conjunctiva clear  Head/Ears/Nose/Mouth/Throat/Neck: Moist mucous membranes. Surgical incision is C/D/I   Cardiovascular: Regular rhythm.   Respiratory: Comfortable respirations. Intubated .  Gastrointestinal: Soft, nondistended     Neurologic:  -Intubated with sedation paused, pupils 2mm and brisk. Follows commands. Pupils equal and reactive, EOMI, Face symmetric. Facial and cervical swelling minor. hemovacs intact x 2 with bloody output anterior and posterior cervical incisions are C/D/I  -moves all extremities x 4 spontaneously with good effort, follows commands x 4, sensation intact.    Medications:   Continuous  sodium chloride 0.9% Last Rate: 100 mL/hr at 12/21/19 1805   niCARdipine Last Rate: Stopped (12/21/19 0805)   Scheduled  amLODIPine 10 mg Daily   amLODIPine 5 mg Once   atorvastatin 80 mg Daily   bacitracin  TID   ceFEPime (MAXIPIME) IVPB 2 g Q8H   chlorhexidine 15 mL BID   [START ON 12/22/2019] diazePAM 5 mg Daily   famotidine 20 mg BID   heparin (porcine) 5,000 Units Q8H   levothyroxine 50 mcg Before breakfast   losartan-hydrochlorothiazide 100-25 mg 1 tablet Daily   metoprolol tartrate 50 mg BID   modafinil 100 mg Daily   [START ON 12/22/2019] modafinil 200 mg Daily   nicotine 1 patch Daily   oxyCODONE 10 mg Q8H   polyethylene glycol 17 g Daily   senna-docusate 8.6-50 mg 1 tablet BID   sodium chloride 0.9% 10 mL Q6H   PRN  acetaminophen 500 mg Q8H PRN   Dextrose 10% Bolus 12.5 g PRN   Dextrose 10% Bolus 25 g PRN   fentaNYL 50 mcg Q2H PRN   glucagon (human recombinant) 1 mg PRN   glucose 16 g PRN   glucose 24 g PRN   hydrALAZINE 10 mg Q4H PRN   labetalol 10 mg Q4H PRN   magnesium sulfate IVPB 2 g PRN   magnesium sulfate IVPB 4 g PRN   naloxone 0.4 mg PRN   ondansetron 8 mg Q8H PRN   potassium chloride in water 40 mEq PRN   And     potassium chloride in water 60 mEq PRN   And     potassium chloride in  water 80 mEq PRN   sodium chloride 0.9% 10 mL PRN   sodium phosphate IVPB 15 mmol PRN   sodium phosphate IVPB 20.01 mmol PRN   sodium phosphate IVPB 30 mmol PRN      Today I independently reviewed pertinent medications, lines/drains/airways, imaging, cardiology results, laboratory results, microbiology results,     ISTAT: Recent Labs   Lab 12/21/19  0441   PH 7.429   PCO2 31.4*   PO2 193*   POCSATURATED 100   HCO3 20.8*   BE -4   POCTCO2 22*   SAMPLE ARTERIAL      Chem: Recent Labs   Lab 12/21/19  0149 12/21/19  1135 12/21/19  1707   *  --   --    K 3.7 3.9 4.7     --   --    CO2 20*  --   --      --   --    BUN 13  --   --    CREATININE 0.7  --   --    ESTGFRAFRICA >60.0  --   --    EGFRNONAA >60.0  --   --    CALCIUM 8.3*  --   --    MG 1.7 2.0  --    PHOS 2.2*  --  2.4*   ANIONGAP 7*  --   --    PROT 5.7*  --   --    ALBUMIN 2.3*  --   --    BILITOT 0.8  --   --    ALKPHOS 334*  --   --    AST 29  --   --    ALT 14  --   --      Heme: Recent Labs   Lab 12/21/19  0809   WBC 10.88   HGB 10.0*   HCT 31.9*   *     Endo: No results for input(s): POCTGLUCOSE in the last 24 hours.   Assessment/Plan:     Pulmonary  On mechanically assisted ventilation  ABG  HOB restriction lifted  Weaning to extubation slowly patient drowsy   On modafonil   Vent Mode: SIMV  Oxygen Concentration (%):  [40] 40  Resp Rate Total:  [9 br/min-24 br/min] 11 br/min  PEEP/CPAP:  [5 cmH20] 5 cmH20  Pressure Support:  [7 cmH20-10 cmH20] 10 cmH20  Mean Airway Pressure:  [8.1 cmH20-9.1 cmH20] 8.7 cmH20      Cardiac/Vascular  Hyperlipidemia  Atorvastatin 80 mg daily  Monitor LFTs    Coronary artery disease involving native coronary artery of native heart without angina pectoris  Clopidogrel 75 mg daily     Essential hypertension  SBP < 180  Amlodipine 10 mg daily  Losartan-HCTZ 100 mg - 25 mg  Metoprolol XL 50 mg BID       ID  * Spinal epidural abscess  C6-T3 osteomyelitis with epidural abscess    -12/10: C7-T1 corpectomy  with C6-T2 anterior fusion and lumbar drain placement   -Cultures: (12/10)Neck abscess +pseudomonas --cefepime 2g q8h for 6 week duration of therapy  12/17: OR with neurosurgery for stage II, posterior instrumentation C2-T3  -lumbar drain in place- removed today per neurosurgery, no HOB goal, monitor for signs of CSF leak   -scheduled oxycodone, wean fentanyl, valium prn   -intubated, WTE  - when OOB      Pseudomonas infection  C3-T3 osteomyelitis with epidural abscess s/p C7-T1 corpectomy & C6-T2 anterior fusion (12/6 stage 1) with tissue cultures +pseudomonas  -ID following, appreciate recommendations : cefepime 2g IV q8h for 6 weeks (tentative end date 1/28/20)          Acute osteomyelitis of thoracic spine  See spinal epidural abscess  Lumber drain in place   On ABx while drain in place       Acute osteomyelitis of cervical spine  See spinal epidural abscess      Endocrine  Other specified hypothyroidism  Levothyroxine 50 mcg daily    GI  Chronic hepatitis C without hepatic coma  Monitor LFTs          The patient is being Prophylaxed for:  Venous Thromboembolism with: Chemical  Stress Ulcer with: H2B  Ventilator Pneumonia with: chlorhexidine oral care    Activity Orders          Diet NPO: NPO starting at 12/17 0001        Full Code   CCT 35 min    Marily Brooks PA-C  Neurocritical Care  [facility]

## 2019-12-22 NOTE — PLAN OF CARE
POC reviewed with pt at 0400. Pt nodded to verbalize understanding but needs reinforcement. TF off at 0400 per Shirley, NP for extubation today. NS @ 100cc/hr. Mag and Phos replaced. Cardene remained off throughout shift. Pt progressing toward goals. Will continue to monitor. See flowsheets for full assessment and VS info

## 2019-12-22 NOTE — ASSESSMENT & PLAN NOTE
74 y.o. male with PMH of HTN, HLD, Hepatitis C, and CAD s/p two stents on plavix who presents with C6-T2 osteomyelitis with suspected epidural abscess.  Now s/p C7/T1 corpectomies and C2-T2  posterior instrumented fusion 12/18    POD 5.     - Continue ICU care and neuro checks q1h  - Drains discontinued, sites c/d/i.  - No HOB restrictions, allow patient to mobilize.   - Continue cefepime per ID Recs x 8 weeks.   - Wean to extubate per ICU  - Follow-up cultures: growing Pseudomonas.  Final antibiotics per ID team  - Likely will need long-term IV antibiotics and PICC line

## 2019-12-22 NOTE — PLAN OF CARE
POC reviewed with pt at 1400. Pt, unable to verbalized understanding. Questions and concerns addressed. No acute events today. Pt progressing toward goals. Will continue to monitor. See flowsheets for full assessment and VS info.     Tube feeding restarted, pt unable to be extubated today, all oxy switched to prn

## 2019-12-22 NOTE — ASSESSMENT & PLAN NOTE
ABG  HOB restriction lifted  Weaning to extubation slowly patient drowsy   On modafonil   Vent Mode: SIMV  Oxygen Concentration (%):  [40] 40  Resp Rate Total:  [9 br/min-24 br/min] 11 br/min  PEEP/CPAP:  [5 cmH20] 5 cmH20  Pressure Support:  [7 cmH20-10 cmH20] 10 cmH20  Mean Airway Pressure:  [8.1 cmH20-9.1 cmH20] 8.7 cmH20

## 2019-12-22 NOTE — ASSESSMENT & PLAN NOTE
C6-T3 osteomyelitis with epidural abscess    -12/10: C7-T1 corpectomy with C6-T2 anterior fusion and lumbar drain placement   -Cultures: (12/10)Neck abscess +pseudomonas --cefepime 2g q8h for 6 week duration of therapy  12/17: OR with neurosurgery for stage II, posterior instrumentation C2-T3  -No HOB goal now that drains dc'd  -intubated, WTE--- daily weaning parameters.   -limit sedation--- off fentanyl, d/c scheduled valium, oxycodone PRN   - when OOB  -PT/OT when appropriate

## 2019-12-22 NOTE — PROGRESS NOTES
[facility]  Neurosurgery  Progress Note    Subjective:     History of Present Illness: Junaid Muñoz is a 74 y.o. male with PMH of HTN, HLD, Hepatitis C, and CAD s/p two stents on plavix who presents with 1 month history of back pain between his shoulders. MRI at OSH demonstrates likely epidural abscess. Pt denies hx of trauma and reports slow onset of symptoms.     Post-Op Info:  Procedure(s) (LRB):  FUSION, SPINE, CERVICAL, POSTERIOR APPROACH C2-T3 posterior instrumented fusion (N/A)   5 Days Post-Op     Medications Prior to Admission   Medication Sig Dispense Refill Last Dose    atorvastatin (LIPITOR) 80 MG tablet Take 80 mg by mouth once daily at 6am.   12/6/2019 at Unknown time    clopidogrel (PLAVIX) 75 mg tablet Take 75 mg by mouth once daily at 6am.   12/6/2019 at Unknown time    ibuprofen (ADVIL,MOTRIN) 600 MG tablet Take 1 tablet (600 mg total) by mouth every 6 (six) hours as needed for Pain. 20 tablet 0 Past Week at Unknown time    levothyroxine (SYNTHROID) 50 MCG tablet Take 50 mcg by mouth once daily at 6am.   12/6/2019 at Unknown time    losartan-hydrochlorothiazide 100-25 mg (HYZAAR) 100-25 mg per tablet Take 1 tablet by mouth once daily at 6am.    12/6/2019 at Unknown time    metoprolol succinate (TOPROL-XL) 100 MG 24 hr tablet Take 100 mg by mouth once daily at 6am.   12/9/2019 at 0900       Review of patient's allergies indicates:   Allergen Reactions    Penicillins Rash     Tolerated cefepime December 2019       Past Medical History:   Diagnosis Date    CAD (coronary artery disease)     s/p stent 2005, on plavix    Chronic hepatitis C     Cirrhosis     biopsy proven - 2007    History of colon polyps 06/2008    1 polyp - tubular adenoma    HTN (hypertension)     Hyperlipidemia     Hypothyroidism      Past Surgical History:   Procedure Laterality Date    COLONOSCOPY W/ POLYPECTOMY  06/2008    Dr Wagoner: fair prep, 3mm polyp - tubular adenoma    CORONARY  ANGIOPLASTY WITH STENT PLACEMENT      FUSION OF CERVICAL SPINE BY POSTERIOR APPROACH N/A 12/17/2019    Procedure: FUSION, SPINE, CERVICAL, POSTERIOR APPROACH C2-T3 posterior instrumented fusion;  Surgeon: Elian Deluca MD;  Location: [facility] OR [address] [place] [address];  Service: Neurosurgery;  Laterality: N/A;    hydrocele repair  teenager    pyloric stenosis repair  age 1    SURGICAL REMOVAL OF VERTEBRAL BODY OF THORACIC SPINE N/A 12/10/2019    Procedure: CORPECTOMY, SPINE, CERVICAL AND THORACIC, C7 and T1 with Globus;  Surgeon: Elian Deluca MD;  Location: [facility] OR [address] [place] [address];  Service: Neurosurgery;  Laterality: N/A;    TONSILLECTOMY       Family History     None        Tobacco Use    Smoking status: Current Every Day Smoker   Substance and Sexual Activity    Alcohol use: Not on file    Drug use: Yes     Types: IV     Comment: MORPHINE    Sexual activity: Not on file     Review of Systems  Objective:     Weight: 79.4 kg (175 lb 0.7 oz)  Body mass index is 25.85 kg/m².  Vital Signs (Most Recent):  Temp: 97.2 °F (36.2 °C) (12/22/19 0305)  Pulse: 83 (12/22/19 0525)  Resp: 13 (12/22/19 0525)  BP: (!) 109/57 (12/22/19 0405)  SpO2: 100 % (12/22/19 0525) Vital Signs (24h Range):  Temp:  [97 °F (36.1 °C)-97.6 °F (36.4 °C)] 97.2 °F (36.2 °C)  Pulse:  [] 83  Resp:  [12-31] 13  SpO2:  [99 %-100 %] 100 %  BP: (102-204)/() 109/57  Arterial Line BP: (118-221)/(51-92) 121/53                Vent Mode: SIMV  Oxygen Concentration (%):  [40] 40  Resp Rate Total:  [8.3 br/min-24 br/min] 12 br/min  PEEP/CPAP:  [5 cmH20] 5 cmH20  Pressure Support:  [7 ajX14-31 cmH20] 13 cmH20  Mean Airway Pressure:  [6.4 cmH20-9.8 cmH20] 6.4 cmH20         NG/OG Tube 12/17/19 1700 (Active)   Placement Check placement verified by x-ray;placement verified by aspirate characteristics;placement verified by distal tube length measurement 12/22/2019  3:05 AM   Tolerance no signs/symptoms of discomfort 12/22/2019  3:05 AM   Securement secured to [employer]  device 12/22/2019  3:05 AM   Clamp Status/Tolerance clamped 12/22/2019  3:05 AM   Insertion Site Appearance no redness, warmth, tenderness, skin breakdown, drainage 12/22/2019  3:05 AM   Drainage None 12/22/2019  3:05 AM   Flush/Irrigation flushed w/;water;no resistance met 12/22/2019  3:05 AM   Feeding Method continuous 12/22/2019  3:05 AM   Feeding Action feeding continued 12/22/2019  3:05 AM   Current Rate (mL/hr) 10 mL/hr 12/21/2019  7:05 PM   Goal Rate (mL/hr) 50 mL/hr 12/21/2019  7:05 PM   Intake (mL) 100 mL 12/19/2019  9:02 AM   Rate Formula Tube Feeding (mL/hr) 10 mL/hr 12/21/2019  7:05 PM   Intake (mL) - Formula Tube Feeding 0 12/22/2019  4:05 AM   Residual Amount (ml) 10 ml 12/21/2019  7:05 PM       Male External Urinary Catheter 12/19/19 1200 Small (Active)   Collection Container Urimeter 12/22/2019  3:05 AM   Securement Method secured to top of thigh w/ adhesive device 12/22/2019  3:05 AM   Skin no redness;no breakdown;penis/scrotum cleansed w/ soap and water 12/22/2019  3:05 AM   Tolerance no signs/symptoms of discomfort 12/22/2019  3:05 AM   Output (mL) 125 mL 12/22/2019  1:05 AM   Catheter Change Date 12/21/19 12/22/2019  3:05 AM   Catheter Change Time 1900 12/22/2019  3:05 AM       Neurosurgery Physical Exam  [id number]  Follows simple commands in UE > LE  Lumbar drain site without leak  Incisions c/d/i        Significant Labs:  Recent Labs   Lab 12/21/19  0149 12/21/19  1135 12/21/19  1707 12/22/19  0121     --   --  109   *  --   --  134*   K 3.7 3.9 4.7 4.2     --   --  106   CO2 20*  --   --  22*   BUN 13  --   --  11   CREATININE 0.7  --   --  0.6   CALCIUM 8.3*  --   --  8.3*   MG 1.7 2.0  --  1.8     Recent Labs   Lab 12/20/19 2020 12/21/19  0809 12/21/19  2108   WBC 9.27 10.88 11.63   HGB 10.2* 10.0* 10.0*   HCT 30.7* 31.9* 30.6*   * 138* 149*     No results for input(s): LABPT, INR, APTT in the last 48 hours.  Microbiology Results (last 7 days)     Procedure Component  Value Units Date/Time    Blood culture [[id number]] Collected:  12/17/19 1505    Order Status:  Completed Specimen:  Blood from Line, Arterial, Right Updated:  12/21/19 1812     Blood Culture, Routine No Growth to date      No Growth to date      No Growth to date      No Growth to date      No Growth to date    Blood culture [[id number]]     Order Status:  No result Specimen:  Blood     Blood culture [[id number]] Collected:  12/12/19 0147    Order Status:  Completed Specimen:  Blood from Wrist, Right Updated:  12/17/19 0612     Blood Culture, Routine No growth after 5 days.    Narrative:       Blood cultures from 2 different sites. 4 bottles total.  Please draw before starting antibiotics.    Blood culture [[id number]] Collected:  12/12/19 0213    Order Status:  Completed Specimen:  Blood from Peripheral, Antecubital, Right Updated:  12/17/19 0612     Blood Culture, Routine No growth after 5 days.    Narrative:       Blood cultures x 2 different sites. 4 bottles total. Please  draw cultures before administering antibiotics.    Blood culture [[id number]] Collected:  12/11/19 1000    Order Status:  Completed Specimen:  Blood from Peripheral, Foot, Right Updated:  12/16/19 1212     Blood Culture, Routine No growth after 5 days.    Narrative:       Blood cultures from 2 different sites. 4 bottles total.  Please draw before starting antibiotics.    Blood culture [[id number]] Collected:  12/11/19 1005    Order Status:  Completed Specimen:  Blood from Peripheral, Forearm, Right Updated:  12/16/19 1212     Blood Culture, Routine No growth after 5 days.    Narrative:       Blood cultures x 2 different sites. 4 bottles total. Please  draw cultures before administering antibiotics.    Culture, Anaerobe [[id number]] Collected:  12/10/19 1038    Order Status:  Completed Specimen:  Body Fluid from Neck Updated:  12/16/19 0854     Anaerobic Culture No anaerobes isolated    Narrative:       2) Free vertebral abscess #2    Culture,  Anaerobe [[id number]] Collected:  12/10/19 1038    Order Status:  Completed Specimen:  Body Fluid from Neck Updated:  12/16/19 0854     Anaerobic Culture No anaerobes isolated    Narrative:       1) Free vertebral abscess #1    CSF culture [[id number]] Collected:  12/11/19 1247    Order Status:  Completed Specimen:  CSF (Spinal Fluid) from CSF Tap, Tube 3 Updated:  12/16/19 0711     CSF CULTURE No Growth     Gram Stain Result Cytospin indicates:      Rare WBC's      No organisms seen        Recent Lab Results       12/22/19  0428   12/22/19  0121   12/21/19  2108   12/21/19  1707   12/21/19  1351        Albumin   2.1           Alkaline Phosphatase   285           Allens Test N/A             ALT   14           Anion Gap   6           AST   25           Baso #     0.02         Basophil%     0.2         BILIRUBIN TOTAL   0.7  Comment:  For infants and newborns, interpretation of results should be based  on gestational age, weight and in agreement with clinical  observations.  Premature Infant recommended reference ranges:  Up to 24 hours.............<8.0 mg/dL  Up to 48 hours............<12.0 mg/dL  3-5 days..................<15.0 mg/dL  6-29 days.................<15.0 mg/dL             Site [facility]             BUN, Bld   11           Calcium   8.3           Chloride   106           CO2   22           Creatinine   0.6           Differential Method     Automated         eGFR if    >60.0           eGFR if non    >60.0  Comment:  Calculation used to obtain the estimated glomerular filtration  rate (eGFR) is the CKD-EPI equation.              Eos #     0.1         Eosinophil%     1.0         Glucose   109           Gran # (ANC)     9.6         Gran%     82.7         Hematocrit     30.6         Hemoglobin     10.0         HIV Rapid Testing         Negative     Immature Grans (Abs)     0.05  Comment:  Mild elevation in immature granulocytes is non specific and   can be seen in a variety of  conditions including stress response,   acute inflammation, trauma and pregnancy. Correlation with other   laboratory and clinical findings is essential.           Immature Granulocytes     0.4         Lymph #     0.9         Lymph%     8.0         Magnesium   1.8           MCH     28.2         MCHC     32.7         MCV     86         Mono #     0.9         Mono%     7.7         MPV     9.5         nRBC     0         Phosphorus   2.6   2.4       Platelets     149         POC BE -2             POC HCO3 22.8             POC PCO2 37.5             POC PH 7.391             POC PO2 184             POC SATURATED O2 100             POC TCO2 24             Potassium   4.2   4.7       PROTEIN TOTAL   5.4           RBC     3.55         RDW     16.3         Sample ARTERIAL             Sodium   134           WBC     11.63                          12/21/19  1135   12/21/19  0809        Albumin         Alkaline Phosphatase         Allens Test         ALT         Anion Gap         AST         Baso #   0.02     Basophil%   0.2     BILIRUBIN TOTAL         Site         BUN, Bld         Calcium         Chloride         CO2         Creatinine         Differential Method   Automated     eGFR if          eGFR if non          Eos #   0.1     Eosinophil%   0.5     Glucose         Gran # (ANC)   8.8     Gran%   81.2     Hematocrit   31.9     Hemoglobin   10.0     HIV Rapid Testing         Immature Grans (Abs)   0.05  Comment:  Mild elevation in immature granulocytes is non specific and   can be seen in a variety of conditions including stress response,   acute inflammation, trauma and pregnancy. Correlation with other   laboratory and clinical findings is essential.       Immature Granulocytes   0.5     Lymph #   1.0     Lymph%   8.7     Magnesium 2.0       MCH   27.3     MCHC   31.3     MCV   87     Mono #   1.0     Mono%   8.9     MPV   9.6     nRBC   0     Phosphorus         Platelets   138     POC BE          POC HCO3         POC PCO2         POC PH         POC PO2         POC SATURATED O2         POC TCO2         Potassium 3.9       PROTEIN TOTAL         RBC   3.66     RDW   16.4     Sample         Sodium         WBC   10.88           Significant Diagnostics:  CT: No results found in the last 24 hours.  Echoencephalography: No results found in the last 24 hours.  MRI: No results found in the last 24 hours.    Assessment/Plan:     * Spinal epidural abscess  74 y.o. male with PMH of HTN, HLD, Hepatitis C, and CAD s/p two stents on plavix who presents with C6-T2 osteomyelitis with suspected epidural abscess.  Now s/p C7/T1 corpectomies and C2-T2  posterior instrumented fusion 12/18    POD 5.     - Continue ICU care and neuro checks q1h  - Drains discontinued, sites c/d/i.  - No HOB restrictions, allow patient to mobilize.   - Continue cefepime per ID Recs x 8 weeks.   - Wean to extubate per ICU  - Follow-up cultures: growing Pseudomonas.  Final antibiotics per ID team  - Likely will need long-term IV antibiotics and PICC line         Julio César Werner MD  Neurosurgery  [facility]

## 2019-12-22 NOTE — PROGRESS NOTES
Notified CRISTIANO Kruse and CRISTIANO Watson of pt's sudden increased WOB w/ abdominal breathing. Pt hypertensive and tachypneic. Pt maintained adequate O2 sats during episode. RT at bedside. Minimal secretions when suctioned. Neb tx and IPV ordered for RT to perform/administer. WCTM.

## 2019-12-22 NOTE — ASSESSMENT & PLAN NOTE
C3-T3 osteomyelitis with epidural abscess s/p C7-T1 corpectomy & C6-T2 anterior fusion (12/6 stage 1) with tissue cultures +pseudomonas  -ID following, appreciate recommendations : cefepime 2g IV q8h for 6 weeks (tentative end date 1/28/20)

## 2019-12-22 NOTE — PROGRESS NOTES
[facility]  Neurocritical Care  Progress Note    Admit Date: 12/6/2019  Service Date: 12/22/2019  Length of Stay: 16    Subjective:     Chief Complaint: Spinal epidural abscess    History of Present Illness: Junaid Muñoz is a 74 year old male with CAD, HTN, HLD, hypothyroidism, tobacco use, and prior history of IVDU who was found to have C6-T3 osteomyelitis with epidural abscess. Patient was neurologically intact and hemodynamically stable on admission.  Now immediately status post phase one of a two part surgery.  Today he had a C7-T1 corpectomy and C6-T2 anterior fusion.  Lumbar drain was placed secondary to intra op CSF leak, and patient admitted to [facility] for post op monitoring and drain maintenance.     Hospital Course: 12/10 admitted post-op C7-T1 corpectomy and C6-T2 anterior fusion with lumbar drain placed 2/2 CSF leak intra-op  12/12  No significant events over night, per nsgy will keep lumbar drain another day. 2nd stage of sugery to occur next Tuesday. Remains NPO while having to lie flat with  lumbar drain. Getting confused after receiving valium decreased dose.  12/13: Restless o/n, c/o back pain. LD dropped to floor by NSGY, 8-10cc/hr. HV drain in place as well. Neurologically stable. AF, no leukocytosis, H/H stable.   12/14: calm at present, slight agitation overnight, can elevate head for swallow trials and po today,pending picc placement for long term abx  12/15: less responsive this am, wean scheduled ativan, begin to advance diet and taper ivfs  12/16: NAEO. Stage II surgery tomorrow for posterior cervical fusion. Leukocytosis increasing, is on cefepime for pseudomonas in CSF, will reach out to ID to see if any further abx pre-op should be given. Lumbar drain in place. Increase normal saline to 100 cc/hr. NPO after midnight.   12/17: OR today for stage II posterior cervical instrumentation. Required 3 units PRBCs and 2 units FFP. 2 hemovacs in place to gravity with large  output. Will repeat CBC. Patient still intubated on arrival to ICU with cervical and facial edema, will start decadron. ID recommended repeating blood cultures as patient's WBC increasing. Continue cefepime.   12/18: POD 1 C2-T3 posterior instrumentation. Patient is still intubated, will wean to extubate. Facial and cervical swelling greatly improved. Lumbar drain in place. Hemovac x 2. Hemodynamically stable.  12/19/2019 subQ heparin,d/c howell, NSGY brace for neck, DAPT, HOB restrictions    12/20/2019 HOB clarified with NSGY keep flat, neuro status drowsy, labetolol 2x overnight, hydralazine not effective, respiratory, weaning paramaters on SIMV, wean to extubate, d/c protime INR   12/21/2019 Lumbar drain and hemovac pulled today per neurosurgery, no more HOB restrictions. Will attempt to wean ventilator and fentanyl. Scheduled oxycodone 5mg q 4h for fentanyl wean.   12/22/2019 Continues to fail weaning trials. Off fentanyl. D/C scheduled valium. Oxycodone PRN. Weaning parameters daily.     Interval History:  Continues to fail weaning trials. Off fentanyl. D/C scheduled valium. Oxycodone PRN. Weaning parameters daily.     Review of Systems: Unable to obtain a complete ROS due to patient is intubated     Vitals:   Temp: 98.7 °F (37.1 °C)  Pulse: 92  Rhythm: normal sinus rhythm  BP: 122/67  MAP (mmHg): 88  Resp: (!) 28  SpO2: 100 %  Oxygen Concentration (%): 40  O2 Device (Oxygen Therapy): ventilator  Vent Mode: SIMV  Set Rate: 10 bmp  Pressure Support: 10 cmH20  PEEP/CPAP: 5 cmH20  Peak Airway Pressure: 15 cmH2O  Mean Airway Pressure: 7.2 cmH20  Plateau Pressure: 15 cmH20    Temp  Min: 97 °F (36.1 °C)  Max: 98.7 °F (37.1 °C)  Pulse  Min: 69  Max: 104  BP  Min: 97/54  Max: 195/77  MAP (mmHg)  Min: 72  Max: 121  Resp  Min: 8  Max: 31  SpO2  Min: 99 %  Max: 100 %  Oxygen Concentration (%)  Min: 40  Max: 40    12/21 0701 - 12/22 0700  In: 3242.5 [I.V.:2762.5]  Out: 2457 [Urine:2450; Drains:7]   Unmeasured Output  Urine  Occurrence: 1  Stool Occurrence: 1     Examination:   Constitutional: No apparent distress.   Eyes: Conjunctiva clear  Head/Ears/Nose/Mouth/Throat/Neck: Moist mucous membranes. Surgical incision is C/D/I   Cardiovascular: Regular rhythm.   Respiratory: Comfortable respirations. Intubated .  Gastrointestinal: Soft, nondistended     Neurologic:  -Intubated with sedation paused, pupils 2mm and brisk. Follows commands. Pupils equal and reactive, EOMI, Face symmetric. Facial and cervical swelling minor. hemovacs intact x 2 with bloody output anterior and posterior cervical incisions are C/D/I  -moves all extremities x 4 spontaneously with good effort, follows commands x 4, sensation intact.    Medications:   Continuous  sodium chloride 0.9% Last Rate: 100 mL/hr at 12/22/19 1500   niCARdipine Last Rate: Stopped (12/21/19 0805)   Scheduled  albuterol-ipratropium 3 mL Q4H   amLODIPine 10 mg Daily   atorvastatin 80 mg Daily   bacitracin  TID   ceFEPime (MAXIPIME) IVPB 2 g Q8H   chlorhexidine 15 mL BID   [START ON 12/23/2019] clopidogrel 75 mg Daily   famotidine 20 mg BID   heparin (porcine) 5,000 Units Q8H   levothyroxine 50 mcg Before breakfast   losartan-hydrochlorothiazide 100-25 mg 1 tablet Daily   metoprolol tartrate 50 mg BID   modafinil 100 mg Daily   modafinil 200 mg Daily   polyethylene glycol 17 g Daily   senna-docusate 8.6-50 mg 1 tablet BID   sodium chloride 0.9% 10 mL Q6H   sodium chloride 3% 4 mL Q4H   PRN  acetaminophen 500 mg Q8H PRN   Dextrose 10% Bolus 12.5 g PRN   Dextrose 10% Bolus 25 g PRN   fentaNYL 50 mcg Q2H PRN   glucagon (human recombinant) 1 mg PRN   glucose 16 g PRN   glucose 24 g PRN   hydrALAZINE 10 mg Q4H PRN   labetalol 10 mg Q4H PRN   magnesium sulfate IVPB 2 g PRN   magnesium sulfate IVPB 4 g PRN   naloxone 0.4 mg PRN   ondansetron 8 mg Q8H PRN   oxyCODONE 10 mg Q6H PRN   potassium chloride in water 40 mEq PRN   And     potassium chloride in water 60 mEq PRN   And     potassium chloride in  water 80 mEq PRN   sodium chloride 0.9% 10 mL PRN   sodium phosphate IVPB 15 mmol PRN   sodium phosphate IVPB 20.01 mmol PRN   sodium phosphate IVPB 30 mmol PRN      Today I independently reviewed pertinent medications, lines/drains/airways, imaging, cardiology results, laboratory results, microbiology results,      ISTAT: Recent Labs   Lab 12/22/19  0428   PH 7.391   PCO2 37.5   PO2 184*   POCSATURATED 100   HCO3 22.8*   BE -2   POCTCO2 24   SAMPLE ARTERIAL      Chem: Recent Labs   Lab 12/22/19  0121 12/22/19  1400   *  --    K 4.2  --      --    CO2 22*  --      --    BUN 11  --    CREATININE 0.6  --    ESTGFRAFRICA >60.0  --    EGFRNONAA >60.0  --    CALCIUM 8.3*  --    MG 1.8 2.2   PHOS 2.6* 2.6*   ANIONGAP 6*  --    PROT 5.4*  --    ALBUMIN 2.1*  --    BILITOT 0.7  --    ALKPHOS 285*  --    AST 25  --    ALT 14  --      Heme: Recent Labs   Lab 12/22/19  0823   WBC 9.76   HGB 8.3*   HCT 27.0*   *     Endo: No results for input(s): POCTGLUCOSE in the last 24 hours.   Assessment/Plan:     Pulmonary  On mechanically assisted ventilation  ABG  HOB restriction lifted  Weaning to extubation slowly patient drowsy-- limit sedation!  On modafinil 200 mg q AM and 100 mg q afternoon  Vent Mode: SIMV  Oxygen Concentration (%):  [40] 40  Resp Rate Total:  [8.3 br/min-24 br/min] 16 br/min  PEEP/CPAP:  [5 cmH20] 5 cmH20  Pressure Support:  [8 pfL12-82 cmH20] 10 cmH20  Mean Airway Pressure:  [6.4 cmH20-9.8 cmH20] 7.2 cmH20      Cardiac/Vascular  Hyperlipidemia  Atorvastatin 80 mg daily  Monitor LFTs    Coronary artery disease involving native coronary artery of native heart without angina pectoris  Clopidogrel 75 mg daily     Essential hypertension  SBP < 180  Amlodipine 10 mg daily  Losartan-HCTZ 100 mg - 25 mg  Metoprolol XL 50 mg BID       ID  * Spinal epidural abscess  C6-T3 osteomyelitis with epidural abscess    -12/10: C7-T1 corpectomy with C6-T2 anterior fusion and lumbar drain placement    -Cultures: (12/10)Neck abscess +pseudomonas --cefepime 2g q8h for 6 week duration of therapy  12/17: OR with neurosurgery for stage II, posterior instrumentation C2-T3  -No HOB goal now that drains dc'd  -intubated, WTE--- daily weaning parameters.   -limit sedation--- off fentanyl, d/c scheduled valium, oxycodone PRN   - when OOB  -PT/OT when appropriate       Pseudomonas infection  C3-T3 osteomyelitis with epidural abscess s/p C7-T1 corpectomy & C6-T2 anterior fusion (12/6 stage 1) with tissue cultures +pseudomonas  -ID following, appreciate recommendations : cefepime 2g IV q8h for 6 weeks (tentative end date 1/28/20)          Acute osteomyelitis of thoracic spine  See spinal epidural abscess        Acute osteomyelitis of cervical spine  See spinal epidural abscess      Oncology  Leukocytosis  See pseudomonas infection/epidural abscess/osteomyelitis     GI  Chronic hepatitis C without hepatic coma  Monitor LFTs          The patient is being Prophylaxed for:  Venous Thromboembolism with: Chemical  Stress Ulcer with: H2B  Ventilator Pneumonia with: chlorhexidine oral care    Activity Orders          Diet NPO: NPO starting at 12/17 0001        Full Code   CCT 35 min    Marily Brooks PA-C  Neurocritical Care  [facility]

## 2019-12-22 NOTE — ASSESSMENT & PLAN NOTE
ABG  HOB restriction lifted  Weaning to extubation slowly patient drowsy-- limit sedation!  On modafinil 200 mg q AM and 100 mg q afternoon  Vent Mode: SIMV  Oxygen Concentration (%):  [40] 40  Resp Rate Total:  [8.3 br/min-24 br/min] 16 br/min  PEEP/CPAP:  [5 cmH20] 5 cmH20  Pressure Support:  [8 koI45-67 cmH20] 10 cmH20  Mean Airway Pressure:  [6.4 cmH20-9.8 cmH20] 7.2 cmH20

## 2019-12-22 NOTE — SUBJECTIVE & OBJECTIVE
Medications Prior to Admission   Medication Sig Dispense Refill Last Dose    atorvastatin (LIPITOR) 80 MG tablet Take 80 mg by mouth once daily at 6am.   12/6/2019 at Unknown time    clopidogrel (PLAVIX) 75 mg tablet Take 75 mg by mouth once daily at 6am.   12/6/2019 at Unknown time    ibuprofen (ADVIL,MOTRIN) 600 MG tablet Take 1 tablet (600 mg total) by mouth every 6 (six) hours as needed for Pain. 20 tablet 0 Past Week at Unknown time    levothyroxine (SYNTHROID) 50 MCG tablet Take 50 mcg by mouth once daily at 6am.   12/6/2019 at Unknown time    losartan-hydrochlorothiazide 100-25 mg (HYZAAR) 100-25 mg per tablet Take 1 tablet by mouth once daily at 6am.    12/6/2019 at Unknown time    metoprolol succinate (TOPROL-XL) 100 MG 24 hr tablet Take 100 mg by mouth once daily at 6am.   12/9/2019 at 0900       Review of patient's allergies indicates:   Allergen Reactions    Penicillins Rash     Tolerated cefepime December 2019       Past Medical History:   Diagnosis Date    CAD (coronary artery disease)     s/p stent 2005, on plavix    Chronic hepatitis C     Cirrhosis     biopsy proven - 2007    History of colon polyps 06/2008    1 polyp - tubular adenoma    HTN (hypertension)     Hyperlipidemia     Hypothyroidism      Past Surgical History:   Procedure Laterality Date    COLONOSCOPY W/ POLYPECTOMY  06/2008    Dr Wagoner: fair prep, 3mm polyp - tubular adenoma    CORONARY ANGIOPLASTY WITH STENT PLACEMENT      FUSION OF CERVICAL SPINE BY POSTERIOR APPROACH N/A 12/17/2019    Procedure: FUSION, SPINE, CERVICAL, POSTERIOR APPROACH C2-T3 posterior instrumented fusion;  Surgeon: Elian Deluca MD;  Location: [facility] OR [address] [place] [address];  Service: Neurosurgery;  Laterality: N/A;    hydrocele repair  teenager    pyloric stenosis repair  age 1    SURGICAL REMOVAL OF VERTEBRAL BODY OF THORACIC SPINE N/A 12/10/2019    Procedure: CORPECTOMY, SPINE, CERVICAL AND THORACIC, C7 and T1 with Globus;  Surgeon: Elian Deluca MD;   Location: [facility] OR [address] [place] [address];  Service: Neurosurgery;  Laterality: N/A;    TONSILLECTOMY       Family History     None        Tobacco Use    Smoking status: Current Every Day Smoker   Substance and Sexual Activity    Alcohol use: Not on file    Drug use: Yes     Types: IV     Comment: MORPHINE    Sexual activity: Not on file     Review of Systems  Objective:     Weight: 79.4 kg (175 lb 0.7 oz)  Body mass index is 25.85 kg/m².  Vital Signs (Most Recent):  Temp: 97.2 °F (36.2 °C) (12/22/19 0305)  Pulse: 83 (12/22/19 0525)  Resp: 13 (12/22/19 0525)  BP: (!) 109/57 (12/22/19 0405)  SpO2: 100 % (12/22/19 0525) Vital Signs (24h Range):  Temp:  [97 °F (36.1 °C)-97.6 °F (36.4 °C)] 97.2 °F (36.2 °C)  Pulse:  [] 83  Resp:  [12-31] 13  SpO2:  [99 %-100 %] 100 %  BP: (102-204)/() 109/57  Arterial Line BP: (118-221)/(51-92) 121/53                Vent Mode: SIMV  Oxygen Concentration (%):  [40] 40  Resp Rate Total:  [8.3 br/min-24 br/min] 12 br/min  PEEP/CPAP:  [5 cmH20] 5 cmH20  Pressure Support:  [7 kjD87-64 cmH20] 13 cmH20  Mean Airway Pressure:  [6.4 cmH20-9.8 cmH20] 6.4 cmH20         NG/OG Tube 12/17/19 1700 (Active)   Placement Check placement verified by x-ray;placement verified by aspirate characteristics;placement verified by distal tube length measurement 12/22/2019  3:05 AM   Tolerance no signs/symptoms of discomfort 12/22/2019  3:05 AM   Securement secured to commercial device 12/22/2019  3:05 AM   Clamp Status/Tolerance clamped 12/22/2019  3:05 AM   Insertion Site Appearance no redness, warmth, tenderness, skin breakdown, drainage 12/22/2019  3:05 AM   Drainage None 12/22/2019  3:05 AM   Flush/Irrigation flushed w/;water;no resistance met 12/22/2019  3:05 AM   Feeding Method continuous 12/22/2019  3:05 AM   Feeding Action feeding continued 12/22/2019  3:05 AM   Current Rate (mL/hr) 10 mL/hr 12/21/2019  7:05 PM   Goal Rate (mL/hr) 50 mL/hr 12/21/2019  7:05 PM   Intake (mL) 100 mL 12/19/2019  9:02 AM    Rate Formula Tube Feeding (mL/hr) 10 mL/hr 12/21/2019  7:05 PM   Intake (mL) - Formula Tube Feeding 0 12/22/2019  4:05 AM   Residual Amount (ml) 10 ml 12/21/2019  7:05 PM       Male External Urinary Catheter 12/19/19 1200 Small (Active)   Collection Container Urimeter 12/22/2019  3:05 AM   Securement Method secured to top of thigh w/ adhesive device 12/22/2019  3:05 AM   Skin no redness;no breakdown;penis/scrotum cleansed w/ soap and water 12/22/2019  3:05 AM   Tolerance no signs/symptoms of discomfort 12/22/2019  3:05 AM   Output (mL) 125 mL 12/22/2019  1:05 AM   Catheter Change Date 12/21/19 12/22/2019  3:05 AM   Catheter Change Time 1900 12/22/2019  3:05 AM       Neurosurgery Physical Exam  [id number]  Follows simple commands in UE > LE  Lumbar drain site without leak  Incisions c/d/i        Significant Labs:  Recent Labs   Lab 12/21/19  0149 12/21/19  1135 12/21/19  1707 12/22/19  0121     --   --  109   *  --   --  134*   K 3.7 3.9 4.7 4.2     --   --  106   CO2 20*  --   --  22*   BUN 13  --   --  11   CREATININE 0.7  --   --  0.6   CALCIUM 8.3*  --   --  8.3*   MG 1.7 2.0  --  1.8     Recent Labs   Lab 12/20/19 2020 12/21/19  0809 12/21/19  2108   WBC 9.27 10.88 11.63   HGB 10.2* 10.0* 10.0*   HCT 30.7* 31.9* 30.6*   * 138* 149*     No results for input(s): LABPT, INR, APTT in the last 48 hours.  Microbiology Results (last 7 days)     Procedure Component Value Units Date/Time    Blood culture [[id number]] Collected:  12/17/19 150    Order Status:  Completed Specimen:  Blood from Line, Arterial, Right Updated:  12/21/19 1812     Blood Culture, Routine No Growth to date      No Growth to date      No Growth to date      No Growth to date      No Growth to date    Blood culture [[id number]]     Order Status:  No result Specimen:  Blood     Blood culture [[id number]] Collected:  12/12/19 0147    Order Status:  Completed Specimen:  Blood from Wrist, Right Updated:  12/17/19 0612     Blood  Culture, Routine No growth after 5 days.    Narrative:       Blood cultures from 2 different sites. 4 bottles total.  Please draw before starting antibiotics.    Blood culture [[id number]] Collected:  12/12/19 0213    Order Status:  Completed Specimen:  Blood from Peripheral, Antecubital, Right Updated:  12/17/19 0612     Blood Culture, Routine No growth after 5 days.    Narrative:       Blood cultures x 2 different sites. 4 bottles total. Please  draw cultures before administering antibiotics.    Blood culture [[id number]] Collected:  12/11/19 1000    Order Status:  Completed Specimen:  Blood from Peripheral, Foot, Right Updated:  12/16/19 1212     Blood Culture, Routine No growth after 5 days.    Narrative:       Blood cultures from 2 different sites. 4 bottles total.  Please draw before starting antibiotics.    Blood culture [[id number]] Collected:  12/11/19 1005    Order Status:  Completed Specimen:  Blood from Peripheral, Forearm, Right Updated:  12/16/19 1212     Blood Culture, Routine No growth after 5 days.    Narrative:       Blood cultures x 2 different sites. 4 bottles total. Please  draw cultures before administering antibiotics.    Culture, Anaerobe [[id number]] Collected:  12/10/19 1038    Order Status:  Completed Specimen:  Body Fluid from Neck Updated:  12/16/19 0854     Anaerobic Culture No anaerobes isolated    Narrative:       2) Free vertebral abscess #2    Culture, Anaerobe [[id number]] Collected:  12/10/19 1038    Order Status:  Completed Specimen:  Body Fluid from Neck Updated:  12/16/19 0854     Anaerobic Culture No anaerobes isolated    Narrative:       1) Free vertebral abscess #1    CSF culture [[id number]] Collected:  12/11/19 1247    Order Status:  Completed Specimen:  CSF (Spinal Fluid) from CSF Tap, Tube 3 Updated:  12/16/19 0711     CSF CULTURE No Growth     Gram Stain Result Cytospin indicates:      Rare WBC's      No organisms seen        Recent Lab Results       12/22/19  0816    12/22/19  0121   12/21/19  2108   12/21/19  1707   12/21/19  1351        Albumin   2.1           Alkaline Phosphatase   285           Allens Test N/A             ALT   14           Anion Gap   6           AST   25           Baso #     0.02         Basophil%     0.2         BILIRUBIN TOTAL   0.7  Comment:  For infants and newborns, interpretation of results should be based  on gestational age, weight and in agreement with clinical  observations.  Premature Infant recommended reference ranges:  Up to 24 hours.............<8.0 mg/dL  Up to 48 hours............<12.0 mg/dL  3-5 days..................<15.0 mg/dL  6-29 days.................<15.0 mg/dL             Site Norma/UAC             BUN, Bld   11           Calcium   8.3           Chloride   106           CO2   22           Creatinine   0.6           Differential Method     Automated         eGFR if    >60.0           eGFR if non    >60.0  Comment:  Calculation used to obtain the estimated glomerular filtration  rate (eGFR) is the CKD-EPI equation.              Eos #     0.1         Eosinophil%     1.0         Glucose   109           Gran # (ANC)     9.6         Gran%     82.7         Hematocrit     30.6         Hemoglobin     10.0         HIV Rapid Testing         Negative     Immature Grans (Abs)     0.05  Comment:  Mild elevation in immature granulocytes is non specific and   can be seen in a variety of conditions including stress response,   acute inflammation, trauma and pregnancy. Correlation with other   laboratory and clinical findings is essential.           Immature Granulocytes     0.4         Lymph #     0.9         Lymph%     8.0         Magnesium   1.8           MCH     28.2         MCHC     32.7         MCV     86         Mono #     0.9         Mono%     7.7         MPV     9.5         nRBC     0         Phosphorus   2.6   2.4       Platelets     149         POC BE -2             POC HCO3 22.8             POC PCO2 37.5              POC PH 7.391             POC PO2 184             POC SATURATED O2 100             POC TCO2 24             Potassium   4.2   4.7       PROTEIN TOTAL   5.4           RBC     3.55         RDW     16.3         Sample ARTERIAL             Sodium   134           WBC     11.63                          12/21/19  1135   12/21/19  0809        Albumin         Alkaline Phosphatase         Allens Test         ALT         Anion Gap         AST         Baso #   0.02     Basophil%   0.2     BILIRUBIN TOTAL         Site         BUN, Bld         Calcium         Chloride         CO2         Creatinine         Differential Method   Automated     eGFR if          eGFR if non          Eos #   0.1     Eosinophil%   0.5     Glucose         Gran # (ANC)   8.8     Gran%   81.2     Hematocrit   31.9     Hemoglobin   10.0     HIV Rapid Testing         Immature Grans (Abs)   0.05  Comment:  Mild elevation in immature granulocytes is non specific and   can be seen in a variety of conditions including stress response,   acute inflammation, trauma and pregnancy. Correlation with other   laboratory and clinical findings is essential.       Immature Granulocytes   0.5     Lymph #   1.0     Lymph%   8.7     Magnesium 2.0       MCH   27.3     MCHC   31.3     MCV   87     Mono #   1.0     Mono%   8.9     MPV   9.6     nRBC   0     Phosphorus         Platelets   138     POC BE         POC HCO3         POC PCO2         POC PH         POC PO2         POC SATURATED O2         POC TCO2         Potassium 3.9       PROTEIN TOTAL         RBC   3.66     RDW   16.4     Sample         Sodium         WBC   10.88           Significant Diagnostics:  CT: No results found in the last 24 hours.  Echoencephalography: No results found in the last 24 hours.  MRI: No results found in the last 24 hours.

## 2019-12-23 PROBLEM — E87.1 HYPONATREMIA: Status: ACTIVE | Noted: 2019-12-23

## 2019-12-23 LAB
ALBUMIN SERPL BCP-MCNC: 2.2 G/DL (ref 3.5–5.2)
ALLENS TEST: ABNORMAL
ALP SERPL-CCNC: 350 U/L (ref 55–135)
ALT SERPL W/O P-5'-P-CCNC: 16 U/L (ref 10–44)
ANION GAP SERPL CALC-SCNC: 6 MMOL/L (ref 8–16)
AST SERPL-CCNC: 33 U/L (ref 10–40)
BASOPHILS # BLD AUTO: 0.04 K/UL (ref 0–0.2)
BASOPHILS NFR BLD: 0.4 % (ref 0–1.9)
BILIRUB SERPL-MCNC: 0.6 MG/DL (ref 0.1–1)
BUN SERPL-MCNC: 11 MG/DL (ref 8–23)
CALCIUM SERPL-MCNC: 8.1 MG/DL (ref 8.7–10.5)
CHLORIDE SERPL-SCNC: 107 MMOL/L (ref 95–110)
CO2 SERPL-SCNC: 20 MMOL/L (ref 23–29)
CREAT SERPL-MCNC: 0.6 MG/DL (ref 0.5–1.4)
DELSYS: ABNORMAL
DIFFERENTIAL METHOD: ABNORMAL
EOSINOPHIL # BLD AUTO: 0.2 K/UL (ref 0–0.5)
EOSINOPHIL NFR BLD: 1.6 % (ref 0–8)
ERYTHROCYTE [DISTWIDTH] IN BLOOD BY AUTOMATED COUNT: 16.7 % (ref 11.5–14.5)
EST. GFR  (AFRICAN AMERICAN): >60 ML/MIN/1.73 M^2
EST. GFR  (NON AFRICAN AMERICAN): >60 ML/MIN/1.73 M^2
FIO2: 40
GLUCOSE SERPL-MCNC: 139 MG/DL (ref 70–110)
HCO3 UR-SCNC: 23.1 MMOL/L (ref 24–28)
HCT VFR BLD AUTO: 26 % (ref 40–54)
HGB BLD-MCNC: 8 G/DL (ref 14–18)
IMM GRANULOCYTES # BLD AUTO: 0.06 K/UL (ref 0–0.04)
IMM GRANULOCYTES NFR BLD AUTO: 0.6 % (ref 0–0.5)
LYMPHOCYTES # BLD AUTO: 1.2 K/UL (ref 1–4.8)
LYMPHOCYTES NFR BLD: 11.6 % (ref 18–48)
MAGNESIUM SERPL-MCNC: 1.7 MG/DL (ref 1.6–2.6)
MAGNESIUM SERPL-MCNC: 2 MG/DL (ref 1.6–2.6)
MAP: 9.3
MCH RBC QN AUTO: 27.4 PG (ref 27–31)
MCHC RBC AUTO-ENTMCNC: 30.8 G/DL (ref 32–36)
MCV RBC AUTO: 89 FL (ref 82–98)
MIN VOL: 16.2
MODE: ABNORMAL
MONOCYTES # BLD AUTO: 1 K/UL (ref 0.3–1)
MONOCYTES NFR BLD: 10.4 % (ref 4–15)
NEUTROPHILS # BLD AUTO: 7.5 K/UL (ref 1.8–7.7)
NEUTROPHILS NFR BLD: 75.4 % (ref 38–73)
NRBC BLD-RTO: 0 /100 WBC
PCO2 BLDA: 22.4 MMHG (ref 35–45)
PEEP: 5
PH SMN: 7.62 [PH] (ref 7.35–7.45)
PHOSPHATE SERPL-MCNC: 1.2 MG/DL (ref 2.7–4.5)
PLATELET # BLD AUTO: 133 K/UL (ref 150–350)
PMV BLD AUTO: 10 FL (ref 9.2–12.9)
PO2 BLDA: 201 MMHG (ref 80–100)
POC BE: 2 MMOL/L
POC SATURATED O2: 100 % (ref 95–100)
POC TCO2: 24 MMOL/L (ref 23–27)
POTASSIUM SERPL-SCNC: 3.5 MMOL/L (ref 3.5–5.1)
POTASSIUM SERPL-SCNC: 3.8 MMOL/L (ref 3.5–5.1)
PROT SERPL-MCNC: 5.4 G/DL (ref 6–8.4)
PS: 10
RBC # BLD AUTO: 2.92 M/UL (ref 4.6–6.2)
SAMPLE: ABNORMAL
SITE: ABNORMAL
SODIUM SERPL-SCNC: 133 MMOL/L (ref 136–145)
SP02: 100
SPONT RATE: 14
VOL: 974
WBC # BLD AUTO: 9.98 K/UL (ref 3.9–12.7)

## 2019-12-23 PROCEDURE — 99900026 HC AIRWAY MAINTENANCE (STAT)

## 2019-12-23 PROCEDURE — 80053 COMPREHEN METABOLIC PANEL: CPT

## 2019-12-23 PROCEDURE — 99291 CRITICAL CARE FIRST HOUR: CPT | Mod: ,,, | Performed by: NURSE PRACTITIONER

## 2019-12-23 PROCEDURE — 25000003 PHARM REV CODE 250: Performed by: PHYSICIAN ASSISTANT

## 2019-12-23 PROCEDURE — 99900035 HC TECH TIME PER 15 MIN (STAT)

## 2019-12-23 PROCEDURE — 99291 PR CRITICAL CARE, E/M 30-74 MINUTES: ICD-10-PCS | Mod: ,,, | Performed by: NURSE PRACTITIONER

## 2019-12-23 PROCEDURE — A4216 STERILE WATER/SALINE, 10 ML: HCPCS | Performed by: ANESTHESIOLOGY

## 2019-12-23 PROCEDURE — 83735 ASSAY OF MAGNESIUM: CPT | Mod: 91

## 2019-12-23 PROCEDURE — 83735 ASSAY OF MAGNESIUM: CPT

## 2019-12-23 PROCEDURE — 37799 UNLISTED PX VASCULAR SURGERY: CPT

## 2019-12-23 PROCEDURE — 84132 ASSAY OF SERUM POTASSIUM: CPT

## 2019-12-23 PROCEDURE — 94640 AIRWAY INHALATION TREATMENT: CPT

## 2019-12-23 PROCEDURE — 25000003 PHARM REV CODE 250: Performed by: PSYCHIATRY & NEUROLOGY

## 2019-12-23 PROCEDURE — 20000000 HC ICU ROOM

## 2019-12-23 PROCEDURE — 84100 ASSAY OF PHOSPHORUS: CPT

## 2019-12-23 PROCEDURE — 27000221 HC OXYGEN, UP TO 24 HOURS

## 2019-12-23 PROCEDURE — 63600175 PHARM REV CODE 636 W HCPCS: Performed by: PHYSICIAN ASSISTANT

## 2019-12-23 PROCEDURE — 63600175 PHARM REV CODE 636 W HCPCS: Performed by: NURSE PRACTITIONER

## 2019-12-23 PROCEDURE — 25000242 PHARM REV CODE 250 ALT 637 W/ HCPCS: Performed by: NURSE PRACTITIONER

## 2019-12-23 PROCEDURE — 25000003 PHARM REV CODE 250: Performed by: ANESTHESIOLOGY

## 2019-12-23 PROCEDURE — 25000003 PHARM REV CODE 250: Performed by: STUDENT IN AN ORGANIZED HEALTH CARE EDUCATION/TRAINING PROGRAM

## 2019-12-23 PROCEDURE — 25000003 PHARM REV CODE 250: Performed by: NURSE PRACTITIONER

## 2019-12-23 PROCEDURE — 63600175 PHARM REV CODE 636 W HCPCS: Performed by: PSYCHIATRY & NEUROLOGY

## 2019-12-23 PROCEDURE — 94761 N-INVAS EAR/PLS OXIMETRY MLT: CPT

## 2019-12-23 PROCEDURE — 82803 BLOOD GASES ANY COMBINATION: CPT

## 2019-12-23 PROCEDURE — 85025 COMPLETE CBC W/AUTO DIFF WBC: CPT

## 2019-12-23 RX ORDER — DEXMEDETOMIDINE HYDROCHLORIDE 4 UG/ML
0.2 INJECTION, SOLUTION INTRAVENOUS CONTINUOUS
Status: DISCONTINUED | OUTPATIENT
Start: 2019-12-23 | End: 2019-12-25

## 2019-12-23 RX ADMIN — HEPARIN SODIUM 5000 UNITS: 5000 INJECTION, SOLUTION INTRAVENOUS; SUBCUTANEOUS at 05:12

## 2019-12-23 RX ADMIN — LEVOTHYROXINE SODIUM 50 MCG: 50 TABLET ORAL at 05:12

## 2019-12-23 RX ADMIN — CEFEPIME 2 G: 2 INJECTION, POWDER, FOR SOLUTION INTRAVENOUS at 05:12

## 2019-12-23 RX ADMIN — FENTANYL CITRATE 50 MCG: 50 INJECTION INTRAMUSCULAR; INTRAVENOUS at 03:12

## 2019-12-23 RX ADMIN — CHLORHEXIDINE GLUCONATE 0.12% ORAL RINSE 15 ML: 1.2 LIQUID ORAL at 09:12

## 2019-12-23 RX ADMIN — SODIUM CHLORIDE SOLN NEBU 3% 4 ML: 3 NEBU SOLN at 07:12

## 2019-12-23 RX ADMIN — FAMOTIDINE 20 MG: 20 TABLET ORAL at 08:12

## 2019-12-23 RX ADMIN — MODAFINIL 200 MG: 100 TABLET ORAL at 05:12

## 2019-12-23 RX ADMIN — Medication 10 ML: at 05:12

## 2019-12-23 RX ADMIN — Medication 10 ML: at 06:12

## 2019-12-23 RX ADMIN — FENTANYL CITRATE 50 MCG: 50 INJECTION INTRAMUSCULAR; INTRAVENOUS at 12:12

## 2019-12-23 RX ADMIN — Medication 1 G: at 12:12

## 2019-12-23 RX ADMIN — Medication 1 G: at 09:12

## 2019-12-23 RX ADMIN — CHLORHEXIDINE GLUCONATE 0.12% ORAL RINSE 15 ML: 1.2 LIQUID ORAL at 08:12

## 2019-12-23 RX ADMIN — IPRATROPIUM BROMIDE AND ALBUTEROL SULFATE 3 ML: .5; 3 SOLUTION RESPIRATORY (INHALATION) at 11:12

## 2019-12-23 RX ADMIN — MAGNESIUM SULFATE IN WATER 2 G: 40 INJECTION, SOLUTION INTRAVENOUS at 05:12

## 2019-12-23 RX ADMIN — SODIUM CHLORIDE SOLN NEBU 3% 4 ML: 3 NEBU SOLN at 11:12

## 2019-12-23 RX ADMIN — HEPARIN SODIUM 5000 UNITS: 5000 INJECTION, SOLUTION INTRAVENOUS; SUBCUTANEOUS at 09:12

## 2019-12-23 RX ADMIN — CEFEPIME 2 G: 2 INJECTION, POWDER, FOR SOLUTION INTRAVENOUS at 09:12

## 2019-12-23 RX ADMIN — DEXMEDETOMIDINE HYDROCHLORIDE 0.2 MCG/KG/HR: 4 INJECTION, SOLUTION INTRAVENOUS at 04:12

## 2019-12-23 RX ADMIN — ATORVASTATIN CALCIUM 80 MG: 20 TABLET, FILM COATED ORAL at 08:12

## 2019-12-23 RX ADMIN — HEPARIN SODIUM 5000 UNITS: 5000 INJECTION, SOLUTION INTRAVENOUS; SUBCUTANEOUS at 02:12

## 2019-12-23 RX ADMIN — MODAFINIL 100 MG: 100 TABLET ORAL at 02:12

## 2019-12-23 RX ADMIN — FENTANYL CITRATE 50 MCG: 50 INJECTION INTRAMUSCULAR; INTRAVENOUS at 08:12

## 2019-12-23 RX ADMIN — SODIUM CHLORIDE SOLN NEBU 3% 4 ML: 3 NEBU SOLN at 03:12

## 2019-12-23 RX ADMIN — SODIUM PHOSPHATE, MONOBASIC, MONOHYDRATE 30 MMOL: 276; 142 INJECTION, SOLUTION INTRAVENOUS at 08:12

## 2019-12-23 RX ADMIN — FAMOTIDINE 20 MG: 20 TABLET ORAL at 09:12

## 2019-12-23 RX ADMIN — IPRATROPIUM BROMIDE AND ALBUTEROL SULFATE 3 ML: .5; 3 SOLUTION RESPIRATORY (INHALATION) at 08:12

## 2019-12-23 RX ADMIN — IPRATROPIUM BROMIDE AND ALBUTEROL SULFATE 3 ML: .5; 3 SOLUTION RESPIRATORY (INHALATION) at 07:12

## 2019-12-23 RX ADMIN — IPRATROPIUM BROMIDE AND ALBUTEROL SULFATE 3 ML: .5; 3 SOLUTION RESPIRATORY (INHALATION) at 03:12

## 2019-12-23 RX ADMIN — Medication: at 09:12

## 2019-12-23 RX ADMIN — METOPROLOL TARTRATE 50 MG: 50 TABLET ORAL at 09:12

## 2019-12-23 RX ADMIN — AMLODIPINE BESYLATE 10 MG: 10 TABLET ORAL at 08:12

## 2019-12-23 RX ADMIN — CLOPIDOGREL BISULFATE 75 MG: 75 TABLET ORAL at 08:12

## 2019-12-23 RX ADMIN — CEFEPIME 2 G: 2 INJECTION, POWDER, FOR SOLUTION INTRAVENOUS at 02:12

## 2019-12-23 RX ADMIN — Medication: at 08:12

## 2019-12-23 RX ADMIN — Medication: at 03:12

## 2019-12-23 RX ADMIN — Medication 1 G: at 03:12

## 2019-12-23 RX ADMIN — SODIUM CHLORIDE SOLN NEBU 3% 4 ML: 3 NEBU SOLN at 08:12

## 2019-12-23 RX ADMIN — METOPROLOL TARTRATE 50 MG: 50 TABLET ORAL at 08:12

## 2019-12-23 RX ADMIN — LOSARTAN POTASSIUM AND HYDROCHLOROTHIAZIDE 1 TABLET: 100; 25 TABLET, FILM COATED ORAL at 08:12

## 2019-12-23 RX ADMIN — OXYCODONE HYDROCHLORIDE 10 MG: 5 TABLET ORAL at 04:12

## 2019-12-23 RX ADMIN — Medication 10 ML: at 11:12

## 2019-12-23 RX ADMIN — POTASSIUM CHLORIDE 40 MEQ: 400 INJECTION, SOLUTION INTRAVENOUS at 08:12

## 2019-12-23 NOTE — PLAN OF CARE
Per MD,. Patient failed vent weaning.  Not medically ready for discharge.        12/23/19 1621   Discharge Reassessment   Assessment Type Discharge Planning Reassessment   Provided patient/caregiver education on the expected discharge date and the discharge plan No   Do you have any problems affording any of your prescribed medications? No   Discharge Plan A Long-term acute care facility (LTAC)   Discharge Plan B Skilled Nursing Facility   DME Needed Upon Discharge  none   Patient choice form signed by patient/caregiver N/A   Anticipated Discharge Disposition LTAC   Can the patient answer the patient profile reliably? No, cognitively impaired   Describe the patient's ability to walk at the present time. Does not walk or unable to take any steps at all   Number of comorbid conditions (as recorded on the chart) Four   Post-Acute Status   Post-Acute Authorization Placement;Other   Post-Acute Placement Status Awaiting Internal Medical Clearance   Discharge Delays None known at this time       Kimberly Carver RN, CCRN-K, [facility]  Neuro-Critical Care   [phone]

## 2019-12-23 NOTE — SUBJECTIVE & OBJECTIVE
Past Medical History:   Diagnosis Date    CAD (coronary artery disease)     s/p stent 2005, on plavix    Chronic hepatitis C     Cirrhosis     biopsy proven - 2007    History of colon polyps 06/2008    1 polyp - tubular adenoma    HTN (hypertension)     Hyperlipidemia     Hypothyroidism      Past Surgical History:   Procedure Laterality Date    COLONOSCOPY W/ POLYPECTOMY  06/2008    Dr Wagoner: fair prep, 3mm polyp - tubular adenoma    CORONARY ANGIOPLASTY WITH STENT PLACEMENT      FUSION OF CERVICAL SPINE BY POSTERIOR APPROACH N/A 12/17/2019    Procedure: FUSION, SPINE, CERVICAL, POSTERIOR APPROACH C2-T3 posterior instrumented fusion;  Surgeon: Elian Deluca MD;  Location: [facility] OR [address] [place] [address];  Service: Neurosurgery;  Laterality: N/A;    hydrocele repair  teenager    pyloric stenosis repair  age 1    SURGICAL REMOVAL OF VERTEBRAL BODY OF THORACIC SPINE N/A 12/10/2019    Procedure: CORPECTOMY, SPINE, CERVICAL AND THORACIC, C7 and T1 with Globus;  Surgeon: Elian Deluca MD;  Location: [facility] OR [address] [place] [address];  Service: Neurosurgery;  Laterality: N/A;    TONSILLECTOMY        No current facility-administered medications on file prior to encounter.      Current Outpatient Medications on File Prior to Encounter   Medication Sig Dispense Refill    atorvastatin (LIPITOR) 80 MG tablet Take 80 mg by mouth once daily at 6am.      clopidogrel (PLAVIX) 75 mg tablet Take 75 mg by mouth once daily at 6am.      ibuprofen (ADVIL,MOTRIN) 600 MG tablet Take 1 tablet (600 mg total) by mouth every 6 (six) hours as needed for Pain. 20 tablet 0    levothyroxine (SYNTHROID) 50 MCG tablet Take 50 mcg by mouth once daily at 6am.      losartan-hydrochlorothiazide 100-25 mg (HYZAAR) 100-25 mg per tablet Take 1 tablet by mouth once daily at 6am.       metoprolol succinate (TOPROL-XL) 100 MG 24 hr tablet Take 100 mg by mouth once daily at 6am.        Allergies: Penicillins    History reviewed. No pertinent family history.  Social  History     Tobacco Use    Smoking status: Current Every Day Smoker   Substance Use Topics    Alcohol use: Not on file    Drug use: Yes     Types: IV     Comment: MORPHINE     Review of Systems-Pt intubated  Objective:     Vitals:    Temp: 98 °F (36.7 °C)  Pulse: 91  Rhythm: normal sinus rhythm  BP: 113/60  MAP (mmHg): 81  Resp: 16  SpO2: 100 %  Oxygen Concentration (%): 40  O2 Device (Oxygen Therapy): ventilator  Vent Mode: SIMV  Set Rate: 10 bmp  Pressure Support: 10 cmH20  PEEP/CPAP: 5 cmH20  Peak Airway Pressure: 15 cmH2O  Mean Airway Pressure: 8.4 cmH20  Plateau Pressure: 15 cmH20    Temp  Min: 98 °F (36.7 °C)  Max: 98.7 °F (37.1 °C)  Pulse  Min: 75  Max: 116  BP  Min: 113/60  Max: 178/84  MAP (mmHg)  Min: 81  Max: 120  Resp  Min: 10  Max: 28  SpO2  Min: 94 %  Max: 100 %  Oxygen Concentration (%)  Min: 40  Max: 40    12/22 0701 - 12/23 0700  In: 2793.3 [I.V.:2253.3]  Out: 2835 [Urine:2835]   Unmeasured Output  Urine Occurrence: 1  Stool Occurrence: 1  Pad Count: 1       Physical Exam  GA: Alert, comfortable, no acute distress.   HEENT: No scleral icterus or JVD.   Pulmonary: Diminished to auscultation A/L. No wheezing, crackles, or rhonchi.  Cardiac: RRR S1 & S2 w/o rubs/murmurs/gallops.   Abdominal: Bowel sounds present x 4. No appreciable hepatosplenomegaly.  Skin: No jaundice, rashes, or visible lesions.  Neuro:  --GCS: E4 VT1 M6  --Mental Status:  Opens eyes to voice, tracks, follows all commands (sticks tongue out, shows thumb up on R side, wiggles toes)  --CN II-XII grossly intact.   --Pupils 3mm, PERRL.   --Corneal reflex, gag, cough intact.  --OSHEA spont    Today I personally reviewed pertinent medications, lines/drains/airways, imaging, laboratory results,

## 2019-12-23 NOTE — PLAN OF CARE
Failed multiple spontaneous trials  ETT @ 6 days, will continue to attempt to wean  Salt tabs 1 q8  elyte replacement

## 2019-12-23 NOTE — PLAN OF CARE
POC reviewed with pt and pt's son at 1600. Pt unable to verbalize understanding due to intubation. Pt's son at bedside to verbalize understanding. Questions and concerns addressed. Pt failed spontaneous breathing trial today and placed back on a rate, plan to reassess tomorrow. Tube feedings continued at 50ml/hr. NS remains at 100ml/hr. PRNs administered for agitation. Will continue to monitor. See flowsheets for full assessment and VS info.

## 2019-12-23 NOTE — OP NOTE
DATE OF PROCEDURE: 12/17/19 (Stage 2 of 2)    PREOPERATIVE DIAGNOSIS:  1. 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 Severe spinal cord compression, kyphotic deformity and spinal instability  3. IV drug abuse history, coronary stents on plavix, and hepatitis C  4. Complex durotomy from Stage 1 s/p lumbar drain     POSTOPERATIVE DIAGNOSIS:  Same.     PROCEDURE PERFORMED:  1. Posterior spinal fusion, C2-T3  2. Posterior segmental spinal fixation, C2-T3 (Depuy)  3. C6, C7 and T1 laminectomy and bilateral C7-T1 facetectomies for repair of complex durotomy  4. Use of intraoperative flouroscopy  5. Use of intraoperative neuromonitoring with MEPs  6. Vivigen bone grafting     SURGEON: Elian Deluca M.D.    COSURGEON: Valdemar Barboza M.D. -- Dr. Barboza assisted with the placement of all spinal instrumentation because a qualified resident was not available for this portion of the procedure.    ANESTHESIA: GETA     ESTIMATED BLOOD LOSS: 300mL     COMPLICATIONS: None     DRAINS: One deep.     SPECIMENS SENT: None     FINDINGS: None     INDICATIONS:     74M s/p C7 and T1 corpectomy from Stage 1. He did well from this, though he had a complex durotomy s/p LD placement. He continued to drain CSF from the anterior incision. He was scheduled for Stage 2 instrumented fusion, and I recommended an attempted durotomy repair from this approach.     I explained the risks, benefits, alternatives, indications and methods of the procedure in detail. The patient voiced understanding and all questions were answered. No guarantees were made. The patient agreed to proceed as planned.    PROCEDURE:     The patient was brought into the operating room where he was intubated and placed under general anesthesia without difficulty. All lines were placed.  A Campbell clamp was placed bitemporally and he was repositioned prone onto the hospital bed with the head fixed to the table in capital flexion and neck  extension. Appropriate padding of all pressure points was placed.  Xrays were used to localize the region of interest. It also showed adequate spinal alignment in the sagittal plane. The posterior cervical spine was marked prepped and draped in the usual sterile fashion.  A timeout was performed prior to the procedure.  10mL of Lidocaine with epinephrine were injected in the skin.     A linear incision was made with a 10 blade from approximately C2-T3.  Supra and subfascial dissection was carried out in the midline with Bovie electrocautery. Subfascial dissection was carried out with Bovie electrocautery and Haas elevators to expose the posterior elements from C2-T3. Levels were confirmed with lateral xray.     First, bilateral C2 pars screws were placed freehand. Then lateral mass screws were placed from C3-C6 using freehand technique and anatomic landmarks. We skipped left C3 and right C4 due to poor bony purchase. Bilateral T2 and T3 pedicle screws were placed freehand. Xrays showed good screw position.    We turned our attention to repair of the durotomy. We did a C6, C7 and T1 laminectomy to expose the thecal sac. We suspected the dural breach was at the ventral take-off of the left C8 nerve root. As such we performed a left C7-T1 facetectomy in standard fashion to expose the left C8 nerve root. From this exposure we could see the ventral thecal sac and the corpectomy cage from the left side. We also identified the dural breach using Valsalva. We then performed a right C7-T1 facetectomy and wrapped the thecal sac with Duragen and a muscle plug, which we secured with hemoclips and 4-0 Nurilons. We also wrapped the left C8 nerve root in identical fashion. No further CSF egress was seen and we covered the repair with Evaseal glue.      Titanium rods were sized, contoured and reduced into the tulip heads. Set screws were finally tightened. A crosslink was placed and tightened. Final xrays showed excellent spinal  alignment and hardware position.     The wound was copiously irrigated with a dilute Betadine solution and normal saline.   Exposed bony surfaces from C2-T3 were decorticated with a high-speed drill. Vivigen and Lilly putty was placed bilaterally for arthrodesis from C2-T3.  One gram of vancomycin powder was placed into the wound. A subfascial Hemovac drains was placed and tunneled through a separate incision, where it was secured with Vicryl sutures.  A watertight fascial closure was achieved with interrupted 0 Ethibond sutures and a running Stratafix suture.  The soft tissues were closed in layers and the skin was closed with staples.  A silver nitrate dressing was applied.     The patient appeared to tolerate the procedure well from a hemodynamic and neuromonitoring standpoint.  Motor evoked potentials were present and stable in all extremities throughout the case. I was present for all critical portions of the case, and at the end of the case all counts were correct. He was removed from the Campbell clamp and repositioned supine onto the hospital bed where he was extubated and allowed to emerge from anesthesia without difficulty.  He was sent to the PACU in stable condition for recovery.    JUSTIFICATION OF MODIFIER 22 AND COSURGEON: This was a complex multi-stage operation for cervical spondylodiscitis and cervical deformity in a patient with IV drug abuse, coronary stents on plavix, and a history of bed bugs. He also had a complex ventral durotomy that we repaired from a posterior approach.  It required significantly increased preop planning and management, mental effort, technical skill, and time to perform every aspect. Two attendings were indicated to reduce OR time, EBL and improve patient outcomes.

## 2019-12-23 NOTE — PROGRESS NOTES
[facility]  Neurosurgery  Progress Note    Subjective:     History of Present Illness: Junaid Muñoz is a 74 y.o. male with PMH of HTN, HLD, Hepatitis C, and CAD s/p two stents on plavix who presents with 1 month history of back pain between his shoulders. MRI at OSH demonstrates likely epidural abscess. Pt denies hx of trauma and reports slow onset of symptoms.     Post-Op Info:  Procedure(s) (LRB):  FUSION, SPINE, CERVICAL, POSTERIOR APPROACH C2-T3 posterior instrumented fusion (N/A)   6 Days Post-Op     Interval History: Remains intubated.  Exam is stable.  Drain removed yesterday pending x-rays    Medications:  Continuous Infusions:   sodium chloride 0.9% 100 mL/hr at 12/23/19 0602    niCARdipine Stopped (12/21/19 0805)     Scheduled Meds:   albuterol-ipratropium  3 mL Nebulization Q4H    amLODIPine  10 mg Per OG tube Daily    atorvastatin  80 mg Per OG tube Daily    bacitracin   Topical (Top) TID    ceFEPime (MAXIPIME) IVPB  2 g Intravenous Q8H    chlorhexidine  15 mL Mouth/Throat BID    clopidogrel  75 mg Per OG tube Daily    famotidine  20 mg Per OG tube BID    heparin (porcine)  5,000 Units Subcutaneous Q8H    levothyroxine  50 mcg Per OG tube Before breakfast    losartan-hydrochlorothiazide 100-25 mg  1 tablet Per OG tube Daily    metoprolol tartrate  50 mg Per OG tube BID    modafinil  100 mg Per OG tube Daily    modafinil  200 mg Per NG tube Daily    polyethylene glycol  17 g Per NG tube Daily    senna-docusate 8.6-50 mg  1 tablet Per OG tube BID    sodium chloride 0.9%  10 mL Intravenous Q6H    sodium chloride 3%  4 mL Nebulization Q4H     PRN Meds:acetaminophen, Dextrose 10% Bolus, Dextrose 10% Bolus, fentaNYL, glucagon (human recombinant), glucose, glucose, hydrALAZINE, labetalol, magnesium sulfate IVPB, magnesium sulfate IVPB, naloxone, ondansetron, oxyCODONE, potassium chloride in water **AND** potassium chloride in water **AND** potassium chloride in  water, Flushing PICC Protocol **AND** sodium chloride 0.9% **AND** sodium chloride 0.9%, sodium phosphate IVPB, sodium phosphate IVPB, sodium phosphate IVPB     Review of Systems  Objective:     Weight: 79.4 kg (175 lb 0.7 oz)  Body mass index is 25.85 kg/m².  Vital Signs (Most Recent):  Temp: 98.5 °F (36.9 °C) (12/23/19 0302)  Pulse: (!) 111 (12/23/19 0814)  Resp: 13 (12/23/19 0814)  BP: (!) 167/88 (12/23/19 0602)  SpO2: 95 % (12/23/19 0814) Vital Signs (24h Range):  Temp:  [98.2 °F (36.8 °C)-98.7 °F (37.1 °C)] 98.5 °F (36.9 °C)  Pulse:  [] 111  Resp:  [10-28] 13  SpO2:  [94 %-100 %] 95 %  BP: ()/(54-88) 167/88  Arterial Line BP: (138-181)/(62-75) 181/75                Vent Mode: Spont  Oxygen Concentration (%):  [40] 40  Resp Rate Total:  [9.9 br/min-34 br/min] 14 br/min  PEEP/CPAP:  [5 cmH20] 5 cmH20  Pressure Support:  [10 cmH20] 10 cmH20  Mean Airway Pressure:  [6.7 cmH20-9.8 cmH20] 9.1 cmH20         Closed/Suction Drain 12/10/19 1018 Anterior Neck Accordion 10 Fr. (Active)   Site Description Reddened 12/21/2019  7:05 AM   Dressing Type No dressing 12/21/2019  7:05 AM   Dressing Status Old drainage 12/20/2019  3:05 PM   Drainage Serosanguineous 12/21/2019  7:05 AM   Status Open to gravity drainage 12/21/2019  7:05 AM   Output (mL) 5 mL 12/21/2019  6:05 AM            Closed/Suction Drain 12/17/19 1111 Posterior Neck Accordion 10 Fr. (Active)   Site Description Unable to view 12/21/2019  7:05 AM   Dressing Type [place] 12/21/2019  7:05 AM   Dressing Status Clean;Dry;Intact 12/21/2019  7:05 AM   Dressing Intervention Dressing changed 12/20/2019  3:01 AM   Drainage None 12/21/2019  7:05 AM   Status Open to gravity drainage 12/21/2019  7:05 AM   Output (mL) 15 mL 12/21/2019  6:05 AM            NG/OG Tube 12/17/19 1700 (Active)   Placement Check placement verified by aspirate characteristics 12/21/2019  7:05 AM   Tolerance no signs/symptoms of discomfort 12/21/2019  7:05 AM   Securement secured to  commercial device 12/21/2019  7:05 AM   Clamp Status/Tolerance clamped 12/21/2019  7:05 AM   Insertion Site Appearance no redness, warmth, tenderness, skin breakdown, drainage 12/21/2019  7:05 AM   Drainage None 12/21/2019  3:05 AM   Flush/Irrigation flushed w/;water;no resistance met 12/21/2019  3:05 AM   Feeding Action feeding held 12/21/2019  7:05 AM   Intake (mL) 100 mL 12/19/2019  9:02 AM   Residual Amount (ml) 0 ml 12/20/2019  7:05 PM       Male External Urinary Catheter 12/19/19 1200 Small (Active)   Collection Container Urimeter 12/21/2019  7:05 AM   Securement Method secured to top of thigh w/ adhesive device 12/21/2019  7:05 AM   Skin no redness;no breakdown 12/21/2019  7:05 AM   Tolerance no signs/symptoms of discomfort 12/21/2019  7:05 AM   Output (mL) 125 mL 12/21/2019 10:05 AM   Catheter Change Date 12/18/19 12/21/2019  3:05 AM   Catheter Change Time 1900 12/21/2019  3:05 AM            Lumbar Drain 12/10/19 1056 (Active)   Drain Status Open 12/21/2019  7:05 AM   Level to iliac crest 12/20/2019  3:01 AM   CSF Color Clear 12/21/2019  7:05 AM   Site Description Unable to view 12/21/2019  7:05 AM   Dressing Status Dry;Intact;Old drainage 12/21/2019  7:05 AM   Interventions HOB degrees (see comment);Bed controls locked 12/21/2019  7:05 AM   Output (mL) 1 mL 12/21/2019 10:05 AM       Neurosurgery Physical Exam  E4VTM6  FCx4  PERRL, EOMI  FC x 4, AG x 4; in restraints  Nods appropriately to questions    Significant Labs:  Recent Labs   Lab 12/21/19  1707 12/22/19  0121 12/22/19  1400 12/23/19  0235   GLU  --  109  --  139*   NA  --  134*  --  133*   K 4.7 4.2  --  3.5   CL  --  106  --  107   CO2  --  22*  --  20*   BUN  --  11  --  11   CREATININE  --  0.6  --  0.6   CALCIUM  --  8.3*  --  8.1*   MG  --  1.8 2.2 1.7     Recent Labs   Lab 12/21/19 2108 12/22/19 0823 12/22/19 2056   WBC 11.63 9.76 11.47   HGB 10.0* 8.3* 9.4*   HCT 30.6* 27.0* 29.9*   * 127* 163     No results for input(s): LABPT,  INR, APTT in the last 48 hours.  Microbiology Results (last 7 days)     Procedure Component Value Units Date/Time    Blood culture [[id number]] Collected:  12/17/19 1505    Order Status:  Completed Specimen:  Blood from Line, Arterial, Right Updated:  12/22/19 1812     Blood Culture, Routine No growth after 5 days.    Blood culture [[id number]]     Order Status:  No result Specimen:  Blood     Blood culture [[id number]] Collected:  12/12/19 0147    Order Status:  Completed Specimen:  Blood from Wrist, Right Updated:  12/17/19 0612     Blood Culture, Routine No growth after 5 days.    Narrative:       Blood cultures from 2 different sites. 4 bottles total.  Please draw before starting antibiotics.    Blood culture [[id number]] Collected:  12/12/19 0213    Order Status:  Completed Specimen:  Blood from Peripheral, Antecubital, Right Updated:  12/17/19 0612     Blood Culture, Routine No growth after 5 days.    Narrative:       Blood cultures x 2 different sites. 4 bottles total. Please  draw cultures before administering antibiotics.    Blood culture [[id number]] Collected:  12/11/19 1000    Order Status:  Completed Specimen:  Blood from Peripheral, Foot, Right Updated:  12/16/19 1212     Blood Culture, Routine No growth after 5 days.    Narrative:       Blood cultures from 2 different sites. 4 bottles total.  Please draw before starting antibiotics.    Blood culture [[id number]] Collected:  12/11/19 1005    Order Status:  Completed Specimen:  Blood from Peripheral, Forearm, Right Updated:  12/16/19 1212     Blood Culture, Routine No growth after 5 days.    Narrative:       Blood cultures x 2 different sites. 4 bottles total. Please  draw cultures before administering antibiotics.    Culture, Anaerobe [[id number]] Collected:  12/10/19 1038    Order Status:  Completed Specimen:  Body Fluid from Neck Updated:  12/16/19 0854     Anaerobic Culture No anaerobes isolated    Narrative:       2) Free vertebral abscess #2    Culture,  Anaerobe [[id number]] Collected:  12/10/19 1038    Order Status:  Completed Specimen:  Body Fluid from Neck Updated:  12/16/19 0854     Anaerobic Culture No anaerobes isolated    Narrative:       1) Free vertebral abscess #1        Recent Lab Results       12/23/19  0325   12/23/19  0235   12/22/19  2056   12/22/19  1400   12/22/19  0823        Albumin   2.2           Alkaline Phosphatase   350           Allens Test N/A             ALT   16           Anion Gap   6           AST   33           Baso #     0.03   0.02     Basophil%     0.3   0.2     BILIRUBIN TOTAL   0.6  Comment:  For infants and newborns, interpretation of results should be based  on gestational age, weight and in agreement with clinical  observations.  Premature Infant recommended reference ranges:  Up to 24 hours.............<8.0 mg/dL  Up to 48 hours............<12.0 mg/dL  3-5 days..................<15.0 mg/dL  6-29 days.................<15.0 mg/dL             Site [place]/C             BUN, Bld   11           Calcium   8.1           Chloride   107           CO2   20           Creatinine   0.6           [employer] Adult Vent             Differential Method     Automated   Automated     eGFR if    >60.0           eGFR if non    >60.0  Comment:  Calculation used to obtain the estimated glomerular filtration  rate (eGFR) is the CKD-EPI equation.              Eos #     0.2   0.1     Eosinophil%     1.6   0.9     FiO2 40             Glucose   139           Gran # (ANC)     9.1   8.0     Gran%     79.2   81.6     Hematocrit     29.9   27.0     Hemoglobin     9.4   8.3     Immature Grans (Abs)     0.07  Comment:  Mild elevation in immature granulocytes is non specific and   can be seen in a variety of conditions including stress response,   acute inflammation, trauma and pregnancy. Correlation with other   laboratory and clinical findings is essential.     0.02  Comment:  Mild elevation in immature granulocytes is non specific  and   can be seen in a variety of conditions including stress response,   acute inflammation, trauma and pregnancy. Correlation with other   laboratory and clinical findings is essential.       Immature Granulocytes     0.6   0.2     Lymph #     1.1   0.8     Lymph%     9.2   8.4     Magnesium   1.7   2.2       MAP 9.3             MCH     27.3   27.3     MCHC     31.4   30.7     MCV     87   89     Min Vol 16.2             Mode PSV             Mono #     1.0   0.9     Mono%     9.1   8.7     MPV     9.7   9.8     nRBC     0   0     PEEP 5             Phosphorus   1.2   2.6       Platelets     163   127     POC BE 2             POC HCO3 23.1             POC PCO2 22.4             POC PH 7.620             POC PO2 201             POC SATURATED O2 100             POC TCO2 24             Potassium   3.5           PROTEIN TOTAL   5.4           PS 10             RBC     3.44   3.04     RDW     16.5   16.5     Sample ARTERIAL             Sodium   133           Sp02 100             Spont Rate 14             Vol 974             WBC     11.47   9.76                          Significant Diagnostics:  CT: No results found in the last 24 hours.  Echoencephalography: No results found in the last 24 hours.  MRI: No results found in the last 24 hours.    Assessment/Plan:     * Spinal epidural abscess  74 y.o. male with PMH of HTN, HLD, Hepatitis C, and CAD s/p two stents on plavix who presents with C6-T2 osteomyelitis with suspected epidural abscess.  Now s/p C7/T1 corpectomies and C2-T2  posterior instrumented fusion 12/18    Continue ICU care and neuro checks q1h  Pain control  Continue C-collar at all times  Drains discontinued yesterday, sites c/d/i  No HOB restrictions, allow patient to mobilize  Cervical x-rays pending extubation  Continue cefepime per ID Recs x 8 weeks  Wean to extubate per ICU, may consider trach if failed  Daily physical and occupational therapy  Subcu heparin DVT prophylaxis  Further care per ICU team  Will  continue to follow        Isreal Hawley MD  Neurosurgery  [facility]

## 2019-12-23 NOTE — ASSESSMENT & PLAN NOTE
74 y.o. male with PMH of HTN, HLD, Hepatitis C, and CAD s/p two stents on plavix who presents with C6-T2 osteomyelitis with suspected epidural abscess.  Now s/p C7/T1 corpectomies and C2-T2  posterior instrumented fusion 12/18    Continue ICU care and neuro checks q1h  Pain control  Continue C-collar at all times  Drains discontinued yesterday, sites c/d/i  No HOB restrictions, allow patient to mobilize  Cervical x-rays pending extubation  Continue cefepime per ID Recs x 8 weeks  Wean to extubate per ICU, may consider trach if failed  Daily physical and occupational therapy  Subcu heparin DVT prophylaxis  Further care per ICU team  Will continue to follow

## 2019-12-23 NOTE — SUBJECTIVE & OBJECTIVE
Interval History: Remains intubated.  Exam is stable.  Drain removed yesterday pending x-rays    Medications:  Continuous Infusions:   sodium chloride 0.9% 100 mL/hr at 12/23/19 0602    niCARdipine Stopped (12/21/19 0805)     Scheduled Meds:   albuterol-ipratropium  3 mL Nebulization Q4H    amLODIPine  10 mg Per OG tube Daily    atorvastatin  80 mg Per OG tube Daily    bacitracin   Topical (Top) TID    ceFEPime (MAXIPIME) IVPB  2 g Intravenous Q8H    chlorhexidine  15 mL Mouth/Throat BID    clopidogrel  75 mg Per OG tube Daily    famotidine  20 mg Per OG tube BID    heparin (porcine)  5,000 Units Subcutaneous Q8H    levothyroxine  50 mcg Per OG tube Before breakfast    losartan-hydrochlorothiazide 100-25 mg  1 tablet Per OG tube Daily    metoprolol tartrate  50 mg Per OG tube BID    modafinil  100 mg Per OG tube Daily    modafinil  200 mg Per NG tube Daily    polyethylene glycol  17 g Per NG tube Daily    senna-docusate 8.6-50 mg  1 tablet Per OG tube BID    sodium chloride 0.9%  10 mL Intravenous Q6H    sodium chloride 3%  4 mL Nebulization Q4H     PRN Meds:acetaminophen, Dextrose 10% Bolus, Dextrose 10% Bolus, fentaNYL, glucagon (human recombinant), glucose, glucose, hydrALAZINE, labetalol, magnesium sulfate IVPB, magnesium sulfate IVPB, naloxone, ondansetron, oxyCODONE, potassium chloride in water **AND** potassium chloride in water **AND** potassium chloride in water, Flushing PICC Protocol **AND** sodium chloride 0.9% **AND** sodium chloride 0.9%, sodium phosphate IVPB, sodium phosphate IVPB, sodium phosphate IVPB     Review of Systems  Objective:     Weight: 79.4 kg (175 lb 0.7 oz)  Body mass index is 25.85 kg/m².  Vital Signs (Most Recent):  Temp: 98.5 °F (36.9 °C) (12/23/19 0302)  Pulse: (!) 111 (12/23/19 0814)  Resp: 13 (12/23/19 0814)  BP: (!) 167/88 (12/23/19 0602)  SpO2: 95 % (12/23/19 0814) Vital Signs (24h Range):  Temp:  [98.2 °F (36.8 °C)-98.7 °F (37.1 °C)] 98.5 °F (36.9  °C)  Pulse:  [] 111  Resp:  [10-28] 13  SpO2:  [94 %-100 %] 95 %  BP: ()/(54-88) 167/88  Arterial Line BP: (138-181)/(62-75) 181/75                Vent Mode: Spont  Oxygen Concentration (%):  [40] 40  Resp Rate Total:  [9.9 br/min-34 br/min] 14 br/min  PEEP/CPAP:  [5 cmH20] 5 cmH20  Pressure Support:  [10 cmH20] 10 cmH20  Mean Airway Pressure:  [6.7 cmH20-9.8 cmH20] 9.1 cmH20         Closed/Suction Drain 12/10/19 1018 Anterior Neck Accordion 10 Fr. (Active)   Site Description Reddened 12/21/2019  7:05 AM   Dressing Type No dressing 12/21/2019  7:05 AM   Dressing Status Old drainage 12/20/2019  3:05 PM   Drainage Serosanguineous 12/21/2019  7:05 AM   Status Open to gravity drainage 12/21/2019  7:05 AM   Output (mL) 5 mL 12/21/2019  6:05 AM            Closed/Suction Drain 12/17/19 1111 Posterior Neck Accordion 10 Fr. (Active)   Site Description Unable to view 12/21/2019  7:05 AM   Dressing Type [place] 12/21/2019  7:05 AM   Dressing Status Clean;Dry;Intact 12/21/2019  7:05 AM   Dressing Intervention Dressing changed 12/20/2019  3:01 AM   Drainage None 12/21/2019  7:05 AM   Status Open to gravity drainage 12/21/2019  7:05 AM   Output (mL) 15 mL 12/21/2019  6:05 AM            NG/OG Tube 12/17/19 1700 (Active)   Placement Check placement verified by aspirate characteristics 12/21/2019  7:05 AM   Tolerance no signs/symptoms of discomfort 12/21/2019  7:05 AM   Securement secured to commercial device 12/21/2019  7:05 AM   Clamp Status/Tolerance clamped 12/21/2019  7:05 AM   Insertion Site Appearance no redness, warmth, tenderness, skin breakdown, drainage 12/21/2019  7:05 AM   Drainage None 12/21/2019  3:05 AM   Flush/Irrigation flushed w/;water;no resistance met 12/21/2019  3:05 AM   Feeding Action feeding held 12/21/2019  7:05 AM   Intake (mL) 100 mL 12/19/2019  9:02 AM   Residual Amount (ml) 0 ml 12/20/2019  7:05 PM       Male External Urinary Catheter 12/19/19 1200 Small (Active)   Collection Container  Urimeter 12/21/2019  7:05 AM   Securement Method secured to top of thigh w/ adhesive device 12/21/2019  7:05 AM   Skin no redness;no breakdown 12/21/2019  7:05 AM   Tolerance no signs/symptoms of discomfort 12/21/2019  7:05 AM   Output (mL) 125 mL 12/21/2019 10:05 AM   Catheter Change Date 12/18/19 12/21/2019  3:05 AM   Catheter Change Time 1900 12/21/2019  3:05 AM            Lumbar Drain 12/10/19 1056 (Active)   Drain Status Open 12/21/2019  7:05 AM   Level to iliac crest 12/20/2019  3:01 AM   CSF Color Clear 12/21/2019  7:05 AM   Site Description Unable to view 12/21/2019  7:05 AM   Dressing Status Dry;Intact;Old drainage 12/21/2019  7:05 AM   Interventions HOB degrees (see comment);Bed controls locked 12/21/2019  7:05 AM   Output (mL) 1 mL 12/21/2019 10:05 AM       Neurosurgery Physical Exam  E4VTM6  FCx4  PERRL, EOMI  FC x 4, AG x 4; in restraints  Nods appropriately to questions    Significant Labs:  Recent Labs   Lab 12/21/19  1707 12/22/19  0121 12/22/19  1400 12/23/19  0235   GLU  --  109  --  139*   NA  --  134*  --  133*   K 4.7 4.2  --  3.5   CL  --  106  --  107   CO2  --  22*  --  20*   BUN  --  11  --  11   CREATININE  --  0.6  --  0.6   CALCIUM  --  8.3*  --  8.1*   MG  --  1.8 2.2 1.7     Recent Labs   Lab 12/21/19 2108 12/22/19  0823 12/22/19 2056   WBC 11.63 9.76 11.47   HGB 10.0* 8.3* 9.4*   HCT 30.6* 27.0* 29.9*   * 127* 163     No results for input(s): LABPT, INR, APTT in the last 48 hours.  Microbiology Results (last 7 days)     Procedure Component Value Units Date/Time    Blood culture [[id number]] Collected:  12/17/19 1505    Order Status:  Completed Specimen:  Blood from Line, Arterial, Right Updated:  12/22/19 1812     Blood Culture, Routine No growth after 5 days.    Blood culture [[id number]]     Order Status:  No result Specimen:  Blood     Blood culture [[id number]] Collected:  12/12/19 0147    Order Status:  Completed Specimen:  Blood from Wrist, Right Updated:  12/17/19 0612      Blood Culture, Routine No growth after 5 days.    Narrative:       Blood cultures from 2 different sites. 4 bottles total.  Please draw before starting antibiotics.    Blood culture [[id number]] Collected:  12/12/19 0213    Order Status:  Completed Specimen:  Blood from Peripheral, Antecubital, Right Updated:  12/17/19 0612     Blood Culture, Routine No growth after 5 days.    Narrative:       Blood cultures x 2 different sites. 4 bottles total. Please  draw cultures before administering antibiotics.    Blood culture [[id number]] Collected:  12/11/19 1000    Order Status:  Completed Specimen:  Blood from Peripheral, Foot, Right Updated:  12/16/19 1212     Blood Culture, Routine No growth after 5 days.    Narrative:       Blood cultures from 2 different sites. 4 bottles total.  Please draw before starting antibiotics.    Blood culture [[id number]] Collected:  12/11/19 1005    Order Status:  Completed Specimen:  Blood from Peripheral, Forearm, Right Updated:  12/16/19 1212     Blood Culture, Routine No growth after 5 days.    Narrative:       Blood cultures x 2 different sites. 4 bottles total. Please  draw cultures before administering antibiotics.    Culture, Anaerobe [[id number]] Collected:  12/10/19 1038    Order Status:  Completed Specimen:  Body Fluid from Neck Updated:  12/16/19 0854     Anaerobic Culture No anaerobes isolated    Narrative:       2) Free vertebral abscess #2    Culture, Anaerobe [[id number]] Collected:  12/10/19 1038    Order Status:  Completed Specimen:  Body Fluid from Neck Updated:  12/16/19 0854     Anaerobic Culture No anaerobes isolated    Narrative:       1) Free vertebral abscess #1        Recent Lab Results       12/23/19  0325   12/23/19  0235   12/22/19  2056   12/22/19  1400   12/22/19  0823        Albumin   2.2           Alkaline Phosphatase   350           Allens Test N/A             ALT   16           Anion Gap   6           AST   33           Baso #     0.03   0.02     Basophil%      0.3   0.2     BILIRUBIN TOTAL   0.6  Comment:  For infants and newborns, interpretation of results should be based  on gestational age, weight and in agreement with clinical  observations.  Premature Infant recommended reference ranges:  Up to 24 hours.............<8.0 mg/dL  Up to 48 hours............<12.0 mg/dL  3-5 days..................<15.0 mg/dL  6-29 days.................<15.0 mg/dL             Site [facility]             BUN, Bld   11           Calcium   8.1           Chloride   107           CO2   20           Creatinine   0.6           [facility]             Differential Method     Automated   Automated     eGFR if    >60.0           eGFR if non    >60.0  Comment:  Calculation used to obtain the estimated glomerular filtration  rate (eGFR) is the CKD-EPI equation.              Eos #     0.2   0.1     Eosinophil%     1.6   0.9     FiO2 40             Glucose   139           Gran # (ANC)     9.1   8.0     Gran%     79.2   81.6     Hematocrit     29.9   27.0     Hemoglobin     9.4   8.3     Immature Grans (Abs)     0.07  Comment:  Mild elevation in immature granulocytes is non specific and   can be seen in a variety of conditions including stress response,   acute inflammation, trauma and pregnancy. Correlation with other   laboratory and clinical findings is essential.     0.02  Comment:  Mild elevation in immature granulocytes is non specific and   can be seen in a variety of conditions including stress response,   acute inflammation, trauma and pregnancy. Correlation with other   laboratory and clinical findings is essential.       Immature Granulocytes     0.6   0.2     Lymph #     1.1   0.8     Lymph%     9.2   8.4     Magnesium   1.7   2.2       MAP 9.3             MCH     27.3   27.3     MCHC     31.4   30.7     MCV     87   89     Min Vol 16.2             Mode PSV             Mono #     1.0   0.9     Mono%     9.1   8.7     MPV     9.7   9.8     nRBC     0   0      PEEP 5             Phosphorus   1.2   2.6       Platelets     163   127     POC BE 2             POC HCO3 23.1             POC PCO2 22.4             POC PH 7.620             POC PO2 201             POC SATURATED O2 100             POC TCO2 24             Potassium   3.5           PROTEIN TOTAL   5.4           PS 10             RBC     3.44   3.04     RDW     16.5   16.5     Sample ARTERIAL             Sodium   133           Sp02 100             Spont Rate 14             Vol 974             WBC     11.47   9.76                          Significant Diagnostics:  CT: No results found in the last 24 hours.  Echoencephalography: No results found in the last 24 hours.  MRI: No results found in the last 24 hours.

## 2019-12-23 NOTE — PLAN OF CARE
POC reviewed with pt at 0500. Pt intubated, unable to verbalize understanding. No acute events overnight. SBP <180 maintained, no issues. NS infusing at 100 ml/hr. Potassium, mag and phos being replaced. Pt afebrile throughout the night. Pt progressing toward goals. Will continue to monitor. See flowsheets for full assessment and VS info.

## 2019-12-23 NOTE — ASSESSMENT & PLAN NOTE
ABG  HOB restriction lifted  Weaning to extubation slowly patient drowsy-- limit sedation!  On modafinil 200 mg q AM and 100 mg q afternoon  Vent Mode: SIMV  Oxygen Concentration (%):  [40] 40  Resp Rate Total:  [10 br/min-34 br/min] 21 br/min  PEEP/CPAP:  [5 cmH20] 5 cmH20  Pressure Support:  [10 cmH20] 10 cmH20  Mean Airway Pressure:  [6.7 cmH20-9.8 cmH20] 8.4 cmH20

## 2019-12-23 NOTE — PROGRESS NOTES
[facility]  Neurocritical Care  Progress Note    Admit Date: 12/6/2019  Service Date: 12/23/2019  Length of Stay: 17    Subjective:     Chief Complaint: Spinal epidural abscess    History of Present Illness: Junaid Muñoz is a 74 year old male with CAD, HTN, HLD, hypothyroidism, tobacco use, and prior history of IVDU who was found to have C6-T3 osteomyelitis with epidural abscess. Patient was neurologically intact and hemodynamically stable on admission.  Now immediately status post phase one of a two part surgery.  Today he had a C7-T1 corpectomy and C6-T2 anterior fusion.  Lumbar drain was placed secondary to intra op CSF leak, and patient admitted to [facility] for post op monitoring and drain maintenance.     Hospital Course: 12/10 admitted post-op C7-T1 corpectomy and C6-T2 anterior fusion with lumbar drain placed 2/2 CSF leak intra-op  12/12  No significant events over night, per nsgy will keep lumbar drain another day. 2nd stage of sugery to occur next Tuesday. Remains NPO while having to lie flat with  lumbar drain. Getting confused after receiving valium decreased dose.  12/13: Restless o/n, c/o back pain. LD dropped to floor by NSGY, 8-10cc/hr. HV drain in place as well. Neurologically stable. AF, no leukocytosis, H/H stable.   12/14: calm at present, slight agitation overnight, can elevate head for swallow trials and po today,pending picc placement for long term abx  12/15: less responsive this am, wean scheduled ativan, begin to advance diet and taper ivfs  12/16: NAEO. Stage II surgery tomorrow for posterior cervical fusion. Leukocytosis increasing, is on cefepime for pseudomonas in CSF, will reach out to ID to see if any further abx pre-op should be given. Lumbar drain in place. Increase normal saline to 100 cc/hr. NPO after midnight.   12/17: OR today for stage II posterior cervical instrumentation. Required 3 units PRBCs and 2 units FFP. 2 hemovacs in place to gravity with large  output. Will repeat CBC. Patient still intubated on arrival to ICU with cervical and facial edema, will start decadron. ID recommended repeating blood cultures as patient's WBC increasing. Continue cefepime.   12/18: POD 1 C2-T3 posterior instrumentation. Patient is still intubated, will wean to extubate. Facial and cervical swelling greatly improved. Lumbar drain in place. Hemovac x 2. Hemodynamically stable.  12/19/2019 subQ heparin,d/c howell, NSGY brace for neck, DAPT, HOB restrictions    12/20/2019 HOB clarified with NSGY keep flat, neuro status drowsy, labetolol 2x overnight, hydralazine not effective, respiratory, weaning paramaters on SIMV, wean to extubate, d/c protime INR   12/21/2019 Lumbar drain and hemovac pulled today per neurosurgery, no more HOB restrictions. Will attempt to wean ventilator and fentanyl. Scheduled oxycodone 5mg q 4h for fentanyl wean.   12/22/2019 Continues to fail weaning trials. Off fentanyl. D/C scheduled valium. Oxycodone PRN. Weaning parameters daily.   12/23/19: Failed multiple spontaneous trials, attempts to wean ETT, hyponatremia-start salt tabs 1 gm q8h    Past Medical History:   Diagnosis Date    CAD (coronary artery disease)     s/p stent 2005, on plavix    Chronic hepatitis C     Cirrhosis     biopsy proven - 2007    History of colon polyps 06/2008    1 polyp - tubular adenoma    HTN (hypertension)     Hyperlipidemia     Hypothyroidism      Past Surgical History:   Procedure Laterality Date    COLONOSCOPY W/ POLYPECTOMY  06/2008    Dr Wagoner: fair prep, 3mm polyp - tubular adenoma    CORONARY ANGIOPLASTY WITH STENT PLACEMENT      FUSION OF CERVICAL SPINE BY POSTERIOR APPROACH N/A 12/17/2019    Procedure: FUSION, SPINE, CERVICAL, POSTERIOR APPROACH C2-T3 posterior instrumented fusion;  Surgeon: Elian Deluca MD;  Location: [facility] OR [address] [place] [address];  Service: Neurosurgery;  Laterality: N/A;    hydrocele repair  teenager    pyloric stenosis repair  age 1    SURGICAL  REMOVAL OF VERTEBRAL BODY OF THORACIC SPINE N/A 12/10/2019    Procedure: CORPECTOMY, SPINE, CERVICAL AND THORACIC, C7 and T1 with Globus;  Surgeon: Elian Deluca MD;  Location: [facility] OR [address] [place] [address];  Service: Neurosurgery;  Laterality: N/A;    TONSILLECTOMY        No current facility-administered medications on file prior to encounter.      Current Outpatient Medications on File Prior to Encounter   Medication Sig Dispense Refill    atorvastatin (LIPITOR) 80 MG tablet Take 80 mg by mouth once daily at 6am.      clopidogrel (PLAVIX) 75 mg tablet Take 75 mg by mouth once daily at 6am.      ibuprofen (ADVIL,MOTRIN) 600 MG tablet Take 1 tablet (600 mg total) by mouth every 6 (six) hours as needed for Pain. 20 tablet 0    levothyroxine (SYNTHROID) 50 MCG tablet Take 50 mcg by mouth once daily at 6am.      losartan-hydrochlorothiazide 100-25 mg (HYZAAR) 100-25 mg per tablet Take 1 tablet by mouth once daily at 6am.       metoprolol succinate (TOPROL-XL) 100 MG 24 hr tablet Take 100 mg by mouth once daily at 6am.        Allergies: Penicillins    History reviewed. No pertinent family history.  Social History     Tobacco Use    Smoking status: Current Every Day Smoker   Substance Use Topics    Alcohol use: Not on file    Drug use: Yes     Types: IV     Comment: MORPHINE     Review of Systems-Pt intubated  Objective:     Vitals:    Temp: 98 °F (36.7 °C)  Pulse: 91  Rhythm: normal sinus rhythm  BP: 113/60  MAP (mmHg): 81  Resp: 16  SpO2: 100 %  Oxygen Concentration (%): 40  O2 Device (Oxygen Therapy): ventilator  Vent Mode: SIMV  Set Rate: 10 bmp  Pressure Support: 10 cmH20  PEEP/CPAP: 5 cmH20  Peak Airway Pressure: 15 cmH2O  Mean Airway Pressure: 8.4 cmH20  Plateau Pressure: 15 cmH20    Temp  Min: 98 °F (36.7 °C)  Max: 98.7 °F (37.1 °C)  Pulse  Min: 75  Max: 116  BP  Min: 113/60  Max: 178/84  MAP (mmHg)  Min: 81  Max: 120  Resp  Min: 10  Max: 28  SpO2  Min: 94 %  Max: 100 %  Oxygen Concentration (%)  Min: 40  Max:  40    12/22 0701 - 12/23 0700  In: 2793.3 [I.V.:2253.3]  Out: 2835 [Urine:2835]   Unmeasured Output  Urine Occurrence: 1  Stool Occurrence: 1  Pad Count: 1       Physical Exam  GA: Alert, comfortable, no acute distress.   HEENT: No scleral icterus or JVD.   Pulmonary: Diminished to auscultation A/L. No wheezing, crackles, or rhonchi.  Cardiac: RRR S1 & S2 w/o rubs/murmurs/gallops.   Abdominal: Bowel sounds present x 4. No appreciable hepatosplenomegaly.  Skin: No jaundice, rashes, or visible lesions.  Neuro:  --GCS: E4 VT1 M6  --Mental Status:  Opens eyes to voice, tracks, follows all commands (sticks tongue out, shows thumb up on R side, wiggles toes)  --CN II-XII grossly intact.   --Pupils 3mm, PERRL.   --Corneal reflex, gag, cough intact.  --OSHEA spont    Today I personally reviewed pertinent medications, lines/drains/airways, imaging, laboratory results,     Assessment/Plan:     Pulmonary  On mechanically assisted ventilation  ABG  HOB restriction lifted  Weaning to extubation slowly patient drowsy-- limit sedation!  On modafinil 200 mg q AM and 100 mg q afternoon  Vent Mode: SIMV  Oxygen Concentration (%):  [40] 40  Resp Rate Total:  [10 br/min-34 br/min] 21 br/min  PEEP/CPAP:  [5 cmH20] 5 cmH20  Pressure Support:  [10 cmH20] 10 cmH20  Mean Airway Pressure:  [6.7 cmH20-9.8 cmH20] 8.4 cmH20      Cardiac/Vascular  Hyperlipidemia  Atorvastatin 80 mg daily      Essential hypertension  SBP < 180  Amlodipine 10 mg daily  Losartan-HCTZ 100 mg - 25 mg  Metoprolol XL 50 mg BID       Coronary artery disease involving native coronary artery of native heart without angina pectoris  Clopidogrel 75 mg daily     Renal/  Hyponatremia  --start salt tabs 1 gm q8h  --follow CMP    Endocrine  Other specified hypothyroidism  Levothyroxine 50 mcg daily    GI  Chronic hepatitis C without hepatic coma  Monitor LFTs    Other  Tobacco abuse  Nicotine patch           The patient is being Prophylaxed for:  Venous Thromboembolism with:  Chemical  Stress Ulcer with: H2B  Ventilator Pneumonia with: not applicable    Activity Orders          Diet NPO: NPO starting at 12/17 0001        Full Code    Kay Bailey NP  Neurocritical Care  [facility]    Critical care time > 30 min.

## 2019-12-23 NOTE — OP NOTE
DATE OF PROCEDURE: 12/17/19     SURGEON: Valdemar Barboza M.D    COSURGEON: Elian Deluca M.D., Neurosurgery    PREOPERATIVE DIAGNOSIS:  1. 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 Severe spinal cord compression, kyphotic deformity and spinal instability  3. IV drug abuse history, coronary stents on plavix, and hepatitis C  4. Complex durotomy from Stage 1 s/p lumbar drain     POSTOPERATIVE DIAGNOSIS:  1. 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 Severe spinal cord compression, kyphotic deformity and spinal instability  3. IV drug abuse history, coronary stents on plavix, and hepatitis C  4. Complex durotomy from Stage 1 s/p lumbar drain     PROCEDURE PERFORMED:  1. Posterior spinal fusion, C2-T3  2. Posterior segmental spinal fixation, C2-T3 (Depuy)  3. C6, C7 and T1 laminectomy and bilateral C7-T1 facetectomies for repair of complex durotomy  4.Vivigen bone grafting    ANESTHESIA: GETA     ESTIMATED BLOOD LOSS: 300mL     COMPLICATIONS: None     DRAINS: One deep.     SPECIMENS SENT: None     FINDINGS: None     INDICATIONS:   Please see Dr. Deluca' note    PROCEDURE:     I met the patient as he was brought into the operating room where he was intubated and placed under general anesthesia without difficulty. All lines were placed.  A Campbell clamp was placed bitemporally and he was repositioned prone onto the hospital bed with the head fixed to the table in capital flexion and neck extension. Appropriate padding of all pressure points was placed.  Xrays were used to localize the region of interest. It also showed adequate spinal alignment in the sagittal plane. The posterior cervical spine was marked prepped and draped in the usual sterile fashion.  A timeout was performed prior to the procedure.  10mL of Lidocaine with epinephrine were injected in the skin.     Dr. Deluca and I then made a linear incision with a 10 blade from approximately  C2-T3.  Supra and subfascial dissection was carried out in the midline with Bovie electrocautery. Subfascial dissection was carried out with Bovie electrocautery and Haas elevators to expose the posterior elements from C2-T3. Levels were confirmed with lateral xray.     First, bilateral C2 pars screws were placed freehand. Then lateral mass screws were placed from C3-C6 using freehand technique and anatomic landmarks. We skipped left C3 and right C4 due to poor bony purchase. Bilateral T2 and T3 pedicle screws were placed freehand. Xrays showed good screw position.    We turned our attention to repair of the durotomy. We did a C6, C7 and T1 laminectomy to expose the thecal sac. We suspected the dural breach was at the ventral take-off of the left C8 nerve root. As such we performed a left C7-T1 facetectomy in standard fashion to expose the left C8 nerve root. From this exposure we could see the ventral thecal sac and the corpectomy cage from the left side. We also identified the dural breach using Valsalva. We then performed a right C7-T1 facetectomy and wrapped the thecal sac with Duragen and a muscle plug, which we secured with hemoclips and 4-0 Nurilons. We also wrapped the left C8 nerve root in identical fashion. No further CSF egress was seen and we covered the repair with Evaseal glue.      Titanium rods were sized, contoured and reduced into the tulip heads. Set screws were finally tightened. A crosslink was placed and tightened. Final xrays showed excellent spinal alignment and hardware position.     The wound was copiously irrigated with a dilute Betadine solution and normal saline.   Exposed bony surfaces from C2-T3 were decorticated with a high-speed drill. Vivigen and [place] putty was placed bilaterally for arthrodesis from C2-T3.  One gram of vancomycin powder was placed into the wound. A subfascial Hemovac drains was placed and tunneled through a separate incision, where it was secured with Vicryl  sutures. The wound was then closed by the Neurosurgery service.      JUSTIFICATION OF MODIFIER 22 AND COSURGEON: This was a complex multi-stage operation for cervical spondylodiscitis and cervical deformity in a patient with IV drug abuse, coronary stents on plavix, and a history of parasitic infection. He also had a complex ventral durotomy that we repaired from a posterior approach.  It required significantly increased preop planning and management, mental effort, technical skill, and time to perform every aspect. Two attendings were indicated to reduce OR time, EBL and improve patient outcomes.

## 2019-12-24 LAB
ALBUMIN SERPL BCP-MCNC: 1.9 G/DL (ref 3.5–5.2)
ALLENS TEST: ABNORMAL
ALLENS TEST: ABNORMAL
ALP SERPL-CCNC: 306 U/L (ref 55–135)
ALT SERPL W/O P-5'-P-CCNC: 18 U/L (ref 10–44)
ANION GAP SERPL CALC-SCNC: 4 MMOL/L (ref 8–16)
AST SERPL-CCNC: 34 U/L (ref 10–40)
BASOPHILS # BLD AUTO: 0.04 K/UL (ref 0–0.2)
BASOPHILS NFR BLD: 0.5 % (ref 0–1.9)
BILIRUB SERPL-MCNC: 0.4 MG/DL (ref 0.1–1)
BUN SERPL-MCNC: 11 MG/DL (ref 8–23)
CALCIUM SERPL-MCNC: 7.6 MG/DL (ref 8.7–10.5)
CHLORIDE SERPL-SCNC: 109 MMOL/L (ref 95–110)
CO2 SERPL-SCNC: 22 MMOL/L (ref 23–29)
CREAT SERPL-MCNC: 0.6 MG/DL (ref 0.5–1.4)
DELSYS: ABNORMAL
DELSYS: ABNORMAL
DIFFERENTIAL METHOD: ABNORMAL
EOSINOPHIL # BLD AUTO: 0.2 K/UL (ref 0–0.5)
EOSINOPHIL NFR BLD: 2 % (ref 0–8)
ERYTHROCYTE [DISTWIDTH] IN BLOOD BY AUTOMATED COUNT: 17.1 % (ref 11.5–14.5)
ERYTHROCYTE [SEDIMENTATION RATE] IN BLOOD BY WESTERGREN METHOD: 10 MM/H
EST. GFR  (AFRICAN AMERICAN): >60 ML/MIN/1.73 M^2
EST. GFR  (NON AFRICAN AMERICAN): >60 ML/MIN/1.73 M^2
FIO2: 40
FIO2: 40
GLUCOSE SERPL-MCNC: 145 MG/DL (ref 70–110)
HCO3 UR-SCNC: 21.9 MMOL/L (ref 24–28)
HCO3 UR-SCNC: 23 MMOL/L (ref 24–28)
HCT VFR BLD AUTO: 25.9 % (ref 40–54)
HGB BLD-MCNC: 8 G/DL (ref 14–18)
IMM GRANULOCYTES # BLD AUTO: 0.06 K/UL (ref 0–0.04)
IMM GRANULOCYTES NFR BLD AUTO: 0.8 % (ref 0–0.5)
LYMPHOCYTES # BLD AUTO: 0.8 K/UL (ref 1–4.8)
LYMPHOCYTES NFR BLD: 9.9 % (ref 18–48)
MAGNESIUM SERPL-MCNC: 1.7 MG/DL (ref 1.6–2.6)
MAP: 7
MCH RBC QN AUTO: 27.6 PG (ref 27–31)
MCHC RBC AUTO-ENTMCNC: 30.9 G/DL (ref 32–36)
MCV RBC AUTO: 89 FL (ref 82–98)
MIN VOL: 15
MODE: ABNORMAL
MODE: ABNORMAL
MONOCYTES # BLD AUTO: 0.8 K/UL (ref 0.3–1)
MONOCYTES NFR BLD: 10.6 % (ref 4–15)
NEUTROPHILS # BLD AUTO: 6 K/UL (ref 1.8–7.7)
NEUTROPHILS NFR BLD: 76.2 % (ref 38–73)
NRBC BLD-RTO: 0 /100 WBC
PCO2 BLDA: 32.4 MMHG (ref 35–45)
PCO2 BLDA: 39.8 MMHG (ref 35–45)
PEEP: 5
PEEP: 5
PH SMN: 7.37 [PH] (ref 7.35–7.45)
PH SMN: 7.44 [PH] (ref 7.35–7.45)
PHOSPHATE SERPL-MCNC: 2.1 MG/DL (ref 2.7–4.5)
PLATELET # BLD AUTO: 113 K/UL (ref 150–350)
PMV BLD AUTO: 10.5 FL (ref 9.2–12.9)
PO2 BLDA: 181 MMHG (ref 80–100)
PO2 BLDA: 201 MMHG (ref 80–100)
POC BE: -2 MMOL/L
POC BE: -2 MMOL/L
POC SATURATED O2: 100 % (ref 95–100)
POC SATURATED O2: 100 % (ref 95–100)
POC TCO2: 23 MMOL/L (ref 23–27)
POC TCO2: 24 MMOL/L (ref 23–27)
POTASSIUM SERPL-SCNC: 3.7 MMOL/L (ref 3.5–5.1)
PROT SERPL-MCNC: 4.7 G/DL (ref 6–8.4)
PS: 10
PS: 8
RBC # BLD AUTO: 2.9 M/UL (ref 4.6–6.2)
SAMPLE: ABNORMAL
SAMPLE: ABNORMAL
SITE: ABNORMAL
SITE: ABNORMAL
SODIUM SERPL-SCNC: 135 MMOL/L (ref 136–145)
SP02: 100
SP02: 100
SPONT RATE: 15
VOL: 923
WBC # BLD AUTO: 7.84 K/UL (ref 3.9–12.7)

## 2019-12-24 PROCEDURE — 83735 ASSAY OF MAGNESIUM: CPT

## 2019-12-24 PROCEDURE — 25000003 PHARM REV CODE 250: Performed by: ANESTHESIOLOGY

## 2019-12-24 PROCEDURE — 85025 COMPLETE CBC W/AUTO DIFF WBC: CPT

## 2019-12-24 PROCEDURE — 94003 VENT MGMT INPAT SUBQ DAY: CPT

## 2019-12-24 PROCEDURE — 37799 UNLISTED PX VASCULAR SURGERY: CPT

## 2019-12-24 PROCEDURE — 25000242 PHARM REV CODE 250 ALT 637 W/ HCPCS: Performed by: NURSE PRACTITIONER

## 2019-12-24 PROCEDURE — 25000003 PHARM REV CODE 250: Performed by: STUDENT IN AN ORGANIZED HEALTH CARE EDUCATION/TRAINING PROGRAM

## 2019-12-24 PROCEDURE — 25000003 PHARM REV CODE 250: Performed by: NURSE PRACTITIONER

## 2019-12-24 PROCEDURE — 63600175 PHARM REV CODE 636 W HCPCS: Performed by: STUDENT IN AN ORGANIZED HEALTH CARE EDUCATION/TRAINING PROGRAM

## 2019-12-24 PROCEDURE — 25000003 PHARM REV CODE 250: Performed by: PSYCHIATRY & NEUROLOGY

## 2019-12-24 PROCEDURE — 63600175 PHARM REV CODE 636 W HCPCS: Performed by: PHYSICIAN ASSISTANT

## 2019-12-24 PROCEDURE — 99900017 HC EXTUBATION W/PARAMETERS (STAT)

## 2019-12-24 PROCEDURE — 80053 COMPREHEN METABOLIC PANEL: CPT

## 2019-12-24 PROCEDURE — 63600175 PHARM REV CODE 636 W HCPCS: Performed by: NURSE PRACTITIONER

## 2019-12-24 PROCEDURE — 94640 AIRWAY INHALATION TREATMENT: CPT

## 2019-12-24 PROCEDURE — 94150 VITAL CAPACITY TEST: CPT

## 2019-12-24 PROCEDURE — 63600175 PHARM REV CODE 636 W HCPCS: Performed by: PSYCHIATRY & NEUROLOGY

## 2019-12-24 PROCEDURE — 20000000 HC ICU ROOM

## 2019-12-24 PROCEDURE — 84100 ASSAY OF PHOSPHORUS: CPT

## 2019-12-24 PROCEDURE — 99900026 HC AIRWAY MAINTENANCE (STAT)

## 2019-12-24 PROCEDURE — 27000221 HC OXYGEN, UP TO 24 HOURS

## 2019-12-24 PROCEDURE — 99900035 HC TECH TIME PER 15 MIN (STAT)

## 2019-12-24 PROCEDURE — 94761 N-INVAS EAR/PLS OXIMETRY MLT: CPT

## 2019-12-24 PROCEDURE — 25000003 PHARM REV CODE 250: Performed by: PHYSICIAN ASSISTANT

## 2019-12-24 PROCEDURE — A4216 STERILE WATER/SALINE, 10 ML: HCPCS | Performed by: ANESTHESIOLOGY

## 2019-12-24 RX ORDER — SODIUM CHLORIDE 9 MG/ML
INJECTION, SOLUTION INTRAVENOUS CONTINUOUS
Status: DISCONTINUED | OUTPATIENT
Start: 2019-12-24 | End: 2019-12-27

## 2019-12-24 RX ADMIN — SODIUM CHLORIDE SOLN NEBU 3% 4 ML: 3 NEBU SOLN at 11:12

## 2019-12-24 RX ADMIN — Medication 10 ML: at 12:12

## 2019-12-24 RX ADMIN — SODIUM CHLORIDE SOLN NEBU 3% 4 ML: 3 NEBU SOLN at 04:12

## 2019-12-24 RX ADMIN — Medication 10 ML: at 06:12

## 2019-12-24 RX ADMIN — IPRATROPIUM BROMIDE AND ALBUTEROL SULFATE 3 ML: .5; 3 SOLUTION RESPIRATORY (INHALATION) at 11:12

## 2019-12-24 RX ADMIN — SENNOSIDES AND DOCUSATE SODIUM 1 TABLET: 8.6; 5 TABLET ORAL at 08:12

## 2019-12-24 RX ADMIN — SODIUM CHLORIDE SOLN NEBU 3% 4 ML: 3 NEBU SOLN at 07:12

## 2019-12-24 RX ADMIN — Medication: at 08:12

## 2019-12-24 RX ADMIN — FAMOTIDINE 20 MG: 20 TABLET ORAL at 09:12

## 2019-12-24 RX ADMIN — Medication 2 G: at 08:12

## 2019-12-24 RX ADMIN — HEPARIN SODIUM 5000 UNITS: 5000 INJECTION, SOLUTION INTRAVENOUS; SUBCUTANEOUS at 02:12

## 2019-12-24 RX ADMIN — ATORVASTATIN CALCIUM 80 MG: 20 TABLET, FILM COATED ORAL at 09:12

## 2019-12-24 RX ADMIN — DEXMEDETOMIDINE HYDROCHLORIDE 0.2 MCG/KG/HR: 4 INJECTION, SOLUTION INTRAVENOUS at 10:12

## 2019-12-24 RX ADMIN — HEPARIN SODIUM 5000 UNITS: 5000 INJECTION, SOLUTION INTRAVENOUS; SUBCUTANEOUS at 05:12

## 2019-12-24 RX ADMIN — Medication 10 ML: at 05:12

## 2019-12-24 RX ADMIN — Medication 2 G: at 03:12

## 2019-12-24 RX ADMIN — IPRATROPIUM BROMIDE AND ALBUTEROL SULFATE 3 ML: .5; 3 SOLUTION RESPIRATORY (INHALATION) at 07:12

## 2019-12-24 RX ADMIN — CEFEPIME 2 G: 2 INJECTION, POWDER, FOR SOLUTION INTRAVENOUS at 09:12

## 2019-12-24 RX ADMIN — SENNOSIDES AND DOCUSATE SODIUM 1 TABLET: 8.6; 5 TABLET ORAL at 09:12

## 2019-12-24 RX ADMIN — LOSARTAN POTASSIUM AND HYDROCHLOROTHIAZIDE 1 TABLET: 100; 25 TABLET, FILM COATED ORAL at 09:12

## 2019-12-24 RX ADMIN — HEPARIN SODIUM 5000 UNITS: 5000 INJECTION, SOLUTION INTRAVENOUS; SUBCUTANEOUS at 10:12

## 2019-12-24 RX ADMIN — LEVOTHYROXINE SODIUM 50 MCG: 50 TABLET ORAL at 05:12

## 2019-12-24 RX ADMIN — CLOPIDOGREL BISULFATE 75 MG: 75 TABLET ORAL at 09:12

## 2019-12-24 RX ADMIN — IPRATROPIUM BROMIDE AND ALBUTEROL SULFATE 3 ML: .5; 3 SOLUTION RESPIRATORY (INHALATION) at 04:12

## 2019-12-24 RX ADMIN — METOPROLOL TARTRATE 50 MG: 50 TABLET ORAL at 09:12

## 2019-12-24 RX ADMIN — SODIUM PHOSPHATE, MONOBASIC, MONOHYDRATE 20.01 MMOL: 276; 142 INJECTION, SOLUTION INTRAVENOUS at 05:12

## 2019-12-24 RX ADMIN — SODIUM CHLORIDE: 0.9 INJECTION, SOLUTION INTRAVENOUS at 09:12

## 2019-12-24 RX ADMIN — Medication: at 03:12

## 2019-12-24 RX ADMIN — POLYETHYLENE GLYCOL 3350 17 G: 17 POWDER, FOR SOLUTION ORAL at 09:12

## 2019-12-24 RX ADMIN — AMLODIPINE BESYLATE 10 MG: 10 TABLET ORAL at 09:12

## 2019-12-24 RX ADMIN — FAMOTIDINE 20 MG: 20 TABLET ORAL at 08:12

## 2019-12-24 RX ADMIN — CEFEPIME 2 G: 2 INJECTION, POWDER, FOR SOLUTION INTRAVENOUS at 02:12

## 2019-12-24 RX ADMIN — CHLORHEXIDINE GLUCONATE 0.12% ORAL RINSE 15 ML: 1.2 LIQUID ORAL at 08:12

## 2019-12-24 RX ADMIN — METOPROLOL TARTRATE 50 MG: 50 TABLET ORAL at 08:12

## 2019-12-24 RX ADMIN — CHLORHEXIDINE GLUCONATE 0.12% ORAL RINSE 15 ML: 1.2 LIQUID ORAL at 09:12

## 2019-12-24 RX ADMIN — MODAFINIL 100 MG: 100 TABLET ORAL at 02:12

## 2019-12-24 RX ADMIN — MODAFINIL 200 MG: 100 TABLET ORAL at 05:12

## 2019-12-24 RX ADMIN — CEFEPIME 2 G: 2 INJECTION, POWDER, FOR SOLUTION INTRAVENOUS at 06:12

## 2019-12-24 RX ADMIN — Medication 1 G: at 09:12

## 2019-12-24 RX ADMIN — Medication: at 09:12

## 2019-12-24 NOTE — ASSESSMENT & PLAN NOTE
ABG WNL 12/24  HOB restriction lifted  Weaning to extubation slowly patient drowsy-- limit sedation!  On modafinil 200 mg q AM and 100 mg q afternoon  Vent Mode: SIMV  Oxygen Concentration (%):  [40] 40  Resp Rate Total:  [9.8 br/min-21 br/min] 15 br/min  PEEP/CPAP:  [5 cmH20] 5 cmH20  Pressure Support:  [10 cmH20] 10 cmH20  Mean Airway Pressure:  [6.8 cmH20-8.8 cmH20] 8.8 cmH20

## 2019-12-24 NOTE — PROGRESS NOTES
[facility]  Neurosurgery  Progress Note    Subjective:     History of Present Illness: Junaid Muñoz is a 74 y.o. male with PMH of HTN, HLD, Hepatitis C, and CAD s/p two stents on plavix who presents with 1 month history of back pain between his shoulders. MRI at OSH demonstrates likely epidural abscess. Pt denies hx of trauma and reports slow onset of symptoms.     Post-Op Info:  Procedure(s) (LRB):  FUSION, SPINE, CERVICAL, POSTERIOR APPROACH C2-T3 posterior instrumented fusion (N/A)   7 Days Post-Op     Interval History: No acute event.  Remains intubated.  X-ray pending    Medications:  Continuous Infusions:   sodium chloride 0.9%      dexmedetomidine (PRECEDEX) infusion 0.3 mcg/kg/hr (12/24/19 0602)    niCARdipine Stopped (12/21/19 0805)     Scheduled Meds:   albuterol-ipratropium  3 mL Nebulization Q4H    amLODIPine  10 mg Per OG tube Daily    atorvastatin  80 mg Per OG tube Daily    bacitracin   Topical (Top) TID    ceFEPime (MAXIPIME) IVPB  2 g Intravenous Q8H    chlorhexidine  15 mL Mouth/Throat BID    clopidogrel  75 mg Per OG tube Daily    famotidine  20 mg Per OG tube BID    heparin (porcine)  5,000 Units Subcutaneous Q8H    levothyroxine  50 mcg Per OG tube Before breakfast    losartan-hydrochlorothiazide 100-25 mg  1 tablet Per OG tube Daily    metoprolol tartrate  50 mg Per OG tube BID    modafinil  100 mg Per OG tube Daily    modafinil  200 mg Per NG tube Daily    polyethylene glycol  17 g Per NG tube Daily    senna-docusate 8.6-50 mg  1 tablet Per OG tube BID    sodium chloride 0.9%  10 mL Intravenous Q6H    sodium chloride 3%  4 mL Nebulization Q4H    sodium chloride  2 g Per OG tube TID     PRN Meds:acetaminophen, Dextrose 10% Bolus, Dextrose 10% Bolus, glucagon (human recombinant), glucose, glucose, hydrALAZINE, labetalol, magnesium sulfate IVPB, magnesium sulfate IVPB, naloxone, ondansetron, oxyCODONE, potassium chloride in water **AND** potassium  chloride in water **AND** potassium chloride in water, Flushing PICC Protocol **AND** sodium chloride 0.9% **AND** sodium chloride 0.9%, sodium phosphate IVPB, sodium phosphate IVPB, sodium phosphate IVPB     Review of Systems  Objective:     Weight: 79.4 kg (175 lb 0.7 oz)  Body mass index is 25.85 kg/m².  Vital Signs (Most Recent):  Temp: 98.5 °F (36.9 °C) (12/24/19 0302)  Pulse: 75 (12/24/19 0740)  Resp: 16 (12/24/19 0740)  BP: 136/68 (12/24/19 0602)  SpO2: 100 % (12/24/19 0740) Vital Signs (24h Range):  Temp:  [98 °F (36.7 °C)-98.5 °F (36.9 °C)] 98.5 °F (36.9 °C)  Pulse:  [72-96] 75  Resp:  [6-28] 16  SpO2:  [100 %] 100 %  BP: ()/(50-82) 136/68  Arterial Line BP: (100-174)/(45-71) 159/68                Vent Mode: SIMV  Oxygen Concentration (%):  [40] 40  Resp Rate Total:  [9.8 br/min-21 br/min] 15 br/min  PEEP/CPAP:  [5 cmH20] 5 cmH20  Pressure Support:  [10 cmH20] 10 cmH20  Mean Airway Pressure:  [6.8 cmH20-8.8 cmH20] 8.8 cmH20         Closed/Suction Drain 12/10/19 1018 Anterior Neck Accordion 10 Fr. (Active)   Site Description Reddened 12/21/2019  7:05 AM   Dressing Type No dressing 12/21/2019  7:05 AM   Dressing Status Old drainage 12/20/2019  3:05 PM   Drainage Serosanguineous 12/21/2019  7:05 AM   Status Open to gravity drainage 12/21/2019  7:05 AM   Output (mL) 5 mL 12/21/2019  6:05 AM            Closed/Suction Drain 12/17/19 1111 Posterior Neck Accordion 10 Fr. (Active)   Site Description Unable to view 12/21/2019  7:05 AM   Dressing Type [place] 12/21/2019  7:05 AM   Dressing Status Clean;Dry;Intact 12/21/2019  7:05 AM   Dressing Intervention Dressing changed 12/20/2019  3:01 AM   Drainage None 12/21/2019  7:05 AM   Status Open to gravity drainage 12/21/2019  7:05 AM   Output (mL) 15 mL 12/21/2019  6:05 AM            NG/OG Tube 12/17/19 1700 (Active)   Placement Check placement verified by aspirate characteristics 12/21/2019  7:05 AM   Tolerance no signs/symptoms of discomfort 12/21/2019  7:05  AM   Securement secured to commercial device 12/21/2019  7:05 AM   Clamp Status/Tolerance clamped 12/21/2019  7:05 AM   Insertion Site Appearance no redness, warmth, tenderness, skin breakdown, drainage 12/21/2019  7:05 AM   Drainage None 12/21/2019  3:05 AM   Flush/Irrigation flushed w/;water;no resistance met 12/21/2019  3:05 AM   Feeding Action feeding held 12/21/2019  7:05 AM   Intake (mL) 100 mL 12/19/2019  9:02 AM   Residual Amount (ml) 0 ml 12/20/2019  7:05 PM       Male External Urinary Catheter 12/19/19 1200 Small (Active)   Collection Container Urimeter 12/21/2019  7:05 AM   Securement Method secured to top of thigh w/ adhesive device 12/21/2019  7:05 AM   Skin no redness;no breakdown 12/21/2019  7:05 AM   Tolerance no signs/symptoms of discomfort 12/21/2019  7:05 AM   Output (mL) 125 mL 12/21/2019 10:05 AM   Catheter Change Date 12/18/19 12/21/2019  3:05 AM   Catheter Change Time 1900 12/21/2019  3:05 AM            Lumbar Drain 12/10/19 1056 (Active)   Drain Status Open 12/21/2019  7:05 AM   Level to iliac crest 12/20/2019  3:01 AM   CSF Color Clear 12/21/2019  7:05 AM   Site Description Unable to view 12/21/2019  7:05 AM   Dressing Status Dry;Intact;Old drainage 12/21/2019  7:05 AM   Interventions HOB degrees (see comment);Bed controls locked 12/21/2019  7:05 AM   Output (mL) 1 mL 12/21/2019 10:05 AM       Neurosurgery Physical Exam  [id number]  FCx4  PERRL, EOMI  FC x 4, AG x 4; in restraints  Nods appropriately to questions    Significant Labs:  Recent Labs   Lab 12/23/19  0235 12/23/19  1239 12/24/19  0220   *  --  145*   *  --  135*   K 3.5 3.8 3.7     --  109   CO2 20*  --  22*   BUN 11  --  11   CREATININE 0.6  --  0.6   CALCIUM 8.1*  --  7.6*   MG 1.7 2.0 1.7     Recent Labs   Lab 12/22/19 2056 12/23/19 2014   WBC 11.47 9.98   HGB 9.4* 8.0*   HCT 29.9* 26.0*    133*     No results for input(s): LABPT, INR, APTT in the last 48 hours.  Microbiology Results (last 7 days)      Procedure Component Value Units Date/Time    Blood culture [[id number]] Collected:  12/17/19 1505    Order Status:  Completed Specimen:  Blood from Line, Arterial, Right Updated:  12/22/19 1812     Blood Culture, Routine No growth after 5 days.    Blood culture [[id number]]     Order Status:  No result Specimen:  Blood         Recent Lab Results       12/24/19  0424   12/24/19  0220   12/23/19 2014 12/23/19  1239        Albumin   1.9         Alkaline Phosphatase   306         Allens Test N/A           ALT   18         Anion Gap   4         AST   34         Baso #     0.04       Basophil%     0.4       BILIRUBIN TOTAL   0.4  Comment:  For infants and newborns, interpretation of results should be based  on gestational age, weight and in agreement with clinical  observations.  Premature Infant recommended reference ranges:  Up to 24 hours.............<8.0 mg/dL  Up to 48 hours............<12.0 mg/dL  3-5 days..................<15.0 mg/dL  6-29 days.................<15.0 mg/dL           Site [facility]           BUN, Bld   11         Calcium   7.6         Chloride   109         CO2   22         Creatinine   0.6         Dels Adult Vent           Differential Method     Automated       eGFR if    >60.0         eGFR if non    >60.0  Comment:  Calculation used to obtain the estimated glomerular filtration  rate (eGFR) is the CKD-EPI equation.            Eos #     0.2       Eosinophil%     1.6       FiO2 40           Glucose   145         Gran # (ANC)     7.5       Gran%     75.4       Hematocrit     26.0       Hemoglobin     8.0       Immature Grans (Abs)     0.06  Comment:  Mild elevation in immature granulocytes is non specific and   can be seen in a variety of conditions including stress response,   acute inflammation, trauma and pregnancy. Correlation with other   laboratory and clinical findings is essential.         Immature Granulocytes     0.6       Lymph #     1.2       Lymph%      11.6       Magnesium   1.7   2.0     MCH     27.4       MCHC     30.8       MCV     89       Mode SIMV           Mono #     1.0       Mono%     10.4       MPV     10.0       nRBC     0       PEEP 5           Phosphorus   2.1         Platelets     133       POC BE -2           POC HCO3 23.0           POC PCO2 39.8           POC PH 7.369           POC PO2 181           POC SATURATED O2 100           POC TCO2 24           Potassium   3.7   3.8     PROTEIN TOTAL   4.7         PS 10           Rate 10           RBC     2.92       RDW     16.7       Sample ARTERIAL           Sodium   135         Sp02 100           WBC     9.98             Significant Diagnostics:  CT: No results found in the last 24 hours.  Echoencephalography: No results found in the last 24 hours.  MRI: No results found in the last 24 hours.      Assessment/Plan:     * Spinal epidural abscess  74 y.o. male with PMH of HTN, HLD, Hepatitis C, and CAD s/p two stents on plavix who presents with C6-T2 osteomyelitis with suspected epidural abscess.  Now s/p C7/T1 corpectomies and C2-T2  posterior instrumented fusion 12/18    Continue ICU care and neuro checks q1h  Pain control  Continue C-collar at all times  Drains discontinued, sites c/d/i  No HOB restrictions, allow patient to mobilize  Cervical x-rays pending extubation  Continue cefepime per ID Recs x 8 weeks  Wean to extubate per ICU, may consider trach if failed  Daily physical and occupational therapy  Subcu heparin DVT prophylaxis  Further care per ICU team  Will continue to follow        Isreal Hawley MD  Neurosurgery  [facility]

## 2019-12-24 NOTE — CARE UPDATE
Patient extubated at this time. Proper procedure followed prior to extubation with weaning parameters met, see flowsheet. Patient awake and alert, able to follow commands and exhibiting adequate airway protection with cough and gag present. Cuff leak present. Patient extubated to nasal cannula at 2 lpm with no adverse effects noted.Patient able to clear secretions and vocalize appropriately. RN at bedside. MD informed. Will continue to monitor.

## 2019-12-24 NOTE — ASSESSMENT & PLAN NOTE
74 y.o. male with PMH of HTN, HLD, Hepatitis C, and CAD s/p two stents on plavix who presents with C6-T2 osteomyelitis with suspected epidural abscess.  Now s/p C7/T1 corpectomies and C2-T2  posterior instrumented fusion 12/18    Continue ICU care and neuro checks q1h  Pain control  Continue C-collar at all times  Drains discontinued, sites c/d/i  No HOB restrictions, allow patient to mobilize  Cervical x-rays pending extubation  Continue cefepime per ID Recs x 8 weeks  Wean to extubate per ICU, may consider trach if failed  Daily physical and occupational therapy  Subcu heparin DVT prophylaxis  Further care per ICU team  Will continue to follow

## 2019-12-24 NOTE — PROGRESS NOTES
[facility]  Neurocritical Care  Progress Note    Admit Date: 12/6/2019  Service Date: 12/24/2019  Length of Stay: 18    Subjective:     Chief Complaint: Spinal epidural abscess    History of Present Illness: Junaid Muñoz is a 74 year old male with CAD, HTN, HLD, hypothyroidism, tobacco use, and prior history of IVDU who was found to have C6-T3 osteomyelitis with epidural abscess. Patient was neurologically intact and hemodynamically stable on admission.  Now immediately status post phase one of a two part surgery.  Today he had a C7-T1 corpectomy and C6-T2 anterior fusion.  Lumbar drain was placed secondary to intra op CSF leak, and patient admitted to [facility] for post op monitoring and drain maintenance.     Hospital Course: 12/10 admitted post-op C7-T1 corpectomy and C6-T2 anterior fusion with lumbar drain placed 2/2 CSF leak intra-op  12/12  No significant events over night, per nsgy will keep lumbar drain another day. 2nd stage of sugery to occur next Tuesday. Remains NPO while having to lie flat with  lumbar drain. Getting confused after receiving valium decreased dose.  12/13: Restless o/n, c/o back pain. LD dropped to floor by NSGY, 8-10cc/hr. HV drain in place as well. Neurologically stable. AF, no leukocytosis, H/H stable.   12/14: calm at present, slight agitation overnight, can elevate head for swallow trials and po today,pending picc placement for long term abx  12/15: less responsive this am, wean scheduled ativan, begin to advance diet and taper ivfs  12/16: NAEO. Stage II surgery tomorrow for posterior cervical fusion. Leukocytosis increasing, is on cefepime for pseudomonas in CSF, will reach out to ID to see if any further abx pre-op should be given. Lumbar drain in place. Increase normal saline to 100 cc/hr. NPO after midnight.   12/17: OR today for stage II posterior cervical instrumentation. Required 3 units PRBCs and 2 units FFP. 2 hemovacs in place to gravity with large  output. Will repeat CBC. Patient still intubated on arrival to ICU with cervical and facial edema, will start decadron. ID recommended repeating blood cultures as patient's WBC increasing. Continue cefepime.   12/18: POD 1 C2-T3 posterior instrumentation. Patient is still intubated, will wean to extubate. Facial and cervical swelling greatly improved. Lumbar drain in place. Hemovac x 2. Hemodynamically stable.  12/19/2019 subQ heparin,d/c howell, NSGY brace for neck, DAPT, HOB restrictions    12/20/2019 HOB clarified with NSGY keep flat, neuro status drowsy, labetolol 2x overnight, hydralazine not effective, respiratory, weaning paramaters on SIMV, wean to extubate, d/c protime INR   12/21/2019 Lumbar drain and hemovac pulled today per neurosurgery, no more HOB restrictions. Will attempt to wean ventilator and fentanyl. Scheduled oxycodone 5mg q 4h for fentanyl wean.   12/22/2019 Continues to fail weaning trials. Off fentanyl. D/C scheduled valium. Oxycodone PRN. Weaning parameters daily.   12/23/19: Failed multiple spontaneous trials, attempts to wean ETT, hyponatremia-start salt tabs 1 gm q8h  12/24: no events, attempting SBT today, Na+ increased, NS weaned    Interval History:  No acute events, neurological exam stable    Review of Systems   Unable to perform ROS: Intubated       Objective:     Vitals:  Temp: 98.5 °F (36.9 °C)  Pulse: 75  Rhythm: normal sinus rhythm  BP: 136/68  MAP (mmHg): 96  Resp: 16  SpO2: 100 %  Oxygen Concentration (%): 40  O2 Device (Oxygen Therapy): ventilator  Vent Mode: SIMV  Set Rate: 10 bmp  Pressure Support: 10 cmH20  PEEP/CPAP: 5 cmH20  Peak Airway Pressure: 14 cmH2O  Mean Airway Pressure: 8.8 cmH20  Plateau Pressure: 15 cmH20    Temp  Min: 98 °F (36.7 °C)  Max: 98.5 °F (36.9 °C)  Pulse  Min: 72  Max: 96  BP  Min: 92/50  Max: 152/70  MAP (mmHg)  Min: 67  Max: 102  Resp  Min: 6  Max: 28  SpO2  Min: 100 %  Max: 100 %  Oxygen Concentration (%)  Min: 40  Max: 40    12/23 0701 - 12/24  0700  In: 4195.7 [I.V.:2445.7]  Out: 1640 [Urine:1640]   Unmeasured Output  Urine Occurrence: 1  Stool Occurrence: 0  Pad Count: 2       Physical Exam   Cardiovascular: Normal rate.   Neurological: He is alert. GCS eye subscore is 4. GCS verbal subscore is 1. GCS motor subscore is 6.   Older man, intubated on precedex; awake, eyes open, following requests  EOMI, PERRL  Tone increased at the patellas BL  Moving all extremities to request  Sensation to light touch equal x 4     Nursing note and vitals reviewed.        Medications:  Continuous  sodium chloride 0.9%    dexmedetomidine (PRECEDEX) infusion Last Rate: 0.3 mcg/kg/hr (12/24/19 0602)   niCARdipine Last Rate: Stopped (12/21/19 0805)   Scheduled  albuterol-ipratropium 3 mL Q4H   amLODIPine 10 mg Daily   atorvastatin 80 mg Daily   bacitracin  TID   ceFEPime (MAXIPIME) IVPB 2 g Q8H   chlorhexidine 15 mL BID   clopidogrel 75 mg Daily   famotidine 20 mg BID   heparin (porcine) 5,000 Units Q8H   levothyroxine 50 mcg Before breakfast   losartan-hydrochlorothiazide 100-25 mg 1 tablet Daily   metoprolol tartrate 50 mg BID   modafinil 100 mg Daily   modafinil 200 mg Daily   polyethylene glycol 17 g Daily   senna-docusate 8.6-50 mg 1 tablet BID   sodium chloride 0.9% 10 mL Q6H   sodium chloride 3% 4 mL Q4H   sodium chloride 2 g TID   PRN  acetaminophen 500 mg Q8H PRN   Dextrose 10% Bolus 12.5 g PRN   Dextrose 10% Bolus 25 g PRN   glucagon (human recombinant) 1 mg PRN   glucose 16 g PRN   glucose 24 g PRN   hydrALAZINE 10 mg Q4H PRN   labetalol 10 mg Q4H PRN   magnesium sulfate IVPB 2 g PRN   magnesium sulfate IVPB 4 g PRN   naloxone 0.4 mg PRN   ondansetron 8 mg Q8H PRN   oxyCODONE 10 mg Q6H PRN   potassium chloride in water 40 mEq PRN   And     potassium chloride in water 60 mEq PRN   And     potassium chloride in water 80 mEq PRN   sodium chloride 0.9% 10 mL PRN   sodium phosphate IVPB 15 mmol PRN   sodium phosphate IVPB 20.01 mmol PRN   sodium phosphate IVPB 30 mmol  PRN     Today I personally reviewed pertinent medications, lines/drains/airways, imaging, notably:    Diet  Diet NPO  Diet NPO    Wyatt Hall MD        Assessment/Plan:     Psychiatric  Restlessness and agitation  - precedex gtt RASS 0    Pulmonary  On mechanically assisted ventilation  ABG WNL 12/24  HOB restriction lifted  Weaning to extubation slowly patient drowsy-- limit sedation!  On modafinil 200 mg q AM and 100 mg q afternoon  Vent Mode: SIMV  Oxygen Concentration (%):  [40] 40  Resp Rate Total:  [9.8 br/min-21 br/min] 15 br/min  PEEP/CPAP:  [5 cmH20] 5 cmH20  Pressure Support:  [10 cmH20] 10 cmH20  Mean Airway Pressure:  [6.8 cmH20-8.8 cmH20] 8.8 cmH20      Cardiac/Vascular  Essential hypertension  SBP < 180; ROSA Brandt 12/24 pm  Amlodipine 10 mg daily  Losartan-HCTZ 100 mg - 25 mg  Metoprolol XL 50 mg BID       Coronary artery disease involving native coronary artery of native heart without angina pectoris  Clopidogrel 75 mg daily     Renal/  Hyponatremia  - increase Na+ 2 g TID  - NS @ 75 cc/hr  --follow CMP    ID  * Spinal epidural abscess  C6-T3 osteomyelitis with epidural abscess    - afebrile, cultures NGTD  -12/10: C7-T1 corpectomy with C6-T2 anterior fusion and lumbar drain placement   -Cultures: (12/10)Neck abscess +pseudomonas --cefepime 2g q8h for 6 week duration of therapy  12/17: OR with neurosurgery for stage II, posterior instrumentation C2-T3  -No HOB goal now that drains dc'd  -intubated, WTE--- daily weaning parameters.   -precedex for sedation  -PT/OT when appropriate       Acute osteomyelitis of thoracic spine  See spinal epidural abscess        Acute osteomyelitis of cervical spine  See spinal epidural abscess      Oncology  Acute blood loss anemia  H/H down trending; monitor   transfuse < 7   Monitor hemovac x 2 output     Leukocytosis  - improving  See pseudomonas infection/epidural abscess/osteomyelitis     Endocrine  Other specified hypothyroidism  Levothyroxine 50 mcg  daily    GI  Chronic hepatitis C without hepatic coma  Monitor LFTs    Other  Tobacco abuse  Nicotine patch           The patient is being Prophylaxed for:  Venous Thromboembolism with: Chemical  Stress Ulcer with: H2B  Ventilator Pneumonia with: chlorhexidine oral care    Activity Orders          Diet NPO: NPO starting at 12/17 0001        Full Code    Wyatt Hall MD  Neurocritical Care  [facility]

## 2019-12-24 NOTE — SUBJECTIVE & OBJECTIVE
Interval History: No acute event.  Remains intubated.  X-ray pending    Medications:  Continuous Infusions:   sodium chloride 0.9%      dexmedetomidine (PRECEDEX) infusion 0.3 mcg/kg/hr (12/24/19 0602)    niCARdipine Stopped (12/21/19 0805)     Scheduled Meds:   albuterol-ipratropium  3 mL Nebulization Q4H    amLODIPine  10 mg Per OG tube Daily    atorvastatin  80 mg Per OG tube Daily    bacitracin   Topical (Top) TID    ceFEPime (MAXIPIME) IVPB  2 g Intravenous Q8H    chlorhexidine  15 mL Mouth/Throat BID    clopidogrel  75 mg Per OG tube Daily    famotidine  20 mg Per OG tube BID    heparin (porcine)  5,000 Units Subcutaneous Q8H    levothyroxine  50 mcg Per OG tube Before breakfast    losartan-hydrochlorothiazide 100-25 mg  1 tablet Per OG tube Daily    metoprolol tartrate  50 mg Per OG tube BID    modafinil  100 mg Per OG tube Daily    modafinil  200 mg Per NG tube Daily    polyethylene glycol  17 g Per NG tube Daily    senna-docusate 8.6-50 mg  1 tablet Per OG tube BID    sodium chloride 0.9%  10 mL Intravenous Q6H    sodium chloride 3%  4 mL Nebulization Q4H    sodium chloride  2 g Per OG tube TID     PRN Meds:acetaminophen, Dextrose 10% Bolus, Dextrose 10% Bolus, glucagon (human recombinant), glucose, glucose, hydrALAZINE, labetalol, magnesium sulfate IVPB, magnesium sulfate IVPB, naloxone, ondansetron, oxyCODONE, potassium chloride in water **AND** potassium chloride in water **AND** potassium chloride in water, Flushing PICC Protocol **AND** sodium chloride 0.9% **AND** sodium chloride 0.9%, sodium phosphate IVPB, sodium phosphate IVPB, sodium phosphate IVPB     Review of Systems  Objective:     Weight: 79.4 kg (175 lb 0.7 oz)  Body mass index is 25.85 kg/m².  Vital Signs (Most Recent):  Temp: 98.5 °F (36.9 °C) (12/24/19 0302)  Pulse: 75 (12/24/19 0740)  Resp: 16 (12/24/19 0740)  BP: 136/68 (12/24/19 0602)  SpO2: 100 % (12/24/19 0740) Vital Signs (24h Range):  Temp:  [98 °F (36.7  °C)-98.5 °F (36.9 °C)] 98.5 °F (36.9 °C)  Pulse:  [72-96] 75  Resp:  [6-28] 16  SpO2:  [100 %] 100 %  BP: ()/(50-82) 136/68  Arterial Line BP: (100-174)/(45-71) 159/68                Vent Mode: SIMV  Oxygen Concentration (%):  [40] 40  Resp Rate Total:  [9.8 br/min-21 br/min] 15 br/min  PEEP/CPAP:  [5 cmH20] 5 cmH20  Pressure Support:  [10 cmH20] 10 cmH20  Mean Airway Pressure:  [6.8 cmH20-8.8 cmH20] 8.8 cmH20         Closed/Suction Drain 12/10/19 1018 Anterior Neck Accordion 10 Fr. (Active)   Site Description Reddened 12/21/2019  7:05 AM   Dressing Type No dressing 12/21/2019  7:05 AM   Dressing Status Old drainage 12/20/2019  3:05 PM   Drainage Serosanguineous 12/21/2019  7:05 AM   Status Open to gravity drainage 12/21/2019  7:05 AM   Output (mL) 5 mL 12/21/2019  6:05 AM            Closed/Suction Drain 12/17/19 1111 Posterior Neck Accordion 10 Fr. (Active)   Site Description Unable to view 12/21/2019  7:05 AM   Dressing Type [place] 12/21/2019  7:05 AM   Dressing Status Clean;Dry;Intact 12/21/2019  7:05 AM   Dressing Intervention Dressing changed 12/20/2019  3:01 AM   Drainage None 12/21/2019  7:05 AM   Status Open to gravity drainage 12/21/2019  7:05 AM   Output (mL) 15 mL 12/21/2019  6:05 AM            NG/OG Tube 12/17/19 1700 (Active)   Placement Check placement verified by aspirate characteristics 12/21/2019  7:05 AM   Tolerance no signs/symptoms of discomfort 12/21/2019  7:05 AM   Securement secured to commercial device 12/21/2019  7:05 AM   Clamp Status/Tolerance clamped 12/21/2019  7:05 AM   Insertion Site Appearance no redness, warmth, tenderness, skin breakdown, drainage 12/21/2019  7:05 AM   Drainage None 12/21/2019  3:05 AM   Flush/Irrigation flushed w/;water;no resistance met 12/21/2019  3:05 AM   Feeding Action feeding held 12/21/2019  7:05 AM   Intake (mL) 100 mL 12/19/2019  9:02 AM   Residual Amount (ml) 0 ml 12/20/2019  7:05 PM       Male External Urinary Catheter 12/19/19 1200 Small  (Active)   Collection Container Urimeter 12/21/2019  7:05 AM   Securement Method secured to top of thigh w/ adhesive device 12/21/2019  7:05 AM   Skin no redness;no breakdown 12/21/2019  7:05 AM   Tolerance no signs/symptoms of discomfort 12/21/2019  7:05 AM   Output (mL) 125 mL 12/21/2019 10:05 AM   Catheter Change Date 12/18/19 12/21/2019  3:05 AM   Catheter Change Time 1900 12/21/2019  3:05 AM            Lumbar Drain 12/10/19 1056 (Active)   Drain Status Open 12/21/2019  7:05 AM   Level to iliac crest 12/20/2019  3:01 AM   CSF Color Clear 12/21/2019  7:05 AM   Site Description Unable to view 12/21/2019  7:05 AM   Dressing Status Dry;Intact;Old drainage 12/21/2019  7:05 AM   Interventions HOB degrees (see comment);Bed controls locked 12/21/2019  7:05 AM   Output (mL) 1 mL 12/21/2019 10:05 AM       Neurosurgery Physical Exam  [id number]  FCx4  PERRL, EOMI  FC x 4, AG x 4; in restraints  Nods appropriately to questions    Significant Labs:  Recent Labs   Lab 12/23/19  0235 12/23/19  1239 12/24/19  0220   *  --  145*   *  --  135*   K 3.5 3.8 3.7     --  109   CO2 20*  --  22*   BUN 11  --  11   CREATININE 0.6  --  0.6   CALCIUM 8.1*  --  7.6*   MG 1.7 2.0 1.7     Recent Labs   Lab 12/22/19 2056 12/23/19 2014   WBC 11.47 9.98   HGB 9.4* 8.0*   HCT 29.9* 26.0*    133*     No results for input(s): LABPT, INR, APTT in the last 48 hours.  Microbiology Results (last 7 days)     Procedure Component Value Units Date/Time    Blood culture [[id number]] Collected:  12/17/19 1505    Order Status:  Completed Specimen:  Blood from Line, Arterial, Right Updated:  12/22/19 1812     Blood Culture, Routine No growth after 5 days.    Blood culture [[id number]]     Order Status:  No result Specimen:  Blood         Recent Lab Results       12/24/19  0424   12/24/19  0220   12/23/19 2014 12/23/19  1239        Albumin   1.9         Alkaline Phosphatase   306         Allens Test N/A           ALT   18         Anion  Gap   4         AST   34         Baso #     0.04       Basophil%     0.4       BILIRUBIN TOTAL   0.4  Comment:  For infants and newborns, interpretation of results should be based  on gestational age, weight and in agreement with clinical  observations.  Premature Infant recommended reference ranges:  Up to 24 hours.............<8.0 mg/dL  Up to 48 hours............<12.0 mg/dL  3-5 days..................<15.0 mg/dL  6-29 days.................<15.0 mg/dL           Site Norma/UAC           BUN, Bld   11         Calcium   7.6         Chloride   109         CO2   22         Creatinine   0.6         [employer] Adult Vent           Differential Method     Automated       eGFR if    >60.0         eGFR if non    >60.0  Comment:  Calculation used to obtain the estimated glomerular filtration  rate (eGFR) is the CKD-EPI equation.            Eos #     0.2       Eosinophil%     1.6       FiO2 40           Glucose   145         Gran # (ANC)     7.5       Gran%     75.4       Hematocrit     26.0       Hemoglobin     8.0       Immature Grans (Abs)     0.06  Comment:  Mild elevation in immature granulocytes is non specific and   can be seen in a variety of conditions including stress response,   acute inflammation, trauma and pregnancy. Correlation with other   laboratory and clinical findings is essential.         Immature Granulocytes     0.6       Lymph #     1.2       Lymph%     11.6       Magnesium   1.7   2.0     MCH     27.4       MCHC     30.8       MCV     89       Mode SIMV           Mono #     1.0       Mono%     10.4       MPV     10.0       nRBC     0       PEEP 5           Phosphorus   2.1         Platelets     133       POC BE -2           POC HCO3 23.0           POC PCO2 39.8           POC PH 7.369           POC PO2 181           POC SATURATED O2 100           POC TCO2 24           Potassium   3.7   3.8     PROTEIN TOTAL   4.7         PS 10           Rate 10           RBC     2.92        RDW     16.7       Sample ARTERIAL           Sodium   135         Sp02 100           WBC     9.98             Significant Diagnostics:  CT: No results found in the last 24 hours.  Echoencephalography: No results found in the last 24 hours.  MRI: No results found in the last 24 hours.

## 2019-12-24 NOTE — PLAN OF CARE
POC reviewed with pt at 0500. Pt intubated, unable to verbalize understanding. No acute events overnight. SBP <180 maintained, no issues. Pt afebrile throughout the night. NS infusing at 100 ml/hr. Precedex infusing at 0.3 mcg/hr. Phos being replaced. Pt progressing toward goals. Will continue to monitor. See flowsheets for full assessment and VS info.

## 2019-12-24 NOTE — ASSESSMENT & PLAN NOTE
SBP < 180; DC Karly 12/24 pm  Amlodipine 10 mg daily  Losartan-HCTZ 100 mg - 25 mg  Metoprolol XL 50 mg BID

## 2019-12-24 NOTE — ASSESSMENT & PLAN NOTE
C6-T3 osteomyelitis with epidural abscess    - afebrile, cultures NGTD  -12/10: C7-T1 corpectomy with C6-T2 anterior fusion and lumbar drain placement   -Cultures: (12/10)Neck abscess +pseudomonas --cefepime 2g q8h for 6 week duration of therapy  12/17: OR with neurosurgery for stage II, posterior instrumentation C2-T3  -No HOB goal now that drains dc'd  -intubated, WTE--- daily weaning parameters.   -precedex for sedation  -PT/OT when appropriate

## 2019-12-24 NOTE — PLAN OF CARE
ETT @ 7 days  Attempt spontaneous trial today  Continue salt tabs @ 2q8  Wean NS to 75  DC A-line today

## 2019-12-24 NOTE — SUBJECTIVE & OBJECTIVE
Interval History:  No acute events, neurological exam stable    Review of Systems   Unable to perform ROS: Intubated       Objective:     Vitals:  Temp: 98.5 °F (36.9 °C)  Pulse: 75  Rhythm: normal sinus rhythm  BP: 136/68  MAP (mmHg): 96  Resp: 16  SpO2: 100 %  Oxygen Concentration (%): 40  O2 Device (Oxygen Therapy): ventilator  Vent Mode: SIMV  Set Rate: 10 bmp  Pressure Support: 10 cmH20  PEEP/CPAP: 5 cmH20  Peak Airway Pressure: 14 cmH2O  Mean Airway Pressure: 8.8 cmH20  Plateau Pressure: 15 cmH20    Temp  Min: 98 °F (36.7 °C)  Max: 98.5 °F (36.9 °C)  Pulse  Min: 72  Max: 96  BP  Min: 92/50  Max: 152/70  MAP (mmHg)  Min: 67  Max: 102  Resp  Min: 6  Max: 28  SpO2  Min: 100 %  Max: 100 %  Oxygen Concentration (%)  Min: 40  Max: 40    12/23 0701 - 12/24 0700  In: 4195.7 [I.V.:2445.7]  Out: 1640 [Urine:1640]   Unmeasured Output  Urine Occurrence: 1  Stool Occurrence: 0  Pad Count: 2       Physical Exam   Cardiovascular: Normal rate.   Neurological: He is alert. GCS eye subscore is 4. GCS verbal subscore is 1. GCS motor subscore is 6.   Older man, intubated on precedex; awake, eyes open, following requests  EOMI, PERRL  Tone increased at the patellas BL  Moving all extremities to request  Sensation to light touch equal x 4     Nursing note and vitals reviewed.        Medications:  Continuous  sodium chloride 0.9%    dexmedetomidine (PRECEDEX) infusion Last Rate: 0.3 mcg/kg/hr (12/24/19 0602)   niCARdipine Last Rate: Stopped (12/21/19 0805)   Scheduled  albuterol-ipratropium 3 mL Q4H   amLODIPine 10 mg Daily   atorvastatin 80 mg Daily   bacitracin  TID   ceFEPime (MAXIPIME) IVPB 2 g Q8H   chlorhexidine 15 mL BID   clopidogrel 75 mg Daily   famotidine 20 mg BID   heparin (porcine) 5,000 Units Q8H   levothyroxine 50 mcg Before breakfast   losartan-hydrochlorothiazide 100-25 mg 1 tablet Daily   metoprolol tartrate 50 mg BID   modafinil 100 mg Daily   modafinil 200 mg Daily   polyethylene glycol 17 g Daily    senna-docusate 8.6-50 mg 1 tablet BID   sodium chloride 0.9% 10 mL Q6H   sodium chloride 3% 4 mL Q4H   sodium chloride 2 g TID   PRN  acetaminophen 500 mg Q8H PRN   Dextrose 10% Bolus 12.5 g PRN   Dextrose 10% Bolus 25 g PRN   glucagon (human recombinant) 1 mg PRN   glucose 16 g PRN   glucose 24 g PRN   hydrALAZINE 10 mg Q4H PRN   labetalol 10 mg Q4H PRN   magnesium sulfate IVPB 2 g PRN   magnesium sulfate IVPB 4 g PRN   naloxone 0.4 mg PRN   ondansetron 8 mg Q8H PRN   oxyCODONE 10 mg Q6H PRN   potassium chloride in water 40 mEq PRN   And     potassium chloride in water 60 mEq PRN   And     potassium chloride in water 80 mEq PRN   sodium chloride 0.9% 10 mL PRN   sodium phosphate IVPB 15 mmol PRN   sodium phosphate IVPB 20.01 mmol PRN   sodium phosphate IVPB 30 mmol PRN     Today I personally reviewed pertinent medications, lines/drains/airways, imaging, notably:    Diet  Diet NPO  Diet NPO    Wyatt Hall MD

## 2019-12-25 PROBLEM — Z99.11 ON MECHANICALLY ASSISTED VENTILATION: Status: RESOLVED | Noted: 2019-12-18 | Resolved: 2019-12-25

## 2019-12-25 LAB
ALBUMIN SERPL BCP-MCNC: 1.9 G/DL (ref 3.5–5.2)
ALLENS TEST: NORMAL
ALP SERPL-CCNC: 325 U/L (ref 55–135)
ALT SERPL W/O P-5'-P-CCNC: 20 U/L (ref 10–44)
ANION GAP SERPL CALC-SCNC: 5 MMOL/L (ref 8–16)
AST SERPL-CCNC: 32 U/L (ref 10–40)
BASOPHILS # BLD AUTO: 0.04 K/UL (ref 0–0.2)
BASOPHILS # BLD AUTO: 0.04 K/UL (ref 0–0.2)
BASOPHILS NFR BLD: 0.5 % (ref 0–1.9)
BASOPHILS NFR BLD: 0.5 % (ref 0–1.9)
BILIRUB SERPL-MCNC: 0.5 MG/DL (ref 0.1–1)
BUN SERPL-MCNC: 9 MG/DL (ref 8–23)
CALCIUM SERPL-MCNC: 8 MG/DL (ref 8.7–10.5)
CHLORIDE SERPL-SCNC: 110 MMOL/L (ref 95–110)
CO2 SERPL-SCNC: 22 MMOL/L (ref 23–29)
CREAT SERPL-MCNC: 0.6 MG/DL (ref 0.5–1.4)
DELSYS: NORMAL
DIFFERENTIAL METHOD: ABNORMAL
DIFFERENTIAL METHOD: ABNORMAL
EOSINOPHIL # BLD AUTO: 0.2 K/UL (ref 0–0.5)
EOSINOPHIL # BLD AUTO: 0.3 K/UL (ref 0–0.5)
EOSINOPHIL NFR BLD: 2.8 % (ref 0–8)
EOSINOPHIL NFR BLD: 3.2 % (ref 0–8)
ERYTHROCYTE [DISTWIDTH] IN BLOOD BY AUTOMATED COUNT: 17.2 % (ref 11.5–14.5)
ERYTHROCYTE [DISTWIDTH] IN BLOOD BY AUTOMATED COUNT: 17.4 % (ref 11.5–14.5)
EST. GFR  (AFRICAN AMERICAN): >60 ML/MIN/1.73 M^2
EST. GFR  (NON AFRICAN AMERICAN): >60 ML/MIN/1.73 M^2
ESTIMATED AVG GLUCOSE: 103 MG/DL (ref 68–131)
GLUCOSE SERPL-MCNC: 105 MG/DL (ref 70–110)
HBA1C MFR BLD HPLC: 5.2 % (ref 4–5.6)
HCO3 UR-SCNC: 24.3 MMOL/L (ref 24–28)
HCT VFR BLD AUTO: 26.3 % (ref 40–54)
HCT VFR BLD AUTO: 27.7 % (ref 40–54)
HGB BLD-MCNC: 8.4 G/DL (ref 14–18)
HGB BLD-MCNC: 8.5 G/DL (ref 14–18)
IMM GRANULOCYTES # BLD AUTO: 0.06 K/UL (ref 0–0.04)
IMM GRANULOCYTES # BLD AUTO: 0.08 K/UL (ref 0–0.04)
IMM GRANULOCYTES NFR BLD AUTO: 0.7 % (ref 0–0.5)
IMM GRANULOCYTES NFR BLD AUTO: 1 % (ref 0–0.5)
LYMPHOCYTES # BLD AUTO: 1 K/UL (ref 1–4.8)
LYMPHOCYTES # BLD AUTO: 1.1 K/UL (ref 1–4.8)
LYMPHOCYTES NFR BLD: 11.6 % (ref 18–48)
LYMPHOCYTES NFR BLD: 13.5 % (ref 18–48)
MAGNESIUM SERPL-MCNC: 1.7 MG/DL (ref 1.6–2.6)
MCH RBC QN AUTO: 27.4 PG (ref 27–31)
MCH RBC QN AUTO: 28.6 PG (ref 27–31)
MCHC RBC AUTO-ENTMCNC: 30.3 G/DL (ref 32–36)
MCHC RBC AUTO-ENTMCNC: 32.3 G/DL (ref 32–36)
MCV RBC AUTO: 89 FL (ref 82–98)
MCV RBC AUTO: 90 FL (ref 82–98)
MONOCYTES # BLD AUTO: 0.9 K/UL (ref 0.3–1)
MONOCYTES # BLD AUTO: 1 K/UL (ref 0.3–1)
MONOCYTES NFR BLD: 11.1 % (ref 4–15)
MONOCYTES NFR BLD: 11.3 % (ref 4–15)
NEUTROPHILS # BLD AUTO: 5.9 K/UL (ref 1.8–7.7)
NEUTROPHILS # BLD AUTO: 6.3 K/UL (ref 1.8–7.7)
NEUTROPHILS NFR BLD: 70.5 % (ref 38–73)
NEUTROPHILS NFR BLD: 73.3 % (ref 38–73)
NRBC BLD-RTO: 0 /100 WBC
NRBC BLD-RTO: 0 /100 WBC
PCO2 BLDA: 36.6 MMHG (ref 35–45)
PH SMN: 7.43 [PH] (ref 7.35–7.45)
PHOSPHATE SERPL-MCNC: 2.4 MG/DL (ref 2.7–4.5)
PLATELET # BLD AUTO: 137 K/UL (ref 150–350)
PLATELET # BLD AUTO: 154 K/UL (ref 150–350)
PMV BLD AUTO: 10.4 FL (ref 9.2–12.9)
PMV BLD AUTO: 10.5 FL (ref 9.2–12.9)
PO2 BLDA: 90 MMHG (ref 80–100)
POC BE: 0 MMOL/L
POC SATURATED O2: 97 % (ref 95–100)
POC TCO2: 25 MMOL/L (ref 23–27)
POTASSIUM SERPL-SCNC: 3.5 MMOL/L (ref 3.5–5.1)
PROT SERPL-MCNC: 5 G/DL (ref 6–8.4)
RBC # BLD AUTO: 2.97 M/UL (ref 4.6–6.2)
RBC # BLD AUTO: 3.07 M/UL (ref 4.6–6.2)
SAMPLE: NORMAL
SITE: NORMAL
SODIUM SERPL-SCNC: 137 MMOL/L (ref 136–145)
WBC # BLD AUTO: 8.32 K/UL (ref 3.9–12.7)
WBC # BLD AUTO: 8.59 K/UL (ref 3.9–12.7)

## 2019-12-25 PROCEDURE — 25000003 PHARM REV CODE 250: Performed by: PHYSICIAN ASSISTANT

## 2019-12-25 PROCEDURE — 83036 HEMOGLOBIN GLYCOSYLATED A1C: CPT

## 2019-12-25 PROCEDURE — 82803 BLOOD GASES ANY COMBINATION: CPT

## 2019-12-25 PROCEDURE — 25000242 PHARM REV CODE 250 ALT 637 W/ HCPCS: Performed by: NURSE PRACTITIONER

## 2019-12-25 PROCEDURE — 63600175 PHARM REV CODE 636 W HCPCS: Performed by: PHYSICIAN ASSISTANT

## 2019-12-25 PROCEDURE — 99233 SBSQ HOSP IP/OBS HIGH 50: CPT | Mod: GC,,, | Performed by: PSYCHIATRY & NEUROLOGY

## 2019-12-25 PROCEDURE — A4216 STERILE WATER/SALINE, 10 ML: HCPCS | Performed by: ANESTHESIOLOGY

## 2019-12-25 PROCEDURE — 84100 ASSAY OF PHOSPHORUS: CPT

## 2019-12-25 PROCEDURE — 63600175 PHARM REV CODE 636 W HCPCS: Performed by: PSYCHIATRY & NEUROLOGY

## 2019-12-25 PROCEDURE — 25000003 PHARM REV CODE 250: Performed by: PSYCHIATRY & NEUROLOGY

## 2019-12-25 PROCEDURE — 94640 AIRWAY INHALATION TREATMENT: CPT

## 2019-12-25 PROCEDURE — 99900035 HC TECH TIME PER 15 MIN (STAT)

## 2019-12-25 PROCEDURE — 37799 UNLISTED PX VASCULAR SURGERY: CPT

## 2019-12-25 PROCEDURE — 94761 N-INVAS EAR/PLS OXIMETRY MLT: CPT

## 2019-12-25 PROCEDURE — 85025 COMPLETE CBC W/AUTO DIFF WBC: CPT | Mod: 91

## 2019-12-25 PROCEDURE — 63600175 PHARM REV CODE 636 W HCPCS: Performed by: NURSE PRACTITIONER

## 2019-12-25 PROCEDURE — 25000003 PHARM REV CODE 250: Performed by: ANESTHESIOLOGY

## 2019-12-25 PROCEDURE — 83735 ASSAY OF MAGNESIUM: CPT

## 2019-12-25 PROCEDURE — 80053 COMPREHEN METABOLIC PANEL: CPT

## 2019-12-25 PROCEDURE — 27000221 HC OXYGEN, UP TO 24 HOURS

## 2019-12-25 PROCEDURE — 99233 PR SUBSEQUENT HOSPITAL CARE,LEVL III: ICD-10-PCS | Mod: GC,,, | Performed by: PSYCHIATRY & NEUROLOGY

## 2019-12-25 PROCEDURE — 20000000 HC ICU ROOM

## 2019-12-25 PROCEDURE — 25000003 PHARM REV CODE 250: Performed by: NURSE PRACTITIONER

## 2019-12-25 RX ORDER — AMOXICILLIN 250 MG
1 CAPSULE ORAL 2 TIMES DAILY
Status: DISCONTINUED | OUTPATIENT
Start: 2019-12-25 | End: 2020-01-11 | Stop reason: HOSPADM

## 2019-12-25 RX ORDER — IBUPROFEN 200 MG
16 TABLET ORAL
Status: DISCONTINUED | OUTPATIENT
Start: 2019-12-25 | End: 2019-12-27

## 2019-12-25 RX ORDER — GABAPENTIN 300 MG/1
300 CAPSULE ORAL 3 TIMES DAILY
Status: DISCONTINUED | OUTPATIENT
Start: 2019-12-25 | End: 2020-01-11 | Stop reason: HOSPADM

## 2019-12-25 RX ORDER — OXYCODONE HYDROCHLORIDE 10 MG/1
10 TABLET ORAL EVERY 6 HOURS PRN
Status: DISCONTINUED | OUTPATIENT
Start: 2019-12-25 | End: 2020-01-11 | Stop reason: HOSPADM

## 2019-12-25 RX ORDER — FAMOTIDINE 20 MG/1
20 TABLET, FILM COATED ORAL 2 TIMES DAILY
Status: DISCONTINUED | OUTPATIENT
Start: 2019-12-25 | End: 2020-01-11 | Stop reason: HOSPADM

## 2019-12-25 RX ORDER — POLYETHYLENE GLYCOL 3350 17 G/17G
17 POWDER, FOR SOLUTION ORAL DAILY
Status: DISCONTINUED | OUTPATIENT
Start: 2019-12-25 | End: 2020-01-11 | Stop reason: HOSPADM

## 2019-12-25 RX ORDER — IBUPROFEN 200 MG
24 TABLET ORAL
Status: DISCONTINUED | OUTPATIENT
Start: 2019-12-25 | End: 2019-12-27

## 2019-12-25 RX ORDER — MODAFINIL 100 MG/1
100 TABLET ORAL DAILY
Status: DISCONTINUED | OUTPATIENT
Start: 2019-12-25 | End: 2019-12-26

## 2019-12-25 RX ORDER — LEVOTHYROXINE SODIUM 50 UG/1
50 TABLET ORAL
Status: DISCONTINUED | OUTPATIENT
Start: 2019-12-26 | End: 2020-01-11 | Stop reason: HOSPADM

## 2019-12-25 RX ORDER — ONDANSETRON 8 MG/1
8 TABLET, ORALLY DISINTEGRATING ORAL EVERY 8 HOURS PRN
Status: DISCONTINUED | OUTPATIENT
Start: 2019-12-25 | End: 2019-12-27

## 2019-12-25 RX ORDER — CLOPIDOGREL BISULFATE 75 MG/1
75 TABLET ORAL DAILY
Status: DISCONTINUED | OUTPATIENT
Start: 2019-12-25 | End: 2020-01-11 | Stop reason: HOSPADM

## 2019-12-25 RX ORDER — LOSARTAN POTASSIUM AND HYDROCHLOROTHIAZIDE 25; 100 MG/1; MG/1
1 TABLET ORAL DAILY
Status: DISCONTINUED | OUTPATIENT
Start: 2019-12-25 | End: 2020-01-11 | Stop reason: HOSPADM

## 2019-12-25 RX ORDER — METOPROLOL TARTRATE 50 MG/1
50 TABLET ORAL 2 TIMES DAILY
Status: DISCONTINUED | OUTPATIENT
Start: 2019-12-25 | End: 2020-01-11 | Stop reason: HOSPADM

## 2019-12-25 RX ORDER — MODAFINIL 100 MG/1
200 TABLET ORAL DAILY
Status: DISCONTINUED | OUTPATIENT
Start: 2019-12-26 | End: 2019-12-26

## 2019-12-25 RX ORDER — ATORVASTATIN CALCIUM 20 MG/1
80 TABLET, FILM COATED ORAL DAILY
Status: DISCONTINUED | OUTPATIENT
Start: 2019-12-25 | End: 2020-01-02

## 2019-12-25 RX ORDER — AMLODIPINE BESYLATE 10 MG/1
10 TABLET ORAL DAILY
Status: DISCONTINUED | OUTPATIENT
Start: 2019-12-25 | End: 2020-01-11 | Stop reason: HOSPADM

## 2019-12-25 RX ORDER — ACETAMINOPHEN 500 MG
500 TABLET ORAL EVERY 8 HOURS PRN
Status: DISCONTINUED | OUTPATIENT
Start: 2019-12-25 | End: 2019-12-27

## 2019-12-25 RX ADMIN — AMLODIPINE BESYLATE 10 MG: 10 TABLET ORAL at 08:12

## 2019-12-25 RX ADMIN — ACETAMINOPHEN 500 MG: 500 TABLET ORAL at 08:12

## 2019-12-25 RX ADMIN — SODIUM CHLORIDE SOLN NEBU 3% 4 ML: 3 NEBU SOLN at 08:12

## 2019-12-25 RX ADMIN — IPRATROPIUM BROMIDE AND ALBUTEROL SULFATE 3 ML: .5; 3 SOLUTION RESPIRATORY (INHALATION) at 11:12

## 2019-12-25 RX ADMIN — METOPROLOL TARTRATE 50 MG: 50 TABLET ORAL at 08:12

## 2019-12-25 RX ADMIN — OXYCODONE HYDROCHLORIDE 10 MG: 10 TABLET ORAL at 02:12

## 2019-12-25 RX ADMIN — GABAPENTIN 300 MG: 300 CAPSULE ORAL at 02:12

## 2019-12-25 RX ADMIN — CLOPIDOGREL BISULFATE 75 MG: 75 TABLET ORAL at 08:12

## 2019-12-25 RX ADMIN — CEFEPIME 2 G: 2 INJECTION, POWDER, FOR SOLUTION INTRAVENOUS at 02:12

## 2019-12-25 RX ADMIN — Medication 10 ML: at 12:12

## 2019-12-25 RX ADMIN — Medication 10 ML: at 02:12

## 2019-12-25 RX ADMIN — CEFEPIME 2 G: 2 INJECTION, POWDER, FOR SOLUTION INTRAVENOUS at 05:12

## 2019-12-25 RX ADMIN — IPRATROPIUM BROMIDE AND ALBUTEROL SULFATE 3 ML: .5; 3 SOLUTION RESPIRATORY (INHALATION) at 08:12

## 2019-12-25 RX ADMIN — OXYCODONE HYDROCHLORIDE 10 MG: 10 TABLET ORAL at 06:12

## 2019-12-25 RX ADMIN — IPRATROPIUM BROMIDE AND ALBUTEROL SULFATE 3 ML: .5; 3 SOLUTION RESPIRATORY (INHALATION) at 03:12

## 2019-12-25 RX ADMIN — Medication 2 G: at 08:12

## 2019-12-25 RX ADMIN — FAMOTIDINE 20 MG: 20 TABLET ORAL at 08:12

## 2019-12-25 RX ADMIN — Medication: at 02:12

## 2019-12-25 RX ADMIN — POLYETHYLENE GLYCOL 3350 17 G: 17 POWDER, FOR SOLUTION ORAL at 08:12

## 2019-12-25 RX ADMIN — SENNOSIDES AND DOCUSATE SODIUM 1 TABLET: 8.6; 5 TABLET ORAL at 08:12

## 2019-12-25 RX ADMIN — HEPARIN SODIUM 5000 UNITS: 5000 INJECTION, SOLUTION INTRAVENOUS; SUBCUTANEOUS at 08:12

## 2019-12-25 RX ADMIN — Medication 2 G: at 02:12

## 2019-12-25 RX ADMIN — Medication: at 08:12

## 2019-12-25 RX ADMIN — LOSARTAN POTASSIUM AND HYDROCHLOROTHIAZIDE TABLETS 1 TABLET: 100; 25 TABLET, FILM COATED ORAL at 08:12

## 2019-12-25 RX ADMIN — MODAFINIL 100 MG: 100 TABLET ORAL at 02:12

## 2019-12-25 RX ADMIN — HEPARIN SODIUM 5000 UNITS: 5000 INJECTION, SOLUTION INTRAVENOUS; SUBCUTANEOUS at 02:12

## 2019-12-25 RX ADMIN — MAGNESIUM SULFATE IN WATER 2 G: 40 INJECTION, SOLUTION INTRAVENOUS at 05:12

## 2019-12-25 RX ADMIN — ATORVASTATIN CALCIUM 80 MG: 20 TABLET, FILM COATED ORAL at 08:12

## 2019-12-25 RX ADMIN — LEVOTHYROXINE SODIUM 50 MCG: 50 TABLET ORAL at 06:12

## 2019-12-25 RX ADMIN — Medication: at 09:12

## 2019-12-25 RX ADMIN — GABAPENTIN 300 MG: 300 CAPSULE ORAL at 08:12

## 2019-12-25 RX ADMIN — SODIUM CHLORIDE SOLN NEBU 3% 4 ML: 3 NEBU SOLN at 03:12

## 2019-12-25 RX ADMIN — HEPARIN SODIUM 5000 UNITS: 5000 INJECTION, SOLUTION INTRAVENOUS; SUBCUTANEOUS at 06:12

## 2019-12-25 RX ADMIN — SODIUM CHLORIDE SOLN NEBU 3% 4 ML: 3 NEBU SOLN at 11:12

## 2019-12-25 RX ADMIN — Medication 10 ML: at 05:12

## 2019-12-25 RX ADMIN — MODAFINIL 200 MG: 100 TABLET ORAL at 06:12

## 2019-12-25 RX ADMIN — CEFEPIME 2 G: 2 INJECTION, POWDER, FOR SOLUTION INTRAVENOUS at 10:12

## 2019-12-25 RX ADMIN — POTASSIUM CHLORIDE 40 MEQ: 400 INJECTION, SOLUTION INTRAVENOUS at 05:12

## 2019-12-25 NOTE — ASSESSMENT & PLAN NOTE
74 y.o. male with PMH of HTN, HLD, Hepatitis C, and CAD s/p two stents on plavix who presents with C6-T2 osteomyelitis with suspected epidural abscess.  Now s/p C7/T1 corpectomies and C2-T2  posterior instrumented fusion 12/18    Continue ICU care and neuro checks q1h  Pain control  Continue C-collar at all times  Oversew right anterior neck drain site.  No HOB restrictions, allow patient to mobilize  Cervical x-rays needed  Continue cefepime per ID Recs x 8 weeks  Aggressive pulm toilet  Daily physical and occupational therapy  Subcu heparin DVT prophylaxis  Remain in ICU today to monitor respiratory status, consider TTF tomorrow.  Will continue to follow

## 2019-12-25 NOTE — PROGRESS NOTES
[facility]  Neurosurgery  Progress Note    Subjective:     History of Present Illness: Junaid Muñoz is a 74 y.o. male with PMH of HTN, HLD, Hepatitis C, and CAD s/p two stents on plavix who presents with 1 month history of back pain between his shoulders. MRI at OSH demonstrates likely epidural abscess. Pt denies hx of trauma and reports slow onset of symptoms.     Post-Op Info:  Procedure(s) (LRB):  FUSION, SPINE, CERVICAL, POSTERIOR APPROACH C2-T3 posterior instrumented fusion (N/A)   8 Days Post-Op     Interval History: naeon, extubated yesterday    Medications:  Continuous Infusions:   sodium chloride 0.9% 75 mL/hr at 12/25/19 0405    dexmedetomidine (PRECEDEX) infusion Stopped (12/24/19 1530)    niCARdipine Stopped (12/21/19 0805)     Scheduled Meds:   albuterol-ipratropium  3 mL Nebulization Q4H    amLODIPine  10 mg Oral Daily    atorvastatin  80 mg Oral Daily    bacitracin   Topical (Top) TID    ceFEPime (MAXIPIME) IVPB  2 g Intravenous Q8H    clopidogrel  75 mg Oral Daily    famotidine  20 mg Oral BID    heparin (porcine)  5,000 Units Subcutaneous Q8H    [START ON 12/26/2019] levothyroxine  50 mcg Oral Before breakfast    losartan-hydrochlorothiazide 100-25 mg  1 tablet Oral Daily    metoprolol tartrate  50 mg Oral BID    modafinil  100 mg Oral Daily    [START ON 12/26/2019] modafinil  200 mg Oral Daily    polyethylene glycol  17 g Oral Daily    senna-docusate 8.6-50 mg  1 tablet Oral BID    sodium chloride 0.9%  10 mL Intravenous Q6H    sodium chloride 3%  4 mL Nebulization Q4H    sodium chloride  2 g Oral TID     PRN Meds:acetaminophen, Dextrose 10% Bolus, Dextrose 10% Bolus, glucagon (human recombinant), glucose, glucose, hydrALAZINE, labetalol, magnesium sulfate IVPB, magnesium sulfate IVPB, naloxone, ondansetron, oxyCODONE, potassium chloride in water **AND** potassium chloride in water **AND** potassium chloride in water, Flushing PICC Protocol **AND**  sodium chloride 0.9% **AND** sodium chloride 0.9%, sodium phosphate IVPB, sodium phosphate IVPB, sodium phosphate IVPB     Review of Systems  Objective:     Weight: 82.2 kg (181 lb 3.5 oz)  Body mass index is 26.76 kg/m².  Vital Signs (Most Recent):  Temp: 98.8 °F (37.1 °C) (12/25/19 0305)  Pulse: 87 (12/25/19 0605)  Resp: 16 (12/25/19 0605)  BP: (!) 145/68 (12/25/19 0605)  SpO2: 99 % (12/25/19 0605) Vital Signs (24h Range):  Temp:  [97.7 °F (36.5 °C)-98.9 °F (37.2 °C)] 98.8 °F (37.1 °C)  Pulse:  [68-92] 87  Resp:  [12-29] 16  SpO2:  [94 %-100 %] 99 %  BP: ()/(53-75) 145/68  Arterial Line BP: (113-176)/(53-71) 167/58     Date 12/25/19 0700 - 12/26/19 0659   Shift 4429-4223 8486-4435 8271-9893 24 Hour Total   INTAKE   Shift Total(mL/kg)       OUTPUT   Urine(mL/kg/hr) 250   250   Shift Total(mL/kg) 250(3)   250(3)   Weight (kg) 82.2 82.2 82.2 82.2              Vent Mode: Spont  Oxygen Concentration (%):  [40] 40  Resp Rate Total:  [5.9 br/min-21 br/min] 7 br/min  PEEP/CPAP:  [5 cmH20] 5 cmH20  Pressure Support:  [8 cmH20] 8 cmH20  Mean Airway Pressure:  [7 cmH20-8.6 cmH20] 8.5 cmH20    Male External Urinary Catheter 12/19/19 1200 Small (Active)   Collection Container Urimeter 12/25/2019  3:05 AM   Securement Method secured to top of thigh w/ adhesive device 12/25/2019  3:05 AM   Skin no redness;no breakdown 12/25/2019  3:05 AM   Tolerance no signs/symptoms of discomfort 12/25/2019  3:05 AM   Output (mL) 250 mL 12/25/2019  7:00 AM   Catheter Change Date 12/24/19 12/25/2019  3:05 AM   Catheter Change Time 1900 12/25/2019  3:05 AM       Neurosurgery Physical Exam   Awake, alert  PERRL  OSHEA symm  SILT  Minimal drainage from right anterior HV drain site.    Significant Labs:  Recent Labs   Lab 12/23/19  1239 12/24/19  0220 12/25/19  0211   GLU  --  145* 105   NA  --  135* 137   K 3.8 3.7 3.5   CL  --  109 110   CO2  --  22* 22*   BUN  --  11 9   CREATININE  --  0.6 0.6   CALCIUM  --  7.6* 8.0*   MG 2.0 1.7 1.7      Recent Labs   Lab 12/23/19 2014 12/24/19 2000   WBC 9.98 7.84   HGB 8.0* 8.0*   HCT 26.0* 25.9*   * 113*     No results for input(s): LABPT, INR, APTT in the last 48 hours.  Microbiology Results (last 7 days)     Procedure Component Value Units Date/Time    Blood culture [[id number]] Collected:  12/17/19 1505    Order Status:  Completed Specimen:  Blood from Line, Arterial, Right Updated:  12/22/19 1812     Blood Culture, Routine No growth after 5 days.            Significant Diagnostics:      Assessment/Plan:     * Spinal epidural abscess  74 y.o. male with PMH of HTN, HLD, Hepatitis C, and CAD s/p two stents on plavix who presents with C6-T2 osteomyelitis with suspected epidural abscess.  Now s/p C7/T1 corpectomies and C2-T2  posterior instrumented fusion 12/18    Continue ICU care and neuro checks q1h  Pain control  Continue C-collar at all times  Oversew right anterior neck drain site.  No HOB restrictions, allow patient to mobilize  Cervical x-rays needed  Continue cefepime per ID Recs x 8 weeks  Aggressive pulm toilet  Daily physical and occupational therapy  Subcu heparin DVT prophylaxis  Remain in ICU today to monitor respiratory status, consider TTF tomorrow.  Will continue to follow        Peter Dela Cruz DO  Neurosurgery  [facility]

## 2019-12-25 NOTE — SUBJECTIVE & OBJECTIVE
Interval History: naeon, extubated yesterday    Medications:  Continuous Infusions:   sodium chloride 0.9% 75 mL/hr at 12/25/19 0405    dexmedetomidine (PRECEDEX) infusion Stopped (12/24/19 1530)    niCARdipine Stopped (12/21/19 0805)     Scheduled Meds:   albuterol-ipratropium  3 mL Nebulization Q4H    amLODIPine  10 mg Oral Daily    atorvastatin  80 mg Oral Daily    bacitracin   Topical (Top) TID    ceFEPime (MAXIPIME) IVPB  2 g Intravenous Q8H    clopidogrel  75 mg Oral Daily    famotidine  20 mg Oral BID    heparin (porcine)  5,000 Units Subcutaneous Q8H    [START ON 12/26/2019] levothyroxine  50 mcg Oral Before breakfast    losartan-hydrochlorothiazide 100-25 mg  1 tablet Oral Daily    metoprolol tartrate  50 mg Oral BID    modafinil  100 mg Oral Daily    [START ON 12/26/2019] modafinil  200 mg Oral Daily    polyethylene glycol  17 g Oral Daily    senna-docusate 8.6-50 mg  1 tablet Oral BID    sodium chloride 0.9%  10 mL Intravenous Q6H    sodium chloride 3%  4 mL Nebulization Q4H    sodium chloride  2 g Oral TID     PRN Meds:acetaminophen, Dextrose 10% Bolus, Dextrose 10% Bolus, glucagon (human recombinant), glucose, glucose, hydrALAZINE, labetalol, magnesium sulfate IVPB, magnesium sulfate IVPB, naloxone, ondansetron, oxyCODONE, potassium chloride in water **AND** potassium chloride in water **AND** potassium chloride in water, Flushing PICC Protocol **AND** sodium chloride 0.9% **AND** sodium chloride 0.9%, sodium phosphate IVPB, sodium phosphate IVPB, sodium phosphate IVPB     Review of Systems  Objective:     Weight: 82.2 kg (181 lb 3.5 oz)  Body mass index is 26.76 kg/m².  Vital Signs (Most Recent):  Temp: 98.8 °F (37.1 °C) (12/25/19 0305)  Pulse: 87 (12/25/19 0605)  Resp: 16 (12/25/19 0605)  BP: (!) 145/68 (12/25/19 0605)  SpO2: 99 % (12/25/19 0605) Vital Signs (24h Range):  Temp:  [97.7 °F (36.5 °C)-98.9 °F (37.2 °C)] 98.8 °F (37.1 °C)  Pulse:  [68-92] 87  Resp:  [12-29] 16  SpO2:   [94 %-100 %] 99 %  BP: ()/(53-75) 145/68  Arterial Line BP: (113-176)/(53-71) 167/58     Date 12/25/19 0700 - 12/26/19 0659   Shift 8984-1673 5738-9575 6569-2768 24 Hour Total   INTAKE   Shift Total(mL/kg)       OUTPUT   Urine(mL/kg/hr) 250   250   Shift Total(mL/kg) 250(3)   250(3)   Weight (kg) 82.2 82.2 82.2 82.2              Vent Mode: Spont  Oxygen Concentration (%):  [40] 40  Resp Rate Total:  [5.9 br/min-21 br/min] 7 br/min  PEEP/CPAP:  [5 cmH20] 5 cmH20  Pressure Support:  [8 cmH20] 8 cmH20  Mean Airway Pressure:  [7 cmH20-8.6 cmH20] 8.5 cmH20    Male External Urinary Catheter 12/19/19 1200 Small (Active)   Collection Container Urimeter 12/25/2019  3:05 AM   Securement Method secured to top of thigh w/ adhesive device 12/25/2019  3:05 AM   Skin no redness;no breakdown 12/25/2019  3:05 AM   Tolerance no signs/symptoms of discomfort 12/25/2019  3:05 AM   Output (mL) 250 mL 12/25/2019  7:00 AM   Catheter Change Date 12/24/19 12/25/2019  3:05 AM   Catheter Change Time 1900 12/25/2019  3:05 AM       Neurosurgery Physical Exam   Awake, alert  PERRL  OSHEA symm  SILT  Minimal drainage from right anterior HV drain site.    Significant Labs:  Recent Labs   Lab 12/23/19  1239 12/24/19  0220 12/25/19  0211   GLU  --  145* 105   NA  --  135* 137   K 3.8 3.7 3.5   CL  --  109 110   CO2  --  22* 22*   BUN  --  11 9   CREATININE  --  0.6 0.6   CALCIUM  --  7.6* 8.0*   MG 2.0 1.7 1.7     Recent Labs   Lab 12/23/19 2014 12/24/19 2000   WBC 9.98 7.84   HGB 8.0* 8.0*   HCT 26.0* 25.9*   * 113*     No results for input(s): LABPT, INR, APTT in the last 48 hours.  Microbiology Results (last 7 days)     Procedure Component Value Units Date/Time    Blood culture [[id number]] Collected:  12/17/19 1505    Order Status:  Completed Specimen:  Blood from Line, Arterial, Right Updated:  12/22/19 1812     Blood Culture, Routine No growth after 5 days.            Significant Diagnostics:

## 2019-12-25 NOTE — ASSESSMENT & PLAN NOTE
- C3-T3 osteomyelitis with epidural abscess s/p C7-T1 corpectomy & C6-T2 anterior fusion (12/6 stage 1) with tissue cultures - +pseudomonas  - ID following, appreciate recommendations : cefepime 2g IV q8h for 6 weeks (tentative end date 1/28/20)

## 2019-12-25 NOTE — PLAN OF CARE
POC reviewed with pt and family at 1400. Pt verbalized understanding. Questions and concerns addressed. No acute events today. A-Line removed. Pt's pain addressed. Pt progressing toward goals. Will continue to monitor. See flowsheets for full assessment and VS info.

## 2019-12-25 NOTE — PLAN OF CARE
POC reviewed with pt at 0500. Pt verbalized understanding. Questions and concerns addressed.  Electrolytes replaced. VS goals maintained, neuro status stabled.  No acute events overnight. Pt progressing toward goals. Will continue to monitor. See flowsheets for full assessment and VS info.

## 2019-12-25 NOTE — ASSESSMENT & PLAN NOTE
- SBP < 180; ROSA Brandt 12/24 pm  - Amlodipine 10 mg daily  - Losartan-HCTZ 100 mg - 25 mg  - Metoprolol XL 50 mg BID

## 2019-12-25 NOTE — PLAN OF CARE
POC reviewed with pt and family at 1400. Pt verbalized understanding. Questions and concerns addressed. No acute events today. Pt extubated. Passed MIGUEL. IV fluids at 75 ml/hr. Pt progressing toward goals. Will continue to monitor. See flowsheets for full assessment and VS info.

## 2019-12-25 NOTE — PROGRESS NOTES
[facility]  Neurocritical Care  Progress Note    Admit Date: 12/6/2019  Service Date: 12/25/2019  Length of Stay: 19    Subjective:     Chief Complaint: Spinal epidural abscess    History of Present Illness: Junaid Muñoz is a 74 year old male with CAD, HTN, HLD, hypothyroidism, tobacco use, and prior history of IVDU who was found to have C6-T3 osteomyelitis with epidural abscess. Patient was neurologically intact and hemodynamically stable on admission.  Now immediately status post phase one of a two part surgery.  Today he had a C7-T1 corpectomy and C6-T2 anterior fusion.  Lumbar drain was placed secondary to intra op CSF leak, and patient admitted to [facility] for post op monitoring and drain maintenance.     Hospital Course: 12/10 admitted post-op C7-T1 corpectomy and C6-T2 anterior fusion with lumbar drain placed 2/2 CSF leak intra-op  12/12  No significant events over night, per nsgy will keep lumbar drain another day. 2nd stage of sugery to occur next Tuesday. Remains NPO while having to lie flat with  lumbar drain. Getting confused after receiving valium decreased dose.  12/13: Restless o/n, c/o back pain. LD dropped to floor by NSGY, 8-10cc/hr. HV drain in place as well. Neurologically stable. AF, no leukocytosis, H/H stable.   12/14: calm at present, slight agitation overnight, can elevate head for swallow trials and po today,pending picc placement for long term abx  12/15: less responsive this am, wean scheduled ativan, begin to advance diet and taper ivfs  12/16: NAEO. Stage II surgery tomorrow for posterior cervical fusion. Leukocytosis increasing, is on cefepime for pseudomonas in CSF, will reach out to ID to see if any further abx pre-op should be given. Lumbar drain in place. Increase normal saline to 100 cc/hr. NPO after midnight.   12/17: OR today for stage II posterior cervical instrumentation. Required 3 units PRBCs and 2 units FFP. 2 hemovacs in place to gravity with large  output. Will repeat CBC. Patient still intubated on arrival to ICU with cervical and facial edema, will start decadron. ID recommended repeating blood cultures as patient's WBC increasing. Continue cefepime.   12/18: POD 1 C2-T3 posterior instrumentation. Patient is still intubated, will wean to extubate. Facial and cervical swelling greatly improved. Lumbar drain in place. Hemovac x 2. Hemodynamically stable.  12/19/2019 subQ heparin,d/c howell, NSGY brace for neck, DAPT, HOB restrictions    12/20/2019 HOB clarified with NSGY keep flat, neuro status drowsy, labetolol 2x overnight, hydralazine not effective, respiratory, weaning paramaters on SIMV, wean to extubate, d/c protime INR   12/21/2019 Lumbar drain and hemovac pulled today per neurosurgery, no more HOB restrictions. Will attempt to wean ventilator and fentanyl. Scheduled oxycodone 5mg q 4h for fentanyl wean.   12/22/2019 Continues to fail weaning trials. Off fentanyl. D/C scheduled valium. Oxycodone PRN. Weaning parameters daily.   12/23/19: Failed multiple spontaneous trials, attempts to wean ETT, hyponatremia-start salt tabs 1 gm q8h  12/24: no events, attempting SBT today, Na+ increased, NS weaned  12/25: NAEON. Extubated yesterday. Continue to monitor for 24 hours before step down.     Interval History:  Extubated yesterday without issues. Tolerating room air. Continue to monitor for 24 hours prior to step down.     Review of Systems   Unable to perform ROS: Intubated   Constitutional: Negative for activity change, appetite change, chills, diaphoresis, fatigue, fever and unexpected weight change.   HENT: Negative for congestion, sore throat and trouble swallowing.    Eyes: Negative for photophobia, pain and discharge.   Respiratory: Negative for cough and shortness of breath.    Cardiovascular: Negative for chest pain and palpitations.   Gastrointestinal: Negative for abdominal pain, constipation, diarrhea, nausea, rectal pain and vomiting.    Genitourinary: Negative for decreased urine volume, difficulty urinating and dysuria.   Musculoskeletal: Positive for back pain. Negative for arthralgias, joint swelling, myalgias, neck pain and neck stiffness.   Skin: Negative for pallor and rash.   Neurological: Negative for dizziness, weakness, light-headedness, numbness and headaches.       Objective:     Vitals:  Temp: 98 °F (36.7 °C)  Pulse: 66  Rhythm: normal sinus rhythm  BP: (!) 99/56  MAP (mmHg): 72  Resp: 11  SpO2: 95 %  O2 Device (Oxygen Therapy): room air    Temp  Min: 97.6 °F (36.4 °C)  Max: 98.9 °F (37.2 °C)  Pulse  Min: 66  Max: 92  BP  Min: 94/53  Max: 177/71  MAP (mmHg)  Min: 68  Max: 108  Resp  Min: 11  Max: 29  SpO2  Min: 94 %  Max: 100 %  Oxygen Concentration (%)  Min: 40  Max: 40    12/24 0701 - 12/25 0700  In: 2437.8 [I.V.:1937.8]  Out: 1875 [Urine:1875]   Unmeasured Output  Urine Occurrence: 1  Stool Occurrence: 0  Pad Count: 2       Physical Exam   Constitutional: He is oriented to person, place, and time. He appears well-developed and well-nourished. No distress.   HENT:   Head: Normocephalic.   Eyes: Pupils are equal, round, and reactive to light. EOM are normal.   Neck: Normal range of motion.   Cardiovascular: Normal rate, regular rhythm and intact distal pulses.   Pulmonary/Chest: Effort normal. No respiratory distress.   Abdominal: Soft. There is no tenderness.   Musculoskeletal: Normal range of motion.   Neurological: He is alert and oriented to person, place, and time. GCS eye subscore is 4. GCS verbal subscore is 1. GCS motor subscore is 6.   Awake and alert  EOMI, PERRL  Tone increased at the patellas BL  Moving all extremities spontaneously   Sensation to light touch equal x 4   Skin: Skin is warm and dry. He is not diaphoretic.   Nursing note and vitals reviewed.        Medications:  Continuous    sodium chloride 0.9% Last Rate: 75 mL/hr at 12/25/19 1200   dexmedetomidine (PRECEDEX) infusion Last Rate: Stopped (12/24/19 1530)    niCARdipine Last Rate: Stopped (12/21/19 0805)   Scheduled    albuterol-ipratropium 3 mL Q4H   amLODIPine 10 mg Daily   atorvastatin 80 mg Daily   bacitracin  TID   ceFEPime (MAXIPIME) IVPB 2 g Q8H   clopidogrel 75 mg Daily   famotidine 20 mg BID   gabapentin 300 mg TID   heparin (porcine) 5,000 Units Q8H   [START ON 12/26/2019] levothyroxine 50 mcg Before breakfast   losartan-hydrochlorothiazide 100-25 mg 1 tablet Daily   metoprolol tartrate 50 mg BID   modafinil 100 mg Daily   [START ON 12/26/2019] modafinil 200 mg Daily   polyethylene glycol 17 g Daily   senna-docusate 8.6-50 mg 1 tablet BID   sodium chloride 0.9% 10 mL Q6H   sodium chloride 3% 4 mL Q4H   sodium chloride 2 g TID   PRN    acetaminophen 500 mg Q8H PRN   Dextrose 10% Bolus 12.5 g PRN   Dextrose 10% Bolus 25 g PRN   glucagon (human recombinant) 1 mg PRN   glucose 16 g PRN   glucose 24 g PRN   hydrALAZINE 10 mg Q4H PRN   labetalol 10 mg Q4H PRN   magnesium sulfate IVPB 2 g PRN   magnesium sulfate IVPB 4 g PRN   naloxone 0.4 mg PRN   ondansetron 8 mg Q8H PRN   oxyCODONE 10 mg Q6H PRN   potassium chloride in water 40 mEq PRN   And     potassium chloride in water 60 mEq PRN   And     potassium chloride in water 80 mEq PRN   sodium chloride 0.9% 10 mL PRN   sodium phosphate IVPB 15 mmol PRN   sodium phosphate IVPB 20.01 mmol PRN   sodium phosphate IVPB 30 mmol PRN     Today I personally reviewed pertinent medications, lines/drains/airways, imaging, notably:    Diet  Diet NPO  Diet NPO    Assessment/Plan:     Cardiac/Vascular  Hyperlipidemia  - Atorvastatin 80 mg daily      Essential hypertension  - SBP < 180; ROSA Brandt 12/24 pm  - Amlodipine 10 mg daily  - Losartan-HCTZ 100 mg - 25 mg  - Metoprolol XL 50 mg BID       Coronary artery disease involving native coronary artery of native heart without angina pectoris  - Clopidogrel 75 mg daily     ID  * Spinal epidural abscess  C6-T3 osteomyelitis with epidural abscess    - afebrile, cultures NGTD  - 12/10:  C7-T1 corpectomy with C6-T2 anterior fusion and lumbar drain placement   - Cultures: (12/10)Neck abscess +pseudomonas --cefepime 2g q8h for 6 week duration of therapy  - 12/17: OR with neurosurgery for stage II, posterior instrumentation C2-T3  - Extubated 12/24, tolerating room air  - PT/OT when appropriate   - Likely step down tomorrow      Pseudomonas infection  - C3-T3 osteomyelitis with epidural abscess s/p C7-T1 corpectomy & C6-T2 anterior fusion (12/6 stage 1) with tissue cultures - +pseudomonas  - ID following, appreciate recommendations : cefepime 2g IV q8h for 6 weeks (tentative end date 1/28/20)          Acute osteomyelitis of thoracic spine  - See spinal epidural abscess        Acute osteomyelitis of cervical spine  - See spinal epidural abscess      Oncology  Acute blood loss anemia  - H/H down trending; monitor   - transfuse < 7   - Monitor hemovac x 2 output     Endocrine  Other specified hypothyroidism  - Levothyroxine 50 mcg daily    GI  Chronic hepatitis C without hepatic coma  - Monitor LFTs    Other  Tobacco abuse  - Nicotine patch           The patient is being Prophylaxed for:  Venous Thromboembolism with: Chemical  Stress Ulcer with: H2B  Ventilator Pneumonia with: not applicable    Activity Orders          Discontinue arterial line starting at 12/25 1029    Diet NPO: NPO starting at 12/17 0001        Full Code    Miki Bethea MD  Neurocritical Care  [facility]

## 2019-12-25 NOTE — ASSESSMENT & PLAN NOTE
C6-T3 osteomyelitis with epidural abscess    - afebrile, cultures NGTD  - 12/10: C7-T1 corpectomy with C6-T2 anterior fusion and lumbar drain placement   - Cultures: (12/10)Neck abscess +pseudomonas --cefepime 2g q8h for 6 week duration of therapy  - 12/17: OR with neurosurgery for stage II, posterior instrumentation C2-T3  - Extubated 12/24, tolerating room air  - PT/OT when appropriate   - Likely step down tomorrow

## 2019-12-25 NOTE — SUBJECTIVE & OBJECTIVE
Interval History:  Extubated yesterday without issues. Tolerating room air. Continue to monitor for 24 hours prior to step down.     Review of Systems   Unable to perform ROS: Intubated   Constitutional: Negative for activity change, appetite change, chills, diaphoresis, fatigue, fever and unexpected weight change.   HENT: Negative for congestion, sore throat and trouble swallowing.    Eyes: Negative for photophobia, pain and discharge.   Respiratory: Negative for cough and shortness of breath.    Cardiovascular: Negative for chest pain and palpitations.   Gastrointestinal: Negative for abdominal pain, constipation, diarrhea, nausea, rectal pain and vomiting.   Genitourinary: Negative for decreased urine volume, difficulty urinating and dysuria.   Musculoskeletal: Positive for back pain. Negative for arthralgias, joint swelling, myalgias, neck pain and neck stiffness.   Skin: Negative for pallor and rash.   Neurological: Negative for dizziness, weakness, light-headedness, numbness and headaches.       Objective:     Vitals:  Temp: 98 °F (36.7 °C)  Pulse: 66  Rhythm: normal sinus rhythm  BP: (!) 99/56  MAP (mmHg): 72  Resp: 11  SpO2: 95 %  O2 Device (Oxygen Therapy): room air    Temp  Min: 97.6 °F (36.4 °C)  Max: 98.9 °F (37.2 °C)  Pulse  Min: 66  Max: 92  BP  Min: 94/53  Max: 177/71  MAP (mmHg)  Min: 68  Max: 108  Resp  Min: 11  Max: 29  SpO2  Min: 94 %  Max: 100 %  Oxygen Concentration (%)  Min: 40  Max: 40    12/24 0701 - 12/25 0700  In: 2437.8 [I.V.:1937.8]  Out: 1875 [Urine:1875]   Unmeasured Output  Urine Occurrence: 1  Stool Occurrence: 0  Pad Count: 2       Physical Exam   Constitutional: He is oriented to person, place, and time. He appears well-developed and well-nourished. No distress.   HENT:   Head: Normocephalic.   Eyes: Pupils are equal, round, and reactive to light. EOM are normal.   Neck: Normal range of motion.   Cardiovascular: Normal rate, regular rhythm and intact distal pulses.    Pulmonary/Chest: Effort normal. No respiratory distress.   Abdominal: Soft. There is no tenderness.   Musculoskeletal: Normal range of motion.   Neurological: He is alert and oriented to person, place, and time. GCS eye subscore is 4. GCS verbal subscore is 1. GCS motor subscore is 6.   Awake and alert  EOMI, PERRL  Tone increased at the patellas BL  Moving all extremities spontaneously   Sensation to light touch equal x 4   Skin: Skin is warm and dry. He is not diaphoretic.   Nursing note and vitals reviewed.        Medications:  Continuous    sodium chloride 0.9% Last Rate: 75 mL/hr at 12/25/19 1200   dexmedetomidine (PRECEDEX) infusion Last Rate: Stopped (12/24/19 1530)   niCARdipine Last Rate: Stopped (12/21/19 0805)   Scheduled    albuterol-ipratropium 3 mL Q4H   amLODIPine 10 mg Daily   atorvastatin 80 mg Daily   bacitracin  TID   ceFEPime (MAXIPIME) IVPB 2 g Q8H   clopidogrel 75 mg Daily   famotidine 20 mg BID   gabapentin 300 mg TID   heparin (porcine) 5,000 Units Q8H   [START ON 12/26/2019] levothyroxine 50 mcg Before breakfast   losartan-hydrochlorothiazide 100-25 mg 1 tablet Daily   metoprolol tartrate 50 mg BID   modafinil 100 mg Daily   [START ON 12/26/2019] modafinil 200 mg Daily   polyethylene glycol 17 g Daily   senna-docusate 8.6-50 mg 1 tablet BID   sodium chloride 0.9% 10 mL Q6H   sodium chloride 3% 4 mL Q4H   sodium chloride 2 g TID   PRN    acetaminophen 500 mg Q8H PRN   Dextrose 10% Bolus 12.5 g PRN   Dextrose 10% Bolus 25 g PRN   glucagon (human recombinant) 1 mg PRN   glucose 16 g PRN   glucose 24 g PRN   hydrALAZINE 10 mg Q4H PRN   labetalol 10 mg Q4H PRN   magnesium sulfate IVPB 2 g PRN   magnesium sulfate IVPB 4 g PRN   naloxone 0.4 mg PRN   ondansetron 8 mg Q8H PRN   oxyCODONE 10 mg Q6H PRN   potassium chloride in water 40 mEq PRN   And     potassium chloride in water 60 mEq PRN   And     potassium chloride in water 80 mEq PRN   sodium chloride 0.9% 10 mL PRN   sodium phosphate IVPB 15  mmol PRN   sodium phosphate IVPB 20.01 mmol PRN   sodium phosphate IVPB 30 mmol PRN     Today I personally reviewed pertinent medications, lines/drains/airways, imaging, notably:    Diet  Diet NPO  Diet NPO

## 2019-12-26 LAB
ALBUMIN SERPL BCP-MCNC: 2 G/DL (ref 3.5–5.2)
ALP SERPL-CCNC: 302 U/L (ref 55–135)
ALT SERPL W/O P-5'-P-CCNC: 19 U/L (ref 10–44)
ANION GAP SERPL CALC-SCNC: 4 MMOL/L (ref 8–16)
AST SERPL-CCNC: 28 U/L (ref 10–40)
BASOPHILS # BLD AUTO: 0.04 K/UL (ref 0–0.2)
BASOPHILS NFR BLD: 0.5 % (ref 0–1.9)
BILIRUB SERPL-MCNC: 0.4 MG/DL (ref 0.1–1)
BUN SERPL-MCNC: 7 MG/DL (ref 8–23)
CALCIUM SERPL-MCNC: 8.1 MG/DL (ref 8.7–10.5)
CHLORIDE SERPL-SCNC: 109 MMOL/L (ref 95–110)
CO2 SERPL-SCNC: 25 MMOL/L (ref 23–29)
CREAT SERPL-MCNC: 0.7 MG/DL (ref 0.5–1.4)
DIFFERENTIAL METHOD: ABNORMAL
EOSINOPHIL # BLD AUTO: 0.3 K/UL (ref 0–0.5)
EOSINOPHIL NFR BLD: 3.4 % (ref 0–8)
ERYTHROCYTE [DISTWIDTH] IN BLOOD BY AUTOMATED COUNT: 17.2 % (ref 11.5–14.5)
EST. GFR  (AFRICAN AMERICAN): >60 ML/MIN/1.73 M^2
EST. GFR  (NON AFRICAN AMERICAN): >60 ML/MIN/1.73 M^2
GLUCOSE SERPL-MCNC: 89 MG/DL (ref 70–110)
HCT VFR BLD AUTO: 29.5 % (ref 40–54)
HGB BLD-MCNC: 8.9 G/DL (ref 14–18)
IMM GRANULOCYTES # BLD AUTO: 0.04 K/UL (ref 0–0.04)
IMM GRANULOCYTES NFR BLD AUTO: 0.5 % (ref 0–0.5)
LYMPHOCYTES # BLD AUTO: 1 K/UL (ref 1–4.8)
LYMPHOCYTES NFR BLD: 12.1 % (ref 18–48)
MAGNESIUM SERPL-MCNC: 1.7 MG/DL (ref 1.6–2.6)
MCH RBC QN AUTO: 27.6 PG (ref 27–31)
MCHC RBC AUTO-ENTMCNC: 30.2 G/DL (ref 32–36)
MCV RBC AUTO: 91 FL (ref 82–98)
MONOCYTES # BLD AUTO: 0.9 K/UL (ref 0.3–1)
MONOCYTES NFR BLD: 11.3 % (ref 4–15)
NEUTROPHILS # BLD AUTO: 6 K/UL (ref 1.8–7.7)
NEUTROPHILS NFR BLD: 72.2 % (ref 38–73)
NRBC BLD-RTO: 0 /100 WBC
PHOSPHATE SERPL-MCNC: 2.4 MG/DL (ref 2.7–4.5)
PLATELET # BLD AUTO: 145 K/UL (ref 150–350)
PMV BLD AUTO: 10.4 FL (ref 9.2–12.9)
POTASSIUM SERPL-SCNC: 3.7 MMOL/L (ref 3.5–5.1)
PROT SERPL-MCNC: 5.1 G/DL (ref 6–8.4)
RBC # BLD AUTO: 3.23 M/UL (ref 4.6–6.2)
SODIUM SERPL-SCNC: 138 MMOL/L (ref 136–145)
WBC # BLD AUTO: 8.26 K/UL (ref 3.9–12.7)

## 2019-12-26 PROCEDURE — 63600175 PHARM REV CODE 636 W HCPCS: Performed by: PSYCHIATRY & NEUROLOGY

## 2019-12-26 PROCEDURE — 25000242 PHARM REV CODE 250 ALT 637 W/ HCPCS: Performed by: NURSE PRACTITIONER

## 2019-12-26 PROCEDURE — 92610 EVALUATE SWALLOWING FUNCTION: CPT

## 2019-12-26 PROCEDURE — 80053 COMPREHEN METABOLIC PANEL: CPT

## 2019-12-26 PROCEDURE — 63600175 PHARM REV CODE 636 W HCPCS: Performed by: PHYSICIAN ASSISTANT

## 2019-12-26 PROCEDURE — 83735 ASSAY OF MAGNESIUM: CPT

## 2019-12-26 PROCEDURE — 25000003 PHARM REV CODE 250: Performed by: ANESTHESIOLOGY

## 2019-12-26 PROCEDURE — 11000001 HC ACUTE MED/SURG PRIVATE ROOM

## 2019-12-26 PROCEDURE — 94640 AIRWAY INHALATION TREATMENT: CPT

## 2019-12-26 PROCEDURE — 27000221 HC OXYGEN, UP TO 24 HOURS

## 2019-12-26 PROCEDURE — 25000003 PHARM REV CODE 250: Performed by: NURSE PRACTITIONER

## 2019-12-26 PROCEDURE — 84100 ASSAY OF PHOSPHORUS: CPT

## 2019-12-26 PROCEDURE — 99233 SBSQ HOSP IP/OBS HIGH 50: CPT | Mod: GC,,, | Performed by: PSYCHIATRY & NEUROLOGY

## 2019-12-26 PROCEDURE — 94761 N-INVAS EAR/PLS OXIMETRY MLT: CPT

## 2019-12-26 PROCEDURE — 25000003 PHARM REV CODE 250: Performed by: PSYCHIATRY & NEUROLOGY

## 2019-12-26 PROCEDURE — A4216 STERILE WATER/SALINE, 10 ML: HCPCS | Performed by: ANESTHESIOLOGY

## 2019-12-26 PROCEDURE — 63600175 PHARM REV CODE 636 W HCPCS: Performed by: STUDENT IN AN ORGANIZED HEALTH CARE EDUCATION/TRAINING PROGRAM

## 2019-12-26 PROCEDURE — 85025 COMPLETE CBC W/AUTO DIFF WBC: CPT

## 2019-12-26 PROCEDURE — 25000003 PHARM REV CODE 250: Performed by: PHYSICIAN ASSISTANT

## 2019-12-26 PROCEDURE — 99233 PR SUBSEQUENT HOSPITAL CARE,LEVL III: ICD-10-PCS | Mod: GC,,, | Performed by: PSYCHIATRY & NEUROLOGY

## 2019-12-26 RX ORDER — LIDOCAINE 50 MG/G
1 PATCH TOPICAL DAILY
Status: DISCONTINUED | OUTPATIENT
Start: 2019-12-26 | End: 2020-01-11 | Stop reason: HOSPADM

## 2019-12-26 RX ORDER — SODIUM CHLORIDE FOR INHALATION 3 %
4 VIAL, NEBULIZER (ML) INHALATION EVERY 6 HOURS
Status: DISCONTINUED | OUTPATIENT
Start: 2019-12-26 | End: 2019-12-27

## 2019-12-26 RX ORDER — IPRATROPIUM BROMIDE AND ALBUTEROL SULFATE 2.5; .5 MG/3ML; MG/3ML
3 SOLUTION RESPIRATORY (INHALATION) EVERY 6 HOURS
Status: DISCONTINUED | OUTPATIENT
Start: 2019-12-26 | End: 2019-12-27

## 2019-12-26 RX ADMIN — SENNOSIDES AND DOCUSATE SODIUM 1 TABLET: 8.6; 5 TABLET ORAL at 10:12

## 2019-12-26 RX ADMIN — HEPARIN SODIUM 5000 UNITS: 5000 INJECTION, SOLUTION INTRAVENOUS; SUBCUTANEOUS at 06:12

## 2019-12-26 RX ADMIN — LIDOCAINE 1 PATCH: 50 PATCH CUTANEOUS at 11:12

## 2019-12-26 RX ADMIN — Medication 10 ML: at 11:12

## 2019-12-26 RX ADMIN — METOPROLOL TARTRATE 50 MG: 50 TABLET ORAL at 09:12

## 2019-12-26 RX ADMIN — SODIUM CHLORIDE SOLN NEBU 3% 4 ML: 3 NEBU SOLN at 03:12

## 2019-12-26 RX ADMIN — SENNOSIDES AND DOCUSATE SODIUM 1 TABLET: 8.6; 5 TABLET ORAL at 09:12

## 2019-12-26 RX ADMIN — METOPROLOL TARTRATE 50 MG: 50 TABLET ORAL at 10:12

## 2019-12-26 RX ADMIN — Medication: at 09:12

## 2019-12-26 RX ADMIN — MODAFINIL 200 MG: 100 TABLET ORAL at 06:12

## 2019-12-26 RX ADMIN — HEPARIN SODIUM 5000 UNITS: 5000 INJECTION, SOLUTION INTRAVENOUS; SUBCUTANEOUS at 10:12

## 2019-12-26 RX ADMIN — ATORVASTATIN CALCIUM 80 MG: 20 TABLET, FILM COATED ORAL at 09:12

## 2019-12-26 RX ADMIN — SODIUM CHLORIDE SOLN NEBU 3% 4 ML: 3 NEBU SOLN at 01:12

## 2019-12-26 RX ADMIN — LOSARTAN POTASSIUM AND HYDROCHLOROTHIAZIDE TABLETS 1 TABLET: 100; 25 TABLET, FILM COATED ORAL at 09:12

## 2019-12-26 RX ADMIN — POLYETHYLENE GLYCOL 3350 17 G: 17 POWDER, FOR SOLUTION ORAL at 09:12

## 2019-12-26 RX ADMIN — IPRATROPIUM BROMIDE AND ALBUTEROL SULFATE 3 ML: .5; 3 SOLUTION RESPIRATORY (INHALATION) at 01:12

## 2019-12-26 RX ADMIN — AMLODIPINE BESYLATE 10 MG: 10 TABLET ORAL at 09:12

## 2019-12-26 RX ADMIN — LEVOTHYROXINE SODIUM 50 MCG: 50 TABLET ORAL at 06:12

## 2019-12-26 RX ADMIN — Medication 10 ML: at 02:12

## 2019-12-26 RX ADMIN — SODIUM CHLORIDE SOLN NEBU 3% 4 ML: 3 NEBU SOLN at 07:12

## 2019-12-26 RX ADMIN — ACETAMINOPHEN 500 MG: 500 TABLET ORAL at 01:12

## 2019-12-26 RX ADMIN — IPRATROPIUM BROMIDE AND ALBUTEROL SULFATE 3 ML: .5; 3 SOLUTION RESPIRATORY (INHALATION) at 09:12

## 2019-12-26 RX ADMIN — CEFEPIME 2 G: 2 INJECTION, POWDER, FOR SOLUTION INTRAVENOUS at 09:12

## 2019-12-26 RX ADMIN — FAMOTIDINE 20 MG: 20 TABLET ORAL at 09:12

## 2019-12-26 RX ADMIN — SODIUM CHLORIDE: 0.9 INJECTION, SOLUTION INTRAVENOUS at 05:12

## 2019-12-26 RX ADMIN — CEFEPIME 2 G: 2 INJECTION, POWDER, FOR SOLUTION INTRAVENOUS at 05:12

## 2019-12-26 RX ADMIN — OXYCODONE HYDROCHLORIDE 10 MG: 10 TABLET ORAL at 02:12

## 2019-12-26 RX ADMIN — SODIUM CHLORIDE SOLN NEBU 3% 4 ML: 3 NEBU SOLN at 09:12

## 2019-12-26 RX ADMIN — HEPARIN SODIUM 5000 UNITS: 5000 INJECTION, SOLUTION INTRAVENOUS; SUBCUTANEOUS at 01:12

## 2019-12-26 RX ADMIN — OXYCODONE HYDROCHLORIDE 10 MG: 10 TABLET ORAL at 09:12

## 2019-12-26 RX ADMIN — Medication 10 ML: at 05:12

## 2019-12-26 RX ADMIN — GABAPENTIN 300 MG: 300 CAPSULE ORAL at 10:12

## 2019-12-26 RX ADMIN — IPRATROPIUM BROMIDE AND ALBUTEROL SULFATE 3 ML: .5; 3 SOLUTION RESPIRATORY (INHALATION) at 03:12

## 2019-12-26 RX ADMIN — Medication 2 G: at 09:12

## 2019-12-26 RX ADMIN — CEFEPIME 2 G: 2 INJECTION, POWDER, FOR SOLUTION INTRAVENOUS at 01:12

## 2019-12-26 RX ADMIN — IPRATROPIUM BROMIDE AND ALBUTEROL SULFATE 3 ML: .5; 3 SOLUTION RESPIRATORY (INHALATION) at 07:12

## 2019-12-26 RX ADMIN — FAMOTIDINE 20 MG: 20 TABLET ORAL at 10:12

## 2019-12-26 RX ADMIN — CLOPIDOGREL BISULFATE 75 MG: 75 TABLET ORAL at 09:12

## 2019-12-26 RX ADMIN — Medication 2 G: at 10:12

## 2019-12-26 RX ADMIN — GABAPENTIN 300 MG: 300 CAPSULE ORAL at 09:12

## 2019-12-26 NOTE — PROGRESS NOTES
"[facility]  Adult Nutrition  Progress Note    SUMMARY       Recommendations  1. If able to advance diet, recommend Regular with texture per SLP recommendations.     2. If unable to advance diet and enteral access gained, recommend starting TF.   Isosource 1.5 @ goal rate 50mL/hr.   - Initiate @ 10mL/hr and increase by 10mL q4hrs, or as tolerated, until goal rate is reached.   - Provides 1800kcals, 82g protein and 917mL free water.     RD to monitor.    Goals: Pt to continue meeting EEN and EPN by RD follow-up  Nutrition Goal Status: goal not met  Communication of RD Recs: other (comment)(POC)    Reason for Assessment    Reason For Assessment: RD follow-up  Diagnosis: other (see comments)(Spinal epidural abscess)  Relevant Medical History: HTN, THC use, nicotine use, HLD, hypothyroidism, CAD, losartan  Interdisciplinary Rounds: attended  General Information Comments: Pt extubated 12/24, passed MIGUEL per nsg. NG tube removed. NFPE completed 12/16 noting mild fat/muscle depletion but does not meet criteria for malnutrition due to fair PO intake and stable wt. Pt at risk for malnutrition given NPO at this time.  Nutrition Discharge Planning: unable to determine at this time    Nutrition Risk Screen    Nutrition Risk Screen: tube feeding or parenteral nutrition    Nutrition/Diet History    Spiritual, Cultural Beliefs, Advent Practices, Values that Affect Care: no  Factors Affecting Nutritional Intake: NPO    Anthropometrics    Temp: 97 °F (36.1 °C)  Height: 5' 9" (175.3 cm)  Height (inches): 69 in  Weight Method: Bed Scale  Weight: 79.6 kg (175 lb 7.8 oz)  Weight (lb): 175.49 lb  Ideal Body Weight (IBW), Male: 160 lb  % Ideal Body Weight, Male (lb): 109.38 %  BMI (Calculated): 25.9  BMI Grade: 25 - 29.9 - overweight       Lab/Procedures/Meds    Pertinent Labs Reviewed: reviewed  Pertinent Labs Comments: noted  Pertinent Medications Reviewed: reviewed  Pertinent Medications Comments: heparin, " IVF    Estimated/Assessed Needs    Weight Used For Calorie Calculations: 79.4 kg (175 lb 0.7 oz)  Energy Calorie Requirements (kcal): 1905  Energy Need Method: [place]-St Jeor(PAL 1.25)  Protein Requirements: 80-95g(1.0-1.2g/kg)  Weight Used For Protein Calculations: 79.4 kg (175 lb 0.7 oz)  Fluid Requirements (mL): 1 ml/kcal or per MD     RDA Method (mL): 1905         Nutrition Prescription Ordered    Current Diet Order: NPO      Evaluation of Received Nutrient/Fluid Intake    IV Fluid (mL): 1800  I/O: +9L since admit, good UOP, LBM 12/26    % Intake of Estimated Energy Needs: 0 - 25 %  % Meal Intake: NPO    Nutrition Risk    Level of Risk/Frequency of Follow-up: high(f/u 2x/week)     Assessment and Plan    Nutrition Problem  Inadequate energy intake     Related to (etiology):   NPO     Signs and Symptoms (as evidenced by):   Pt receiving <85% EEN and EPN.      Interventions(treatment strategy):  Collaboration of care with providers     Nutrition Diagnosis Status:   Improving       Monitor and Evaluation    Food and Nutrient Intake: energy intake, food and beverage intake, enteral nutrition intake  Food and Nutrient Adminstration: diet order, enteral and parenteral nutrition administration  Knowledge/Beliefs/Attitudes: food and nutrition knowledge/skill  Anthropometric Measurements: weight change, weight, body mass index  Biochemical Data, Medical Tests and Procedures: gastrointestinal profile, glucose/endocrine profile, electrolyte and renal panel, inflammatory profile, lipid profile  Nutrition-Focused Physical Findings: overall appearance     Malnutrition Assessment  Energy Intake: moderate energy intake              Orbital Region (Subcutaneous Fat Loss): mild depletion  Upper Arm Region (Subcutaneous Fat Loss): mild depletion   Zoroastrianism Region (Muscle Loss): mild depletion  Clavicle Bone Region (Muscle Loss): well nourished  Clavicle and Acromion Bone Region (Muscle Loss): well nourished  Dorsal Hand (Muscle  Loss): mild depletion                 Nutrition Follow-Up    RD Follow-up?: Yes

## 2019-12-26 NOTE — SUBJECTIVE & OBJECTIVE
Interval History:  NAEON. Stable for step down to neurosurgery service.    Review of Systems   Constitutional: Negative for activity change, appetite change, chills, diaphoresis, fatigue, fever and unexpected weight change.   HENT: Negative for congestion, sore throat and trouble swallowing.    Eyes: Negative for photophobia, pain and discharge.   Respiratory: Negative for cough and shortness of breath.    Cardiovascular: Negative for chest pain and palpitations.   Gastrointestinal: Negative for abdominal pain, constipation, diarrhea, nausea, rectal pain and vomiting.   Genitourinary: Negative for decreased urine volume, difficulty urinating and dysuria.   Musculoskeletal: Positive for back pain. Negative for arthralgias, joint swelling, myalgias, neck pain and neck stiffness.   Skin: Negative for pallor and rash.   Neurological: Negative for dizziness, weakness, light-headedness, numbness and headaches.       Objective:     Vitals:  Temp: 98 °F (36.7 °C)  Pulse: 66  Rhythm: normal sinus rhythm  BP: (!) 102/56  MAP (mmHg): 74  Resp: (!) 8  SpO2: (!) 94 %  Oxygen Concentration (%): 28  O2 Device (Oxygen Therapy): nasal cannula    Temp  Min: 97 °F (36.1 °C)  Max: 98.1 °F (36.7 °C)  Pulse  Min: 60  Max: 86  BP  Min: 94/51  Max: 174/80  MAP (mmHg)  Min: 67  Max: 115  Resp  Min: 7  Max: 24  SpO2  Min: 93 %  Max: 100 %  Oxygen Concentration (%)  Min: 28  Max: 28    12/25 0701 - 12/26 0700  In: 2348.8 [P.O.:500; I.V.:1848.8]  Out: 3295 [Urine:3295]   Unmeasured Output  Urine Occurrence: 1  Stool Occurrence: 0  Pad Count: 2       Physical Exam   Constitutional: He is oriented to person, place, and time. He appears well-developed and well-nourished. No distress.   HENT:   Head: Normocephalic.   Eyes: Pupils are equal, round, and reactive to light. EOM are normal.   Neck: Normal range of motion.   Cardiovascular: Normal rate, regular rhythm and intact distal pulses.   Pulmonary/Chest: Effort normal. No respiratory distress.    Abdominal: Soft. There is no tenderness.   Musculoskeletal: Normal range of motion.   Neurological: He is alert and oriented to person, place, and time. GCS eye subscore is 4. GCS verbal subscore is 1. GCS motor subscore is 6.   Awake and alert  EOMI, PERRL  Tone increased at the patellas BL  Moving all extremities spontaneously   Sensation to light touch equal x 4   Skin: Skin is warm and dry. He is not diaphoretic.   Nursing note and vitals reviewed.        Medications:  Continuous    sodium chloride 0.9% Last Rate: 75 mL/hr at 12/26/19 1500   Scheduled    albuterol-ipratropium 3 mL Q6H   amLODIPine 10 mg Daily   atorvastatin 80 mg Daily   bacitracin  TID   ceFEPime (MAXIPIME) IVPB 2 g Q8H   clopidogrel 75 mg Daily   famotidine 20 mg BID   gabapentin 300 mg TID   heparin (porcine) 5,000 Units Q8H   levothyroxine 50 mcg Before breakfast   lidocaine 1 patch Daily   losartan-hydrochlorothiazide 100-25 mg 1 tablet Daily   metoprolol tartrate 50 mg BID   polyethylene glycol 17 g Daily   senna-docusate 8.6-50 mg 1 tablet BID   sodium chloride 0.9% 10 mL Q6H   sodium chloride 3% 4 mL Q6H   sodium chloride 2 g TID   PRN    acetaminophen 500 mg Q8H PRN   Dextrose 10% Bolus 12.5 g PRN   Dextrose 10% Bolus 25 g PRN   glucagon (human recombinant) 1 mg PRN   glucose 16 g PRN   glucose 24 g PRN   labetalol 10 mg Q4H PRN   magnesium sulfate IVPB 2 g PRN   magnesium sulfate IVPB 4 g PRN   naloxone 0.4 mg PRN   ondansetron 8 mg Q8H PRN   oxyCODONE 10 mg Q6H PRN   potassium chloride in water 40 mEq PRN   And     potassium chloride in water 60 mEq PRN   And     potassium chloride in water 80 mEq PRN   sodium chloride 0.9% 10 mL PRN   sodium phosphate IVPB 15 mmol PRN   sodium phosphate IVPB 20.01 mmol PRN   sodium phosphate IVPB 30 mmol PRN     Today I personally reviewed pertinent medications, lines/drains/airways, imaging, notably:    Diet  Diet NPO  Diet NPO

## 2019-12-26 NOTE — SUBJECTIVE & OBJECTIVE
Interval History: naeon    Medications:  Continuous Infusions:   sodium chloride 0.9% 75 mL/hr at 12/26/19 0505     Scheduled Meds:   albuterol-ipratropium  3 mL Nebulization Q6H    amLODIPine  10 mg Oral Daily    atorvastatin  80 mg Oral Daily    bacitracin   Topical (Top) TID    ceFEPime (MAXIPIME) IVPB  2 g Intravenous Q8H    clopidogrel  75 mg Oral Daily    famotidine  20 mg Oral BID    gabapentin  300 mg Oral TID    heparin (porcine)  5,000 Units Subcutaneous Q8H    levothyroxine  50 mcg Oral Before breakfast    losartan-hydrochlorothiazide 100-25 mg  1 tablet Oral Daily    metoprolol tartrate  50 mg Oral BID    modafinil  100 mg Oral Daily    modafinil  200 mg Oral Daily    polyethylene glycol  17 g Oral Daily    senna-docusate 8.6-50 mg  1 tablet Oral BID    sodium chloride 0.9%  10 mL Intravenous Q6H    sodium chloride 3%  4 mL Nebulization Q6H    sodium chloride  2 g Oral TID     PRN Meds:acetaminophen, Dextrose 10% Bolus, Dextrose 10% Bolus, glucagon (human recombinant), glucose, glucose, hydrALAZINE, labetalol, magnesium sulfate IVPB, magnesium sulfate IVPB, naloxone, ondansetron, oxyCODONE, potassium chloride in water **AND** potassium chloride in water **AND** potassium chloride in water, Flushing PICC Protocol **AND** sodium chloride 0.9% **AND** sodium chloride 0.9%, sodium phosphate IVPB, sodium phosphate IVPB, sodium phosphate IVPB     Review of Systems  Objective:     Weight: 79.6 kg (175 lb 7.8 oz)  Body mass index is 25.91 kg/m².  Vital Signs (Most Recent):  Temp: 97 °F (36.1 °C) (12/26/19 0305)  Pulse: 86 (12/26/19 0505)  Resp: 20 (12/26/19 0505)  BP: (!) 174/80 (12/26/19 0505)  SpO2: 97 % (12/26/19 0505) Vital Signs (24h Range):  Temp:  [97 °F (36.1 °C)-98.4 °F (36.9 °C)] 97 °F (36.1 °C)  Pulse:  [66-86] 86  Resp:  [10-20] 20  SpO2:  [95 %-99 %] 97 %  BP: ()/(53-80) 174/80  Arterial Line BP: (141-165)/(54-62) 149/61                Oxygen Concentration (%):  [28]  28    Male External Urinary Catheter 12/19/19 1200 Small (Active)   Collection Container Urimeter 12/26/2019  3:05 AM   Securement Method secured to top of thigh w/ adhesive device 12/26/2019  3:05 AM   Skin no redness;no breakdown 12/26/2019  3:05 AM   Tolerance no signs/symptoms of discomfort 12/26/2019  3:05 AM   Output (mL) 0 mL 12/26/2019  5:05 AM   Catheter Change Date 12/24/19 12/26/2019  3:05 AM   Catheter Change Time 1900 12/26/2019  3:05 AM       Neurosurgery Physical Exam   AOX3  PERRL  OSHEA symm  SILT  Incisions c/d/i    Significant Labs:  Recent Labs   Lab 12/25/19  0211 12/26/19  0118    89    138   K 3.5 3.7    109   CO2 22* 25   BUN 9 7*   CREATININE 0.6 0.7   CALCIUM 8.0* 8.1*   MG 1.7 1.7     Recent Labs   Lab 12/24/19 2000 12/25/19  1030 12/25/19 2000   WBC 7.84 8.59 8.32   HGB 8.0* 8.5* 8.4*   HCT 25.9* 26.3* 27.7*   * 137* 154     No results for input(s): LABPT, INR, APTT in the last 48 hours.  Microbiology Results (last 7 days)     Procedure Component Value Units Date/Time    Blood culture [[id number]] Collected:  12/17/19 1505    Order Status:  Completed Specimen:  Blood from Line, Arterial, Right Updated:  12/22/19 1812     Blood Culture, Routine No growth after 5 days.            Significant Diagnostics:

## 2019-12-26 NOTE — PROGRESS NOTES
Pt in X-ray. Transported via stretcher attached to portable monitor with RN x1. Will be transferring to room 528 after x-ray is complete. Report given. Pt stable. Will continue to monitor.

## 2019-12-26 NOTE — PT/OT/SLP EVAL
Speech Language Pathology Evaluation  Bedside Swallow    Patient Name:  Junaid Muñoz   MRN:  [id number]  Admitting Diagnosis: Spinal epidural abscess    Recommendations:                 General Recommendations:  Dysphagia therapy  Diet recommendations:  Mechanical soft, Thin   Aspiration Precautions: 1 bite/sip at a time, Feed only when awake/alert, HOB to 90 degrees and Small bites/sips   General Precautions: Standard, aspiration, fall  Communication strategies:  none    History:     Past Medical History:   Diagnosis Date    CAD (coronary artery disease)     s/p stent 2005, on plavix    Chronic hepatitis C     Cirrhosis     biopsy proven - 2007    History of colon polyps 06/2008    1 polyp - tubular adenoma    HTN (hypertension)     Hyperlipidemia     Hypothyroidism        Past Surgical History:   Procedure Laterality Date    COLONOSCOPY W/ POLYPECTOMY  06/2008    Dr Wagoner: fair prep, 3mm polyp - tubular adenoma    CORONARY ANGIOPLASTY WITH STENT PLACEMENT      FUSION OF CERVICAL SPINE BY POSTERIOR APPROACH N/A 12/17/2019    Procedure: FUSION, SPINE, CERVICAL, POSTERIOR APPROACH C2-T3 posterior instrumented fusion;  Surgeon: Elian Deluca MD;  Location: [facility] OR [address] [place] [address];  Service: Neurosurgery;  Laterality: N/A;    hydrocele repair  teenager    pyloric stenosis repair  age 1    SURGICAL REMOVAL OF VERTEBRAL BODY OF THORACIC SPINE N/A 12/10/2019    Procedure: CORPECTOMY, SPINE, CERVICAL AND THORACIC, C7 and T1 with Globus;  Surgeon: Elian Deluca MD;  Location: [facility] OR [address] [place] [address];  Service: Neurosurgery;  Laterality: N/A;    TONSILLECTOMY         Prior diet: mechanical soft/thin prior to sx.    Subjective     Awake/alert    Pain/Comfort:  · Pain Rating 1: 0/10  · Pain Rating Post-Intervention 1: 0/10    Objective:     Oral Musculature Evaluation  · Oral Musculature: general weakness  · Dentition: edentulous  · Secretion Management: adequate  · Mucosal Quality: adequate  · Mandibular Strength and  Mobility: WFL  · Oral Labial Strength and Mobility: WFL  · Lingual Strength and Mobility: WFL  · Buccal Strength and Mobility: WFL  · Volitional Cough: adequate  · Volitional Swallow: timely  · Voice Prior to PO Intake: clear    Bedside Swallow Eval:   Consistencies Assessed:  · Thin liquids x 4oz cup/straw  · Puree x2  · Solids x1     Oral Phase:   · WFL   · Pt with prolonged mastication of cracker 2/2 edentulous    Pharyngeal Phase:   · no overt clinical signs/symptoms of aspiration        Assessment:     Junaid Muñoz is a 74 y.o. male with an SLP diagnosis of Dysphagia.   Goals:   Multidisciplinary Problems     SLP Goals        Problem: SLP Goal    Goal Priority Disciplines Outcome   SLP Goal     SLP Ongoing, Progressing   Description:  Speech Language Pathology Goals  Goals expected to be met by 1/2:    1.  Pt will tolerate mechanical soft diet with thin liquids with adequate oral clearance and no overt signs of aspiration.  2.  Pt will tolerate trials of regular solids with adequate oral clearance and no overt signs of aspiration.                         Plan:     · Patient to be seen:  3 x/week   · Plan of Care expires:  01/13/20  · Plan of Care reviewed with:  patient   · SLP Follow-Up:  Yes       Discharge recommendations:  (Pending pt progress and any PT/OT recs)     Time Tracking:     SLP Treatment Date:   12/26/19  Speech Start Time:  0811  Speech Stop Time:  0820     Speech Total Time (min):  9 min    Billable Minutes: Eval Swallow and Oral Function 9    Denice Dumont CCC-SLP  12/26/2019

## 2019-12-26 NOTE — PROGRESS NOTES
[facility]  Neurosurgery  Progress Note    Subjective:     History of Present Illness: Junaid Muñoz is a 74 y.o. male with PMH of HTN, HLD, Hepatitis C, and CAD s/p two stents on plavix who presents with 1 month history of back pain between his shoulders. MRI at OSH demonstrates likely epidural abscess. Pt denies hx of trauma and reports slow onset of symptoms.     Post-Op Info:  Procedure(s) (LRB):  FUSION, SPINE, CERVICAL, POSTERIOR APPROACH C2-T3 posterior instrumented fusion (N/A)   9 Days Post-Op     Interval History: naeon    Medications:  Continuous Infusions:   sodium chloride 0.9% 75 mL/hr at 12/26/19 0505     Scheduled Meds:   albuterol-ipratropium  3 mL Nebulization Q6H    amLODIPine  10 mg Oral Daily    atorvastatin  80 mg Oral Daily    bacitracin   Topical (Top) TID    ceFEPime (MAXIPIME) IVPB  2 g Intravenous Q8H    clopidogrel  75 mg Oral Daily    famotidine  20 mg Oral BID    gabapentin  300 mg Oral TID    heparin (porcine)  5,000 Units Subcutaneous Q8H    levothyroxine  50 mcg Oral Before breakfast    losartan-hydrochlorothiazide 100-25 mg  1 tablet Oral Daily    metoprolol tartrate  50 mg Oral BID    modafinil  100 mg Oral Daily    modafinil  200 mg Oral Daily    polyethylene glycol  17 g Oral Daily    senna-docusate 8.6-50 mg  1 tablet Oral BID    sodium chloride 0.9%  10 mL Intravenous Q6H    sodium chloride 3%  4 mL Nebulization Q6H    sodium chloride  2 g Oral TID     PRN Meds:acetaminophen, Dextrose 10% Bolus, Dextrose 10% Bolus, glucagon (human recombinant), glucose, glucose, hydrALAZINE, labetalol, magnesium sulfate IVPB, magnesium sulfate IVPB, naloxone, ondansetron, oxyCODONE, potassium chloride in water **AND** potassium chloride in water **AND** potassium chloride in water, Flushing PICC Protocol **AND** sodium chloride 0.9% **AND** sodium chloride 0.9%, sodium phosphate IVPB, sodium phosphate IVPB, sodium phosphate IVPB     Review of  Systems  Objective:     Weight: 79.6 kg (175 lb 7.8 oz)  Body mass index is 25.91 kg/m².  Vital Signs (Most Recent):  Temp: 97 °F (36.1 °C) (12/26/19 0305)  Pulse: 86 (12/26/19 0505)  Resp: 20 (12/26/19 0505)  BP: (!) 174/80 (12/26/19 0505)  SpO2: 97 % (12/26/19 0505) Vital Signs (24h Range):  Temp:  [97 °F (36.1 °C)-98.4 °F (36.9 °C)] 97 °F (36.1 °C)  Pulse:  [66-86] 86  Resp:  [10-20] 20  SpO2:  [95 %-99 %] 97 %  BP: ()/(53-80) 174/80  Arterial Line BP: (141-165)/(54-62) 149/61                Oxygen Concentration (%):  [28] 28    Male External Urinary Catheter 12/19/19 1200 Small (Active)   Collection Container Urimeter 12/26/2019  3:05 AM   Securement Method secured to top of thigh w/ adhesive device 12/26/2019  3:05 AM   Skin no redness;no breakdown 12/26/2019  3:05 AM   Tolerance no signs/symptoms of discomfort 12/26/2019  3:05 AM   Output (mL) 0 mL 12/26/2019  5:05 AM   Catheter Change Date 12/24/19 12/26/2019  3:05 AM   Catheter Change Time 1900 12/26/2019  3:05 AM       Neurosurgery Physical Exam   AOX3  PERRL  OSHEA symm  SILT  Incisions c/d/i    Significant Labs:  Recent Labs   Lab 12/25/19  0211 12/26/19  0118    89    138   K 3.5 3.7    109   CO2 22* 25   BUN 9 7*   CREATININE 0.6 0.7   CALCIUM 8.0* 8.1*   MG 1.7 1.7     Recent Labs   Lab 12/24/19 2000 12/25/19  1030 12/25/19 2000   WBC 7.84 8.59 8.32   HGB 8.0* 8.5* 8.4*   HCT 25.9* 26.3* 27.7*   * 137* 154     No results for input(s): LABPT, INR, APTT in the last 48 hours.  Microbiology Results (last 7 days)     Procedure Component Value Units Date/Time    Blood culture [[id number]] Collected:  12/17/19 1505    Order Status:  Completed Specimen:  Blood from Line, Arterial, Right Updated:  12/22/19 1812     Blood Culture, Routine No growth after 5 days.            Significant Diagnostics:      Assessment/Plan:     * Spinal epidural abscess  74 y.o. male with PMH of HTN, HLD, Hepatitis C, and CAD s/p two stents on plavix  who presents with C6-T2 osteomyelitis with suspected epidural abscess.  Now s/p C7/T1 corpectomies and C2-T2  posterior instrumented fusion 12/18    Neuro stable  Pain control  Continue  at all times  Incisions c/d/i  No HOB restrictions, allow patient to mobilize  Cervical x-rays needed  Continue cefepime per ID Recs x 8 weeks  Aggressive pulm toilet  Daily physical and occupational therapy  Subcu heparin DVT prophylaxis  TTF today to JODI Dela Cruz,   Neurosurgery  [facility]

## 2019-12-26 NOTE — PLAN OF CARE
Problem: SLP Goal  Goal: SLP Goal  Description  Speech Language Pathology Goals  Goals expected to be met by 1/2:    1.  Pt will tolerate mechanical soft diet with thin liquids with adequate oral clearance and no overt signs of aspiration.  2.  Pt will tolerate trials of regular solids with adequate oral clearance and no overt signs of aspiration.        Outcome: Ongoing, Progressing   Bedside swallow study completed. Recommend mechanical soft diet/thin liquids at this time.   Denice Dumont CCC-SLP  12/26/2019

## 2019-12-26 NOTE — PLAN OF CARE
Patient extubated 12/24/19.  Awaiting therapy recs for needs.  Patient to transfer to the floor today per Neurosurgery.       12/26/19 4721   Discharge Reassessment   Assessment Type Discharge Planning Reassessment   Provided patient/caregiver education on the expected discharge date and the discharge plan No   Do you have any problems affording any of your prescribed medications? No   Discharge Plan A Rehab   Discharge Plan B Skilled Nursing Facility   DME Needed Upon Discharge  other (see comments)  (tbd)   Patient choice form signed by patient/caregiver N/A   Anticipated Discharge Disposition Rehab   Can the patient answer the patient profile reliably? Yes, cognitively intact   Describe the patient's ability to walk at the present time. Major restrictions/daily assistance from another person   How often would a person be available to care for the patient? Occasionally   Number of comorbid conditions (as recorded on the chart) Four   Post-Acute Status   Post-Acute Authorization Placement;Other   Post-Acute Placement Status Awaiting Therapy Documentation   Discharge Delays None known at this time       Kimberly Carver RN, CCRN-K, [facility]  Neuro-Critical Care   [phone]

## 2019-12-26 NOTE — PROGRESS NOTES
[facility]  Neurocritical Care  Progress Note    Admit Date: 12/6/2019  Service Date: 12/26/2019  Length of Stay: 20    Subjective:     Chief Complaint: Spinal epidural abscess    History of Present Illness: Junaid Muñoz is a 74 year old male with CAD, HTN, HLD, hypothyroidism, tobacco use, and prior history of IVDU who was found to have C6-T3 osteomyelitis with epidural abscess. Patient was neurologically intact and hemodynamically stable on admission.  Now immediately status post phase one of a two part surgery.  Today he had a C7-T1 corpectomy and C6-T2 anterior fusion.  Lumbar drain was placed secondary to intra op CSF leak, and patient admitted to [facility] for post op monitoring and drain maintenance.     Hospital Course: 12/10 admitted post-op C7-T1 corpectomy and C6-T2 anterior fusion with lumbar drain placed 2/2 CSF leak intra-op  12/12  No significant events over night, per nsgy will keep lumbar drain another day. 2nd stage of sugery to occur next Tuesday. Remains NPO while having to lie flat with  lumbar drain. Getting confused after receiving valium decreased dose.  12/13: Restless o/n, c/o back pain. LD dropped to floor by NSGY, 8-10cc/hr. HV drain in place as well. Neurologically stable. AF, no leukocytosis, H/H stable.   12/14: calm at present, slight agitation overnight, can elevate head for swallow trials and po today,pending picc placement for long term abx  12/15: less responsive this am, wean scheduled ativan, begin to advance diet and taper ivfs  12/16: NAEO. Stage II surgery tomorrow for posterior cervical fusion. Leukocytosis increasing, is on cefepime for pseudomonas in CSF, will reach out to ID to see if any further abx pre-op should be given. Lumbar drain in place. Increase normal saline to 100 cc/hr. NPO after midnight.   12/17: OR today for stage II posterior cervical instrumentation. Required 3 units PRBCs and 2 units FFP. 2 hemovacs in place to gravity with large  output. Will repeat CBC. Patient still intubated on arrival to ICU with cervical and facial edema, will start decadron. ID recommended repeating blood cultures as patient's WBC increasing. Continue cefepime.   12/18: POD 1 C2-T3 posterior instrumentation. Patient is still intubated, will wean to extubate. Facial and cervical swelling greatly improved. Lumbar drain in place. Hemovac x 2. Hemodynamically stable.  12/19/2019 subQ heparin,d/c howell, NSGY brace for neck, DAPT, HOB restrictions    12/20/2019 HOB clarified with NSGY keep flat, neuro status drowsy, labetolol 2x overnight, hydralazine not effective, respiratory, weaning paramaters on SIMV, wean to extubate, d/c protime INR   12/21/2019 Lumbar drain and hemovac pulled today per neurosurgery, no more HOB restrictions. Will attempt to wean ventilator and fentanyl. Scheduled oxycodone 5mg q 4h for fentanyl wean.   12/22/2019 Continues to fail weaning trials. Off fentanyl. D/C scheduled valium. Oxycodone PRN. Weaning parameters daily.   12/23/19: Failed multiple spontaneous trials, attempts to wean ETT, hyponatremia-start salt tabs 1 gm q8h  12/24: no events, attempting SBT today, Na+ increased, NS weaned  12/25: NAEON. Extubated yesterday. Continue to monitor for 24 hours before step down.   12/26: NAEON. Stable for step down to     Interval History:  NAEON. Stable for step down to neurosurgery service.    Review of Systems   Constitutional: Negative for activity change, appetite change, chills, diaphoresis, fatigue, fever and unexpected weight change.   HENT: Negative for congestion, sore throat and trouble swallowing.    Eyes: Negative for photophobia, pain and discharge.   Respiratory: Negative for cough and shortness of breath.    Cardiovascular: Negative for chest pain and palpitations.   Gastrointestinal: Negative for abdominal pain, constipation, diarrhea, nausea, rectal pain and vomiting.   Genitourinary: Negative for decreased urine volume, difficulty  urinating and dysuria.   Musculoskeletal: Positive for back pain. Negative for arthralgias, joint swelling, myalgias, neck pain and neck stiffness.   Skin: Negative for pallor and rash.   Neurological: Negative for dizziness, weakness, light-headedness, numbness and headaches.       Objective:     Vitals:  Temp: 98 °F (36.7 °C)  Pulse: 66  Rhythm: normal sinus rhythm  BP: (!) 102/56  MAP (mmHg): 74  Resp: (!) 8  SpO2: (!) 94 %  Oxygen Concentration (%): 28  O2 Device (Oxygen Therapy): nasal cannula    Temp  Min: 97 °F (36.1 °C)  Max: 98.1 °F (36.7 °C)  Pulse  Min: 60  Max: 86  BP  Min: 94/51  Max: 174/80  MAP (mmHg)  Min: 67  Max: 115  Resp  Min: 7  Max: 24  SpO2  Min: 93 %  Max: 100 %  Oxygen Concentration (%)  Min: 28  Max: 28    12/25 0701 - 12/26 0700  In: 2348.8 [P.O.:500; I.V.:1848.8]  Out: 3295 [Urine:3295]   Unmeasured Output  Urine Occurrence: 1  Stool Occurrence: 0  Pad Count: 2       Physical Exam   Constitutional: He is oriented to person, place, and time. He appears well-developed and well-nourished. No distress.   HENT:   Head: Normocephalic.   Eyes: Pupils are equal, round, and reactive to light. EOM are normal.   Neck: Normal range of motion.   Cardiovascular: Normal rate, regular rhythm and intact distal pulses.   Pulmonary/Chest: Effort normal. No respiratory distress.   Abdominal: Soft. There is no tenderness.   Musculoskeletal: Normal range of motion.   Neurological: He is alert and oriented to person, place, and time. GCS eye subscore is 4. GCS verbal subscore is 1. GCS motor subscore is 6.   Awake and alert  EOMI, PERRL  Tone increased at the patellas BL  Moving all extremities spontaneously   Sensation to light touch equal x 4   Skin: Skin is warm and dry. He is not diaphoretic.   Nursing note and vitals reviewed.        Medications:  Continuous    sodium chloride 0.9% Last Rate: 75 mL/hr at 12/26/19 1500   Scheduled    albuterol-ipratropium 3 mL Q6H   amLODIPine 10 mg Daily   atorvastatin 80  mg Daily   bacitracin  TID   ceFEPime (MAXIPIME) IVPB 2 g Q8H   clopidogrel 75 mg Daily   famotidine 20 mg BID   gabapentin 300 mg TID   heparin (porcine) 5,000 Units Q8H   levothyroxine 50 mcg Before breakfast   lidocaine 1 patch Daily   losartan-hydrochlorothiazide 100-25 mg 1 tablet Daily   metoprolol tartrate 50 mg BID   polyethylene glycol 17 g Daily   senna-docusate 8.6-50 mg 1 tablet BID   sodium chloride 0.9% 10 mL Q6H   sodium chloride 3% 4 mL Q6H   sodium chloride 2 g TID   PRN    acetaminophen 500 mg Q8H PRN   Dextrose 10% Bolus 12.5 g PRN   Dextrose 10% Bolus 25 g PRN   glucagon (human recombinant) 1 mg PRN   glucose 16 g PRN   glucose 24 g PRN   labetalol 10 mg Q4H PRN   magnesium sulfate IVPB 2 g PRN   magnesium sulfate IVPB 4 g PRN   naloxone 0.4 mg PRN   ondansetron 8 mg Q8H PRN   oxyCODONE 10 mg Q6H PRN   potassium chloride in water 40 mEq PRN   And     potassium chloride in water 60 mEq PRN   And     potassium chloride in water 80 mEq PRN   sodium chloride 0.9% 10 mL PRN   sodium phosphate IVPB 15 mmol PRN   sodium phosphate IVPB 20.01 mmol PRN   sodium phosphate IVPB 30 mmol PRN     Today I personally reviewed pertinent medications, lines/drains/airways, imaging, notably:    Diet  Diet NPO  Diet NPO    Assessment/Plan:     Psychiatric  Restlessness and agitation  - Resolved    Cardiac/Vascular  Hyperlipidemia  - Atorvastatin 80 mg daily     Essential hypertension  - SBP < 180; DC Karly 12/24 pm  - Amlodipine 10 mg daily   - Losartan-HCTZ 100 mg - 25 mg  - Metoprolol XL 50 mg BID       Coronary artery disease involving native coronary artery of native heart without angina pectoris  - Clopidogrel 75 mg daily      Renal/  Hyponatremia  - increase Na+ 2 g TID  - NS @ 75 cc/hr  - follow CMP    ID  * Spinal epidural abscess  C6-T3 osteomyelitis with epidural abscess    - afebrile, cultures NGTD  - 12/10: C7-T1 corpectomy with C6-T2 anterior fusion and lumbar drain placement   - Cultures: (12/10)Neck  abscess +pseudomonas --cefepime 2g q8h for 6 week duration of therapy  - 12/17: OR with neurosurgery for stage II, posterior instrumentation C2-T3  - Extubated 12/24, tolerating room air  - PT/OT when appropriate   - Stable for step-down to neurosurgery       Pseudomonas infection  - C3-T3 osteomyelitis with epidural abscess s/p C7-T1 corpectomy & C6-T2 anterior fusion (12/6 stage 1) with tissue cultures - +pseudomonas  - ID following, appreciate recommendations : cefepime 2g IV q8h for 6 weeks (tentative end date 1/28/20)             Acute osteomyelitis of thoracic spine  - See spinal epidural abscess         Acute osteomyelitis of cervical spine  - See spinal epidural abscess       Oncology  Acute blood loss anemia  - H/H down trending; monitor   - transfuse < 7    - Monitor hemovac x 2 output     Leukocytosis  - improving  - See pseudomonas infection/epidural abscess/osteomyelitis     Endocrine  Other specified hypothyroidism  - Levothyroxine 50 mcg daily     GI  Chronic hepatitis C without hepatic coma  - Monitor LFTs     Other  Tobacco abuse  - Nicotine patch prn          The patient is being Prophylaxed for:  Venous Thromboembolism with: Chemical  Stress Ulcer with: Not Applicable   Ventilator Pneumonia with: not applicable    Activity Orders          Diet NPO: NPO starting at 12/17 0001        Full Code    Miki Bethea MD  Neurocritical Care  [facility]

## 2019-12-26 NOTE — PLAN OF CARE
POC reviewed with pt  and his wife at 0500. Pt and his wife verbalized understanding. Questions and concerns addressed. Pain med. Gave to help comfortable. No acute events overnight. Pt progressing toward goals. Will start PT today.Will continue to monitor. See flowsheets for full assessment and VS info

## 2019-12-26 NOTE — ASSESSMENT & PLAN NOTE
74 y.o. male with PMH of HTN, HLD, Hepatitis C, and CAD s/p two stents on plavix who presents with C6-T2 osteomyelitis with suspected epidural abscess.  Now s/p C7/T1 corpectomies and C2-T2  posterior instrumented fusion 12/18    Neuro stable  Pain control  Continue  at all times  Incisions c/d/i  No HOB restrictions, allow patient to mobilize  Cervical x-rays needed  Continue cefepime per ID Recs x 8 weeks  Aggressive pulm toilet  Daily physical and occupational therapy  Subcu heparin DVT prophylaxis  TTF today to NSGY

## 2019-12-26 NOTE — NURSING TRANSFER
Nursing Transfer Note      12/26/2019     Transfer To: 528 from 9072    Transfer via stretcher    Transfer with cardiac monitoring    Transported by Karma Plummer, RN     Medicines sent: None      Chart send with patient: Yes (handed to unit secretary)     Notified: Multiple attemptes to call son with no answer    Patient reassessed at: 12/26/19 4:00pm     Upon arrival to floor: patient oriented to room, call bell in reach and bed in lowest position    No personal belongings were in the patient's possession in room 9072.

## 2019-12-26 NOTE — ASSESSMENT & PLAN NOTE
C6-T3 osteomyelitis with epidural abscess    - afebrile, cultures NGTD  - 12/10: C7-T1 corpectomy with C6-T2 anterior fusion and lumbar drain placement   - Cultures: (12/10)Neck abscess +pseudomonas --cefepime 2g q8h for 6 week duration of therapy  - 12/17: OR with neurosurgery for stage II, posterior instrumentation C2-T3  - Extubated 12/24, tolerating room air  - PT/OT when appropriate   - Stable for step-down to neurosurgery

## 2019-12-27 LAB
ALBUMIN SERPL BCP-MCNC: 2.1 G/DL (ref 3.5–5.2)
ALP SERPL-CCNC: 336 U/L (ref 55–135)
ALT SERPL W/O P-5'-P-CCNC: 19 U/L (ref 10–44)
ANION GAP SERPL CALC-SCNC: 5 MMOL/L (ref 8–16)
AST SERPL-CCNC: 27 U/L (ref 10–40)
BASOPHILS # BLD AUTO: 0.03 K/UL (ref 0–0.2)
BASOPHILS NFR BLD: 0.5 % (ref 0–1.9)
BILIRUB SERPL-MCNC: 0.4 MG/DL (ref 0.1–1)
BUN SERPL-MCNC: 8 MG/DL (ref 8–23)
CALCIUM SERPL-MCNC: 8.5 MG/DL (ref 8.7–10.5)
CHLORIDE SERPL-SCNC: 105 MMOL/L (ref 95–110)
CO2 SERPL-SCNC: 28 MMOL/L (ref 23–29)
CREAT SERPL-MCNC: 0.7 MG/DL (ref 0.5–1.4)
DIFFERENTIAL METHOD: ABNORMAL
EOSINOPHIL # BLD AUTO: 0.2 K/UL (ref 0–0.5)
EOSINOPHIL NFR BLD: 3.4 % (ref 0–8)
ERYTHROCYTE [DISTWIDTH] IN BLOOD BY AUTOMATED COUNT: 17.5 % (ref 11.5–14.5)
EST. GFR  (AFRICAN AMERICAN): >60 ML/MIN/1.73 M^2
EST. GFR  (NON AFRICAN AMERICAN): >60 ML/MIN/1.73 M^2
GLUCOSE SERPL-MCNC: 89 MG/DL (ref 70–110)
HCT VFR BLD AUTO: 26.7 % (ref 40–54)
HGB BLD-MCNC: 8.3 G/DL (ref 14–18)
IMM GRANULOCYTES # BLD AUTO: 0.03 K/UL (ref 0–0.04)
IMM GRANULOCYTES NFR BLD AUTO: 0.5 % (ref 0–0.5)
LYMPHOCYTES # BLD AUTO: 1 K/UL (ref 1–4.8)
LYMPHOCYTES NFR BLD: 15.5 % (ref 18–48)
MAGNESIUM SERPL-MCNC: 1.6 MG/DL (ref 1.6–2.6)
MCH RBC QN AUTO: 28 PG (ref 27–31)
MCHC RBC AUTO-ENTMCNC: 31.1 G/DL (ref 32–36)
MCV RBC AUTO: 90 FL (ref 82–98)
MONOCYTES # BLD AUTO: 0.7 K/UL (ref 0.3–1)
MONOCYTES NFR BLD: 11.9 % (ref 4–15)
NEUTROPHILS # BLD AUTO: 4.2 K/UL (ref 1.8–7.7)
NEUTROPHILS NFR BLD: 68.2 % (ref 38–73)
NRBC BLD-RTO: 0 /100 WBC
PHOSPHATE SERPL-MCNC: 2.7 MG/DL (ref 2.7–4.5)
PLATELET # BLD AUTO: 136 K/UL (ref 150–350)
PMV BLD AUTO: 10.5 FL (ref 9.2–12.9)
POTASSIUM SERPL-SCNC: 3.7 MMOL/L (ref 3.5–5.1)
PROT SERPL-MCNC: 5.5 G/DL (ref 6–8.4)
RBC # BLD AUTO: 2.96 M/UL (ref 4.6–6.2)
SODIUM SERPL-SCNC: 138 MMOL/L (ref 136–145)
WBC # BLD AUTO: 6.13 K/UL (ref 3.9–12.7)

## 2019-12-27 PROCEDURE — 25000003 PHARM REV CODE 250: Performed by: ANESTHESIOLOGY

## 2019-12-27 PROCEDURE — 94640 AIRWAY INHALATION TREATMENT: CPT

## 2019-12-27 PROCEDURE — 25000242 PHARM REV CODE 250 ALT 637 W/ HCPCS: Performed by: NURSE PRACTITIONER

## 2019-12-27 PROCEDURE — 25000003 PHARM REV CODE 250: Performed by: PHYSICIAN ASSISTANT

## 2019-12-27 PROCEDURE — 63600175 PHARM REV CODE 636 W HCPCS: Performed by: PSYCHIATRY & NEUROLOGY

## 2019-12-27 PROCEDURE — 99024 PR POST-OP FOLLOW-UP VISIT: ICD-10-PCS | Mod: ,,, | Performed by: PHYSICIAN ASSISTANT

## 2019-12-27 PROCEDURE — 25000003 PHARM REV CODE 250: Performed by: STUDENT IN AN ORGANIZED HEALTH CARE EDUCATION/TRAINING PROGRAM

## 2019-12-27 PROCEDURE — 83735 ASSAY OF MAGNESIUM: CPT

## 2019-12-27 PROCEDURE — 92526 ORAL FUNCTION THERAPY: CPT

## 2019-12-27 PROCEDURE — 97535 SELF CARE MNGMENT TRAINING: CPT

## 2019-12-27 PROCEDURE — 85025 COMPLETE CBC W/AUTO DIFF WBC: CPT

## 2019-12-27 PROCEDURE — 99024 POSTOP FOLLOW-UP VISIT: CPT | Mod: ,,, | Performed by: PHYSICIAN ASSISTANT

## 2019-12-27 PROCEDURE — 94761 N-INVAS EAR/PLS OXIMETRY MLT: CPT

## 2019-12-27 PROCEDURE — A4216 STERILE WATER/SALINE, 10 ML: HCPCS | Performed by: ANESTHESIOLOGY

## 2019-12-27 PROCEDURE — 11000001 HC ACUTE MED/SURG PRIVATE ROOM

## 2019-12-27 PROCEDURE — 80053 COMPREHEN METABOLIC PANEL: CPT

## 2019-12-27 PROCEDURE — 25000003 PHARM REV CODE 250: Performed by: PSYCHIATRY & NEUROLOGY

## 2019-12-27 PROCEDURE — 25000003 PHARM REV CODE 250: Performed by: NURSE PRACTITIONER

## 2019-12-27 PROCEDURE — 84100 ASSAY OF PHOSPHORUS: CPT

## 2019-12-27 PROCEDURE — 25000242 PHARM REV CODE 250 ALT 637 W/ HCPCS: Performed by: PHYSICIAN ASSISTANT

## 2019-12-27 RX ORDER — ASCORBIC ACID 250 MG
250 TABLET ORAL 2 TIMES DAILY
Status: DISCONTINUED | OUTPATIENT
Start: 2019-12-27 | End: 2020-01-11 | Stop reason: HOSPADM

## 2019-12-27 RX ORDER — ONDANSETRON 2 MG/ML
8 INJECTION INTRAMUSCULAR; INTRAVENOUS EVERY 8 HOURS PRN
Status: DISCONTINUED | OUTPATIENT
Start: 2019-12-27 | End: 2020-01-11 | Stop reason: HOSPADM

## 2019-12-27 RX ORDER — ACETAMINOPHEN 500 MG
500 TABLET ORAL EVERY 8 HOURS PRN
Status: DISCONTINUED | OUTPATIENT
Start: 2019-12-27 | End: 2020-01-11 | Stop reason: HOSPADM

## 2019-12-27 RX ORDER — IPRATROPIUM BROMIDE AND ALBUTEROL SULFATE 2.5; .5 MG/3ML; MG/3ML
3 SOLUTION RESPIRATORY (INHALATION) EVERY 12 HOURS
Status: DISCONTINUED | OUTPATIENT
Start: 2019-12-27 | End: 2020-01-11 | Stop reason: HOSPADM

## 2019-12-27 RX ORDER — BISACODYL 10 MG
10 SUPPOSITORY, RECTAL RECTAL DAILY PRN
Status: DISCONTINUED | OUTPATIENT
Start: 2019-12-27 | End: 2020-01-11 | Stop reason: HOSPADM

## 2019-12-27 RX ORDER — SODIUM CHLORIDE FOR INHALATION 3 %
4 VIAL, NEBULIZER (ML) INHALATION EVERY 12 HOURS
Status: DISCONTINUED | OUTPATIENT
Start: 2019-12-27 | End: 2020-01-11 | Stop reason: HOSPADM

## 2019-12-27 RX ORDER — FERROUS SULFATE 325(65) MG
325 TABLET, DELAYED RELEASE (ENTERIC COATED) ORAL 2 TIMES DAILY
Status: DISCONTINUED | OUTPATIENT
Start: 2019-12-27 | End: 2020-01-11 | Stop reason: HOSPADM

## 2019-12-27 RX ADMIN — LIDOCAINE 1 PATCH: 50 PATCH CUTANEOUS at 09:12

## 2019-12-27 RX ADMIN — Medication 10 ML: at 11:12

## 2019-12-27 RX ADMIN — HEPARIN SODIUM 5000 UNITS: 5000 INJECTION, SOLUTION INTRAVENOUS; SUBCUTANEOUS at 02:12

## 2019-12-27 RX ADMIN — Medication 250 MG: at 09:12

## 2019-12-27 RX ADMIN — METOPROLOL TARTRATE 50 MG: 50 TABLET ORAL at 09:12

## 2019-12-27 RX ADMIN — Medication: at 09:12

## 2019-12-27 RX ADMIN — CEFEPIME 2 G: 2 INJECTION, POWDER, FOR SOLUTION INTRAVENOUS at 03:12

## 2019-12-27 RX ADMIN — SODIUM CHLORIDE SOLN NEBU 3% 4 ML: 3 NEBU SOLN at 08:12

## 2019-12-27 RX ADMIN — FERROUS SULFATE TAB EC 325 MG (65 MG FE EQUIVALENT) 325 MG: 325 (65 FE) TABLET DELAYED RESPONSE at 09:12

## 2019-12-27 RX ADMIN — LEVOTHYROXINE SODIUM 50 MCG: 50 TABLET ORAL at 06:12

## 2019-12-27 RX ADMIN — FAMOTIDINE 20 MG: 20 TABLET ORAL at 09:12

## 2019-12-27 RX ADMIN — Medication: at 02:12

## 2019-12-27 RX ADMIN — SENNOSIDES AND DOCUSATE SODIUM 1 TABLET: 8.6; 5 TABLET ORAL at 09:12

## 2019-12-27 RX ADMIN — GABAPENTIN 300 MG: 300 CAPSULE ORAL at 02:12

## 2019-12-27 RX ADMIN — Medication 10 ML: at 06:12

## 2019-12-27 RX ADMIN — SODIUM CHLORIDE SOLN NEBU 3% 4 ML: 3 NEBU SOLN at 07:12

## 2019-12-27 RX ADMIN — POLYETHYLENE GLYCOL 3350 17 G: 17 POWDER, FOR SOLUTION ORAL at 09:12

## 2019-12-27 RX ADMIN — LOSARTAN POTASSIUM AND HYDROCHLOROTHIAZIDE TABLETS 1 TABLET: 100; 25 TABLET, FILM COATED ORAL at 09:12

## 2019-12-27 RX ADMIN — Medication 2 G: at 09:12

## 2019-12-27 RX ADMIN — CEFEPIME 2 G: 2 INJECTION, POWDER, FOR SOLUTION INTRAVENOUS at 11:12

## 2019-12-27 RX ADMIN — HEPARIN SODIUM 5000 UNITS: 5000 INJECTION, SOLUTION INTRAVENOUS; SUBCUTANEOUS at 09:12

## 2019-12-27 RX ADMIN — HEPARIN SODIUM 5000 UNITS: 5000 INJECTION, SOLUTION INTRAVENOUS; SUBCUTANEOUS at 06:12

## 2019-12-27 RX ADMIN — ATORVASTATIN CALCIUM 80 MG: 20 TABLET, FILM COATED ORAL at 09:12

## 2019-12-27 RX ADMIN — AMLODIPINE BESYLATE 10 MG: 10 TABLET ORAL at 09:12

## 2019-12-27 RX ADMIN — CEFEPIME 2 G: 2 INJECTION, POWDER, FOR SOLUTION INTRAVENOUS at 09:12

## 2019-12-27 RX ADMIN — GABAPENTIN 300 MG: 300 CAPSULE ORAL at 09:12

## 2019-12-27 RX ADMIN — CLOPIDOGREL BISULFATE 75 MG: 75 TABLET ORAL at 09:12

## 2019-12-27 RX ADMIN — Medication 10 ML: at 05:12

## 2019-12-27 RX ADMIN — IPRATROPIUM BROMIDE AND ALBUTEROL SULFATE 3 ML: .5; 3 SOLUTION RESPIRATORY (INHALATION) at 07:12

## 2019-12-27 RX ADMIN — IPRATROPIUM BROMIDE AND ALBUTEROL SULFATE 3 ML: .5; 3 SOLUTION RESPIRATORY (INHALATION) at 08:12

## 2019-12-27 NOTE — PLAN OF CARE
AAOx4, pt in bed resting, no complaints of N/V, pain. In NADN. VS as charted. Ant neck inc CDI. Posterior inc c staples CDI. Turn q2h. Heel  protectors in place. Activity promoted. Waffle mattress placed. Soft  diet tolerated. No falls this shift. Call light in reach. Bed in lowest position. Will continue to monitor.

## 2019-12-27 NOTE — PROGRESS NOTES
[facility]  Neurosurgery  Progress Note    Subjective:     History of Present Illness: Junaid Muñoz is a 74 y.o. male with PMH of HTN, HLD, Hepatitis C, and CAD s/p two stents on plavix who presents with 1 month history of back pain between his shoulders. MRI at OSH demonstrates likely epidural abscess. Pt denies hx of trauma and reports slow onset of symptoms.     Post-Op Info:  Procedure(s) (LRB):  FUSION, SPINE, CERVICAL, POSTERIOR APPROACH C2-T3 posterior instrumented fusion (N/A)   10 Days Post-Op     Interval History:   NAEON. Patient resting in bed. No family at bedside. He reports neck pain that increases with movement. Denies any UE or LE radicular pain or paresthesias. Tolerating diet but admits he is not eating as much as he should be eating due to lack of appetite. Voiding appropriately.    Medications:  Continuous Infusions:  Scheduled Meds:   albuterol-ipratropium  3 mL Nebulization Q12H    amLODIPine  10 mg Oral Daily    ascorbic acid (vitamin C)  250 mg Oral BID    atorvastatin  80 mg Oral Daily    bacitracin   Topical (Top) TID    ceFEPime (MAXIPIME) IVPB  2 g Intravenous Q8H    clopidogrel  75 mg Oral Daily    famotidine  20 mg Oral BID    ferrous sulfate  325 mg Oral BID    gabapentin  300 mg Oral TID    heparin (porcine)  5,000 Units Subcutaneous Q8H    levothyroxine  50 mcg Oral Before breakfast    lidocaine  1 patch Transdermal Daily    losartan-hydrochlorothiazide 100-25 mg  1 tablet Oral Daily    metoprolol tartrate  50 mg Oral BID    polyethylene glycol  17 g Oral Daily    senna-docusate 8.6-50 mg  1 tablet Oral BID    sodium chloride 0.9%  10 mL Intravenous Q6H    sodium chloride 3%  4 mL Nebulization Q12H    sodium chloride  2 g Oral BID     PRN Meds:acetaminophen, bisacodyl, labetalol, naloxone, ondansetron, oxyCODONE, promethazine (PHENERGAN) IVPB, Flushing PICC Protocol **AND** sodium chloride 0.9% **AND** sodium chloride 0.9%     Review of  Systems  Objective:     Weight: 79.6 kg (175 lb 7.8 oz)  Body mass index is 25.91 kg/m².  Vital Signs (Most Recent):  Temp: 97 °F (36.1 °C) (12/27/19 1258)  Pulse: 72 (12/27/19 1258)  Resp: 18 (12/27/19 1258)  BP: 134/63 (12/27/19 1258)  SpO2: 96 % (12/27/19 1258) Vital Signs (24h Range):  Temp:  [97 °F (36.1 °C)-98.6 °F (37 °C)] 97 °F (36.1 °C)  Pulse:  [69-78] 72  Resp:  [16-20] 18  SpO2:  [95 %-100 %] 96 %  BP: (128-162)/(62-77) 134/63     Date 12/27/19 0700 - 12/28/19 0659   Shift 5453-5253 2046-7331 9538-9484 24 Hour Total   INTAKE   P.O. 250   250   Shift Total(mL/kg) 250(3.1)   250(3.1)   OUTPUT   Urine(mL/kg/hr) 500(0.8)   500   Shift Total(mL/kg) 500(6.3)   500(6.3)   Weight (kg) 79.6 79.6 79.6 79.6                   Male External Urinary Catheter 12/19/19 1200 Small (Active)   Collection Container Urimeter 12/27/2019  8:00 AM   Securement Method secured to top of thigh w/ tape 12/27/2019  8:00 AM   Skin no redness;no breakdown 12/27/2019  8:00 AM   Tolerance no signs/symptoms of discomfort 12/27/2019  8:00 AM   Output (mL) 500 mL 12/27/2019  1:00 PM   Catheter Change Date 12/24/19 12/26/2019  4:24 PM   Catheter Change Time 1100 12/26/2019  4:24 PM       Neurosurgery Physical Exam  General: poor dentition, appears older than stated age, no distress.   Head: normocephalic, atraumatic  Neurologic: Mild confusion  GCS: Motor: 6/Verbal: 4/Eyes: 4 GCS Total: 14  Mental Status: Awake, Alert, Oriented to person and place [hospital]. Not oriented to year or name of the hospital.   Language: No aphasia  Speech: Mild dysarthria 2/2 dentition  Cranial nerves: face symmetric, tongue midline, CN II-XII grossly intact.   Eyes: pupils equal, round, reactive to light with accomodation, EOMI.   Pulmonary: normal respirations, no signs of respiratory distress  Abdomen: soft, non-distended, not tender to palpation  Skin: Skin is warm, dry and intact.  Sensory: intact to light touch throughout  Motor Strength: Moves all  extremities spontaneously with good tone. No abnormal movements seen. Generalized deconditioning. LUE 4+/5.  RUE and BLE 5-/5.  Damian's: Negative.  Babinski's: Negative.  Clonus: Negative.       Incisions:  Anterior: Clean, dry, dissolvable sutures intact. Skin edges well approximated. No surrounding erythema or edema. No drainage or TTP. No evidence of an underlying hematoma.     Posterior: Clean, dry, staples intact. Skin edges well approximated. No surrounding erythema or edema. No drainage or TTP.       Significant Labs:  Recent Labs   Lab 12/26/19  0118 12/27/19  0310   GLU 89 89    138   K 3.7 3.7    105   CO2 25 28   BUN 7* 8   CREATININE 0.7 0.7   CALCIUM 8.1* 8.5*   MG 1.7 1.6     Recent Labs   Lab 12/25/19  2000 12/26/19  0902 12/27/19  0310   WBC 8.32 8.26 6.13   HGB 8.4* 8.9* 8.3*   HCT 27.7* 29.5* 26.7*    145* 136*     No results for input(s): LABPT, INR, APTT in the last 48 hours.  Microbiology Results (last 7 days)     Procedure Component Value Units Date/Time    Fungus culture [[id number]] Collected:  12/10/19 1038    Order Status:  Completed Specimen:  Body Fluid from Neck Updated:  12/27/19 1218     Fungus (Mycology) Culture Culture in progress      No fungus isolated after 2 weeks    Narrative:       2) Free vertebral abscess #2    Fungus culture [[id number]] Collected:  12/10/19 1038    Order Status:  Completed Specimen:  Body Fluid from Neck Updated:  12/27/19 1218     Fungus (Mycology) Culture Culture in progress      No fungus isolated after 2 weeks    Narrative:       1) Free vertebral abscess #1    Blood culture [[id number]] Collected:  12/17/19 1505    Order Status:  Completed Specimen:  Blood from Line, Arterial, Right Updated:  12/22/19 1812     Blood Culture, Routine No growth after 5 days.          Significant Diagnostics:  Xray cervical spine 12/26:  I independently reviewed the imaging.     Two views: There are pedicle screws and fixation rods between C2 and upper  thoracic levels.  There is corpectomy at the cervicothoracic junction with laminectomies.  There is DJD.  Alignment is normal.  Odontoid prevertebral soft tissues are intact.  No fracture dislocation bone destruction seen.  No hardware failure seen and no complication seen.    Assessment/Plan:     * Spinal epidural abscess  74 y.o. male with PMH of HTN, HLD, Hepatitis C, and CAD s/p two stents on plavix who presents with C6-T2 osteomyelitis with suspected epidural abscess.  Now s/p C7/T1 corpectomies on 12/10 and C2-T2  posterior instrumented fusion 12/17.    -Patient neurologically stable on exam  -Post op Xrays show good positioning of the hardware. No detrimental findings.   -Brace on when upright, OOB, or working with therapy. OK to remove when lying in bed.   -ID: Afebrile. No leukocytosis. Continue Cefepime 2g q 8 hours. ESR and CRP ordered for tomorrow. Estimated end date of therapy 1/28/20-2/11/20. FU in ID clinic in 2-3 weeks.   -Pain: Continue current regimen. No adjustments needed at this time.   -Anemia: H 8/26. Begin iron for replacement x 2 weeks.   -Elevated Alk Phos: Elevated at the time of admission. 336 today, was 302 yesterday. AST/ALT are WNL. Per HM, likely secondary to osteomyelitis. Will continue to monitor closely.   -HTN: -157. Continue Amlodipine 10 mg daily, Losartan-HCTZ 100/25 mg daily, and Metoprolol 50 mg BID.   -CAD s/p stent placement: Continue home dose of Plavix 75 mg daily.   -Hyponatremia: Na 138 today. Decrease Na tabs from TID to BID. Will continue to wean as tolerated.   -Hypoalbuminemia: Alb 2.6. Begin Jose BID to promote wound healing.   -HLD: Continue home dose of Atorvastatin 80 mg daily  -DVT prophylaxis: TUCKER's, SCD's, SQH  -Bowel regimen: senna BID and miralax daily  -Atelectasis prevention: IS hourly while awake, PT/OT, OOB > 6 hours per day    DISPO: PT/OT re-consulted for dispo recs. Patient medically stable for discharge.         Please call with any  questions      Zoie Reyes PA-C   Neurosurgery   Pager: [phone]

## 2019-12-27 NOTE — PLAN OF CARE
Plan of care discussed with patient. Pt verbalizes understanding. Pt alert and oriented. VSS. TLSO brace in place. All safety precautions in place. Bed alarm on . Call light in reach. No falls this shift.

## 2019-12-27 NOTE — PLAN OF CARE
SW following for DC needs. SW in communication with CM.    PT/OT orders placed today. Therapy likely to evaluate patient over weekend.     Jolanta Zaidi, LCSW [facility]  [id number]

## 2019-12-27 NOTE — PT/OT/SLP PROGRESS
Speech Language Pathology Treatment  Discharge    Patient Name:  Junaid Muñoz   MRN:  [id number]   528/528 A    Admitting Diagnosis: Spinal epidural abscess    Recommendations:                 General Recommendations:  Follow-up not indicated  Diet recommendations:  Dental Soft, Liquid Diet Level: Thin   Aspiration Precautions:   · 1 small bite/sip at a time,   · Assistance with meals/setting up tray,   · HOB to 90 degrees   · Continue to monitor for signs and symptoms of aspiration and discontinue oral feeding should you notice any of the following: watery eyes, reddened facial area, wet vocal quality, increased work of breathing, change in respiratory status, increased congestion, coughing, fever, etc.  General Precautions: Standard, aspiration, fall, dental soft  Communication strategies:  none    Subjective     · Patient awake and cooperative with brace. PCT assisted with repositioning patient. HOB elevated. SLP communicated with RN prior to entry.     Objective:     Has the patient been evaluated by SLP for swallowing?   Yes  Keep patient NPO? No   Current Respiratory Status: room air      Patient seen to assess tolerance of mechanical soft diet and thin liquids. Patient accepted sips of water via straw x 8 ounces, bites of amilcar cracker, and bites of a turkey sandwich (half of sandwich). Patient with noted difficulty breaking turkey to take bites and excess cehwing 2/2 lack of dentition. No overt s/s of aspiration with all trials. SLP provided education on the importance of taking smaller bites, SLP recommendations, SLP role, s/s and risks of aspiration, and safe swallow precautions. Patient reporting preference to remain on dental soft diet at this time. He verbalized understanding of all discussed and is in agreement with discharge from  services.       Assessment:     Junaid Muñoz is a 74 y.o. male tolerating dental soft diet and thin liquids with no overt s/s of aspiration. Patient to remain on  dental soft diet 2/2 lack of dentition and difficulty with hard solids without teeth at baseline. No further skilled acute ST services warranted at this time. Please re-consult as needed.     Goals:   Multidisciplinary Problems     SLP Goals     Not on file          Multidisciplinary Problems (Resolved)        Problem: SLP Goal    Goal Priority Disciplines Outcome   SLP Goal   (Resolved)     SLP Met   Description:  Speech Language Pathology Goals  Goals expected to be met by 1/2:    1.  Pt will tolerate mechanical soft diet with thin liquids with adequate oral clearance and no overt signs of aspiration. -met  2.  Pt will tolerate trials of regular solids with adequate oral clearance and no overt signs of aspiration. -met                          Plan:     · Plan of Care reviewed with:  patient   · SLP Follow-Up:  No       Discharge recommendations:  (see PT/OT recs; no ST needs)   Barriers to Discharge:  None    Time Tracking:     SLP Treatment Date:   12/27/19  Speech Start Time:  0830  Speech Stop Time:  0901     Speech Total Time (min):  31 min    Billable Minutes: Treatment Swallowing Dysfunction 23 and Seld Care/Home Management Training 8    TAMMY Holguin, CCC-SLP   Pager: [phone]  12/27/2019

## 2019-12-27 NOTE — ASSESSMENT & PLAN NOTE
74 y.o. male with PMH of HTN, HLD, Hepatitis C, and CAD s/p two stents on plavix who presents with C6-T2 osteomyelitis with suspected epidural abscess.  Now s/p C7/T1 corpectomies on 12/10 and C2-T2  posterior instrumented fusion 12/17.    -Patient neurologically stable on exam  -Post op Xrays show good positioning of the hardware. No detrimental findings.   -Brace on when upright, OOB, or working with therapy. OK to remove when lying in bed.   -ID: Afebrile. No leukocytosis. Continue Cefepime 2g q 8 hours. ESR and CRP ordered for tomorrow. Estimated end date of therapy 1/28/20-2/11/20. FU in ID clinic in 2-3 weeks.   -Pain: Continue current regimen. No adjustments needed at this time.   -Anemia: H 8/26. Begin iron for replacement x 2 weeks.   -Elevated Alk Phos: Elevated at the time of admission. 336 today, was 302 yesterday. AST/ALT are WNL. Per HM, likely secondary to osteomyelitis. Will continue to monitor closely.   -HTN: -157. Continue Amlodipine 10 mg daily, Losartan-HCTZ 100/25 mg daily, and Metoprolol 50 mg BID.   -CAD s/p stent placement: Continue home dose of Plavix 75 mg daily.   -Hyponatremia: Na 138 today. Decrease Na tabs from TID to BID. Will continue to wean as tolerated.   -Hypoalbuminemia: Alb 2.6. Begin Jose BID to promote wound healing.   -HLD: Continue home dose of Atorvastatin 80 mg daily  -DVT prophylaxis: TUCKER's, SCD's, SQH  -Bowel regimen: senna BID and miralax daily  -Atelectasis prevention: IS hourly while awake, PT/OT, OOB > 6 hours per day    DISPO: PT/OT re-consulted for dispo recs. Patient medically stable for discharge.

## 2019-12-27 NOTE — NURSING
Pt arrived to room 528A via stretcher from Neuro ICU. Pt alert and oriented, VSS, on room air. PICC line to right upper arm saline locked. PIV to left foot saline locked. Mcdonough in place draining clear yellow urine to gravity. TLSO in place. No s/s of distress and no complaints of pain. All safety precautions in place. Call light in reach.

## 2019-12-27 NOTE — SUBJECTIVE & OBJECTIVE
Interval History:   NAEON. Patient resting in bed. No family at bedside. He reports neck pain that increases with movement. Denies any UE or LE radicular pain or paresthesias. Tolerating diet but admits he is not eating as much as he should be eating due to lack of appetite. Voiding appropriately.    Medications:  Continuous Infusions:  Scheduled Meds:   albuterol-ipratropium  3 mL Nebulization Q12H    amLODIPine  10 mg Oral Daily    ascorbic acid (vitamin C)  250 mg Oral BID    atorvastatin  80 mg Oral Daily    bacitracin   Topical (Top) TID    ceFEPime (MAXIPIME) IVPB  2 g Intravenous Q8H    clopidogrel  75 mg Oral Daily    famotidine  20 mg Oral BID    ferrous sulfate  325 mg Oral BID    gabapentin  300 mg Oral TID    heparin (porcine)  5,000 Units Subcutaneous Q8H    levothyroxine  50 mcg Oral Before breakfast    lidocaine  1 patch Transdermal Daily    losartan-hydrochlorothiazide 100-25 mg  1 tablet Oral Daily    metoprolol tartrate  50 mg Oral BID    polyethylene glycol  17 g Oral Daily    senna-docusate 8.6-50 mg  1 tablet Oral BID    sodium chloride 0.9%  10 mL Intravenous Q6H    sodium chloride 3%  4 mL Nebulization Q12H    sodium chloride  2 g Oral BID     PRN Meds:acetaminophen, bisacodyl, labetalol, naloxone, ondansetron, oxyCODONE, promethazine (PHENERGAN) IVPB, Flushing PICC Protocol **AND** sodium chloride 0.9% **AND** sodium chloride 0.9%     Review of Systems  Objective:     Weight: 79.6 kg (175 lb 7.8 oz)  Body mass index is 25.91 kg/m².  Vital Signs (Most Recent):  Temp: 97 °F (36.1 °C) (12/27/19 1258)  Pulse: 72 (12/27/19 1258)  Resp: 18 (12/27/19 1258)  BP: 134/63 (12/27/19 1258)  SpO2: 96 % (12/27/19 1258) Vital Signs (24h Range):  Temp:  [97 °F (36.1 °C)-98.6 °F (37 °C)] 97 °F (36.1 °C)  Pulse:  [69-78] 72  Resp:  [16-20] 18  SpO2:  [95 %-100 %] 96 %  BP: (128-162)/(62-77) 134/63     Date 12/27/19 0700 - 12/28/19 0659   Shift 0560-5363 5101-1370 1168-4169 24 Hour Total    INTAKE   P.O. 250   250   Shift Total(mL/kg) 250(3.1)   250(3.1)   OUTPUT   Urine(mL/kg/hr) 500(0.8)   500   Shift Total(mL/kg) 500(6.3)   500(6.3)   Weight (kg) 79.6 79.6 79.6 79.6                   Male External Urinary Catheter 12/19/19 1200 Small (Active)   Collection Container Urimeter 12/27/2019  8:00 AM   Securement Method secured to top of thigh w/ tape 12/27/2019  8:00 AM   Skin no redness;no breakdown 12/27/2019  8:00 AM   Tolerance no signs/symptoms of discomfort 12/27/2019  8:00 AM   Output (mL) 500 mL 12/27/2019  1:00 PM   Catheter Change Date 12/24/19 12/26/2019  4:24 PM   Catheter Change Time 1100 12/26/2019  4:24 PM       Neurosurgery Physical Exam  General: poor dentition, appears older than stated age, no distress.   Head: normocephalic, atraumatic  Neurologic: Mild confusion  GCS: Motor: 6/Verbal: 4/Eyes: 4 GCS Total: 14  Mental Status: Awake, Alert, Oriented to person and place [hospital]. Not oriented to year or name of the hospital.   Language: No aphasia  Speech: Mild dysarthria 2/2 dentition  Cranial nerves: face symmetric, tongue midline, CN II-XII grossly intact.   Eyes: pupils equal, round, reactive to light with accomodation, EOMI.   Pulmonary: normal respirations, no signs of respiratory distress  Abdomen: soft, non-distended, not tender to palpation  Skin: Skin is warm, dry and intact.  Sensory: intact to light touch throughout  Motor Strength: Moves all extremities spontaneously with good tone. No abnormal movements seen. Generalized deconditioning. LUE 4+/5.  RUE and BLE 5-/5.  Damian's: Negative.  Babinski's: Negative.  Clonus: Negative.       Incisions:  Anterior: Clean, dry, dissolvable sutures intact. Skin edges well approximated. No surrounding erythema or edema. No drainage or TTP. No evidence of an underlying hematoma.     Posterior: Clean, dry, staples intact. Skin edges well approximated. No surrounding erythema or edema. No drainage or TTP.       Significant Labs:  Recent  Labs   Lab 12/26/19  0118 12/27/19  0310   GLU 89 89    138   K 3.7 3.7    105   CO2 25 28   BUN 7* 8   CREATININE 0.7 0.7   CALCIUM 8.1* 8.5*   MG 1.7 1.6     Recent Labs   Lab 12/25/19 2000 12/26/19  0902 12/27/19  0310   WBC 8.32 8.26 6.13   HGB 8.4* 8.9* 8.3*   HCT 27.7* 29.5* 26.7*    145* 136*     No results for input(s): LABPT, INR, APTT in the last 48 hours.  Microbiology Results (last 7 days)     Procedure Component Value Units Date/Time    Fungus culture [[id number]] Collected:  12/10/19 1038    Order Status:  Completed Specimen:  Body Fluid from Neck Updated:  12/27/19 1218     Fungus (Mycology) Culture Culture in progress      No fungus isolated after 2 weeks    Narrative:       2) Free vertebral abscess #2    Fungus culture [[id number]] Collected:  12/10/19 1038    Order Status:  Completed Specimen:  Body Fluid from Neck Updated:  12/27/19 1218     Fungus (Mycology) Culture Culture in progress      No fungus isolated after 2 weeks    Narrative:       1) Free vertebral abscess #1    Blood culture [[id number]] Collected:  12/17/19 1505    Order Status:  Completed Specimen:  Blood from Line, Arterial, Right Updated:  12/22/19 1812     Blood Culture, Routine No growth after 5 days.          Significant Diagnostics:  Xray cervical spine 12/26:  I independently reviewed the imaging.     Two views: There are pedicle screws and fixation rods between C2 and upper thoracic levels.  There is corpectomy at the cervicothoracic junction with laminectomies.  There is DJD.  Alignment is normal.  Odontoid prevertebral soft tissues are intact.  No fracture dislocation bone destruction seen.  No hardware failure seen and no complication seen.

## 2019-12-27 NOTE — PLAN OF CARE
Problem: SLP Goal  Goal: SLP Goal  Description  Speech Language Pathology Goals  Goals expected to be met by 1/2:    1.  Pt will tolerate mechanical soft diet with thin liquids with adequate oral clearance and no overt signs of aspiration. -met  2.  Pt will tolerate trials of regular solids with adequate oral clearance and no overt signs of aspiration. -met         Outcome: Met  Patient tolerating dental soft solids and thin liquids with no overt s/s of aspiration. Patient in agreement with remaining on dental soft solids 2/2 lack of dentition and difficulty chewing. All safe swallowing precautions reviewed. No further skilled acute ST services warranted at this time. Please re-consult as needed.   GABBY Del Castillo., CCC-SLP  Pager: [phone]  12/27/2019

## 2019-12-27 NOTE — PLAN OF CARE
Pt is AAOX4,VSS. Pt is progressing towards plan of care goals. Back brace in place. Fall precautions in place, no report of falls. saftey measures in place, bed in lowest position, wheels locked, call light within reach. Will continue to monitor.

## 2019-12-28 LAB
CRP SERPL-MCNC: 26.6 MG/L (ref 0–8.2)
ERYTHROCYTE [SEDIMENTATION RATE] IN BLOOD BY WESTERGREN METHOD: 50 MM/HR (ref 0–23)

## 2019-12-28 PROCEDURE — 63600175 PHARM REV CODE 636 W HCPCS: Performed by: PSYCHIATRY & NEUROLOGY

## 2019-12-28 PROCEDURE — A4216 STERILE WATER/SALINE, 10 ML: HCPCS | Performed by: ANESTHESIOLOGY

## 2019-12-28 PROCEDURE — 97165 OT EVAL LOW COMPLEX 30 MIN: CPT

## 2019-12-28 PROCEDURE — 97162 PT EVAL MOD COMPLEX 30 MIN: CPT

## 2019-12-28 PROCEDURE — 85652 RBC SED RATE AUTOMATED: CPT

## 2019-12-28 PROCEDURE — 94761 N-INVAS EAR/PLS OXIMETRY MLT: CPT

## 2019-12-28 PROCEDURE — 97535 SELF CARE MNGMENT TRAINING: CPT

## 2019-12-28 PROCEDURE — 25000003 PHARM REV CODE 250: Performed by: PHYSICIAN ASSISTANT

## 2019-12-28 PROCEDURE — 25000242 PHARM REV CODE 250 ALT 637 W/ HCPCS: Performed by: PHYSICIAN ASSISTANT

## 2019-12-28 PROCEDURE — 25000003 PHARM REV CODE 250: Performed by: ANESTHESIOLOGY

## 2019-12-28 PROCEDURE — 94640 AIRWAY INHALATION TREATMENT: CPT

## 2019-12-28 PROCEDURE — 11000001 HC ACUTE MED/SURG PRIVATE ROOM

## 2019-12-28 PROCEDURE — 25000003 PHARM REV CODE 250: Performed by: NURSE PRACTITIONER

## 2019-12-28 PROCEDURE — 97530 THERAPEUTIC ACTIVITIES: CPT

## 2019-12-28 PROCEDURE — 86140 C-REACTIVE PROTEIN: CPT

## 2019-12-28 PROCEDURE — 25000003 PHARM REV CODE 250: Performed by: PSYCHIATRY & NEUROLOGY

## 2019-12-28 RX ADMIN — GABAPENTIN 300 MG: 300 CAPSULE ORAL at 02:12

## 2019-12-28 RX ADMIN — Medication 250 MG: at 08:12

## 2019-12-28 RX ADMIN — Medication: at 09:12

## 2019-12-28 RX ADMIN — LEVOTHYROXINE SODIUM 50 MCG: 50 TABLET ORAL at 05:12

## 2019-12-28 RX ADMIN — SODIUM CHLORIDE SOLN NEBU 3% 4 ML: 3 NEBU SOLN at 09:12

## 2019-12-28 RX ADMIN — SENNOSIDES AND DOCUSATE SODIUM 1 TABLET: 8.6; 5 TABLET ORAL at 08:12

## 2019-12-28 RX ADMIN — HEPARIN SODIUM 5000 UNITS: 5000 INJECTION, SOLUTION INTRAVENOUS; SUBCUTANEOUS at 02:12

## 2019-12-28 RX ADMIN — Medication 10 ML: at 06:12

## 2019-12-28 RX ADMIN — SENNOSIDES AND DOCUSATE SODIUM 1 TABLET: 8.6; 5 TABLET ORAL at 09:12

## 2019-12-28 RX ADMIN — LOSARTAN POTASSIUM AND HYDROCHLOROTHIAZIDE TABLETS 1 TABLET: 100; 25 TABLET, FILM COATED ORAL at 08:12

## 2019-12-28 RX ADMIN — FERROUS SULFATE TAB EC 325 MG (65 MG FE EQUIVALENT) 325 MG: 325 (65 FE) TABLET DELAYED RESPONSE at 08:12

## 2019-12-28 RX ADMIN — CEFEPIME 2 G: 2 INJECTION, POWDER, FOR SOLUTION INTRAVENOUS at 05:12

## 2019-12-28 RX ADMIN — Medication 10 ML: at 01:12

## 2019-12-28 RX ADMIN — HEPARIN SODIUM 5000 UNITS: 5000 INJECTION, SOLUTION INTRAVENOUS; SUBCUTANEOUS at 09:12

## 2019-12-28 RX ADMIN — SODIUM CHLORIDE SOLN NEBU 3% 4 ML: 3 NEBU SOLN at 07:12

## 2019-12-28 RX ADMIN — POLYETHYLENE GLYCOL 3350 17 G: 17 POWDER, FOR SOLUTION ORAL at 08:12

## 2019-12-28 RX ADMIN — METOPROLOL TARTRATE 50 MG: 50 TABLET ORAL at 08:12

## 2019-12-28 RX ADMIN — ATORVASTATIN CALCIUM 80 MG: 20 TABLET, FILM COATED ORAL at 08:12

## 2019-12-28 RX ADMIN — Medication 10 ML: at 12:12

## 2019-12-28 RX ADMIN — IPRATROPIUM BROMIDE AND ALBUTEROL SULFATE 3 ML: .5; 3 SOLUTION RESPIRATORY (INHALATION) at 07:12

## 2019-12-28 RX ADMIN — GABAPENTIN 300 MG: 300 CAPSULE ORAL at 09:12

## 2019-12-28 RX ADMIN — CLOPIDOGREL BISULFATE 75 MG: 75 TABLET ORAL at 08:12

## 2019-12-28 RX ADMIN — Medication 2 G: at 08:12

## 2019-12-28 RX ADMIN — METOPROLOL TARTRATE 50 MG: 50 TABLET ORAL at 09:12

## 2019-12-28 RX ADMIN — GABAPENTIN 300 MG: 300 CAPSULE ORAL at 08:12

## 2019-12-28 RX ADMIN — Medication 10 ML: at 05:12

## 2019-12-28 RX ADMIN — Medication: at 03:12

## 2019-12-28 RX ADMIN — Medication 250 MG: at 09:12

## 2019-12-28 RX ADMIN — FAMOTIDINE 20 MG: 20 TABLET ORAL at 09:12

## 2019-12-28 RX ADMIN — FAMOTIDINE 20 MG: 20 TABLET ORAL at 08:12

## 2019-12-28 RX ADMIN — FERROUS SULFATE TAB EC 325 MG (65 MG FE EQUIVALENT) 325 MG: 325 (65 FE) TABLET DELAYED RESPONSE at 09:12

## 2019-12-28 RX ADMIN — Medication: at 08:12

## 2019-12-28 RX ADMIN — AMLODIPINE BESYLATE 10 MG: 10 TABLET ORAL at 08:12

## 2019-12-28 RX ADMIN — HEPARIN SODIUM 5000 UNITS: 5000 INJECTION, SOLUTION INTRAVENOUS; SUBCUTANEOUS at 05:12

## 2019-12-28 RX ADMIN — CEFEPIME 2 G: 2 INJECTION, POWDER, FOR SOLUTION INTRAVENOUS at 09:12

## 2019-12-28 RX ADMIN — CEFEPIME 2 G: 2 INJECTION, POWDER, FOR SOLUTION INTRAVENOUS at 01:12

## 2019-12-28 RX ADMIN — Medication 2 G: at 09:12

## 2019-12-28 NOTE — PLAN OF CARE
Pt is AAOX4,VSS. Pt is progressing towards plan of care goals. Pain management has been assessed. Pt reports pain at 0. Pain reassessed throughout shift. Incision to upper neck/back intact, dry, open to air. SCDs in place with frequent checks for skin breakdown. Fall reported/ MD notified. Bed alarm set, encouraged pt to call for assistance. Safety measures are in place bed in lowest position, wheels locked, call light within reach. Will continue to monitor.

## 2019-12-28 NOTE — PLAN OF CARE
Evaluation completed.  OT plan of care developed and reviewed with patient.     Recommend SNF upon d/c.     Problem: Occupational Therapy Goal  Goal: Occupational Therapy Goal  Description  Goals to be met by: 1/10/2019     Patient will increase functional independence with ADLs by performing:    UE Dressing with Minimal Assistance.  LE Dressing with Moderate Assistance.  Grooming while standing with Stand-by Assistance.  Toileting from toilet with Minimal Assistance for hygiene and clothing management.   Bathing from  edge of bed with Moderate Assistance.  Supine to sit with Minimal Assistance.     Outcome: Ongoing, Progressing     KASIA Naidu  12/28/2019  Rehab Services

## 2019-12-28 NOTE — PLAN OF CARE
Problem: Physical Therapy Goal  Goal: Physical Therapy Goal  Description  Goals to be met by: 19     Patient will increase functional independence with mobility by performin. Supine to sit with MInimal Assistance  2. Sit to supine with MInimal Assistance  3. Rolling to Left and Right with Modified [place].  4. Sit to stand transfer with Minimal Assistance  5. Bed to chair transfer with Minimal Assistance using Rolling Walker  6. Gait  x 40 feet with Minimal Assistance using Rolling Walker.   7. Sitting at edge of bed x 8 minutes with Minimal Assistance  8. Stand for 5 minutes with Minimal Assistance using Rolling Walker  9. Lower extremity exercise program x15 reps per handout, with independence     Outcome: Ongoing, Progressing    Michelle Grey, PT, DPT  2019

## 2019-12-28 NOTE — NURSING
"In hallway heard someone shouting, "Help", went to pt room, pt was on floor, pt denies hitting head, vitals are stable. Pt had stated, " I was trying to go to the bathroom", got him back to bed, no injuries noted, pt denies pain. notified on call MD Hawley, said to continue to monitor and set bed alarm. Bed alarm set, Instructed pt not to get out of bed, and to call for assistance, call light within reach. Will continue to monitor.   "

## 2019-12-28 NOTE — PLAN OF CARE
Pt resting in bed comfortably. PIV line and PICC intact and saline locked, free of infection and irritation. Fall precautions maintained, no falls noted. Call light within reach, bed locked and in lowest position. Patient instructed to call for assistance. Frequent turning provided, wedge in use. No complaints of pain/nausea. Will continue to monitor and follow plan of care.

## 2019-12-28 NOTE — PT/OT/SLP EVAL
Occupational Therapy   Evaluation    Name: Junaid Muñoz  MRN: [id number]  Admitting Diagnosis:  Spinal epidural abscess 11 Days Post-Op    Recommendations:     Discharge Recommendations: nursing facility, skilled  Discharge Equipment Recommendations:  none  Barriers to discharge:  Pt reports his wife would be of limited physical assistance.    Assessment:     Junaid Muñoz is a 74 y.o. male with a medical diagnosis of Spinal epidural abscess.  He presents with generalized weakness, debility and post op pain.  Performance deficits affecting function: weakness, impaired endurance, impaired self care skills, impaired functional mobilty, impaired cardiopulmonary response to activity, impaired balance, decreased upper extremity function, decreased lower extremity function, pain, decreased ROM, orthopedic precautions, gait instability.      Rehab Prognosis: Good; patient would benefit from acute skilled OT services to address these deficits and reach maximum level of function.       Plan:     Patient to be seen 5x/week to address the above listed problems via self-care/home management, therapeutic activities, therapeutic exercises, neuromuscular re-education  · Plan of Care Expires: 01/27/20  · Plan of Care Reviewed with: patient    Subjective     Chief Complaint: Pain with movement  Patient/Family Comments/goals: Pt agreeable to progressing towards independence with ADLs and functional mobility. Pt is agreeable to going to get therapy at a place prior to going home.     Occupational Profile:  Living Environment: Pt lives with wife in a [facility] with no steps to enter.   Previous level of function: independent with self care and functional mobility  Roles and Routines:   Equipment Used at Home:  none  Assistance upon Discharge: wife can assist but it sounds like she will be of limited physical assistance.     Pain/Comfort:  · Pain Rating 1: 0/10(at rest)  · Pain Rating Post-Intervention 1: 7/10(in upper back/neck  with sitting upright)  · Pain Rating Post-Intervention 2: 0/10    Patients cultural, spiritual, Voodoo conflicts given the current situation: no    Objective:     Communicated with: RN prior to session.  Patient found supine  upon OT entry to room.    General Precautions: Standard, aspiration, fall   Orthopedic Precautions:N/A   Braces: N/A     Occupational Performance:    Bed Mobility:    · Patient completed Rolling/Turning to Left with  maximal assistance  · Patient completed Supine to Sit with maximal assistance  · Patient completed Sit to Supine with maximal assistance    Functional Mobility/Transfers:  · Patient completed Sit <> Stand Transfer with maximal assistance  with  a therapist on each side of him   · Functional Mobility: Pt attempted to take lateral steps to the left but was unable to.  On second attempt he did take 3-4 lateral steps to the left with max A x 2. Pt had gradual lowering.  Not safe to trial walker due to LE weakness with gradual lowering.     Activities of Daily Living:  · Feeding:  independence    · Lower Body Dressing: total assistance (for socks)  · Toileting: maximal assistance bed level  (transfer to [facility] would be difficult)     Cognitive/Visual Perceptual:  Cognitive/Psychosocial Skills:     -       Oriented to: person, not place  -       Follows Commands/attention: follows one step commands  Visual/Perceptual:      -Intact     Physical Exam:  Balance: -       poor sitting balance requiring max A to remain upright while sitting EOB  Upper Extremity Range of Motion:     -       Right Upper Extremity: WFL  -       Left Upper Extremity: WFL  Upper Extremity Strength:    -       Right Upper Extremity: WFL  -       Left Upper Extremity: WFL  Neurological: -       further assessment required    AMPAC 6 Click ADL:  AMPAC Total Score: 10    Treatment & Education:  · Pt educated on role of OT in acute care setting.   · Assisted with ADLs and functional mobility with assist levels noted  above  Education:    Patient left supine with all lines intact, call button in reach and SCDs on, bed alarm on    GOALS:   Multidisciplinary Problems     Occupational Therapy Goals        Problem: Occupational Therapy Goal    Goal Priority Disciplines Outcome Interventions   Occupational Therapy Goal     OT, PT/OT Ongoing, Progressing    Description:  Goals to be met by: 1/10/2019     Patient will increase functional independence with ADLs by performing:    UE Dressing with Minimal Assistance.  LE Dressing with Moderate Assistance.  Grooming while standing with Stand-by Assistance.  Toileting from toilet with Minimal Assistance for hygiene and clothing management.   Bathing from  edge of bed with Moderate Assistance.  Supine to sit with Minimal Assistance.                      History:     Past Medical History:   Diagnosis Date    CAD (coronary artery disease)     s/p stent 2005, on plavix    Chronic hepatitis C     Cirrhosis     biopsy proven - 2007    History of colon polyps 06/2008    1 polyp - tubular adenoma    HTN (hypertension)     Hyperlipidemia     Hypothyroidism        Past Surgical History:   Procedure Laterality Date    COLONOSCOPY W/ POLYPECTOMY  06/2008    Dr Wagoner: fair prep, 3mm polyp - tubular adenoma    CORONARY ANGIOPLASTY WITH STENT PLACEMENT      FUSION OF CERVICAL SPINE BY POSTERIOR APPROACH N/A 12/17/2019    Procedure: FUSION, SPINE, CERVICAL, POSTERIOR APPROACH C2-T3 posterior instrumented fusion;  Surgeon: Elian Deluca MD;  Location: [facility] OR [address] [place] [address];  Service: Neurosurgery;  Laterality: N/A;    hydrocele repair  teenager    pyloric stenosis repair  age 1    SURGICAL REMOVAL OF VERTEBRAL BODY OF THORACIC SPINE N/A 12/10/2019    Procedure: CORPECTOMY, SPINE, CERVICAL AND THORACIC, C7 and T1 with Globus;  Surgeon: Elian Deluca MD;  Location: [facility] OR [address] [place] [address];  Service: Neurosurgery;  Laterality: N/A;    TONSILLECTOMY         Time Tracking:     OT Date of Treatment:  12/28/19  OT Start Time: 0950  OT Stop Time: 1020  OT Total Time (min): 30 min    Billable Minutes:Evaluation 10  Self Care/Home Management 20    KASIA Roman  12/28/2019

## 2019-12-28 NOTE — PT/OT/SLP EVAL
Physical Therapy Evaluation    Patient Name:  Junaid Muñoz   MRN:  [id number]    Recommendations:     Discharge Recommendations:  nursing facility, skilled   Discharge Equipment Recommendations: other (see comments)(TBD)   Barriers to discharge: Decreased caregiver support , reports wife is unable to provide physical assistance    Assessment:     Junaid Muñoz is a 74 y.o. male admitted with a medical diagnosis of Spinal epidural abscess.  He presents with the following impairments/functional limitations:  weakness, impaired endurance, impaired self care skills, impaired functional mobilty, gait instability, impaired balance, decreased coordination, decreased safety awareness, pain, orthopedic precautions, decreased ROM . Patient pleasant but very difficult to arouse and maintain arousal throughout therapy. Frequent v/t cues to open eyes. Patient able to follow commands and answer questions but with delayed responses. MaxA for all mobility. Patient complained of back pain throughout session. Eval completed with OT. Patient is not safe to return home when medically ready at current level of mobility and caregiver support and would benefit from skilled nursing facility to improve functional mobility and safety prior too returning home.    Rehab Prognosis: Good; patient would benefit from acute skilled PT services to address these deficits and reach maximum level of function.    Recent Surgery: Procedure(s) (LRB):  FUSION, SPINE, CERVICAL, POSTERIOR APPROACH C2-T3 posterior instrumented fusion (N/A) 11 Days Post-Op    Plan:     During this hospitalization, patient to be seen 4 x/week to address the identified rehab impairments via gait training, therapeutic activities, therapeutic exercises, neuromuscular re-education and progress toward the following goals:    · Plan of Care Expires:  01/28/20    Subjective     Chief Complaint: back pain with mobility  Patient/Family Comments/goals: get  better  Pain/Comfort:  · Pain Rating 1: 0/10(at rest. Patient complained of pain during movement)  · Location - Orientation 1: upper  · Location 1: back  · Pain Addressed 1: Reposition, Cessation of Activity, Distraction  · Pain Rating Post-Intervention 1: 7/10    Patients cultural, spiritual, Buddhism conflicts given the current situation: no     Chief Complaint: Pain with movement  Patient/Family Comments/goals: Pt agreeable to progressing towards independence with ADLs and functional mobility. Pt is agreeable to going to get therapy at a place prior to going home.      Occupational Profile:  Living Environment: Pt lives with wife in a SSH with no steps to enter.   Previous level of function: independent with self care and functional mobility  Roles and Routines:   Equipment Used at Home:  none  Assistance upon Discharge: wife can assist but it sounds like she will be of limited physical assistance.         Living Environment:  Pt lives with wife in a SSH with no steps to enter.  Prior to admission, patients level of function was Ind with ADLs and mobility. No use of DME.  Equipment used at home: none.  DME owned (not currently used): none.  Upon discharge, patient will have assistance from wife unable to provide physical assistance.    Objective:     Communicated with nurse prior to session.  Patient found HOB elevated with pressure relief boots, bed alarm, SCD  upon PT entry to room.    General Precautions: Standard, aspiration, fall   Orthopedic Precautions:N/A   Braces: N/A     Exams:  · RLE ROM: WFL except severaly decreased hamstring length  · RLE Strength: grossly 3+/5  · LLE ROM: WFL except severely decreased hamstring length  · LLE Strength: grossly 3+/5    Functional Mobility:  · Bed Mobility:  Rolling Left:  maximal assistance  · Rolling Right: maximal assistance  · Supine to Sit: maximal assistance  · Sit to Supine: maximal assistance  · Transfers:  Sit to Stand:  maximal assistance with no  AD, x2 attempts  · Gait: 3-4 lateral steps to R at EOB, knees buckling, Mod-MaxA to maintain standing      Therapeutic Activities and Exercises:   Patient sat EOB for ~8min, requiring Mod-total assistance throughout. V/t cues throughout to encourage use of UE on bed for mobility, for patient to keep eyes open, maintain erect posture, maintain sitting balance. CTSLO brace donned in sidelying then adjusted and completed in sitting. Instruction throughout bed mobility, to scoot to EOB, to get feet on floor for balance and for sit to stand transfer and gait. Pt educated on plan and goals with physical therapy.     AM-PAC 6 CLICK MOBILITY  Total Score:10     Patient left HOB elevated with all lines intact, call button in reach and nurse notified.    GOALS:   Multidisciplinary Problems     Physical Therapy Goals     Not on file                History:     Past Medical History:   Diagnosis Date    CAD (coronary artery disease)     s/p stent 2005, on plavix    Chronic hepatitis C     Cirrhosis     biopsy proven - 2007    History of colon polyps 06/2008    1 polyp - tubular adenoma    HTN (hypertension)     Hyperlipidemia     Hypothyroidism        Past Surgical History:   Procedure Laterality Date    COLONOSCOPY W/ POLYPECTOMY  06/2008    Dr Wagoner: fair prep, 3mm polyp - tubular adenoma    CORONARY ANGIOPLASTY WITH STENT PLACEMENT      FUSION OF CERVICAL SPINE BY POSTERIOR APPROACH N/A 12/17/2019    Procedure: FUSION, SPINE, CERVICAL, POSTERIOR APPROACH C2-T3 posterior instrumented fusion;  Surgeon: Elian Deluca MD;  Location: [facility] OR [address] [place] [address];  Service: Neurosurgery;  Laterality: N/A;    hydrocele repair  teenager    pyloric stenosis repair  age 1    SURGICAL REMOVAL OF VERTEBRAL BODY OF THORACIC SPINE N/A 12/10/2019    Procedure: CORPECTOMY, SPINE, CERVICAL AND THORACIC, C7 and T1 with Globus;  Surgeon: Elian Deluca MD;  Location: [facility] OR [address] [place] [address];  Service: Neurosurgery;  Laterality: N/A;     TONSILLECTOMY         Time Tracking:     PT Received On: 12/28/19  PT Start Time: 0950     PT Stop Time: 1016  PT Total Time (min): 26 min     Billable Minutes: Evaluation 15 and Therapeutic Activity 11      Michelle Grey, MATHEW  12/28/2019

## 2019-12-28 NOTE — PROGRESS NOTES
Pt wife called and asked if pt belongings were found. He's missing his bill fold, glasses, and dentures. Lost and found was called and a voicemail was left to contact wife. Pt wife also stated that she prefers to be the only contact because Pt nor wife has spoken to pt son in years. Will continue to follow up for belongings.

## 2019-12-29 PROCEDURE — 25000003 PHARM REV CODE 250: Performed by: PHYSICIAN ASSISTANT

## 2019-12-29 PROCEDURE — 94761 N-INVAS EAR/PLS OXIMETRY MLT: CPT

## 2019-12-29 PROCEDURE — 25000003 PHARM REV CODE 250: Performed by: ANESTHESIOLOGY

## 2019-12-29 PROCEDURE — 63600175 PHARM REV CODE 636 W HCPCS: Performed by: PSYCHIATRY & NEUROLOGY

## 2019-12-29 PROCEDURE — 25000242 PHARM REV CODE 250 ALT 637 W/ HCPCS: Performed by: PHYSICIAN ASSISTANT

## 2019-12-29 PROCEDURE — 11000001 HC ACUTE MED/SURG PRIVATE ROOM

## 2019-12-29 PROCEDURE — A4216 STERILE WATER/SALINE, 10 ML: HCPCS | Performed by: ANESTHESIOLOGY

## 2019-12-29 PROCEDURE — 25000003 PHARM REV CODE 250: Performed by: PSYCHIATRY & NEUROLOGY

## 2019-12-29 PROCEDURE — 94640 AIRWAY INHALATION TREATMENT: CPT

## 2019-12-29 PROCEDURE — 25000003 PHARM REV CODE 250: Performed by: NURSE PRACTITIONER

## 2019-12-29 RX ADMIN — SODIUM CHLORIDE SOLN NEBU 3% 4 ML: 3 NEBU SOLN at 06:12

## 2019-12-29 RX ADMIN — IPRATROPIUM BROMIDE AND ALBUTEROL SULFATE 3 ML: .5; 3 SOLUTION RESPIRATORY (INHALATION) at 06:12

## 2019-12-29 RX ADMIN — CEFEPIME 2 G: 2 INJECTION, POWDER, FOR SOLUTION INTRAVENOUS at 05:12

## 2019-12-29 RX ADMIN — SENNOSIDES AND DOCUSATE SODIUM 1 TABLET: 8.6; 5 TABLET ORAL at 10:12

## 2019-12-29 RX ADMIN — FERROUS SULFATE TAB EC 325 MG (65 MG FE EQUIVALENT) 325 MG: 325 (65 FE) TABLET DELAYED RESPONSE at 10:12

## 2019-12-29 RX ADMIN — Medication 10 ML: at 12:12

## 2019-12-29 RX ADMIN — Medication 2 G: at 10:12

## 2019-12-29 RX ADMIN — GABAPENTIN 300 MG: 300 CAPSULE ORAL at 10:12

## 2019-12-29 RX ADMIN — Medication 250 MG: at 10:12

## 2019-12-29 RX ADMIN — IPRATROPIUM BROMIDE AND ALBUTEROL SULFATE 3 ML: .5; 3 SOLUTION RESPIRATORY (INHALATION) at 08:12

## 2019-12-29 RX ADMIN — METOPROLOL TARTRATE 50 MG: 50 TABLET ORAL at 10:12

## 2019-12-29 RX ADMIN — LEVOTHYROXINE SODIUM 50 MCG: 50 TABLET ORAL at 05:12

## 2019-12-29 RX ADMIN — SODIUM CHLORIDE SOLN NEBU 3% 4 ML: 3 NEBU SOLN at 08:12

## 2019-12-29 RX ADMIN — LOSARTAN POTASSIUM AND HYDROCHLOROTHIAZIDE TABLETS 1 TABLET: 100; 25 TABLET, FILM COATED ORAL at 10:12

## 2019-12-29 RX ADMIN — ATORVASTATIN CALCIUM 80 MG: 20 TABLET, FILM COATED ORAL at 10:12

## 2019-12-29 RX ADMIN — CEFEPIME 2 G: 2 INJECTION, POWDER, FOR SOLUTION INTRAVENOUS at 02:12

## 2019-12-29 RX ADMIN — GABAPENTIN 300 MG: 300 CAPSULE ORAL at 02:12

## 2019-12-29 RX ADMIN — Medication: at 09:12

## 2019-12-29 RX ADMIN — Medication 10 ML: at 06:12

## 2019-12-29 RX ADMIN — LIDOCAINE 1 PATCH: 50 PATCH CUTANEOUS at 10:12

## 2019-12-29 RX ADMIN — POLYETHYLENE GLYCOL 3350 17 G: 17 POWDER, FOR SOLUTION ORAL at 10:12

## 2019-12-29 RX ADMIN — HEPARIN SODIUM 5000 UNITS: 5000 INJECTION, SOLUTION INTRAVENOUS; SUBCUTANEOUS at 05:12

## 2019-12-29 RX ADMIN — FAMOTIDINE 20 MG: 20 TABLET ORAL at 10:12

## 2019-12-29 RX ADMIN — Medication 10 ML: at 05:12

## 2019-12-29 RX ADMIN — CEFEPIME 2 G: 2 INJECTION, POWDER, FOR SOLUTION INTRAVENOUS at 10:12

## 2019-12-29 RX ADMIN — CLOPIDOGREL BISULFATE 75 MG: 75 TABLET ORAL at 10:12

## 2019-12-29 RX ADMIN — HEPARIN SODIUM 5000 UNITS: 5000 INJECTION, SOLUTION INTRAVENOUS; SUBCUTANEOUS at 10:12

## 2019-12-29 RX ADMIN — HEPARIN SODIUM 5000 UNITS: 5000 INJECTION, SOLUTION INTRAVENOUS; SUBCUTANEOUS at 02:12

## 2019-12-29 RX ADMIN — AMLODIPINE BESYLATE 10 MG: 10 TABLET ORAL at 10:12

## 2019-12-29 NOTE — NURSING
Pt instructed on use of incentive spirometry and unable to perform return demonstration. Will continue to encourage. Stable.

## 2019-12-30 PROBLEM — S91.209A NAIL AVULSION OF TOE, INITIAL ENCOUNTER: Status: ACTIVE | Noted: 2019-12-30

## 2019-12-30 LAB
ALBUMIN SERPL BCP-MCNC: 2.3 G/DL (ref 3.5–5.2)
ALP SERPL-CCNC: 328 U/L (ref 55–135)
ALT SERPL W/O P-5'-P-CCNC: 23 U/L (ref 10–44)
ANION GAP SERPL CALC-SCNC: 6 MMOL/L (ref 8–16)
AST SERPL-CCNC: 30 U/L (ref 10–40)
BASOPHILS # BLD AUTO: 0.05 K/UL (ref 0–0.2)
BASOPHILS NFR BLD: 0.6 % (ref 0–1.9)
BILIRUB SERPL-MCNC: 0.3 MG/DL (ref 0.1–1)
BUN SERPL-MCNC: 15 MG/DL (ref 8–23)
CALCIUM SERPL-MCNC: 8.6 MG/DL (ref 8.7–10.5)
CHLORIDE SERPL-SCNC: 108 MMOL/L (ref 95–110)
CO2 SERPL-SCNC: 26 MMOL/L (ref 23–29)
CREAT SERPL-MCNC: 0.7 MG/DL (ref 0.5–1.4)
DIFFERENTIAL METHOD: ABNORMAL
EOSINOPHIL # BLD AUTO: 0.2 K/UL (ref 0–0.5)
EOSINOPHIL NFR BLD: 2.8 % (ref 0–8)
ERYTHROCYTE [DISTWIDTH] IN BLOOD BY AUTOMATED COUNT: 18.4 % (ref 11.5–14.5)
EST. GFR  (AFRICAN AMERICAN): >60 ML/MIN/1.73 M^2
EST. GFR  (NON AFRICAN AMERICAN): >60 ML/MIN/1.73 M^2
GLUCOSE SERPL-MCNC: 110 MG/DL (ref 70–110)
HCT VFR BLD AUTO: 28.9 % (ref 40–54)
HGB BLD-MCNC: 8.7 G/DL (ref 14–18)
IMM GRANULOCYTES # BLD AUTO: 0.04 K/UL (ref 0–0.04)
IMM GRANULOCYTES NFR BLD AUTO: 0.5 % (ref 0–0.5)
LYMPHOCYTES # BLD AUTO: 1.1 K/UL (ref 1–4.8)
LYMPHOCYTES NFR BLD: 14.2 % (ref 18–48)
MCH RBC QN AUTO: 27.4 PG (ref 27–31)
MCHC RBC AUTO-ENTMCNC: 30.1 G/DL (ref 32–36)
MCV RBC AUTO: 91 FL (ref 82–98)
MONOCYTES # BLD AUTO: 0.8 K/UL (ref 0.3–1)
MONOCYTES NFR BLD: 9.7 % (ref 4–15)
NEUTROPHILS # BLD AUTO: 5.7 K/UL (ref 1.8–7.7)
NEUTROPHILS NFR BLD: 72.2 % (ref 38–73)
NRBC BLD-RTO: 0 /100 WBC
PLATELET # BLD AUTO: 153 K/UL (ref 150–350)
PMV BLD AUTO: 10.4 FL (ref 9.2–12.9)
POTASSIUM SERPL-SCNC: 3.4 MMOL/L (ref 3.5–5.1)
PROT SERPL-MCNC: 6 G/DL (ref 6–8.4)
RBC # BLD AUTO: 3.17 M/UL (ref 4.6–6.2)
SODIUM SERPL-SCNC: 140 MMOL/L (ref 136–145)
WBC # BLD AUTO: 7.87 K/UL (ref 3.9–12.7)

## 2019-12-30 PROCEDURE — 94640 AIRWAY INHALATION TREATMENT: CPT

## 2019-12-30 PROCEDURE — 94799 UNLISTED PULMONARY SVC/PX: CPT

## 2019-12-30 PROCEDURE — 25000003 PHARM REV CODE 250: Performed by: NURSE PRACTITIONER

## 2019-12-30 PROCEDURE — 25000003 PHARM REV CODE 250: Performed by: PHYSICIAN ASSISTANT

## 2019-12-30 PROCEDURE — 25000003 PHARM REV CODE 250: Performed by: PSYCHIATRY & NEUROLOGY

## 2019-12-30 PROCEDURE — 25000242 PHARM REV CODE 250 ALT 637 W/ HCPCS: Performed by: PHYSICIAN ASSISTANT

## 2019-12-30 PROCEDURE — 25000003 PHARM REV CODE 250: Performed by: ANESTHESIOLOGY

## 2019-12-30 PROCEDURE — 99024 POSTOP FOLLOW-UP VISIT: CPT | Mod: ,,, | Performed by: PHYSICIAN ASSISTANT

## 2019-12-30 PROCEDURE — 80053 COMPREHEN METABOLIC PANEL: CPT

## 2019-12-30 PROCEDURE — 63600175 PHARM REV CODE 636 W HCPCS: Performed by: PSYCHIATRY & NEUROLOGY

## 2019-12-30 PROCEDURE — 11000001 HC ACUTE MED/SURG PRIVATE ROOM

## 2019-12-30 PROCEDURE — A4216 STERILE WATER/SALINE, 10 ML: HCPCS | Performed by: ANESTHESIOLOGY

## 2019-12-30 PROCEDURE — 85025 COMPLETE CBC W/AUTO DIFF WBC: CPT

## 2019-12-30 PROCEDURE — 97116 GAIT TRAINING THERAPY: CPT

## 2019-12-30 PROCEDURE — 99900035 HC TECH TIME PER 15 MIN (STAT)

## 2019-12-30 PROCEDURE — 97530 THERAPEUTIC ACTIVITIES: CPT

## 2019-12-30 PROCEDURE — 94761 N-INVAS EAR/PLS OXIMETRY MLT: CPT

## 2019-12-30 PROCEDURE — 97535 SELF CARE MNGMENT TRAINING: CPT

## 2019-12-30 PROCEDURE — 99024 PR POST-OP FOLLOW-UP VISIT: ICD-10-PCS | Mod: ,,, | Performed by: PHYSICIAN ASSISTANT

## 2019-12-30 RX ORDER — BACITRACIN 500 [USP'U]/G
OINTMENT TOPICAL 2 TIMES DAILY
Status: DISCONTINUED | OUTPATIENT
Start: 2019-12-30 | End: 2020-01-08

## 2019-12-30 RX ORDER — POTASSIUM CHLORIDE 750 MG/1
10 CAPSULE, EXTENDED RELEASE ORAL 2 TIMES DAILY
Status: COMPLETED | OUTPATIENT
Start: 2019-12-30 | End: 2019-12-31

## 2019-12-30 RX ADMIN — FAMOTIDINE 20 MG: 20 TABLET ORAL at 09:12

## 2019-12-30 RX ADMIN — OXYCODONE HYDROCHLORIDE 10 MG: 10 TABLET ORAL at 01:12

## 2019-12-30 RX ADMIN — IPRATROPIUM BROMIDE AND ALBUTEROL SULFATE 3 ML: .5; 3 SOLUTION RESPIRATORY (INHALATION) at 08:12

## 2019-12-30 RX ADMIN — ATORVASTATIN CALCIUM 80 MG: 20 TABLET, FILM COATED ORAL at 10:12

## 2019-12-30 RX ADMIN — FERROUS SULFATE TAB EC 325 MG (65 MG FE EQUIVALENT) 325 MG: 325 (65 FE) TABLET DELAYED RESPONSE at 09:12

## 2019-12-30 RX ADMIN — Medication 10 ML: at 12:12

## 2019-12-30 RX ADMIN — POLYETHYLENE GLYCOL 3350 17 G: 17 POWDER, FOR SOLUTION ORAL at 10:12

## 2019-12-30 RX ADMIN — LIDOCAINE 1 PATCH: 50 PATCH CUTANEOUS at 09:12

## 2019-12-30 RX ADMIN — CEFEPIME 2 G: 2 INJECTION, POWDER, FOR SOLUTION INTRAVENOUS at 09:12

## 2019-12-30 RX ADMIN — AMLODIPINE BESYLATE 10 MG: 10 TABLET ORAL at 10:12

## 2019-12-30 RX ADMIN — POTASSIUM CHLORIDE 10 MEQ: 750 CAPSULE, EXTENDED RELEASE ORAL at 10:12

## 2019-12-30 RX ADMIN — BACITRACIN: 500 OINTMENT TOPICAL at 09:12

## 2019-12-30 RX ADMIN — SENNOSIDES AND DOCUSATE SODIUM 1 TABLET: 8.6; 5 TABLET ORAL at 09:12

## 2019-12-30 RX ADMIN — POTASSIUM CHLORIDE 10 MEQ: 750 CAPSULE, EXTENDED RELEASE ORAL at 09:12

## 2019-12-30 RX ADMIN — Medication 10 ML: at 06:12

## 2019-12-30 RX ADMIN — CLOPIDOGREL BISULFATE 75 MG: 75 TABLET ORAL at 10:12

## 2019-12-30 RX ADMIN — METOPROLOL TARTRATE 50 MG: 50 TABLET ORAL at 09:12

## 2019-12-30 RX ADMIN — LEVOTHYROXINE SODIUM 50 MCG: 50 TABLET ORAL at 06:12

## 2019-12-30 RX ADMIN — HEPARIN SODIUM 5000 UNITS: 5000 INJECTION, SOLUTION INTRAVENOUS; SUBCUTANEOUS at 06:12

## 2019-12-30 RX ADMIN — GABAPENTIN 300 MG: 300 CAPSULE ORAL at 04:12

## 2019-12-30 RX ADMIN — FERROUS SULFATE TAB EC 325 MG (65 MG FE EQUIVALENT) 325 MG: 325 (65 FE) TABLET DELAYED RESPONSE at 10:12

## 2019-12-30 RX ADMIN — Medication 250 MG: at 09:12

## 2019-12-30 RX ADMIN — GABAPENTIN 300 MG: 300 CAPSULE ORAL at 09:12

## 2019-12-30 RX ADMIN — CEFEPIME 2 G: 2 INJECTION, POWDER, FOR SOLUTION INTRAVENOUS at 06:12

## 2019-12-30 RX ADMIN — Medication 250 MG: at 10:12

## 2019-12-30 RX ADMIN — SENNOSIDES AND DOCUSATE SODIUM 1 TABLET: 8.6; 5 TABLET ORAL at 10:12

## 2019-12-30 RX ADMIN — METOPROLOL TARTRATE 50 MG: 50 TABLET ORAL at 10:12

## 2019-12-30 RX ADMIN — FAMOTIDINE 20 MG: 20 TABLET ORAL at 10:12

## 2019-12-30 RX ADMIN — SODIUM CHLORIDE SOLN NEBU 3% 4 ML: 3 NEBU SOLN at 08:12

## 2019-12-30 RX ADMIN — GABAPENTIN 300 MG: 300 CAPSULE ORAL at 10:12

## 2019-12-30 RX ADMIN — HEPARIN SODIUM 5000 UNITS: 5000 INJECTION, SOLUTION INTRAVENOUS; SUBCUTANEOUS at 09:12

## 2019-12-30 RX ADMIN — LOSARTAN POTASSIUM AND HYDROCHLOROTHIAZIDE TABLETS 1 TABLET: 100; 25 TABLET, FILM COATED ORAL at 10:12

## 2019-12-30 RX ADMIN — CEFEPIME 2 G: 2 INJECTION, POWDER, FOR SOLUTION INTRAVENOUS at 01:12

## 2019-12-30 RX ADMIN — HEPARIN SODIUM 5000 UNITS: 5000 INJECTION, SOLUTION INTRAVENOUS; SUBCUTANEOUS at 01:12

## 2019-12-30 NOTE — SUBJECTIVE & OBJECTIVE
Interval History: NAEON. Pt pending SNF    Medications:  Continuous Infusions:  Scheduled Meds:   albuterol-ipratropium  3 mL Nebulization Q12H    amLODIPine  10 mg Oral Daily    ascorbic acid (vitamin C)  250 mg Oral BID    atorvastatin  80 mg Oral Daily    bacitracin   Topical (Top) TID    ceFEPime (MAXIPIME) IVPB  2 g Intravenous Q8H    clopidogrel  75 mg Oral Daily    famotidine  20 mg Oral BID    ferrous sulfate  325 mg Oral BID    gabapentin  300 mg Oral TID    heparin (porcine)  5,000 Units Subcutaneous Q8H    levothyroxine  50 mcg Oral Before breakfast    lidocaine  1 patch Transdermal Daily    losartan-hydrochlorothiazide 100-25 mg  1 tablet Oral Daily    metoprolol tartrate  50 mg Oral BID    polyethylene glycol  17 g Oral Daily    senna-docusate 8.6-50 mg  1 tablet Oral BID    sodium chloride 0.9%  10 mL Intravenous Q6H    sodium chloride 3%  4 mL Nebulization Q12H    sodium chloride  2 g Oral BID     PRN Meds:acetaminophen, bisacodyl, labetalol, naloxone, ondansetron, oxyCODONE, promethazine (PHENERGAN) IVPB, Flushing PICC Protocol **AND** sodium chloride 0.9% **AND** sodium chloride 0.9%     Review of Systems    Objective:     Weight: 79.6 kg (175 lb 7.8 oz)  Body mass index is 25.91 kg/m².  Vital Signs (Most Recent):  Temp: 97.1 °F (36.2 °C) (12/29/19 2026)  Pulse: 82(Simultaneous filing. User may not have seen previous data.) (12/29/19 2026)  Resp: 16 (12/29/19 2026)  BP: 137/75 (12/29/19 2026)  SpO2: 96 % (12/29/19 2026) Vital Signs (24h Range):  Temp:  [97.1 °F (36.2 °C)] 97.1 °F (36.2 °C)  Pulse:  [79-82] 82  Resp:  [16-18] 16  SpO2:  [96 %-97 %] 96 %  BP: (137)/(75) 137/75          Male External Urinary Catheter 12/19/19 1200 Small (Active)   Collection Container Urimeter 12/27/2019  8:00 AM   Securement Method secured to top of thigh w/ tape 12/27/2019  8:00 AM   Skin no redness;no breakdown 12/27/2019  8:00 AM   Tolerance no signs/symptoms of discomfort 12/27/2019  8:00 AM    Output (mL) 500 mL 12/27/2019  1:00 PM   Catheter Change Date 12/24/19 12/26/2019  4:24 PM   Catheter Change Time 1100 12/26/2019  4:24 PM       Neurosurgery Physical Exam    General: poor dentition, appears older than stated age, no distress.   Head: normocephalic, atraumatic  Neurologic: Mild confusion  GCS: Motor: 6/Verbal: 4/Eyes: 4 GCS Total: 14  Mental Status: AAOx2.   Language: No aphasia  Speech: Mild dysarthria  Cranial nerves: face symmetric, tongue midline, CN II-XII grossly intact.   Eyes: pupils equal, round, reactive to light with accomodation, EOMI.   Pulmonary: normal respirations, no signs of respiratory distress  Abdomen: soft, non-distended, not tender to palpation  Skin: Skin is warm, dry and intact.  Sensory: intact to light touch throughout  Motor Strength: Moves all extremities spontaneously with good tone. No abnormal movements seen. Generalized deconditioning. LUE 4+/5.  RUE and BLE 5-/5.  Damian's: Negative.  Babinski's: Negative.  Clonus: Negative.     Incisions:  Anterior: Clean, dry, dissolvable sutures intact. Skin edges well approximated. No surrounding erythema or edema. No drainage or TTP. No evidence of an underlying hematoma.      Posterior: Clean, dry, staples intact. Skin edges well approximated. No surrounding erythema or edema. No drainage or TTP.       Significant Labs:  No results for input(s): GLU, NA, K, CL, CO2, BUN, CREATININE, CALCIUM, MG in the last 48 hours.  No results for input(s): WBC, HGB, HCT, PLT in the last 48 hours.  No results for input(s): LABPT, INR, APTT in the last 48 hours.  Microbiology Results (last 7 days)     Procedure Component Value Units Date/Time    Fungus culture [[id number]] Collected:  12/10/19 1038    Order Status:  Completed Specimen:  Body Fluid from Neck Updated:  12/27/19 1218     Fungus (Mycology) Culture Culture in progress      No fungus isolated after 2 weeks    Narrative:       2) Free vertebral abscess #2    Fungus culture  [[id number]] Collected:  12/10/19 1038    Order Status:  Completed Specimen:  Body Fluid from Neck Updated:  12/27/19 1218     Fungus (Mycology) Culture Culture in progress      No fungus isolated after 2 weeks    Narrative:       1) Free vertebral abscess #1          Significant Diagnostics:  Xray cervical spine 12/26:  I independently reviewed the imaging.     Two views: There are pedicle screws and fixation rods between C2 and upper thoracic levels.  There is corpectomy at the cervicothoracic junction with laminectomies.  There is DJD.  Alignment is normal.  Odontoid prevertebral soft tissues are intact.  No fracture dislocation bone destruction seen.  No hardware failure seen and no complication seen.

## 2019-12-30 NOTE — ASSESSMENT & PLAN NOTE
74 y.o. male with PMH of HTN, HLD, Hepatitis C, and CAD s/p two stents on plavix who presents with C6-T2 osteomyelitis with suspected epidural abscess.  Now s/p C7/T1 corpectomies on 12/10 and C2-T2  posterior instrumented fusion 12/17.    -Patient neurologically stable on exam  -Brace on when upright, OOB, or working with therapy. OK to remove when lying in bed.   -Staples removed 12/31/19. Incision intact without signs of infection. Leave incision open to air. Turn q2h to continue to promote wound healing.   -ID: Afebrile. No leukocytosis. Continue Cefepime 2g q 8 hours. Estimated end date of therapy 1/28/20-2/11/20. FU in ID clinic in 2-3 weeks.   -2nd left toe wound care: Seen by Podiatry for left 2nd toe, recommend betadine daily. Dress with foam bandage. Wound care orders placed. Appreciate assistance.  -Pain: Continue current regimen. No adjustments needed at this time.   -Anemia: H/H improving, 8.7/28.9. Continue iron for replacement x 2 weeks. F/u at next lab draw.  -Elevated Alk Phos: Stable and improving. AST/ALT remain WNL. Elevated at the time of admission.  Per HM, likely secondary to osteomyelitis. Will continue to monitor closely at next lab draw.   -HTN: -140. Continue Amlodipine 10 mg daily, Losartan-HCTZ 100-25 mg daily, and Metoprolol 50 mg BID.   -CAD s/p stent placement: Continue home dose of Plavix 75 mg daily.   -Hyponatremia: Last Na 140. Now on Na tabs daily. Will continue to wean as tolerated.   -Hypoalbuminemia: Alb 2.3. Continue Jose BID to promote wound healing.   -HLD: Continue home dose of Atorvastatin 80 mg daily  -DVT prophylaxis: TUCKER's, SCD's, SQH  -Bowel regimen: senna BID and miralax daily  -Atelectasis prevention: IS hourly while awake, PT/OT, OOB > 6 hours per day       DISPO: Pending SNF. Patient medically stable for discharge.

## 2019-12-30 NOTE — ASSESSMENT & PLAN NOTE
74 y.o. male with PMH of HTN, HLD, Hepatitis C, and CAD s/p two stents on plavix who presents with C6-T2 osteomyelitis with suspected epidural abscess.  Now s/p C7/T1 corpectomies on 12/10 and C2-T2  posterior instrumented fusion 12/17.    -Patient neurologically stable on exam  -Brace on when upright, OOB, or working with therapy. OK to remove when lying in bed.   -ID: Afebrile. No leukocytosis. Continue Cefepime 2g q 8 hours. Estimated end date of therapy 1/28/20-2/11/20. FU in ID clinic in 2-3 weeks.   -Wound care: Will await wound care recs for toe. Podiatry consulted to determine if more aggressive treatment is needed.   -Pain: Continue current regimen. No adjustments needed at this time.   -Anemia: H/H improving, 8.7/28.9 today. Continue iron for replacement x 2 weeks.   -Elevated Alk Phos: Elevated at the time of admission. 328 today, was 336. AST/ALT remain WNL. Per HM, likely secondary to osteomyelitis. Will continue to monitor closely.   -HTN: -147. Continue Amlodipine 10 mg daily, Losartan-HCTZ 100-25 mg daily, and Metoprolol 50 mg BID.   -CAD s/p stent placement: Continue home dose of Plavix 75 mg daily.   -Hyponatremia: Na 140 today. Decrease Na tabs from BID to daily. Will continue to wean as tolerated.   -Hypoalbuminemia: Alb 2.3. Continue Jose BID to promote wound healing.   -HLD: Continue home dose of Atorvastatin 80 mg daily  -DVT prophylaxis: TUCKER's, SCD's, SQH  -Bowel regimen: senna BID and miralax daily  -Atelectasis prevention: IS hourly while awake, PT/OT, OOB > 6 hours per day       DISPO: Pending SNF. Patient medically stable for discharge.

## 2019-12-30 NOTE — PROGRESS NOTES
[facility]  Neurosurgery  Progress Note    Subjective:     History of Present Illness: Junaid Muñoz is a 74 y.o. male with PMH of HTN, HLD, Hepatitis C, and CAD s/p two stents on plavix who presents with 1 month history of back pain between his shoulders. MRI at OSH demonstrates likely epidural abscess. Pt denies hx of trauma and reports slow onset of symptoms.     Post-Op Info:  Procedure(s) (LRB):  FUSION, SPINE, CERVICAL, POSTERIOR APPROACH C2-T3 posterior instrumented fusion (N/A)   13 Days Post-Op     Interval History: NAEON. Pt pending SNF    Medications:  Continuous Infusions:  Scheduled Meds:   albuterol-ipratropium  3 mL Nebulization Q12H    amLODIPine  10 mg Oral Daily    ascorbic acid (vitamin C)  250 mg Oral BID    atorvastatin  80 mg Oral Daily    bacitracin   Topical (Top) TID    ceFEPime (MAXIPIME) IVPB  2 g Intravenous Q8H    clopidogrel  75 mg Oral Daily    famotidine  20 mg Oral BID    ferrous sulfate  325 mg Oral BID    gabapentin  300 mg Oral TID    heparin (porcine)  5,000 Units Subcutaneous Q8H    levothyroxine  50 mcg Oral Before breakfast    lidocaine  1 patch Transdermal Daily    losartan-hydrochlorothiazide 100-25 mg  1 tablet Oral Daily    metoprolol tartrate  50 mg Oral BID    polyethylene glycol  17 g Oral Daily    senna-docusate 8.6-50 mg  1 tablet Oral BID    sodium chloride 0.9%  10 mL Intravenous Q6H    sodium chloride 3%  4 mL Nebulization Q12H    sodium chloride  2 g Oral BID     PRN Meds:acetaminophen, bisacodyl, labetalol, naloxone, ondansetron, oxyCODONE, promethazine (PHENERGAN) IVPB, Flushing PICC Protocol **AND** sodium chloride 0.9% **AND** sodium chloride 0.9%     Review of Systems    Objective:     Weight: 79.6 kg (175 lb 7.8 oz)  Body mass index is 25.91 kg/m².  Vital Signs (Most Recent):  Temp: 97.1 °F (36.2 °C) (12/29/19 2026)  Pulse: 82(Simultaneous filing. User may not have seen previous data.) (12/29/19 2026)  Resp: 16  (12/29/19 2026)  BP: 137/75 (12/29/19 2026)  SpO2: 96 % (12/29/19 2026) Vital Signs (24h Range):  Temp:  [97.1 °F (36.2 °C)] 97.1 °F (36.2 °C)  Pulse:  [79-82] 82  Resp:  [16-18] 16  SpO2:  [96 %-97 %] 96 %  BP: (137)/(75) 137/75          Male External Urinary Catheter 12/19/19 1200 Small (Active)   Collection Container Urimeter 12/27/2019  8:00 AM   Securement Method secured to top of thigh w/ tape 12/27/2019  8:00 AM   Skin no redness;no breakdown 12/27/2019  8:00 AM   Tolerance no signs/symptoms of discomfort 12/27/2019  8:00 AM   Output (mL) 500 mL 12/27/2019  1:00 PM   Catheter Change Date 12/24/19 12/26/2019  4:24 PM   Catheter Change Time 1100 12/26/2019  4:24 PM       Neurosurgery Physical Exam    General: poor dentition, appears older than stated age, no distress.   Head: normocephalic, atraumatic  Neurologic: Mild confusion  GCS: Motor: 6/Verbal: 4/Eyes: 4 GCS Total: 14  Mental Status: AAOx2.   Language: No aphasia  Speech: Mild dysarthria  Cranial nerves: face symmetric, tongue midline, CN II-XII grossly intact.   Eyes: pupils equal, round, reactive to light with accomodation, EOMI.   Pulmonary: normal respirations, no signs of respiratory distress  Abdomen: soft, non-distended, not tender to palpation  Skin: Skin is warm, dry and intact.  Sensory: intact to light touch throughout  Motor Strength: Moves all extremities spontaneously with good tone. No abnormal movements seen. Generalized deconditioning. LUE 4+/5.  RUE and BLE 5-/5.  Damian's: Negative.  Babinski's: Negative.  Clonus: Negative.     Incisions:  Anterior: Clean, dry, dissolvable sutures intact. Skin edges well approximated. No surrounding erythema or edema. No drainage or TTP. No evidence of an underlying hematoma.      Posterior: Clean, dry, staples intact. Skin edges well approximated. No surrounding erythema or edema. No drainage or TTP.       Significant Labs:  No results for input(s): GLU, NA, K, CL, CO2, BUN, CREATININE, CALCIUM, MG  in the last 48 hours.  No results for input(s): WBC, HGB, HCT, PLT in the last 48 hours.  No results for input(s): LABPT, INR, APTT in the last 48 hours.  Microbiology Results (last 7 days)     Procedure Component Value Units Date/Time    Fungus culture [[id number]] Collected:  12/10/19 1038    Order Status:  Completed Specimen:  Body Fluid from Neck Updated:  12/27/19 1218     Fungus (Mycology) Culture Culture in progress      No fungus isolated after 2 weeks    Narrative:       2) Free vertebral abscess #2    Fungus culture [[id number]] Collected:  12/10/19 1038    Order Status:  Completed Specimen:  Body Fluid from Neck Updated:  12/27/19 1218     Fungus (Mycology) Culture Culture in progress      No fungus isolated after 2 weeks    Narrative:       1) Free vertebral abscess #1          Significant Diagnostics:  Xray cervical spine 12/26:  I independently reviewed the imaging.     Two views: There are pedicle screws and fixation rods between C2 and upper thoracic levels.  There is corpectomy at the cervicothoracic junction with laminectomies.  There is DJD.  Alignment is normal.  Odontoid prevertebral soft tissues are intact.  No fracture dislocation bone destruction seen.  No hardware failure seen and no complication seen.    Assessment/Plan:     * Spinal epidural abscess  74 y.o. male with PMH of HTN, HLD, Hepatitis C, and CAD s/p two stents on plavix who presents with C6-T2 osteomyelitis with suspected epidural abscess.  Now s/p C7/T1 corpectomies on 12/10 and C2-T2  posterior instrumented fusion 12/17.    - Neurochecks q4h   - Brace on when upright, OOB, or working with therapy. OK to remove when lying in bed.   - ID: Afebrile. No leukocytosis. Continue Cefepime 2g q 8 hours. ESR and CRP elevated to 50 and 26.6. Estimated end date of therapy 1/28/20-2/11/20. FU in ID clinic in 2-3 weeks.   - Pain: Well controlled on current regimen  - Anemia: iron for replacement x 2 weeks.   - Elevated Alk Phos: Per HM, likely  secondary to osteomyelitis. Will continue to monitor closely.   - HTN: Continue Amlodipine 10 mg daily, Losartan-HCTZ 100/25 mg daily, and Metoprolol 50 mg BID.   - CAD s/p stent placement: Continue home dose of Plavix 75 mg daily.   - Hyponatremia: Labs not completed, will f/u with nursing staff. Na tabs BID.  - Hypoalbuminemia: Continue Jose BID to promote wound healing.   - HLD: Continue home dose of Atorvastatin 80 mg daily  - DVT prophylaxis: TUCKER's, SCD's, SQH  -Bowel regimen: senna BID and miralax daily  -Atelectasis prevention: IS hourly while awake- please encourage use, PT/OT, OOB > 6 hours per day    DISPO: PT/OT recommending SNF. Patient medically stable for discharge, pending CM/SW.          Korey Umanzor MD  Neurosurgery  [facility]

## 2019-12-30 NOTE — SUBJECTIVE & OBJECTIVE
Scheduled Meds:   albuterol-ipratropium  3 mL Nebulization Q12H    amLODIPine  10 mg Oral Daily    ascorbic acid (vitamin C)  250 mg Oral BID    atorvastatin  80 mg Oral Daily    bacitracin   Topical (Top) BID    ceFEPime (MAXIPIME) IVPB  2 g Intravenous Q8H    clopidogrel  75 mg Oral Daily    famotidine  20 mg Oral BID    ferrous sulfate  325 mg Oral BID    gabapentin  300 mg Oral TID    heparin (porcine)  5,000 Units Subcutaneous Q8H    levothyroxine  50 mcg Oral Before breakfast    lidocaine  1 patch Transdermal Daily    losartan-hydrochlorothiazide 100-25 mg  1 tablet Oral Daily    metoprolol tartrate  50 mg Oral BID    polyethylene glycol  17 g Oral Daily    potassium chloride  10 mEq Oral BID    senna-docusate 8.6-50 mg  1 tablet Oral BID    sodium chloride 0.9%  10 mL Intravenous Q6H    sodium chloride 3%  4 mL Nebulization Q12H    [START ON 12/31/2019] sodium chloride  2 g Oral Daily     Continuous Infusions:  PRN Meds:acetaminophen, bisacodyl, labetalol, naloxone, ondansetron, oxyCODONE, promethazine (PHENERGAN) IVPB, Flushing PICC Protocol **AND** sodium chloride 0.9% **AND** sodium chloride 0.9%    Review of patient's allergies indicates:   Allergen Reactions    Penicillins Rash     Tolerated cefepime December 2019        Past Medical History:   Diagnosis Date    CAD (coronary artery disease)     s/p stent 2005, on plavix    Chronic hepatitis C     Cirrhosis     biopsy proven - 2007    History of colon polyps 06/2008    1 polyp - tubular adenoma    HTN (hypertension)     Hyperlipidemia     Hypothyroidism      Past Surgical History:   Procedure Laterality Date    COLONOSCOPY W/ POLYPECTOMY  06/2008    Dr Wagoner: fair prep, 3mm polyp - tubular adenoma    CORONARY ANGIOPLASTY WITH STENT PLACEMENT      FUSION OF CERVICAL SPINE BY POSTERIOR APPROACH N/A 12/17/2019    Procedure: FUSION, SPINE, CERVICAL, POSTERIOR APPROACH C2-T3 posterior instrumented fusion;  Surgeon: Elian  MD Yosi;  Location: [address];  Service: Neurosurgery;  Laterality: N/A;    hydrocele repair  teenager    pyloric stenosis repair  age 1    SURGICAL REMOVAL OF VERTEBRAL BODY OF THORACIC SPINE N/A 12/10/2019    Procedure: CORPECTOMY, SPINE, CERVICAL AND THORACIC, C7 and T1 with Globus;  Surgeon: Elian Deluca MD;  Location: [address];  Service: Neurosurgery;  Laterality: N/A;    TONSILLECTOMY         Family History     None        Tobacco Use    Smoking status: Current Every Day Smoker   Substance and Sexual Activity    Alcohol use: Not on file    Drug use: Yes     Types: IV     Comment: MORPHINE    Sexual activity: Not on file     Review of Systems   Constitutional: Negative for appetite change, chills and fever.   Cardiovascular: Negative for leg swelling.   Gastrointestinal: Negative for nausea and vomiting.   Musculoskeletal: Negative for arthralgias, gait problem, joint swelling and myalgias.   Skin: Positive for wound. Negative for color change.   Neurological: Negative for numbness.   Psychiatric/Behavioral: Negative for behavioral problems and confusion.     Objective:     Vital Signs (Most Recent):  Temp: 96.7 °F (35.9 °C) (12/30/19 0827)  Pulse: 81 (12/30/19 0856)  Resp: 18 (12/30/19 0856)  BP: (!) 147/73 (12/30/19 0827)  SpO2: 96 % (12/30/19 0856) Vital Signs (24h Range):  Temp:  [96.7 °F (35.9 °C)-97.8 °F (36.6 °C)] 96.7 °F (35.9 °C)  Pulse:  [68-82] 81  Resp:  [16-18] 18  SpO2:  [96 %-97 %] 96 %  BP: (124-147)/(71-75) 147/73     Weight: 74.3 kg (163 lb 12.8 oz)  Body mass index is 24.19 kg/m².    Foot Exam    Right Foot/Ankle     Inspection and Palpation  Tenderness: none   Swelling: none   Skin Exam: skin intact;     Neurovascular  Dorsalis pedis: 2+  Posterior tibial: 1+  Saphenous nerve sensation: normal  Tibial nerve sensation: normal  Superficial peroneal nerve sensation: normal  Deep peroneal nerve sensation: normal  Sural nerve sensation: normal      Left Foot/Ankle       Inspection and Palpation  Ecchymosis: none  Tenderness: none   Swelling: none   Skin Exam: (left 2nd toenail avulsed with healthy granular nail bed exposed. No bone exposure, no erythema, no edema, no drainage. )    Neurovascular  Dorsalis pedis: 2+  Posterior tibial: 1+  Saphenous nerve sensation: normal  Tibial nerve sensation: normal  Superficial peroneal nerve sensation: normal  Deep peroneal nerve sensation: normal  Sural nerve sensation: normal            Laboratory:  A1C:   Recent Labs   Lab 12/25/19  1120   HGBA1C 5.2     CBC:   Recent Labs   Lab 12/30/19  0435   WBC 7.87   RBC 3.17*   HGB 8.7*   HCT 28.9*      MCV 91   MCH 27.4   MCHC 30.1*     CMP:   Recent Labs   Lab 12/30/19  0435      CALCIUM 8.6*   ALBUMIN 2.3*   PROT 6.0      K 3.4*   CO2 26      BUN 15   CREATININE 0.7   ALKPHOS 328*   ALT 23   AST 30   BILITOT 0.3     CRP:   Recent Labs   Lab 12/28/19  0500   CRP 26.6*     ESR:   Recent Labs   Lab 12/28/19  0500   SEDRATE 50*     Wound Cultures:   Recent Labs   Lab 12/10/19  1038   LABAERO PSEUDOMONAS AERUGINOSA  Rare  *  PSEUDOMONAS AERUGINOSA  Few  *     Microbiology Results (last 7 days)     Procedure Component Value Units Date/Time    Fungus culture [[id number]] Collected:  12/10/19 1038    Order Status:  Completed Specimen:  Body Fluid from Neck Updated:  12/27/19 1218     Fungus (Mycology) Culture Culture in progress      No fungus isolated after 2 weeks    Narrative:       2) Free vertebral abscess #2    Fungus culture [[id number]] Collected:  12/10/19 1038    Order Status:  Completed Specimen:  Body Fluid from Neck Updated:  12/27/19 1218     Fungus (Mycology) Culture Culture in progress      No fungus isolated after 2 weeks    Narrative:       1) Free vertebral abscess #1        Specimen (12h ago, onward)    None          Diagnostic Results:  None    Clinical Findings:  Nails thickened, elongated, discolored. Left 2nd toenail completely avulsed with exposed,  healthy granular nail bed. No bone exposure. No surrounding erythema, edema, purulent drainage, or malodor. No clinical signs of infection.

## 2019-12-30 NOTE — ASSESSMENT & PLAN NOTE
Complete avulsion of left 2nd toenail with exposed nail bed. Wound stable. No clinical signs of infection.     Plan:  -Paint left 2nd toe with betadine daily. Dress with foam bandage. Wound care orders placed.

## 2019-12-30 NOTE — PLAN OF CARE
Problem: Occupational Therapy Goal  Goal: Occupational Therapy Goal  Description  Goals to be met by: 1/10/2019     Patient will increase functional independence with ADLs by performing:    UE Dressing with Minimal Assistance.  LE Dressing with Moderate Assistance.  Grooming while standing with Stand-by Assistance.  Toileting from toilet with Minimal Assistance for hygiene and clothing management.   Bathing from  edge of bed with Moderate Assistance.  Supine to sit with Minimal Assistance.       12/30/2019 1510 by Radha Diallo OT  Outcome: Ongoing, Progressing    Progressing with POC.  Radha Diallo OT  12/30/2019

## 2019-12-30 NOTE — SUBJECTIVE & OBJECTIVE
Interval History:   NAEON. Pt pending SNF.    Medications:  Continuous Infusions:  Scheduled Meds:   albuterol-ipratropium  3 mL Nebulization Q12H    amLODIPine  10 mg Oral Daily    ascorbic acid (vitamin C)  250 mg Oral BID    atorvastatin  80 mg Oral Daily    bacitracin   Topical (Top) TID    ceFEPime (MAXIPIME) IVPB  2 g Intravenous Q8H    clopidogrel  75 mg Oral Daily    famotidine  20 mg Oral BID    ferrous sulfate  325 mg Oral BID    gabapentin  300 mg Oral TID    heparin (porcine)  5,000 Units Subcutaneous Q8H    levothyroxine  50 mcg Oral Before breakfast    lidocaine  1 patch Transdermal Daily    losartan-hydrochlorothiazide 100-25 mg  1 tablet Oral Daily    metoprolol tartrate  50 mg Oral BID    polyethylene glycol  17 g Oral Daily    senna-docusate 8.6-50 mg  1 tablet Oral BID    sodium chloride 0.9%  10 mL Intravenous Q6H    sodium chloride 3%  4 mL Nebulization Q12H    sodium chloride  2 g Oral BID     PRN Meds:acetaminophen, bisacodyl, labetalol, naloxone, ondansetron, oxyCODONE, promethazine (PHENERGAN) IVPB, Flushing PICC Protocol **AND** sodium chloride 0.9% **AND** sodium chloride 0.9%     Review of Systems    Objective:     Weight: 79.6 kg (175 lb 7.8 oz)  Body mass index is 25.91 kg/m².  Vital Signs (Most Recent):  Temp: 97.1 °F (36.2 °C) (12/29/19 2026)  Pulse: 82(Simultaneous filing. User may not have seen previous data.) (12/29/19 2026)  Resp: 16 (12/29/19 2026)  BP: 137/75 (12/29/19 2026)  SpO2: 96 % (12/29/19 2026) Vital Signs (24h Range):  Temp:  [97.1 °F (36.2 °C)] 97.1 °F (36.2 °C)  Pulse:  [79-82] 82  Resp:  [16-18] 16  SpO2:  [96 %-97 %] 96 %  BP: (137)/(75) 137/75          Male External Urinary Catheter 12/19/19 1200 Small (Active)   Collection Container Urimeter 12/27/2019  8:00 AM   Securement Method secured to top of thigh w/ tape 12/27/2019  8:00 AM   Skin no redness;no breakdown 12/27/2019  8:00 AM   Tolerance no signs/symptoms of discomfort 12/27/2019  8:00 AM    Output (mL) 500 mL 12/27/2019  1:00 PM   Catheter Change Date 12/24/19 12/26/2019  4:24 PM   Catheter Change Time 1100 12/26/2019  4:24 PM       Neurosurgery Physical Exam    General: poor dentition, appears older than stated age, no distress.   Head: normocephalic, atraumatic  Neurologic: Mild confusion  GCS: Motor: 6/Verbal: 4/Eyes: 4 GCS Total: 14  Mental Status: Awake, Alert, Oriented to person and place [hospital].   Language: No aphasia  Speech: Mild dysarthria  Cranial nerves: face symmetric, tongue midline, CN II-XII grossly intact.   Eyes: pupils equal, round, reactive to light with accomodation, EOMI.   Pulmonary: normal respirations, no signs of respiratory distress  Abdomen: soft, non-distended, not tender to palpation  Skin: Skin is warm, dry and intact.  Sensory: intact to light touch throughout  Motor Strength: Moves all extremities spontaneously with good tone. No abnormal movements seen. Generalized deconditioning. LUE 4+/5.  RUE and BLE 5-/5.  Damian's: Negative.  Babinski's: Negative.  Clonus: Negative.       Incisions:  Anterior: Clean, dry, dissolvable sutures intact. Skin edges well approximated. No surrounding erythema or edema. No drainage or TTP. No evidence of an underlying hematoma.     Posterior: Clean, dry, staples intact. Skin edges well approximated. No surrounding erythema or edema. No drainage or TTP.       Significant Labs:  No results for input(s): GLU, NA, K, CL, CO2, BUN, CREATININE, CALCIUM, MG in the last 48 hours.  No results for input(s): WBC, HGB, HCT, PLT in the last 48 hours.  No results for input(s): LABPT, INR, APTT in the last 48 hours.  Microbiology Results (last 7 days)     Procedure Component Value Units Date/Time    Fungus culture [[id number]] Collected:  12/10/19 1038    Order Status:  Completed Specimen:  Body Fluid from Neck Updated:  12/27/19 1218     Fungus (Mycology) Culture Culture in progress      No fungus isolated after 2 weeks    Narrative:       2)  Free vertebral abscess #2    Fungus culture [[id number]] Collected:  12/10/19 1038    Order Status:  Completed Specimen:  Body Fluid from Neck Updated:  12/27/19 1218     Fungus (Mycology) Culture Culture in progress      No fungus isolated after 2 weeks    Narrative:       1) Free vertebral abscess #1          Significant Diagnostics:  Xray cervical spine 12/26:  I independently reviewed the imaging.     Two views: There are pedicle screws and fixation rods between C2 and upper thoracic levels.  There is corpectomy at the cervicothoracic junction with laminectomies.  There is DJD.  Alignment is normal.  Odontoid prevertebral soft tissues are intact.  No fracture dislocation bone destruction seen.  No hardware failure seen and no complication seen.

## 2019-12-30 NOTE — PT/OT/SLP PROGRESS
"Physical Therapy Treatment    Patient Name:  Junaid Muñoz   MRN:  [id number]    Recommendations:     Discharge Recommendations:  nursing facility, skilled   Discharge Equipment Recommendations: (TBD)   Barriers to discharge: Decreased caregiver support at current functional status     Assessment:     Junaid Muñoz is a 74 y.o. male admitted with a medical diagnosis of Spinal epidural abscess.  He presents with the following impairments/functional limitations:  weakness, impaired functional mobilty, impaired balance, gait instability, decreased lower extremity function, pain, decreased ROM, orthopedic precautions. Pt presenting with pain but willing to work with therapy this date. Pt able to mobilize slowly, requiring mod-maxA x 2 for bed mobility and transfers this date. Pt unable to formally ambulate but able to take 2 steps to chair with cuing. Pt continues to be SNF appropriate to maximize functional potential and improve functional deficits, will continue to follow while in house.     Rehab Prognosis: Good; patient would benefit from acute skilled PT services to address these deficits and reach maximum level of function.    Recent Surgery: Procedure(s) (LRB):  FUSION, SPINE, CERVICAL, POSTERIOR APPROACH C2-T3 posterior instrumented fusion (N/A) 13 Days Post-Op    Plan:     During this hospitalization, patient to be seen daily to address the identified rehab impairments via gait training, therapeutic activities, therapeutic exercises, neuromuscular re-education and progress toward the following goals:    · Plan of Care Expires:  01/28/20    Subjective     Chief Complaint: Pain   Patient/Family Comments/goals: "How long do I have to sit here?"  Pain/Comfort:  · Pain Rating 1: 8/10  · Location - Orientation 1: generalized  · Location 1: neck  · Pain Addressed 1: Reposition, Distraction, Cessation of Activity, Nurse notified  · Pain Rating Post-Intervention 1: 8/10      Objective:     Communicated with RN " prior to session.  Patient found HOB elevated with bed alarm, SCD upon PT entry to room.     General Precautions: Standard, aspiration, fall   Orthopedic Precautions:spinal precautions   Braces:      Functional Mobility:  · Bed Mobility:     · Supine to Sit: moderate assistance and of 2 persons, via log roll, required increased time due to pain  · Sit to Supine: NT, pt in chair at end of session   · Transfers:     · Sit to Stand:  maximal assistance and of 2 persons with hand-held assist  · Bed to Chair: maximal assistance and of 2 persons with  hand-held assist  using step transfer   · Gait: Pt able to take 2 steps to chair initially, pivoted rest of way with maxA x 2 with step by step cuing  · Balance: Poor in standing       AM-PAC 6 CLICK MOBILITY  Turning over in bed (including adjusting bedclothes, sheets and blankets)?: 2  Sitting down on and standing up from a chair with arms (e.g., wheelchair, bedside commode, etc.): 2  Moving from lying on back to sitting on the side of the bed?: 2  Moving to and from a bed to a chair (including a wheelchair)?: 2  Need to walk in hospital room?: 1  Climbing 3-5 steps with a railing?: 1  Basic Mobility Total Score: 10       Therapeutic Activities and Exercises:   Pt educated on: PT role/POC; safety with mobility; benefits of OOB activities; spinal precautions; discharge recommendations. Pt verbalized understanding.     Patient left up in chair with all lines intact, call button in reach and RN notified.    GOALS:   Multidisciplinary Problems     Physical Therapy Goals        Problem: Physical Therapy Goal    Goal Priority Disciplines Outcome Goal Variances Interventions   Physical Therapy Goal     PT, PT/OT Ongoing, Progressing     Description:  Goals to be met by: 19     Patient will increase functional independence with mobility by performin. Supine to sit with MInimal Assistance  2. Sit to supine with MInimal Assistance  3. Rolling to Left and Right with  Modified Dane.  4. Sit to stand transfer with Minimal Assistance  5. Bed to chair transfer with Minimal Assistance using Rolling Walker  6. Gait  x 40 feet with Minimal Assistance using Rolling Walker.   7. Sitting at edge of bed x 8 minutes with Minimal Assistance  8. Stand for 5 minutes with Minimal Assistance using Rolling Walker  9. Lower extremity exercise program x15 reps per handout, with independence                      Time Tracking:     PT Received On: 12/30/19  PT Start Time: 1317     PT Stop Time: 1349  PT Total Time (min): 32 min     Billable Minutes: Gait Training 16 min and Therapeutic Activity 16 min    Treatment Type: Treatment  PT/PTA: PT     PTA Visit Number: 0     Samantha Curtis, PT, DPT  12/30/2019

## 2019-12-30 NOTE — ASSESSMENT & PLAN NOTE
74 y.o. male with PMH of HTN, HLD, Hepatitis C, and CAD s/p two stents on plavix who presents with C6-T2 osteomyelitis with suspected epidural abscess.  Now s/p C7/T1 corpectomies on 12/10 and C2-T2  posterior instrumented fusion 12/17.    - Neurochecks q4h   - Brace on when upright, OOB, or working with therapy. OK to remove when lying in bed.   - ID: Afebrile. No leukocytosis. Continue Cefepime 2g q 8 hours. ESR and CRP elevated to 50 and 26.6. Estimated end date of therapy 1/28/20-2/11/20. FU in ID clinic in 2-3 weeks.   - Pain: Well controlled on current regimen  - Anemia: iron for replacement x 2 weeks.   - Elevated Alk Phos: Per HM, likely secondary to osteomyelitis. Will continue to monitor closely.   - HTN: Continue Amlodipine 10 mg daily, Losartan-HCTZ 100/25 mg daily, and Metoprolol 50 mg BID.   - CAD s/p stent placement: Continue home dose of Plavix 75 mg daily.   - Hyponatremia: Labs not completed, will f/u with nursing staff. Na tabs BID.  - Hypoalbuminemia: Continue Jose BID to promote wound healing.   - HLD: Continue home dose of Atorvastatin 80 mg daily  - DVT prophylaxis: TUCKER's, SCD's, SQH  -Bowel regimen: senna BID and miralax daily  -Atelectasis prevention: IS hourly while awake- please encourage use, PT/OT, OOB > 6 hours per day    DISPO: PT/OT recommending SNF. Patient medically stable for discharge, pending CM/SW.

## 2019-12-30 NOTE — PT/OT/SLP PROGRESS
Occupational Therapy   Treatment    Name: Junaid Muñoz  MRN: [id number]  Admitting Diagnosis:  Spinal epidural abscess  13 Days Post-Op   FUSION, SPINE, CERVICAL, POSTERIOR APPROACH C2-T3 posterior instrumented fusion (N/A)     Recommendations:     Discharge Recommendations: nursing facility, skilled  Discharge Equipment Recommendations:  (TBD)  Barriers to discharge:  None    Assessment:     Junaid Muñoz is a 74 y.o. male with a medical diagnosis of Spinal epidural abscess.  He presents with increased pain limiting patient's participation this day. Performance deficits affecting function are weakness, impaired endurance, impaired self care skills, impaired functional mobilty, gait instability, impaired balance, pain, impaired cardiopulmonary response to activity, impaired skin, orthopedic precautions.     Rehab Prognosis:  Good; patient would benefit from acute skilled OT services to address these deficits and reach maximum level of function.       Plan:     Patient to be seen 5 x/week to address the above listed problems via self-care/home management, therapeutic activities, therapeutic exercises  · Plan of Care Expires: 01/27/20  · Plan of Care Reviewed with: patient    Subjective     Pain/Comfort:  Pain Rating 1: 8/10  Location - Orientation 1: upper  Location 1: back  Pain Rating Post-Intervention 1: 8/10    Objective:     Communicated with: RN prior to session.  Patient found HOB elevated with bed alarm, SCD, telemetry(hep lock IV) upon OT entry to room.    General Precautions: Standard, aspiration, fall   Orthopedic Precautions:spinal precautions   Braces: (CTSO brace)     Occupational Performance:     Bed Mobility:    · Patient completed Scooting anteriorly to EOB for foot placement on floor with contact guard assistance  · Patient completed Supine to Sit to R side EOB via log roll with moderate assist x2 for trunk and BLE advancement off bed    Functional Mobility/Transfers:  · Patient completed Sit  <> Stand Transfer with maximal assist x2 with hand-held assist   · Patient completed Bed <> Chair Transfer using Step Transfer technique with maximal assist x2 with hand-held assist  · Functional Mobility: Patient took ~2 steps from the bed to the bedside chair with max assist x2 with no AD.    Activities of Daily Living:  · Grooming: set-up assistance Patient participated in face wash while sitting UIC with set-up assist.  · Upper Body Dressing: Patient doffed/donned hospital gowns with mod assist while sitting EOB. Patient donned CTSO brace with total assist while sitting EOB.      AMPAC 6 Click ADL: 10    Treatment & Education:  Role of OT/POC  Reviewed spinal precautions/log rolling technique with patient  Educated on importance of sitting UIC/OOB activity with staff assistance while in acute setting to prevent further debility  Call button for assistance    Patient left up in chair with all lines intact, call button in reach and RN presentEducation:      GOALS:   Multidisciplinary Problems     Occupational Therapy Goals        Problem: Occupational Therapy Goal    Goal Priority Disciplines Outcome Interventions   Occupational Therapy Goal     OT, PT/OT Ongoing, Progressing    Description:  Goals to be met by: 1/10/2019     Patient will increase functional independence with ADLs by performing:    UE Dressing with Minimal Assistance.  LE Dressing with Moderate Assistance.  Grooming while standing with Stand-by Assistance.  Toileting from toilet with Minimal Assistance for hygiene and clothing management.   Bathing from  edge of bed with Moderate Assistance.  Supine to sit with Minimal Assistance.                         Time Tracking:     OT Date of Treatment: 12/30/19  OT Start Time: 1318  OT Stop Time: 1348  OT Total Time (min): 30 min    Billable Minutes:Self Care/Home Management 15 minutes  Therapeutic Activity 15 minutes    Radha Diallo OT  12/30/2019

## 2019-12-30 NOTE — PROGRESS NOTES
Wound consult received on patient's skin breakdown to left toe. Upon initial assessment small to moderate dry crust located around left 1st and 2nd toe. Toenails elongated and fungal in appearance.  During cleansing around toes to remove dry crust and to better assess for skin breakdown, 2nd toe nail was easily removed, patient tolerated well. Moist pink wound bed. No drainage or odor.    Discussed with Zoie AMADOR with neurosurgery, PA consulting podiatry, agree with consult.  Dressed 2nd toe with foam dressing.  Would consider daily iodosorb dressings.  Would wait until evaluated by podiatry.    Nursing to continue care. Wound care to assist as needed.       12/30/19 0954        Wound 12/30/19 0954 Ulceration dorsal Toe, second   Date First Assessed/Time First Assessed: 12/30/19 0954   Primary Wound Type: Ulceration  Side: Left  Orientation: dorsal  Location: Toe, second   Wound Image    Wound WDL ex   Drainage Amount Scant   Drainage Characteristics/Odor Serous;No odor   Appearance Moist;Pink   Tissue loss description Partial thickness   Periwound Area Intact  (fungal toe nails)   Wound Length (cm) 0.8 cm   Wound Width (cm) 0.8 cm   Wound Depth (cm) 0.1 cm   Wound Volume (cm^3) 0.06 cm^3   Wound Surface Area (cm^2) 0.64 cm^2   Care Cleansed with:;Sterile normal saline   Dressing Applied;Foam

## 2019-12-30 NOTE — PROGRESS NOTES
[facility]  Neurosurgery  Progress Note    Subjective:     History of Present Illness: Junaid Muñoz is a 74 y.o. male with PMH of HTN, HLD, Hepatitis C, and CAD s/p two stents on plavix who presents with 1 month history of back pain between his shoulders. MRI at OSH demonstrates likely epidural abscess. Pt denies hx of trauma and reports slow onset of symptoms.     Post-Op Info:  Procedure(s) (LRB):  FUSION, SPINE, CERVICAL, POSTERIOR APPROACH C2-T3 posterior instrumented fusion (N/A)   13 Days Post-Op     Interval History:   NAEON. Pt pending SNF.    Medications:  Continuous Infusions:  Scheduled Meds:   albuterol-ipratropium  3 mL Nebulization Q12H    amLODIPine  10 mg Oral Daily    ascorbic acid (vitamin C)  250 mg Oral BID    atorvastatin  80 mg Oral Daily    bacitracin   Topical (Top) TID    ceFEPime (MAXIPIME) IVPB  2 g Intravenous Q8H    clopidogrel  75 mg Oral Daily    famotidine  20 mg Oral BID    ferrous sulfate  325 mg Oral BID    gabapentin  300 mg Oral TID    heparin (porcine)  5,000 Units Subcutaneous Q8H    levothyroxine  50 mcg Oral Before breakfast    lidocaine  1 patch Transdermal Daily    losartan-hydrochlorothiazide 100-25 mg  1 tablet Oral Daily    metoprolol tartrate  50 mg Oral BID    polyethylene glycol  17 g Oral Daily    senna-docusate 8.6-50 mg  1 tablet Oral BID    sodium chloride 0.9%  10 mL Intravenous Q6H    sodium chloride 3%  4 mL Nebulization Q12H    sodium chloride  2 g Oral BID     PRN Meds:acetaminophen, bisacodyl, labetalol, naloxone, ondansetron, oxyCODONE, promethazine (PHENERGAN) IVPB, Flushing PICC Protocol **AND** sodium chloride 0.9% **AND** sodium chloride 0.9%     Review of Systems    Objective:     Weight: 79.6 kg (175 lb 7.8 oz)  Body mass index is 25.91 kg/m².  Vital Signs (Most Recent):  Temp: 97.1 °F (36.2 °C) (12/29/19 2026)  Pulse: 82(Simultaneous filing. User may not have seen previous data.) (12/29/19 2026)  Resp: 16  (12/29/19 2026)  BP: 137/75 (12/29/19 2026)  SpO2: 96 % (12/29/19 2026) Vital Signs (24h Range):  Temp:  [97.1 °F (36.2 °C)] 97.1 °F (36.2 °C)  Pulse:  [79-82] 82  Resp:  [16-18] 16  SpO2:  [96 %-97 %] 96 %  BP: (137)/(75) 137/75          Male External Urinary Catheter 12/19/19 1200 Small (Active)   Collection Container Urimeter 12/27/2019  8:00 AM   Securement Method secured to top of thigh w/ tape 12/27/2019  8:00 AM   Skin no redness;no breakdown 12/27/2019  8:00 AM   Tolerance no signs/symptoms of discomfort 12/27/2019  8:00 AM   Output (mL) 500 mL 12/27/2019  1:00 PM   Catheter Change Date 12/24/19 12/26/2019  4:24 PM   Catheter Change Time 1100 12/26/2019  4:24 PM       Neurosurgery Physical Exam    General: poor dentition, appears older than stated age, no distress.   Head: normocephalic, atraumatic  Neurologic: Mild confusion  GCS: Motor: 6/Verbal: 4/Eyes: 4 GCS Total: 14  Mental Status: Awake, Alert, Oriented to person and place [hospital].   Language: No aphasia  Speech: Mild dysarthria  Cranial nerves: face symmetric, tongue midline, CN II-XII grossly intact.   Eyes: pupils equal, round, reactive to light with accomodation, EOMI.   Pulmonary: normal respirations, no signs of respiratory distress  Abdomen: soft, non-distended, not tender to palpation  Skin: Skin is warm, dry and intact.  Sensory: intact to light touch throughout  Motor Strength: Moves all extremities spontaneously with good tone. No abnormal movements seen. Generalized deconditioning. LUE 4+/5.  RUE and BLE 5-/5.  Damian's: Negative.  Babinski's: Negative.  Clonus: Negative.       Incisions:  Anterior: Clean, dry, dissolvable sutures intact. Skin edges well approximated. No surrounding erythema or edema. No drainage or TTP. No evidence of an underlying hematoma.     Posterior: Clean, dry, staples intact. Skin edges well approximated. No surrounding erythema or edema. No drainage or TTP.       Significant Labs:  No results for input(s):  GLU, NA, K, CL, CO2, BUN, CREATININE, CALCIUM, MG in the last 48 hours.  No results for input(s): WBC, HGB, HCT, PLT in the last 48 hours.  No results for input(s): LABPT, INR, APTT in the last 48 hours.  Microbiology Results (last 7 days)     Procedure Component Value Units Date/Time    Fungus culture [[id number]] Collected:  12/10/19 1038    Order Status:  Completed Specimen:  Body Fluid from Neck Updated:  12/27/19 1218     Fungus (Mycology) Culture Culture in progress      No fungus isolated after 2 weeks    Narrative:       2) Free vertebral abscess #2    Fungus culture [[id number]] Collected:  12/10/19 1038    Order Status:  Completed Specimen:  Body Fluid from Neck Updated:  12/27/19 1218     Fungus (Mycology) Culture Culture in progress      No fungus isolated after 2 weeks    Narrative:       1) Free vertebral abscess #1          Significant Diagnostics:  Xray cervical spine 12/26:  I independently reviewed the imaging.     Two views: There are pedicle screws and fixation rods between C2 and upper thoracic levels.  There is corpectomy at the cervicothoracic junction with laminectomies.  There is DJD.  Alignment is normal.  Odontoid prevertebral soft tissues are intact.  No fracture dislocation bone destruction seen.  No hardware failure seen and no complication seen.    Assessment/Plan:     * Spinal epidural abscess  74 y.o. male with PMH of HTN, HLD, Hepatitis C, and CAD s/p two stents on plavix who presents with C6-T2 osteomyelitis with suspected epidural abscess.  Now s/p C7/T1 corpectomies on 12/10 and C2-T2  posterior instrumented fusion 12/17.    -Patient neurologically stable on exam  -Post op Xrays show good positioning of the hardware. No detrimental findings.   -Brace on when upright, OOB, or working with therapy. OK to remove when lying in bed.   -ID: Afebrile. No leukocytosis. Continue Cefepime 2g q 8 hours. ESR and CRP elevated to 50 and 26.6. Estimated end date of therapy 1/28/20-2/11/20. FU in  ID clinic in 2-3 weeks.   -Pain: Well controlled on current regimen  -Anemia: iron for replacement x 2 weeks.   -Elevated Alk Phos: Per HM, likely secondary to osteomyelitis. Will continue to monitor closely.   -HTN: Continue Amlodipine 10 mg daily, Losartan-HCTZ 100/25 mg daily, and Metoprolol 50 mg BID.   -CAD s/p stent placement: Continue home dose of Plavix 75 mg daily.   -Hyponatremia: Na tabs from TID to BID. Will continue to wean as tolerated.   -Hypoalbuminemia: Continue Jose BID to promote wound healing.   -HLD: Continue home dose of Atorvastatin 80 mg daily  -DVT prophylaxis: TUCKER's, SCD's, SQH  -Bowel regimen: senna BID and miralax daily  -Atelectasis prevention: IS hourly while awake, PT/OT, OOB > 6 hours per day    DISPO: PT/OT recommending SNF. Patient medically stable for discharge, pending CM/SW.          Korey Umanzor MD  Neurosurgery  [facility]

## 2019-12-30 NOTE — SUBJECTIVE & OBJECTIVE
Interval History:   NAEON. Patient resting comfortably in bed. No family at bedside. Wound care at bedside to evaluate patient's toe. Patient denies any new symptoms or complaints. Tolerating diet. Voiding appropriately.     Medications:  Continuous Infusions:  Scheduled Meds:   albuterol-ipratropium  3 mL Nebulization Q12H    amLODIPine  10 mg Oral Daily    ascorbic acid (vitamin C)  250 mg Oral BID    atorvastatin  80 mg Oral Daily    bacitracin   Topical (Top) BID    ceFEPime (MAXIPIME) IVPB  2 g Intravenous Q8H    clopidogrel  75 mg Oral Daily    famotidine  20 mg Oral BID    ferrous sulfate  325 mg Oral BID    gabapentin  300 mg Oral TID    heparin (porcine)  5,000 Units Subcutaneous Q8H    levothyroxine  50 mcg Oral Before breakfast    lidocaine  1 patch Transdermal Daily    losartan-hydrochlorothiazide 100-25 mg  1 tablet Oral Daily    metoprolol tartrate  50 mg Oral BID    polyethylene glycol  17 g Oral Daily    potassium chloride  10 mEq Oral BID    senna-docusate 8.6-50 mg  1 tablet Oral BID    sodium chloride 0.9%  10 mL Intravenous Q6H    sodium chloride 3%  4 mL Nebulization Q12H    [START ON 12/31/2019] sodium chloride  2 g Oral Daily     PRN Meds:acetaminophen, bisacodyl, labetalol, naloxone, ondansetron, oxyCODONE, promethazine (PHENERGAN) IVPB, Flushing PICC Protocol **AND** sodium chloride 0.9% **AND** sodium chloride 0.9%     Review of Systems  Objective:     Weight: 74.3 kg (163 lb 12.8 oz)  Body mass index is 24.19 kg/m².  Vital Signs (Most Recent):  Temp: 96.7 °F (35.9 °C) (12/30/19 0827)  Pulse: 81 (12/30/19 0856)  Resp: 18 (12/30/19 0856)  BP: (!) 147/73 (12/30/19 0827)  SpO2: 96 % (12/30/19 0856) Vital Signs (24h Range):  Temp:  [96.7 °F (35.9 °C)-97.8 °F (36.6 °C)] 96.7 °F (35.9 °C)  Pulse:  [68-82] 81  Resp:  [16-18] 18  SpO2:  [96 %-97 %] 96 %  BP: (124-147)/(71-75) 147/73                     Male External Urinary Catheter 12/19/19 1200 Small (Active)   Collection  Container Urimeter 12/27/2019  7:50 PM   Securement Method secured to lower ABD w/ tape 12/27/2019  7:50 PM   Skin no redness;no breakdown 12/27/2019  7:50 PM   Tolerance no signs/symptoms of discomfort 12/27/2019  7:50 PM   Output (mL) 250 mL 12/28/2019  9:00 AM   Catheter Change Date 12/24/19 12/26/2019  4:24 PM   Catheter Change Time 1100 12/26/2019  4:24 PM       Neurosurgery Physical Exam  General: poor dentition, appears older than stated age, no distress.   Head: normocephalic, atraumatic  Neurologic: Mild confusion  GCS: Motor: 6/Verbal: 4/Eyes: 4 GCS Total: 14  Mental Status: Awake, Alert, Oriented to person and place [hospital]. Not oriented to year or name of the hospital.     Language: No aphasia   Speech: Mild dysarthria 2/2 dentition  Cranial nerves: face symmetric, tongue midline, CN II-XII grossly intact.   Eyes: pupils equal, round, reactive to light with accomodation, EOMI.   Pulmonary: normal respirations, no signs of respiratory distress  Abdomen: soft, non-distended, not tender to palpation  Skin: Left 3rd toes is black and TTP.   Sensory: intact to light touch throughout  Motor Strength: Moves all extremities spontaneously with good tone. No abnormal movements seen. Generalized deconditioning. BUE and BLE 5-/5.  Damian's: Negative.  Babinski's: Negative.  Clonus: Negative.       Incisions:  Anterior: Clean, dry, dissolvable sutures intact. Skin edges well approximated. No surrounding erythema or edema. No drainage or TTP. No evidence of an underlying hematoma.      Posterior: Clean, dry, staples intact. Skin edges well approximated. No surrounding erythema or edema. No drainage or TTP.       Significant Labs:  Recent Labs   Lab 12/30/19  0435         K 3.4*      CO2 26   BUN 15   CREATININE 0.7   CALCIUM 8.6*     Recent Labs   Lab 12/30/19  0435   WBC 7.87   HGB 8.7*   HCT 28.9*        No results for input(s): LABPT, INR, APTT in the last 48 hours.  Microbiology  Results (last 7 days)     Procedure Component Value Units Date/Time    Fungus culture [[id number]] Collected:  12/10/19 1038    Order Status:  Completed Specimen:  Body Fluid from Neck Updated:  12/27/19 1218     Fungus (Mycology) Culture Culture in progress      No fungus isolated after 2 weeks    Narrative:       2) Free vertebral abscess #2    Fungus culture [[id number]] Collected:  12/10/19 1038    Order Status:  Completed Specimen:  Body Fluid from Neck Updated:  12/27/19 1218     Fungus (Mycology) Culture Culture in progress      No fungus isolated after 2 weeks    Narrative:       1) Free vertebral abscess #1

## 2019-12-30 NOTE — PROGRESS NOTES
[facility] [address] [facility]  Neurosurgery  Progress Note    Subjective:     History of Present Illness: Junaid Muñoz is a 74 y.o. male with PMH of HTN, HLD, Hepatitis C, and CAD s/p two stents on plavix who presents with 1 month history of back pain between his shoulders. MRI at OSH demonstrates likely epidural abscess. Pt denies hx of trauma and reports slow onset of symptoms.     Post-Op Info:  Procedure(s) (LRB):  FUSION, SPINE, CERVICAL, POSTERIOR APPROACH C2-T3 posterior instrumented fusion (N/A)   13 Days Post-Op     Interval History:   NAEON. Patient resting comfortably in bed. No family at bedside. Wound care at bedside to evaluate patient's toe. Patient denies any new symptoms or complaints. Tolerating diet. Voiding appropriately.     Medications:  Continuous Infusions:  Scheduled Meds:   albuterol-ipratropium  3 mL Nebulization Q12H    amLODIPine  10 mg Oral Daily    ascorbic acid (vitamin C)  250 mg Oral BID    atorvastatin  80 mg Oral Daily    bacitracin   Topical (Top) BID    ceFEPime (MAXIPIME) IVPB  2 g Intravenous Q8H    clopidogrel  75 mg Oral Daily    famotidine  20 mg Oral BID    ferrous sulfate  325 mg Oral BID    gabapentin  300 mg Oral TID    heparin (porcine)  5,000 Units Subcutaneous Q8H    levothyroxine  50 mcg Oral Before breakfast    lidocaine  1 patch Transdermal Daily    losartan-hydrochlorothiazide 100-25 mg  1 tablet Oral Daily    metoprolol tartrate  50 mg Oral BID    polyethylene glycol  17 g Oral Daily    potassium chloride  10 mEq Oral BID    senna-docusate 8.6-50 mg  1 tablet Oral BID    sodium chloride 0.9%  10 mL Intravenous Q6H    sodium chloride 3%  4 mL Nebulization Q12H    [START ON 12/31/2019] sodium chloride  2 g Oral Daily     PRN Meds:acetaminophen, bisacodyl, labetalol, naloxone, ondansetron, oxyCODONE, promethazine (PHENERGAN) IVPB, Flushing PICC Protocol **AND** sodium chloride 0.9% **AND** sodium chloride 0.9%     Review of  Systems  Objective:     Weight: 74.3 kg (163 lb 12.8 oz)  Body mass index is 24.19 kg/m².  Vital Signs (Most Recent):  Temp: 96.7 °F (35.9 °C) (12/30/19 0827)  Pulse: 81 (12/30/19 0856)  Resp: 18 (12/30/19 0856)  BP: (!) 147/73 (12/30/19 0827)  SpO2: 96 % (12/30/19 0856) Vital Signs (24h Range):  Temp:  [96.7 °F (35.9 °C)-97.8 °F (36.6 °C)] 96.7 °F (35.9 °C)  Pulse:  [68-82] 81  Resp:  [16-18] 18  SpO2:  [96 %-97 %] 96 %  BP: (124-147)/(71-75) 147/73                     Male External Urinary Catheter 12/19/19 1200 Small (Active)   Collection Container Urimeter 12/27/2019  7:50 PM   Securement Method secured to lower ABD w/ tape 12/27/2019  7:50 PM   Skin no redness;no breakdown 12/27/2019  7:50 PM   Tolerance no signs/symptoms of discomfort 12/27/2019  7:50 PM   Output (mL) 250 mL 12/28/2019  9:00 AM   Catheter Change Date 12/24/19 12/26/2019  4:24 PM   Catheter Change Time 1100 12/26/2019  4:24 PM       Neurosurgery Physical Exam  General: poor dentition, appears older than stated age, no distress.   Head: normocephalic, atraumatic  Neurologic: Mild confusion  GCS: Motor: 6/Verbal: 4/Eyes: 4 GCS Total: 14  Mental Status: Awake, Alert, Oriented to person and place [hospital]. Not oriented to year or name of the hospital.     Language: No aphasia   Speech: Mild dysarthria 2/2 dentition  Cranial nerves: face symmetric, tongue midline, CN II-XII grossly intact.   Eyes: pupils equal, round, reactive to light with accomodation, EOMI.   Pulmonary: normal respirations, no signs of respiratory distress  Abdomen: soft, non-distended, not tender to palpation  Skin: Left 2nd toe is black and TTP.   Sensory: intact to light touch throughout  Motor Strength: Moves all extremities spontaneously with good tone. No abnormal movements seen. Generalized deconditioning. BUE and BLE 5-/5.  Damian's: Negative.  Babinski's: Negative.  Clonus: Negative.       Incisions:  Anterior: Clean, dry, dissolvable sutures intact. Skin edges well  approximated. No surrounding erythema or edema. No drainage or TTP. No evidence of an underlying hematoma.      Posterior: Clean, dry, staples intact. Skin edges well approximated. No surrounding erythema or edema. No drainage or TTP.       Significant Labs:  Recent Labs   Lab 12/30/19  0435         K 3.4*      CO2 26   BUN 15   CREATININE 0.7   CALCIUM 8.6*     Recent Labs   Lab 12/30/19  0435   WBC 7.87   HGB 8.7*   HCT 28.9*        No results for input(s): LABPT, INR, APTT in the last 48 hours.  Microbiology Results (last 7 days)     Procedure Component Value Units Date/Time    Fungus culture [[id number]] Collected:  12/10/19 1038    Order Status:  Completed Specimen:  Body Fluid from Neck Updated:  12/27/19 1218     Fungus (Mycology) Culture Culture in progress      No fungus isolated after 2 weeks    Narrative:       2) Free vertebral abscess #2    Fungus culture [[id number]] Collected:  12/10/19 1038    Order Status:  Completed Specimen:  Body Fluid from Neck Updated:  12/27/19 1218     Fungus (Mycology) Culture Culture in progress      No fungus isolated after 2 weeks    Narrative:       1) Free vertebral abscess #1            Assessment/Plan:     * Spinal epidural abscess  74 y.o. male with PMH of HTN, HLD, Hepatitis C, and CAD s/p two stents on plavix who presents with C6-T2 osteomyelitis with suspected epidural abscess.  Now s/p C7/T1 corpectomies on 12/10 and C2-T2  posterior instrumented fusion 12/17.    -Patient neurologically stable on exam  -Brace on when upright, OOB, or working with therapy. OK to remove when lying in bed.   -ID: Afebrile. No leukocytosis. Continue Cefepime 2g q 8 hours. Estimated end date of therapy 1/28/20-2/11/20. FU in ID clinic in 2-3 weeks.   -Wound care: Will await wound care recs for toe. Podiatry consulted to determine if more aggressive treatment is needed.   -Pain: Continue current regimen. No adjustments needed at this time.   -Anemia: H/H  improving, 8.7/28.9 today. Continue iron for replacement x 2 weeks.   -Elevated Alk Phos: Improving. 328 today, was 336. AST/ALT remain WNL. Elevated at the time of admission.  Per HM, likely secondary to osteomyelitis. Will continue to monitor closely.   -HTN: -147. Continue Amlodipine 10 mg daily, Losartan-HCTZ 100-25 mg daily, and Metoprolol 50 mg BID.   -CAD s/p stent placement: Continue home dose of Plavix 75 mg daily.   -Hyponatremia: Na 140 today. Decrease Na tabs from BID to daily. Will continue to wean as tolerated.   -Hypoalbuminemia: Alb 2.3. Continue Jose BID to promote wound healing.   -HLD: Continue home dose of Atorvastatin 80 mg daily  -DVT prophylaxis: TUCKER's, SCD's, SQH  -Bowel regimen: senna BID and miralax daily  -Atelectasis prevention: IS hourly while awake, PT/OT, OOB > 6 hours per day       DISPO: Pending SNF. Patient medically stable for discharge.        Please call with any questions      Zoie Reyes PA-C   Neurosurgery   Pager: [phone]

## 2019-12-30 NOTE — HPI
Junaid Muñoz is a 74 year old male with CAD, HTN, HLD, hypothyroidism, tobacco use, and prior history of IVDU admitted to [facility] for  C6-T3 osteomyelitis with epidural abscess.     Podiatry consulted for left 2nd toe wound. Patient denies any pain to bilateral feet. Wound care was cleansing his toes today to remove dry crust and to better assess for skin breakdown and 2nd toe nail avulsed.

## 2019-12-30 NOTE — CONSULTS
[facility]  Podiatry  Consult Note    Patient Name: Junaid Muñoz  MRN: [id number]  Admission Date: 12/6/2019  Hospital Length of Stay: 24 days  Attending Physician: Elian Deluca MD  Primary Care Provider: Oskar Whitman MD     Inpatient consult to Podiatry  Consult performed by: Sarita Montilla MD  Consult ordered by: PERRY Saez        Subjective:     History of Present Illness:  Junaid Muñoz is a 74 year old male with CAD, HTN, HLD, hypothyroidism, tobacco use, and prior history of IVDU admitted to [facility] for  C6-T3 osteomyelitis with epidural abscess.     Podiatry consulted for left 2nd toe wound. Patient denies any pain to bilateral feet. Wound care was cleansing his toes today to remove dry crust and to better assess for skin breakdown and 2nd toe nail avulsed.     Scheduled Meds:   albuterol-ipratropium  3 mL Nebulization Q12H    amLODIPine  10 mg Oral Daily    ascorbic acid (vitamin C)  250 mg Oral BID    atorvastatin  80 mg Oral Daily    bacitracin   Topical (Top) BID    ceFEPime (MAXIPIME) IVPB  2 g Intravenous Q8H    clopidogrel  75 mg Oral Daily    famotidine  20 mg Oral BID    ferrous sulfate  325 mg Oral BID    gabapentin  300 mg Oral TID    heparin (porcine)  5,000 Units Subcutaneous Q8H    levothyroxine  50 mcg Oral Before breakfast    lidocaine  1 patch Transdermal Daily    losartan-hydrochlorothiazide 100-25 mg  1 tablet Oral Daily    metoprolol tartrate  50 mg Oral BID    polyethylene glycol  17 g Oral Daily    potassium chloride  10 mEq Oral BID    senna-docusate 8.6-50 mg  1 tablet Oral BID    sodium chloride 0.9%  10 mL Intravenous Q6H    sodium chloride 3%  4 mL Nebulization Q12H    [START ON 12/31/2019] sodium chloride  2 g Oral Daily     Continuous Infusions:  PRN Meds:acetaminophen, bisacodyl, labetalol, naloxone, ondansetron, oxyCODONE, promethazine (PHENERGAN) IVPB, Flushing PICC Protocol **AND** sodium chloride 0.9% **AND**  sodium chloride 0.9%    Review of patient's allergies indicates:   Allergen Reactions    Penicillins Rash     Tolerated cefepime December 2019        Past Medical History:   Diagnosis Date    CAD (coronary artery disease)     s/p stent 2005, on plavix    Chronic hepatitis C     Cirrhosis     biopsy proven - 2007    History of colon polyps 06/2008    1 polyp - tubular adenoma    HTN (hypertension)     Hyperlipidemia     Hypothyroidism      Past Surgical History:   Procedure Laterality Date    COLONOSCOPY W/ POLYPECTOMY  06/2008    Dr Wagoner: fair prep, 3mm polyp - tubular adenoma    CORONARY ANGIOPLASTY WITH STENT PLACEMENT      FUSION OF CERVICAL SPINE BY POSTERIOR APPROACH N/A 12/17/2019    Procedure: FUSION, SPINE, CERVICAL, POSTERIOR APPROACH C2-T3 posterior instrumented fusion;  Surgeon: Elian Deluca MD;  Location: [facility] OR [address] [place] [address];  Service: Neurosurgery;  Laterality: N/A;    hydrocele repair  teenager    pyloric stenosis repair  age 1    SURGICAL REMOVAL OF VERTEBRAL BODY OF THORACIC SPINE N/A 12/10/2019    Procedure: CORPECTOMY, SPINE, CERVICAL AND THORACIC, C7 and T1 with Globus;  Surgeon: Elian Deluca MD;  Location: [facility] OR [address] [place] [address];  Service: Neurosurgery;  Laterality: N/A;    TONSILLECTOMY         Family History     None        Tobacco Use    Smoking status: Current Every Day Smoker   Substance and Sexual Activity    Alcohol use: Not on file    Drug use: Yes     Types: IV     Comment: MORPHINE    Sexual activity: Not on file     Review of Systems   Constitutional: Negative for appetite change, chills and fever.   Cardiovascular: Negative for leg swelling.   Gastrointestinal: Negative for nausea and vomiting.   Musculoskeletal: Negative for arthralgias, gait problem, joint swelling and myalgias.   Skin: Positive for wound. Negative for color change.   Neurological: Negative for numbness.   Psychiatric/Behavioral: Negative for behavioral problems and confusion.     Objective:     Vital  Signs (Most Recent):  Temp: 96.7 °F (35.9 °C) (12/30/19 0827)  Pulse: 81 (12/30/19 0856)  Resp: 18 (12/30/19 0856)  BP: (!) 147/73 (12/30/19 0827)  SpO2: 96 % (12/30/19 0856) Vital Signs (24h Range):  Temp:  [96.7 °F (35.9 °C)-97.8 °F (36.6 °C)] 96.7 °F (35.9 °C)  Pulse:  [68-82] 81  Resp:  [16-18] 18  SpO2:  [96 %-97 %] 96 %  BP: (124-147)/(71-75) 147/73     Weight: 74.3 kg (163 lb 12.8 oz)  Body mass index is 24.19 kg/m².    Foot Exam    Right Foot/Ankle     Inspection and Palpation  Tenderness: none   Swelling: none   Skin Exam: skin intact;     Neurovascular  Dorsalis pedis: 2+  Posterior tibial: 1+  Saphenous nerve sensation: normal  Tibial nerve sensation: normal  Superficial peroneal nerve sensation: normal  Deep peroneal nerve sensation: normal  Sural nerve sensation: normal      Left Foot/Ankle      Inspection and Palpation  Ecchymosis: none  Tenderness: none   Swelling: none   Skin Exam: (left 2nd toenail avulsed with healthy granular nail bed exposed. No bone exposure, no erythema, no edema, no drainage. )    Neurovascular  Dorsalis pedis: 2+  Posterior tibial: 1+  Saphenous nerve sensation: normal  Tibial nerve sensation: normal  Superficial peroneal nerve sensation: normal  Deep peroneal nerve sensation: normal  Sural nerve sensation: normal            Laboratory:  A1C:   Recent Labs   Lab 12/25/19  1120   HGBA1C 5.2     CBC:   Recent Labs   Lab 12/30/19  0435   WBC 7.87   RBC 3.17*   HGB 8.7*   HCT 28.9*      MCV 91   MCH 27.4   MCHC 30.1*     CMP:   Recent Labs   Lab 12/30/19  0435      CALCIUM 8.6*   ALBUMIN 2.3*   PROT 6.0      K 3.4*   CO2 26      BUN 15   CREATININE 0.7   ALKPHOS 328*   ALT 23   AST 30   BILITOT 0.3     CRP:   Recent Labs   Lab 12/28/19  0500   CRP 26.6*     ESR:   Recent Labs   Lab 12/28/19  0500   SEDRATE 50*     Wound Cultures:   Recent Labs   Lab 12/10/19  1038   LABAERO PSEUDOMONAS AERUGINOSA  Rare  *  PSEUDOMONAS AERUGINOSA  Few  *     Microbiology  Results (last 7 days)     Procedure Component Value Units Date/Time    Fungus culture [[id number]] Collected:  12/10/19 1038    Order Status:  Completed Specimen:  Body Fluid from Neck Updated:  12/27/19 1218     Fungus (Mycology) Culture Culture in progress      No fungus isolated after 2 weeks    Narrative:       2) Free vertebral abscess #2    Fungus culture [[id number]] Collected:  12/10/19 1038    Order Status:  Completed Specimen:  Body Fluid from Neck Updated:  12/27/19 1218     Fungus (Mycology) Culture Culture in progress      No fungus isolated after 2 weeks    Narrative:       1) Free vertebral abscess #1        Specimen (12h ago, onward)    None          Diagnostic Results:  None    Clinical Findings:  Nails thickened, elongated, discolored. Left 2nd toenail completely avulsed with exposed, healthy granular nail bed. No bone exposure. No surrounding erythema, edema, purulent drainage, or malodor. No clinical signs of infection.           Assessment/Plan:     Nail avulsion of toe  Complete avulsion of left 2nd toenail with exposed nail bed. Wound stable. No clinical signs of infection.     Plan:  -Paint left 2nd toe with betadine daily. Dress with foam bandage. Wound care orders placed.         Thank you for your consult. I will sign off. Please contact us if you have any additional questions.    Sarita Montilla MD  Podiatry  [facility]

## 2019-12-30 NOTE — PLAN OF CARE
Problem: Physical Therapy Goal  Goal: Physical Therapy Goal  Description  Goals to be met by: 19     Patient will increase functional independence with mobility by performin. Supine to sit with MInimal Assistance  2. Sit to supine with MInimal Assistance  3. Rolling to Left and Right with Modified [place].  4. Sit to stand transfer with Minimal Assistance  5. Bed to chair transfer with Minimal Assistance using Rolling Walker  6. Gait  x 40 feet with Minimal Assistance using Rolling Walker.   7. Sitting at edge of bed x 8 minutes with Minimal Assistance  8. Stand for 5 minutes with Minimal Assistance using Rolling Walker  9. Lower extremity exercise program x15 reps per handout, with independence     Outcome: Ongoing, Progressing    Pt cooperative with PT, continues to work towards goals. Will continue to follow while in house.     Samantha Curtis, PT, DPT  2019

## 2019-12-30 NOTE — ASSESSMENT & PLAN NOTE
74 y.o. male with PMH of HTN, HLD, Hepatitis C, and CAD s/p two stents on plavix who presents with C6-T2 osteomyelitis with suspected epidural abscess.  Now s/p C7/T1 corpectomies on 12/10 and C2-T2  posterior instrumented fusion 12/17.    -Patient neurologically stable on exam  -Post op Xrays show good positioning of the hardware. No detrimental findings.   -Brace on when upright, OOB, or working with therapy. OK to remove when lying in bed.   -ID: Afebrile. No leukocytosis. Continue Cefepime 2g q 8 hours. ESR and CRP elevated to 50 and 26.6. Estimated end date of therapy 1/28/20-2/11/20. FU in ID clinic in 2-3 weeks.   -Pain: Well controlled on current regimen  -Anemia: iron for replacement x 2 weeks.   -Elevated Alk Phos: Per HM, likely secondary to osteomyelitis. Will continue to monitor closely.   -HTN: Continue Amlodipine 10 mg daily, Losartan-HCTZ 100/25 mg daily, and Metoprolol 50 mg BID.   -CAD s/p stent placement: Continue home dose of Plavix 75 mg daily.   -Hyponatremia: Na tabs from TID to BID. Will continue to wean as tolerated.   -Hypoalbuminemia: Continue Jose BID to promote wound healing.   -HLD: Continue home dose of Atorvastatin 80 mg daily  -DVT prophylaxis: TUCKER's, SCD's, SQH  -Bowel regimen: senna BID and miralax daily  -Atelectasis prevention: IS hourly while awake, PT/OT, OOB > 6 hours per day    DISPO: PT/OT recommending SNF. Patient medically stable for discharge, pending CM/SW.

## 2019-12-31 PROCEDURE — 25000003 PHARM REV CODE 250: Performed by: NURSE PRACTITIONER

## 2019-12-31 PROCEDURE — 25000242 PHARM REV CODE 250 ALT 637 W/ HCPCS: Performed by: PHYSICIAN ASSISTANT

## 2019-12-31 PROCEDURE — 25000003 PHARM REV CODE 250: Performed by: PHYSICIAN ASSISTANT

## 2019-12-31 PROCEDURE — 97535 SELF CARE MNGMENT TRAINING: CPT

## 2019-12-31 PROCEDURE — 97116 GAIT TRAINING THERAPY: CPT

## 2019-12-31 PROCEDURE — 25000003 PHARM REV CODE 250: Performed by: ANESTHESIOLOGY

## 2019-12-31 PROCEDURE — 94640 AIRWAY INHALATION TREATMENT: CPT

## 2019-12-31 PROCEDURE — 63600175 PHARM REV CODE 636 W HCPCS: Performed by: PSYCHIATRY & NEUROLOGY

## 2019-12-31 PROCEDURE — 11000001 HC ACUTE MED/SURG PRIVATE ROOM

## 2019-12-31 PROCEDURE — 99024 POSTOP FOLLOW-UP VISIT: CPT | Mod: ,,, | Performed by: PHYSICIAN ASSISTANT

## 2019-12-31 PROCEDURE — 97803 MED NUTRITION INDIV SUBSEQ: CPT

## 2019-12-31 PROCEDURE — 99024 PR POST-OP FOLLOW-UP VISIT: ICD-10-PCS | Mod: ,,, | Performed by: PHYSICIAN ASSISTANT

## 2019-12-31 PROCEDURE — A4216 STERILE WATER/SALINE, 10 ML: HCPCS | Performed by: ANESTHESIOLOGY

## 2019-12-31 PROCEDURE — 25000003 PHARM REV CODE 250: Performed by: PSYCHIATRY & NEUROLOGY

## 2019-12-31 PROCEDURE — 94761 N-INVAS EAR/PLS OXIMETRY MLT: CPT

## 2019-12-31 RX ADMIN — SENNOSIDES AND DOCUSATE SODIUM 1 TABLET: 8.6; 5 TABLET ORAL at 11:12

## 2019-12-31 RX ADMIN — Medication 10 ML: at 06:12

## 2019-12-31 RX ADMIN — FERROUS SULFATE TAB EC 325 MG (65 MG FE EQUIVALENT) 325 MG: 325 (65 FE) TABLET DELAYED RESPONSE at 11:12

## 2019-12-31 RX ADMIN — CEFEPIME 2 G: 2 INJECTION, POWDER, FOR SOLUTION INTRAVENOUS at 12:12

## 2019-12-31 RX ADMIN — Medication 250 MG: at 11:12

## 2019-12-31 RX ADMIN — SENNOSIDES AND DOCUSATE SODIUM 1 TABLET: 8.6; 5 TABLET ORAL at 08:12

## 2019-12-31 RX ADMIN — CEFEPIME 2 G: 2 INJECTION, POWDER, FOR SOLUTION INTRAVENOUS at 04:12

## 2019-12-31 RX ADMIN — METOPROLOL TARTRATE 50 MG: 50 TABLET ORAL at 11:12

## 2019-12-31 RX ADMIN — FERROUS SULFATE TAB EC 325 MG (65 MG FE EQUIVALENT) 325 MG: 325 (65 FE) TABLET DELAYED RESPONSE at 08:12

## 2019-12-31 RX ADMIN — LEVOTHYROXINE SODIUM 50 MCG: 50 TABLET ORAL at 04:12

## 2019-12-31 RX ADMIN — HEPARIN SODIUM 5000 UNITS: 5000 INJECTION, SOLUTION INTRAVENOUS; SUBCUTANEOUS at 08:12

## 2019-12-31 RX ADMIN — HEPARIN SODIUM 5000 UNITS: 5000 INJECTION, SOLUTION INTRAVENOUS; SUBCUTANEOUS at 04:12

## 2019-12-31 RX ADMIN — SODIUM CHLORIDE SOLN NEBU 3% 4 ML: 3 NEBU SOLN at 07:12

## 2019-12-31 RX ADMIN — LOSARTAN POTASSIUM AND HYDROCHLOROTHIAZIDE TABLETS 1 TABLET: 100; 25 TABLET, FILM COATED ORAL at 11:12

## 2019-12-31 RX ADMIN — Medication 250 MG: at 08:12

## 2019-12-31 RX ADMIN — POTASSIUM CHLORIDE 10 MEQ: 750 CAPSULE, EXTENDED RELEASE ORAL at 11:12

## 2019-12-31 RX ADMIN — Medication 10 ML: at 12:12

## 2019-12-31 RX ADMIN — IPRATROPIUM BROMIDE AND ALBUTEROL SULFATE 3 ML: .5; 3 SOLUTION RESPIRATORY (INHALATION) at 08:12

## 2019-12-31 RX ADMIN — ATORVASTATIN CALCIUM 80 MG: 20 TABLET, FILM COATED ORAL at 11:12

## 2019-12-31 RX ADMIN — CLOPIDOGREL BISULFATE 75 MG: 75 TABLET ORAL at 11:12

## 2019-12-31 RX ADMIN — BACITRACIN: 500 OINTMENT TOPICAL at 11:12

## 2019-12-31 RX ADMIN — FAMOTIDINE 20 MG: 20 TABLET ORAL at 11:12

## 2019-12-31 RX ADMIN — AMLODIPINE BESYLATE 10 MG: 10 TABLET ORAL at 11:12

## 2019-12-31 RX ADMIN — Medication 2 G: at 11:12

## 2019-12-31 RX ADMIN — CEFEPIME 2 G: 2 INJECTION, POWDER, FOR SOLUTION INTRAVENOUS at 08:12

## 2019-12-31 RX ADMIN — POLYETHYLENE GLYCOL 3350 17 G: 17 POWDER, FOR SOLUTION ORAL at 11:12

## 2019-12-31 RX ADMIN — SODIUM CHLORIDE SOLN NEBU 3% 4 ML: 3 NEBU SOLN at 08:12

## 2019-12-31 RX ADMIN — GABAPENTIN 300 MG: 300 CAPSULE ORAL at 04:12

## 2019-12-31 RX ADMIN — IPRATROPIUM BROMIDE AND ALBUTEROL SULFATE 3 ML: .5; 3 SOLUTION RESPIRATORY (INHALATION) at 07:12

## 2019-12-31 RX ADMIN — GABAPENTIN 300 MG: 300 CAPSULE ORAL at 11:12

## 2019-12-31 RX ADMIN — FAMOTIDINE 20 MG: 20 TABLET ORAL at 08:12

## 2019-12-31 RX ADMIN — GABAPENTIN 300 MG: 300 CAPSULE ORAL at 08:12

## 2019-12-31 RX ADMIN — METOPROLOL TARTRATE 50 MG: 50 TABLET ORAL at 08:12

## 2019-12-31 RX ADMIN — Medication 10 ML: at 11:12

## 2019-12-31 RX ADMIN — BACITRACIN: 500 OINTMENT TOPICAL at 08:12

## 2019-12-31 NOTE — PT/OT/SLP PROGRESS
Occupational Therapy   Treatment    Name: Junaid Muñoz  MRN: [id number]  Admitting Diagnosis:  Spinal epidural abscess  14 Days Post-Op    Recommendations:     Discharge Recommendations: nursing facility, skilled  Discharge Equipment Recommendations:  walker, rolling, bedside commode, wheelchair, shower chair  Barriers to discharge:  None    Assessment:     Junaid Muñoz is a 74 y.o. male with a medical diagnosis of Spinal epidural abscess. Performance deficits affecting function are weakness, impaired endurance, impaired self care skills, impaired functional mobilty, gait instability, impaired balance, decreased coordination, decreased safety awareness, impaired coordination, orthopedic precautions, impaired muscle length, impaired joint extensibility, decreased lower extremity function.     Rehab Prognosis:  Good; patient would benefit from acute skilled OT services to address these deficits and reach maximum level of function.       Plan:     Patient to be seen 5 x/week to address the above listed problems via self-care/home management, therapeutic activities, therapeutic exercises  · Plan of Care Expires: 01/27/20  · Plan of Care Reviewed with: patient    Subjective     Pain/Comfort:  · Pain Rating 1: (Patient did not rate)  · Location - Side 1: Bilateral  · Location - Orientation 1: generalized  · Location 1: neck  · Pain Addressed 1: Reposition, Distraction, Cessation of Activity  · Pain Rating Post-Intervention 1: (patient did not rate)    Objective:     Communicated with: NSG prior to session.  Patient found HOB elevated with bed alarm, SCD upon OT entry to room.    General Precautions: Standard, aspiration, fall   Orthopedic Precautions:spinal precautions   Braces: CTLSO     Occupational Performance:     Bed Mobility:    · Patient completed Supine to Sit with moderate assistance     Functional Mobility/Transfers:  · Patient completed Sit <> Stand Transfer with moderate assistance  with  rolling  walker   · Patient completed Bed > Chair Transfer using with functional ambulation technique with maximal assistance with rolling walker    Activities of Daily Living:  · Upper Body Dressing: maximal assistance Donning back gown and CTLSO brace  · LE dressing: Mod A while sitting up in chair to doff and don socks. However, patient needed min A while up in chair to maintain upright position during task for safety    [facility] 6 Click ADL: 12    Treatment & Education:  Role of OT and POC  Safety  ADL retraining  Functional mobility training  Importance of OOB activity  Importance of calling nursing when ready to return to bed for assistance for safety  Purpose of POSEY chair alarm for safety when OOB    Patient left up in chair with call button in reach, chair alarm on, nurse notified and all needs met. Education:      GOALS:   Multidisciplinary Problems     Occupational Therapy Goals        Problem: Occupational Therapy Goal    Goal Priority Disciplines Outcome Interventions   Occupational Therapy Goal     OT, PT/OT Ongoing, Progressing    Description:  Goals to be met by: 1/10/2019     Patient will increase functional independence with ADLs by performing:    UE Dressing with Minimal Assistance.  LE Dressing with Moderate Assistance.  Grooming while standing with Stand-by Assistance.  Toileting from toilet with Minimal Assistance for hygiene and clothing management.   Bathing from  edge of bed with Moderate Assistance.  Supine to sit with Minimal Assistance.                         Time Tracking:     OT Date of Treatment: 12/31/19  OT Start Time: 1311  OT Stop Time: 1337  OT Total Time (min): 26 min    Billable Minutes:Self Care/Home Management 26    KASIA Roth  12/31/2019

## 2019-12-31 NOTE — PLAN OF CARE
Problem: Physical Therapy Goal  Goal: Physical Therapy Goal  Description  Goals to be met by: 19     Patient will increase functional independence with mobility by performin. Supine to sit with MInimal Assistance  2. Sit to supine with MInimal Assistance  3. Rolling to Left and Right with Modified [place].  4. Sit to stand transfer with Minimal Assistance  5. Bed to chair transfer with Minimal Assistance using Rolling Walker  6. Gait  x 40 feet with Minimal Assistance using Rolling Walker.   7. Sitting at edge of bed x 8 minutes with Minimal Assistance  8. Stand for 5 minutes with Minimal Assistance using Rolling Walker  9. Lower extremity exercise program x15 reps per handout, with independence     Outcome: Ongoing, Progressing     Patient progressing appropriately towards current goals and plan of care remains appropriate at this time. Patient demonstrates good motivation and fair activity tolerance secondary weakness and fatigue.  Patient demonstrates good willingness to participate but with poor trunk control and B LE weakness limiting functional independence.  Patient required moderated to maximal assistance for balance sitting edge of bed and moderate assistance for sit to stand with rolling walker and CTLSO.  Patient was with decreased stability during ambulation and required Max A for ambulation of 12 ft with rolling walker and assistance to minimize chance of B LE buckling.  Patient continues to benefit from further skilled PT for strengthening and mobility training in an acute care and SNF setting.      Tom Connelly, PT, DPT  2019  Pager #: [phone]

## 2019-12-31 NOTE — PLAN OF CARE
Referral sent to [facility] skilled per therapy recommendations.   12/31/19 0978   Post-Acute Status   Post-Acute Authorization Placement   Post-Acute Placement Status Referrals Sent

## 2019-12-31 NOTE — PLAN OF CARE
Recommendations    1. Continue current diet + Jose as tolerated.  2. Encourage po intake.   3. Please obtain new wt for accuracy.   4. RD following.     Goals: Pt to continue meeting EEN and EPN by RD follow-up  Nutrition Goal Status: progressing towards goal

## 2019-12-31 NOTE — PLAN OF CARE
Problem: Occupational Therapy Goal  Goal: Occupational Therapy Goal  Description  Goals to be met by: 1/10/2019     Patient will increase functional independence with ADLs by performing:    UE Dressing with Minimal Assistance.  LE Dressing with Moderate Assistance.  Grooming while standing with Stand-by Assistance.  Toileting from toilet with Minimal Assistance for hygiene and clothing management.   Bathing from  edge of bed with Moderate Assistance.  Supine to sit with Minimal Assistance.        Outcome: Ongoing, Progressing   Patient's goals are appropriate.   KASIA Roth  12/31/2019

## 2019-12-31 NOTE — SUBJECTIVE & OBJECTIVE
Interval History: NAEON. Pain remains controlled. Podiatry recommending wound care for left toe. Denies weakness, paresthesias, b/b dysfunction. Tolerating PO. Staples removed from posterior incision today. Pending SNF, medically stable for discharge.    Medications:  Continuous Infusions:  Scheduled Meds:   albuterol-ipratropium  3 mL Nebulization Q12H    amLODIPine  10 mg Oral Daily    ascorbic acid (vitamin C)  250 mg Oral BID    atorvastatin  80 mg Oral Daily    bacitracin   Topical (Top) BID    ceFEPime (MAXIPIME) IVPB  2 g Intravenous Q8H    clopidogrel  75 mg Oral Daily    famotidine  20 mg Oral BID    ferrous sulfate  325 mg Oral BID    gabapentin  300 mg Oral TID    heparin (porcine)  5,000 Units Subcutaneous Q8H    levothyroxine  50 mcg Oral Before breakfast    lidocaine  1 patch Transdermal Daily    losartan-hydrochlorothiazide 100-25 mg  1 tablet Oral Daily    metoprolol tartrate  50 mg Oral BID    polyethylene glycol  17 g Oral Daily    senna-docusate 8.6-50 mg  1 tablet Oral BID    sodium chloride 0.9%  10 mL Intravenous Q6H    sodium chloride 3%  4 mL Nebulization Q12H    sodium chloride  2 g Oral Daily     PRN Meds:acetaminophen, bisacodyl, labetalol, naloxone, ondansetron, oxyCODONE, promethazine (PHENERGAN) IVPB, Flushing PICC Protocol **AND** sodium chloride 0.9% **AND** sodium chloride 0.9%       Objective:     Weight: 75.1 kg (165 lb 9.1 oz)  Body mass index is 24.45 kg/m².  Vital Signs (Most Recent):  Temp: 97.9 °F (36.6 °C) (12/31/19 0747)  Pulse: 81 (12/31/19 0817)  Resp: 20 (12/31/19 0817)  BP: (!) 140/70 (12/31/19 0747)  SpO2: 95 % (12/31/19 0817) Vital Signs (24h Range):  Temp:  [97.9 °F (36.6 °C)-98.3 °F (36.8 °C)] 97.9 °F (36.6 °C)  Pulse:  [71-88] 81  Resp:  [16-20] 20  SpO2:  [94 %-99 %] 95 %  BP: (109-140)/(57-70) 140/70                     Male External Urinary Catheter 12/19/19 1200 Small (Active)   Collection Container Urimeter 12/27/2019  7:50 PM   Securement  Method secured to lower ABD w/ tape 12/27/2019  7:50 PM   Skin no redness;no breakdown 12/27/2019  7:50 PM   Tolerance no signs/symptoms of discomfort 12/27/2019  7:50 PM   Output (mL) 250 mL 12/28/2019  9:00 AM   Catheter Change Date 12/24/19 12/26/2019  4:24 PM   Catheter Change Time 1100 12/26/2019  4:24 PM       Neurosurgery Physical Exam    General: well developed, well nourished, no distress.   Head: normocephalic, atraumatic  Neck: No tracheal deviation. No palpable masses.   Neurologic: Alert and oriented. Thought content appropriate.  GCS: Motor: 6/Verbal: 5/Eyes: 4 GCS Total: 15  Mental Status: Awake, Alert, Oriented x 4  Language: No aphasia  Speech: No dysarthria  Cranial nerves: face symmetric, tongue midline, CN II-XII grossly intact.   Eyes: pupils equal, round, reactive to light, EOM intact.   Ears: No drainage.   Pulmonary: normal respirations, no signs of respiratory distress  Abdomen: soft, non-distended, not tender to palpation  Sensory: intact to light touch throughout  Motor Strength: Moves all extremities spontaneously with good tone.  Generalized deconditioning throughout, grossly full strength upper and lower extremities. No abnormal movements seen.     Strength  Deltoids Triceps Biceps Wrist Extension Wrist Flexion Hand    Upper: R 5/5 5/5 5/5 5/5 5/5 5/5    L 5/5 5/5 5/5 5/5 5/5 5/5     Iliopsoas Quadriceps Knee  Flexion Tibialis  anterior Gastro- cnemius EHL   Lower: R 5/5 5/5 5/5 5/5 5/5 5/5    L 5/5 5/5 5/5 5/5 5/5 5/5     Damian: absent  Clonus: absent  Babinski: absent  Vascular: Pulses 2+ and symmetric radial and dorsalis pedis. No LE edema.   Skin: Skin is warm, dry and intact.    Anterior incision c/d/i with skin edges well approximated. No surrounding erythema or edema. No drainage from incision. No palpable hematoma.    Posterior incision c/d/I with skin edges well approximated, staples removed today. No surrounding erythema or edema. No drainage from incision. No palpable  underlying fluid collection.      Significant Labs:  Recent Labs   Lab 12/30/19  0435         K 3.4*      CO2 26   BUN 15   CREATININE 0.7   CALCIUM 8.6*     Recent Labs   Lab 12/30/19  0435   WBC 7.87   HGB 8.7*   HCT 28.9*        No results for input(s): LABPT, INR, APTT in the last 48 hours.  Microbiology Results (last 7 days)     Procedure Component Value Units Date/Time    Fungus culture [[id number]] Collected:  12/10/19 1038    Order Status:  Completed Specimen:  Body Fluid from Neck Updated:  12/27/19 1218     Fungus (Mycology) Culture Culture in progress      No fungus isolated after 2 weeks    Narrative:       2) Free vertebral abscess #2    Fungus culture [[id number]] Collected:  12/10/19 1038    Order Status:  Completed Specimen:  Body Fluid from Neck Updated:  12/27/19 1218     Fungus (Mycology) Culture Culture in progress      No fungus isolated after 2 weeks    Narrative:       1) Free vertebral abscess #1        Recent Lab Results     None        All pertinent labs from the last 24 hours have been reviewed.    Significant Diagnostics:  I have reviewed all pertinent imaging results/findings since admission. No new imaging within the past 24 hours.

## 2019-12-31 NOTE — PROGRESS NOTES
"[facility]  Adult Nutrition  Progress Note    SUMMARY       Recommendations    1. Continue current diet + Jose as tolerated.  2. Encourage po intake.   3. Please obtain new wt for accuracy.   4. RD following.     Goals: Pt to continue meeting EEN and EPN by RD follow-up  Nutrition Goal Status: progressing towards goal  Communication of RD Recs: (POC)    Reason for Assessment    Reason For Assessment: RD follow-up  Diagnosis: other (see comments)(Spinal epidural abscess)  Relevant Medical History: HTN, THC use, nicotine use, HLD, hypothyroidism, CAD, losartan  Interdisciplinary Rounds: did not attend  General Information Comments: Unable to see pt x 2 attempts. Per chart review, pt po intake varies, average 50-75% intake. Noted wt loss, but possible scale error. NFPE completed 12/16 noting mild fat/muscle wasting. RD does not feel pt meets malnutrition criteria at this time with current information, but will continue to monitor. Pt risk for acute malnutrition.   Nutrition Discharge Planning: adequate po intake    Nutrition Risk Screen    Nutrition Risk Screen: no indicators present    Nutrition/Diet History    Spiritual, Cultural Beliefs, Buddhist Practices, Values that Affect Care: no  Factors Affecting Nutritional Intake: decreased appetite(suspected)    Anthropometrics    Temp: 96.4 °F (35.8 °C)  Height: 5' 9" (175.3 cm)  Height (inches): 69 in  Weight Method: Bed Scale  Weight: 75.1 kg (165 lb 9.1 oz)  Weight (lb): 165.57 lb  Ideal Body Weight (IBW), Male: 160 lb  % Ideal Body Weight, Male (lb): 109.38 %  BMI (Calculated): 24.4  BMI Grade: 25 - 29.9 - overweight     Lab/Procedures/Meds    Pertinent Labs Reviewed: reviewed  Pertinent Labs Comments: K 3.4, alk phos 328  Pertinent Medications Reviewed: reviewed  Pertinent Medications Comments: amlodipine, vitamin C, famotidine, ferrous sulfate, levothyroxine, metoprolol, docusate    Estimated/Assessed Needs    Weight Used For Calorie " Calculations: 79.4 kg (175 lb 0.7 oz)  Energy Calorie Requirements (kcal): 1905  Energy Need Method: [place]-St Jeor(PAL 1.25)  Protein Requirements: 80-95g(1.0-1.2g/kg)  Weight Used For Protein Calculations: 79.4 kg (175 lb 0.7 oz)  Fluid Requirements (mL): 1 ml/kcal or per MD     RDA Method (mL): 1905     Nutrition Prescription Ordered    Current Diet Order: Mechanical Soft  Oral Nutrition Supplement: Jose    Evaluation of Received Nutrient/Fluid Intake    I/O: +3.885L since 12/17  Comments: LBM 12/28  Tolerance: tolerating  % Intake of Estimated Energy Needs: 50 - 75 %  % Meal Intake: 50 - 75 %    Nutrition Risk    Level of Risk/Frequency of Follow-up: (f/u 1 x wk)     Assessment and Plan    Nutrition Problem  Inadequate energy intake     Related to (etiology):   NPO     Signs and Symptoms (as evidenced by):   Pt receiving <85% EEN and EPN.      Interventions(treatment strategy):  Collaboration of care with providers     Nutrition Diagnosis Status:   Improving      Monitor and Evaluation    Food and Nutrient Intake: energy intake, food and beverage intake  Food and Nutrient Adminstration: diet order  Knowledge/Beliefs/Attitudes: food and nutrition knowledge/skill  Physical Activity and Function: nutrition-related ADLs and IADLs  Anthropometric Measurements: weight, weight change, body mass index  Biochemical Data, Medical Tests and Procedures: gastrointestinal profile, glucose/endocrine profile, electrolyte and renal panel, inflammatory profile, lipid profile  Nutrition-Focused Physical Findings: overall appearance     Malnutrition Assessment  Energy Intake: moderate energy intake              Orbital Region (Subcutaneous Fat Loss): mild depletion  Upper Arm Region (Subcutaneous Fat Loss): mild depletion   Bullard Region (Muscle Loss): mild depletion  Clavicle Bone Region (Muscle Loss): well nourished  Clavicle and Acromion Bone Region (Muscle Loss): well nourished  Dorsal Hand (Muscle Loss): mild depletion                  Nutrition Follow-Up    RD Follow-up?: Yes

## 2019-12-31 NOTE — PROGRESS NOTES
[facility]  Neurosurgery  Progress Note    Subjective:     History of Present Illness: Junaid Muñoz is a 74 y.o. male with PMH of HTN, HLD, Hepatitis C, and CAD s/p two stents on plavix who presents with 1 month history of back pain between his shoulders. MRI at OSH demonstrates likely epidural abscess. Pt denies hx of trauma and reports slow onset of symptoms.     Post-Op Info:  Procedure(s) (LRB):  FUSION, SPINE, CERVICAL, POSTERIOR APPROACH C2-T3 posterior instrumented fusion (N/A)   14 Days Post-Op     Interval History: NAEON. Pain remains controlled. Podiatry recommending wound care for left toe. Denies weakness, paresthesias, b/b dysfunction. Tolerating PO. Staples removed from posterior incision today. Pending SNF, medically stable for discharge.    Medications:  Continuous Infusions:  Scheduled Meds:   albuterol-ipratropium  3 mL Nebulization Q12H    amLODIPine  10 mg Oral Daily    ascorbic acid (vitamin C)  250 mg Oral BID    atorvastatin  80 mg Oral Daily    bacitracin   Topical (Top) BID    ceFEPime (MAXIPIME) IVPB  2 g Intravenous Q8H    clopidogrel  75 mg Oral Daily    famotidine  20 mg Oral BID    ferrous sulfate  325 mg Oral BID    gabapentin  300 mg Oral TID    heparin (porcine)  5,000 Units Subcutaneous Q8H    levothyroxine  50 mcg Oral Before breakfast    lidocaine  1 patch Transdermal Daily    losartan-hydrochlorothiazide 100-25 mg  1 tablet Oral Daily    metoprolol tartrate  50 mg Oral BID    polyethylene glycol  17 g Oral Daily    senna-docusate 8.6-50 mg  1 tablet Oral BID    sodium chloride 0.9%  10 mL Intravenous Q6H    sodium chloride 3%  4 mL Nebulization Q12H    sodium chloride  2 g Oral Daily     PRN Meds:acetaminophen, bisacodyl, labetalol, naloxone, ondansetron, oxyCODONE, promethazine (PHENERGAN) IVPB, Flushing PICC Protocol **AND** sodium chloride 0.9% **AND** sodium chloride 0.9%       Objective:     Weight: 75.1 kg (165 lb 9.1 oz)  Body  mass index is 24.45 kg/m².  Vital Signs (Most Recent):  Temp: 97.9 °F (36.6 °C) (12/31/19 0747)  Pulse: 81 (12/31/19 0817)  Resp: 20 (12/31/19 0817)  BP: (!) 140/70 (12/31/19 0747)  SpO2: 95 % (12/31/19 0817) Vital Signs (24h Range):  Temp:  [97.9 °F (36.6 °C)-98.3 °F (36.8 °C)] 97.9 °F (36.6 °C)  Pulse:  [71-88] 81  Resp:  [16-20] 20  SpO2:  [94 %-99 %] 95 %  BP: (109-140)/(57-70) 140/70                     Male External Urinary Catheter 12/19/19 1200 Small (Active)   Collection Container Urimeter 12/27/2019  7:50 PM   Securement Method secured to lower ABD w/ tape 12/27/2019  7:50 PM   Skin no redness;no breakdown 12/27/2019  7:50 PM   Tolerance no signs/symptoms of discomfort 12/27/2019  7:50 PM   Output (mL) 250 mL 12/28/2019  9:00 AM   Catheter Change Date 12/24/19 12/26/2019  4:24 PM   Catheter Change Time 1100 12/26/2019  4:24 PM       Neurosurgery Physical Exam    General: well developed, well nourished, no distress.   Head: normocephalic, atraumatic  Neck: No tracheal deviation. No palpable masses.   Neurologic: Alert and oriented. Thought content appropriate.  GCS: Motor: 6/Verbal: 5/Eyes: 4 GCS Total: 15  Mental Status: Awake, Alert, Oriented x 4  Language: No aphasia  Speech: No dysarthria  Cranial nerves: face symmetric, tongue midline, CN II-XII grossly intact.   Eyes: pupils equal, round, reactive to light, EOM intact.   Ears: No drainage.   Pulmonary: normal respirations, no signs of respiratory distress  Abdomen: soft, non-distended, not tender to palpation  Sensory: intact to light touch throughout  Motor Strength: Moves all extremities spontaneously with good tone.  Generalized deconditioning throughout, grossly full strength upper and lower extremities. No abnormal movements seen.     Strength  Deltoids Triceps Biceps Wrist Extension Wrist Flexion Hand    Upper: R 5/5 5/5 5/5 5/5 5/5 5/5    L 5/5 5/5 5/5 5/5 5/5 5/5     Iliopsoas Quadriceps Knee  Flexion Tibialis  anterior Gastro- cnemius EHL    Lower: R 5/5 5/5 5/5 5/5 5/5 5/5    L 5/5 5/5 5/5 5/5 5/5 5/5     Damian: absent  Clonus: absent  Babinski: absent  Vascular: Pulses 2+ and symmetric radial and dorsalis pedis. No LE edema.   Skin: Skin is warm, dry and intact.    Anterior incision c/d/i with skin edges well approximated. No surrounding erythema or edema. No drainage from incision. No palpable hematoma.    Posterior incision c/d/I with skin edges well approximated, staples removed today. No surrounding erythema or edema. No drainage from incision. No palpable underlying fluid collection.      Significant Labs:  Recent Labs   Lab 12/30/19  0435         K 3.4*      CO2 26   BUN 15   CREATININE 0.7   CALCIUM 8.6*     Recent Labs   Lab 12/30/19  0435   WBC 7.87   HGB 8.7*   HCT 28.9*        No results for input(s): LABPT, INR, APTT in the last 48 hours.  Microbiology Results (last 7 days)     Procedure Component Value Units Date/Time    Fungus culture [[id number]] Collected:  12/10/19 1038    Order Status:  Completed Specimen:  Body Fluid from Neck Updated:  12/27/19 1218     Fungus (Mycology) Culture Culture in progress      No fungus isolated after 2 weeks    Narrative:       2) Free vertebral abscess #2    Fungus culture [[id number]] Collected:  12/10/19 1038    Order Status:  Completed Specimen:  Body Fluid from Neck Updated:  12/27/19 1218     Fungus (Mycology) Culture Culture in progress      No fungus isolated after 2 weeks    Narrative:       1) Free vertebral abscess #1        Recent Lab Results     None        All pertinent labs from the last 24 hours have been reviewed.    Significant Diagnostics:  I have reviewed all pertinent imaging results/findings since admission. No new imaging within the past 24 hours.    Assessment/Plan:     * Spinal epidural abscess  74 y.o. male with PMH of HTN, HLD, Hepatitis C, and CAD s/p two stents on plavix who presents with C6-T2 osteomyelitis with suspected epidural abscess.  Now  s/p C7/T1 corpectomies on 12/10 and C2-T2  posterior instrumented fusion 12/17.    -Patient neurologically stable on exam  -Brace on when upright, OOB, or working with therapy. OK to remove when lying in bed.   -Staples removed 12/31/19. Incision intact without signs of infection. Leave incision open to air. Turn q2h to continue to promote wound healing.   -ID: Afebrile. No leukocytosis. Continue Cefepime 2g q 8 hours. Estimated end date of therapy 1/28/20-2/11/20. FU in ID clinic in 2-3 weeks.   -2nd left toe wound care: Seen by Podiatry for left 2nd toe, recommend betadine daily. Dress with foam bandage. Wound care orders placed. Appreciate assistance.  -Pain: Continue current regimen. No adjustments needed at this time.   -Anemia: H/H improving, 8.7/28.9. Continue iron for replacement x 2 weeks. F/u at next lab draw.  -Elevated Alk Phos: Stable and improving. AST/ALT remain WNL. Elevated at the time of admission.  Per HM, likely secondary to osteomyelitis. Will continue to monitor closely at next lab draw.   -HTN: -140. Continue Amlodipine 10 mg daily, Losartan-HCTZ 100-25 mg daily, and Metoprolol 50 mg BID.   -CAD s/p stent placement: Continue home dose of Plavix 75 mg daily.   -Hyponatremia: Last Na 140. Now on Na tabs daily. Will continue to wean as tolerated.   -Hypoalbuminemia: Alb 2.3. Continue Jose BID to promote wound healing.   -HLD: Continue home dose of Atorvastatin 80 mg daily  -DVT prophylaxis: TUCKER's, SCD's, SQH  -Bowel regimen: senna BID and miralax daily  -Atelectasis prevention: IS hourly while awake, PT/OT, OOB > 6 hours per day       DISPO: Pending SNF. Patient medically stable for discharge.     Discussed with Dr. Yosi Wood PA-C  Neurosurgery  [facility]

## 2019-12-31 NOTE — PLAN OF CARE
Problem: Adult Inpatient Plan of Care  Goal: Absence of Hospital-Acquired Illness or Injury  Outcome: Ongoing, Progressing     Problem: Fall Injury Risk  Goal: Absence of Fall and Fall-Related Injury  Outcome: Ongoing, Progressing   Pt resting in bed with no distress noted.  Respirations even and unlabored.  Incisions to neck and upper back with no s/s of infection.  Picc to right upper arm patent flushes well and draws back blood.  Foam dressing to coccyx for protections.  Dressing to foot clean and dry.  No c/o pain or discomfort.  Call light in reach.  Bed alarm set for safety.

## 2020-01-01 LAB
BASOPHILS # BLD AUTO: 0.04 K/UL (ref 0–0.2)
BASOPHILS NFR BLD: 0.5 % (ref 0–1.9)
DIFFERENTIAL METHOD: ABNORMAL
EOSINOPHIL # BLD AUTO: 0.2 K/UL (ref 0–0.5)
EOSINOPHIL NFR BLD: 2.9 % (ref 0–8)
ERYTHROCYTE [DISTWIDTH] IN BLOOD BY AUTOMATED COUNT: 19.3 % (ref 11.5–14.5)
HCT VFR BLD AUTO: 28.5 % (ref 40–54)
HGB BLD-MCNC: 8.8 G/DL (ref 14–18)
IMM GRANULOCYTES # BLD AUTO: 0.04 K/UL (ref 0–0.04)
IMM GRANULOCYTES NFR BLD AUTO: 0.5 % (ref 0–0.5)
LYMPHOCYTES # BLD AUTO: 1.2 K/UL (ref 1–4.8)
LYMPHOCYTES NFR BLD: 16.5 % (ref 18–48)
MCH RBC QN AUTO: 28.3 PG (ref 27–31)
MCHC RBC AUTO-ENTMCNC: 30.9 G/DL (ref 32–36)
MCV RBC AUTO: 92 FL (ref 82–98)
MONOCYTES # BLD AUTO: 0.8 K/UL (ref 0.3–1)
MONOCYTES NFR BLD: 10.9 % (ref 4–15)
NEUTROPHILS # BLD AUTO: 5.1 K/UL (ref 1.8–7.7)
NEUTROPHILS NFR BLD: 68.7 % (ref 38–73)
NRBC BLD-RTO: 0 /100 WBC
PLATELET # BLD AUTO: 131 K/UL (ref 150–350)
PMV BLD AUTO: 10.2 FL (ref 9.2–12.9)
RBC # BLD AUTO: 3.11 M/UL (ref 4.6–6.2)
WBC # BLD AUTO: 7.35 K/UL (ref 3.9–12.7)

## 2020-01-01 PROCEDURE — 25000003 PHARM REV CODE 250: Performed by: PHYSICIAN ASSISTANT

## 2020-01-01 PROCEDURE — 94640 AIRWAY INHALATION TREATMENT: CPT

## 2020-01-01 PROCEDURE — 25000242 PHARM REV CODE 250 ALT 637 W/ HCPCS: Performed by: PHYSICIAN ASSISTANT

## 2020-01-01 PROCEDURE — 99221 PR INITIAL HOSPITAL CARE,LEVL I: ICD-10-PCS | Mod: ,,, | Performed by: PODIATRIST

## 2020-01-01 PROCEDURE — 94761 N-INVAS EAR/PLS OXIMETRY MLT: CPT

## 2020-01-01 PROCEDURE — 63600175 PHARM REV CODE 636 W HCPCS: Performed by: PSYCHIATRY & NEUROLOGY

## 2020-01-01 PROCEDURE — 85025 COMPLETE CBC W/AUTO DIFF WBC: CPT

## 2020-01-01 PROCEDURE — 25000003 PHARM REV CODE 250: Performed by: PSYCHIATRY & NEUROLOGY

## 2020-01-01 PROCEDURE — 25000003 PHARM REV CODE 250: Performed by: NURSE PRACTITIONER

## 2020-01-01 PROCEDURE — 25000003 PHARM REV CODE 250: Performed by: ANESTHESIOLOGY

## 2020-01-01 PROCEDURE — 11000001 HC ACUTE MED/SURG PRIVATE ROOM

## 2020-01-01 PROCEDURE — 99221 1ST HOSP IP/OBS SF/LOW 40: CPT | Mod: ,,, | Performed by: PODIATRIST

## 2020-01-01 PROCEDURE — 94799 UNLISTED PULMONARY SVC/PX: CPT

## 2020-01-01 PROCEDURE — A4216 STERILE WATER/SALINE, 10 ML: HCPCS | Performed by: ANESTHESIOLOGY

## 2020-01-01 RX ADMIN — Medication 10 ML: at 12:01

## 2020-01-01 RX ADMIN — GABAPENTIN 300 MG: 300 CAPSULE ORAL at 09:01

## 2020-01-01 RX ADMIN — FERROUS SULFATE TAB EC 325 MG (65 MG FE EQUIVALENT) 325 MG: 325 (65 FE) TABLET DELAYED RESPONSE at 10:01

## 2020-01-01 RX ADMIN — GABAPENTIN 300 MG: 300 CAPSULE ORAL at 02:01

## 2020-01-01 RX ADMIN — IPRATROPIUM BROMIDE AND ALBUTEROL SULFATE 3 ML: .5; 3 SOLUTION RESPIRATORY (INHALATION) at 07:01

## 2020-01-01 RX ADMIN — Medication 2 G: at 10:01

## 2020-01-01 RX ADMIN — METOPROLOL TARTRATE 50 MG: 50 TABLET ORAL at 10:01

## 2020-01-01 RX ADMIN — HEPARIN SODIUM 5000 UNITS: 5000 INJECTION, SOLUTION INTRAVENOUS; SUBCUTANEOUS at 02:01

## 2020-01-01 RX ADMIN — LOSARTAN POTASSIUM AND HYDROCHLOROTHIAZIDE TABLETS 1 TABLET: 100; 25 TABLET, FILM COATED ORAL at 10:01

## 2020-01-01 RX ADMIN — SENNOSIDES AND DOCUSATE SODIUM 1 TABLET: 8.6; 5 TABLET ORAL at 10:01

## 2020-01-01 RX ADMIN — CLOPIDOGREL BISULFATE 75 MG: 75 TABLET ORAL at 10:01

## 2020-01-01 RX ADMIN — AMLODIPINE BESYLATE 10 MG: 10 TABLET ORAL at 10:01

## 2020-01-01 RX ADMIN — Medication 10 ML: at 06:01

## 2020-01-01 RX ADMIN — Medication 250 MG: at 09:01

## 2020-01-01 RX ADMIN — POLYETHYLENE GLYCOL 3350 17 G: 17 POWDER, FOR SOLUTION ORAL at 10:01

## 2020-01-01 RX ADMIN — BACITRACIN: 500 OINTMENT TOPICAL at 10:01

## 2020-01-01 RX ADMIN — OXYCODONE HYDROCHLORIDE 10 MG: 10 TABLET ORAL at 12:01

## 2020-01-01 RX ADMIN — HEPARIN SODIUM 5000 UNITS: 5000 INJECTION, SOLUTION INTRAVENOUS; SUBCUTANEOUS at 04:01

## 2020-01-01 RX ADMIN — FERROUS SULFATE TAB EC 325 MG (65 MG FE EQUIVALENT) 325 MG: 325 (65 FE) TABLET DELAYED RESPONSE at 09:01

## 2020-01-01 RX ADMIN — CEFEPIME 2 G: 2 INJECTION, POWDER, FOR SOLUTION INTRAVENOUS at 04:01

## 2020-01-01 RX ADMIN — HEPARIN SODIUM 5000 UNITS: 5000 INJECTION, SOLUTION INTRAVENOUS; SUBCUTANEOUS at 09:01

## 2020-01-01 RX ADMIN — Medication 10 ML: at 04:01

## 2020-01-01 RX ADMIN — LIDOCAINE 1 PATCH: 50 PATCH CUTANEOUS at 10:01

## 2020-01-01 RX ADMIN — LEVOTHYROXINE SODIUM 50 MCG: 50 TABLET ORAL at 04:01

## 2020-01-01 RX ADMIN — CEFEPIME 2 G: 2 INJECTION, POWDER, FOR SOLUTION INTRAVENOUS at 12:01

## 2020-01-01 RX ADMIN — SODIUM CHLORIDE SOLN NEBU 3% 4 ML: 3 NEBU SOLN at 07:01

## 2020-01-01 RX ADMIN — Medication 250 MG: at 10:01

## 2020-01-01 RX ADMIN — FAMOTIDINE 20 MG: 20 TABLET ORAL at 10:01

## 2020-01-01 RX ADMIN — BACITRACIN: 500 OINTMENT TOPICAL at 09:01

## 2020-01-01 RX ADMIN — ATORVASTATIN CALCIUM 80 MG: 20 TABLET, FILM COATED ORAL at 10:01

## 2020-01-01 RX ADMIN — GABAPENTIN 300 MG: 300 CAPSULE ORAL at 10:01

## 2020-01-01 RX ADMIN — CEFEPIME 2 G: 2 INJECTION, POWDER, FOR SOLUTION INTRAVENOUS at 09:01

## 2020-01-01 RX ADMIN — SENNOSIDES AND DOCUSATE SODIUM 1 TABLET: 8.6; 5 TABLET ORAL at 09:01

## 2020-01-01 RX ADMIN — METOPROLOL TARTRATE 50 MG: 50 TABLET ORAL at 09:01

## 2020-01-01 RX ADMIN — FAMOTIDINE 20 MG: 20 TABLET ORAL at 09:01

## 2020-01-01 NOTE — PLAN OF CARE
Problem: Infection  Goal: Infection Symptom Resolution  Outcome: Ongoing, Progressing     Problem: Pain Acute  Goal: Optimal Pain Control  Outcome: Ongoing, Progressing   Pt resting in bed with no distress noted.  Picc line to right upper arm patent and flushes well.   Dressing to foot clean and dry.  Pulses to bilateral lower ext wnl.  No c/o pain or discomfort.  Incision to neck and back with no s/s of infection.  Call light within reach.  Bed locked and lowered for safety

## 2020-01-02 PROBLEM — Z01.818 PRE-OP EVALUATION: Status: RESOLVED | Noted: 2019-12-09 | Resolved: 2020-01-02

## 2020-01-02 LAB
ALBUMIN SERPL BCP-MCNC: 2.3 G/DL (ref 3.5–5.2)
ALP SERPL-CCNC: 401 U/L (ref 55–135)
ALT SERPL W/O P-5'-P-CCNC: 45 U/L (ref 10–44)
AMMONIA PLAS-SCNC: 29 UMOL/L (ref 10–50)
ANION GAP SERPL CALC-SCNC: 5 MMOL/L (ref 8–16)
APTT BLDCRRT: 23.6 SEC (ref 21–32)
AST SERPL-CCNC: 54 U/L (ref 10–40)
BILIRUB SERPL-MCNC: 0.4 MG/DL (ref 0.1–1)
BUN SERPL-MCNC: 22 MG/DL (ref 8–23)
CALCIUM SERPL-MCNC: 9.1 MG/DL (ref 8.7–10.5)
CHLORIDE SERPL-SCNC: 103 MMOL/L (ref 95–110)
CO2 SERPL-SCNC: 27 MMOL/L (ref 23–29)
CREAT SERPL-MCNC: 0.8 MG/DL (ref 0.5–1.4)
EST. GFR  (AFRICAN AMERICAN): >60 ML/MIN/1.73 M^2
EST. GFR  (NON AFRICAN AMERICAN): >60 ML/MIN/1.73 M^2
GLUCOSE SERPL-MCNC: 103 MG/DL (ref 70–110)
INR PPP: 1 (ref 0.8–1.2)
POTASSIUM SERPL-SCNC: 3.8 MMOL/L (ref 3.5–5.1)
PROT SERPL-MCNC: 6.1 G/DL (ref 6–8.4)
PROTHROMBIN TIME: 10.7 SEC (ref 9–12.5)
SODIUM SERPL-SCNC: 135 MMOL/L (ref 136–145)

## 2020-01-02 PROCEDURE — 25000003 PHARM REV CODE 250: Performed by: ANESTHESIOLOGY

## 2020-01-02 PROCEDURE — 99024 PR POST-OP FOLLOW-UP VISIT: ICD-10-PCS | Mod: ,,, | Performed by: PHYSICIAN ASSISTANT

## 2020-01-02 PROCEDURE — 85730 THROMBOPLASTIN TIME PARTIAL: CPT

## 2020-01-02 PROCEDURE — 86803 HEPATITIS C AB TEST: CPT

## 2020-01-02 PROCEDURE — 99222 PR INITIAL HOSPITAL CARE,LEVL II: ICD-10-PCS | Mod: GC,,, | Performed by: INTERNAL MEDICINE

## 2020-01-02 PROCEDURE — 97116 GAIT TRAINING THERAPY: CPT

## 2020-01-02 PROCEDURE — 25000003 PHARM REV CODE 250: Performed by: NURSE PRACTITIONER

## 2020-01-02 PROCEDURE — 25000003 PHARM REV CODE 250: Performed by: PSYCHIATRY & NEUROLOGY

## 2020-01-02 PROCEDURE — 63600175 PHARM REV CODE 636 W HCPCS: Performed by: PSYCHIATRY & NEUROLOGY

## 2020-01-02 PROCEDURE — 82140 ASSAY OF AMMONIA: CPT

## 2020-01-02 PROCEDURE — 25000242 PHARM REV CODE 250 ALT 637 W/ HCPCS: Performed by: PHYSICIAN ASSISTANT

## 2020-01-02 PROCEDURE — 87522 HEPATITIS C REVRS TRNSCRPJ: CPT

## 2020-01-02 PROCEDURE — 99222 1ST HOSP IP/OBS MODERATE 55: CPT | Mod: GC,,, | Performed by: INTERNAL MEDICINE

## 2020-01-02 PROCEDURE — 11000001 HC ACUTE MED/SURG PRIVATE ROOM

## 2020-01-02 PROCEDURE — 99024 POSTOP FOLLOW-UP VISIT: CPT | Mod: ,,, | Performed by: PHYSICIAN ASSISTANT

## 2020-01-02 PROCEDURE — 25000003 PHARM REV CODE 250: Performed by: PHYSICIAN ASSISTANT

## 2020-01-02 PROCEDURE — 94761 N-INVAS EAR/PLS OXIMETRY MLT: CPT

## 2020-01-02 PROCEDURE — 97535 SELF CARE MNGMENT TRAINING: CPT

## 2020-01-02 PROCEDURE — 36415 COLL VENOUS BLD VENIPUNCTURE: CPT

## 2020-01-02 PROCEDURE — 94640 AIRWAY INHALATION TREATMENT: CPT

## 2020-01-02 PROCEDURE — 85610 PROTHROMBIN TIME: CPT

## 2020-01-02 PROCEDURE — 80053 COMPREHEN METABOLIC PANEL: CPT

## 2020-01-02 PROCEDURE — A4216 STERILE WATER/SALINE, 10 ML: HCPCS | Performed by: ANESTHESIOLOGY

## 2020-01-02 RX ORDER — ASCORBIC ACID 250 MG
250 TABLET ORAL 2 TIMES DAILY
Status: ON HOLD
Start: 2020-01-02 | End: 2022-10-21 | Stop reason: HOSPADM

## 2020-01-02 RX ORDER — SODIUM CHLORIDE 0.9 % (FLUSH) 0.9 %
10 SYRINGE (ML) INJECTION EVERY 6 HOURS
Start: 2020-01-02 | End: 2021-06-03

## 2020-01-02 RX ORDER — METOPROLOL TARTRATE 50 MG/1
50 TABLET ORAL 2 TIMES DAILY
Qty: 60 TABLET | Refills: 11
Start: 2020-01-02 | End: 2020-01-14

## 2020-01-02 RX ORDER — GABAPENTIN 300 MG/1
300 CAPSULE ORAL 3 TIMES DAILY
Qty: 90 CAPSULE | Refills: 11
Start: 2020-01-02 | End: 2021-06-03

## 2020-01-02 RX ORDER — OXYCODONE HYDROCHLORIDE 10 MG/1
10 TABLET ORAL EVERY 6 HOURS PRN
Qty: 56 TABLET | Refills: 0 | Status: SHIPPED | OUTPATIENT
Start: 2020-01-02 | End: 2021-06-03

## 2020-01-02 RX ORDER — AMLODIPINE BESYLATE 10 MG/1
10 TABLET ORAL DAILY
Qty: 30 TABLET | Refills: 11
Start: 2020-01-03 | End: 2020-01-14 | Stop reason: SDUPTHER

## 2020-01-02 RX ORDER — IPRATROPIUM BROMIDE AND ALBUTEROL SULFATE 2.5; .5 MG/3ML; MG/3ML
3 SOLUTION RESPIRATORY (INHALATION) EVERY 12 HOURS
Qty: 1 BOX | Refills: 0
Start: 2020-01-02 | End: 2021-06-03

## 2020-01-02 RX ORDER — FERROUS SULFATE 325(65) MG
325 TABLET, DELAYED RELEASE (ENTERIC COATED) ORAL 2 TIMES DAILY
Refills: 0 | Status: ON HOLD
Start: 2020-01-02 | End: 2022-10-21 | Stop reason: HOSPADM

## 2020-01-02 RX ORDER — SODIUM CHLORIDE 0.9 % (FLUSH) 0.9 %
10 SYRINGE (ML) INJECTION
Start: 2020-01-02 | End: 2021-06-03

## 2020-01-02 RX ADMIN — Medication 10 ML: at 02:01

## 2020-01-02 RX ADMIN — GABAPENTIN 300 MG: 300 CAPSULE ORAL at 09:01

## 2020-01-02 RX ADMIN — IPRATROPIUM BROMIDE AND ALBUTEROL SULFATE 3 ML: .5; 3 SOLUTION RESPIRATORY (INHALATION) at 07:01

## 2020-01-02 RX ADMIN — CLOPIDOGREL BISULFATE 75 MG: 75 TABLET ORAL at 09:01

## 2020-01-02 RX ADMIN — LOSARTAN POTASSIUM AND HYDROCHLOROTHIAZIDE TABLETS 1 TABLET: 100; 25 TABLET, FILM COATED ORAL at 09:01

## 2020-01-02 RX ADMIN — SODIUM CHLORIDE SOLN NEBU 3% 4 ML: 3 NEBU SOLN at 07:01

## 2020-01-02 RX ADMIN — Medication 10 ML: at 12:01

## 2020-01-02 RX ADMIN — FERROUS SULFATE TAB EC 325 MG (65 MG FE EQUIVALENT) 325 MG: 325 (65 FE) TABLET DELAYED RESPONSE at 09:01

## 2020-01-02 RX ADMIN — Medication 10 ML: at 05:01

## 2020-01-02 RX ADMIN — AMLODIPINE BESYLATE 10 MG: 10 TABLET ORAL at 09:01

## 2020-01-02 RX ADMIN — SENNOSIDES AND DOCUSATE SODIUM 1 TABLET: 8.6; 5 TABLET ORAL at 09:01

## 2020-01-02 RX ADMIN — LEVOTHYROXINE SODIUM 50 MCG: 50 TABLET ORAL at 04:01

## 2020-01-02 RX ADMIN — METOPROLOL TARTRATE 50 MG: 50 TABLET ORAL at 09:01

## 2020-01-02 RX ADMIN — HEPARIN SODIUM 5000 UNITS: 5000 INJECTION, SOLUTION INTRAVENOUS; SUBCUTANEOUS at 09:01

## 2020-01-02 RX ADMIN — BACITRACIN: 500 OINTMENT TOPICAL at 09:01

## 2020-01-02 RX ADMIN — Medication 2 G: at 09:01

## 2020-01-02 RX ADMIN — ATORVASTATIN CALCIUM 80 MG: 20 TABLET, FILM COATED ORAL at 09:01

## 2020-01-02 RX ADMIN — FAMOTIDINE 20 MG: 20 TABLET ORAL at 09:01

## 2020-01-02 RX ADMIN — GABAPENTIN 300 MG: 300 CAPSULE ORAL at 02:01

## 2020-01-02 RX ADMIN — LIDOCAINE 1 PATCH: 50 PATCH CUTANEOUS at 09:01

## 2020-01-02 RX ADMIN — Medication 250 MG: at 09:01

## 2020-01-02 RX ADMIN — HEPARIN SODIUM 5000 UNITS: 5000 INJECTION, SOLUTION INTRAVENOUS; SUBCUTANEOUS at 04:01

## 2020-01-02 RX ADMIN — CEFEPIME 2 G: 2 INJECTION, POWDER, FOR SOLUTION INTRAVENOUS at 04:01

## 2020-01-02 RX ADMIN — CEFEPIME 2 G: 2 INJECTION, POWDER, FOR SOLUTION INTRAVENOUS at 01:01

## 2020-01-02 RX ADMIN — CEFEPIME 2 G: 2 INJECTION, POWDER, FOR SOLUTION INTRAVENOUS at 09:01

## 2020-01-02 RX ADMIN — POLYETHYLENE GLYCOL 3350 17 G: 17 POWDER, FOR SOLUTION ORAL at 09:01

## 2020-01-02 RX ADMIN — HEPARIN SODIUM 5000 UNITS: 5000 INJECTION, SOLUTION INTRAVENOUS; SUBCUTANEOUS at 02:01

## 2020-01-02 NOTE — PROGRESS NOTES
[facility]  Neurosurgery  Progress Note    Subjective:     History of Present Illness: Junaid Muñoz is a 74 y.o. male with PMH of HTN, HLD, Hepatitis C, and CAD s/p two stents on plavix who presents with 1 month history of back pain between his shoulders. MRI at OSH demonstrates likely epidural abscess. Pt denies hx of trauma and reports slow onset of symptoms.     Post-Op Info:  Procedure(s) (LRB):  FUSION, SPINE, CERVICAL, POSTERIOR APPROACH C2-T3 posterior instrumented fusion (N/A)   15 Days Post-Op     Interval History: NAEON. Pending SNF. Medically stable for discharge to next level of care.    Medications:  Continuous Infusions:  Scheduled Meds:   albuterol-ipratropium  3 mL Nebulization Q12H    amLODIPine  10 mg Oral Daily    ascorbic acid (vitamin C)  250 mg Oral BID    atorvastatin  80 mg Oral Daily    bacitracin   Topical (Top) BID    ceFEPime (MAXIPIME) IVPB  2 g Intravenous Q8H    clopidogrel  75 mg Oral Daily    famotidine  20 mg Oral BID    ferrous sulfate  325 mg Oral BID    gabapentin  300 mg Oral TID    heparin (porcine)  5,000 Units Subcutaneous Q8H    levothyroxine  50 mcg Oral Before breakfast    lidocaine  1 patch Transdermal Daily    losartan-hydrochlorothiazide 100-25 mg  1 tablet Oral Daily    metoprolol tartrate  50 mg Oral BID    polyethylene glycol  17 g Oral Daily    senna-docusate 8.6-50 mg  1 tablet Oral BID    sodium chloride 0.9%  10 mL Intravenous Q6H    sodium chloride 3%  4 mL Nebulization Q12H    sodium chloride  2 g Oral Daily     PRN Meds:acetaminophen, bisacodyl, labetalol, naloxone, ondansetron, oxyCODONE, promethazine (PHENERGAN) IVPB, Flushing PICC Protocol **AND** sodium chloride 0.9% **AND** sodium chloride 0.9%       Objective:     Weight: 75.1 kg (165 lb 9.1 oz)  Body mass index is 24.45 kg/m².  Vital Signs (Most Recent):  Temp: 98.2 °F (36.8 °C) (01/01/20 1452)  Pulse: 91 (01/01/20 1452)  Resp: 16 (01/01/20 0748)  BP: 123/62  (01/01/20 1452)  SpO2: 98 % (01/01/20 1452) Vital Signs (24h Range):  Temp:  [95.8 °F (35.4 °C)-98.6 °F (37 °C)] 98.2 °F (36.8 °C)  Pulse:  [] 91  Resp:  [16-18] 16  SpO2:  [95 %-99 %] 98 %  BP: (113-157)/(55-75) 123/62                     Male External Urinary Catheter 12/19/19 1200 Small (Active)   Collection Container Urimeter 12/27/2019  7:50 PM   Securement Method secured to lower ABD w/ tape 12/27/2019  7:50 PM   Skin no redness;no breakdown 12/27/2019  7:50 PM   Tolerance no signs/symptoms of discomfort 12/27/2019  7:50 PM   Output (mL) 250 mL 12/28/2019  9:00 AM   Catheter Change Date 12/24/19 12/26/2019  4:24 PM   Catheter Change Time 1100 12/26/2019  4:24 PM       Neurosurgery Physical Exam      General: well developed, well nourished, no distress.   Head: normocephalic, atraumatic  Neck: No tracheal deviation. No palpable masses.   Neurologic: Alert and oriented. Thought content appropriate.  GCS: Motor: 6/Verbal: 5/Eyes: 4 GCS Total: 15  Mental Status: Awake, Alert, Oriented x 4  Language: No aphasia  Speech: No dysarthria  Cranial nerves: face symmetric, tongue midline, CN II-XII grossly intact.   Eyes: pupils equal, round, reactive to light, EOM intact.   Ears: No drainage.   Pulmonary: normal respirations, no signs of respiratory distress  Abdomen: soft, non-distended, not tender to palpation  Sensory: intact to light touch throughout  Motor Strength: Moves all extremities spontaneously with good tone.  Generalized deconditioning throughout, grossly full strength upper and lower extremities. No abnormal movements seen.     Strength  Deltoids Triceps Biceps Wrist Extension Wrist Flexion Hand    Upper: R 5/5 5/5 5/5 5/5 5/5 5/5    L 5/5 5/5 5/5 5/5 5/5 5/5     Iliopsoas Quadriceps Knee  Flexion Tibialis  anterior Gastro- cnemius EHL   Lower: R 5/5 5/5 5/5 5/5 5/5 5/5    L 5/5 5/5 5/5 5/5 5/5 5/5     Damian: absent  Clonus: absent  Babinski: absent  Vascular: Pulses 2+ and symmetric radial and  dorsalis pedis. No LE edema.   Skin: Skin is warm, dry and intact.    Anterior incision c/d/i with skin edges well approximated. No surrounding erythema or edema. No drainage from incision. No palpable hematoma.    Posterior incision c/d/I with skin edges well approximated, staples removed today. No surrounding erythema or edema. No drainage from incision. No palpable underlying fluid collection.      Significant Labs:  No results for input(s): GLU, NA, K, CL, CO2, BUN, CREATININE, CALCIUM, MG in the last 48 hours.  No results for input(s): WBC, HGB, HCT, PLT in the last 48 hours.  No results for input(s): LABPT, INR, APTT in the last 48 hours.  Microbiology Results (last 7 days)     Procedure Component Value Units Date/Time    Fungus culture [[id number]] Collected:  12/10/19 1038    Order Status:  Completed Specimen:  Body Fluid from Neck Updated:  12/27/19 1218     Fungus (Mycology) Culture Culture in progress      No fungus isolated after 2 weeks    Narrative:       2) Free vertebral abscess #2    Fungus culture [[id number]] Collected:  12/10/19 1038    Order Status:  Completed Specimen:  Body Fluid from Neck Updated:  12/27/19 1218     Fungus (Mycology) Culture Culture in progress      No fungus isolated after 2 weeks    Narrative:       1) Free vertebral abscess #1        Recent Lab Results     None        All pertinent labs from the last 24 hours have been reviewed.    Significant Diagnostics:  I have reviewed all pertinent imaging results/findings since admission. No new imaging within the past 24 hours.    Review of Systems        Assessment/Plan:     * Spinal epidural abscess  74 y.o. male with PMH of HTN, HLD, Hepatitis C, and CAD s/p two stents on plavix who presents with C6-T2 osteomyelitis with suspected epidural abscess.  Now s/p C7/T1 corpectomies on 12/10 and C2-T2  posterior instrumented fusion 12/17.    -Patient neurologically stable on exam  -Brace on when upright, OOB, or working with therapy. OK  to remove when lying in bed.   -Staples removed 12/31/19. Incision intact without signs of infection. Leave incision open to air. Turn q2h to continue to promote wound healing.   -ID: Afebrile. No leukocytosis. Continue Cefepime 2g q 8 hours. Estimated end date of therapy 1/28/20-2/11/20. FU in ID clinic in 2-3 weeks.   -2nd left toe wound care: Seen by Podiatry for left 2nd toe, recommend betadine daily. Dress with foam bandage. Wound care on board. Appreciate assistance.  -Pain: Continue current regimen. No adjustments needed at this time.   -Anemia: Continue iron for replacement x 2 weeks. F/u at next lab draw.  -HTN: Continue Amlodipine 10 mg daily, Losartan-HCTZ 100-25 mg daily, and Metoprolol 50 mg BID.   -CAD s/p stent placement: Continue home dose of Plavix 75 mg daily.   -Hyponatremia:  Na tabs daily. Will continue to wean as tolerated.   -Hypoalbuminemia: Continue Jose BID to promote wound healing.   -HLD: Continue home dose of Atorvastatin 80 mg daily  -DVT prophylaxis: TUCKER's, SCD's, SQH  -Bowel regimen: senna BID and miralax daily  -Atelectasis prevention: IS hourly while awake, PT/OT, OOB > 6 hours per day       DISPO: Pending SNF. Patient medically stable for discharge.         Korey Umanzor MD  Neurosurgery  [facility]

## 2020-01-02 NOTE — ASSESSMENT & PLAN NOTE
Pt is a 74yM with PMH notable for prior IVDU and Hep C with cirrhosis who presented on 12/06 with one month duration of upper back pain with work-up at outside hospital significant for MRI suggestive of osteomyelitis of C6-T2 with associated epidural abscess with posterior spinal cord displacement. He was taken to OR for C7-T1 corpectomy on 12/10 and again on 12/17 for posterior fusion with stay in Neuro ICU following surgery and drain management. Stepped down to floor on 12/26. Currently on cefepime 2g Q8 hours for pseudomonas growing in aerobic culture from abscess on 12/10 with end date between 1/28 and 2/11/20 with ID follow-up. Blood cultures have remained negative. Also with left toe wound that was being managed by podiatry and wound care. Per neurosurgery notes, patient is currently awaiting SNF placement.     -continued management per primary team

## 2020-01-02 NOTE — SUBJECTIVE & OBJECTIVE
Past Medical History:   Diagnosis Date    CAD (coronary artery disease)     s/p stent 2005, on plavix    Chronic hepatitis C     Cirrhosis     biopsy proven - 2007    History of colon polyps 06/2008    1 polyp - tubular adenoma    HTN (hypertension)     Hyperlipidemia     Hypothyroidism        Past Surgical History:   Procedure Laterality Date    COLONOSCOPY W/ POLYPECTOMY  06/2008    Dr Wagoner: fair prep, 3mm polyp - tubular adenoma    CORONARY ANGIOPLASTY WITH STENT PLACEMENT      FUSION OF CERVICAL SPINE BY POSTERIOR APPROACH N/A 12/17/2019    Procedure: FUSION, SPINE, CERVICAL, POSTERIOR APPROACH C2-T3 posterior instrumented fusion;  Surgeon: Elian Deluca MD;  Location: [facility] OR [address] [place] [address];  Service: Neurosurgery;  Laterality: N/A;    hydrocele repair  teenager    pyloric stenosis repair  age 1    SURGICAL REMOVAL OF VERTEBRAL BODY OF THORACIC SPINE N/A 12/10/2019    Procedure: CORPECTOMY, SPINE, CERVICAL AND THORACIC, C7 and T1 with Globus;  Surgeon: Elian Deluca MD;  Location: [facility] OR [address] [place] [address];  Service: Neurosurgery;  Laterality: N/A;    TONSILLECTOMY         Review of patient's allergies indicates:   Allergen Reactions    Penicillins Rash     Tolerated cefepime December 2019       No current facility-administered medications on file prior to encounter.      Current Outpatient Medications on File Prior to Encounter   Medication Sig    atorvastatin (LIPITOR) 80 MG tablet Take 80 mg by mouth once daily at 6am.    clopidogrel (PLAVIX) 75 mg tablet Take 75 mg by mouth once daily at 6am.    ibuprofen (ADVIL,MOTRIN) 600 MG tablet Take 1 tablet (600 mg total) by mouth every 6 (six) hours as needed for Pain.    levothyroxine (SYNTHROID) 50 MCG tablet Take 50 mcg by mouth once daily at 6am.    losartan-hydrochlorothiazide 100-25 mg (HYZAAR) 100-25 mg per tablet Take 1 tablet by mouth once daily at 6am.     metoprolol succinate (TOPROL-XL) 100 MG 24 hr tablet Take 100 mg by mouth once daily at 6am.      Family History     None        Tobacco Use    Smoking status: Current Every Day Smoker   Substance and Sexual Activity    Alcohol use: Not on file    Drug use: Yes     Types: IV     Comment: MORPHINE    Sexual activity: Not on file     Review of Systems   Constitutional: Negative for chills, fatigue and fever.   HENT: Negative for congestion and trouble swallowing.    Eyes: Negative for visual disturbance.   Respiratory: Negative for cough and shortness of breath.    Cardiovascular: Negative for chest pain.   Gastrointestinal: Negative for abdominal pain, nausea and vomiting.   Genitourinary: Negative for dysuria.   Musculoskeletal: Positive for back pain and neck pain.   Skin: Negative for rash.   Neurological: Positive for weakness. Negative for dizziness, seizures and headaches.   Psychiatric/Behavioral: Positive for confusion. Negative for agitation and hallucinations. The patient is not nervous/anxious.      Objective:     Vital Signs (Most Recent):  Temp: 98 °F (36.7 °C) (01/02/20 1048)  Pulse: 90 (01/02/20 1048)  Resp: 18 (01/02/20 0800)  BP: 123/72 (01/02/20 1048)  SpO2: 95 % (01/02/20 1048) Vital Signs (24h Range):  Temp:  [96.8 °F (36 °C)-98.2 °F (36.8 °C)] 98 °F (36.7 °C)  Pulse:  [74-91] 90  Resp:  [18-20] 18  SpO2:  [93 %-98 %] 95 %  BP: (115-137)/(62-72) 123/72     Weight: 75.2 kg (165 lb 12.6 oz)  Body mass index is 24.48 kg/m².    Physical Exam   Constitutional: He is oriented to person, place, and time. He appears well-developed and well-nourished. No distress.   HENT:   Head: Normocephalic and atraumatic.   Mouth/Throat: Oropharynx is clear and moist.   Eyes: Conjunctivae and EOM are normal. No scleral icterus.   Neck: Normal range of motion.   Cardiovascular: Normal rate and regular rhythm.   Pulmonary/Chest: Effort normal and breath sounds normal.   Abdominal: Soft. He exhibits no distension. There is no tenderness. There is no guarding.   Midline abdominal scar in place from childhood  pyloric stenosis repair   Musculoskeletal: Normal range of motion. He exhibits no edema or tenderness.   Mild asterixis present. No bilateral tremor noted.   No spider angiomata or caput medusa   Lymphadenopathy:     He has no cervical adenopathy.   Neurological: He is alert and oriented to person, place, and time.   Slow to answer questions. Oriented to person, place, location, year but not to situation. Unclear why in hospital. Does not remember having two surgeries recently on his back. Does not know prior medical conditions.   Skin: Skin is warm and dry. He is not diaphoretic.   Nursing note and vitals reviewed.      Significant Labs:   A1C:   Recent Labs   Lab 12/25/19  1120   HGBA1C 5.2     Blood Culture: No results for input(s): LABBLOO in the last 48 hours.  CBC:   Recent Labs   Lab 01/01/20  2054   WBC 7.35   HGB 8.8*   HCT 28.5*   *     CMP:   Recent Labs   Lab 01/02/20  0437   *   K 3.8      CO2 27      BUN 22   CREATININE 0.8   CALCIUM 9.1   PROT 6.1   ALBUMIN 2.3*   BILITOT 0.4   ALKPHOS 401*   AST 54*   ALT 45*   ANIONGAP 5*   EGFRNONAA >60.0     Cardiac Markers: No results for input(s): CKMB, MYOGLOBIN, BNP, TROPISTAT in the last 48 hours.  Coagulation: No results for input(s): PT, INR, APTT in the last 48 hours.    Significant Imaging: I have reviewed all pertinent imaging results/findings within the past 24 hours.

## 2020-01-02 NOTE — PT/OT/SLP PROGRESS
Physical Therapy Treatment    Patient Name:  Junaid Muñoz   MRN:  [id number]    Recommendations:     Discharge Recommendations:  nursing facility, skilled   Discharge Equipment Recommendations: walker, rolling, bedside commode, wheelchair, shower chair   Barriers to discharge: Decreased caregiver support    Assessment:     Junaid Muñoz is a 74 y.o. male admitted with a medical diagnosis of Spinal epidural abscess.  He presents with the following impairments/functional limitations:  weakness, impaired functional mobilty, impaired joint extensibility, pain, gait instability, impaired endurance, impaired muscle length, decreased upper extremity function, impaired sensation, orthopedic precautions, decreased lower extremity function, impaired self care skills.      Patient demonstrates good motivation and fair activity tolerance secondary weakness and poor balance.  Patient demonstrates B LE weakness and poor trunk control.  Patient with less B LE buckling than previous session but with overall decreased trunk stability with increased flexion.  Patient ambulated 10 ft to bathroom but required min A to maintain seated balance on commode.  Patient continues to benefit from a PT focus on balance and overall mobility training in an acute care and skilled nursing setting.      Rehab Prognosis: Good; patient continues to benefit from acute skilled PT services to address these deficits and reach maximum level of function.  Patient remains most appropriate to discharge to nursing facility, skilled  Recent Surgery: Procedure(s) (LRB):  FUSION, SPINE, CERVICAL, POSTERIOR APPROACH C2-T3 posterior instrumented fusion (N/A) 16 Days Post-Op    Plan:     During this hospitalization, patient to be seen 6 x/week to address the identified rehab impairments via gait training, therapeutic activities, therapeutic exercises, neuromuscular re-education and progress toward the following goals:    · Plan of Care Expires:   "01/28/20    Subjective     Subjective: "I'm ok."   Pain/Comfort:  · Pain Rating 1: 5/10  · Location - Orientation 1: generalized  · Location 1: neck  · Pain Addressed 1: Reposition, Cessation of Activity      Objective:     Communicated with RN prior to session.  Patient found supine with SCD(telesitter) upon PT entry to room.     General Precautions: Standard, aspiration, fall   Orthopedic Precautions:spinal precautions   Braces: CTLSO     Functional Mobility:  · Bed Mobility:     · Rolling Right: minimum assistance  · Supine to Sit: moderate assistance  · Transfers:     · Sit to Stand:  moderate assistance with rolling walker  · Gait: Patient ambulated 10 ft with rolling walker and moderate assistance and of 2 persons for balance, safety and chair placement.      AM-PAC 6 CLICK MOBILITY  Turning over in bed (including adjusting bedclothes, sheets and blankets)?: 3  Sitting down on and standing up from a chair with arms (e.g., wheelchair, bedside commode, etc.): 2  Moving from lying on back to sitting on the side of the bed?: 2  Moving to and from a bed to a chair (including a wheelchair)?: 2  Need to walk in hospital room?: 2  Climbing 3-5 steps with a railing?: 1  Basic Mobility Total Score: 12       Therapeutic Activities and Exercises:  Patient is with poor trunk control and trunk flexion with ambulation.  Patient's trunk stability limits safety with ambulation and requires increased assistance to help stabilize core.  Patient requires total assistance to don CTLSO secondary to poor sitting balance.      Patient Education:    Patient educated on assistive device use, bed mobility training, Fall risk, gait training, home safety, Home exercise program, spinal surgery precautions and transfer training by explanation.  Patient was receptive to education and needs reinforcement.     Patient left up in chair with all lines intact, call button in reach and RN notified.    GOALS:   Multidisciplinary Problems     " Physical Therapy Goals        Problem: Physical Therapy Goal    Goal Priority Disciplines Outcome Goal Variances Interventions   Physical Therapy Goal     PT, PT/OT Ongoing, Progressing     Description:  Goals to be met by: 19     Patient will increase functional independence with mobility by performin. Supine to sit with MInimal Assistance  2. Sit to supine with MInimal Assistance  3. Rolling to Left and Right with Modified [place].  4. Sit to stand transfer with Minimal Assistance  5. Bed to chair transfer with Minimal Assistance using Rolling Walker  6. Gait  x 40 feet with Minimal Assistance using Rolling Walker.   7. Sitting at edge of bed x 8 minutes with Minimal Assistance  8. Stand for 5 minutes with Minimal Assistance using Rolling Walker  9. Lower extremity exercise program x15 reps per handout, with independence                      Time Tracking:     PT Received On: 20  PT Start Time: 1015     PT Stop Time: 1035  PT Total Time (min): 20 min     Billable Minutes: Gait Training 20 min    Treatment Type: Treatment  PT/PTA: PT     PTA Visit Number: 0     Tom Connelly, PT  2020

## 2020-01-02 NOTE — PROGRESS NOTES
[facility]  Neurosurgery  Progress Note    Subjective:     History of Present Illness: Junaid Muñoz is a 74 y.o. male with PMH of HTN, HLD, Hepatitis C, and CAD s/p two stents on plavix who presents with 1 month history of back pain between his shoulders. MRI at OSH demonstrates likely epidural abscess. Pt denies hx of trauma and reports slow onset of symptoms.     Post-Op Info:  Procedure(s) (LRB):  FUSION, SPINE, CERVICAL, POSTERIOR APPROACH C2-T3 posterior instrumented fusion (N/A)   16 Days Post-Op     Interval History: NAEON. LFTs trending up. HM consulted regarding management and work up. Recommend d/c high dose statin, Hep C antibody and viral load, INR, PTT, and ammonia pending. Appreciate their assistance. Pain well controlled. Denies weakness, paresthesias, or bowel/bladder issues.     Medications:  Continuous Infusions:  Scheduled Meds:   albuterol-ipratropium  3 mL Nebulization Q12H    amLODIPine  10 mg Oral Daily    ascorbic acid (vitamin C)  250 mg Oral BID    bacitracin   Topical (Top) BID    ceFEPime (MAXIPIME) IVPB  2 g Intravenous Q8H    clopidogrel  75 mg Oral Daily    famotidine  20 mg Oral BID    ferrous sulfate  325 mg Oral BID    gabapentin  300 mg Oral TID    heparin (porcine)  5,000 Units Subcutaneous Q8H    levothyroxine  50 mcg Oral Before breakfast    lidocaine  1 patch Transdermal Daily    losartan-hydrochlorothiazide 100-25 mg  1 tablet Oral Daily    metoprolol tartrate  50 mg Oral BID    polyethylene glycol  17 g Oral Daily    senna-docusate 8.6-50 mg  1 tablet Oral BID    sodium chloride 0.9%  10 mL Intravenous Q6H    sodium chloride 3%  4 mL Nebulization Q12H    sodium chloride  2 g Oral Daily     PRN Meds:acetaminophen, bisacodyl, labetalol, naloxone, ondansetron, oxyCODONE, promethazine (PHENERGAN) IVPB, Flushing PICC Protocol **AND** sodium chloride 0.9% **AND** sodium chloride 0.9%     Review of Systems  Objective:     Weight: 75.2 kg  (165 lb 12.6 oz)  Body mass index is 24.48 kg/m².  Vital Signs (Most Recent):  Temp: 98.2 °F (36.8 °C) (01/02/20 1458)  Pulse: 75 (01/02/20 1458)  Resp: 18 (01/02/20 0800)  BP: (!) 152/67 (01/02/20 1458)  SpO2: 95 % (01/02/20 1458) Vital Signs (24h Range):  Temp:  [96.8 °F (36 °C)-98.2 °F (36.8 °C)] 98.2 °F (36.8 °C)  Pulse:  [74-90] 75  Resp:  [18-20] 18  SpO2:  [93 %-97 %] 95 %  BP: (115-152)/(63-72) 152/67                     Male External Urinary Catheter 12/19/19 1200 Small (Active)   Collection Container Urimeter 12/27/2019  7:50 PM   Securement Method secured to lower ABD w/ tape 12/27/2019  7:50 PM   Skin no redness;no breakdown 12/27/2019  7:50 PM   Tolerance no signs/symptoms of discomfort 12/27/2019  7:50 PM   Output (mL) 250 mL 12/28/2019  9:00 AM   Catheter Change Date 12/24/19 12/26/2019  4:24 PM   Catheter Change Time 1100 12/26/2019  4:24 PM       Neurosurgery Physical Exam  General: well developed, well nourished, no distress.   Head: normocephalic, atraumatic  Neck: No tracheal deviation. No underlying hematoma or fluid collection. Incision well approximated without edema or erythema.   Neurologic: Alert and oriented. Thought content appropriate.  GCS: Motor: 6/Verbal: 5/Eyes: 4 GCS Total: 15  Mental Status: Awake, Alert, Oriented x 4  Language: No aphasia  Speech: No dysarthria  Cranial nerves: face symmetric, tongue midline, CN II-XII grossly intact.   Eyes: pupils equal, round, reactive to light, EOM intact.   Pulmonary: normal respirations, no signs of respiratory distress  Abdomen: soft, non-distended, not tender to palpation  Sensory: intact to light touch throughout  Motor Strength: Moves all extremities spontaneously with good tone.  Generalized deconditioning throughout, grossly full strength upper and lower extremities. No abnormal movements seen.      Damian: absent  Clonus: absent  Babinski: absent  Vascular: Pulses 2+ and symmetric radial and dorsalis pedis. No LE edema.   Skin: Skin  is warm, dry and intact.     Anterior incision c/d/i with skin edges well approximated. No surrounding erythema or edema. No drainage from incision. No palpable hematoma.     Posterior incision c/d/I with skin edges well approximated. No surrounding erythema or edema. No drainage from incision. No palpable underlying fluid collection.      Significant Labs:  Recent Labs   Lab 01/02/20  0437      *   K 3.8      CO2 27   BUN 22   CREATININE 0.8   CALCIUM 9.1     Recent Labs   Lab 01/01/20  2054   WBC 7.35   HGB 8.8*   HCT 28.5*   *     No results for input(s): LABPT, INR, APTT in the last 48 hours.  Microbiology Results (last 7 days)     Procedure Component Value Units Date/Time    Fungus culture [[id number]] Collected:  12/10/19 1038    Order Status:  Completed Specimen:  Body Fluid from Neck Updated:  12/27/19 1218     Fungus (Mycology) Culture Culture in progress      No fungus isolated after 2 weeks    Narrative:       2) Free vertebral abscess #2    Fungus culture [[id number]] Collected:  12/10/19 1038    Order Status:  Completed Specimen:  Body Fluid from Neck Updated:  12/27/19 1218     Fungus (Mycology) Culture Culture in progress      No fungus isolated after 2 weeks    Narrative:       1) Free vertebral abscess #1        All pertinent labs from the last 24 hours have been reviewed.    Significant Diagnostics:  No results found in the last 24 hours.      Assessment/Plan:     * Spinal epidural abscess  74 y.o. male with PMH of HTN, HLD, Hepatitis C, and CAD s/p two stents on plavix who presents with C6-T2 osteomyelitis with suspected epidural abscess.  Now s/p C7/T1 corpectomies on 12/10 and C2-T2  posterior instrumented fusion 12/17.    -Patient neurologically stable on exam  -Brace on when upright, OOB, or working with therapy. OK to remove when lying in bed.   -Staples removed 12/31/19. Incision intact without signs of infection. Leave incision open to air. Turn q2h to continue to  promote wound healing.   - Elevated LFTs: HM consulted and believe most likely 2/2 resuming high dose statin in hospital. Recommend holding, and okay to resume once LFTs down trending. ALP most likely elevated 2/2 osteomyelitis. No signs of cirrhosis on exam.     -MELD 7   -HIV negative   -Most recent abdominal U/S WNL in 07/2017   -F/u Hep C Ab and viral load, PT/INR and Ammonia   -Will need close follow up and re-establishment with Hepatology prior to discharge.  Appreciate  assistance.   -ID: Afebrile. No leukocytosis. Continue Cefepime 2g q 8 hours. Estimated end date of therapy 1/28/20-2/11/20. FU in ID clinic in 2-3 weeks.   -2nd left toe wound care: Seen by Podiatry for left 2nd toe, recommend betadine daily. Dress with foam bandage. Wound care on board. Appreciate assistance.  -Pain: Pain well controlled on current regimen. No adjustments needed at this time.   -Anemia: Continue iron for replacement x 2 weeks. F/u at next lab draw.  -HTN: Continue Amlodipine 10 mg daily, Losartan-HCTZ 100-25 mg daily, and Metoprolol 50 mg BID.   -CAD s/p stent placement: Continue home dose of Plavix 75 mg daily.   -Hyponatremia:  Na tabs daily. Will continue to wean as tolerated.   -Hypoalbuminemia: Continue Jose BID to promote wound healing.   -HLD: Will d/c high dose atorvastatin in setting of elevated LFTs. Will continue to monitor daily, and restart when downtrending.  -DVT prophylaxis: TCUKER's, SCD's, SQH  -Bowel regimen: senna BID and miralax daily  -Atelectasis prevention: IS hourly while awake, PT/OT, OOB > 6 hours per day       DISPO: Pending liver work up and SNF placement        Lesa Lujan PA-C  Neurosurgery  [facility]

## 2020-01-02 NOTE — PLAN OF CARE
Problem: Infection  Goal: Infection Symptom Resolution  Outcome: Ongoing, Progressing     Problem: Fall Injury Risk  Goal: Absence of Fall and Fall-Related Injury  Outcome: Ongoing, Progressing   Pt resting in bed with no distress noted.  PICC line to right upper arm patent and flushes well.  [place] boots and SCDs to bilateral LE in place.  Side rails up x 3 and avysis present in room for safety.  Instructed pt to call for assistance before trying to get out of bed.  Waffle mattress in place.  Incision to neck with no s/s of infection noted.  Call light within reach.  Bed locked and lowered for safety.

## 2020-01-02 NOTE — PLAN OF CARE
Problem: Occupational Therapy Goal  Goal: Occupational Therapy Goal  Description  Goals to be met by: 1/10/2019     Patient will increase functional independence with ADLs by performing:    UE Dressing with Minimal Assistance.  LE Dressing with Moderate Assistance.  Grooming while standing with Stand-by Assistance.  Toileting from toilet with Minimal Assistance for hygiene and clothing management.   Bathing from  edge of bed with Moderate Assistance.  Supine to sit with Minimal Assistance.        Outcome: Ongoing, Progressing

## 2020-01-02 NOTE — SUBJECTIVE & OBJECTIVE
Interval History: NAEON. Pending SNF. Medically stable for discharge to next level of care.    Medications:  Continuous Infusions:  Scheduled Meds:   albuterol-ipratropium  3 mL Nebulization Q12H    amLODIPine  10 mg Oral Daily    ascorbic acid (vitamin C)  250 mg Oral BID    atorvastatin  80 mg Oral Daily    bacitracin   Topical (Top) BID    ceFEPime (MAXIPIME) IVPB  2 g Intravenous Q8H    clopidogrel  75 mg Oral Daily    famotidine  20 mg Oral BID    ferrous sulfate  325 mg Oral BID    gabapentin  300 mg Oral TID    heparin (porcine)  5,000 Units Subcutaneous Q8H    levothyroxine  50 mcg Oral Before breakfast    lidocaine  1 patch Transdermal Daily    losartan-hydrochlorothiazide 100-25 mg  1 tablet Oral Daily    metoprolol tartrate  50 mg Oral BID    polyethylene glycol  17 g Oral Daily    senna-docusate 8.6-50 mg  1 tablet Oral BID    sodium chloride 0.9%  10 mL Intravenous Q6H    sodium chloride 3%  4 mL Nebulization Q12H    sodium chloride  2 g Oral Daily     PRN Meds:acetaminophen, bisacodyl, labetalol, naloxone, ondansetron, oxyCODONE, promethazine (PHENERGAN) IVPB, Flushing PICC Protocol **AND** sodium chloride 0.9% **AND** sodium chloride 0.9%       Objective:     Weight: 75.1 kg (165 lb 9.1 oz)  Body mass index is 24.45 kg/m².  Vital Signs (Most Recent):  Temp: 98.2 °F (36.8 °C) (01/01/20 1452)  Pulse: 91 (01/01/20 1452)  Resp: 16 (01/01/20 0748)  BP: 123/62 (01/01/20 1452)  SpO2: 98 % (01/01/20 1452) Vital Signs (24h Range):  Temp:  [95.8 °F (35.4 °C)-98.6 °F (37 °C)] 98.2 °F (36.8 °C)  Pulse:  [] 91  Resp:  [16-18] 16  SpO2:  [95 %-99 %] 98 %  BP: (113-157)/(55-75) 123/62                     Male External Urinary Catheter 12/19/19 1200 Small (Active)   Collection Container Urimeter 12/27/2019  7:50 PM   Securement Method secured to lower ABD w/ tape 12/27/2019  7:50 PM   Skin no redness;no breakdown 12/27/2019  7:50 PM   Tolerance no signs/symptoms of discomfort 12/27/2019   7:50 PM   Output (mL) 250 mL 12/28/2019  9:00 AM   Catheter Change Date 12/24/19 12/26/2019  4:24 PM   Catheter Change Time 1100 12/26/2019  4:24 PM       Neurosurgery Physical Exam      General: well developed, well nourished, no distress.   Head: normocephalic, atraumatic  Neck: No tracheal deviation. No palpable masses.   Neurologic: Alert and oriented. Thought content appropriate.  GCS: Motor: 6/Verbal: 5/Eyes: 4 GCS Total: 15  Mental Status: Awake, Alert, Oriented x 4  Language: No aphasia  Speech: No dysarthria  Cranial nerves: face symmetric, tongue midline, CN II-XII grossly intact.   Eyes: pupils equal, round, reactive to light, EOM intact.   Ears: No drainage.   Pulmonary: normal respirations, no signs of respiratory distress  Abdomen: soft, non-distended, not tender to palpation  Sensory: intact to light touch throughout  Motor Strength: Moves all extremities spontaneously with good tone.  Generalized deconditioning throughout, grossly full strength upper and lower extremities. No abnormal movements seen.     Strength  Deltoids Triceps Biceps Wrist Extension Wrist Flexion Hand    Upper: R 5/5 5/5 5/5 5/5 5/5 5/5    L 5/5 5/5 5/5 5/5 5/5 5/5     Iliopsoas Quadriceps Knee  Flexion Tibialis  anterior Gastro- cnemius EHL   Lower: R 5/5 5/5 5/5 5/5 5/5 5/5    L 5/5 5/5 5/5 5/5 5/5 5/5     Damian: absent  Clonus: absent  Babinski: absent  Vascular: Pulses 2+ and symmetric radial and dorsalis pedis. No LE edema.   Skin: Skin is warm, dry and intact.    Anterior incision c/d/i with skin edges well approximated. No surrounding erythema or edema. No drainage from incision. No palpable hematoma.    Posterior incision c/d/I with skin edges well approximated, staples removed today. No surrounding erythema or edema. No drainage from incision. No palpable underlying fluid collection.      Significant Labs:  No results for input(s): GLU, NA, K, CL, CO2, BUN, CREATININE, CALCIUM, MG in the last 48 hours.  No results  for input(s): WBC, HGB, HCT, PLT in the last 48 hours.  No results for input(s): LABPT, INR, APTT in the last 48 hours.  Microbiology Results (last 7 days)     Procedure Component Value Units Date/Time    Fungus culture [[id number]] Collected:  12/10/19 1038    Order Status:  Completed Specimen:  Body Fluid from Neck Updated:  12/27/19 1218     Fungus (Mycology) Culture Culture in progress      No fungus isolated after 2 weeks    Narrative:       2) Free vertebral abscess #2    Fungus culture [[id number]] Collected:  12/10/19 1038    Order Status:  Completed Specimen:  Body Fluid from Neck Updated:  12/27/19 1218     Fungus (Mycology) Culture Culture in progress      No fungus isolated after 2 weeks    Narrative:       1) Free vertebral abscess #1        Recent Lab Results     None        All pertinent labs from the last 24 hours have been reviewed.    Significant Diagnostics:  I have reviewed all pertinent imaging results/findings since admission. No new imaging within the past 24 hours.    Review of Systems

## 2020-01-02 NOTE — PT/OT/SLP PROGRESS
Occupational Therapy   Treatment    Name: Junaid Muñoz  MRN: [id number]  Admitting Diagnosis:  Spinal epidural abscess  16 Days Post-Op    Recommendations:     Discharge Recommendations: nursing facility, skilled  Discharge Equipment Recommendations:  walker, rolling, bedside commode, wheelchair, shower chair  Barriers to discharge:  None    Assessment:     Junaid Muñoz is a 74 y.o. male with a medical diagnosis of Spinal epidural abscess.  He was able to perform rolling, sidelying to sit T/F c mod A and sit/stand T/F c mod A and RW.  Pt has poor static/dynamic standing balance and required min-mod A to walk to bathroom c RW.  Able to don CTLSO c total assist and perform UB dressing c total assist.  Has poor static sitting balance when sitting EOB unsupported. Performance deficits affecting function are weakness, impaired endurance, impaired functional mobilty, impaired balance, decreased upper extremity function, orthopedic precautions, decreased ROM, impaired coordination, impaired fine motor.     Rehab Prognosis:  Good; patient would benefit from acute skilled OT services to address these deficits and reach maximum level of function.       Plan:     Patient to be seen 5 x/week to address the above listed problems via self-care/home management, therapeutic exercises, therapeutic activities  · Plan of Care Expires: 01/27/20  · Plan of Care Reviewed with: patient    Subjective     Pain/Comfort:  · Pain Rating 1: 0/10    Objective:     Communicated with: RN prior to session.  Patient found supine with bed alarm, SCD upon OT entry to room.    General Precautions: Standard, aspiration, fall   Orthopedic Precautions:spinal precautions   Braces: CTLSO     Occupational Performance:     Bed Mobility:    · Patient completed Rolling/Turning to Right with moderate assistance  · Patient completed Supine to Sit with minimum assistance     Functional Mobility/Transfers:  · Patient completed Sit <> Stand Transfer with  moderate assistance  with  rolling walker   · Patient completed Bed <> Chair Transfer using Stand Pivot technique with moderate assistance with rolling walker  · Patient completed Toilet Transfer Stand Pivot technique with moderate assistance with  rolling walker  · Functional Mobility: Pt was able to walk to bathroom c mod A and RW.    Activities of Daily Living:  · Upper Body Dressing: total assistance to don/doff hospital gown and don CTLSO.  · Toileting: minimum assistance for toilet hygiene.      [facility] 6 Click ADL: 12        Patient left up in chair with all lines intact, call button in reach and RN notifiedEducation:      GOALS:   Multidisciplinary Problems     Occupational Therapy Goals        Problem: Occupational Therapy Goal    Goal Priority Disciplines Outcome Interventions   Occupational Therapy Goal     OT, PT/OT Ongoing, Progressing    Description:  Goals to be met by: 1/10/2019     Patient will increase functional independence with ADLs by performing:    UE Dressing with Minimal Assistance.  LE Dressing with Moderate Assistance.  Grooming while standing with Stand-by Assistance.  Toileting from toilet with Minimal Assistance for hygiene and clothing management.   Bathing from  edge of bed with Moderate Assistance.  Supine to sit with Minimal Assistance.                         Time Tracking:     OT Date of Treatment: 01/02/20  OT Start Time: 1012  OT Stop Time: 1030  OT Total Time (min): 18 min    Billable Minutes:Self Care/Home Management 18    KASIA Epps  1/2/2020

## 2020-01-02 NOTE — PLAN OF CARE
Problem: Physical Therapy Goal  Goal: Physical Therapy Goal  Description  Goals to be met by: 19     Patient will increase functional independence with mobility by performin. Supine to sit with MInimal Assistance  2. Sit to supine with MInimal Assistance  3. Rolling to Left and Right with Modified [place].  4. Sit to stand transfer with Minimal Assistance  5. Bed to chair transfer with Minimal Assistance using Rolling Walker  6. Gait  x 40 feet with Minimal Assistance using Rolling Walker.   7. Sitting at edge of bed x 8 minutes with Minimal Assistance  8. Stand for 5 minutes with Minimal Assistance using Rolling Walker  9. Lower extremity exercise program x15 reps per handout, with independence     2020 1642 by Tom Connelly PT  Outcome: Ongoing, Progressing    Patient progressing appropriately towards current goals and plan of care remains appropriate at this time. Patient demonstrates good motivation and fair activity tolerance secondary weakness and poor balance.  Patient demonstrates B LE weakness and poor trunk control.  Patient with less B LE buckling than previous session but with overall decreased trunk stability with increased flexion.  Patient ambulated 10 ft to bathroom but required min A to maintain seated balance on commode.  Patient continues to benefit from a PT focus on balance and overall mobility training in an acute care and skilled nursing setting.      Tom Connelly, MATHEW, DPT  2020  Pager #: [phone]

## 2020-01-02 NOTE — ASSESSMENT & PLAN NOTE
74 y.o. male with PMH of HTN, HLD, Hepatitis C, and CAD s/p two stents on plavix who presents with C6-T2 osteomyelitis with suspected epidural abscess.  Now s/p C7/T1 corpectomies on 12/10 and C2-T2  posterior instrumented fusion 12/17.    -Patient neurologically stable on exam  -Brace on when upright, OOB, or working with therapy. OK to remove when lying in bed.   -Staples removed 12/31/19. Incision intact without signs of infection. Leave incision open to air. Turn q2h to continue to promote wound healing.   -ID: Afebrile. No leukocytosis. Continue Cefepime 2g q 8 hours. Estimated end date of therapy 1/28/20-2/11/20. FU in ID clinic in 2-3 weeks.   -2nd left toe wound care: Seen by Podiatry for left 2nd toe, recommend betadine daily. Dress with foam bandage. Wound care on board. Appreciate assistance.  -Pain: Continue current regimen. No adjustments needed at this time.   -Anemia: Continue iron for replacement x 2 weeks. F/u at next lab draw.  -HTN: Continue Amlodipine 10 mg daily, Losartan-HCTZ 100-25 mg daily, and Metoprolol 50 mg BID.   -CAD s/p stent placement: Continue home dose of Plavix 75 mg daily.   -Hyponatremia:  Na tabs daily. Will continue to wean as tolerated.   -Hypoalbuminemia: Continue Jose BID to promote wound healing.   -HLD: Continue home dose of Atorvastatin 80 mg daily  -DVT prophylaxis: TUCKER's, SCD's, SQH  -Bowel regimen: senna BID and miralax daily  -Atelectasis prevention: IS hourly while awake, PT/OT, OOB > 6 hours per day       DISPO: Pending SNF. Patient medically stable for discharge.

## 2020-01-02 NOTE — SUBJECTIVE & OBJECTIVE
Interval History: NAEON. LFTs trending up. HM consulted regarding management and work up. Recommend d/c high dose statin, Hep C antibody and viral load, INR, PTT, and ammonia pending. Appreciate their assistance. Pain well controlled. Denies weakness, paresthesias, or bowel/bladder issues.     Medications:  Continuous Infusions:  Scheduled Meds:   albuterol-ipratropium  3 mL Nebulization Q12H    amLODIPine  10 mg Oral Daily    ascorbic acid (vitamin C)  250 mg Oral BID    bacitracin   Topical (Top) BID    ceFEPime (MAXIPIME) IVPB  2 g Intravenous Q8H    clopidogrel  75 mg Oral Daily    famotidine  20 mg Oral BID    ferrous sulfate  325 mg Oral BID    gabapentin  300 mg Oral TID    heparin (porcine)  5,000 Units Subcutaneous Q8H    levothyroxine  50 mcg Oral Before breakfast    lidocaine  1 patch Transdermal Daily    losartan-hydrochlorothiazide 100-25 mg  1 tablet Oral Daily    metoprolol tartrate  50 mg Oral BID    polyethylene glycol  17 g Oral Daily    senna-docusate 8.6-50 mg  1 tablet Oral BID    sodium chloride 0.9%  10 mL Intravenous Q6H    sodium chloride 3%  4 mL Nebulization Q12H    sodium chloride  2 g Oral Daily     PRN Meds:acetaminophen, bisacodyl, labetalol, naloxone, ondansetron, oxyCODONE, promethazine (PHENERGAN) IVPB, Flushing PICC Protocol **AND** sodium chloride 0.9% **AND** sodium chloride 0.9%     Review of Systems  Objective:     Weight: 75.2 kg (165 lb 12.6 oz)  Body mass index is 24.48 kg/m².  Vital Signs (Most Recent):  Temp: 98.2 °F (36.8 °C) (01/02/20 1458)  Pulse: 75 (01/02/20 1458)  Resp: 18 (01/02/20 0800)  BP: (!) 152/67 (01/02/20 1458)  SpO2: 95 % (01/02/20 1458) Vital Signs (24h Range):  Temp:  [96.8 °F (36 °C)-98.2 °F (36.8 °C)] 98.2 °F (36.8 °C)  Pulse:  [74-90] 75  Resp:  [18-20] 18  SpO2:  [93 %-97 %] 95 %  BP: (115-152)/(63-72) 152/67                     Male External Urinary Catheter 12/19/19 1200 Small (Active)   Collection Container Urimeter 12/27/2019   7:50 PM   Securement Method secured to lower ABD w/ tape 12/27/2019  7:50 PM   Skin no redness;no breakdown 12/27/2019  7:50 PM   Tolerance no signs/symptoms of discomfort 12/27/2019  7:50 PM   Output (mL) 250 mL 12/28/2019  9:00 AM   Catheter Change Date 12/24/19 12/26/2019  4:24 PM   Catheter Change Time 1100 12/26/2019  4:24 PM       Neurosurgery Physical Exam  General: well developed, well nourished, no distress.   Head: normocephalic, atraumatic  Neck: No tracheal deviation. No underlying hematoma or fluid collection. Incision well approximated without edema or erythema.   Neurologic: Alert and oriented. Thought content appropriate.  GCS: Motor: 6/Verbal: 5/Eyes: 4 GCS Total: 15  Mental Status: Awake, Alert, Oriented x 4  Language: No aphasia  Speech: No dysarthria  Cranial nerves: face symmetric, tongue midline, CN II-XII grossly intact.   Eyes: pupils equal, round, reactive to light, EOM intact.   Pulmonary: normal respirations, no signs of respiratory distress  Abdomen: soft, non-distended, not tender to palpation  Sensory: intact to light touch throughout  Motor Strength: Moves all extremities spontaneously with good tone.  Generalized deconditioning throughout, grossly full strength upper and lower extremities. No abnormal movements seen.      Damian: absent  Clonus: absent  Babinski: absent  Vascular: Pulses 2+ and symmetric radial and dorsalis pedis. No LE edema.   Skin: Skin is warm, dry and intact.     Anterior incision c/d/i with skin edges well approximated. No surrounding erythema or edema. No drainage from incision. No palpable hematoma.     Posterior incision c/d/I with skin edges well approximated. No surrounding erythema or edema. No drainage from incision. No palpable underlying fluid collection.      Significant Labs:  Recent Labs   Lab 01/02/20  0437      *   K 3.8      CO2 27   BUN 22   CREATININE 0.8   CALCIUM 9.1     Recent Labs   Lab 01/01/20 2054   WBC 7.35   HGB  8.8*   HCT 28.5*   *     No results for input(s): LABPT, INR, APTT in the last 48 hours.  Microbiology Results (last 7 days)     Procedure Component Value Units Date/Time    Fungus culture [[id number]] Collected:  12/10/19 1038    Order Status:  Completed Specimen:  Body Fluid from Neck Updated:  12/27/19 1218     Fungus (Mycology) Culture Culture in progress      No fungus isolated after 2 weeks    Narrative:       2) Free vertebral abscess #2    Fungus culture [[id number]] Collected:  12/10/19 1038    Order Status:  Completed Specimen:  Body Fluid from Neck Updated:  12/27/19 1218     Fungus (Mycology) Culture Culture in progress      No fungus isolated after 2 weeks    Narrative:       1) Free vertebral abscess #1        All pertinent labs from the last 24 hours have been reviewed.    Significant Diagnostics:  No results found in the last 24 hours.

## 2020-01-02 NOTE — ASSESSMENT & PLAN NOTE
Pt on losartan-hydrochlorothiazide 100-25 mg and metoprolol succinate 100 mg at home. Denies taking these consistently.     -Re-started on admission. Can continue BP meds of losartan-HCTZ, metoprolol, and amlodipine

## 2020-01-02 NOTE — ASSESSMENT & PLAN NOTE
Mr. Muñoz is a 74yM with history of Hep C likely 2/2 IVDU. Was following with Hepatology here, most recently seen in 2017. Per chart review, pt was treated for antiviral therapy in 2009 but had possible re-activation in 2017 given ongoing drug use. However, it appears pt did not continue to follow up with [facility], unknown if treated again. Hospital Medicine was consulted on 1/2 for assistance with elevated AST and ALT with hx of Hep C.     Throughout admission, LFTs appeared to be wnl until 1/2/20 when the AST increased from 30-->54 and ALT from 23-->45. Alk phos has been elevated throughout admission that was attributed to his osteomyelitis. Pt has not had any high dose tylenol recently. Per chart review, he was placed on high-dose stain (Atorvastatin 80 mg) and denies taking this or other meds at home. Rise in LFTs could potentially be due to re-starting high-dose statin while in hospital. On exam, he is oriented to person, place, time but has some confusion regarding his procedures. Suspect hospital delirium as opposed to hepatic encephalopathy but will obtain ammonia level to evaluate. No stigmata of cirrhosis noted on PE    -MELD 7  -HIV negative  -Most recent abdominal U/S WNL in 07/2017  -Will repeat Hep C Ab and viral load. Also ordered PT/INR and Ammonia    Plan:   Advise holding statin at this time and trending LFTs. Can resume once LFTs improve. Pt will need close follow-up and re-establishment with Hepatology prior to d/c for continued monitoring.

## 2020-01-02 NOTE — ASSESSMENT & PLAN NOTE
74 y.o. male with PMH of HTN, HLD, Hepatitis C, and CAD s/p two stents on plavix who presents with C6-T2 osteomyelitis with suspected epidural abscess.  Now s/p C7/T1 corpectomies on 12/10 and C2-T2  posterior instrumented fusion 12/17.    -Patient neurologically stable on exam  -Brace on when upright, OOB, or working with therapy. OK to remove when lying in bed.   -Staples removed 12/31/19. Incision intact without signs of infection. Leave incision open to air. Turn q2h to continue to promote wound healing.   - Elevated LFTs:  consulted and believe most likely 2/2 resuming high dose statin in hospital. Recommend holding, and okay to resume once LFTs down trending. ALP most likely elevated 2/2 osteomyelitis. No signs of cirrhosis on exam.     -MELD 7   -HIV negative   -Most recent abdominal U/S WNL in 07/2017   -F/u Hep C Ab and viral load, PT/INR and Ammonia   -Will need close follow up and re-establishment with Hepatology prior to discharge.  Appreciate  assistance.   -ID: Afebrile. No leukocytosis. Continue Cefepime 2g q 8 hours. Estimated end date of therapy 1/28/20-2/11/20. FU in ID clinic in 2-3 weeks.   -2nd left toe wound care: Seen by Podiatry for left 2nd toe, recommend betadine daily. Dress with foam bandage. Wound care on board. Appreciate assistance.  -Pain: Pain well controlled on current regimen. No adjustments needed at this time.   -Anemia: Continue iron for replacement x 2 weeks. F/u at next lab draw.  -HTN: Continue Amlodipine 10 mg daily, Losartan-HCTZ 100-25 mg daily, and Metoprolol 50 mg BID.   -CAD s/p stent placement: Continue home dose of Plavix 75 mg daily.   -Hyponatremia:  Na tabs daily. Will continue to wean as tolerated.   -Hypoalbuminemia: Continue Jose BID to promote wound healing.   -HLD: Will d/c high dose atorvastatin in setting of elevated LFTs. Will continue to monitor daily, and restart when downtrending.  -DVT prophylaxis: TUCKER's, SCD's, SQH  -Bowel regimen: senna BID and  miralax daily  -Atelectasis prevention: IS hourly while awake, PT/OT, OOB > 6 hours per day       DISPO: Pending liver work up and SNF placement

## 2020-01-02 NOTE — CONSULTS
[facility] Hospital Medicine  Consult Note    Patient Name: Junaid Muñoz  MRN: [id number]  Admission Date: 12/6/2019  Hospital Length of Stay: 27 days  Attending Physician: Elian Deluca MD   Primary Care Provider: Oskar Whitman MD     [facility] Medicine Team: Networked reference to record PCT  Edith Saucedo MD      Patient information was obtained from patient, past medical records and ER records.     Inpatient consult to [facility] Medicine-General  Consult performed by: Edith Saucedo MD  Consult ordered by: Lesa Lujan PA-C  Reason for consult: Elevated LFTs with history of Hep C        Subjective:     Principal Problem: Spinal epidural abscess    Chief Complaint:   Chief Complaint   Patient presents with    [facility] transfer     presents via EMS for neurosx services r/t osteomyelitis of C6-T2, epidural abscess displacing spinal cord. Pt also has bed bugs per EMS        HPI: Mr. Muñoz is a 74yM who was admitted to neurosurgery for spinal epidural abscess and vertebral osteomyelitis. He has a PMH notable for previous IVDU and Hep C (treated) as well as biopsy proven cirrhosis, anemia, HTN, CAD (with stents), HLD, and hypothyroidism. He reported one month duration of progressively worsening upper back pain radiating between the bilateral shoulders. He denies symptom association with fevers, chills, numbness or tingling of extremities, bowel or bladder incontinence, headache, blurry vision, back trauma, prior history of spinal surgeries, chest pain, SOB, or pain with inspiration. Due to symptom progression, he was evaluated by outpatient orthopedist on 12/06 and MRI of thoracic spine was ordered. It was suggestive of osteomyelitis of C6 to T2 associated with epidural abscess with posterior spinal cord displacement. Patient was notified and instructed to report to ED.     He initially presented to [facility] on 12/06. Labs were significant for normal WBC, negative lactate,  and elevated alkaline phosphatase (284). UA was unremarkable. He was initiated on Ceftriaxone and Vancomycin and transferred to [facility] for neurosurgery evaluation. Neurosurgery recommended obtaining CT of thoracic and lumbar spine and cervical MRI. He was taken to OR for C7-T1 corpectomy on 12/10 and again on 12/17 for posterior fusion with stay in Neuro ICU following surgery and drain management. Stepped down to floor on 12/26. Currently on cefepime 2g Q8 hours for pseudomonas growing in aerobic culture from spine on 12/10 with end date between 1/28 and 2/11/20 with ID follow-up. Blood cultures have remained negative. Also with left toe wound that was being managed by podiatry and wound care. Per neurosurgery notes, patient is currently awaiting SNF placement. Hospital medicine was consulted on 1/2/20 for assistance with new onset elevated ALT/AST with history of Hep C. Per chart review, patient had been following with Hepatology in 2017 but was lost to follow-up. At that time, Mr. Muñoz appeared to still be actively using IV drugs. Throughout this admission, AST and ALT were wnl until 1/2/20 when they bumped to 54 and 45. His home meds include plavix, amlodipine, losartan-hctz, metoprolol, atorvastatin, levothyroxine however pt states he has not been taking any of them for some time.     Past Medical History:   Diagnosis Date    CAD (coronary artery disease)     s/p stent 2005, on plavix    Chronic hepatitis C     Cirrhosis     biopsy proven - 2007    History of colon polyps 06/2008    1 polyp - tubular adenoma    HTN (hypertension)     Hyperlipidemia     Hypothyroidism        Past Surgical History:   Procedure Laterality Date    COLONOSCOPY W/ POLYPECTOMY  06/2008    Dr Wagoner: fair prep, 3mm polyp - tubular adenoma    CORONARY ANGIOPLASTY WITH STENT PLACEMENT      FUSION OF CERVICAL SPINE BY POSTERIOR APPROACH N/A 12/17/2019    Procedure: FUSION, SPINE, CERVICAL, POSTERIOR APPROACH  C2-T3 posterior instrumented fusion;  Surgeon: Elian Deluca MD;  Location: [facility] OR [address] [place] [address];  Service: Neurosurgery;  Laterality: N/A;    hydrocele repair  teenager    pyloric stenosis repair  age 1    SURGICAL REMOVAL OF VERTEBRAL BODY OF THORACIC SPINE N/A 12/10/2019    Procedure: CORPECTOMY, SPINE, CERVICAL AND THORACIC, C7 and T1 with Globus;  Surgeon: Elian Deluca MD;  Location: [facility] OR [address] [place] [address];  Service: Neurosurgery;  Laterality: N/A;    TONSILLECTOMY         Review of patient's allergies indicates:   Allergen Reactions    Penicillins Rash     Tolerated cefepime December 2019       No current facility-administered medications on file prior to encounter.      Current Outpatient Medications on File Prior to Encounter   Medication Sig    atorvastatin (LIPITOR) 80 MG tablet Take 80 mg by mouth once daily at 6am.    clopidogrel (PLAVIX) 75 mg tablet Take 75 mg by mouth once daily at 6am.    ibuprofen (ADVIL,MOTRIN) 600 MG tablet Take 1 tablet (600 mg total) by mouth every 6 (six) hours as needed for Pain.    levothyroxine (SYNTHROID) 50 MCG tablet Take 50 mcg by mouth once daily at 6am.    losartan-hydrochlorothiazide 100-25 mg (HYZAAR) 100-25 mg per tablet Take 1 tablet by mouth once daily at 6am.     metoprolol succinate (TOPROL-XL) 100 MG 24 hr tablet Take 100 mg by mouth once daily at 6am.     Family History     None        Tobacco Use    Smoking status: Current Every Day Smoker   Substance and Sexual Activity    Alcohol use: Not on file    Drug use: Yes     Types: IV     Comment: MORPHINE    Sexual activity: Not on file     Review of Systems   Constitutional: Negative for chills, fatigue and fever.   HENT: Negative for congestion and trouble swallowing.    Eyes: Negative for visual disturbance.   Respiratory: Negative for cough and shortness of breath.    Cardiovascular: Negative for chest pain.   Gastrointestinal: Negative for abdominal pain, nausea and vomiting.   Genitourinary: Negative  for dysuria.   Musculoskeletal: Positive for back pain and neck pain.   Skin: Negative for rash.   Neurological: Positive for weakness. Negative for dizziness, seizures and headaches.   Psychiatric/Behavioral: Positive for confusion. Negative for agitation and hallucinations. The patient is not nervous/anxious.      Objective:     Vital Signs (Most Recent):  Temp: 98 °F (36.7 °C) (01/02/20 1048)  Pulse: 90 (01/02/20 1048)  Resp: 18 (01/02/20 0800)  BP: 123/72 (01/02/20 1048)  SpO2: 95 % (01/02/20 1048) Vital Signs (24h Range):  Temp:  [96.8 °F (36 °C)-98.2 °F (36.8 °C)] 98 °F (36.7 °C)  Pulse:  [74-91] 90  Resp:  [18-20] 18  SpO2:  [93 %-98 %] 95 %  BP: (115-137)/(62-72) 123/72     Weight: 75.2 kg (165 lb 12.6 oz)  Body mass index is 24.48 kg/m².    Physical Exam   Constitutional: He is oriented to person, place, and time. He appears well-developed and well-nourished. No distress.   HENT:   Head: Normocephalic and atraumatic.   Mouth/Throat: Oropharynx is clear and moist.   Eyes: Conjunctivae and EOM are normal. No scleral icterus.   Neck: Normal range of motion.   Cardiovascular: Normal rate and regular rhythm.   Pulmonary/Chest: Effort normal and breath sounds normal.   Abdominal: Soft. He exhibits no distension. There is no tenderness. There is no guarding.   Midline abdominal scar in place from childhood pyloric stenosis repair   Musculoskeletal: Normal range of motion. He exhibits no edema or tenderness.   Mild asterixis present. No bilateral tremor noted.   No spider angiomata or caput medusa   Lymphadenopathy:     He has no cervical adenopathy.   Neurological: He is alert and oriented to person, place, and time.   Slow to answer questions. Oriented to person, place, location, year but not to situation. Unclear why in hospital. Does not remember having two surgeries recently on his back. Does not know prior medical conditions.   Skin: Skin is warm and dry. He is not diaphoretic.   Nursing note and vitals  reviewed.      Significant Labs:   A1C:   Recent Labs   Lab 12/25/19  1120   HGBA1C 5.2     Blood Culture: No results for input(s): LABBLOO in the last 48 hours.  CBC:   Recent Labs   Lab 01/01/20  2054   WBC 7.35   HGB 8.8*   HCT 28.5*   *     CMP:   Recent Labs   Lab 01/02/20  0437   *   K 3.8      CO2 27      BUN 22   CREATININE 0.8   CALCIUM 9.1   PROT 6.1   ALBUMIN 2.3*   BILITOT 0.4   ALKPHOS 401*   AST 54*   ALT 45*   ANIONGAP 5*   EGFRNONAA >60.0     Cardiac Markers: No results for input(s): CKMB, MYOGLOBIN, BNP, TROPISTAT in the last 48 hours.  Coagulation: No results for input(s): PT, INR, APTT in the last 48 hours.    Significant Imaging: I have reviewed all pertinent imaging results/findings within the past 24 hours.    Assessment/Plan:     * Spinal epidural abscess  Pt is a 74yM with PMH notable for prior IVDU and Hep C with cirrhosis who presented on 12/06 with one month duration of upper back pain with work-up at outside hospital significant for MRI suggestive of osteomyelitis of C6-T2 with associated epidural abscess with posterior spinal cord displacement. He was taken to OR for C7-T1 corpectomy on 12/10 and again on 12/17 for posterior fusion with stay in Neuro ICU following surgery and drain management. Stepped down to floor on 12/26. Currently on cefepime 2g Q8 hours for pseudomonas growing in aerobic culture from abscess on 12/10 with end date between 1/28 and 2/11/20 with ID follow-up. Blood cultures have remained negative. Also with left toe wound that was being managed by podiatry and wound care. Per neurosurgery notes, patient is currently awaiting SNF placement.     -continued management per primary team      Elevated alkaline phosphatase level  -Likely secondary to osteomyelitis.       Tobacco abuse        Hyperlipidemia  -Advise holding statin in setting of elevation in LFTs  -Can resume when down-trending    Other specified hypothyroidism  -Continue home  Synthroid    Essential hypertension  Pt on losartan-hydrochlorothiazide 100-25 mg and metoprolol succinate 100 mg at home. Denies taking these consistently.     -Re-started on admission. Can continue BP meds of losartan-HCTZ, metoprolol, and amlodipine          Coronary artery disease involving native coronary artery of native heart without angina pectoris  -Continue plavix      Chronic hepatitis C without hepatic coma  Mr. Muñoz is a 74yM with history of Hep C likely 2/2 IVDU. Was following with Hepatology here, most recently seen in 2017. Per chart review, pt was treated for antiviral therapy in 2009 but had possible re-activation in 2017 given ongoing drug use. However, it appears pt did not continue to follow up with [facility], unknown if treated again. Hospital Medicine was consulted on 1/2 for assistance with elevated AST and ALT with hx of Hep C.     Throughout admission, LFTs appeared to be wnl until 1/2/20 when the AST increased from 30-->54 and ALT from 23-->45. Alk phos has been elevated throughout admission that was attributed to his osteomyelitis. Pt has not had any high dose tylenol recently. Per chart review, he was placed on high-dose stain (Atorvastatin 80 mg) and denies taking this or other meds at home. Rise in LFTs could potentially be due to re-starting high-dose statin while in hospital. On exam, he is oriented to person, place, time but has some confusion regarding his procedures. Suspect hospital delirium as opposed to hepatic encephalopathy but will obtain ammonia level to evaluate. No stigmata of cirrhosis noted on PE    -MELD 7  -HIV negative  -Most recent abdominal U/S WNL in 07/2017  -Will repeat Hep C Ab and viral load. Also ordered PT/INR and Ammonia    Plan:   Advise holding statin at this time and trending LFTs. Can resume once LFTs improve. Pt will need close follow-up and re-establishment with Hepatology prior to d/c for continued monitoring.     Acute osteomyelitis of  thoracic spine  -See assessment for epidural abscess.       Acute osteomyelitis of cervical spine  -See assessment for epidural abscess.       VTE Risk Mitigation (From admission, onward)         Ordered     Place TUCKER hose  Until discontinued      12/27/19 0741     heparin (porcine) injection 5,000 Units  Every 8 hours      12/19/19 1049     IP VTE HIGH RISK PATIENT  Once      12/07/19 0219     Place sequential compression device  Until discontinued      12/07/19 0219                    Thank you for your consult. We will follow-up with patient. Please contact us if you have any additional questions.    Edith Saucedo MD   PGY-1  Department of Hospital Medicine   [facility]

## 2020-01-03 LAB
ALBUMIN SERPL BCP-MCNC: 2.4 G/DL (ref 3.5–5.2)
ALP SERPL-CCNC: 387 U/L (ref 55–135)
ALT SERPL W/O P-5'-P-CCNC: 41 U/L (ref 10–44)
ANION GAP SERPL CALC-SCNC: 5 MMOL/L (ref 8–16)
AST SERPL-CCNC: 49 U/L (ref 10–40)
BILIRUB SERPL-MCNC: 0.3 MG/DL (ref 0.1–1)
BUN SERPL-MCNC: 24 MG/DL (ref 8–23)
CALCIUM SERPL-MCNC: 9.4 MG/DL (ref 8.7–10.5)
CHLORIDE SERPL-SCNC: 103 MMOL/L (ref 95–110)
CO2 SERPL-SCNC: 26 MMOL/L (ref 23–29)
CREAT SERPL-MCNC: 0.7 MG/DL (ref 0.5–1.4)
EST. GFR  (AFRICAN AMERICAN): >60 ML/MIN/1.73 M^2
EST. GFR  (NON AFRICAN AMERICAN): >60 ML/MIN/1.73 M^2
GLUCOSE SERPL-MCNC: 106 MG/DL (ref 70–110)
HCV AB SERPL QL IA: POSITIVE
POTASSIUM SERPL-SCNC: 3.4 MMOL/L (ref 3.5–5.1)
PROT SERPL-MCNC: 6.1 G/DL (ref 6–8.4)
SODIUM SERPL-SCNC: 134 MMOL/L (ref 136–145)

## 2020-01-03 PROCEDURE — 25000003 PHARM REV CODE 250: Performed by: PHYSICIAN ASSISTANT

## 2020-01-03 PROCEDURE — 94640 AIRWAY INHALATION TREATMENT: CPT

## 2020-01-03 PROCEDURE — 97535 SELF CARE MNGMENT TRAINING: CPT

## 2020-01-03 PROCEDURE — 63600175 PHARM REV CODE 636 W HCPCS: Performed by: PSYCHIATRY & NEUROLOGY

## 2020-01-03 PROCEDURE — 25000003 PHARM REV CODE 250: Performed by: NURSE PRACTITIONER

## 2020-01-03 PROCEDURE — 63600175 PHARM REV CODE 636 W HCPCS: Performed by: PHYSICIAN ASSISTANT

## 2020-01-03 PROCEDURE — 97116 GAIT TRAINING THERAPY: CPT

## 2020-01-03 PROCEDURE — 25000003 PHARM REV CODE 250: Performed by: PSYCHIATRY & NEUROLOGY

## 2020-01-03 PROCEDURE — 99024 POSTOP FOLLOW-UP VISIT: CPT | Mod: ,,, | Performed by: PHYSICIAN ASSISTANT

## 2020-01-03 PROCEDURE — 99024 PR POST-OP FOLLOW-UP VISIT: ICD-10-PCS | Mod: ,,, | Performed by: PHYSICIAN ASSISTANT

## 2020-01-03 PROCEDURE — 99232 SBSQ HOSP IP/OBS MODERATE 35: CPT | Mod: ,,, | Performed by: INTERNAL MEDICINE

## 2020-01-03 PROCEDURE — 94761 N-INVAS EAR/PLS OXIMETRY MLT: CPT

## 2020-01-03 PROCEDURE — 11000001 HC ACUTE MED/SURG PRIVATE ROOM

## 2020-01-03 PROCEDURE — A4216 STERILE WATER/SALINE, 10 ML: HCPCS | Performed by: ANESTHESIOLOGY

## 2020-01-03 PROCEDURE — 27000221 HC OXYGEN, UP TO 24 HOURS

## 2020-01-03 PROCEDURE — 99232 PR SUBSEQUENT HOSPITAL CARE,LEVL II: ICD-10-PCS | Mod: ,,, | Performed by: INTERNAL MEDICINE

## 2020-01-03 PROCEDURE — 25000003 PHARM REV CODE 250: Performed by: ANESTHESIOLOGY

## 2020-01-03 PROCEDURE — 25000242 PHARM REV CODE 250 ALT 637 W/ HCPCS: Performed by: PHYSICIAN ASSISTANT

## 2020-01-03 PROCEDURE — 80053 COMPREHEN METABOLIC PANEL: CPT

## 2020-01-03 RX ORDER — POTASSIUM CHLORIDE 7.45 MG/ML
10 INJECTION INTRAVENOUS
Status: COMPLETED | OUTPATIENT
Start: 2020-01-03 | End: 2020-01-03

## 2020-01-03 RX ADMIN — HEPARIN SODIUM 5000 UNITS: 5000 INJECTION, SOLUTION INTRAVENOUS; SUBCUTANEOUS at 09:01

## 2020-01-03 RX ADMIN — Medication 2 G: at 09:01

## 2020-01-03 RX ADMIN — LIDOCAINE 1 PATCH: 50 PATCH CUTANEOUS at 09:01

## 2020-01-03 RX ADMIN — SENNOSIDES AND DOCUSATE SODIUM 1 TABLET: 8.6; 5 TABLET ORAL at 09:01

## 2020-01-03 RX ADMIN — GABAPENTIN 300 MG: 300 CAPSULE ORAL at 02:01

## 2020-01-03 RX ADMIN — POLYETHYLENE GLYCOL 3350 17 G: 17 POWDER, FOR SOLUTION ORAL at 09:01

## 2020-01-03 RX ADMIN — SODIUM CHLORIDE SOLN NEBU 3% 4 ML: 3 NEBU SOLN at 08:01

## 2020-01-03 RX ADMIN — FAMOTIDINE 20 MG: 20 TABLET ORAL at 09:01

## 2020-01-03 RX ADMIN — Medication 250 MG: at 09:01

## 2020-01-03 RX ADMIN — LEVOTHYROXINE SODIUM 50 MCG: 50 TABLET ORAL at 05:01

## 2020-01-03 RX ADMIN — FERROUS SULFATE TAB EC 325 MG (65 MG FE EQUIVALENT) 325 MG: 325 (65 FE) TABLET DELAYED RESPONSE at 09:01

## 2020-01-03 RX ADMIN — IPRATROPIUM BROMIDE AND ALBUTEROL SULFATE 3 ML: .5; 3 SOLUTION RESPIRATORY (INHALATION) at 07:01

## 2020-01-03 RX ADMIN — Medication 10 ML: at 06:01

## 2020-01-03 RX ADMIN — HEPARIN SODIUM 5000 UNITS: 5000 INJECTION, SOLUTION INTRAVENOUS; SUBCUTANEOUS at 05:01

## 2020-01-03 RX ADMIN — POTASSIUM CHLORIDE 10 MEQ: 7.46 INJECTION, SOLUTION INTRAVENOUS at 10:01

## 2020-01-03 RX ADMIN — METOPROLOL TARTRATE 50 MG: 50 TABLET ORAL at 09:01

## 2020-01-03 RX ADMIN — BACITRACIN: 500 OINTMENT TOPICAL at 09:01

## 2020-01-03 RX ADMIN — Medication 10 ML: at 11:01

## 2020-01-03 RX ADMIN — POTASSIUM CHLORIDE 10 MEQ: 7.46 INJECTION, SOLUTION INTRAVENOUS at 09:01

## 2020-01-03 RX ADMIN — CLOPIDOGREL BISULFATE 75 MG: 75 TABLET ORAL at 09:01

## 2020-01-03 RX ADMIN — AMLODIPINE BESYLATE 10 MG: 10 TABLET ORAL at 09:01

## 2020-01-03 RX ADMIN — POTASSIUM CHLORIDE 10 MEQ: 7.46 INJECTION, SOLUTION INTRAVENOUS at 11:01

## 2020-01-03 RX ADMIN — LOSARTAN POTASSIUM AND HYDROCHLOROTHIAZIDE TABLETS 1 TABLET: 100; 25 TABLET, FILM COATED ORAL at 09:01

## 2020-01-03 RX ADMIN — CEFEPIME 2 G: 2 INJECTION, POWDER, FOR SOLUTION INTRAVENOUS at 05:01

## 2020-01-03 RX ADMIN — Medication 10 ML: at 12:01

## 2020-01-03 RX ADMIN — GABAPENTIN 300 MG: 300 CAPSULE ORAL at 09:01

## 2020-01-03 RX ADMIN — SODIUM CHLORIDE SOLN NEBU 3% 4 ML: 3 NEBU SOLN at 09:01

## 2020-01-03 RX ADMIN — CEFEPIME 2 G: 2 INJECTION, POWDER, FOR SOLUTION INTRAVENOUS at 12:01

## 2020-01-03 RX ADMIN — HEPARIN SODIUM 5000 UNITS: 5000 INJECTION, SOLUTION INTRAVENOUS; SUBCUTANEOUS at 02:01

## 2020-01-03 RX ADMIN — CEFEPIME 2 G: 2 INJECTION, POWDER, FOR SOLUTION INTRAVENOUS at 09:01

## 2020-01-03 NOTE — PT/OT/SLP PROGRESS
Occupational Therapy   Treatment    Name: Junaid Muñoz  MRN: [id number]  Admitting Diagnosis:  Spinal epidural abscess  17 Days Post-Op    Recommendations:     Discharge Recommendations: nursing facility, skilled  Discharge Equipment Recommendations:  bedside commode, walker, rolling, wheelchair, shower chair  Barriers to discharge:  None    Assessment:     Junaid Muñoz is a 74 y.o. male with a medical diagnosis of Spinal epidural abscess.  He presents with the following performance deficits affecting function:  weakness, impaired endurance, impaired self care skills, impaired balance, impaired functional mobilty, gait instability, decreased lower extremity function, orthopedic precautions, decreased safety awareness, pain, impaired skin, impaired coordination.     Pt tolerated session well. Pt initially required mod-max encouragement to participate because he did not want to don his brace stating the brace hurts him. After encouragement, pt agreeable to participate. Pt presents w/ improved functional mobility as he required Min A for bed mobility as well as sit<>stand transfer w/ RW. Pt continues to require Total A for  management and Min A for sitting balance at EOB. Pt ambulated to bathroom w/ Mod A using the RW; pt's gait instability makes him unsafe to ambulate w/out physical assistance. Pt had one LOB in bathroom post toiletting task which was corrected by Max A from therapist. Pt continues to benefit from skilled OT to address the above listed impairments.     Rehab Prognosis:  Good; patient would benefit from acute skilled OT services to address these deficits and reach maximum level of function.       Plan:     Patient to be seen 5 x/week to address the above listed problems via self-care/home management, therapeutic activities, therapeutic exercises  · Plan of Care Expires: 01/27/20  · Plan of Care Reviewed with: patient    Subjective     Pain/Comfort:  · Pain Rating 1: (did not  rate)  · Location - Orientation 1: generalized  · Location 1: neck  · Pain Addressed 1: Reposition, Distraction, Cessation of Activity  · Pain Rating Post-Intervention 1: (did not rate)    Objective:     Communicated with: RN prior to session.  Patient found HOB elevated with SCD upon OT entry to room.    General Precautions: Standard, aspiration, fall   Orthopedic Precautions:spinal precautions   Braces:      Occupational Performance:     Bed Mobility:    · Patient completed Rolling/Turning to Left with  minimum assistance  · Patient completed Rolling/Turning to Right with minimum assistance  · Patient completed Scooting to JAMI with contact guard assistance  · Patient completed Supine to Sit with minimum assistance  · Patient completed Sit to Supine with minimum assistance     Functional Mobility/Transfers:  · Patient completed Sit <> Stand Transfer with minimum assistance  with  rolling walker   · Patient completed Toilet Transfer Step Transfer technique with minimum assistance with  rolling walker  · Functional Mobility: Pt ambulated a household distance to bathroom w/ Mod A using the RW w/ brace donned. One occurrence of LOB which was safely corrected by therapist.     Activities of Daily Living:  · Grooming: moderate assistance for standing balance to clean hands post toiletting  · Upper Body Dressing: total assistance for  management  · Toileting: minimum assistance for agustín-hygiene on toilet       [facility] 6 Click ADL: 13    Treatment & Education:  - OT POC  - Importance of OOB activity to maximize recovery  - re-ed on spinal precautions, log roll technique for bed mobility and importance of  when OOB  - cues for hand placement to promote stabilization sitting EOB      Patient left HOB elevated with all lines intact, call button in reach and RN notifiedEducation:      GOALS:   Multidisciplinary Problems     Occupational Therapy Goals        Problem: Occupational Therapy Goal    Goal Priority Disciplines  Outcome Interventions   Occupational Therapy Goal     OT, PT/OT Ongoing, Progressing    Description:  Goals to be met by: 1/10/2019     Patient will increase functional independence with ADLs by performing:    UE Dressing with Minimal Assistance.  LE Dressing with Moderate Assistance.  Grooming while standing with Stand-by Assistance.  Toileting from toilet with Minimal Assistance for hygiene (met - 1/3) and clothing management.   Bathing from  edge of bed with Moderate Assistance.  Supine to sit with Minimal Assistance. -- Met (1/3)                         Time Tracking:     OT Date of Treatment: 01/03/20  OT Start Time: 1330  OT Stop Time: 1353  OT Total Time (min): 23 min    Billable Minutes:Self Care/Home Management 23    Shirley Rushing OT  1/3/2020

## 2020-01-03 NOTE — ASSESSMENT & PLAN NOTE
Mr. Muñoz is a 74yM with history of Hep C likely 2/2 IVDU. Was following with Hepatology here, most recently seen in 2017. Per chart review, pt was treated for antiviral therapy in 2009 but had possible re-activation in 2017 given ongoing drug use. However, it appears pt did not continue to follow up with [facility], unknown if treated again. Hospital Medicine was consulted on 1/2/20 for assistance with elevated AST and ALT with hx of Hep C.     Throughout admission, LFTs appeared to be wnl until 1/2/20 when the AST increased from 30-->54 and ALT from 23-->45. Alk phos has been elevated throughout admission that was attributed to his osteomyelitis. Pt has not had any high dose tylenol recently. Per chart review, he was placed on high-dose stain (Atorvastatin 80 mg). Rise in LFTs could potentially be due to re-starting high-dose statin while in hospital. On exam, he is oriented to person, place, time but has some confusion regarding his medical history. Suspect hospital delirium as opposed to hepatic encephalopathy but will obtain ammonia level to evaluate. No stigmata of cirrhosis noted on PE. Spoke to patient's wife on 1/2/20 regarding mental status baseline and home medication regimen. States that he does have significant history of recent IVDU and has not followed with hepatology since 2017. States he was complaint with home meds of metoprolol, losartan, plavix, levothyroxine, and atorvastatin.     -MELD 7  -HIV negative  -Most recent abdominal U/S in 07/2017  -Hep C Ab and viral load pending, will need Hepatology follow-up after d/c. Pt was informed of this as well.   -PT/INR and Ammonia wnl  -ALT/AST and alk phos trending down  -Replace K and Mg as needed    Plan:   LFTs trending down. Advise to continue holding statin at this time. Upon discharge, can re-start atorvastatin at lower dose of 40 mg daily as opposed to the 80 mg patient was on prior to admission. Pt will need to follow-up with Hepatology  after d/c for continued monitoring of LFTs and Hep C.

## 2020-01-03 NOTE — PLAN OF CARE
Problem: Physical Therapy Goal  Goal: Physical Therapy Goal  Description  Goals to be met by: 19     Patient will increase functional independence with mobility by performin. Supine to sit with MInimal Assistance  2. Sit to supine with MInimal Assistance  3. Rolling to Left and Right with Modified Saunders.  4. Sit to stand transfer with Minimal Assistance  5. Bed to chair transfer with Minimal Assistance using Rolling Walker  6. Gait  x 40 feet with Minimal Assistance using Rolling Walker.   7. Sitting at edge of bed x 8 minutes with Minimal Assistance  8. Stand for 5 minutes with Minimal Assistance using Rolling Walker  9. Lower extremity exercise program x15 reps per handout, with independence     Outcome: Ongoing, Progressing    Pt progressing towards goals, requires encouragement to participate this date. Goals remain appropriate. Will continue to follow while in house.     Samantha Curtis, PT, DPT  1/3/2020

## 2020-01-03 NOTE — SUBJECTIVE & OBJECTIVE
Interval History: NAEON. LFTs improving after d/c statin. Spoke with hepatology, which recommend outpatient follow up, they will schedule the patient. INR/PTT/ammonia WNL. Denies weakness, paresthesias, or bowel/bladder issues. Medically stable for discharge pending SNF    Medications:  Continuous Infusions:  Scheduled Meds:   albuterol-ipratropium  3 mL Nebulization Q12H    amLODIPine  10 mg Oral Daily    ascorbic acid (vitamin C)  250 mg Oral BID    bacitracin   Topical (Top) BID    ceFEPime (MAXIPIME) IVPB  2 g Intravenous Q8H    clopidogrel  75 mg Oral Daily    famotidine  20 mg Oral BID    ferrous sulfate  325 mg Oral BID    gabapentin  300 mg Oral TID    heparin (porcine)  5,000 Units Subcutaneous Q8H    levothyroxine  50 mcg Oral Before breakfast    lidocaine  1 patch Transdermal Daily    losartan-hydrochlorothiazide 100-25 mg  1 tablet Oral Daily    metoprolol tartrate  50 mg Oral BID    polyethylene glycol  17 g Oral Daily    senna-docusate 8.6-50 mg  1 tablet Oral BID    sodium chloride 0.9%  10 mL Intravenous Q6H    sodium chloride 3%  4 mL Nebulization Q12H    sodium chloride  2 g Oral BID     PRN Meds:acetaminophen, bisacodyl, labetalol, naloxone, ondansetron, oxyCODONE, promethazine (PHENERGAN) IVPB, Flushing PICC Protocol **AND** sodium chloride 0.9% **AND** sodium chloride 0.9%     Review of Systems  Objective:     Weight: 75.2 kg (165 lb 12.6 oz)  Body mass index is 24.48 kg/m².  Vital Signs (Most Recent):  Temp: 97 °F (36.1 °C) (01/03/20 1100)  Pulse: 85 (01/03/20 1100)  Resp: 18 (01/03/20 1100)  BP: 127/64 (01/03/20 1100)  SpO2: 95 % (01/03/20 0920) Vital Signs (24h Range):  Temp:  [97 °F (36.1 °C)-98.2 °F (36.8 °C)] 97 °F (36.1 °C)  Pulse:  [70-85] 85  Resp:  [14-18] 18  SpO2:  [95 %-99 %] 95 %  BP: (110-152)/(60-67) 127/64     Date 01/03/20 0700 - 01/04/20 0659   Shift 7131-3800 6340-1762 3391-4729 24 Hour Total   INTAKE   P.O. 500   500   Shift Total(mL/kg) 500(6.6)    500(6.6)   OUTPUT   Shift Total(mL/kg)       Weight (kg) 75.2 75.2 75.2 75.2                   Male External Urinary Catheter 12/19/19 1200 Small (Active)   Collection Container Urimeter 12/27/2019  7:50 PM   Securement Method secured to lower ABD w/ tape 12/27/2019  7:50 PM   Skin no redness;no breakdown 12/27/2019  7:50 PM   Tolerance no signs/symptoms of discomfort 12/27/2019  7:50 PM   Output (mL) 250 mL 12/28/2019  9:00 AM   Catheter Change Date 12/24/19 12/26/2019  4:24 PM   Catheter Change Time 1100 12/26/2019  4:24 PM       Neurosurgery Physical Exam   I have seen and examined the patient yesterday and today. Exam remains stable.   General: well developed, well nourished, no distress.   Head: normocephalic, atraumatic  Neck: No tracheal deviation. No underlying hematoma or fluid collection. Incision well approximated without edema or erythema.   Neurologic: Alert and oriented. Thought content appropriate.  GCS: Motor: 6/Verbal: 5/Eyes: 4 GCS Total: 15  Mental Status: Awake, Alert, Oriented x 4  Language: No aphasia  Speech: No dysarthria  Cranial nerves: face symmetric, tongue midline, CN II-XII grossly intact.   Eyes: pupils equal, round, reactive to light, EOM intact.   Pulmonary: normal respirations, no signs of respiratory distress  Abdomen: soft, non-distended, not tender to palpation  Sensory: intact to light touch throughout  Motor Strength: Moves all extremities spontaneously with good tone.  Generalized deconditioning throughout, grossly full strength upper and lower extremities. No abnormal movements seen.      Damian: absent  Clonus: absent  Babinski: absent  Vascular: Pulses 2+ and symmetric radial and dorsalis pedis. No LE edema.   Skin: Skin is warm, dry and intact.     Anterior incision c/d/i with skin edges well approximated. No surrounding erythema or edema. No drainage from incision. No palpable hematoma.     Posterior incision c/d/I with skin edges well approximated. No surrounding  erythema or edema. No drainage from incision. No palpable underlying fluid collection.    Significant Labs:  Recent Labs   Lab 01/02/20  0437 01/03/20  0400    106   * 134*   K 3.8 3.4*    103   CO2 27 26   BUN 22 24*   CREATININE 0.8 0.7   CALCIUM 9.1 9.4     Recent Labs   Lab 01/01/20  2054   WBC 7.35   HGB 8.8*   HCT 28.5*   *     Recent Labs   Lab 01/02/20  1512   INR 1.0   APTT 23.6     Microbiology Results (last 7 days)     ** No results found for the last 168 hours. **        All pertinent labs from the last 24 hours have been reviewed.    Significant Diagnostics:  No results found in the last 24 hours.

## 2020-01-03 NOTE — ASSESSMENT & PLAN NOTE
-Advise holding high dose statin in setting of elevation in LFTs.  -AST and ALT trending down, continue to monitor, can re-start statin at lower dose on d/c

## 2020-01-03 NOTE — ASSESSMENT & PLAN NOTE
74 y.o. male with PMH of HTN, HLD, Hepatitis C, and CAD s/p two stents on plavix who presents with C6-T2 osteomyelitis with suspected epidural abscess.  Now s/p C7/T1 corpectomies on 12/10 and C2-T2  posterior instrumented fusion 12/17.    -Patient neurologically stable on exam  -Brace on when upright, OOB, or working with therapy. OK to remove when lying in bed. Pt sitting up in bed without brace on, explained to patient that brace must be worn when sitting up and when OOB. He voiced understanding.   -Staples removed 12/31/19. Incision intact without signs of infection. Leave incision open to air. Turn q2h to continue to promote wound healing.   - Elevated LFTs: HM consulted and believe most likely 2/2 resuming high dose statin in hospital. Recommend holding, and okay to resume once LFTs down trending. ALP most likely elevated 2/2 osteomyelitis. No signs of cirrhosis on exam. LFTs improving after holding high dose statin. HM recommend restarting statin at lower dose (40 mg) upon discharge. PTT/INR/ammonia all within normal limits. HCV ab and viral load pending. Spoke with Dr. Giles from hepatology who will set the patient up for ouptatient follow up in hepatology clinic. Appreciate  and Hepatology assistance.   -ID: Afebrile. No leukocytosis. Continue Cefepime 2g q 8 hours. Estimated end date of therapy 1/28/20-2/11/20. FU in ID clinic in 2-3 weeks.   -2nd left toe wound care: Seen by Podiatry for left 2nd toe, recommend betadine daily. Dress with foam bandage. Wound care on board. Appreciate assistance.  -Pain: Pain well controlled on current regimen. No adjustments needed at this time.   -Anemia: Continue iron for replacement x 2 weeks. F/u at next lab draw.  -HTN: Continue Amlodipine 10 mg daily, Losartan-HCTZ 100-25 mg daily, and Metoprolol 50 mg BID.   -CAD s/p stent placement: Continue home dose of Plavix 75 mg daily.   -Hyponatremia: Na 134 today, Na tabs increased to bid. Will continue to monitor and wean  as tolerated.   -Hypoalbuminemia: Continue Ojse BID to promote wound healing.   -HLD: Will d/c high dose atorvastatin in setting of elevated LFTs. Will continue to monitor daily, and restart when downtrending. Per HM okay to resume at discharge at lower dose (40 mg).   -DVT prophylaxis: TUCKER's, SCD's, SQH  -Bowel regimen: senna BID and miralax daily  -Atelectasis prevention: IS hourly while awake, PT/OT, OOB > 6 hours per day       DISPO: Pending SNF placement

## 2020-01-03 NOTE — PROGRESS NOTES
[facility]  Neurosurgery  Progress Note    Subjective:     History of Present Illness: Junaid Muñoz is a 74 y.o. male with PMH of HTN, HLD, Hepatitis C, and CAD s/p two stents on plavix who presents with 1 month history of back pain between his shoulders. MRI at OSH demonstrates likely epidural abscess. Pt denies hx of trauma and reports slow onset of symptoms.     Post-Op Info:  Procedure(s) (LRB):  FUSION, SPINE, CERVICAL, POSTERIOR APPROACH C2-T3 posterior instrumented fusion (N/A)   17 Days Post-Op     Interval History: NAEON. LFTs improving after d/c statin. Spoke with hepatology, which recommend outpatient follow up, they will schedule the patient. INR/PTT/ammonia WNL. Denies weakness, paresthesias, or bowel/bladder issues. Medically stable for discharge pending SNF    Medications:  Continuous Infusions:  Scheduled Meds:   albuterol-ipratropium  3 mL Nebulization Q12H    amLODIPine  10 mg Oral Daily    ascorbic acid (vitamin C)  250 mg Oral BID    bacitracin   Topical (Top) BID    ceFEPime (MAXIPIME) IVPB  2 g Intravenous Q8H    clopidogrel  75 mg Oral Daily    famotidine  20 mg Oral BID    ferrous sulfate  325 mg Oral BID    gabapentin  300 mg Oral TID    heparin (porcine)  5,000 Units Subcutaneous Q8H    levothyroxine  50 mcg Oral Before breakfast    lidocaine  1 patch Transdermal Daily    losartan-hydrochlorothiazide 100-25 mg  1 tablet Oral Daily    metoprolol tartrate  50 mg Oral BID    polyethylene glycol  17 g Oral Daily    senna-docusate 8.6-50 mg  1 tablet Oral BID    sodium chloride 0.9%  10 mL Intravenous Q6H    sodium chloride 3%  4 mL Nebulization Q12H    sodium chloride  2 g Oral BID     PRN Meds:acetaminophen, bisacodyl, labetalol, naloxone, ondansetron, oxyCODONE, promethazine (PHENERGAN) IVPB, Flushing PICC Protocol **AND** sodium chloride 0.9% **AND** sodium chloride 0.9%     Review of Systems  Objective:     Weight: 75.2 kg (165 lb 12.6 oz)  Body  mass index is 24.48 kg/m².  Vital Signs (Most Recent):  Temp: 97 °F (36.1 °C) (01/03/20 1100)  Pulse: 85 (01/03/20 1100)  Resp: 18 (01/03/20 1100)  BP: 127/64 (01/03/20 1100)  SpO2: 95 % (01/03/20 0920) Vital Signs (24h Range):  Temp:  [97 °F (36.1 °C)-98.2 °F (36.8 °C)] 97 °F (36.1 °C)  Pulse:  [70-85] 85  Resp:  [14-18] 18  SpO2:  [95 %-99 %] 95 %  BP: (110-152)/(60-67) 127/64     Date 01/03/20 0700 - 01/04/20 0659   Shift 7498-9121 5734-0820 0721-9764 24 Hour Total   INTAKE   P.O. 500   500   Shift Total(mL/kg) 500(6.6)   500(6.6)   OUTPUT   Shift Total(mL/kg)       Weight (kg) 75.2 75.2 75.2 75.2                   Male External Urinary Catheter 12/19/19 1200 Small (Active)   Collection Container Urimeter 12/27/2019  7:50 PM   Securement Method secured to lower ABD w/ tape 12/27/2019  7:50 PM   Skin no redness;no breakdown 12/27/2019  7:50 PM   Tolerance no signs/symptoms of discomfort 12/27/2019  7:50 PM   Output (mL) 250 mL 12/28/2019  9:00 AM   Catheter Change Date 12/24/19 12/26/2019  4:24 PM   Catheter Change Time 1100 12/26/2019  4:24 PM       Neurosurgery Physical Exam   I have seen and examined the patient yesterday and today. Exam remains stable.   General: well developed, well nourished, no distress.   Head: normocephalic, atraumatic  Neck: No tracheal deviation. No underlying hematoma or fluid collection. Incision well approximated without edema or erythema.   Neurologic: Alert and oriented. Thought content appropriate.  GCS: Motor: 6/Verbal: 5/Eyes: 4 GCS Total: 15  Mental Status: Awake, Alert, Oriented x 4  Language: No aphasia  Speech: No dysarthria  Cranial nerves: face symmetric, tongue midline, CN II-XII grossly intact.   Eyes: pupils equal, round, reactive to light, EOM intact.   Pulmonary: normal respirations, no signs of respiratory distress  Abdomen: soft, non-distended, not tender to palpation  Sensory: intact to light touch throughout  Motor Strength: Moves all extremities spontaneously  with good tone.  Generalized deconditioning throughout, grossly full strength upper and lower extremities. No abnormal movements seen.      Damian: absent  Clonus: absent  Babinski: absent  Vascular: Pulses 2+ and symmetric radial and dorsalis pedis. No LE edema.   Skin: Skin is warm, dry and intact.     Anterior incision c/d/i with skin edges well approximated. No surrounding erythema or edema. No drainage from incision. No palpable hematoma.     Posterior incision c/d/I with skin edges well approximated. No surrounding erythema or edema. No drainage from incision. No palpable underlying fluid collection.    Significant Labs:  Recent Labs   Lab 01/02/20  0437 01/03/20  0400    106   * 134*   K 3.8 3.4*    103   CO2 27 26   BUN 22 24*   CREATININE 0.8 0.7   CALCIUM 9.1 9.4     Recent Labs   Lab 01/01/20  2054   WBC 7.35   HGB 8.8*   HCT 28.5*   *     Recent Labs   Lab 01/02/20  1512   INR 1.0   APTT 23.6     Microbiology Results (last 7 days)     ** No results found for the last 168 hours. **        All pertinent labs from the last 24 hours have been reviewed.    Significant Diagnostics:  No results found in the last 24 hours.      Assessment/Plan:     * Spinal epidural abscess  74 y.o. male with PMH of HTN, HLD, Hepatitis C, and CAD s/p two stents on plavix who presents with C6-T2 osteomyelitis with suspected epidural abscess.  Now s/p C7/T1 corpectomies on 12/10 and C2-T2  posterior instrumented fusion 12/17.    -Patient neurologically stable on exam  -Brace on when upright, OOB, or working with therapy. OK to remove when lying in bed. Pt sitting up in bed without brace on, explained to patient that brace must be worn when sitting up and when OOB. He voiced understanding.   -Staples removed 12/31/19. Incision intact without signs of infection. Leave incision open to air. Turn q2h to continue to promote wound healing.   - Elevated LFTs: HM consulted and believe most likely 2/2 resuming  high dose statin in hospital. Recommend holding, and okay to resume once LFTs down trending. ALP most likely elevated 2/2 osteomyelitis. No signs of cirrhosis on exam. LFTs improving after holding high dose statin. HM recommend restarting statin at lower dose (40 mg) upon discharge. PTT/INR/ammonia all within normal limits. HCV ab and viral load pending. Spoke with Dr. Giles from hepatology who will set the patient up for ouptatient follow up in hepatology clinic. Appreciate  and Hepatology assistance.   -ID: Afebrile. No leukocytosis. Continue Cefepime 2g q 8 hours. Estimated end date of therapy 1/28/20-2/11/20. FU in ID clinic in 2-3 weeks.   -2nd left toe wound care: Seen by Podiatry for left 2nd toe, recommend betadine daily. Dress with foam bandage. Wound care on board. Appreciate assistance.  -Pain: Pain well controlled on current regimen. No adjustments needed at this time.   -Anemia: Continue iron for replacement x 2 weeks. F/u at next lab draw.  -HTN: Continue Amlodipine 10 mg daily, Losartan-HCTZ 100-25 mg daily, and Metoprolol 50 mg BID.   -CAD s/p stent placement: Continue home dose of Plavix 75 mg daily.   -Hyponatremia: Na 134 today, Na tabs increased to bid. Will continue to monitor and wean as tolerated.   -Hypoalbuminemia: Continue Jose BID to promote wound healing.   -HLD: Will d/c high dose atorvastatin in setting of elevated LFTs. Will continue to monitor daily, and restart when downtrending. Per  okay to resume at discharge at lower dose (40 mg).   -DVT prophylaxis: TUCKER's, SCD's, SQH  -Bowel regimen: senna BID and miralax daily  -Atelectasis prevention: IS hourly while awake, PT/OT, OOB > 6 hours per day       DISPO: Pending SNF placement        Lesa Lujan PA-C  Neurosurgery  [facility]

## 2020-01-03 NOTE — PROGRESS NOTES
[facility] Hospital Medicine  Progress Note    Patient Name: Junaid Muñoz  MRN: [id number]  Patient Class: IP- Inpatient   Admission Date: 12/6/2019  Length of Stay: 28 days  Attending Physician: Elian Deluca MD  Primary Care Provider: Oskar Whitman MD    [facility] Medicine Team: Networked reference to record PCT  Edith Saucedo MD    Subjective:     Principal Problem:Spinal epidural abscess        HPI:  Mr. Muñoz is a 74yM who was admitted to neurosurgery for spinal epidural abscess and vertebral osteomyelitis. He has a PMH notable for previous IVDU and Hep C (treated) as well as biopsy proven cirrhosis, anemia, HTN, CAD (with stents), HLD, and hypothyroidism. He reported one month duration of progressively worsening upper back pain radiating between the bilateral shoulders. He denies symptom association with fevers, chills, numbness or tingling of extremities, bowel or bladder incontinence, headache, blurry vision, back trauma, prior history of spinal surgeries, chest pain, SOB, or pain with inspiration. Due to symptom progression, he was evaluated by outpatient orthopedist on 12/06 and MRI of thoracic spine was ordered. It was suggestive of osteomyelitis of C6 to T2 associated with epidural abscess with posterior spinal cord displacement. Patient was notified and instructed to report to ED.     He initially presented to [facility] on 12/06. Labs were significant for normal WBC, negative lactate, and elevated alkaline phosphatase (284). UA was unremarkable. He was initiated on Ceftriaxone and Vancomycin and transferred to [facility] for neurosurgery evaluation. Neurosurgery recommended obtaining CT of thoracic and lumbar spine and cervical MRI. He was taken to OR for C7-T1 corpectomy on 12/10 and again on 12/17 for posterior fusion with stay in Neuro ICU following surgery and drain management. Stepped down to floor on 12/26. Currently on cefepime 2g Q8 hours for  pseudomonas growing in aerobic culture from spine on 12/10 with end date between 1/28 and 2/11/20 with ID follow-up. Blood cultures have remained negative. Also with left toe wound that was being managed by podiatry and wound care. Per neurosurgery notes, patient is currently awaiting SNF placement. Hospital medicine was consulted on 1/2/20 for assistance with new onset elevated ALT/AST with history of Hep C. Per chart review, patient had been following with Hepatology in 2017 but was lost to follow-up. At that time, Mr. Muñoz appeared to still be actively using IV drugs. Throughout this admission, AST and ALT were wnl until 1/2/20 when they bumped to 54 and 45. His home meds include plavix, amlodipine, losartan-hctz, metoprolol, atorvastatin, levothyroxine however pt states he has not been taking any of them for some time.     Overview/Hospital Course:  Hospital Medicine consulted by Neurosurgery 1/2/20 for rising LFTs. Pt has history of Hep C and was on high intensity statin (atorvastain 80 mg). Advised holding statin in setting of rising AST/ALT. Overnight, LFTs trending down. Hep C serologies pending. Per chart review, pt waiting on SNF placement.     Interval History: No acute events overnight. AST and ALT trending down. Still holding statin. Discussed case with pt's wife who states he has been compliant with medications at home but has not seen hepatology since 2017.     Review of Systems   Constitutional: Negative for chills, fatigue and fever.   HENT: Negative for congestion and trouble swallowing.    Eyes: Negative for visual disturbance.   Respiratory: Negative for cough and shortness of breath.    Cardiovascular: Negative for chest pain.   Gastrointestinal: Negative for abdominal pain, nausea and vomiting.   Genitourinary: Negative for dysuria.   Musculoskeletal: Negative for back pain and neck pain.   Skin: Negative for rash.   Neurological: Negative for dizziness, seizures and headaches.    Psychiatric/Behavioral: Negative for agitation and hallucinations. The patient is not nervous/anxious.      Objective:     Vital Signs (Most Recent):  Temp: 97 °F (36.1 °C) (01/03/20 0753)  Pulse: 81 (01/03/20 0920)  Resp: 14 (01/03/20 0920)  BP: 125/63 (01/03/20 0753)  SpO2: 95 % (01/03/20 0920) Vital Signs (24h Range):  Temp:  [97 °F (36.1 °C)-98.2 °F (36.8 °C)] 97 °F (36.1 °C)  Pulse:  [70-90] 81  Resp:  [14-18] 14  SpO2:  [95 %-99 %] 95 %  BP: (110-152)/(60-72) 125/63     Weight: 75.2 kg (165 lb 12.6 oz)  Body mass index is 24.48 kg/m².  No intake or output data in the 24 hours ending 01/03/20 0950   Physical Exam   Constitutional: He is oriented to person, place, and time. He appears well-developed and well-nourished. No distress.   Well-appearing, NAD   HENT:   Head: Normocephalic and atraumatic.   Mouth/Throat: Oropharynx is clear and moist.   Eyes: Conjunctivae and EOM are normal. No scleral icterus.   Neck: Normal range of motion.   Cardiovascular: Normal rate and regular rhythm.   Pulmonary/Chest: Effort normal and breath sounds normal.   Abdominal: Soft. He exhibits no distension. There is no tenderness. There is no guarding.   Midline abdominal scar in place from childhood pyloric stenosis repair   Musculoskeletal: Normal range of motion. He exhibits no edema or tenderness.   Lymphadenopathy:     He has no cervical adenopathy.   Neurological: He is alert and oriented to person, place, and time.   A&O x3. Still unsure of pmhx.   Skin: Skin is warm and dry. He is not diaphoretic.   Nursing note and vitals reviewed.      Significant Labs:   CBC:   Recent Labs   Lab 01/01/20 2054   WBC 7.35   HGB 8.8*   HCT 28.5*   *     CMP:   Recent Labs   Lab 01/02/20  0437 01/03/20  0400   * 134*   K 3.8 3.4*    103   CO2 27 26    106   BUN 22 24*   CREATININE 0.8 0.7   CALCIUM 9.1 9.4   PROT 6.1 6.1   ALBUMIN 2.3* 2.4*   BILITOT 0.4 0.3   ALKPHOS 401* 387*   AST 54* 49*   ALT 45* 41    ANIONGAP 5* 5*   EGFRNONAA >60.0 >60.0     Cardiac Markers: No results for input(s): CKMB, MYOGLOBIN, BNP, TROPISTAT in the last 48 hours.  Lactic Acid: No results for input(s): LACTATE in the last 48 hours.  POCT Glucose: No results for input(s): POCTGLUCOSE in the last 48 hours.    Significant Imaging: I have reviewed all pertinent imaging results/findings within the past 24 hours.      Assessment/Plan:      * Spinal epidural abscess  Pt is a 74yM with PMH notable for prior IVDU and Hep C with cirrhosis who presented on 12/06 with one month duration of upper back pain with work-up at outside hospital significant for MRI suggestive of osteomyelitis of C6-T2 with associated epidural abscess with posterior spinal cord displacement. He was taken to OR for C7-T1 corpectomy on 12/10 and again on 12/17 for posterior fusion with stay in Neuro ICU following surgery and drain management. Stepped down to floor on 12/26. Currently on cefepime 2g Q8 hours for pseudomonas growing in aerobic culture from abscess on 12/10 with end date between 1/28 and 2/11/20 with ID follow-up. Blood cultures have remained negative. Also with left toe wound that was being managed by podiatry and wound care. Per neurosurgery notes, patient is currently awaiting SNF placement.     -continued management per primary team      Pseudomonas infection  -Continued management pre primary team      Elevated alkaline phosphatase level  -Likely secondary to osteomyelitis.       Tobacco abuse        Hyperlipidemia  -Advise holding high dose statin in setting of elevation in LFTs.  -AST and ALT trending down, continue to monitor, can re-start statin at lower dose on d/c    Other specified hypothyroidism  -Continue home Synthroid    Essential hypertension  Pt on losartan-hydrochlorothiazide 100-25 mg and metoprolol succinate 100 mg at home. Wife states he has been taking these medications consistently home.     -Re-started on admission. Can continue BP meds  of losartan-HCTZ, metoprolol, and amlodipine          Coronary artery disease involving native coronary artery of native heart without angina pectoris  -Continue plavix      Chronic hepatitis C without hepatic coma  Mr. Muñoz is a 74yM with history of Hep C likely 2/2 IVDU. Was following with Hepatology here, most recently seen in 2017. Per chart review, pt was treated for antiviral therapy in 2009 but had possible re-activation in 2017 given ongoing drug use. However, it appears pt did not continue to follow up with [facility], unknown if treated again. Hospital Medicine was consulted on 1/2/20 for assistance with elevated AST and ALT with hx of Hep C.     Throughout admission, LFTs appeared to be wnl until 1/2/20 when the AST increased from 30-->54 and ALT from 23-->45. Alk phos has been elevated throughout admission that was attributed to his osteomyelitis. Pt has not had any high dose tylenol recently. Per chart review, he was placed on high-dose stain (Atorvastatin 80 mg). Rise in LFTs could potentially be due to re-starting high-dose statin while in hospital. On exam, he is oriented to person, place, time but has some confusion regarding his medical history. Suspect hospital delirium as opposed to hepatic encephalopathy but will obtain ammonia level to evaluate. No stigmata of cirrhosis noted on PE. Spoke to patient's wife on 1/2/20 regarding mental status baseline and home medication regimen. States that he does have significant history of recent IVDU and has not followed with hepatology since 2017. States he was complaint with home meds of metoprolol, losartan, plavix, levothyroxine, and atorvastatin.     -MELD 7  -HIV negative  -Most recent abdominal U/S in 07/2017  -Hep C Ab and viral load pending, will need Hepatology follow-up after d/c. Pt was informed of this as well.   -PT/INR and Ammonia wnl  -ALT/AST and alk phos trending down  -Replace K and Mg as needed    Plan:   LFTs trending down.  Advise to continue holding statin at this time. Upon discharge, can re-start atorvastatin at lower dose of 40 mg daily as opposed to the 80 mg patient was on prior to admission. Pt will need to follow-up with Hepatology after d/c for continued monitoring of LFTs and Hep C.     Acute osteomyelitis of thoracic spine  -See assessment for epidural abscess.       Acute osteomyelitis of cervical spine  -See assessment for epidural abscess.       VTE Risk Mitigation (From admission, onward)         Ordered     Place TUCKER hose  Until discontinued      12/27/19 0741     heparin (porcine) injection 5,000 Units  Every 8 hours      12/19/19 1049     IP VTE HIGH RISK PATIENT  Once      12/07/19 0219     Place sequential compression device  Until discontinued      12/07/19 0219                Hospital Medicine will sign-off at this time. Please feel free to reach out with any questions/concerns.      Edith Saucedo MD   PGY-1  Department of Hospital Medicine   [facility]

## 2020-01-03 NOTE — SUBJECTIVE & OBJECTIVE
Interval History: No acute events overnight. AST and ALT trending down. Still holding statin. Discussed case with pt's wife who states he has been compliant with medications at home but has not seen hepatology since 2017.     Review of Systems   Constitutional: Negative for chills, fatigue and fever.   HENT: Negative for congestion and trouble swallowing.    Eyes: Negative for visual disturbance.   Respiratory: Negative for cough and shortness of breath.    Cardiovascular: Negative for chest pain.   Gastrointestinal: Negative for abdominal pain, nausea and vomiting.   Genitourinary: Negative for dysuria.   Musculoskeletal: Negative for back pain and neck pain.   Skin: Negative for rash.   Neurological: Negative for dizziness, seizures and headaches.   Psychiatric/Behavioral: Negative for agitation and hallucinations. The patient is not nervous/anxious.      Objective:     Vital Signs (Most Recent):  Temp: 97 °F (36.1 °C) (01/03/20 0753)  Pulse: 81 (01/03/20 0920)  Resp: 14 (01/03/20 0920)  BP: 125/63 (01/03/20 0753)  SpO2: 95 % (01/03/20 0920) Vital Signs (24h Range):  Temp:  [97 °F (36.1 °C)-98.2 °F (36.8 °C)] 97 °F (36.1 °C)  Pulse:  [70-90] 81  Resp:  [14-18] 14  SpO2:  [95 %-99 %] 95 %  BP: (110-152)/(60-72) 125/63     Weight: 75.2 kg (165 lb 12.6 oz)  Body mass index is 24.48 kg/m².  No intake or output data in the 24 hours ending 01/03/20 0950   Physical Exam   Constitutional: He is oriented to person, place, and time. He appears well-developed and well-nourished. No distress.   Well-appearing, NAD   HENT:   Head: Normocephalic and atraumatic.   Mouth/Throat: Oropharynx is clear and moist.   Eyes: Conjunctivae and EOM are normal. No scleral icterus.   Neck: Normal range of motion.   Cardiovascular: Normal rate and regular rhythm.   Pulmonary/Chest: Effort normal and breath sounds normal.   Abdominal: Soft. He exhibits no distension. There is no tenderness. There is no guarding.   Midline abdominal scar in  place from childhood pyloric stenosis repair   Musculoskeletal: Normal range of motion. He exhibits no edema or tenderness.   Lymphadenopathy:     He has no cervical adenopathy.   Neurological: He is alert and oriented to person, place, and time.   A&O x3. Still unsure of pmhx.   Skin: Skin is warm and dry. He is not diaphoretic.   Nursing note and vitals reviewed.      Significant Labs:   CBC:   Recent Labs   Lab 01/01/20  2054   WBC 7.35   HGB 8.8*   HCT 28.5*   *     CMP:   Recent Labs   Lab 01/02/20  0437 01/03/20  0400   * 134*   K 3.8 3.4*    103   CO2 27 26    106   BUN 22 24*   CREATININE 0.8 0.7   CALCIUM 9.1 9.4   PROT 6.1 6.1   ALBUMIN 2.3* 2.4*   BILITOT 0.4 0.3   ALKPHOS 401* 387*   AST 54* 49*   ALT 45* 41   ANIONGAP 5* 5*   EGFRNONAA >60.0 >60.0     Cardiac Markers: No results for input(s): CKMB, MYOGLOBIN, BNP, TROPISTAT in the last 48 hours.  Lactic Acid: No results for input(s): LACTATE in the last 48 hours.  POCT Glucose: No results for input(s): POCTGLUCOSE in the last 48 hours.    Significant Imaging: I have reviewed all pertinent imaging results/findings within the past 24 hours.

## 2020-01-03 NOTE — PLAN OF CARE
Plan of care reviewed with pt. Pt aox4, VS as charted. Purposeful rounding for pt care and safety and positioning. No reports of pain, NV. No falls/injury reported this shift. Surgical incisions to posterior and anterior neck intact. Blanchable redness noted to pt sacrum, mepilex applied - otherwise skin integrity intact. SCD in place. Safety precautions maintained - bed in low position, call light in reach, side rails up, telesitter at bedside.

## 2020-01-03 NOTE — PLAN OF CARE
01/03/20 1052   Post-Acute Status   Post-Acute Authorization Placement   Post-Acute Placement Status Referrals Sent     SW met with patient at the bedside to discuss skilled nursing facility placement. SW provided patient with a SNF list and contacted patient spouse and obtained SNF choices. SW sent the referrals via [facility] and will follow up.    Kristi Davis LMSW  [facility]   [phone]

## 2020-01-03 NOTE — PT/OT/SLP PROGRESS
"Physical Therapy Treatment    Patient Name:  Junaid Muñoz   MRN:  [id number]    Recommendations:     Discharge Recommendations:  nursing facility, skilled   Discharge Equipment Recommendations: bedside commode, walker, rolling, wheelchair, shower chair   Barriers to discharge: Decreased caregiver support at current functional status     Assessment:     Junaid Muñoz is a 74 y.o. male admitted with a medical diagnosis of Spinal epidural abscess.  He presents with the following impairments/functional limitations:  weakness, impaired functional mobilty, decreased safety awareness, gait instability, impaired endurance, pain, impaired balance, impaired self care skills, decreased lower extremity function, orthopedic precautions. Pt pleasant but required encouragement to participate as pt fearful of pain from  brace. However, pt willing and required Val x 1 for bed mobility and standing but required modA x 1 for ambulation as pt with noted impaired balance and gait deviations. Pt at this time continues to be SNF appropriate to maximize functional potential, will continue to follow.     Rehab Prognosis: Good; patient would benefit from acute skilled PT services to address these deficits and reach maximum level of function.    Recent Surgery: Procedure(s) (LRB):  FUSION, SPINE, CERVICAL, POSTERIOR APPROACH C2-T3 posterior instrumented fusion (N/A) 17 Days Post-Op    Plan:     During this hospitalization, patient to be seen 6 x/week to address the identified rehab impairments via gait training, therapeutic activities, therapeutic exercises, neuromuscular re-education and progress toward the following goals:    · Plan of Care Expires:  01/28/20    Subjective     Chief Complaint: Pain from  brace   Patient/Family Comments/goals: "I will keep it on for a little"  Pain/Comfort:  · Pain Rating 1: (Did not rate)  · Pain Rating Post-Intervention 1: (Did not rate)      Objective:     Communicated with RN prior to " session.  Patient found HOB elevated with SCD upon PT entry to room.     General Precautions: Standard, fall   Orthopedic Precautions:spinal precautions   Braces:      Functional Mobility:  · Bed Mobility:     · Supine to Sit: minimum assistance via log roll  · Sit to Supine: minimum assistance  · Transfers:     · Sit to Stand:  minimum assistance with rolling walker  · Gait: 15' x 2 with modA x 1-2 with RW; pt moderately unsteady with difficulty maneuvering obstacles, noted flexed posture and asymmetrical step length. Pt also with one LOB to the left requiring modA to return to neutral.   · Balance: Fair- with RW      AM-PAC 6 CLICK MOBILITY  Turning over in bed (including adjusting bedclothes, sheets and blankets)?: 3  Sitting down on and standing up from a chair with arms (e.g., wheelchair, bedside commode, etc.): 3  Moving from lying on back to sitting on the side of the bed?: 3  Moving to and from a bed to a chair (including a wheelchair)?: 2  Need to walk in hospital room?: 2  Climbing 3-5 steps with a railing?: 1  Basic Mobility Total Score: 14       Therapeutic Activities and Exercises:   Pt educated on: PT role/POC; safety with mobility; benefits of OOB activities; discharge recommendations. Pt verbalized understanding.       Patient left HOB elevated with all lines intact, call button in reach and RN notified..    GOALS:   Multidisciplinary Problems     Physical Therapy Goals        Problem: Physical Therapy Goal    Goal Priority Disciplines Outcome Goal Variances Interventions   Physical Therapy Goal     PT, PT/OT Ongoing, Progressing     Description:  Goals to be met by: 19     Patient will increase functional independence with mobility by performin. Supine to sit with MInimal Assistance  2. Sit to supine with MInimal Assistance  3. Rolling to Left and Right with Modified [place].  4. Sit to stand transfer with Minimal Assistance  5. Bed to chair transfer with Minimal Assistance  using Rolling Walker  6. Gait  x 40 feet with Minimal Assistance using Rolling Walker.   7. Sitting at edge of bed x 8 minutes with Minimal Assistance  8. Stand for 5 minutes with Minimal Assistance using Rolling Walker  9. Lower extremity exercise program x15 reps per handout, with independence                      Time Tracking:     PT Received On: 01/03/20  PT Start Time: 1331     PT Stop Time: 1351  PT Total Time (min): 20 min     Billable Minutes: Gait Training 20 min    Treatment Type: Treatment  PT/PTA: PT     PTA Visit Number: 0     Samantha Curtis PT, DPT  01/03/2020

## 2020-01-03 NOTE — PHYSICIAN QUERY
PT Name: Junaid Muñoz  MR #: [id number]     Physician Query Form - Diagnosis Clarification      CDS: Sabi Norton RN, CCDS       Contact information: [email or web address]    This form is a permanent document in the medical record.     Query Date: January 3, 2020    By submitting this query, we are merely seeking further clarification of documentation.  Please utilize your independent clinical judgment when addressing the question(s) below.     The medical record contains the following:      Findings Supporting Clinical Information Location in Medical Record   Gram negative sepsis-poa Gram negative sepsis-poa  Spinal epidural abscess  Acute osteomyelitis of cervical spine  Acute osteomyelitis of thoracic spine  Plan:  abx  Blood cs    Spinal epidural abscess  This is a 74 year old  male with PMH notable for prior IVDU who presented on 12/06 with one month duration of upper back pain with work-up at outside hospital significant for MRI suggestive of osteomyelitis of C6-T2 with associated epidural abscess with posterior spinal cord displacement. He has no neurological deficits on exam and without evidence of developing sepsis.   Plan:   -NSGY did C7-T1 corpectomy on 12/10.  -Vancomycin and Rocephin resumed.    WBC=9.39, 9.20, 12.08  Lactate=1.4, 1.8  Procalcitonin=0.06  Blood Cultures No Growth    Vital Signs (24h Range):  Temp:  [97.8 °F (36.6 °C)-98.6 °F (37 °C)] 98.6 °F (37 °C)  Pulse:  [57-83] 65  Resp:  [12-19] 19  SpO2:  [96 %-98 %] 96 %  BP: (115-187)/(66-86) 165/72     Temp  Min: 97.5 °F (36.4 °C)  Max: 98.5 °F (36.9 °C)  Pulse  Min: 65  Max: 89  BP  Min: 152/85  Max: 208/93  MAP (mmHg)  Min: 107  Max: 137  Resp  Min: 13  Max: 23  SpO2  Min: 94 %  Max: 100 % 12/11-[facility] PN (Dr. Zhang)                12/10-Hosp Med PN                              12/9,12/10, 12/11-Labs  12/6-Labs  12/7-Labs  12/6, 12/11, 12/11, 12/17-Microbiology    12/7-H&P                12/10-[facility] H&P     Please clarify if  the ____Gram negative sepsis-poa____ diagnosis has been:    [  ] Ruled In   [  ] Ruled In, Now Resolved   [  ] Ruled Out   [  ] Other/Clarification of findings (please specify):     [ x ] Clinically undetermined     Please document in your progress notes daily for the duration of treatment, until resolved, and include in your discharge summary.

## 2020-01-03 NOTE — ASSESSMENT & PLAN NOTE
Pt on losartan-hydrochlorothiazide 100-25 mg and metoprolol succinate 100 mg at home. Wife states he has been taking these medications consistently home.     -Re-started on admission. Can continue BP meds of losartan-HCTZ, metoprolol, and amlodipine

## 2020-01-03 NOTE — PLAN OF CARE
Pt progressing well with therapy.       Problem: Occupational Therapy Goal  Goal: Occupational Therapy Goal  Description  Goals to be met by: 1/10/2019     Patient will increase functional independence with ADLs by performing:    UE Dressing with Minimal Assistance.  LE Dressing with Moderate Assistance.  Grooming while standing with Stand-by Assistance.  Toileting from toilet with Minimal Assistance for hygiene (met - 1/3) and clothing management.   Bathing from  edge of bed with Moderate Assistance.  Supine to sit with Minimal Assistance. -- Met (1/3)        Outcome: Ongoing, Progressing

## 2020-01-03 NOTE — PLAN OF CARE
01/02/20 1810   Discharge Reassessment   Assessment Type Discharge Planning Reassessment   Provided patient/caregiver education on the expected discharge date and the discharge plan Yes   Do you have any problems affording any of your prescribed medications? No   Discharge Plan A Skilled Nursing Facility   Discharge Plan B Rehab

## 2020-01-04 LAB
ALBUMIN SERPL BCP-MCNC: 2.4 G/DL (ref 3.5–5.2)
ALP SERPL-CCNC: 438 U/L (ref 55–135)
ALT SERPL W/O P-5'-P-CCNC: 62 U/L (ref 10–44)
ANION GAP SERPL CALC-SCNC: 6 MMOL/L (ref 8–16)
AST SERPL-CCNC: 72 U/L (ref 10–40)
BILIRUB SERPL-MCNC: 0.4 MG/DL (ref 0.1–1)
BUN SERPL-MCNC: 22 MG/DL (ref 8–23)
CALCIUM SERPL-MCNC: 9.1 MG/DL (ref 8.7–10.5)
CHLORIDE SERPL-SCNC: 107 MMOL/L (ref 95–110)
CO2 SERPL-SCNC: 26 MMOL/L (ref 23–29)
CREAT SERPL-MCNC: 0.7 MG/DL (ref 0.5–1.4)
CRP SERPL-MCNC: 8.6 MG/L (ref 0–8.2)
ERYTHROCYTE [SEDIMENTATION RATE] IN BLOOD BY WESTERGREN METHOD: 33 MM/HR (ref 0–23)
EST. GFR  (AFRICAN AMERICAN): >60 ML/MIN/1.73 M^2
EST. GFR  (NON AFRICAN AMERICAN): >60 ML/MIN/1.73 M^2
GLUCOSE SERPL-MCNC: 97 MG/DL (ref 70–110)
POTASSIUM SERPL-SCNC: 3.9 MMOL/L (ref 3.5–5.1)
PROT SERPL-MCNC: 6.1 G/DL (ref 6–8.4)
SODIUM SERPL-SCNC: 139 MMOL/L (ref 136–145)

## 2020-01-04 PROCEDURE — 94761 N-INVAS EAR/PLS OXIMETRY MLT: CPT

## 2020-01-04 PROCEDURE — 11000001 HC ACUTE MED/SURG PRIVATE ROOM

## 2020-01-04 PROCEDURE — 85652 RBC SED RATE AUTOMATED: CPT

## 2020-01-04 PROCEDURE — 25000003 PHARM REV CODE 250: Performed by: ANESTHESIOLOGY

## 2020-01-04 PROCEDURE — 25000003 PHARM REV CODE 250: Performed by: PHYSICIAN ASSISTANT

## 2020-01-04 PROCEDURE — 94640 AIRWAY INHALATION TREATMENT: CPT

## 2020-01-04 PROCEDURE — 63600175 PHARM REV CODE 636 W HCPCS: Performed by: PSYCHIATRY & NEUROLOGY

## 2020-01-04 PROCEDURE — 86140 C-REACTIVE PROTEIN: CPT

## 2020-01-04 PROCEDURE — 25000003 PHARM REV CODE 250: Performed by: NURSE PRACTITIONER

## 2020-01-04 PROCEDURE — A4216 STERILE WATER/SALINE, 10 ML: HCPCS | Performed by: ANESTHESIOLOGY

## 2020-01-04 PROCEDURE — 80053 COMPREHEN METABOLIC PANEL: CPT

## 2020-01-04 PROCEDURE — 25000242 PHARM REV CODE 250 ALT 637 W/ HCPCS: Performed by: PHYSICIAN ASSISTANT

## 2020-01-04 PROCEDURE — 25000003 PHARM REV CODE 250: Performed by: PSYCHIATRY & NEUROLOGY

## 2020-01-04 RX ADMIN — SENNOSIDES AND DOCUSATE SODIUM 1 TABLET: 8.6; 5 TABLET ORAL at 09:01

## 2020-01-04 RX ADMIN — FERROUS SULFATE TAB EC 325 MG (65 MG FE EQUIVALENT) 325 MG: 325 (65 FE) TABLET DELAYED RESPONSE at 09:01

## 2020-01-04 RX ADMIN — AMLODIPINE BESYLATE 10 MG: 10 TABLET ORAL at 09:01

## 2020-01-04 RX ADMIN — CEFEPIME 2 G: 2 INJECTION, POWDER, FOR SOLUTION INTRAVENOUS at 06:01

## 2020-01-04 RX ADMIN — CEFEPIME 2 G: 2 INJECTION, POWDER, FOR SOLUTION INTRAVENOUS at 02:01

## 2020-01-04 RX ADMIN — Medication 2 G: at 09:01

## 2020-01-04 RX ADMIN — LIDOCAINE 1 PATCH: 50 PATCH CUTANEOUS at 09:01

## 2020-01-04 RX ADMIN — METOPROLOL TARTRATE 50 MG: 50 TABLET ORAL at 09:01

## 2020-01-04 RX ADMIN — HEPARIN SODIUM 5000 UNITS: 5000 INJECTION, SOLUTION INTRAVENOUS; SUBCUTANEOUS at 02:01

## 2020-01-04 RX ADMIN — Medication 10 ML: at 06:01

## 2020-01-04 RX ADMIN — HEPARIN SODIUM 5000 UNITS: 5000 INJECTION, SOLUTION INTRAVENOUS; SUBCUTANEOUS at 09:01

## 2020-01-04 RX ADMIN — Medication 10 ML: at 02:01

## 2020-01-04 RX ADMIN — Medication 250 MG: at 09:01

## 2020-01-04 RX ADMIN — CEFEPIME 2 G: 2 INJECTION, POWDER, FOR SOLUTION INTRAVENOUS at 09:01

## 2020-01-04 RX ADMIN — SODIUM CHLORIDE SOLN NEBU 3% 4 ML: 3 NEBU SOLN at 08:01

## 2020-01-04 RX ADMIN — IPRATROPIUM BROMIDE AND ALBUTEROL SULFATE 3 ML: .5; 3 SOLUTION RESPIRATORY (INHALATION) at 08:01

## 2020-01-04 RX ADMIN — BACITRACIN: 500 OINTMENT TOPICAL at 09:01

## 2020-01-04 RX ADMIN — LOSARTAN POTASSIUM AND HYDROCHLOROTHIAZIDE TABLETS 1 TABLET: 100; 25 TABLET, FILM COATED ORAL at 09:01

## 2020-01-04 RX ADMIN — LEVOTHYROXINE SODIUM 50 MCG: 50 TABLET ORAL at 06:01

## 2020-01-04 RX ADMIN — HEPARIN SODIUM 5000 UNITS: 5000 INJECTION, SOLUTION INTRAVENOUS; SUBCUTANEOUS at 06:01

## 2020-01-04 RX ADMIN — GABAPENTIN 300 MG: 300 CAPSULE ORAL at 02:01

## 2020-01-04 RX ADMIN — GABAPENTIN 300 MG: 300 CAPSULE ORAL at 09:01

## 2020-01-04 RX ADMIN — FAMOTIDINE 20 MG: 20 TABLET ORAL at 09:01

## 2020-01-04 RX ADMIN — POLYETHYLENE GLYCOL 3350 17 G: 17 POWDER, FOR SOLUTION ORAL at 09:01

## 2020-01-04 RX ADMIN — CLOPIDOGREL BISULFATE 75 MG: 75 TABLET ORAL at 09:01

## 2020-01-04 NOTE — PROGRESS NOTES
[facility]  Neurosurgery  Progress Note    Subjective:     History of Present Illness: Junaid Muñoz is a 74 y.o. male with PMH of HTN, HLD, Hepatitis C, and CAD s/p two stents on plavix who presents with 1 month history of back pain between his shoulders. MRI at OSH demonstrates likely epidural abscess. Pt denies hx of trauma and reports slow onset of symptoms.     Post-Op Info:  Procedure(s) (LRB):  FUSION, SPINE, CERVICAL, POSTERIOR APPROACH C2-T3 posterior instrumented fusion (N/A)   18 Days Post-Op     Interval History: had fall overnight without brace. XR C spine showed no acute changes. Patient without complaints this morning.    Medications:  Continuous Infusions:  Scheduled Meds:   albuterol-ipratropium  3 mL Nebulization Q12H    amLODIPine  10 mg Oral Daily    ascorbic acid (vitamin C)  250 mg Oral BID    bacitracin   Topical (Top) BID    ceFEPime (MAXIPIME) IVPB  2 g Intravenous Q8H    clopidogrel  75 mg Oral Daily    famotidine  20 mg Oral BID    ferrous sulfate  325 mg Oral BID    gabapentin  300 mg Oral TID    heparin (porcine)  5,000 Units Subcutaneous Q8H    levothyroxine  50 mcg Oral Before breakfast    lidocaine  1 patch Transdermal Daily    losartan-hydrochlorothiazide 100-25 mg  1 tablet Oral Daily    metoprolol tartrate  50 mg Oral BID    polyethylene glycol  17 g Oral Daily    senna-docusate 8.6-50 mg  1 tablet Oral BID    sodium chloride 0.9%  10 mL Intravenous Q6H    sodium chloride 3%  4 mL Nebulization Q12H    sodium chloride  2 g Oral BID     PRN Meds:acetaminophen, bisacodyl, labetalol, naloxone, ondansetron, oxyCODONE, promethazine (PHENERGAN) IVPB, Flushing PICC Protocol **AND** sodium chloride 0.9% **AND** sodium chloride 0.9%     Review of Systems    Objective:     Weight: 75.2 kg (165 lb 12.6 oz)  Body mass index is 24.48 kg/m².  Vital Signs (Most Recent):  Temp: 97.9 °F (36.6 °C) (01/03/20 2031)  Pulse: 84 (01/03/20 2031)  Resp: 18  (01/03/20 2031)  BP: (!) 142/63 (01/03/20 2031)  SpO2: 97 % (01/03/20 2031) Vital Signs (24h Range):  Temp:  [97 °F (36.1 °C)-98 °F (36.7 °C)] 97.9 °F (36.6 °C)  Pulse:  [75-85] 84  Resp:  [14-18] 18  SpO2:  [95 %-100 %] 97 %  BP: (103-142)/(56-64) 142/63                     Male External Urinary Catheter 12/19/19 1200 Small (Active)   Collection Container Urimeter 12/27/2019  7:50 PM   Securement Method secured to lower ABD w/ tape 12/27/2019  7:50 PM   Skin no redness;no breakdown 12/27/2019  7:50 PM   Tolerance no signs/symptoms of discomfort 12/27/2019  7:50 PM   Output (mL) 250 mL 12/28/2019  9:00 AM   Catheter Change Date 12/24/19 12/26/2019  4:24 PM   Catheter Change Time 1100 12/26/2019  4:24 PM       Neurosurgery Physical Exam     I have seen and examined the patient yesterday and today. Exam remains stable.   General: well developed, well nourished, no distress.   Head: normocephalic, atraumatic  Neck: No tracheal deviation. No underlying hematoma or fluid collection. Incision well approximated without edema or erythema.   Neurologic: Alert and oriented. Thought content appropriate.  GCS: Motor: 6/Verbal: 5/Eyes: 4 GCS Total: 15  Mental Status: Awake, Alert, Oriented x 4  Language: No aphasia  Speech: No dysarthria  Cranial nerves: face symmetric, tongue midline, CN II-XII grossly intact.   Eyes: pupils equal, round, reactive to light, EOM intact.   Pulmonary: normal respirations, no signs of respiratory distress  Abdomen: soft, non-distended, not tender to palpation  Sensory: intact to light touch throughout  Motor Strength: Moves all extremities spontaneously with good tone.  Generalized deconditioning throughout, grossly full strength upper and lower extremities. No abnormal movements seen.      Admian: absent  Clonus: absent  Babinski: absent  Vascular: Pulses 2+ and symmetric radial and dorsalis pedis. No LE edema.   Skin: Skin is warm, dry and intact.     Anterior incision c/d/i with skin edges well  approximated. No surrounding erythema or edema. No drainage from incision. No palpable hematoma.     Posterior incision c/d/I with skin edges well approximated. No surrounding erythema or edema. No drainage from incision. No palpable underlying fluid collection.    Significant Labs:  Recent Labs   Lab 01/03/20  0400 01/04/20  0400    97   * 139   K 3.4* 3.9    107   CO2 26 26   BUN 24* 22   CREATININE 0.7 0.7   CALCIUM 9.4 9.1     No results for input(s): WBC, HGB, HCT, PLT in the last 48 hours.  Recent Labs   Lab 01/02/20  1512   INR 1.0   APTT 23.6     Microbiology Results (last 7 days)     ** No results found for the last 168 hours. **        All pertinent labs from the last 24 hours have been reviewed.    Significant Diagnostics:  No results found in the last 24 hours.      Assessment/Plan:     * Spinal epidural abscess  74 y.o. male with PMH of HTN, HLD, Hepatitis C, and CAD s/p two stents on plavix who presents with C6-T2 osteomyelitis with suspected epidural abscess.  Now s/p C7/T1 corpectomies on 12/10 and C2-T2  posterior instrumented fusion 12/17.    -Patient neurologically stable on exam  -Brace on when upright, OOB, or working with therapy. OK to remove when lying in bed. Pt sitting up in bed without brace on, explained to patient that brace must be worn when sitting up and when OOB. He voiced understanding.   -Staples removed 12/31/19. Incision intact without signs of infection. Leave incision open to air. Turn q2h to continue to promote wound healing.   - Elevated LFTs: HM consulted and believe most likely 2/2 resuming high dose statin in hospital. Recommend holding, and okay to resume once LFTs down trending. ALP most likely elevated 2/2 osteomyelitis. No signs of cirrhosis on exam. LFTs improving after holding high dose statin. HM recommend restarting statin at lower dose (40 mg) upon discharge. PTT/INR/ammonia all within normal limits. HCV ab and viral load pending. Spoke with   Chan from hepatology who will set the patient up for ouptatient follow up in hepatology clinic. Appreciate  and Hepatology assistance.   -ID: Afebrile. No leukocytosis. Continue Cefepime 2g q 8 hours. Estimated end date of therapy 1/28/20-2/11/20. FU in ID clinic in 2-3 weeks.   -2nd left toe wound care: Seen by Podiatry for left 2nd toe, recommend betadine daily. Dress with foam bandage. Wound care on board. Appreciate assistance.  -Pain: Pain well controlled on current regimen. No adjustments needed at this time.   -Anemia: Continue iron for replacement x 2 weeks. F/u at next lab draw.  -HTN: Continue Amlodipine 10 mg daily, Losartan-HCTZ 100-25 mg daily, and Metoprolol 50 mg BID.   -CAD s/p stent placement: Continue home dose of Plavix 75 mg daily.   -Hyponatremia: Na 134 today, Na tabs increased to bid. Will continue to monitor and wean as tolerated.   -Hypoalbuminemia: Continue Jose BID to promote wound healing.   -HLD: Will d/c high dose atorvastatin in setting of elevated LFTs. Will continue to monitor daily, and restart when downtrending. Per  okay to resume at discharge at lower dose (40 mg).   -DVT prophylaxis: TUCKER's, SCD's, SQH  -Bowel regimen: senna BID and miralax daily  -Atelectasis prevention: IS hourly while awake, PT/OT, OOB > 6 hours per day       DISPO: Pending SNF placement        Paul Castellanos MD  Neurosurgery  [facility]

## 2020-01-04 NOTE — PLAN OF CARE
Plan of care reviewed with pt. Pt aox4, VS as charted. Purposeful rounding for pt care and safety and positioning. No pain. No reports of NV. Pt fall from bed to ground this shift - See significant event note. Blanchable redness to sacral spine. SCD in place. Safety precautions maintained - bed in low position, call light in reach, side rails up x2, telesitter at bedside.

## 2020-01-04 NOTE — SUBJECTIVE & OBJECTIVE
Interval History: had fall overnight without brace. XR C spine showed no acute changes. Patient without complaints this morning.    Medications:  Continuous Infusions:  Scheduled Meds:   albuterol-ipratropium  3 mL Nebulization Q12H    amLODIPine  10 mg Oral Daily    ascorbic acid (vitamin C)  250 mg Oral BID    bacitracin   Topical (Top) BID    ceFEPime (MAXIPIME) IVPB  2 g Intravenous Q8H    clopidogrel  75 mg Oral Daily    famotidine  20 mg Oral BID    ferrous sulfate  325 mg Oral BID    gabapentin  300 mg Oral TID    heparin (porcine)  5,000 Units Subcutaneous Q8H    levothyroxine  50 mcg Oral Before breakfast    lidocaine  1 patch Transdermal Daily    losartan-hydrochlorothiazide 100-25 mg  1 tablet Oral Daily    metoprolol tartrate  50 mg Oral BID    polyethylene glycol  17 g Oral Daily    senna-docusate 8.6-50 mg  1 tablet Oral BID    sodium chloride 0.9%  10 mL Intravenous Q6H    sodium chloride 3%  4 mL Nebulization Q12H    sodium chloride  2 g Oral BID     PRN Meds:acetaminophen, bisacodyl, labetalol, naloxone, ondansetron, oxyCODONE, promethazine (PHENERGAN) IVPB, Flushing PICC Protocol **AND** sodium chloride 0.9% **AND** sodium chloride 0.9%     Review of Systems    Objective:     Weight: 75.2 kg (165 lb 12.6 oz)  Body mass index is 24.48 kg/m².  Vital Signs (Most Recent):  Temp: 97.9 °F (36.6 °C) (01/03/20 2031)  Pulse: 84 (01/03/20 2031)  Resp: 18 (01/03/20 2031)  BP: (!) 142/63 (01/03/20 2031)  SpO2: 97 % (01/03/20 2031) Vital Signs (24h Range):  Temp:  [97 °F (36.1 °C)-98 °F (36.7 °C)] 97.9 °F (36.6 °C)  Pulse:  [75-85] 84  Resp:  [14-18] 18  SpO2:  [95 %-100 %] 97 %  BP: (103-142)/(56-64) 142/63                     Male External Urinary Catheter 12/19/19 1200 Small (Active)   Collection Container Urimeter 12/27/2019  7:50 PM   Securement Method secured to lower ABD w/ tape 12/27/2019  7:50 PM   Skin no redness;no breakdown 12/27/2019  7:50 PM   Tolerance no signs/symptoms of  discomfort 12/27/2019  7:50 PM   Output (mL) 250 mL 12/28/2019  9:00 AM   Catheter Change Date 12/24/19 12/26/2019  4:24 PM   Catheter Change Time 1100 12/26/2019  4:24 PM       Neurosurgery Physical Exam     I have seen and examined the patient yesterday and today. Exam remains stable.   General: well developed, well nourished, no distress.   Head: normocephalic, atraumatic  Neck: No tracheal deviation. No underlying hematoma or fluid collection. Incision well approximated without edema or erythema.   Neurologic: Alert and oriented. Thought content appropriate.  GCS: Motor: 6/Verbal: 5/Eyes: 4 GCS Total: 15  Mental Status: Awake, Alert, Oriented x 4  Language: No aphasia  Speech: No dysarthria  Cranial nerves: face symmetric, tongue midline, CN II-XII grossly intact.   Eyes: pupils equal, round, reactive to light, EOM intact.   Pulmonary: normal respirations, no signs of respiratory distress  Abdomen: soft, non-distended, not tender to palpation  Sensory: intact to light touch throughout  Motor Strength: Moves all extremities spontaneously with good tone.  Generalized deconditioning throughout, grossly full strength upper and lower extremities. No abnormal movements seen.      Damian: absent  Clonus: absent  Babinski: absent  Vascular: Pulses 2+ and symmetric radial and dorsalis pedis. No LE edema.   Skin: Skin is warm, dry and intact.     Anterior incision c/d/i with skin edges well approximated. No surrounding erythema or edema. No drainage from incision. No palpable hematoma.     Posterior incision c/d/I with skin edges well approximated. No surrounding erythema or edema. No drainage from incision. No palpable underlying fluid collection.    Significant Labs:  Recent Labs   Lab 01/03/20  0400 01/04/20  0400    97   * 139   K 3.4* 3.9    107   CO2 26 26   BUN 24* 22   CREATININE 0.7 0.7   CALCIUM 9.4 9.1     No results for input(s): WBC, HGB, HCT, PLT in the last 48 hours.  Recent Labs   Lab  01/02/20  1512   INR 1.0   APTT 23.6     Microbiology Results (last 7 days)     ** No results found for the last 168 hours. **        All pertinent labs from the last 24 hours have been reviewed.    Significant Diagnostics:  No results found in the last 24 hours.

## 2020-01-05 LAB
ALBUMIN SERPL BCP-MCNC: 2.5 G/DL (ref 3.5–5.2)
ALP SERPL-CCNC: 469 U/L (ref 55–135)
ALT SERPL W/O P-5'-P-CCNC: 83 U/L (ref 10–44)
ANION GAP SERPL CALC-SCNC: 4 MMOL/L (ref 8–16)
AST SERPL-CCNC: 99 U/L (ref 10–40)
BASOPHILS # BLD AUTO: 0.04 K/UL (ref 0–0.2)
BASOPHILS NFR BLD: 0.7 % (ref 0–1.9)
BILIRUB SERPL-MCNC: 0.3 MG/DL (ref 0.1–1)
BNP SERPL-MCNC: 36 PG/ML (ref 0–99)
BUN SERPL-MCNC: 25 MG/DL (ref 8–23)
CALCIUM SERPL-MCNC: 9.4 MG/DL (ref 8.7–10.5)
CHLORIDE SERPL-SCNC: 107 MMOL/L (ref 95–110)
CO2 SERPL-SCNC: 27 MMOL/L (ref 23–29)
CREAT SERPL-MCNC: 0.8 MG/DL (ref 0.5–1.4)
DIFFERENTIAL METHOD: ABNORMAL
EOSINOPHIL # BLD AUTO: 0.2 K/UL (ref 0–0.5)
EOSINOPHIL NFR BLD: 4 % (ref 0–8)
ERYTHROCYTE [DISTWIDTH] IN BLOOD BY AUTOMATED COUNT: 20.5 % (ref 11.5–14.5)
EST. GFR  (AFRICAN AMERICAN): >60 ML/MIN/1.73 M^2
EST. GFR  (NON AFRICAN AMERICAN): >60 ML/MIN/1.73 M^2
GLUCOSE SERPL-MCNC: 103 MG/DL (ref 70–110)
HCT VFR BLD AUTO: 29.7 % (ref 40–54)
HGB BLD-MCNC: 8.8 G/DL (ref 14–18)
IMM GRANULOCYTES # BLD AUTO: 0.03 K/UL (ref 0–0.04)
IMM GRANULOCYTES NFR BLD AUTO: 0.5 % (ref 0–0.5)
LYMPHOCYTES # BLD AUTO: 1 K/UL (ref 1–4.8)
LYMPHOCYTES NFR BLD: 18.1 % (ref 18–48)
MCH RBC QN AUTO: 27.9 PG (ref 27–31)
MCHC RBC AUTO-ENTMCNC: 29.6 G/DL (ref 32–36)
MCV RBC AUTO: 94 FL (ref 82–98)
MONOCYTES # BLD AUTO: 0.6 K/UL (ref 0.3–1)
MONOCYTES NFR BLD: 11.1 % (ref 4–15)
NEUTROPHILS # BLD AUTO: 3.6 K/UL (ref 1.8–7.7)
NEUTROPHILS NFR BLD: 65.6 % (ref 38–73)
NRBC BLD-RTO: 0 /100 WBC
PLATELET # BLD AUTO: 109 K/UL (ref 150–350)
PMV BLD AUTO: 11.2 FL (ref 9.2–12.9)
POTASSIUM SERPL-SCNC: 3.7 MMOL/L (ref 3.5–5.1)
PROT SERPL-MCNC: 6.3 G/DL (ref 6–8.4)
RBC # BLD AUTO: 3.15 M/UL (ref 4.6–6.2)
SODIUM SERPL-SCNC: 138 MMOL/L (ref 136–145)
WBC # BLD AUTO: 5.48 K/UL (ref 3.9–12.7)

## 2020-01-05 PROCEDURE — 94761 N-INVAS EAR/PLS OXIMETRY MLT: CPT

## 2020-01-05 PROCEDURE — 97116 GAIT TRAINING THERAPY: CPT | Mod: CQ

## 2020-01-05 PROCEDURE — 80053 COMPREHEN METABOLIC PANEL: CPT

## 2020-01-05 PROCEDURE — 97110 THERAPEUTIC EXERCISES: CPT

## 2020-01-05 PROCEDURE — 25000003 PHARM REV CODE 250: Performed by: PHYSICIAN ASSISTANT

## 2020-01-05 PROCEDURE — 63600175 PHARM REV CODE 636 W HCPCS: Performed by: PSYCHIATRY & NEUROLOGY

## 2020-01-05 PROCEDURE — 83880 ASSAY OF NATRIURETIC PEPTIDE: CPT

## 2020-01-05 PROCEDURE — 25000003 PHARM REV CODE 250: Performed by: NURSE PRACTITIONER

## 2020-01-05 PROCEDURE — 25000242 PHARM REV CODE 250 ALT 637 W/ HCPCS: Performed by: PHYSICIAN ASSISTANT

## 2020-01-05 PROCEDURE — 97530 THERAPEUTIC ACTIVITIES: CPT

## 2020-01-05 PROCEDURE — 94640 AIRWAY INHALATION TREATMENT: CPT

## 2020-01-05 PROCEDURE — 25000003 PHARM REV CODE 250: Performed by: PSYCHIATRY & NEUROLOGY

## 2020-01-05 PROCEDURE — A4216 STERILE WATER/SALINE, 10 ML: HCPCS | Performed by: ANESTHESIOLOGY

## 2020-01-05 PROCEDURE — 85025 COMPLETE CBC W/AUTO DIFF WBC: CPT

## 2020-01-05 PROCEDURE — 11000001 HC ACUTE MED/SURG PRIVATE ROOM

## 2020-01-05 PROCEDURE — 25000003 PHARM REV CODE 250: Performed by: ANESTHESIOLOGY

## 2020-01-05 RX ADMIN — CLOPIDOGREL BISULFATE 75 MG: 75 TABLET ORAL at 09:01

## 2020-01-05 RX ADMIN — FAMOTIDINE 20 MG: 20 TABLET ORAL at 09:01

## 2020-01-05 RX ADMIN — IPRATROPIUM BROMIDE AND ALBUTEROL SULFATE 3 ML: .5; 3 SOLUTION RESPIRATORY (INHALATION) at 07:01

## 2020-01-05 RX ADMIN — FERROUS SULFATE TAB EC 325 MG (65 MG FE EQUIVALENT) 325 MG: 325 (65 FE) TABLET DELAYED RESPONSE at 09:01

## 2020-01-05 RX ADMIN — GABAPENTIN 300 MG: 300 CAPSULE ORAL at 09:01

## 2020-01-05 RX ADMIN — LOSARTAN POTASSIUM AND HYDROCHLOROTHIAZIDE TABLETS 1 TABLET: 100; 25 TABLET, FILM COATED ORAL at 09:01

## 2020-01-05 RX ADMIN — HEPARIN SODIUM 5000 UNITS: 5000 INJECTION, SOLUTION INTRAVENOUS; SUBCUTANEOUS at 05:01

## 2020-01-05 RX ADMIN — LEVOTHYROXINE SODIUM 50 MCG: 50 TABLET ORAL at 05:01

## 2020-01-05 RX ADMIN — ACETAMINOPHEN 500 MG: 500 TABLET ORAL at 09:01

## 2020-01-05 RX ADMIN — SODIUM CHLORIDE SOLN NEBU 3% 4 ML: 3 NEBU SOLN at 06:01

## 2020-01-05 RX ADMIN — Medication 250 MG: at 09:01

## 2020-01-05 RX ADMIN — CEFEPIME 2 G: 2 INJECTION, POWDER, FOR SOLUTION INTRAVENOUS at 05:01

## 2020-01-05 RX ADMIN — BACITRACIN: 500 OINTMENT TOPICAL at 09:01

## 2020-01-05 RX ADMIN — METOPROLOL TARTRATE 50 MG: 50 TABLET ORAL at 09:01

## 2020-01-05 RX ADMIN — POLYETHYLENE GLYCOL 3350 17 G: 17 POWDER, FOR SOLUTION ORAL at 09:01

## 2020-01-05 RX ADMIN — AMLODIPINE BESYLATE 10 MG: 10 TABLET ORAL at 09:01

## 2020-01-05 RX ADMIN — Medication 2 G: at 09:01

## 2020-01-05 RX ADMIN — HEPARIN SODIUM 5000 UNITS: 5000 INJECTION, SOLUTION INTRAVENOUS; SUBCUTANEOUS at 01:01

## 2020-01-05 RX ADMIN — Medication 10 ML: at 12:01

## 2020-01-05 RX ADMIN — GABAPENTIN 300 MG: 300 CAPSULE ORAL at 02:01

## 2020-01-05 RX ADMIN — CEFEPIME 2 G: 2 INJECTION, POWDER, FOR SOLUTION INTRAVENOUS at 09:01

## 2020-01-05 RX ADMIN — SENNOSIDES AND DOCUSATE SODIUM 1 TABLET: 8.6; 5 TABLET ORAL at 09:01

## 2020-01-05 RX ADMIN — SODIUM CHLORIDE SOLN NEBU 3% 4 ML: 3 NEBU SOLN at 07:01

## 2020-01-05 RX ADMIN — Medication 10 ML: at 01:01

## 2020-01-05 RX ADMIN — IPRATROPIUM BROMIDE AND ALBUTEROL SULFATE 3 ML: .5; 3 SOLUTION RESPIRATORY (INHALATION) at 06:01

## 2020-01-05 RX ADMIN — HEPARIN SODIUM 5000 UNITS: 5000 INJECTION, SOLUTION INTRAVENOUS; SUBCUTANEOUS at 10:01

## 2020-01-05 RX ADMIN — LIDOCAINE 1 PATCH: 50 PATCH CUTANEOUS at 09:01

## 2020-01-05 RX ADMIN — CEFEPIME 2 G: 2 INJECTION, POWDER, FOR SOLUTION INTRAVENOUS at 01:01

## 2020-01-05 NOTE — PLAN OF CARE
Problem: Occupational Therapy Goal  Goal: Occupational Therapy Goal  Description  Goals to be met by: 1/10/2019     Patient will increase functional independence with ADLs by performing:    UE Dressing with Minimal Assistance.  LE Dressing with Moderate Assistance.  Grooming while standing with Stand-by Assistance.  Toileting from toilet with Minimal Assistance for hygiene (met - 1/3) and clothing management.   Bathing from  edge of bed with Moderate Assistance.  Supine to sit with Minimal Assistance. -- Met (1/3)        Outcome: Ongoing, Progressing    Continue with POC.  Radha Diallo OT  1/5/2020

## 2020-01-05 NOTE — PLAN OF CARE
Plan of care reviewed with pt. Pt aox4, VS as charted. Purposeful rounding for pt care and safety. Denies pain. No reports of NV. No falls/injury reported this shift. Blanchable redness with a small skin tear to sacral spine. SCD and moon boots in place. Pt had 2 BM this shift. Pt ambulated to bedside commode with assist, aspen collar was placed. Safety precautions maintained - bed in low position, call light in reach, side rails up, telesitter in room.

## 2020-01-05 NOTE — PT/OT/SLP PROGRESS
Physical Therapy Treatment    Patient Name:  uJnaid Muñoz   MRN:  [id number]    Recommendations:     Discharge Recommendations:  nursing facility, skilled   Discharge Equipment Recommendations: bedside commode, walker, rolling, wheelchair, shower chair   Barriers to discharge: Inaccessible home and Decreased caregiver support    Assessment:     Junaid Muñoz is a 74 y.o. male admitted with a medical diagnosis of Spinal epidural abscess.  He presents with the following impairments/functional limitations:  weakness, impaired endurance, impaired self care skills, impaired functional mobilty, gait instability, impaired balance, decreased ROM, edema, decreased coordination, impaired cognition, decreased safety awareness, pain, decreased lower extremity function . Patient presents with min posterior lean while sitting at EOB, but improved in standing. Patient ambulates with narrow base of support and R hip internally rotated at times, but showed good compliance with safety instructions .    Rehab Prognosis: Good; patient would benefit from acute skilled PT services to address these deficits and reach maximum level of function.    Recent Surgery: Procedure(s) (LRB):  FUSION, SPINE, CERVICAL, POSTERIOR APPROACH C2-T3 posterior instrumented fusion (N/A) 19 Days Post-Op    Plan:     During this hospitalization, patient to be seen 6 x/week to address the identified rehab impairments via gait training, therapeutic activities, therapeutic exercises, neuromuscular re-education and progress toward the following goals:    · Plan of Care Expires:  01/28/20    Subjective     Chief Complaint: fatigue  Patient/Family Comments/goals: none stated  Pain/Comfort:  · Pain Rating 1: 5/10  · Location - Side 1: Bilateral  · Location - Orientation 1: generalized  · Location 1: neck  · Pain Addressed 1: Pre-medicate for activity, Reposition, Distraction  · Pain Rating Post-Intervention 1: 5/10      Objective:     Communicated with nsolivia  prior to session.  Patient found supine with bed alarm, SCD upon PT entry to room.     General Precautions: Standard, fall   Orthopedic Precautions:spinal precautions   Braces:      Functional Mobility:  · Bed Mobility:     · Rolling Right: minimum assistance  · Scooting: minimum assistance  · Supine to Sit: minimum assistance  · Sit to Supine: minimum assistance  · Transfers:     · Sit to Stand:  minimum assistance with rolling walker  · Bed to Chair: minimum assistance with  rolling walker  using  Stand Pivot  · Gait: 9 ft x 3 trials with RW and min assistance, with chair follow and cues to ambulate slowly in order to improve safety.      AM-PAC 6 CLICK MOBILITY  Turning over in bed (including adjusting bedclothes, sheets and blankets)?: 3  Sitting down on and standing up from a chair with arms (e.g., wheelchair, bedside commode, etc.): 3  Moving from lying on back to sitting on the side of the bed?: 3  Moving to and from a bed to a chair (including a wheelchair)?: 3  Need to walk in hospital room?: 3  Climbing 3-5 steps with a railing?: 1  Basic Mobility Total Score: 16       Therapeutic Activities and Exercises:   Donned/Doffed . Patient sat at EOB to prepare for treatment. Patient transferred from bed<>bedside chair with RW and min assistance.  There ex in sitting: LAQ, HIP FLEX AND HEEL/TOE RAISES 2X12 REPS B LE.    Patient left supine with all lines intact, call button in reach, bed alarm on and RN notified..    GOALS:   Multidisciplinary Problems     Physical Therapy Goals        Problem: Physical Therapy Goal    Goal Priority Disciplines Outcome Goal Variances Interventions   Physical Therapy Goal     PT, PT/OT Ongoing, Progressing     Description:  Goals to be met by: 19     Patient will increase functional independence with mobility by performin. Supine to sit with MInimal Assistance  2. Sit to supine with MInimal Assistance  3. Rolling to Left and Right with Modified [place].  4.  Sit to stand transfer with Minimal Assistance  5. Bed to chair transfer with Minimal Assistance using Rolling Walker  6. Gait  x 40 feet with Minimal Assistance using Rolling Walker.   7. Sitting at edge of bed x 8 minutes with Minimal Assistance  8. Stand for 5 minutes with Minimal Assistance using Rolling Walker  9. Lower extremity exercise program x15 reps per handout, with independence                      Time Tracking:     PT Received On: 01/05/20  PT Start Time: 1410     PT Stop Time: 1436  PT Total Time (min): 26 min     Billable Minutes: Gait Training 18 and Therapeutic Exercise 8       PT/PTA: PTA     PTA Visit Number: Dillan Elaine, PTA  01/05/2020

## 2020-01-05 NOTE — PLAN OF CARE
Problem: Physical Therapy Goal  Goal: Physical Therapy Goal  Description  Goals to be met by: 19     Patient will increase functional independence with mobility by performin. Supine to sit with MInimal Assistance  2. Sit to supine with MInimal Assistance  3. Rolling to Left and Right with Modified [place].  4. Sit to stand transfer with Minimal Assistance  5. Bed to chair transfer with Minimal Assistance using Rolling Walker  6. Gait  x 40 feet with Minimal Assistance using Rolling Walker.   7. Sitting at edge of bed x 8 minutes with Minimal Assistance  8. Stand for 5 minutes with Minimal Assistance using Rolling Walker  9. Lower extremity exercise program x15 reps per handout, with independence     Outcome: Ongoing, Progressing   Overall strength and postural control in standing has improved.

## 2020-01-05 NOTE — SUBJECTIVE & OBJECTIVE
Interval History: naeon. Pt without complaint    Medications:  Continuous Infusions:  Scheduled Meds:   albuterol-ipratropium  3 mL Nebulization Q12H    amLODIPine  10 mg Oral Daily    ascorbic acid (vitamin C)  250 mg Oral BID    bacitracin   Topical (Top) BID    ceFEPime (MAXIPIME) IVPB  2 g Intravenous Q8H    clopidogrel  75 mg Oral Daily    famotidine  20 mg Oral BID    ferrous sulfate  325 mg Oral BID    gabapentin  300 mg Oral TID    heparin (porcine)  5,000 Units Subcutaneous Q8H    levothyroxine  50 mcg Oral Before breakfast    lidocaine  1 patch Transdermal Daily    losartan-hydrochlorothiazide 100-25 mg  1 tablet Oral Daily    metoprolol tartrate  50 mg Oral BID    polyethylene glycol  17 g Oral Daily    senna-docusate 8.6-50 mg  1 tablet Oral BID    sodium chloride 0.9%  10 mL Intravenous Q6H    sodium chloride 3%  4 mL Nebulization Q12H    sodium chloride  2 g Oral BID     PRN Meds:acetaminophen, bisacodyl, labetalol, naloxone, ondansetron, oxyCODONE, promethazine (PHENERGAN) IVPB, Flushing PICC Protocol **AND** sodium chloride 0.9% **AND** sodium chloride 0.9%     Review of Systems    Objective:     Weight: 75.2 kg (165 lb 12.6 oz)  Body mass index is 24.48 kg/m².  Vital Signs (Most Recent):  Temp: 98.4 °F (36.9 °C) (01/04/20 1930)  Pulse: 79 (01/05/20 0645)  Resp: 16 (01/05/20 0645)  BP: 118/61 (01/04/20 1930)  SpO2: 98 % (01/05/20 0645) Vital Signs (24h Range):  Temp:  [96 °F (35.6 °C)-98.4 °F (36.9 °C)] 98.4 °F (36.9 °C)  Pulse:  [71-81] 79  Resp:  [16-18] 16  SpO2:  [96 %-100 %] 98 %  BP: (111-118)/(61-62) 118/61                     Male External Urinary Catheter 12/19/19 1200 Small (Active)   Collection Container Urimeter 12/27/2019  7:50 PM   Securement Method secured to lower ABD w/ tape 12/27/2019  7:50 PM   Skin no redness;no breakdown 12/27/2019  7:50 PM   Tolerance no signs/symptoms of discomfort 12/27/2019  7:50 PM   Output (mL) 250 mL 12/28/2019  9:00 AM   Catheter Change  Date 12/24/19 12/26/2019  4:24 PM   Catheter Change Time 1100 12/26/2019  4:24 PM       Neurosurgery Physical Exam     I have seen and examined the patient yesterday and today. Exam remains stable.   General: well developed, well nourished, no distress.   Head: normocephalic, atraumatic  Neck: No tracheal deviation. No underlying hematoma or fluid collection. Incision well approximated without edema or erythema.   Neurologic: Alert and oriented. Thought content appropriate.  GCS: Motor: 6/Verbal: 5/Eyes: 4 GCS Total: 15  Mental Status: Awake, Alert, Oriented x 4  Language: No aphasia  Speech: No dysarthria  Cranial nerves: face symmetric, tongue midline, CN II-XII grossly intact.   Eyes: pupils equal, round, reactive to light, EOM intact.   Pulmonary: normal respirations, no signs of respiratory distress  Abdomen: soft, non-distended, not tender to palpation  Sensory: intact to light touch throughout  Motor Strength: Moves all extremities spontaneously with good tone.  Generalized deconditioning throughout, grossly full strength upper and lower extremities. No abnormal movements seen.      Damian: absent  Clonus: absent  Babinski: absent  Vascular: Pulses 2+ and symmetric radial and dorsalis pedis. No LE edema.   Skin: Skin is warm, dry and intact.     Anterior incision c/d/i with skin edges well approximated. No surrounding erythema or edema. No drainage from incision. No palpable hematoma.     Posterior incision c/d/I with skin edges well approximated. No surrounding erythema or edema. No drainage from incision. No palpable underlying fluid collection.    Significant Labs:  Recent Labs   Lab 01/04/20  0400 01/05/20  0359   GLU 97 103    138   K 3.9 3.7    107   CO2 26 27   BUN 22 25*   CREATININE 0.7 0.8   CALCIUM 9.1 9.4     Recent Labs   Lab 01/05/20  0359   WBC 5.48   HGB 8.8*   HCT 29.7*   *     No results for input(s): LABPT, INR, APTT in the last 48 hours.  Microbiology Results (last 7  days)     ** No results found for the last 168 hours. **        All pertinent labs from the last 24 hours have been reviewed.    Significant Diagnostics:  No results found in the last 24 hours.

## 2020-01-05 NOTE — PT/OT/SLP PROGRESS
Occupational Therapy   Treatment    Name: Junaid Muñoz  MRN: [id number]  Admitting Diagnosis:  Spinal epidural abscess  19 Days Post-Op   FUSION, SPINE, CERVICAL, POSTERIOR APPROACH C2-T3 posterior instrumented fusion (N/A)     Recommendations:     Discharge Recommendations: nursing facility, skilled  Discharge Equipment Recommendations:  bedside commode, walker, rolling, wheelchair, shower chair  Barriers to discharge:  None    Assessment:     Junaid Muñoz is a 74 y.o. male with a medical diagnosis of Spinal epidural abscess.  He presents with pain but is agreeable to participate with therapy. Overheard patient calling for help while in hallway and upon entry to room, patient attempting to remove brace while sitting UIC 2' discomfort. Patient reports discomfort with  brace despite efforts to adjust it and requires reminders that he must wear it when OOB. Patient assisted back to bed so brace could be removed. Performance deficits affecting function are weakness, impaired endurance, impaired self care skills, impaired functional mobilty, gait instability, impaired balance, decreased safety awareness, pain, impaired cardiopulmonary response to activity, orthopedic precautions.     Rehab Prognosis:  Good; patient would benefit from acute skilled OT services to address these deficits and reach maximum level of function.       Plan:     Patient to be seen 5 x/week to address the above listed problems via self-care/home management, therapeutic activities, therapeutic exercises  · Plan of Care Expires: 01/27/20  · Plan of Care Reviewed with: patient    Subjective     Pain/Comfort:  · Pain Rating 1: 7/10  · Location 1: back  · Pain Addressed 1: Nurse notified, Reposition, Distraction  · Pain Rating Post-Intervention 1: 7/10    Objective:     Communicated with: RN prior to session.  Patient found supine with SCD(hep lock IV) upon OT entry to room.    General Precautions: Standard, fall   Orthopedic  Precautions:spinal precautions   Braces:      Occupational Performance:     Bed Mobility:    · Patient completed Scooting anteriorly to EOB for foot placement on floor with moderate assistance for sitting balance  · Patient completed Supine to Sit to R side EOB with minimum assistance for trunk advancement off bed  · Patient completed Sit to Supine with minimum assistance for trunk placement onto bed    Functional Mobility/Transfers:  · Patient completed Sit <> Stand Transfer with minimum assistance  with  rolling walker   · Patient completed Bed <> Chair Transfer using Step Transfer technique with minimum assistance with rolling walker  · Functional Mobility: Patient completed functional mobility ~4' with min assist and RW from bed<>bedside chair. Patient exhibited increased instability upon returning back to bed from bedside chair.    Activities of Daily Living:  · Upper Body Dressing: total assistance Patient donned  brace while sitting EOB with total assist.     Therapeutic Exercises:    · Patient participated in BUE exercises while sitting UIC wearing  brace with patient completing 1 x10 of the following: shoulder flex/ext, forward punches, bicep curls.      AMPA 6 Click ADL: 13    Treatment & Education:  Role of OT/POC  Reviewed spinal precautions with patient recalling 0/3 initially - needs reinforcement  Reviewed log rolling technique during bed mobility  Educated patient on need to wear brace while OOB  Reviewed call button with patient when he needs assistance    Patient left HOB elevated with all lines intact, call button in reach, bed alarm on and RN and Chu notifiedEducation:      GOALS:   Multidisciplinary Problems     Occupational Therapy Goals        Problem: Occupational Therapy Goal    Goal Priority Disciplines Outcome Interventions   Occupational Therapy Goal     OT, PT/OT Ongoing, Progressing    Description:  Goals to be met by: 1/10/2019     Patient will increase functional  independence with ADLs by performing:    UE Dressing with Minimal Assistance.  LE Dressing with Moderate Assistance.  Grooming while standing with Stand-by Assistance.  Toileting from toilet with Minimal Assistance for hygiene (met - 1/3) and clothing management.   Bathing from  edge of bed with Moderate Assistance.  Supine to sit with Minimal Assistance. -- Met (1/3)                         Time Tracking:     OT Date of Treatment: 01/05/20  OT Start Time: 0924  OT Stop Time: 1003  OT Total Time (min): 39 min (time added for return to room to assist patient back to bed)    Billable Minutes:Therapeutic Activity 29 minutes  Therapeutic Exercise 10 minutes    Radha Diallo, SPENCER  1/5/2020

## 2020-01-05 NOTE — PLAN OF CARE
Pt with continued elevations in AST/ALT. BNP ordered to evaluate for fluid retention as cause but wnl at 36. Will discontinue sodium tablets and see if that helps as they can cause fluid retention and possibly contributing to current presentation. Also will fluid restrict and recommended strict Is/Os.       Edith Saucedo MD  [facility] Medicine, PGY1

## 2020-01-05 NOTE — PROGRESS NOTES
[facility]  Neurosurgery  Progress Note    Subjective:     History of Present Illness: Junaid Muñoz is a 74 y.o. male with PMH of HTN, HLD, Hepatitis C, and CAD s/p two stents on plavix who presents with 1 month history of back pain between his shoulders. MRI at OSH demonstrates likely epidural abscess. Pt denies hx of trauma and reports slow onset of symptoms.     Post-Op Info:  Procedure(s) (LRB):  FUSION, SPINE, CERVICAL, POSTERIOR APPROACH C2-T3 posterior instrumented fusion (N/A)   19 Days Post-Op     Interval History: naeon. Pt without complaint    Medications:  Continuous Infusions:  Scheduled Meds:   albuterol-ipratropium  3 mL Nebulization Q12H    amLODIPine  10 mg Oral Daily    ascorbic acid (vitamin C)  250 mg Oral BID    bacitracin   Topical (Top) BID    ceFEPime (MAXIPIME) IVPB  2 g Intravenous Q8H    clopidogrel  75 mg Oral Daily    famotidine  20 mg Oral BID    ferrous sulfate  325 mg Oral BID    gabapentin  300 mg Oral TID    heparin (porcine)  5,000 Units Subcutaneous Q8H    levothyroxine  50 mcg Oral Before breakfast    lidocaine  1 patch Transdermal Daily    losartan-hydrochlorothiazide 100-25 mg  1 tablet Oral Daily    metoprolol tartrate  50 mg Oral BID    polyethylene glycol  17 g Oral Daily    senna-docusate 8.6-50 mg  1 tablet Oral BID    sodium chloride 0.9%  10 mL Intravenous Q6H    sodium chloride 3%  4 mL Nebulization Q12H    sodium chloride  2 g Oral BID     PRN Meds:acetaminophen, bisacodyl, labetalol, naloxone, ondansetron, oxyCODONE, promethazine (PHENERGAN) IVPB, Flushing PICC Protocol **AND** sodium chloride 0.9% **AND** sodium chloride 0.9%     Review of Systems    Objective:     Weight: 75.2 kg (165 lb 12.6 oz)  Body mass index is 24.48 kg/m².  Vital Signs (Most Recent):  Temp: 98.4 °F (36.9 °C) (01/04/20 1930)  Pulse: 79 (01/05/20 0645)  Resp: 16 (01/05/20 0645)  BP: 118/61 (01/04/20 1930)  SpO2: 98 % (01/05/20 0645) Vital Signs (24h  Range):  Temp:  [96 °F (35.6 °C)-98.4 °F (36.9 °C)] 98.4 °F (36.9 °C)  Pulse:  [71-81] 79  Resp:  [16-18] 16  SpO2:  [96 %-100 %] 98 %  BP: (111-118)/(61-62) 118/61                     Male External Urinary Catheter 12/19/19 1200 Small (Active)   Collection Container Urimeter 12/27/2019  7:50 PM   Securement Method secured to lower ABD w/ tape 12/27/2019  7:50 PM   Skin no redness;no breakdown 12/27/2019  7:50 PM   Tolerance no signs/symptoms of discomfort 12/27/2019  7:50 PM   Output (mL) 250 mL 12/28/2019  9:00 AM   Catheter Change Date 12/24/19 12/26/2019  4:24 PM   Catheter Change Time 1100 12/26/2019  4:24 PM       Neurosurgery Physical Exam     I have seen and examined the patient yesterday and today. Exam remains stable.   General: well developed, well nourished, no distress.   Head: normocephalic, atraumatic  Neck: No tracheal deviation. No underlying hematoma or fluid collection. Incision well approximated without edema or erythema.   Neurologic: Alert and oriented. Thought content appropriate.  GCS: Motor: 6/Verbal: 5/Eyes: 4 GCS Total: 15  Mental Status: Awake, Alert, Oriented x 4  Language: No aphasia  Speech: No dysarthria  Cranial nerves: face symmetric, tongue midline, CN II-XII grossly intact.   Eyes: pupils equal, round, reactive to light, EOM intact.   Pulmonary: normal respirations, no signs of respiratory distress  Abdomen: soft, non-distended, not tender to palpation  Sensory: intact to light touch throughout  Motor Strength: Moves all extremities spontaneously with good tone.  Generalized deconditioning throughout, grossly full strength upper and lower extremities. No abnormal movements seen.      Damian: absent  Clonus: absent  Babinski: absent  Vascular: Pulses 2+ and symmetric radial and dorsalis pedis. No LE edema.   Skin: Skin is warm, dry and intact.     Anterior incision c/d/i with skin edges well approximated. No surrounding erythema or edema. No drainage from incision. No palpable  hematoma.     Posterior incision c/d/I with skin edges well approximated. No surrounding erythema or edema. No drainage from incision. No palpable underlying fluid collection.    Significant Labs:  Recent Labs   Lab 01/04/20  0400 01/05/20  0359   GLU 97 103    138   K 3.9 3.7    107   CO2 26 27   BUN 22 25*   CREATININE 0.7 0.8   CALCIUM 9.1 9.4     Recent Labs   Lab 01/05/20  0359   WBC 5.48   HGB 8.8*   HCT 29.7*   *     No results for input(s): LABPT, INR, APTT in the last 48 hours.  Microbiology Results (last 7 days)     ** No results found for the last 168 hours. **        All pertinent labs from the last 24 hours have been reviewed.    Significant Diagnostics:  No results found in the last 24 hours.      Assessment/Plan:     * Spinal epidural abscess  74 y.o. male with PMH of HTN, HLD, Hepatitis C, and CAD s/p two stents on plavix who presents with C6-T2 osteomyelitis with suspected epidural abscess.  Now s/p C7/T1 corpectomies on 12/10 and C2-T2  posterior instrumented fusion 12/17.    -Patient neurologically stable on exam  -Brace on when upright, OOB, or working with therapy. OK to remove when lying in bed. Pt sitting up in bed without brace on, explained to patient that brace must be worn when sitting up and when OOB. He voiced understanding.   -Staples removed 12/31/19. Incision intact without signs of infection. Leave incision open to air. Turn q2h to continue to promote wound healing.   - Elevated LFTs: HM consulted and believe most likely 2/2 resuming high dose statin in hospital. Recommend holding, and okay to resume once LFTs down trending. ALP most likely elevated 2/2 osteomyelitis. No signs of cirrhosis on exam. LFTs improving after holding high dose statin. HM recommend restarting statin at lower dose (40 mg) upon discharge. PTT/INR/ammonia all within normal limits. HCV ab and viral load pending. Spoke with Dr. Giles from hepatology who will set the patient up for ouptatient  follow up in hepatology clinic. Appreciate  and Hepatology assistance.   -ID: Afebrile. No leukocytosis. Continue Cefepime 2g q 8 hours. Estimated end date of therapy 1/28/20-2/11/20. FU in ID clinic in 2-3 weeks.   -2nd left toe wound care: Seen by Podiatry for left 2nd toe, recommend betadine daily. Dress with foam bandage. Wound care on board. Appreciate assistance.  -Pain: Pain well controlled on current regimen. No adjustments needed at this time.   -Anemia: Continue iron for replacement x 2 weeks. F/u at next lab draw.  -HTN: Continue Amlodipine 10 mg daily, Losartan-HCTZ 100-25 mg daily, and Metoprolol 50 mg BID.   -CAD s/p stent placement: Continue home dose of Plavix 75 mg daily.   -Hyponatremia: Na 134 today, Na tabs increased to bid. Will continue to monitor and wean as tolerated.   -Hypoalbuminemia: Continue Jose BID to promote wound healing.   -HLD: Will d/c high dose atorvastatin in setting of elevated LFTs. Will continue to monitor daily, and restart when downtrending. Per  okay to resume at discharge at lower dose (40 mg).   -DVT prophylaxis: TUCKER's, SCD's, SQH  -Bowel regimen: senna BID and miralax daily  -Atelectasis prevention: IS hourly while awake, PT/OT, OOB > 6 hours per day       DISPO: Pending SNF placement        Paul Castellanos MD  Neurosurgery  [facility]

## 2020-01-05 NOTE — ASSESSMENT & PLAN NOTE
74 y.o. male with PMH of HTN, HLD, Hepatitis C, and CAD s/p two stents on plavix who presents with C6-T2 osteomyelitis with suspected epidural abscess.  Now s/p C7/T1 corpectomies on 12/10 and C2-T2  posterior instrumented fusion 12/17.    -Patient neurologically stable on exam  -Brace on when upright, OOB, or working with therapy. OK to remove when lying in bed. Pt sitting up in bed without brace on, explained to patient that brace must be worn when sitting up and when OOB. He voiced understanding.   -Staples removed 12/31/19. Incision intact without signs of infection. Leave incision open to air. Turn q2h to continue to promote wound healing.   - Elevated LFTs: HM consulted and believe most likely 2/2 resuming high dose statin in hospital. Recommend holding, and okay to resume once LFTs down trending. ALP most likely elevated 2/2 osteomyelitis. No signs of cirrhosis on exam. LFTs improving after holding high dose statin. HM recommend restarting statin at lower dose (40 mg) upon discharge. PTT/INR/ammonia all within normal limits. HCV ab and viral load pending. Spoke with Dr. Giles from hepatology who will set the patient up for ouptatient follow up in hepatology clinic. Appreciate  and Hepatology assistance.   -ID: Afebrile. No leukocytosis. Continue Cefepime 2g q 8 hours. Estimated end date of therapy 1/28/20-2/11/20. FU in ID clinic in 2-3 weeks.   -2nd left toe wound care: Seen by Podiatry for left 2nd toe, recommend betadine daily. Dress with foam bandage. Wound care on board. Appreciate assistance.  -Pain: Pain well controlled on current regimen. No adjustments needed at this time.   -Anemia: Continue iron for replacement x 2 weeks. F/u at next lab draw.  -HTN: Continue Amlodipine 10 mg daily, Losartan-HCTZ 100-25 mg daily, and Metoprolol 50 mg BID.   -CAD s/p stent placement: Continue home dose of Plavix 75 mg daily.   -Hyponatremia: Na 134 today, Na tabs increased to bid. Will continue to monitor and wean  as tolerated.   -Hypoalbuminemia: Continue Jose BID to promote wound healing.   -HLD: Will d/c high dose atorvastatin in setting of elevated LFTs. Will continue to monitor daily, and restart when downtrending. Per HM okay to resume at discharge at lower dose (40 mg).   -DVT prophylaxis: TUCKER's, SCD's, SQH  -Bowel regimen: senna BID and miralax daily  -Atelectasis prevention: IS hourly while awake, PT/OT, OOB > 6 hours per day       DISPO: Pending SNF placement

## 2020-01-06 DIAGNOSIS — R76.8 HEPATITIS C ANTIBODY POSITIVE IN BLOOD: Primary | ICD-10-CM

## 2020-01-06 LAB
ALBUMIN SERPL BCP-MCNC: 2.7 G/DL (ref 3.5–5.2)
ALP SERPL-CCNC: 493 U/L (ref 55–135)
ALT SERPL W/O P-5'-P-CCNC: 141 U/L (ref 10–44)
ANION GAP SERPL CALC-SCNC: 7 MMOL/L (ref 8–16)
AST SERPL-CCNC: 136 U/L (ref 10–40)
BILIRUB SERPL-MCNC: 0.4 MG/DL (ref 0.1–1)
BUN SERPL-MCNC: 20 MG/DL (ref 8–23)
CALCIUM SERPL-MCNC: 9.5 MG/DL (ref 8.7–10.5)
CHLORIDE SERPL-SCNC: 104 MMOL/L (ref 95–110)
CO2 SERPL-SCNC: 26 MMOL/L (ref 23–29)
CREAT SERPL-MCNC: 0.8 MG/DL (ref 0.5–1.4)
EST. GFR  (AFRICAN AMERICAN): >60 ML/MIN/1.73 M^2
EST. GFR  (NON AFRICAN AMERICAN): >60 ML/MIN/1.73 M^2
GGT SERPL-CCNC: 490 U/L (ref 8–55)
GLUCOSE SERPL-MCNC: 100 MG/DL (ref 70–110)
HCV RNA SERPL NAA+PROBE-LOG IU: 6.22 LOG (10) IU/ML
HCV RNA SERPL QL NAA+PROBE: DETECTED IU/ML
HCV RNA SPEC NAA+PROBE-ACNC: ABNORMAL IU/ML
POTASSIUM SERPL-SCNC: 3.7 MMOL/L (ref 3.5–5.1)
PROT SERPL-MCNC: 6.6 G/DL (ref 6–8.4)
SODIUM SERPL-SCNC: 137 MMOL/L (ref 136–145)

## 2020-01-06 PROCEDURE — 25000003 PHARM REV CODE 250: Performed by: PHYSICIAN ASSISTANT

## 2020-01-06 PROCEDURE — 99024 POSTOP FOLLOW-UP VISIT: CPT | Mod: ,,, | Performed by: PHYSICIAN ASSISTANT

## 2020-01-06 PROCEDURE — 25000003 PHARM REV CODE 250: Performed by: NURSE PRACTITIONER

## 2020-01-06 PROCEDURE — 25000242 PHARM REV CODE 250 ALT 637 W/ HCPCS: Performed by: PHYSICIAN ASSISTANT

## 2020-01-06 PROCEDURE — 36415 COLL VENOUS BLD VENIPUNCTURE: CPT

## 2020-01-06 PROCEDURE — 97530 THERAPEUTIC ACTIVITIES: CPT

## 2020-01-06 PROCEDURE — 94761 N-INVAS EAR/PLS OXIMETRY MLT: CPT

## 2020-01-06 PROCEDURE — 82977 ASSAY OF GGT: CPT

## 2020-01-06 PROCEDURE — 63600175 PHARM REV CODE 636 W HCPCS: Performed by: PSYCHIATRY & NEUROLOGY

## 2020-01-06 PROCEDURE — 99232 SBSQ HOSP IP/OBS MODERATE 35: CPT | Mod: ,,, | Performed by: INTERNAL MEDICINE

## 2020-01-06 PROCEDURE — 99024 PR POST-OP FOLLOW-UP VISIT: ICD-10-PCS | Mod: ,,, | Performed by: PHYSICIAN ASSISTANT

## 2020-01-06 PROCEDURE — 11000001 HC ACUTE MED/SURG PRIVATE ROOM

## 2020-01-06 PROCEDURE — 25000003 PHARM REV CODE 250: Performed by: PSYCHIATRY & NEUROLOGY

## 2020-01-06 PROCEDURE — 97116 GAIT TRAINING THERAPY: CPT

## 2020-01-06 PROCEDURE — 99232 PR SUBSEQUENT HOSPITAL CARE,LEVL II: ICD-10-PCS | Mod: ,,, | Performed by: INTERNAL MEDICINE

## 2020-01-06 PROCEDURE — 97110 THERAPEUTIC EXERCISES: CPT

## 2020-01-06 PROCEDURE — 80053 COMPREHEN METABOLIC PANEL: CPT

## 2020-01-06 PROCEDURE — 25000003 PHARM REV CODE 250: Performed by: ANESTHESIOLOGY

## 2020-01-06 PROCEDURE — A4216 STERILE WATER/SALINE, 10 ML: HCPCS | Performed by: ANESTHESIOLOGY

## 2020-01-06 PROCEDURE — 94640 AIRWAY INHALATION TREATMENT: CPT

## 2020-01-06 RX ADMIN — Medication 250 MG: at 09:01

## 2020-01-06 RX ADMIN — GABAPENTIN 300 MG: 300 CAPSULE ORAL at 02:01

## 2020-01-06 RX ADMIN — FERROUS SULFATE TAB EC 325 MG (65 MG FE EQUIVALENT) 325 MG: 325 (65 FE) TABLET DELAYED RESPONSE at 09:01

## 2020-01-06 RX ADMIN — FAMOTIDINE 20 MG: 20 TABLET ORAL at 09:01

## 2020-01-06 RX ADMIN — SODIUM CHLORIDE SOLN NEBU 3% 4 ML: 3 NEBU SOLN at 08:01

## 2020-01-06 RX ADMIN — SENNOSIDES AND DOCUSATE SODIUM 1 TABLET: 8.6; 5 TABLET ORAL at 09:01

## 2020-01-06 RX ADMIN — HEPARIN SODIUM 5000 UNITS: 5000 INJECTION, SOLUTION INTRAVENOUS; SUBCUTANEOUS at 02:01

## 2020-01-06 RX ADMIN — CLOPIDOGREL BISULFATE 75 MG: 75 TABLET ORAL at 09:01

## 2020-01-06 RX ADMIN — CEFEPIME 2 G: 2 INJECTION, POWDER, FOR SOLUTION INTRAVENOUS at 09:01

## 2020-01-06 RX ADMIN — GABAPENTIN 300 MG: 300 CAPSULE ORAL at 09:01

## 2020-01-06 RX ADMIN — SODIUM CHLORIDE SOLN NEBU 3% 4 ML: 3 NEBU SOLN at 09:01

## 2020-01-06 RX ADMIN — Medication 10 ML: at 02:01

## 2020-01-06 RX ADMIN — Medication 10 ML: at 06:01

## 2020-01-06 RX ADMIN — IPRATROPIUM BROMIDE AND ALBUTEROL SULFATE 3 ML: .5; 3 SOLUTION RESPIRATORY (INHALATION) at 07:01

## 2020-01-06 RX ADMIN — AMLODIPINE BESYLATE 10 MG: 10 TABLET ORAL at 09:01

## 2020-01-06 RX ADMIN — Medication 10 ML: at 12:01

## 2020-01-06 RX ADMIN — HEPARIN SODIUM 5000 UNITS: 5000 INJECTION, SOLUTION INTRAVENOUS; SUBCUTANEOUS at 09:01

## 2020-01-06 RX ADMIN — METOPROLOL TARTRATE 50 MG: 50 TABLET ORAL at 09:01

## 2020-01-06 RX ADMIN — POLYETHYLENE GLYCOL 3350 17 G: 17 POWDER, FOR SOLUTION ORAL at 09:01

## 2020-01-06 RX ADMIN — LEVOTHYROXINE SODIUM 50 MCG: 50 TABLET ORAL at 06:01

## 2020-01-06 RX ADMIN — LOSARTAN POTASSIUM AND HYDROCHLOROTHIAZIDE TABLETS 1 TABLET: 100; 25 TABLET, FILM COATED ORAL at 09:01

## 2020-01-06 RX ADMIN — BACITRACIN: 500 OINTMENT TOPICAL at 09:01

## 2020-01-06 RX ADMIN — CEFEPIME 2 G: 2 INJECTION, POWDER, FOR SOLUTION INTRAVENOUS at 02:01

## 2020-01-06 RX ADMIN — LIDOCAINE 1 PATCH: 50 PATCH CUTANEOUS at 09:01

## 2020-01-06 RX ADMIN — HEPARIN SODIUM 5000 UNITS: 5000 INJECTION, SOLUTION INTRAVENOUS; SUBCUTANEOUS at 06:01

## 2020-01-06 RX ADMIN — CEFEPIME 2 G: 2 INJECTION, POWDER, FOR SOLUTION INTRAVENOUS at 05:01

## 2020-01-06 NOTE — PLAN OF CARE
Patient resting in bed comfortably. iv intact and irration, fall precautions maintained no falls noted. Call light in reach bed locked and in lowest position. Non skid socks on while out of bed. Patient instructed to call for assistance. Skin integrity maintained as patient is assisted with positioning,pt able to turn and reposition somewhat as well. No C/o pain  No other complaints or concerns. Wound care completed. Scds in place, waffle mattress in place, heel protectors on, I.S by bedside pt demonstrated how to use. Will continue to monitor and follow careplan of care.

## 2020-01-06 NOTE — PROGRESS NOTES
[facility] Hospital Medicine  Progress Note    Patient Name: Junaid Muñoz  MRN: [id number]  Patient Class: IP- Inpatient   Admission Date: 12/6/2019  Length of Stay: 31 days  Attending Physician: Elian Deluca MD  Primary Care Provider: Oskar Whitman MD    [facility] Medicine Team: Networked reference to record PCT  Hao Gonzalez MD    Subjective:     Principal Problem:Spinal epidural abscess        HPI:  Mr. Muñoz is a 74yM who was admitted to neurosurgery for spinal epidural abscess and vertebral osteomyelitis. He has a PMH notable for previous IVDU and Hep C (treated) as well as biopsy proven cirrhosis, anemia, HTN, CAD (with stents), HLD, and hypothyroidism. He reported one month duration of progressively worsening upper back pain radiating between the bilateral shoulders. He denies symptom association with fevers, chills, numbness or tingling of extremities, bowel or bladder incontinence, headache, blurry vision, back trauma, prior history of spinal surgeries, chest pain, SOB, or pain with inspiration. Due to symptom progression, he was evaluated by outpatient orthopedist on 12/06 and MRI of thoracic spine was ordered. It was suggestive of osteomyelitis of C6 to T2 associated with epidural abscess with posterior spinal cord displacement. Patient was notified and instructed to report to ED.     He initially presented to [facility] on 12/06. Labs were significant for normal WBC, negative lactate, and elevated alkaline phosphatase (284). UA was unremarkable. He was initiated on Ceftriaxone and Vancomycin and transferred to [facility] for neurosurgery evaluation. Neurosurgery recommended obtaining CT of thoracic and lumbar spine and cervical MRI. He was taken to OR for C7-T1 corpectomy on 12/10 and again on 12/17 for posterior fusion with stay in Neuro ICU following surgery and drain management. Stepped down to floor on 12/26. Currently on cefepime 2g Q8 hours for  pseudomonas growing in aerobic culture from spine on 12/10 with end date between 1/28 and 2/11/20 with ID follow-up. Blood cultures have remained negative. Also with left toe wound that was being managed by podiatry and wound care. Per neurosurgery notes, patient is currently awaiting SNF placement. Hospital medicine was consulted on 1/2/20 for assistance with new onset elevated ALT/AST with history of Hep C. Per chart review, patient had been following with Hepatology in 2017 but was lost to follow-up. At that time, Mr. Muñoz appeared to still be actively using IV drugs. Throughout this admission, AST and ALT were wnl until 1/2/20 when they bumped to 54 and 45. His home meds include plavix, amlodipine, losartan-hctz, metoprolol, atorvastatin, levothyroxine however pt states he has not been taking any of them for some time.     Overview/Hospital Course:  Hospital Medicine consulted by Neurosurgery 1/2/20 for rising LFTs. Pt has history of Hep C and was on high intensity statin (atorvastain 80 mg). Advised holding statin in setting of rising AST/ALT. Overnight, LFTs trending down. Hep C serologies pending. Per chart review, pt waiting on SNF placement.     Interval History:   NAEON. LFTs continue to uptrend. Denies abd pain. Continues to have BM.   Suspecting up trending LFTs secondary to cefepime.   Will get RUQ US and ggt. Continue to trend.   If LFTs continue to increase; will get ID on board and change ABx.       Objective:     Vital Signs (Most Recent):  Temp: 97.5 °F (36.4 °C) (01/06/20 0911)  Pulse: 97 (01/06/20 0957)  Resp: 16 (01/06/20 0957)  BP: 129/70 (01/06/20 0911)  SpO2: 97 % (01/06/20 0957) Vital Signs (24h Range):  Temp:  [96.1 °F (35.6 °C)-98.2 °F (36.8 °C)] 97.5 °F (36.4 °C)  Pulse:  [] 97  Resp:  [14-18] 16  SpO2:  [96 %-99 %] 97 %  BP: (112-138)/(60-70) 129/70     Weight: 75.2 kg (165 lb 12.6 oz)  Body mass index is 24.48 kg/m².    Intake/Output Summary (Last 24 hours) at 1/6/2020  1248  Last data filed at 1/6/2020 0900  Gross per 24 hour   Intake 1540 ml   Output 300 ml   Net 1240 ml      Physical Exam   Constitutional: He is oriented to person, place, and time. He appears well-developed and well-nourished. No distress.   Well-appearing, NAD   HENT:   Head: Normocephalic and atraumatic.   Mouth/Throat: Oropharynx is clear and moist.   Eyes: Conjunctivae and EOM are normal. No scleral icterus.   Neck: Normal range of motion.   Cardiovascular: Normal rate and regular rhythm.   Pulmonary/Chest: Effort normal and breath sounds normal.   Abdominal: Soft. He exhibits no distension. There is no tenderness. There is no guarding.   Musculoskeletal: Normal range of motion. He exhibits no edema or tenderness.   Lymphadenopathy:     He has no cervical adenopathy.   Neurological: He is alert and oriented to person, place, and time.   Skin: Skin is warm and dry. He is not diaphoretic.   Nursing note and vitals reviewed.      Significant Labs: All pertinent labs within the past 24 hours have been reviewed.    Significant Imaging: I have reviewed all pertinent imaging results/findings within the past 24 hours.      Assessment/Plan:     # LFTs   # Chronic hepatitis C without hepatic coma  Mr. Muñoz is a 74yM with history of Hep C likely 2/2 IVDU. Was following with Hepatology here, most recently seen in 2017. Per chart review, pt was treated for antiviral therapy in 2009 but had possible re-activation in 2017 given ongoing drug use. However, it appears pt did not continue to follow up with [facility], unknown if treated again. Hospital Medicine was consulted on 1/2/20 for assistance with elevated AST and ALT with hx of Hep C.     Throughout admission, LFTs appeared to be wnl until 1/2/20 when the AST increased from 30-->54 and ALT from 23-->45. Alk phos has been elevated throughout admission that was attributed to his osteomyelitis. Pt has not had any high dose tylenol recently. Per chart review, he  was placed on high-dose stain (Atorvastatin 80 mg). Rise in LFTs could potentially be due to re-starting high-dose statin while in hospital. On exam, he is oriented to person, place, time but has some confusion regarding his medical history. Suspect hospital delirium as opposed to hepatic encephalopathy but will obtain ammonia level to evaluate. No stigmata of cirrhosis noted on PE. Spoke to patient's wife on 1/2/20 regarding mental status baseline and home medication regimen. States that he does have significant history of recent IVDU and has not followed with hepatology since 2017. States he was complaint with home meds of metoprolol, losartan, plavix, levothyroxine, and atorvastatin.     -MELD 7  -HIV negative  -Most recent abdominal U/S in 07/2017  -Hep C Ab and viral load pending, will need Hepatology follow-up after d/c. Pt was informed of this as well.   -PT/INR and Ammonia wnl  -ALT/AST and alk phos trending down  -Replace K and Mg as needed    Plan:   Continue to hold statin   Hep C quant pending   Suspecting up trending LFTs secondary to cefepime.   Will get RUQ US and ggt. Continue to trend.   If LFTs continue to increase; will get ID on board and change ABx.         VTE Risk Mitigation (From admission, onward)         Ordered     Place TUCKER hose  Until discontinued      12/27/19 0741     heparin (porcine) injection 5,000 Units  Every 8 hours      12/19/19 1049     IP VTE HIGH RISK PATIENT  Once      12/07/19 0219     Place sequential compression device  Until discontinued      12/07/19 0219                      Hao Gonzalez MD  Department of Hospital Medicine   [facility]

## 2020-01-06 NOTE — PLAN OF CARE
Goals remain appropriate.       Problem: Occupational Therapy Goal  Goal: Occupational Therapy Goal  Description  Goals to be met by: 1/10/2019     Patient will increase functional independence with ADLs by performing:    UE Dressing with Minimal Assistance.  LE Dressing with Moderate Assistance.  Grooming while standing with Stand-by Assistance.  Toileting from toilet with Minimal Assistance for hygiene (met - 1/3) and clothing management.   Bathing from  edge of bed with Moderate Assistance.  Supine to sit with Minimal Assistance. -- Met (1/3)        Outcome: Ongoing, Progressing

## 2020-01-06 NOTE — SUBJECTIVE & OBJECTIVE
Interval History: NAEON. Patient resting in bed without complaints. Sat up to edge of bed with minimal assistance with therapy on assessment today. Pain controlled. Strength exam stable. Pending placement, medically stable for discharge.    Medications:  Continuous Infusions:  Scheduled Meds:   albuterol-ipratropium  3 mL Nebulization Q12H    amLODIPine  10 mg Oral Daily    ascorbic acid (vitamin C)  250 mg Oral BID    bacitracin   Topical (Top) BID    ceFEPime (MAXIPIME) IVPB  2 g Intravenous Q8H    clopidogrel  75 mg Oral Daily    famotidine  20 mg Oral BID    ferrous sulfate  325 mg Oral BID    gabapentin  300 mg Oral TID    heparin (porcine)  5,000 Units Subcutaneous Q8H    levothyroxine  50 mcg Oral Before breakfast    lidocaine  1 patch Transdermal Daily    losartan-hydrochlorothiazide 100-25 mg  1 tablet Oral Daily    metoprolol tartrate  50 mg Oral BID    polyethylene glycol  17 g Oral Daily    senna-docusate 8.6-50 mg  1 tablet Oral BID    sodium chloride 0.9%  10 mL Intravenous Q6H    sodium chloride 3%  4 mL Nebulization Q12H     PRN Meds:acetaminophen, bisacodyl, labetalol, naloxone, ondansetron, oxyCODONE, promethazine (PHENERGAN) IVPB, Flushing PICC Protocol **AND** sodium chloride 0.9% **AND** sodium chloride 0.9%       Objective:     Weight: 75.2 kg (165 lb 12.6 oz)  Body mass index is 24.48 kg/m².  Vital Signs (Most Recent):  Temp: 97.9 °F (36.6 °C) (01/06/20 1401)  Pulse: 74 (01/06/20 1401)  Resp: 17 (01/06/20 1401)  BP: (!) 129/58 (01/06/20 1401)  SpO2: 96 % (01/06/20 1401) Vital Signs (24h Range):  Temp:  [96.1 °F (35.6 °C)-98.2 °F (36.8 °C)] 97.9 °F (36.6 °C)  Pulse:  [] 74  Resp:  [14-18] 17  SpO2:  [96 %-99 %] 96 %  BP: (112-138)/(58-70) 129/58     Date 01/06/20 0700 - 01/07/20 0659   Shift 7920-9179 8744-6182 4499-0164 24 Hour Total   INTAKE   P.O. 400   400   Shift Total(mL/kg) 400(5.3)   400(5.3)   OUTPUT   Shift Total(mL/kg)       Weight (kg) 75.2 75.2 75.2 75.2                    Male External Urinary Catheter 12/19/19 1200 Small (Active)   Collection Container Urimeter 12/27/2019  7:50 PM   Securement Method secured to lower ABD w/ tape 12/27/2019  7:50 PM   Skin no redness;no breakdown 12/27/2019  7:50 PM   Tolerance no signs/symptoms of discomfort 12/27/2019  7:50 PM   Output (mL) 250 mL 12/28/2019  9:00 AM   Catheter Change Date 12/24/19 12/26/2019  4:24 PM   Catheter Change Time 1100 12/26/2019  4:24 PM       Neurosurgery Physical Exam    General: well developed, well nourished, no distress.   Head: normocephalic, atraumatic  Neck: No tracheal deviation. No palpable masses.   Neurologic: Alert and oriented. Thought content appropriate.  GCS: Motor: 6/Verbal: 5/Eyes: 4 GCS Total: 15  Mental Status: Awake, Alert, Oriented x 4  Language: No aphasia  Speech: No dysarthria  Cranial nerves: face symmetric, tongue midline, CN II-XII grossly intact.   Eyes: pupils equal, round, reactive to light with accomodation, EOMI.   Ears: No drainage.   Pulmonary: normal respirations, no signs of respiratory distress  Abdomen: soft, non-distended, not tender to palpation  Sensory: intact to light touch throughout  Motor Strength: Moves all extremities spontaneously with good tone.  Full strength upper and lower extremities. No abnormal movements seen.     Strength  Deltoids Triceps Biceps Wrist Extension Wrist Flexion Hand    Upper: R 5/5 5/5 5/5 5/5 5/5 5/5    L 5/5 5/5 5/5 5/5 5/5 5/5     Iliopsoas Quadriceps Knee  Flexion Tibialis  anterior Gastro- cnemius EHL   Lower: R 5/5 5/5 5/5 5/5 5/5 5/5    L 5/5 5/5 5/5 5/5 5/5 5/5     Damian: absent  Clonus: absent  Babinski: absent  Vascular: Pulses 2+ and symmetric radial and dorsalis pedis. No LE edema.   Skin: Skin is warm, dry and intact.    Incision c/d/I with skin edges well approximated. Inferior aspect of incision with small amount of surrounding erythema, no edema. No drainage from incision. No palpable underlying fluid  collection.      Significant Labs:  Recent Labs   Lab 01/05/20  0359 01/06/20  0655    100    137   K 3.7 3.7    104   CO2 27 26   BUN 25* 20   CREATININE 0.8 0.8   CALCIUM 9.4 9.5     Recent Labs   Lab 01/05/20  0359   WBC 5.48   HGB 8.8*   HCT 29.7*   *     No results for input(s): LABPT, INR, APTT in the last 48 hours.  Microbiology Results (last 7 days)     ** No results found for the last 168 hours. **        Recent Lab Results       01/06/20  0655        Albumin 2.7     Alkaline Phosphatase 493          Anion Gap 7          BILIRUBIN TOTAL 0.4  Comment:  For infants and newborns, interpretation of results should be based  on gestational age, weight and in agreement with clinical  observations.  Premature Infant recommended reference ranges:  Up to 24 hours.............<8.0 mg/dL  Up to 48 hours............<12.0 mg/dL  3-5 days..................<15.0 mg/dL  6-29 days.................<15.0 mg/dL       BUN, Bld 20     Calcium 9.5     Chloride 104     CO2 26     Creatinine 0.8     eGFR if African American >60.0     eGFR if non  >60.0  Comment:  Calculation used to obtain the estimated glomerular filtration  rate (eGFR) is the CKD-EPI equation.        Glucose 100     Potassium 3.7     PROTEIN TOTAL 6.6     Sodium 137         All pertinent labs from the last 24 hours have been reviewed.    Significant Diagnostics:  I have reviewed all pertinent imaging results/findings within the past 24 hours.

## 2020-01-06 NOTE — PLAN OF CARE
Patient awaiting acceptance to a skilled nursing facility.     01/06/20 0941   Discharge Reassessment   Assessment Type Discharge Planning Reassessment   Provided patient/caregiver education on the expected discharge date and the discharge plan Yes   Do you have any problems affording any of your prescribed medications? No   Discharge Plan A Skilled Nursing Facility   Discharge Plan B Skilled Nursing Facility   DME Needed Upon Discharge  none   Patient choice form signed by patient/caregiver N/A   Anticipated Discharge Disposition SNF   Can the patient answer the patient profile reliably? Yes, cognitively intact   How does the patient rate their overall health at the present time? Fair   Describe the patient's ability to walk at the present time. Major restrictions/daily assistance from another person   How often would a person be available to care for the patient? Occasionally   Number of comorbid conditions (as recorded on the chart) Four   During the past month, has the patient often been bothered by feeling down, depressed or hopeless? No   During the past month, has the patient often been bothered by little interest or pleasure in doing things? No   Post-Acute Status   Post-Acute Authorization Placement   Post-Acute Placement Status Referrals Sent

## 2020-01-06 NOTE — PT/OT/SLP PROGRESS
Physical Therapy Treatment    Patient Name:  Junaid Muñoz   MRN:  [id number]    Recommendations:     Discharge Recommendations:  nursing facility, skilled   Discharge Equipment Recommendations: bedside commode, walker, rolling, wheelchair, shower chair   Barriers to discharge: Decreased caregiver support    Assessment:     Junaid Muñoz is a 74 y.o. male admitted with a medical diagnosis of Spinal epidural abscess.  He presents with the following impairments/functional limitations:  weakness, impaired functional mobilty, impaired balance, decreased safety awareness, impaired coordination, impaired muscle length, orthopedic precautions, pain, decreased lower extremity function, decreased coordination, gait instability, impaired self care skills, impaired endurance.      Patient demonstrates good motivation and fair plus activity tolerance secondary weakness and fatigue.  Patient demonstrates increased flexion posture and decreased core stability limiting overall independence and functional mobility.  Patient's balance and stability is improving from previous sessions leading to improving safety and independence.  Patient currently requires between CGA and minimal assistance with bed mobility, transfers and ambulation.  Patient able to ambulate 40 ft with rolling walker and minimal assistance for balance and walker management.  Patient continues to benefit from further skilled PT in an acute care and skilled nursing setting for strengthening and mobility training.      Rehab Prognosis: Good; patient continues to benefit from acute skilled PT services to address these deficits and reach maximum level of function.  Patient remains most appropriate to discharge to nursing facility, skilled  Recent Surgery: Procedure(s) (LRB):  FUSION, SPINE, CERVICAL, POSTERIOR APPROACH C2-T3 posterior instrumented fusion (N/A) 20 Days Post-Op    Plan:     During this hospitalization, patient to be seen 6 x/week to address the  "identified rehab impairments via gait training, therapeutic activities, therapeutic exercises, neuromuscular re-education and progress toward the following goals:    · Plan of Care Expires:  01/28/20    Subjective     Subjective: "I'm good."   Pain/Comfort:  · Pain Rating 1: (did not rate)  · Location - Side 1: Bilateral  · Location - Orientation 1: generalized  · Location 1: neck  · Pain Addressed 1: Reposition, Cessation of Activity, Distraction      Objective:     Communicated with RN prior to session.  Patient found supine with SCD, telemetry(tele-sitter) upon PT entry to room.     General Precautions: Standard, fall   Orthopedic Precautions:spinal precautions   Braces: CTLSO     Functional Mobility:  · Bed Mobility:     · Supine to Sit: minimum assistance with log rolling technique  · Transfers:     · Sit to Stand:  minimum assistance with rolling walker and cues for weight shifting and hand placement.  · Gait: Patient ambulated 40 ft with rolling walker and minimum assistance for balance and foot placement.  ·      AM-PAC 6 CLICK MOBILITY  Turning over in bed (including adjusting bedclothes, sheets and blankets)?: 3  Sitting down on and standing up from a chair with arms (e.g., wheelchair, bedside commode, etc.): 3  Moving from lying on back to sitting on the side of the bed?: 3  Moving to and from a bed to a chair (including a wheelchair)?: 3  Need to walk in hospital room?: 3  Climbing 3-5 steps with a railing?: 2  Basic Mobility Total Score: 17       Therapeutic Activities and Exercises:   OT focused on postural exercises secondary to forward head and increased kyphosis.    Gait training:  Patient required cues for sequencing, upright posture and width of base of support to increase independence and safety.  Patient required cues ~ 25% of the time.    Patient Education:    Patient educated on assistive device use, bed mobility training, Fall risk, gait training, home safety, Home exercise program, posture " and transfer training by explanation.  Patient was receptive to education and verbalizes understanding.     Patient left up in chair with all lines intact, call button in reach and back brace in place.    GOALS:   Multidisciplinary Problems     Physical Therapy Goals        Problem: Physical Therapy Goal    Goal Priority Disciplines Outcome Goal Variances Interventions   Physical Therapy Goal     PT, PT/OT Ongoing, Progressing     Description:  Goals to be met by: 19     Patient will increase functional independence with mobility by performin. Supine to sit with MInimal Assistance - met   2. Sit to supine with MInimal Assistance  3. Rolling to Left and Right with Modified [place].  4. Sit to stand transfer with Minimal Assistance - met   5. Bed to chair transfer with Minimal Assistance using Rolling Walker  6. Gait  x 40 feet with Minimal Assistance using Rolling Walker.   7. Sitting at edge of bed x 8 minutes with Minimal Assistance  8. Stand for 5 minutes with Minimal Assistance using Rolling Walker  9. Lower extremity exercise program x15 reps per handout, with independence    10. Supine to sit with Stand-by Assistance  11. Sit to stand transfer with Stand-by Assistance.                         Time Tracking:     PT Received On: 20  PT Start Time: 1048     PT Stop Time: 1110  PT Total Time (min): 22 min     Billable Minutes: Gait Training 22 min    Treatment Type: Treatment  PT/PTA: PT     PTA Visit Number: 0     Tom Connelly, PT  2020

## 2020-01-06 NOTE — PLAN OF CARE
CM following up on skilled referrals. Message left for admission office at the [facility]. [facility] declined from [facility], [facility] has no available beds until Wednesday or Thursday, Spoke with patient and wife to get additional choices for skilled placement. New referrals placed to [facility] , [facility] and rehab, [facility], [facility] and [facility]. Will follow-up.

## 2020-01-06 NOTE — PROGRESS NOTES
[facility]  Neurosurgery  Progress Note    Subjective:     History of Present Illness: Junaid Muñoz is a 74 y.o. male with PMH of HTN, HLD, Hepatitis C, and CAD s/p two stents on plavix who presents with 1 month history of back pain between his shoulders. MRI at OSH demonstrates likely epidural abscess. Pt denies hx of trauma and reports slow onset of symptoms.     Post-Op Info:  Procedure(s) (LRB):  FUSION, SPINE, CERVICAL, POSTERIOR APPROACH C2-T3 posterior instrumented fusion (N/A)   20 Days Post-Op     Interval History: NAEON. Patient resting in bed without complaints. Sat up to edge of bed with minimal assistance with therapy on assessment today. Pain controlled. Strength exam stable. Pending placement, medically stable for discharge.    Medications:  Continuous Infusions:  Scheduled Meds:   albuterol-ipratropium  3 mL Nebulization Q12H    amLODIPine  10 mg Oral Daily    ascorbic acid (vitamin C)  250 mg Oral BID    bacitracin   Topical (Top) BID    ceFEPime (MAXIPIME) IVPB  2 g Intravenous Q8H    clopidogrel  75 mg Oral Daily    famotidine  20 mg Oral BID    ferrous sulfate  325 mg Oral BID    gabapentin  300 mg Oral TID    heparin (porcine)  5,000 Units Subcutaneous Q8H    levothyroxine  50 mcg Oral Before breakfast    lidocaine  1 patch Transdermal Daily    losartan-hydrochlorothiazide 100-25 mg  1 tablet Oral Daily    metoprolol tartrate  50 mg Oral BID    polyethylene glycol  17 g Oral Daily    senna-docusate 8.6-50 mg  1 tablet Oral BID    sodium chloride 0.9%  10 mL Intravenous Q6H    sodium chloride 3%  4 mL Nebulization Q12H     PRN Meds:acetaminophen, bisacodyl, labetalol, naloxone, ondansetron, oxyCODONE, promethazine (PHENERGAN) IVPB, Flushing PICC Protocol **AND** sodium chloride 0.9% **AND** sodium chloride 0.9%       Objective:     Weight: 75.2 kg (165 lb 12.6 oz)  Body mass index is 24.48 kg/m².  Vital Signs (Most Recent):  Temp: 97.9 °F (36.6 °C)  (01/06/20 1401)  Pulse: 74 (01/06/20 1401)  Resp: 17 (01/06/20 1401)  BP: (!) 129/58 (01/06/20 1401)  SpO2: 96 % (01/06/20 1401) Vital Signs (24h Range):  Temp:  [96.1 °F (35.6 °C)-98.2 °F (36.8 °C)] 97.9 °F (36.6 °C)  Pulse:  [] 74  Resp:  [14-18] 17  SpO2:  [96 %-99 %] 96 %  BP: (112-138)/(58-70) 129/58     Date 01/06/20 0700 - 01/07/20 0659   Shift 9164-8977 4469-4253 7390-2520 24 Hour Total   INTAKE   P.O. 400   400   Shift Total(mL/kg) 400(5.3)   400(5.3)   OUTPUT   Shift Total(mL/kg)       Weight (kg) 75.2 75.2 75.2 75.2                   Male External Urinary Catheter 12/19/19 1200 Small (Active)   Collection Container Urimeter 12/27/2019  7:50 PM   Securement Method secured to lower ABD w/ tape 12/27/2019  7:50 PM   Skin no redness;no breakdown 12/27/2019  7:50 PM   Tolerance no signs/symptoms of discomfort 12/27/2019  7:50 PM   Output (mL) 250 mL 12/28/2019  9:00 AM   Catheter Change Date 12/24/19 12/26/2019  4:24 PM   Catheter Change Time 1100 12/26/2019  4:24 PM       Neurosurgery Physical Exam    General: well developed, well nourished, no distress.   Head: normocephalic, atraumatic  Neck: No tracheal deviation. No palpable masses.   Neurologic: Alert and oriented. Thought content appropriate.  GCS: Motor: 6/Verbal: 5/Eyes: 4 GCS Total: 15  Mental Status: Awake, Alert, Oriented x 4  Language: No aphasia  Speech: No dysarthria  Cranial nerves: face symmetric, tongue midline, CN II-XII grossly intact.   Eyes: pupils equal, round, reactive to light with accomodation, EOMI.   Ears: No drainage.   Pulmonary: normal respirations, no signs of respiratory distress  Abdomen: soft, non-distended, not tender to palpation  Sensory: intact to light touch throughout  Motor Strength: Moves all extremities spontaneously with good tone.  Full strength upper and lower extremities. No abnormal movements seen.     Strength  Deltoids Triceps Biceps Wrist Extension Wrist Flexion Hand    Upper: R 5/5 5/5 5/5 5/5 5/5  5/5    L 5/5 5/5 5/5 5/5 5/5 5/5     Iliopsoas Quadriceps Knee  Flexion Tibialis  anterior Gastro- cnemius EHL   Lower: R 5/5 5/5 5/5 5/5 5/5 5/5    L 5/5 5/5 5/5 5/5 5/5 5/5     Damian: absent  Clonus: absent  Babinski: absent  Vascular: Pulses 2+ and symmetric radial and dorsalis pedis. No LE edema.   Skin: Skin is warm, dry and intact.    Incision c/d/I with skin edges well approximated. Inferior aspect of incision with small amount of surrounding erythema, no edema. No drainage from incision. No palpable underlying fluid collection.      Significant Labs:  Recent Labs   Lab 01/05/20  0359 01/06/20  0655    100    137   K 3.7 3.7    104   CO2 27 26   BUN 25* 20   CREATININE 0.8 0.8   CALCIUM 9.4 9.5     Recent Labs   Lab 01/05/20  0359   WBC 5.48   HGB 8.8*   HCT 29.7*   *     No results for input(s): LABPT, INR, APTT in the last 48 hours.  Microbiology Results (last 7 days)     ** No results found for the last 168 hours. **        Recent Lab Results       01/06/20  0655        Albumin 2.7     Alkaline Phosphatase 493          Anion Gap 7          BILIRUBIN TOTAL 0.4  Comment:  For infants and newborns, interpretation of results should be based  on gestational age, weight and in agreement with clinical  observations.  Premature Infant recommended reference ranges:  Up to 24 hours.............<8.0 mg/dL  Up to 48 hours............<12.0 mg/dL  3-5 days..................<15.0 mg/dL  6-29 days.................<15.0 mg/dL       BUN, Bld 20     Calcium 9.5     Chloride 104     CO2 26     Creatinine 0.8     eGFR if African American >60.0     eGFR if non  >60.0  Comment:  Calculation used to obtain the estimated glomerular filtration  rate (eGFR) is the CKD-EPI equation.        Glucose 100     Potassium 3.7     PROTEIN TOTAL 6.6     Sodium 137         All pertinent labs from the last 24 hours have been reviewed.    Significant Diagnostics:  I have reviewed all  pertinent imaging results/findings within the past 24 hours.    Assessment/Plan:     * Spinal epidural abscess  74 y.o. male with PMH of HTN, HLD, Hepatitis C, and CAD s/p two stents on plavix who presents with C6-T2 osteomyelitis with suspected epidural abscess.  Now s/p C7/T1 corpectomies on 12/10 and C2-T2  posterior instrumented fusion 12/17.    -Patient neurologically stable on exam  - brace at all times. Pt sitting up in bed without brace on, explained previously to patient that brace must be worn when sitting up and when OOB. Poor posture noted when patient was sitting edge of bed today and his neck is more flexed forward. Addressed that  brace now needs to be worn at all times.   -Staples removed 12/31/19. Incision intact without signs of infection. Leave incision open to air. Turn q2h to continue to promote wound healing.   - Elevated LFTs: HM following, appreciate assistance. Up trending after discontinuing statin, suspected 2/2 cefepime. RUQ US and ggt ordered. Continue to trend LFTs. Recommending to continue to hold statin. If remain elevated, will consult ID for change in abx. Hep C quantitative pending. ALP most likely elevated 2/2 osteomyelitis. No signs of cirrhosis on exam. HM recommend restarting statin at lower dose (40 mg) upon discharge. PTT/INR/ammonia all within normal limits. Spoke with Dr. Giles from hepatology who will set the patient up for ouptatient follow up in hepatology clinic. Appreciate HM and Hepatology assistance.   -ID: Afebrile. No leukocytosis. Continue Cefepime 2g q 8 hours. Estimated end date of therapy 1/28/20-2/11/20. FU in ID clinic in 2-3 weeks.   -2nd left toe wound care: Seen by Podiatry for left 2nd toe, recommend betadine daily. Dress with foam bandage. Wound care on board. Appreciate assistance.  -Pain: Pain well controlled on current regimen. No adjustments needed at this time.   -Anemia: Continue iron for replacement x 2 weeks. F/u at next lab draw.  -HTN:  Continue Amlodipine 10 mg daily, Losartan-HCTZ 100-25 mg daily, and Metoprolol 50 mg BID.   -CAD s/p stent placement: Continue home dose of Plavix 75 mg daily.   -Hyponatremia: Na 137. Na tabs discontinued in the setting of fluid retention. Will continue to monitor.   -Hypoalbuminemia: Continue Jose BID to promote wound healing.   -HLD: Will d/c high dose atorvastatin in setting of elevated LFTs. Will continue to monitor daily, and restart when downtrending. Per  okay to resume at discharge at lower dose (40 mg).   -DVT prophylaxis: TUCKER's, SCD's, SQH  -Bowel regimen: senna BID and miralax daily  -Atelectasis prevention: IS hourly while awake, PT/OT, OOB > 6 hours per day  - Discussed with Dr. Deluca     DISPO: Pending SNF placement        Miley Wood PA-C  Neurosurgery  [facility]

## 2020-01-06 NOTE — SUBJECTIVE & OBJECTIVE
Interval History:   NAEON. LFTs continue to uptrend. Denies abd pain. Continues to have BM.   Suspecting up trending LFTs secondary to cefepime.   Will get RUQ US and ggt. Continue to trend.   If LFTs continue to increase; will get ID on board and change ABx.       Objective:     Vital Signs (Most Recent):  Temp: 97.5 °F (36.4 °C) (01/06/20 0911)  Pulse: 97 (01/06/20 0957)  Resp: 16 (01/06/20 0957)  BP: 129/70 (01/06/20 0911)  SpO2: 97 % (01/06/20 0957) Vital Signs (24h Range):  Temp:  [96.1 °F (35.6 °C)-98.2 °F (36.8 °C)] 97.5 °F (36.4 °C)  Pulse:  [] 97  Resp:  [14-18] 16  SpO2:  [96 %-99 %] 97 %  BP: (112-138)/(60-70) 129/70     Weight: 75.2 kg (165 lb 12.6 oz)  Body mass index is 24.48 kg/m².    Intake/Output Summary (Last 24 hours) at 1/6/2020 1248  Last data filed at 1/6/2020 0900  Gross per 24 hour   Intake 1540 ml   Output 300 ml   Net 1240 ml      Physical Exam   Constitutional: He is oriented to person, place, and time. He appears well-developed and well-nourished. No distress.   Well-appearing, NAD   HENT:   Head: Normocephalic and atraumatic.   Mouth/Throat: Oropharynx is clear and moist.   Eyes: Conjunctivae and EOM are normal. No scleral icterus.   Neck: Normal range of motion.   Cardiovascular: Normal rate and regular rhythm.   Pulmonary/Chest: Effort normal and breath sounds normal.   Abdominal: Soft. He exhibits no distension. There is no tenderness. There is no guarding.   Musculoskeletal: Normal range of motion. He exhibits no edema or tenderness.   Lymphadenopathy:     He has no cervical adenopathy.   Neurological: He is alert and oriented to person, place, and time.   Skin: Skin is warm and dry. He is not diaphoretic.   Nursing note and vitals reviewed.      Significant Labs: All pertinent labs within the past 24 hours have been reviewed.    Significant Imaging: I have reviewed all pertinent imaging results/findings within the past 24 hours.

## 2020-01-06 NOTE — PT/OT/SLP PROGRESS
Occupational Therapy   Treatment    Name: Junaid Muñoz  MRN: [id number]  Admitting Diagnosis:  Spinal epidural abscess  20 Days Post-Op    Recommendations:     Discharge Recommendations: nursing facility, skilled  Discharge Equipment Recommendations:  walker, rolling, bedside commode, wheelchair, shower chair  Barriers to discharge:  None    Assessment:     Junaid Muñoz is a 74 y.o. male with a medical diagnosis of Spinal epidural abscess.  He presents with the following performance deficits affecting function:  weakness, impaired endurance, impaired self care skills, impaired balance, impaired functional mobilty, gait instability, decreased lower extremity function, pain, decreased safety awareness, orthopedic precautions, impaired coordination.     Pt tolerated session well. He continues to functionally improve w/ self-care and functional mobility tasks. Pt demonstrates improved overall trunk control and stability increasing his overall safety w/ functional activities. Pt sat EOB and participated in  education on donning sequence. Pt will require reinforcement in future sessions to ensure good recall. Pt educated on and participated in postural exercises to strengthen his neck and upper back muscles to decrease cervical flexion. Pt continues to benefit from skilled OT to address the above listed impairments.      Rehab Prognosis:  Good; patient would benefit from acute skilled OT services to address these deficits and reach maximum level of function.       Plan:     Patient to be seen 5 x/week to address the above listed problems via self-care/home management, therapeutic activities, therapeutic exercises  · Plan of Care Expires: 01/27/20  · Plan of Care Reviewed with: patient    Subjective     Pain/Comfort:  · Pain Rating 1: (did not rate)  · Location - Side 1: Bilateral  · Location - Orientation 1: generalized  · Location 1: neck  · Pain Addressed 1: Reposition, Cessation of Activity,  Distraction  · Pain Rating Post-Intervention 1: (did not rate)    Objective:     Communicated with: RN prior to session.  Patient found HOB elevated with telemetry, SCD upon OT entry to room.    General Precautions: Standard, fall   Orthopedic Precautions:spinal precautions   Braces:      Occupational Performance:     Bed Mobility:    · Patient completed Rolling/Turning to Right with minimum assistance  · Patient completed Scooting/Bridging with contact guard assistance  · Patient completed Supine to Sit with minimum assistance     Functional Mobility/Transfers:  · Patient completed Sit <> Stand Transfer with minimum assistance  with  rolling walker   · Functional Mobility: Pt ambulated 40ft w/ Min A using RW for improved balance and stability.     Activities of Daily Living:  · Upper Body Dressing: moderate assistance and education on  donning sequence      [facility] 6 Click ADL: 15    Treatment & Education:  - OT POC  - Importance of OOB activity to maximize recovery   - reviewed log roll technique   - Educated on  donning sequence  - Educated on and participated in postural exercises seated EOB    Patient left up in chair with all lines intact, call button in reach and RN notifiedEducation:      GOALS:   Multidisciplinary Problems     Occupational Therapy Goals        Problem: Occupational Therapy Goal    Goal Priority Disciplines Outcome Interventions   Occupational Therapy Goal     OT, PT/OT Ongoing, Progressing    Description:  Goals to be met by: 1/10/2019     Patient will increase functional independence with ADLs by performing:    UE Dressing with Minimal Assistance.  LE Dressing with Moderate Assistance.  Grooming while standing with Stand-by Assistance.  Toileting from toilet with Minimal Assistance for hygiene (met - 1/3) and clothing management.   Bathing from  edge of bed with Moderate Assistance.  Supine to sit with Minimal Assistance. -- Met (1/3)                         Time Tracking:     OT  Date of Treatment: 01/06/20  OT Start Time: 1040  OT Stop Time: 1104  OT Total Time (min): 24 min    Billable Minutes:Therapeutic Activity 12  Therapeutic Exercise 12    Shirley Rushing OT  1/6/2020

## 2020-01-06 NOTE — ASSESSMENT & PLAN NOTE
74 y.o. male with PMH of HTN, HLD, Hepatitis C, and CAD s/p two stents on plavix who presents with C6-T2 osteomyelitis with suspected epidural abscess.  Now s/p C7/T1 corpectomies on 12/10 and C2-T2  posterior instrumented fusion 12/17.    -Patient neurologically stable on exam  - brace at all times. Pt sitting up in bed without brace on, explained previously to patient that brace must be worn when sitting up and when OOB. Poor posture noted when patient was sitting edge of bed today and his neck is more flexed forward. Addressed that  brace now needs to be worn at all times.   -Staples removed 12/31/19. Incision intact without signs of infection. Leave incision open to air. Turn q2h to continue to promote wound healing.   - Elevated LFTs: HM following, appreciate assistance. Up trending after discontinuing statin, suspected 2/2 cefepime. RUQ US and ggt ordered. Continue to trend LFTs. Recommending to continue to hold statin. If remain elevated, will consult ID for change in abx. Hep C quantitative pending. ALP most likely elevated 2/2 osteomyelitis. No signs of cirrhosis on exam. HM recommend restarting statin at lower dose (40 mg) upon discharge. PTT/INR/ammonia all within normal limits. Spoke with Dr. Giles from hepatology who will set the patient up for ouptatient follow up in hepatology clinic. Appreciate  and Hepatology assistance.   -ID: Afebrile. No leukocytosis. Continue Cefepime 2g q 8 hours. Estimated end date of therapy 1/28/20-2/11/20. FU in ID clinic in 2-3 weeks.   -2nd left toe wound care: Seen by Podiatry for left 2nd toe, recommend betadine daily. Dress with foam bandage. Wound care on board. Appreciate assistance.  -Pain: Pain well controlled on current regimen. No adjustments needed at this time.   -Anemia: Continue iron for replacement x 2 weeks. F/u at next lab draw.  -HTN: Continue Amlodipine 10 mg daily, Losartan-HCTZ 100-25 mg daily, and Metoprolol 50 mg BID.   -CAD s/p stent  placement: Continue home dose of Plavix 75 mg daily.   -Hyponatremia: Na 137. Na tabs discontinued in the setting of fluid retention. Will continue to monitor.   -Hypoalbuminemia: Continue Jose BID to promote wound healing.   -HLD: Will d/c high dose atorvastatin in setting of elevated LFTs. Will continue to monitor daily, and restart when downtrending. Per  okay to resume at discharge at lower dose (40 mg).   -DVT prophylaxis: TUCKER's, SCD's, SQH  -Bowel regimen: senna BID and miralax daily  -Atelectasis prevention: IS hourly while awake, PT/OT, OOB > 6 hours per day  - Discussed with Dr. Deluca     DISPO: Pending SNF placement

## 2020-01-06 NOTE — PLAN OF CARE
Problem: Physical Therapy Goal  Goal: Physical Therapy Goal  Description  Goals to be met by: 19     Patient will increase functional independence with mobility by performin. Supine to sit with MInimal Assistance - met   2. Sit to supine with MInimal Assistance  3. Rolling to Left and Right with Modified [place].  4. Sit to stand transfer with Minimal Assistance - met   5. Bed to chair transfer with Minimal Assistance using Rolling Walker  6. Gait  x 40 feet with Minimal Assistance using Rolling Walker.   7. Sitting at edge of bed x 8 minutes with Minimal Assistance  8. Stand for 5 minutes with Minimal Assistance using Rolling Walker  9. Lower extremity exercise program x15 reps per handout, with independence    10. Supine to sit with Stand-by Assistance  11. Sit to stand transfer with Stand-by Assistance.        Outcome: Ongoing, Progressing     Patient progressing appropriately towards current goals and plan of care remains appropriate at this time.  Patient demonstrates good motivation and fair plus activity tolerance secondary weakness and fatigue.  Patient demonstrates increased flexion posture and decreased core stability limiting overall independence and functional mobility.  Patient's balance and stability is improving from previous sessions leading to improving safety and independence.  Patient currently requires between CGA and minimal assistance with bed mobility, transfers and ambulation.  Patient able to ambulate 40 ft with rolling walker and minimal assistance for balance and walker management.  Patient continues to benefit from further skilled PT in an acute care and skilled nursing setting for strengthening and mobility training.      Tom Connelly, PT, DPT  2020  Pager #: [phone]

## 2020-01-07 ENCOUNTER — TELEPHONE (OUTPATIENT)
Dept: HEPATOLOGY | Facility: CLINIC | Age: 75
End: 2020-01-07

## 2020-01-07 PROBLEM — L89.151 PRESSURE INJURY OF SACRAL REGION, STAGE 1: Status: ACTIVE | Noted: 2020-01-07

## 2020-01-07 LAB
ALBUMIN SERPL BCP-MCNC: 2.5 G/DL (ref 3.5–5.2)
ALP SERPL-CCNC: 463 U/L (ref 55–135)
ALT SERPL W/O P-5'-P-CCNC: 138 U/L (ref 10–44)
ANION GAP SERPL CALC-SCNC: 5 MMOL/L (ref 8–16)
AST SERPL-CCNC: 123 U/L (ref 10–40)
BILIRUB SERPL-MCNC: 0.3 MG/DL (ref 0.1–1)
BUN SERPL-MCNC: 20 MG/DL (ref 8–23)
CALCIUM SERPL-MCNC: 9.1 MG/DL (ref 8.7–10.5)
CHLORIDE SERPL-SCNC: 106 MMOL/L (ref 95–110)
CO2 SERPL-SCNC: 28 MMOL/L (ref 23–29)
CREAT SERPL-MCNC: 0.8 MG/DL (ref 0.5–1.4)
EST. GFR  (AFRICAN AMERICAN): >60 ML/MIN/1.73 M^2
EST. GFR  (NON AFRICAN AMERICAN): >60 ML/MIN/1.73 M^2
GLUCOSE SERPL-MCNC: 101 MG/DL (ref 70–110)
POTASSIUM SERPL-SCNC: 3.9 MMOL/L (ref 3.5–5.1)
PROT SERPL-MCNC: 6.2 G/DL (ref 6–8.4)
SODIUM SERPL-SCNC: 139 MMOL/L (ref 136–145)

## 2020-01-07 PROCEDURE — 97116 GAIT TRAINING THERAPY: CPT

## 2020-01-07 PROCEDURE — 94761 N-INVAS EAR/PLS OXIMETRY MLT: CPT

## 2020-01-07 PROCEDURE — 63600175 PHARM REV CODE 636 W HCPCS: Performed by: PSYCHIATRY & NEUROLOGY

## 2020-01-07 PROCEDURE — 11000001 HC ACUTE MED/SURG PRIVATE ROOM

## 2020-01-07 PROCEDURE — 30200315 PPD INTRADERMAL TEST REV CODE 302: Performed by: NEUROLOGICAL SURGERY

## 2020-01-07 PROCEDURE — 25000003 PHARM REV CODE 250: Performed by: PSYCHIATRY & NEUROLOGY

## 2020-01-07 PROCEDURE — 80053 COMPREHEN METABOLIC PANEL: CPT

## 2020-01-07 PROCEDURE — 99900026 HC AIRWAY MAINTENANCE (STAT)

## 2020-01-07 PROCEDURE — 99024 POSTOP FOLLOW-UP VISIT: CPT | Mod: ,,, | Performed by: PHYSICIAN ASSISTANT

## 2020-01-07 PROCEDURE — 25000242 PHARM REV CODE 250 ALT 637 W/ HCPCS: Performed by: PHYSICIAN ASSISTANT

## 2020-01-07 PROCEDURE — 99233 PR SUBSEQUENT HOSPITAL CARE,LEVL III: ICD-10-PCS | Mod: ,,, | Performed by: HOSPITALIST

## 2020-01-07 PROCEDURE — 99024 PR POST-OP FOLLOW-UP VISIT: ICD-10-PCS | Mod: ,,, | Performed by: PHYSICIAN ASSISTANT

## 2020-01-07 PROCEDURE — 94799 UNLISTED PULMONARY SVC/PX: CPT

## 2020-01-07 PROCEDURE — 86580 TB INTRADERMAL TEST: CPT | Performed by: NEUROLOGICAL SURGERY

## 2020-01-07 PROCEDURE — 99233 SBSQ HOSP IP/OBS HIGH 50: CPT | Mod: ,,, | Performed by: HOSPITALIST

## 2020-01-07 PROCEDURE — 97803 MED NUTRITION INDIV SUBSEQ: CPT

## 2020-01-07 PROCEDURE — 25000003 PHARM REV CODE 250: Performed by: PHYSICIAN ASSISTANT

## 2020-01-07 PROCEDURE — 85025 COMPLETE CBC W/AUTO DIFF WBC: CPT

## 2020-01-07 PROCEDURE — 94640 AIRWAY INHALATION TREATMENT: CPT

## 2020-01-07 PROCEDURE — 25000003 PHARM REV CODE 250: Performed by: ANESTHESIOLOGY

## 2020-01-07 PROCEDURE — 25000003 PHARM REV CODE 250: Performed by: NURSE PRACTITIONER

## 2020-01-07 PROCEDURE — A4216 STERILE WATER/SALINE, 10 ML: HCPCS | Performed by: ANESTHESIOLOGY

## 2020-01-07 RX ORDER — BALSAM PERU/CASTOR OIL
OINTMENT (GRAM) TOPICAL 2 TIMES DAILY
Status: DISCONTINUED | OUTPATIENT
Start: 2020-01-07 | End: 2020-01-11 | Stop reason: HOSPADM

## 2020-01-07 RX ADMIN — BACITRACIN: 500 OINTMENT TOPICAL at 09:01

## 2020-01-07 RX ADMIN — LEVOTHYROXINE SODIUM 50 MCG: 50 TABLET ORAL at 05:01

## 2020-01-07 RX ADMIN — CEFEPIME 2 G: 2 INJECTION, POWDER, FOR SOLUTION INTRAVENOUS at 10:01

## 2020-01-07 RX ADMIN — CLOPIDOGREL BISULFATE 75 MG: 75 TABLET ORAL at 09:01

## 2020-01-07 RX ADMIN — METOPROLOL TARTRATE 50 MG: 50 TABLET ORAL at 10:01

## 2020-01-07 RX ADMIN — GABAPENTIN 300 MG: 300 CAPSULE ORAL at 10:01

## 2020-01-07 RX ADMIN — GABAPENTIN 300 MG: 300 CAPSULE ORAL at 02:01

## 2020-01-07 RX ADMIN — LOSARTAN POTASSIUM AND HYDROCHLOROTHIAZIDE TABLETS 1 TABLET: 100; 25 TABLET, FILM COATED ORAL at 09:01

## 2020-01-07 RX ADMIN — CEFEPIME 2 G: 2 INJECTION, POWDER, FOR SOLUTION INTRAVENOUS at 05:01

## 2020-01-07 RX ADMIN — Medication 250 MG: at 10:01

## 2020-01-07 RX ADMIN — FAMOTIDINE 20 MG: 20 TABLET ORAL at 09:01

## 2020-01-07 RX ADMIN — SENNOSIDES AND DOCUSATE SODIUM 1 TABLET: 8.6; 5 TABLET ORAL at 09:01

## 2020-01-07 RX ADMIN — IPRATROPIUM BROMIDE AND ALBUTEROL SULFATE 3 ML: .5; 3 SOLUTION RESPIRATORY (INHALATION) at 09:01

## 2020-01-07 RX ADMIN — SENNOSIDES AND DOCUSATE SODIUM 1 TABLET: 8.6; 5 TABLET ORAL at 10:01

## 2020-01-07 RX ADMIN — GABAPENTIN 300 MG: 300 CAPSULE ORAL at 09:01

## 2020-01-07 RX ADMIN — HEPARIN SODIUM 5000 UNITS: 5000 INJECTION, SOLUTION INTRAVENOUS; SUBCUTANEOUS at 05:01

## 2020-01-07 RX ADMIN — IPRATROPIUM BROMIDE AND ALBUTEROL SULFATE 3 ML: .5; 3 SOLUTION RESPIRATORY (INHALATION) at 08:01

## 2020-01-07 RX ADMIN — METOPROLOL TARTRATE 50 MG: 50 TABLET ORAL at 09:01

## 2020-01-07 RX ADMIN — Medication 10 ML: at 06:01

## 2020-01-07 RX ADMIN — FERROUS SULFATE TAB EC 325 MG (65 MG FE EQUIVALENT) 325 MG: 325 (65 FE) TABLET DELAYED RESPONSE at 10:01

## 2020-01-07 RX ADMIN — CASTOR OIL AND BALSAM, PERU: 788; 87 OINTMENT TOPICAL at 10:01

## 2020-01-07 RX ADMIN — HEPARIN SODIUM 5000 UNITS: 5000 INJECTION, SOLUTION INTRAVENOUS; SUBCUTANEOUS at 02:01

## 2020-01-07 RX ADMIN — FAMOTIDINE 20 MG: 20 TABLET ORAL at 10:01

## 2020-01-07 RX ADMIN — POLYETHYLENE GLYCOL 3350 17 G: 17 POWDER, FOR SOLUTION ORAL at 09:01

## 2020-01-07 RX ADMIN — SODIUM CHLORIDE SOLN NEBU 3% 4 ML: 3 NEBU SOLN at 09:01

## 2020-01-07 RX ADMIN — Medication 10 ML: at 02:01

## 2020-01-07 RX ADMIN — BACITRACIN: 500 OINTMENT TOPICAL at 10:01

## 2020-01-07 RX ADMIN — CEFEPIME 2 G: 2 INJECTION, POWDER, FOR SOLUTION INTRAVENOUS at 02:01

## 2020-01-07 RX ADMIN — Medication 5 UNITS: at 02:01

## 2020-01-07 RX ADMIN — FERROUS SULFATE TAB EC 325 MG (65 MG FE EQUIVALENT) 325 MG: 325 (65 FE) TABLET DELAYED RESPONSE at 09:01

## 2020-01-07 RX ADMIN — LIDOCAINE 1 PATCH: 50 PATCH CUTANEOUS at 09:01

## 2020-01-07 RX ADMIN — HEPARIN SODIUM 5000 UNITS: 5000 INJECTION, SOLUTION INTRAVENOUS; SUBCUTANEOUS at 10:01

## 2020-01-07 RX ADMIN — Medication 250 MG: at 09:01

## 2020-01-07 NOTE — PROGRESS NOTES
[facility]  Neurosurgery  Progress Note    Subjective:     History of Present Illness: Junaid Muñoz is a 74 y.o. male with PMH of HTN, HLD, Hepatitis C, and CAD s/p two stents on plavix who presents with 1 month history of back pain between his shoulders. MRI at OSH demonstrates likely epidural abscess. Pt denies hx of trauma and reports slow onset of symptoms.     Post-Op Info:  Procedure(s) (LRB):  FUSION, SPINE, CERVICAL, POSTERIOR APPROACH C2-T3 posterior instrumented fusion (N/A)   21 Days Post-Op     Interval History: NAEON. LFTs remain elevated.  Hospital Medicine recommending outpatient follow-up with hepatology.  No further inpatient workup required, patient is medically stable for discharge to SNF.  He has an appointment scheduled 1/29 for hepatology follow-up.  He has been compliant with his  brace.  He denies any pain at this time. BM yesterday, voiding appropriately.      Medications:  Continuous Infusions:  Scheduled Meds:   albuterol-ipratropium  3 mL Nebulization Q12H    amLODIPine  10 mg Oral Daily    ascorbic acid (vitamin C)  250 mg Oral BID    bacitracin   Topical (Top) BID    balsam peru-castor oil   Topical (Top) BID    ceFEPime (MAXIPIME) IVPB  2 g Intravenous Q8H    clopidogrel  75 mg Oral Daily    famotidine  20 mg Oral BID    ferrous sulfate  325 mg Oral BID    gabapentin  300 mg Oral TID    heparin (porcine)  5,000 Units Subcutaneous Q8H    levothyroxine  50 mcg Oral Before breakfast    lidocaine  1 patch Transdermal Daily    losartan-hydrochlorothiazide 100-25 mg  1 tablet Oral Daily    metoprolol tartrate  50 mg Oral BID    polyethylene glycol  17 g Oral Daily    senna-docusate 8.6-50 mg  1 tablet Oral BID    sodium chloride 0.9%  10 mL Intravenous Q6H    sodium chloride 3%  4 mL Nebulization Q12H     PRN Meds:acetaminophen, bisacodyl, labetalol, naloxone, ondansetron, oxyCODONE, promethazine (PHENERGAN) IVPB, Flushing PICC Protocol **AND**  sodium chloride 0.9% **AND** sodium chloride 0.9%       Objective:     Weight: 75.2 kg (165 lb 12.6 oz)  Body mass index is 24.48 kg/m².  Vital Signs (Most Recent):  Temp: 96.4 °F (35.8 °C) (01/07/20 1128)  Pulse: 72 (01/07/20 1128)  Resp: 18 (01/07/20 1128)  BP: 115/63 (01/07/20 1128)  SpO2: 98 % (01/07/20 1128) Vital Signs (24h Range):  Temp:  [96.4 °F (35.8 °C)-98 °F (36.7 °C)] 96.4 °F (35.8 °C)  Pulse:  [70-78] 72  Resp:  [16-18] 18  SpO2:  [96 %-100 %] 98 %  BP: (115-123)/(60-63) 115/63     Date 01/07/20 0700 - 01/08/20 0659   Shift 1408-9930 0063-4799 3583-1207 24 Hour Total   INTAKE   P.O. 600   600   Shift Total(mL/kg) 600(8)   600(8)   OUTPUT   Urine(mL/kg/hr) 300   300   Shift Total(mL/kg) 300(4)   300(4)   Weight (kg) 75.2 75.2 75.2 75.2                   Male External Urinary Catheter 12/19/19 1200 Small (Active)   Collection Container Urimeter 12/27/2019  7:50 PM   Securement Method secured to lower ABD w/ tape 12/27/2019  7:50 PM   Skin no redness;no breakdown 12/27/2019  7:50 PM   Tolerance no signs/symptoms of discomfort 12/27/2019  7:50 PM   Output (mL) 250 mL 12/28/2019  9:00 AM   Catheter Change Date 12/24/19 12/26/2019  4:24 PM   Catheter Change Time 1100 12/26/2019  4:24 PM       Neurosurgery Physical Exam    General: well developed, well nourished, no distress.   Head: normocephalic, atraumatic  Neck:  brace in place  Neurologic: Alert and oriented. Thought content appropriate.  GCS: Motor: 6/Verbal: 5/Eyes: 4 GCS Total: 15  Mental Status: Awake, Alert, Oriented x 4  Language: No aphasia  Speech: No dysarthria  Cranial nerves: face symmetric, tongue midline, CN II-XII grossly intact.   Eyes: pupils equal, round, reactive to light with accomodation, EOMI.   Ears: No drainage.   Pulmonary: normal respirations, no signs of respiratory distress  Abdomen: soft, non-distended, not tender to palpation  Sensory: intact to light touch throughout  Motor Strength: Moves all extremities spontaneously  with good tone.  Full strength upper and lower extremities. No abnormal movements seen.     Strength  Deltoids Triceps Biceps Wrist Extension Wrist Flexion Hand    Upper: R 5/5 5/5 5/5 5/5 5/5 5/5    L 5/5 5/5 5/5 5/5 5/5 5/5     Iliopsoas Quadriceps Knee  Flexion Tibialis  anterior Gastro- cnemius EHL   Lower: R 5/5 5/5 5/5 5/5 5/5 5/5    L 5/5 5/5 5/5 5/5 5/5 5/5     Damian: absent  Clonus: absent  Babinski: absent  Vascular: Pulses 2+ and symmetric radial and dorsalis pedis. No LE edema.   Skin: Skin is warm, dry and intact.    Incision c/d/I with skin edges well approximated, well healed. No surrounding erythema or edema. No drainage from incision. No palpable underlying fluid collection.      Significant Labs:  Recent Labs   Lab 01/06/20  0655 01/07/20  0319    101    139   K 3.7 3.9    106   CO2 26 28   BUN 20 20   CREATININE 0.8 0.8   CALCIUM 9.5 9.1     No results for input(s): WBC, HGB, HCT, PLT in the last 48 hours.  No results for input(s): LABPT, INR, APTT in the last 48 hours.  Microbiology Results (last 7 days)     ** No results found for the last 168 hours. **        Recent Lab Results       01/07/20  0319   01/06/20  1940        Albumin 2.5       Alkaline Phosphatase 463              Anion Gap 5              BILIRUBIN TOTAL 0.3  Comment:  For infants and newborns, interpretation of results should be based  on gestational age, weight and in agreement with clinical  observations.  Premature Infant recommended reference ranges:  Up to 24 hours.............<8.0 mg/dL  Up to 48 hours............<12.0 mg/dL  3-5 days..................<15.0 mg/dL  6-29 days.................<15.0 mg/dL         BUN, Bld 20       Calcium 9.1       Chloride 106       CO2 28       Creatinine 0.8       eGFR if  >60.0       eGFR if non  >60.0  Comment:  Calculation used to obtain the estimated glomerular filtration  rate (eGFR) is the CKD-EPI equation.           GGT   490     Glucose 101       Potassium 3.9       PROTEIN TOTAL 6.2       Sodium 139           All pertinent labs from the last 24 hours have been reviewed.    Significant Diagnostics:  I have reviewed all pertinent imaging results/findings within the past 24 hours.    Assessment/Plan:     * Spinal epidural abscess  74 y.o. male with PMH of HTN, HLD, Hepatitis C, and CAD s/p two stents on plavix who presents with C6-T2 osteomyelitis with suspected epidural abscess.  Now s/p C7/T1 corpectomies on 12/10 and C2-T2  posterior instrumented fusion 12/17.    -Patient neurologically stable on exam  - brace at all times. Patient compliant with brace. Pt sitting up in bed without brace on, explained previously to patient that brace must be worn when sitting up and when OOB. Poor posture noted when patient was sitting edge of bed today and his neck is more flexed forward. Addressed that  brace now needs to be worn at all times.   -Staples removed 12/31/19. Incision intact without signs of infection. Leave incision open to air. Turn q2h to continue to promote wound healing.   - Elevated LFTs: HM following, appreciate assistance. Remain elevated, ggt 490. Abdominal US reveals splenomegaly and hepatic steatosis. Continue to trend LFTs. Recommending to continue to hold statin. No signs of cirrhosis on exam. HM recommend restarting statin at lower dose (40 mg) upon discharge. PTT/INR/ammonia all within normal limits. He has a follow-up with hepatology on 1/29, no further inpatient workup for elevated LFTs required. He is medically stable for discharge to SNF.  Appreciate HM and Hepatology assistance.    -ID: Afebrile. No leukocytosis. Continue Cefepime 2g q 8 hours. Estimated end date of therapy 1/28/20-2/11/20. FU scheduled with ID 1/14/2020.  -2nd left toe wound care: Seen by Podiatry for left 2nd toe, recommend betadine daily. Dress with foam bandage. Wound care on board. Appreciate assistance.  -Pain: Pain well  controlled on current regimen. No adjustments needed at this time.   -Anemia: Continue iron for replacement x 2 weeks. F/u at next lab draw.  -HTN: Continue Amlodipine 10 mg daily, Losartan-HCTZ 100-25 mg daily, and Metoprolol 50 mg BID.   -CAD s/p stent placement: Continue home dose of Plavix 75 mg daily.   -Hyponatremia: resolved. Na tabs discontinued 1/5. Will continue to monitor.   -Hypoalbuminemia: Continue Jose BID to promote wound healing.   -HLD: Continue to hold high dose atorvastatin in setting of elevated LFTs. Will continue to monitor daily, and restart when downtrending. Per HM okay to resume at discharge at lower dose (40 mg).   -DVT prophylaxis: TUCKER's, SCD's, SQH  -Bowel regimen: senna BID and miralax daily  -Atelectasis prevention: IS hourly while awake, PT/OT, OOB > 6 hours per day  - Discussed with Dr. Deluca     DISPO: Pending SNF placement, medically stable for discharge.         Miley Wood PA-C  Neurosurgery  [facility]

## 2020-01-07 NOTE — PLAN OF CARE
01/07/20 1228   Post-Acute Status   Post-Acute Authorization Placement;Other   Other Status See Comments     JC spoke to Yenni with [facility] in regard to patient referral and she states that at this time the director is concerned about his liver function and the Hep C and requesting Hepatology to see him. JC also spoke with Merced with [facility] and patient is accepted, SW provided representative the medicare number [id number] and will continue to follow up.      Kristi Davis LMSW  [facility]   [phone]

## 2020-01-07 NOTE — SUBJECTIVE & OBJECTIVE
Interval History:   NAEON. RUQ U/S reviewed. LFTs down trending.  Hep C viral load 1.6 million, needs hepatology f/u as an outpatient.       Objective:     Vital Signs (Most Recent):  Temp: 96.4 °F (35.8 °C) (01/07/20 1128)  Pulse: 72 (01/07/20 1128)  Resp: 18 (01/07/20 1128)  BP: 115/63 (01/07/20 1128)  SpO2: 98 % (01/07/20 1128) Vital Signs (24h Range):  Temp:  [96.4 °F (35.8 °C)-98 °F (36.7 °C)] 96.4 °F (35.8 °C)  Pulse:  [70-78] 72  Resp:  [16-18] 18  SpO2:  [96 %-100 %] 98 %  BP: (115-123)/(60-63) 115/63     Weight: 75.2 kg (165 lb 12.6 oz)  Body mass index is 24.48 kg/m².    Intake/Output Summary (Last 24 hours) at 1/7/2020 1429  Last data filed at 1/7/2020 1300  Gross per 24 hour   Intake 600 ml   Output 600 ml   Net 0 ml      Physical Exam   Constitutional: He is oriented to person, place, and time. He appears well-developed and well-nourished. No distress.   Well-appearing, NAD   HENT:   Head: Normocephalic and atraumatic.   Mouth/Throat: Oropharynx is clear and moist.   Eyes: Conjunctivae and EOM are normal. No scleral icterus.   Neck: Normal range of motion.   Cardiovascular: Normal rate and regular rhythm.   Pulmonary/Chest: Effort normal and breath sounds normal.   Abdominal: Soft. He exhibits no distension. There is no tenderness. There is no guarding.   Musculoskeletal: Normal range of motion. He exhibits no edema or tenderness.   Lymphadenopathy:     He has no cervical adenopathy.   Neurological: He is alert and oriented to person, place, and time.   Skin: Skin is warm and dry. He is not diaphoretic.   Nursing note and vitals reviewed.      Significant Labs: All pertinent labs within the past 24 hours have been reviewed.    Significant Imaging: I have reviewed all pertinent imaging results/findings within the past 24 hours.

## 2020-01-07 NOTE — PROGRESS NOTES
Refer to Progress Note 1/7 2:32pm for further recommendations.  Hospital medicine will signoff  Needs outpatient follow-up with Hepatology.         Hao Gonzalez MD     Internal Medicine PGY3

## 2020-01-07 NOTE — PROGRESS NOTES
"[facility]  Adult Nutrition  Progress Note    SUMMARY       Recommendations    1. Continue current diet + Jose as tolerated.   2. Encourage po intake.   3. RD following.     Goals: Pt to continue meeting EEN and EPN by RD follow-up  Nutrition Goal Status: goal met  Communication of RD Recs: (POC)    Reason for Assessment    Reason For Assessment: RD follow-up  Diagnosis: other (see comments)(Spinal epidural abscess)  Relevant Medical History: HTN, THC use, nicotine use, HLD, hypothyroidism, CAD, losartan  Interdisciplinary Rounds: did not attend  General Information Comments: Currently NPO for procedure. Pt resting in bed with no family members at bedisde. Pt reports that appetite is good. Per chart review, pt eating 100% of meals. Noted wt loss, but possible scale error. NFPE completed 12/16 noting mild fat/muscle wasting. RD does not feel that pt meets malnutrition criteria at this time.   Nutrition Discharge Planning: adequate po intake    Nutrition Risk Screen    Nutrition Risk Screen: no indicators present    Nutrition/Diet History    Spiritual, Cultural Beliefs, [employer]al Practices, Values that Affect Care: no  Factors Affecting Nutritional Intake: None identified at this time    Anthropometrics    Temp: 96.4 °F (35.8 °C)  Height: 5' 9" (175.3 cm)  Height (inches): 69 in  Weight Method: Bed Scale  Weight: 75.2 kg (165 lb 12.6 oz)  Weight (lb): 165.79 lb  Ideal Body Weight (IBW), Male: 160 lb  % Ideal Body Weight, Male (lb): 109.38 %  BMI (Calculated): 24.5  BMI Grade: 25 - 29.9 - overweight     Lab/Procedures/Meds    Pertinent Labs Reviewed: reviewed  Pertinent Labs Comments: alk phos 463, , ALT  Pertinent Medications Reviewed: reviewed  Pertinent Medications Comments: amlodipine, vitamin C, famotidine, ferrous sulfate, levothyroxine, metoprolol, docusate    Estimated/Assessed Needs    Weight Used For Calorie Calculations: 79.4 kg (175 lb 0.7 oz)  Energy Calorie Requirements (kcal): " 1905  Energy Need Method: [place]-St Jeor(PAL 1.25)  Protein Requirements: 80-95g(1.0-1.2g/kg)  Weight Used For Protein Calculations: 79.4 kg (175 lb 0.7 oz)  Fluid Requirements (mL): 1 ml/kcal or per MD     RDA Method (mL): 1905     Nutrition Prescription Ordered    Current Diet Order: NPO    Evaluation of Received Nutrient/Fluid Intake  I/O: -2.324L since 12/24  Comments: LBM 1/6  Tolerance: tolerating  % Intake of Estimated Energy Needs: 75 - 100 %  % Meal Intake: NPO    Nutrition Risk    Level of Risk/Frequency of Follow-up: (f/u 1 x wk)     Assessment and Plan    Nutrition Problem  Inadequate energy intake     Related to (etiology):   NPO     Signs and Symptoms (as evidenced by):   Pt receiving <85% EEN and EPN.      Interventions(treatment strategy):  Collaboration of care with providers     Nutrition Diagnosis Status:   Resolved    Monitor and Evaluation    Food and Nutrient Intake: energy intake, food and beverage intake  Food and Nutrient Adminstration: diet order  Knowledge/Beliefs/Attitudes: food and nutrition knowledge/skill  Physical Activity and Function: nutrition-related ADLs and IADLs  Anthropometric Measurements: weight, weight change, body mass index  Biochemical Data, Medical Tests and Procedures: gastrointestinal profile, glucose/endocrine profile, electrolyte and renal panel, inflammatory profile, lipid profile  Nutrition-Focused Physical Findings: overall appearance     Malnutrition Assessment  Energy Intake: moderate energy intake              Orbital Region (Subcutaneous Fat Loss): mild depletion  Upper Arm Region (Subcutaneous Fat Loss): mild depletion   [place] Region (Muscle Loss): mild depletion  Clavicle Bone Region (Muscle Loss): well nourished  Clavicle and Acromion Bone Region (Muscle Loss): well nourished  Dorsal Hand (Muscle Loss): mild depletion                 Nutrition Follow-Up    RD Follow-up?: Yes

## 2020-01-07 NOTE — PROGRESS NOTES
[facility] Hospital Medicine  Progress Note    Patient Name: Junaid Muñoz  MRN: [id number]  Patient Class: IP- Inpatient   Admission Date: 12/6/2019  Length of Stay: 32 days  Attending Physician: Elian Deluca MD  Primary Care Provider: Oskar Whitman MD    [facility] Medicine Team: Networked reference to record PCT  Hao Gonzalez MD    Subjective:     Principal Problem:Spinal epidural abscess        HPI:  Mr. Muñoz is a 74yM who was admitted to neurosurgery for spinal epidural abscess and vertebral osteomyelitis. He has a PMH notable for previous IVDU and Hep C (treated) as well as biopsy proven cirrhosis, anemia, HTN, CAD (with stents), HLD, and hypothyroidism. He reported one month duration of progressively worsening upper back pain radiating between the bilateral shoulders. He denies symptom association with fevers, chills, numbness or tingling of extremities, bowel or bladder incontinence, headache, blurry vision, back trauma, prior history of spinal surgeries, chest pain, SOB, or pain with inspiration. Due to symptom progression, he was evaluated by outpatient orthopedist on 12/06 and MRI of thoracic spine was ordered. It was suggestive of osteomyelitis of C6 to T2 associated with epidural abscess with posterior spinal cord displacement. Patient was notified and instructed to report to ED.     He initially presented to [facility] on 12/06. Labs were significant for normal WBC, negative lactate, and elevated alkaline phosphatase (284). UA was unremarkable. He was initiated on Ceftriaxone and Vancomycin and transferred to [facility] for neurosurgery evaluation. Neurosurgery recommended obtaining CT of thoracic and lumbar spine and cervical MRI. He was taken to OR for C7-T1 corpectomy on 12/10 and again on 12/17 for posterior fusion with stay in Neuro ICU following surgery and drain management. Stepped down to floor on 12/26. Currently on cefepime 2g Q8 hours for  pseudomonas growing in aerobic culture from spine on 12/10 with end date between 1/28 and 2/11/20 with ID follow-up. Blood cultures have remained negative. Also with left toe wound that was being managed by podiatry and wound care. Per neurosurgery notes, patient is currently awaiting SNF placement. Hospital medicine was consulted on 1/2/20 for assistance with new onset elevated ALT/AST with history of Hep C. Per chart review, patient had been following with Hepatology in 2017 but was lost to follow-up. At that time, Mr. Muñoz appeared to still be actively using IV drugs. Throughout this admission, AST and ALT were wnl until 1/2/20 when they bumped to 54 and 45. His home meds include plavix, amlodipine, losartan-hctz, metoprolol, atorvastatin, levothyroxine however pt states he has not been taking any of them for some time.     Overview/Hospital Course:  Hospital Medicine consulted by Neurosurgery 1/2/20 for rising LFTs. Pt has history of Hep C and was on high intensity statin (atorvastain 80 mg). Advised holding statin in setting of rising AST/ALT. Overnight, LFTs trending down. Hep C serologies pending. Per chart review, pt waiting on SNF placement.     Interval History:   NAEON. RUQ U/S reviewed. LFTs down trending.  Hep C viral load 1.6 million, needs hepatology f/u as an outpatient.       Objective:     Vital Signs (Most Recent):  Temp: 96.4 °F (35.8 °C) (01/07/20 1128)  Pulse: 72 (01/07/20 1128)  Resp: 18 (01/07/20 1128)  BP: 115/63 (01/07/20 1128)  SpO2: 98 % (01/07/20 1128) Vital Signs (24h Range):  Temp:  [96.4 °F (35.8 °C)-98 °F (36.7 °C)] 96.4 °F (35.8 °C)  Pulse:  [70-78] 72  Resp:  [16-18] 18  SpO2:  [96 %-100 %] 98 %  BP: (115-123)/(60-63) 115/63     Weight: 75.2 kg (165 lb 12.6 oz)  Body mass index is 24.48 kg/m².    Intake/Output Summary (Last 24 hours) at 1/7/2020 1429  Last data filed at 1/7/2020 1300  Gross per 24 hour   Intake 600 ml   Output 600 ml   Net 0 ml      Physical Exam    Constitutional: He is oriented to person, place, and time. He appears well-developed and well-nourished. No distress.   Well-appearing, NAD   HENT:   Head: Normocephalic and atraumatic.   Mouth/Throat: Oropharynx is clear and moist.   Eyes: Conjunctivae and EOM are normal. No scleral icterus.   Neck: Normal range of motion.   Cardiovascular: Normal rate and regular rhythm.   Pulmonary/Chest: Effort normal and breath sounds normal.   Abdominal: Soft. He exhibits no distension. There is no tenderness. There is no guarding.   Musculoskeletal: Normal range of motion. He exhibits no edema or tenderness.   Lymphadenopathy:     He has no cervical adenopathy.   Neurological: He is alert and oriented to person, place, and time.   Skin: Skin is warm and dry. He is not diaphoretic.   Nursing note and vitals reviewed.      Significant Labs: All pertinent labs within the past 24 hours have been reviewed.    Significant Imaging: I have reviewed all pertinent imaging results/findings within the past 24 hours.      Assessment/Plan:      * Spinal epidural abscess  Pt is a 74yM with PMH notable for prior IVDU and Hep C with cirrhosis who presented on 12/06 with one month duration of upper back pain with work-up at outside hospital significant for MRI suggestive of osteomyelitis of C6-T2 with associated epidural abscess with posterior spinal cord displacement. He was taken to OR for C7-T1 corpectomy on 12/10 and again on 12/17 for posterior fusion with stay in Neuro ICU following surgery and drain management. Stepped down to floor on 12/26. Currently on cefepime 2g Q8 hours for pseudomonas growing in aerobic culture from abscess on 12/10 with end date between 1/28 and 2/11/20 with ID follow-up. Blood cultures have remained negative. Also with left toe wound that was being managed by podiatry and wound care. Per neurosurgery notes, patient is currently awaiting SNF placement.     -continued management per primary  team      Pseudomonas infection  -Continued management pre primary team      Elevated alkaline phosphatase level  -Likely secondary to osteomyelitis.       Tobacco abuse        Hyperlipidemia  -Advise holding high dose statin in setting of elevation in LFTs.  -AST and ALT trending down, continue to monitor, can re-start statin at lower dose on d/c    Other specified hypothyroidism  -Continue home Synthroid    Essential hypertension  Pt on losartan-hydrochlorothiazide 100-25 mg and metoprolol succinate 100 mg at home. Wife states he has been taking these medications consistently home.     -Re-started on admission. Can continue BP meds of losartan-HCTZ, metoprolol, and amlodipine          Coronary artery disease involving native coronary artery of native heart without angina pectoris  -Continue plavix      Chronic hepatitis C without hepatic coma  Mr. Muñoz is a 74yM with history of Hep C likely 2/2 IVDU. Was following with Hepatology here, most recently seen in 2017. Per chart review, pt was treated for antiviral therapy in 2009 but had possible re-activation in 2017 given ongoing drug use. However, it appears pt did not continue to follow up with [facility], unknown if treated again. Hospital Medicine was consulted on 1/2/20 for assistance with elevated AST and ALT with hx of Hep C.     Throughout admission, LFTs appeared to be wnl until 1/2/20 when the AST increased from 30-->54 and ALT from 23-->45. Alk phos has been elevated throughout admission that was attributed to his osteomyelitis. Pt has not had any high dose tylenol recently. Per chart review, he was placed on high-dose stain (Atorvastatin 80 mg). Rise in LFTs could potentially be due to re-starting high-dose statin while in hospital. On exam, he is oriented to person, place, time but has some confusion regarding his medical history. Suspect hospital delirium as opposed to hepatic encephalopathy but will obtain ammonia level to evaluate. No  stigmata of cirrhosis noted on PE. Spoke to patient's wife on 1/2/20 regarding mental status baseline and home medication regimen. States that he does have significant history of recent IVDU and has not followed with hepatology since 2017. States he was complaint with home meds of metoprolol, losartan, plavix, levothyroxine, and atorvastatin.     -MELD 7  -HIV negative  -Most recent abdominal U/S in 07/2017  -Hep C Ab and viral load pending, will need Hepatology follow-up after d/c. Pt was informed of this as well.   -PT/INR and Ammonia wnl  -ALT/AST and alk phos trending down  -Replace K and Mg as needed    Plan:        - Follow-up with Hepatology after d/c for continued monitoring of LFTs and Hep C.   - f/u can be done as outpatient, no need for inpatient evaluation     LFTs trending down.   Advise to continue holding statin at this time.   Upon discharge, can re-start atorvastatin at lower dose of 40 mg daily as opposed to the 80 mg patient was on prior to admission.         Acute osteomyelitis of thoracic spine  -See assessment for epidural abscess.       Acute osteomyelitis of cervical spine  -See assessment for epidural abscess.         VTE Risk Mitigation (From admission, onward)         Ordered     Place TUCKER hose  Until discontinued      12/27/19 0741     heparin (porcine) injection 5,000 Units  Every 8 hours      12/19/19 1049     IP VTE HIGH RISK PATIENT  Once      12/07/19 0219     Place sequential compression device  Until discontinued      12/07/19 0219                      Hao oGnzalez MD  Department of Hospital Medicine   [facility]                    01/07/2020                             STAFF PHYSICIAN NOTE                                   Attending Attestation for Rounds with Resident  I have reviewed and concur with the resident's history, physical, assessment, and plan.  I have personally interviewed and examined the patient at bedside and agree with the resident's  findings.                                     Giovanna Ford MD  Senior Hospitalist  [id number], [phone]

## 2020-01-07 NOTE — PT/OT/SLP PROGRESS
Occupational Therapy      Patient Name:  Junaid Muñoz   MRN:  [id number]    Patient not seen today secondary to pt working with PT and unable to attempt at a later time. Will follow-up as able.    Shirley Rushing OT  1/7/2020

## 2020-01-07 NOTE — PLAN OF CARE
CM received call from patient's brother for placement update. 9 referrals sent to skilled facilities. Patient declined from 4. Will follow-up today regarding acceptance.

## 2020-01-07 NOTE — PLAN OF CARE
Patient resting in bed comfortably. PICC intact and free of infection and irration, Call light in reach bed locked and in lowest position. Non skid socks on while out of bed. Patient instructed to call for assistance. Skin integrity maintained as patient is assisted with positioning pt can position independently at times, no C/o pain. Pt has had brace on all shift at all times, Will continue to monitor and follow careplan of care.

## 2020-01-07 NOTE — SUBJECTIVE & OBJECTIVE
Interval History: NAEON. LFTs remain elevated.  Hospital Medicine recommending outpatient follow-up with hepatology.  No further inpatient workup required, patient is medically stable for discharge to SNF.  He has an appointment scheduled 1/29 for hepatology follow-up.  He has been compliant with his  brace.  He denies any pain at this time. BM yesterday, voiding appropriately.      Medications:  Continuous Infusions:  Scheduled Meds:   albuterol-ipratropium  3 mL Nebulization Q12H    amLODIPine  10 mg Oral Daily    ascorbic acid (vitamin C)  250 mg Oral BID    bacitracin   Topical (Top) BID    balsam peru-castor oil   Topical (Top) BID    ceFEPime (MAXIPIME) IVPB  2 g Intravenous Q8H    clopidogrel  75 mg Oral Daily    famotidine  20 mg Oral BID    ferrous sulfate  325 mg Oral BID    gabapentin  300 mg Oral TID    heparin (porcine)  5,000 Units Subcutaneous Q8H    levothyroxine  50 mcg Oral Before breakfast    lidocaine  1 patch Transdermal Daily    losartan-hydrochlorothiazide 100-25 mg  1 tablet Oral Daily    metoprolol tartrate  50 mg Oral BID    polyethylene glycol  17 g Oral Daily    senna-docusate 8.6-50 mg  1 tablet Oral BID    sodium chloride 0.9%  10 mL Intravenous Q6H    sodium chloride 3%  4 mL Nebulization Q12H     PRN Meds:acetaminophen, bisacodyl, labetalol, naloxone, ondansetron, oxyCODONE, promethazine (PHENERGAN) IVPB, Flushing PICC Protocol **AND** sodium chloride 0.9% **AND** sodium chloride 0.9%       Objective:     Weight: 75.2 kg (165 lb 12.6 oz)  Body mass index is 24.48 kg/m².  Vital Signs (Most Recent):  Temp: 96.4 °F (35.8 °C) (01/07/20 1128)  Pulse: 72 (01/07/20 1128)  Resp: 18 (01/07/20 1128)  BP: 115/63 (01/07/20 1128)  SpO2: 98 % (01/07/20 1128) Vital Signs (24h Range):  Temp:  [96.4 °F (35.8 °C)-98 °F (36.7 °C)] 96.4 °F (35.8 °C)  Pulse:  [70-78] 72  Resp:  [16-18] 18  SpO2:  [96 %-100 %] 98 %  BP: (115-123)/(60-63) 115/63     Date 01/07/20 0700 - 01/08/20 0659    Shift 5260-4633 9137-8542 4191-9994 24 Hour Total   INTAKE   P.O. 600   600   Shift Total(mL/kg) 600(8)   600(8)   OUTPUT   Urine(mL/kg/hr) 300   300   Shift Total(mL/kg) 300(4)   300(4)   Weight (kg) 75.2 75.2 75.2 75.2                   Male External Urinary Catheter 12/19/19 1200 Small (Active)   Collection Container Urimeter 12/27/2019  7:50 PM   Securement Method secured to lower ABD w/ tape 12/27/2019  7:50 PM   Skin no redness;no breakdown 12/27/2019  7:50 PM   Tolerance no signs/symptoms of discomfort 12/27/2019  7:50 PM   Output (mL) 250 mL 12/28/2019  9:00 AM   Catheter Change Date 12/24/19 12/26/2019  4:24 PM   Catheter Change Time 1100 12/26/2019  4:24 PM       Neurosurgery Physical Exam    General: well developed, well nourished, no distress.   Head: normocephalic, atraumatic  Neck:  brace in place  Neurologic: Alert and oriented. Thought content appropriate.  GCS: Motor: 6/Verbal: 5/Eyes: 4 GCS Total: 15  Mental Status: Awake, Alert, Oriented x 4  Language: No aphasia  Speech: No dysarthria  Cranial nerves: face symmetric, tongue midline, CN II-XII grossly intact.   Eyes: pupils equal, round, reactive to light with accomodation, EOMI.   Ears: No drainage.   Pulmonary: normal respirations, no signs of respiratory distress  Abdomen: soft, non-distended, not tender to palpation  Sensory: intact to light touch throughout  Motor Strength: Moves all extremities spontaneously with good tone.  Full strength upper and lower extremities. No abnormal movements seen.     Strength  Deltoids Triceps Biceps Wrist Extension Wrist Flexion Hand    Upper: R 5/5 5/5 5/5 5/5 5/5 5/5    L 5/5 5/5 5/5 5/5 5/5 5/5     Iliopsoas Quadriceps Knee  Flexion Tibialis  anterior Gastro- cnemius EHL   Lower: R 5/5 5/5 5/5 5/5 5/5 5/5    L 5/5 5/5 5/5 5/5 5/5 5/5     Damian: absent  Clonus: absent  Babinski: absent  Vascular: Pulses 2+ and symmetric radial and dorsalis pedis. No LE edema.   Skin: Skin is warm, dry and  intact.    Incision c/d/I with skin edges well approximated, well healed. No surrounding erythema or edema. No drainage from incision. No palpable underlying fluid collection.      Significant Labs:  Recent Labs   Lab 01/06/20  0655 01/07/20  0319    101    139   K 3.7 3.9    106   CO2 26 28   BUN 20 20   CREATININE 0.8 0.8   CALCIUM 9.5 9.1     No results for input(s): WBC, HGB, HCT, PLT in the last 48 hours.  No results for input(s): LABPT, INR, APTT in the last 48 hours.  Microbiology Results (last 7 days)     ** No results found for the last 168 hours. **        Recent Lab Results       01/07/20 0319 01/06/20  1940        Albumin 2.5       Alkaline Phosphatase 463              Anion Gap 5              BILIRUBIN TOTAL 0.3  Comment:  For infants and newborns, interpretation of results should be based  on gestational age, weight and in agreement with clinical  observations.  Premature Infant recommended reference ranges:  Up to 24 hours.............<8.0 mg/dL  Up to 48 hours............<12.0 mg/dL  3-5 days..................<15.0 mg/dL  6-29 days.................<15.0 mg/dL         BUN, Bld 20       Calcium 9.1       Chloride 106       CO2 28       Creatinine 0.8       eGFR if  >60.0       eGFR if non  >60.0  Comment:  Calculation used to obtain the estimated glomerular filtration  rate (eGFR) is the CKD-EPI equation.          GGT   490     Glucose 101       Potassium 3.9       PROTEIN TOTAL 6.2       Sodium 139           All pertinent labs from the last 24 hours have been reviewed.    Significant Diagnostics:  I have reviewed all pertinent imaging results/findings within the past 24 hours.

## 2020-01-07 NOTE — PLAN OF CARE
01/07/20 1017   Post-Acute Status   Post-Acute Authorization Placement;Other   Other Status See Comments     SW uploaded the PASSR and 142 into [facility] care.    Kristi Davis LMSW  [facility]   [id number]

## 2020-01-07 NOTE — PT/OT/SLP PROGRESS
"Physical Therapy Treatment    Patient Name:  Junaid Muñoz   MRN:  [id number]    Recommendations:     Discharge Recommendations:  nursing facility, skilled   Discharge Equipment Recommendations: walker, rolling, bedside commode, wheelchair, shower chair   Barriers to discharge: Decreased caregiver support at current functional status     Assessment:     Junaid Muñoz is a 74 y.o. male admitted with a medical diagnosis of Spinal epidural abscess.  He presents with the following impairments/functional limitations:  weakness, impaired functional mobilty, gait instability, impaired balance, orthopedic precautions, impaired self care skills. Pt pleasant and cooperative, brace donned already. Pt willing to participate with therapy with no encouragement. Able to perform bed mobility, transfers and ambulation with Val x 1 (noted improvement in balance). Continues to be SNF appropriate to maximize functional potential.     E-sitter confirmed pt in view at end of session     Rehab Prognosis: Good; patient would benefit from acute skilled PT services to address these deficits and reach maximum level of function.    Recent Surgery: Procedure(s) (LRB):  FUSION, SPINE, CERVICAL, POSTERIOR APPROACH C2-T3 posterior instrumented fusion (N/A) 21 Days Post-Op    Plan:     During this hospitalization, patient to be seen 6 x/week to address the identified rehab impairments via gait training, therapeutic activities, therapeutic exercises, neuromuscular re-education and progress toward the following goals:    · Plan of Care Expires:  01/28/20    Subjective     Chief Complaint: Discomfort from brace   Patient/Family Comments/goals: "Ask if I can take this off?"  Pain/Comfort:  · Pain Rating 1: (Did not rate )  · Location - Orientation 1: generalized  · Location 1: neck  · Pain Addressed 1: Pre-medicate for activity, Reposition, Distraction  · Pain Rating Post-Intervention 1: (Did not rate)      Objective:     Communicated with RN " prior to session.  Patient found supine with telemetry, SCD upon PT entry to room.     General Precautions: Contact, fall   Orthopedic Precautions:spinal precautions   Braces:      Functional Mobility:  · Bed Mobility:     · Supine to Sit: minimum assistance via log roll   · Sit to Supine: minimum assistance  · Transfers:     · Sit to Stand:  minimum assistance with rolling walker  · Gait: 15' x 2 with RW with Val x 1; pt with L lateral leaning initially but able to correct with Val x 1; flexed posture, narrow ADRIA, decreased step length and gumaro noted, verbal cuing required.   · Balance: Fair with RW, required RW and hands on assist for safe ambulation      AM-PAC 6 CLICK MOBILITY  Turning over in bed (including adjusting bedclothes, sheets and blankets)?: 3  Sitting down on and standing up from a chair with arms (e.g., wheelchair, bedside commode, etc.): 3  Moving from lying on back to sitting on the side of the bed?: 3  Moving to and from a bed to a chair (including a wheelchair)?: 3  Need to walk in hospital room?: 3  Climbing 3-5 steps with a railing?: 2  Basic Mobility Total Score: 17       Therapeutic Activities and Exercises:   Pt educated on: PT role/POC; safety with mobility; benefits of OOB activities; discharge recommendations. Pt verbalized understanding.       Patient left HOB elevated with all lines intact, call button in reach, bed alarm on and RN notified.    GOALS:   Multidisciplinary Problems     Physical Therapy Goals        Problem: Physical Therapy Goal    Goal Priority Disciplines Outcome Goal Variances Interventions   Physical Therapy Goal     PT, PT/OT Ongoing, Progressing     Description:  Goals to be met by: 19     Patient will increase functional independence with mobility by performin. Supine to sit with MInimal Assistance - met   2. Sit to supine with MInimal Assistance  3. Rolling to Left and Right with Modified [place].  4. Sit to stand transfer with  Minimal Assistance - met 1/6  5. Bed to chair transfer with Minimal Assistance using Rolling Walker  6. Gait  x 40 feet with Minimal Assistance using Rolling Walker.   7. Sitting at edge of bed x 8 minutes with Minimal Assistance  8. Stand for 5 minutes with Minimal Assistance using Rolling Walker  9. Lower extremity exercise program x15 reps per handout, with independence    10. Supine to sit with Stand-by Assistance  11. Sit to stand transfer with Stand-by Assistance.                         Time Tracking:     PT Received On: 01/07/20  PT Start Time: 1504     PT Stop Time: 1519  PT Total Time (min): 15 min     Billable Minutes: Gait Training 15 min    Treatment Type: Treatment  PT/PTA: PT     PTA Visit Number: 0     Samantha Curtis PT, DPT  01/07/2020

## 2020-01-07 NOTE — PLAN OF CARE
Problem: Physical Therapy Goal  Goal: Physical Therapy Goal  Description  Goals to be met by: 19     Patient will increase functional independence with mobility by performin. Supine to sit with MInimal Assistance - met   2. Sit to supine with MInimal Assistance  3. Rolling to Left and Right with Modified [place].  4. Sit to stand transfer with Minimal Assistance - met   5. Bed to chair transfer with Minimal Assistance using Rolling Walker  6. Gait  x 40 feet with Minimal Assistance using Rolling Walker.   7. Sitting at edge of bed x 8 minutes with Minimal Assistance  8. Stand for 5 minutes with Minimal Assistance using Rolling Walker  9. Lower extremity exercise program x15 reps per handout, with independence    10. Supine to sit with Stand-by Assistance  11. Sit to stand transfer with Stand-by Assistance.        Outcome: Ongoing, Progressing    Pt pleasant and cooperative, continues to work towards goals. Will continue to follow.     Samantha Curtis, PT, DPT  2020

## 2020-01-07 NOTE — TELEPHONE ENCOUNTER
----- Message from Shanae Cota RN sent at 1/6/2020  2:09 PM CST -----  Regarding: New hepatology referral.    Okay.  Let's put this [place] with a Dr.  Looks like a [place] with history of IVDU who was treated for Hep C years ago, cured and then went back to IVDU.  May have reactivated.  Was also on statins.  His serologies were pending.  He can be seen in 3-4 weeks.   Ritika Galindo  ----- Message -----  From: Ac Giles MD  Sent: 1/3/2020  10:12 AM CST  To: Shanae Cota, RN    Julio galindo, can we set up a clinic appointment for this patient please    ThanksStuart

## 2020-01-07 NOTE — PROGRESS NOTES
Wound care consult received from nursing for assessment of sacrum. Pt with history of CAD, HTN, HLD, hypothyroidism, tobacco use, and prior history of IVDU admitted with C6-T3 osteomyelitis with epidural abscess. Upon assessment of sacrum, noted non-blanchable redness, stage 1 pressure injury, hospital acquired. Spoke with Nurse Wooten and JOSS AMADOR regarding recommendation and entered wound care orders.  Recommendations:  - Nursing to maintain pressure injury prevention interventions to include repositioning every 2 hours on SHERLYN surface  - Nursing to apply Venelex ointment to sacrum BID, to stimulate capillary blood flow to sacrum and promote healing.  Wound care team to rony pt prn  [id number]       01/07/20 1336        Pressure Injury 01/07/20 Sacral spine Stage 1   Date First Assessed: 01/07/20   Pressure Injury Present on Admission: suspected hospital acquired  Location: Sacral spine  Staging: Stage 1   Wound Image    Staging Stage 1   Dressing Appearance Intact   Drainage Amount None   Appearance Red  (non-blanchable)   Red (%), Wound Tissue Color 100 %   Wound Length (cm) 13 cm   Wound Width (cm) 13 cm   Wound Depth (cm) 0.1 cm   Wound Volume (cm^3) 16.9 cm^3   Wound Surface Area (cm^2) 169 cm^2   Care   (ordered venelex)   Dressing Reinforced;Foam   Skin Interventions   Pressure Reduction Devices specialty bed utilized  (ordered)

## 2020-01-07 NOTE — ASSESSMENT & PLAN NOTE
74 y.o. male with PMH of HTN, HLD, Hepatitis C, and CAD s/p two stents on plavix who presents with C6-T2 osteomyelitis with suspected epidural abscess.  Now s/p C7/T1 corpectomies on 12/10 and C2-T2  posterior instrumented fusion 12/17.    -Patient neurologically stable on exam  - brace at all times. Patient compliant with brace. Pt sitting up in bed without brace on, explained previously to patient that brace must be worn when sitting up and when OOB. Poor posture noted when patient was sitting edge of bed today and his neck is more flexed forward. Addressed that  brace now needs to be worn at all times.   -Staples removed 12/31/19. Incision intact without signs of infection. Leave incision open to air. Turn q2h to continue to promote wound healing.   - Elevated LFTs: HM following, appreciate assistance. Remain elevated, ggt 490. Abdominal US reveals splenomegaly and hepatic steatosis. Continue to trend LFTs. Recommending to continue to hold statin. No signs of cirrhosis on exam. HM recommend restarting statin at lower dose (40 mg) upon discharge. PTT/INR/ammonia all within normal limits. He has a follow-up with hepatology on 1/29, no further inpatient workup for elevated LFTs required. He is medically stable for discharge to SNF.  Appreciate  and Hepatology assistance.    -ID: Afebrile. No leukocytosis. Continue Cefepime 2g q 8 hours. Estimated end date of therapy 1/28/20-2/11/20. FU scheduled with ID 1/14/2020.  -2nd left toe wound care: Seen by Podiatry for left 2nd toe, recommend betadine daily. Dress with foam bandage. Wound care on board. Appreciate assistance.  -Pain: Pain well controlled on current regimen. No adjustments needed at this time.   -Anemia: Continue iron for replacement x 2 weeks. F/u at next lab draw.  -HTN: Continue Amlodipine 10 mg daily, Losartan-HCTZ 100-25 mg daily, and Metoprolol 50 mg BID.   -CAD s/p stent placement: Continue home dose of Plavix 75 mg daily.   -Hyponatremia:  resolved. Na tabs discontinued 1/5. Will continue to monitor.   -Hypoalbuminemia: Continue Jose BID to promote wound healing.   -HLD: Continue to hold high dose atorvastatin in setting of elevated LFTs. Will continue to monitor daily, and restart when downtrending. Per HM okay to resume at discharge at lower dose (40 mg).   -DVT prophylaxis: TUCKER's, SCD's, SQH  -Bowel regimen: senna BID and miralax daily  -Atelectasis prevention: IS hourly while awake, PT/OT, OOB > 6 hours per day  - Discussed with Dr. Deluca     DISPO: Pending SNF placement, medically stable for discharge.

## 2020-01-08 LAB
ALBUMIN SERPL BCP-MCNC: 2.6 G/DL (ref 3.5–5.2)
ALP SERPL-CCNC: 479 U/L (ref 55–135)
ALT SERPL W/O P-5'-P-CCNC: 140 U/L (ref 10–44)
ANION GAP SERPL CALC-SCNC: 7 MMOL/L (ref 8–16)
AST SERPL-CCNC: 116 U/L (ref 10–40)
BASOPHILS # BLD AUTO: 0.04 K/UL (ref 0–0.2)
BASOPHILS NFR BLD: 0.8 % (ref 0–1.9)
BILIRUB SERPL-MCNC: 0.3 MG/DL (ref 0.1–1)
BUN SERPL-MCNC: 20 MG/DL (ref 8–23)
CALCIUM SERPL-MCNC: 9.2 MG/DL (ref 8.7–10.5)
CHLORIDE SERPL-SCNC: 105 MMOL/L (ref 95–110)
CO2 SERPL-SCNC: 27 MMOL/L (ref 23–29)
CREAT SERPL-MCNC: 0.8 MG/DL (ref 0.5–1.4)
DIFFERENTIAL METHOD: ABNORMAL
EOSINOPHIL # BLD AUTO: 0.2 K/UL (ref 0–0.5)
EOSINOPHIL NFR BLD: 3.3 % (ref 0–8)
ERYTHROCYTE [DISTWIDTH] IN BLOOD BY AUTOMATED COUNT: 21 % (ref 11.5–14.5)
EST. GFR  (AFRICAN AMERICAN): >60 ML/MIN/1.73 M^2
EST. GFR  (NON AFRICAN AMERICAN): >60 ML/MIN/1.73 M^2
GLUCOSE SERPL-MCNC: 94 MG/DL (ref 70–110)
HCT VFR BLD AUTO: 29 % (ref 40–54)
HGB BLD-MCNC: 8.9 G/DL (ref 14–18)
IMM GRANULOCYTES # BLD AUTO: 0.01 K/UL (ref 0–0.04)
IMM GRANULOCYTES NFR BLD AUTO: 0.2 % (ref 0–0.5)
LYMPHOCYTES # BLD AUTO: 0.8 K/UL (ref 1–4.8)
LYMPHOCYTES NFR BLD: 14.8 % (ref 18–48)
MCH RBC QN AUTO: 29.1 PG (ref 27–31)
MCHC RBC AUTO-ENTMCNC: 30.7 G/DL (ref 32–36)
MCV RBC AUTO: 95 FL (ref 82–98)
MONOCYTES # BLD AUTO: 0.7 K/UL (ref 0.3–1)
MONOCYTES NFR BLD: 12.7 % (ref 4–15)
NEUTROPHILS # BLD AUTO: 3.5 K/UL (ref 1.8–7.7)
NEUTROPHILS NFR BLD: 68.2 % (ref 38–73)
NRBC BLD-RTO: 0 /100 WBC
PLATELET # BLD AUTO: 106 K/UL (ref 150–350)
PMV BLD AUTO: 11.2 FL (ref 9.2–12.9)
POTASSIUM SERPL-SCNC: 4 MMOL/L (ref 3.5–5.1)
PROT SERPL-MCNC: 6.4 G/DL (ref 6–8.4)
RBC # BLD AUTO: 3.06 M/UL (ref 4.6–6.2)
SODIUM SERPL-SCNC: 139 MMOL/L (ref 136–145)
WBC # BLD AUTO: 5.13 K/UL (ref 3.9–12.7)

## 2020-01-08 PROCEDURE — 80053 COMPREHEN METABOLIC PANEL: CPT

## 2020-01-08 PROCEDURE — 25000003 PHARM REV CODE 250: Performed by: PHYSICIAN ASSISTANT

## 2020-01-08 PROCEDURE — 11000001 HC ACUTE MED/SURG PRIVATE ROOM

## 2020-01-08 PROCEDURE — 63600175 PHARM REV CODE 636 W HCPCS: Performed by: PSYCHIATRY & NEUROLOGY

## 2020-01-08 PROCEDURE — 94640 AIRWAY INHALATION TREATMENT: CPT

## 2020-01-08 PROCEDURE — 36415 COLL VENOUS BLD VENIPUNCTURE: CPT

## 2020-01-08 PROCEDURE — 97530 THERAPEUTIC ACTIVITIES: CPT

## 2020-01-08 PROCEDURE — 25000003 PHARM REV CODE 250: Performed by: NURSE PRACTITIONER

## 2020-01-08 PROCEDURE — 25000242 PHARM REV CODE 250 ALT 637 W/ HCPCS: Performed by: PHYSICIAN ASSISTANT

## 2020-01-08 PROCEDURE — 97535 SELF CARE MNGMENT TRAINING: CPT

## 2020-01-08 PROCEDURE — 25000003 PHARM REV CODE 250: Performed by: PSYCHIATRY & NEUROLOGY

## 2020-01-08 PROCEDURE — 99024 PR POST-OP FOLLOW-UP VISIT: ICD-10-PCS | Mod: ,,, | Performed by: PHYSICIAN ASSISTANT

## 2020-01-08 PROCEDURE — 99024 POSTOP FOLLOW-UP VISIT: CPT | Mod: ,,, | Performed by: PHYSICIAN ASSISTANT

## 2020-01-08 PROCEDURE — A4216 STERILE WATER/SALINE, 10 ML: HCPCS | Performed by: ANESTHESIOLOGY

## 2020-01-08 PROCEDURE — 94761 N-INVAS EAR/PLS OXIMETRY MLT: CPT

## 2020-01-08 PROCEDURE — 25000003 PHARM REV CODE 250: Performed by: ANESTHESIOLOGY

## 2020-01-08 PROCEDURE — 97116 GAIT TRAINING THERAPY: CPT

## 2020-01-08 RX ADMIN — SENNOSIDES AND DOCUSATE SODIUM 1 TABLET: 8.6; 5 TABLET ORAL at 11:01

## 2020-01-08 RX ADMIN — LOSARTAN POTASSIUM AND HYDROCHLOROTHIAZIDE TABLETS 1 TABLET: 100; 25 TABLET, FILM COATED ORAL at 08:01

## 2020-01-08 RX ADMIN — METOPROLOL TARTRATE 50 MG: 50 TABLET ORAL at 11:01

## 2020-01-08 RX ADMIN — CEFEPIME 2 G: 2 INJECTION, POWDER, FOR SOLUTION INTRAVENOUS at 11:01

## 2020-01-08 RX ADMIN — SENNOSIDES AND DOCUSATE SODIUM 1 TABLET: 8.6; 5 TABLET ORAL at 08:01

## 2020-01-08 RX ADMIN — LEVOTHYROXINE SODIUM 50 MCG: 50 TABLET ORAL at 05:01

## 2020-01-08 RX ADMIN — IPRATROPIUM BROMIDE AND ALBUTEROL SULFATE 3 ML: .5; 3 SOLUTION RESPIRATORY (INHALATION) at 07:01

## 2020-01-08 RX ADMIN — SODIUM CHLORIDE SOLN NEBU 3% 4 ML: 3 NEBU SOLN at 08:01

## 2020-01-08 RX ADMIN — HEPARIN SODIUM 5000 UNITS: 5000 INJECTION, SOLUTION INTRAVENOUS; SUBCUTANEOUS at 05:01

## 2020-01-08 RX ADMIN — Medication 250 MG: at 08:01

## 2020-01-08 RX ADMIN — CEFEPIME 2 G: 2 INJECTION, POWDER, FOR SOLUTION INTRAVENOUS at 05:01

## 2020-01-08 RX ADMIN — SODIUM CHLORIDE SOLN NEBU 3% 4 ML: 3 NEBU SOLN at 07:01

## 2020-01-08 RX ADMIN — HEPARIN SODIUM 5000 UNITS: 5000 INJECTION, SOLUTION INTRAVENOUS; SUBCUTANEOUS at 02:01

## 2020-01-08 RX ADMIN — Medication 250 MG: at 11:01

## 2020-01-08 RX ADMIN — METOPROLOL TARTRATE 50 MG: 50 TABLET ORAL at 08:01

## 2020-01-08 RX ADMIN — AMLODIPINE BESYLATE 10 MG: 10 TABLET ORAL at 08:01

## 2020-01-08 RX ADMIN — IPRATROPIUM BROMIDE AND ALBUTEROL SULFATE 3 ML: .5; 3 SOLUTION RESPIRATORY (INHALATION) at 08:01

## 2020-01-08 RX ADMIN — FERROUS SULFATE TAB EC 325 MG (65 MG FE EQUIVALENT) 325 MG: 325 (65 FE) TABLET DELAYED RESPONSE at 08:01

## 2020-01-08 RX ADMIN — FAMOTIDINE 20 MG: 20 TABLET ORAL at 08:01

## 2020-01-08 RX ADMIN — CLOPIDOGREL BISULFATE 75 MG: 75 TABLET ORAL at 08:01

## 2020-01-08 RX ADMIN — Medication 10 ML: at 06:01

## 2020-01-08 RX ADMIN — CASTOR OIL AND BALSAM, PERU: 788; 87 OINTMENT TOPICAL at 11:01

## 2020-01-08 RX ADMIN — Medication 10 ML: at 12:01

## 2020-01-08 RX ADMIN — CASTOR OIL AND BALSAM, PERU: 788; 87 OINTMENT TOPICAL at 08:01

## 2020-01-08 RX ADMIN — GABAPENTIN 300 MG: 300 CAPSULE ORAL at 11:01

## 2020-01-08 RX ADMIN — POLYETHYLENE GLYCOL 3350 17 G: 17 POWDER, FOR SOLUTION ORAL at 08:01

## 2020-01-08 RX ADMIN — GABAPENTIN 300 MG: 300 CAPSULE ORAL at 02:01

## 2020-01-08 RX ADMIN — FERROUS SULFATE TAB EC 325 MG (65 MG FE EQUIVALENT) 325 MG: 325 (65 FE) TABLET DELAYED RESPONSE at 11:01

## 2020-01-08 RX ADMIN — CEFEPIME 2 G: 2 INJECTION, POWDER, FOR SOLUTION INTRAVENOUS at 12:01

## 2020-01-08 RX ADMIN — FAMOTIDINE 20 MG: 20 TABLET ORAL at 11:01

## 2020-01-08 RX ADMIN — BACITRACIN: 500 OINTMENT TOPICAL at 08:01

## 2020-01-08 RX ADMIN — LIDOCAINE 1 PATCH: 50 PATCH CUTANEOUS at 08:01

## 2020-01-08 RX ADMIN — HEPARIN SODIUM 5000 UNITS: 5000 INJECTION, SOLUTION INTRAVENOUS; SUBCUTANEOUS at 11:01

## 2020-01-08 RX ADMIN — GABAPENTIN 300 MG: 300 CAPSULE ORAL at 08:01

## 2020-01-08 NOTE — PLAN OF CARE
01/08/20 0915   Post-Acute Status   Post-Acute Authorization Placement;Other   Other Status See Comments       SW sent a message via [employer] to the admissions coordinator for [facility] and will continue to follow up. JC also left a voicemail via phone for coordinator awaiting a call back.    Kristi Davis LMSW  [facility]   [phone]

## 2020-01-08 NOTE — NURSING
"Pt has brace on upon assessment at shift change but refusing cervical collar around neck. Pt states " please, let's leave it off." will continue to encourage. Stable. No s/s of distress.  "

## 2020-01-08 NOTE — NURSING
"Spoke with charge nurse regarding SHERLYN mattress and she stated "I will look into it." Pt turned and repositioned. Continues to be non-compliant with neck part of brace. Will continue to encourage. Stable Will continue to monitor. Call bell in reach.  "

## 2020-01-08 NOTE — PLAN OF CARE
01/08/20 1133   Post-Acute Status   Post-Acute Authorization Placement;Other   Other Status See Comments     JC spoke to Merced in admissions with [facility] to follow up on insurance Auth and she reports the  is still working on obtaining information on patient benefits and she states that she will call JC back today with information.    Kristi Davis, JOAQUÍN  [facility]   [id number].

## 2020-01-08 NOTE — ASSESSMENT & PLAN NOTE
74 y.o. male with PMH of HTN, HLD, Hepatitis C, and CAD s/p two stents on plavix who presents with C6-T2 osteomyelitis with suspected epidural abscess.  Now s/p C7/T1 corpectomies on 12/10 and C2-T2  posterior instrumented fusion 12/17.    -Patient neurologically stable on exam  -Continue  brace at all times.   -Staples removed 12/31/19. Incision well healed. Leave incision open to air. Turn q2h to continue to promote wound healing.   -Elevated LFTs: Stable. AST//140, was 123/138 yesterday. Abdominal US on 1/7 revealed splenomegaly and hepatic steatosis. Will continue to trend LFTs and hold statin. Per , restart statin at lower dose (40 mg) upon discharge. Follow-up with hepatology on 1/29. No further inpatient workup indicated.  -ID: Remains afebrile. PICC in place. Continue Cefepime 2g q 8 hours. Estimated end date of therapy 1/28/20-2/11/20. FU scheduled with ID on 1/14.  -2nd left toe wound care: Continue betadine daily and dress with foam bandage.   -Pain: Pain well controlled on current regimen. No adjustments needed at this time.   -Anemia: Continue iron for replacement x 2 weeks. F/u at next lab draw.  -HTN: Continue Amlodipine 10 mg daily, Losartan-HCTZ 100-25 mg daily, and Metoprolol 50 mg BID.   -CAD s/p stent placement: Continue home dose of Plavix 75 mg daily.   -Hyponatremia: resolved. Na tabs discontinued 1/5. Will continue to monitor.   -Hypoalbuminemia: Continue Jose BID to promote wound healing.   -HLD: Continue to hold high dose atorvastatin in setting of elevated LFTs. Per  okay to resume at discharge at lower dose (40 mg).   -DVT prophylaxis: TUCKER's, SCD's, SQH  -Bowel regimen: senna BID and miralax daily  -Atelectasis prevention: IS hourly while awake, PT/OT, OOB > 6 hours per day    DISPO: Pending SNF placement, medically stable for discharge.

## 2020-01-08 NOTE — SUBJECTIVE & OBJECTIVE
Interval History:   NAEON. Patient resting comfortably in bed. No family at bedside. Cervical part of  brace has been loosened by the patient due to discomfort. Brace adjusted. Patient informed about the reason for the brace correctly. He voiced understanding. Continues to deny any UE or LE pain or paresthesias. Neck pain continues to improve. Tolerating diet. Voiding appropriately.     Medications:  Continuous Infusions:  Scheduled Meds:   albuterol-ipratropium  3 mL Nebulization Q12H    amLODIPine  10 mg Oral Daily    ascorbic acid (vitamin C)  250 mg Oral BID    balsam peru-castor oil   Topical (Top) BID    ceFEPime (MAXIPIME) IVPB  2 g Intravenous Q8H    clopidogrel  75 mg Oral Daily    famotidine  20 mg Oral BID    ferrous sulfate  325 mg Oral BID    gabapentin  300 mg Oral TID    heparin (porcine)  5,000 Units Subcutaneous Q8H    levothyroxine  50 mcg Oral Before breakfast    lidocaine  1 patch Transdermal Daily    losartan-hydrochlorothiazide 100-25 mg  1 tablet Oral Daily    metoprolol tartrate  50 mg Oral BID    polyethylene glycol  17 g Oral Daily    senna-docusate 8.6-50 mg  1 tablet Oral BID    sodium chloride 0.9%  10 mL Intravenous Q6H    sodium chloride 3%  4 mL Nebulization Q12H     PRN Meds:acetaminophen, bisacodyl, labetalol, naloxone, ondansetron, oxyCODONE, promethazine (PHENERGAN) IVPB, Fl[place] PICC Protocol **AND** sodium chloride 0.9% **AND** sodium chloride 0.9%     Review of Systems  Objective:     Weight: 75.2 kg (165 lb 12.6 oz)  Body mass index is 24.48 kg/m².  Vital Signs (Most Recent):  Temp: 97.8 °F (36.6 °C) (01/08/20 0740)  Pulse: 80 (01/08/20 0740)  Resp: 16 (01/08/20 0740)  BP: 124/68 (01/08/20 0740)  SpO2: 98 % (01/08/20 0740) Vital Signs (24h Range):  Temp:  [96.4 °F (35.8 °C)-97.8 °F (36.6 °C)] 97.8 °F (36.6 °C)  Pulse:  [71-94] 80  Resp:  [16-20] 16  SpO2:  [96 %-98 %] 98 %  BP: (102-124)/(58-68) 124/68                     Male External Urinary Catheter  12/19/19 1200 Small (Active)   Collection Container Urimeter 12/27/2019  7:50 PM   Securement Method secured to lower ABD w/ tape 12/27/2019  7:50 PM   Skin no redness;no breakdown 12/27/2019  7:50 PM   Tolerance no signs/symptoms of discomfort 12/27/2019  7:50 PM   Output (mL) 250 mL 12/28/2019  9:00 AM   Catheter Change Date 12/24/19 12/26/2019  4:24 PM   Catheter Change Time 1100 12/26/2019  4:24 PM       Neurosurgery Physical Exam  General: poor dentition, appears older than stated age, no distress.   Head: normocephalic, atraumatic  Neurologic: Alert, thought content appropriate.  GCS: Motor: 6/Verbal: 5/Eyes: 4 GCS Total: 15  Mental Status: Awake, Alert, Oriented x 4  Language: No aphasia   Speech: Mild dysarthria 2/2 dentition  Cranial nerves: face symmetric, tongue midline, CN II-XII grossly intact.   Eyes: pupils equal, round, reactive to light with accomodation, EOMI.   Pulmonary: normal respirations, no signs of respiratory distress  Abdomen: soft, non-distended, not tender to palpation  Sensory: intact to light touch throughout  Motor Strength: Moves all extremities spontaneously with good tone. No abnormal movements seen. Generalized deconditioning. BUE and BLE 5-/5.  Damian's: Negative.  Babinski's: Negative.  Clonus: Negative.        Incisions:  Anterior: Clean, dry, dissolvable sutures intact. Skin edges well approximated. No surrounding erythema or edema. No drainage or TTP. No evidence of an underlying hematoma.   Incision appears to be well healed.      Posterior: Clean, dry, intact. Staples no longer in place. Skin edges well approximated. No surrounding erythema or edema. No drainage or TTP.  Incision appears to be well healed.       Significant Labs:  Recent Labs   Lab 01/07/20  0319 01/08/20  0439    94    139   K 3.9 4.0    105   CO2 28 27   BUN 20 20   CREATININE 0.8 0.8   CALCIUM 9.1 9.2     Recent Labs   Lab 01/07/20  2354   WBC 5.13   HGB 8.9*   HCT 29.0*   *

## 2020-01-08 NOTE — PLAN OF CARE
CM spoke with Patient's wife carlos. Wife agreed to meet with facility staff regarding SNF admission sooner than 1/13. Appt scheduled at [facility] for 1/9 @ 130PM per wife. Spoke with Merced GREENE at [facility] to verify appt has been scheduled.

## 2020-01-08 NOTE — PLAN OF CARE
CM spoke with Merced at the [facility] regarding patient's skilled placement. Provided facility with [employer] Pre-cert number @ [phone] to submit for authorization. SW reports facility will make final determination about acceptance after meeting with wife to discuss financials. Facility reports wife has scheduled meeting for 1/13/20. Discussed with facility to please submit for auth since patient has been medically stable and pending placement for almost 2 weeks. Call placed to wife to encourage scheduling meeting with facility ASAP. Message left, will keep attempting to reach wife.

## 2020-01-08 NOTE — PLAN OF CARE
Problem: Physical Therapy Goal  Goal: Physical Therapy Goal  Description  Goals to be met by: 19     Patient will increase functional independence with mobility by performin. Supine to sit with MInimal Assistance - met   2. Sit to supine with MInimal Assistance  3. Rolling to Left and Right with Modified [place].  4. Sit to stand transfer with Minimal Assistance - met   5. Bed to chair transfer with Minimal Assistance using Rolling Walker  6. Gait  x 40 feet with Minimal Assistance using Rolling Walker.   7. Sitting at edge of bed x 8 minutes with Minimal Assistance  8. Stand for 5 minutes with Minimal Assistance using Rolling Walker  9. Lower extremity exercise program x15 reps per handout, with independence    10. Supine to sit with Stand-by Assistance  11. Sit to stand transfer with Stand-by Assistance.        Outcome: Ongoing, Progressing     Patient progressing appropriately towards current goals and plan of care remains appropriate at this time. Patient demonstrates good motivation and fair activity tolerance secondary pain and weakness.  Patient with improving but decreased balance limiting overall independence and safety.  Patient requires between CGA and min A for bed mobility, transfers and ambulation secondary to balance and weakness.  Patient's ambulation safety was decreased secondary to balance with cues needed to stay close to walker and assistance needed to maintain balance, particularly during turning.  Patient continues to benefit from skilled PT for mobility training and strengthening in an acute care and skilled nursing setting.      Tom Connelly PT, DPT  2020  Pager #: [phone]

## 2020-01-08 NOTE — PT/OT/SLP PROGRESS
Occupational Therapy   Treatment    Name: Junaid Muñoz  MRN: [id number]  Admitting Diagnosis:  Spinal epidural abscess  22 Days Post-Op    Recommendations:     Discharge Recommendations: nursing facility, skilled  Discharge Equipment Recommendations:  bedside commode, shower chair, walker, rolling  Barriers to discharge:  None    Assessment:     Junaid Muñoz is a 74 y.o. male with a medical diagnosis of Spinal epidural abscess.  He presents with the following performance deficits affecting function are weakness, impaired functional mobilty, gait instability, impaired balance, impaired self care skills, orthopedic precautions. Patient participated well with therapy, completed bed mobility with minimal-moderate assist, tolerated sitting edge of bed ~15 minutes with contact-minimal assist due to impaired postural control/awareness and static sitting balance. Patient would continue to benefit from skilled OT services and placement in skilled nursing facility to address deficits listed above.     Rehab Prognosis:  Good; patient would benefit from acute skilled OT services to address these deficits and reach maximum level of function.       Plan:     Patient to be seen 5 x/week to address the above listed problems via self-care/home management, therapeutic exercises, therapeutic activities  · Plan of Care Expires: 01/27/20  · Plan of Care Reviewed with: patient    Subjective     Pain/Comfort:  · Pain Rating 1: 0/10  · Pain Addressed 1: Pre-medicate for activity, Distraction  · Pain Rating Post-Intervention 1: 0/10    Objective:     Communicated with: Nsg prior to session.  Patient found supine with telemetry, SCD upon OT entry to room.    General Precautions: Standard, fall, contact   Orthopedic Precautions:spinal precautions   Braces:      Occupational Performance:     Bed Mobility:    · Patient completed Rolling/Turning to Right with contact guard assistance  · Patient completed Scooting/Bridging with minimum  assistance  · Patient completed Supine to Sit with minimum assistance  · Patient completed Sit to Supine with moderate assistance     Functional Mobility/Transfers:  · Patient deferring transfer at this time    Activities of Daily Living:  · Grooming: minimum assistance seated edge of bed due to intermittent LOB in static sitting  · Upper Body Dressing: minimum assistance to don gown as robe and to don  with moderate assistance    [facility] 6 Click ADL: 16    Treatment & Education:  -Patient completed x 15 repetitions of truncal weight-shifting in all planes, seated edge of bed with contact guard assist-minimal assist and verbal cues to regain upright posture, and x 10 repetitions knee flex and marching with focus on maintenance of upright posture and balance through out.   -Benefited from cues for placement of BUE's and pelvic tilt    -Patient educated on roles/goals of OT and POC.  -White board updated.  -Therapist provided time for questions and answered within scope of practice.  -Patient educated on importance of EOB/OOB activity to maximize recovery.    Patient left supine with all lines intact, call button in reach and nsg notifiedEducation:      GOALS:   Multidisciplinary Problems     Occupational Therapy Goals        Problem: Occupational Therapy Goal    Goal Priority Disciplines Outcome Interventions   Occupational Therapy Goal     OT, PT/OT Ongoing, Progressing    Description:  Goals to be met by: 1/10/2019     Patient will increase functional independence with ADLs by performing:    UE Dressing with Minimal Assistance.  LE Dressing with Moderate Assistance.  Grooming while standing with Stand-by Assistance.  Toileting from toilet with Minimal Assistance for hygiene (met - 1/3) and clothing management.   Bathing from  edge of bed with Moderate Assistance.  Supine to sit with Minimal Assistance. -- Met (1/3)                         Time Tracking:     OT Date of Treatment: 01/08/20  OT Start Time: 1520  OT  Stop Time: 1545  OT Total Time (min): 25 min    Billable Minutes:Self Care/Home Management 10  Therapeutic Activity 15    Shirley Zaragoza, OT  1/8/2020

## 2020-01-08 NOTE — PHYSICIAN QUERY
PT Name: Junaid Muñoz  MR #: [id number]    Physician Query Form - Consultant Diagnosis Clarification     CDS: Sabi Norton RN, CCDS       Contact information: [email or web address]    This form is a permanent document in the medical record.     Query Date: January 8, 2020      By submitting this query, we are merely seeking further clarification of documentation.  Please utilize your independent clinical judgment when addressing the question(s) below.      The Medical record contains the following:   Diagnosis Supporting Clinical Information Location in Medical Record   Pressure Injury Sacral spine Stage 1; Hospital acquired Pressure Injury 01/07/20 Sacral spine Stage 1   Date First Assessed: 01/07/20   Pressure Injury Present on Admission: suspected hospital acquired  Location: Sacral spine  Staging: Stage 1   Wound Image      Upon assessment of sacrum, noted non-blanchable redness, stage 1 pressure injury, hospital acquired.  Recommendations:  - Nursing to maintain pressure injury prevention interventions to include repositioning every 2 hours on SHERLYN surface  - Nursing to apply Venelex ointment to sacrum BID, to stimulate capillary blood flow to sacrum and promote healing.  Wound care team to rony pt prn  [id number]   1/7-Wound Care RN PN         Do you agree with the Consultants diagnosis of ____Pressure Injury Sacral spine Stage 1; Hospital acquired_____?    [X  ] Yes   [  ] No   [  ] Other/Clarification of findings:   [  ] Clinically undetermined

## 2020-01-08 NOTE — PLAN OF CARE
Continue per OT POC, all goals remain appropriate.    Problem: Occupational Therapy Goal  Goal: Occupational Therapy Goal  Description  Goals to be met by: 1/10/2019     Patient will increase functional independence with ADLs by performing:    UE Dressing with Minimal Assistance.  LE Dressing with Moderate Assistance.  Grooming while standing with Stand-by Assistance.  Toileting from toilet with Minimal Assistance for hygiene (met - 1/3) and clothing management.   Bathing from  edge of bed with Moderate Assistance.  Supine to sit with Minimal Assistance. -- Met (1/3)        Outcome: Ongoing, Progressing

## 2020-01-08 NOTE — PROGRESS NOTES
[facility]  Neurosurgery  Progress Note    Subjective:     History of Present Illness: Junaid Muñoz is a 74 y.o. male with PMH of HTN, HLD, Hepatitis C, and CAD s/p two stents on plavix who presents with 1 month history of back pain between his shoulders. MRI at OSH demonstrates likely epidural abscess. Pt denies hx of trauma and reports slow onset of symptoms.     Post-Op Info:  Procedure(s) (LRB):  FUSION, SPINE, CERVICAL, POSTERIOR APPROACH C2-T3 posterior instrumented fusion (N/A)   22 Days Post-Op     Interval History:   NAEON. Patient resting comfortably in bed. No family at bedside. Cervical part of  brace has been loosened by the patient due to discomfort. Brace adjusted. Patient informed about the reason for the brace correctly. He voiced understanding. Continues to deny any UE or LE pain or paresthesias. Neck pain continues to improve. Tolerating diet. Voiding appropriately.     Medications:  Continuous Infusions:  Scheduled Meds:   albuterol-ipratropium  3 mL Nebulization Q12H    amLODIPine  10 mg Oral Daily    ascorbic acid (vitamin C)  250 mg Oral BID    balsam peru-castor oil   Topical (Top) BID    ceFEPime (MAXIPIME) IVPB  2 g Intravenous Q8H    clopidogrel  75 mg Oral Daily    famotidine  20 mg Oral BID    ferrous sulfate  325 mg Oral BID    gabapentin  300 mg Oral TID    heparin (porcine)  5,000 Units Subcutaneous Q8H    levothyroxine  50 mcg Oral Before breakfast    lidocaine  1 patch Transdermal Daily    losartan-hydrochlorothiazide 100-25 mg  1 tablet Oral Daily    metoprolol tartrate  50 mg Oral BID    polyethylene glycol  17 g Oral Daily    senna-docusate 8.6-50 mg  1 tablet Oral BID    sodium chloride 0.9%  10 mL Intravenous Q6H    sodium chloride 3%  4 mL Nebulization Q12H     PRN Meds:acetaminophen, bisacodyl, labetalol, naloxone, ondansetron, oxyCODONE, promethazine (PHENERGAN) IVPB, Flushing PICC Protocol **AND** sodium chloride 0.9% **AND**  sodium chloride 0.9%     Review of Systems  Objective:     Weight: 75.2 kg (165 lb 12.6 oz)  Body mass index is 24.48 kg/m².  Vital Signs (Most Recent):  Temp: 97.8 °F (36.6 °C) (01/08/20 0740)  Pulse: 80 (01/08/20 0740)  Resp: 16 (01/08/20 0740)  BP: 124/68 (01/08/20 0740)  SpO2: 98 % (01/08/20 0740) Vital Signs (24h Range):  Temp:  [96.4 °F (35.8 °C)-97.8 °F (36.6 °C)] 97.8 °F (36.6 °C)  Pulse:  [71-94] 80  Resp:  [16-20] 16  SpO2:  [96 %-98 %] 98 %  BP: (102-124)/(58-68) 124/68                     Male External Urinary Catheter 12/19/19 1200 Small (Active)   Collection Container Urimeter 12/27/2019  7:50 PM   Securement Method secured to lower ABD w/ tape 12/27/2019  7:50 PM   Skin no redness;no breakdown 12/27/2019  7:50 PM   Tolerance no signs/symptoms of discomfort 12/27/2019  7:50 PM   Output (mL) 250 mL 12/28/2019  9:00 AM   Catheter Change Date 12/24/19 12/26/2019  4:24 PM   Catheter Change Time 1100 12/26/2019  4:24 PM       Neurosurgery Physical Exam  General: poor dentition, appears older than stated age, no distress.   Head: normocephalic, atraumatic  Neurologic: Alert, thought content appropriate.  GCS: Motor: 6/Verbal: 5/Eyes: 4 GCS Total: 15  Mental Status: Awake, Alert, Oriented x 4  Language: No aphasia   Speech: Mild dysarthria 2/2 dentition  Cranial nerves: face symmetric, tongue midline, CN II-XII grossly intact.   Eyes: pupils equal, round, reactive to light with accomodation, EOMI.   Pulmonary: normal respirations, no signs of respiratory distress  Abdomen: soft, non-distended, not tender to palpation  Sensory: intact to light touch throughout  Motor Strength: Moves all extremities spontaneously with good tone. No abnormal movements seen. Generalized deconditioning. BUE and BLE 5-/5.  Damian's: Negative.  Babinski's: Negative.  Clonus: Negative.        Incisions:  Anterior: Clean, dry, dissolvable sutures intact. Skin edges well approximated. No surrounding erythema or edema. No drainage or  TTP. No evidence of an underlying hematoma.   Incision appears to be well healed.      Posterior: Clean, dry, intact. Staples no longer in place. Skin edges well approximated. No surrounding erythema or edema. No drainage or TTP.  Incision appears to be well healed.       Significant Labs:  Recent Labs   Lab 01/07/20  0319 01/08/20  0439    94    139   K 3.9 4.0    105   CO2 28 27   BUN 20 20   CREATININE 0.8 0.8   CALCIUM 9.1 9.2     Recent Labs   Lab 01/07/20  2354   WBC 5.13   HGB 8.9*   HCT 29.0*   *         Assessment/Plan:     * Spinal epidural abscess  74 y.o. male with PMH of HTN, HLD, Hepatitis C, and CAD s/p two stents on plavix who presents with C6-T2 osteomyelitis with suspected epidural abscess.  Now s/p C7/T1 corpectomies on 12/10 and C2-T2  posterior instrumented fusion 12/17.    -Patient neurologically stable on exam  -Continue  brace at all times.   -Staples removed 12/31/19. Incision well healed. Leave incision open to air. Turn q2h to continue to promote wound healing.   -Elevated LFTs: Stable. AST//140, was 123/138 yesterday. Abdominal US on 1/7 revealed splenomegaly and hepatic steatosis. Will continue to trend LFTs and hold statin. Per , restart statin at lower dose (40 mg) upon discharge. Follow-up with hepatology on 1/29. No further inpatient workup indicated.  -ID: Remains afebrile. PICC in place. Continue Cefepime 2g q 8 hours. Estimated end date of therapy 1/28/20-2/11/20. FU scheduled with ID on 1/14.  -2nd left toe wound care: Continue betadine daily and dress with foam bandage.   -Pain: Pain well controlled on current regimen. No adjustments needed at this time.   -Anemia: Continue iron for replacement x 2 weeks. F/u at next lab draw.  -HTN: Continue Amlodipine 10 mg daily, Losartan-HCTZ 100-25 mg daily, and Metoprolol 50 mg BID.   -CAD s/p stent placement: Continue home dose of Plavix 75 mg daily.   -Hyponatremia: resolved. Na tabs discontinued  1/5. Will continue to monitor.   -Hypoalbuminemia: Continue Jose BID to promote wound healing.   -HLD: Continue to hold high dose atorvastatin in setting of elevated LFTs. Per HM okay to resume at discharge at lower dose (40 mg).   -DVT prophylaxis: TUCKER's, SCD's, SQH  -Bowel regimen: senna BID and miralax daily  -Atelectasis prevention: IS hourly while awake, PT/OT, OOB > 6 hours per day    DISPO: Pending SNF placement, medically stable for discharge.       Please call with any questions      Zoie Reyes PA-C   Neurosurgery   Pager: [phone]

## 2020-01-08 NOTE — PT/OT/SLP PROGRESS
Physical Therapy Treatment    Patient Name:  Junaid Muñoz   MRN:  [id number]    Recommendations:     Discharge Recommendations:  nursing facility, skilled   Discharge Equipment Recommendations: walker, rolling, shower chair, bedside commode   Barriers to discharge: Decreased caregiver support    Assessment:     Junaid Muñoz is a 74 y.o. male admitted with a medical diagnosis of Spinal epidural abscess.  He presents with the following impairments/functional limitations:  weakness, impaired functional mobilty, decreased safety awareness, impaired joint extensibility, pain, gait instability, impaired balance, impaired muscle length, orthopedic precautions, decreased ROM, decreased lower extremity function, impaired self care skills.      Patient demonstrates good motivation and fair activity tolerance secondary pain and weakness.  Patient with improving but decreased balance limiting overall independence and safety.  Patient requires between CGA and min A for bed mobility, transfers and ambulation secondary to balance and weakness.  Patient's ambulation safety was decreased secondary to balance with cues needed to stay close to walker and assistance needed to maintain balance, particularly during turning.  Patient continues to benefit from skilled PT for mobility training and strengthening in an acute care and skilled nursing setting.      Rehab Prognosis: Good; patient continues to benefit from acute skilled PT services to address these deficits and reach maximum level of function.  Patient remains most appropriate to discharge to nursing facility, skilled  Recent Surgery: Procedure(s) (LRB):  FUSION, SPINE, CERVICAL, POSTERIOR APPROACH C2-T3 posterior instrumented fusion (N/A) 22 Days Post-Op    Plan:     During this hospitalization, patient to be seen 6 x/week to address the identified rehab impairments via gait training, therapeutic activities, therapeutic exercises, neuromuscular re-education and progress  "toward the following goals:    · Plan of Care Expires:  01/28/20    Subjective     Subjective: "I don't need the back brace."   Pain/Comfort:  · Pain Rating 1: 2/10  · Location - Orientation 1: generalized  · Location 1: back  · Pain Addressed 1: Pre-medicate for activity, Reposition, Cessation of Activity  · Pain Rating Post-Intervention 1: 2/10      Objective:     Communicated with RN prior to session.  Patient found supine with telemetry, SCD upon PT entry to room.     General Precautions: Standard, fall, contact   Orthopedic Precautions:spinal precautions   Braces:      Functional Mobility:  · Bed Mobility:     · Supine to Sit: contact guard assistance with use of rail  · Sit to Supine: contact guard assistance  · Transfers:     · Sit to Stand:  contact guard assistance with rolling walker with decreased trunk control  · Gait: Patient ambulated 90 ft with rolling walker and minimum assistance secondary to decreased core control.      AM-PAC 6 CLICK MOBILITY  Turning over in bed (including adjusting bedclothes, sheets and blankets)?: 3  Sitting down on and standing up from a chair with arms (e.g., wheelchair, bedside commode, etc.): 3  Moving from lying on back to sitting on the side of the bed?: 3  Moving to and from a bed to a chair (including a wheelchair)?: 3  Need to walk in hospital room?: 3  Climbing 3-5 steps with a railing?: 2  Basic Mobility Total Score: 17       Therapeutic Activities and Exercises:   Gait training:  Patient required cues for position in walker, sequencing and upright posture to increase independence and safety.  Patient required cues ~ 25% of the time.  Cues for walker position particularly mentioned during turning secondary to poor safety.      Patient Education:    Patient educated on bed mobility training, Fall risk, gait training, home safety, Home exercise program, transfer training and weight bearing restrictions by explanation.  Patient was receptive to education and " verbalizes understanding.     Patient left supine with all lines intact and call button in reach.    GOALS:   Multidisciplinary Problems     Physical Therapy Goals        Problem: Physical Therapy Goal    Goal Priority Disciplines Outcome Goal Variances Interventions   Physical Therapy Goal     PT, PT/OT Ongoing, Progressing     Description:  Goals to be met by: 19     Patient will increase functional independence with mobility by performin. Supine to sit with MInimal Assistance - met   2. Sit to supine with MInimal Assistance  3. Rolling to Left and Right with Modified [place].  4. Sit to stand transfer with Minimal Assistance - met   5. Bed to chair transfer with Minimal Assistance using Rolling Walker  6. Gait  x 40 feet with Minimal Assistance using Rolling Walker.   7. Sitting at edge of bed x 8 minutes with Minimal Assistance  8. Stand for 5 minutes with Minimal Assistance using Rolling Walker  9. Lower extremity exercise program x15 reps per handout, with independence    10. Supine to sit with Stand-by Assistance  11. Sit to stand transfer with Stand-by Assistance.                         Time Tracking:     PT Received On: 20  PT Start Time: 1605     PT Stop Time: 1619  PT Total Time (min): 14 min     Billable Minutes: Gait Training 14 min    Treatment Type: Treatment  PT/PTA: PT     PTA Visit Number: 0     Tom Connelly, PT  2020

## 2020-01-09 LAB
ALBUMIN SERPL BCP-MCNC: 2.5 G/DL (ref 3.5–5.2)
ALP SERPL-CCNC: 460 U/L (ref 55–135)
ALT SERPL W/O P-5'-P-CCNC: 145 U/L (ref 10–44)
ANION GAP SERPL CALC-SCNC: 8 MMOL/L (ref 8–16)
AST SERPL-CCNC: 112 U/L (ref 10–40)
BASOPHILS # BLD AUTO: 0.04 K/UL (ref 0–0.2)
BASOPHILS NFR BLD: 0.7 % (ref 0–1.9)
BILIRUB SERPL-MCNC: 0.3 MG/DL (ref 0.1–1)
BUN SERPL-MCNC: 24 MG/DL (ref 8–23)
CALCIUM SERPL-MCNC: 9.6 MG/DL (ref 8.7–10.5)
CHLORIDE SERPL-SCNC: 104 MMOL/L (ref 95–110)
CO2 SERPL-SCNC: 24 MMOL/L (ref 23–29)
CREAT SERPL-MCNC: 0.8 MG/DL (ref 0.5–1.4)
DIFFERENTIAL METHOD: ABNORMAL
EOSINOPHIL # BLD AUTO: 0.2 K/UL (ref 0–0.5)
EOSINOPHIL NFR BLD: 4.1 % (ref 0–8)
ERYTHROCYTE [DISTWIDTH] IN BLOOD BY AUTOMATED COUNT: 20.9 % (ref 11.5–14.5)
EST. GFR  (AFRICAN AMERICAN): >60 ML/MIN/1.73 M^2
EST. GFR  (NON AFRICAN AMERICAN): >60 ML/MIN/1.73 M^2
GLUCOSE SERPL-MCNC: 97 MG/DL (ref 70–110)
HCT VFR BLD AUTO: 28.5 % (ref 40–54)
HGB BLD-MCNC: 8.6 G/DL (ref 14–18)
IMM GRANULOCYTES # BLD AUTO: 0.03 K/UL (ref 0–0.04)
IMM GRANULOCYTES NFR BLD AUTO: 0.6 % (ref 0–0.5)
LYMPHOCYTES # BLD AUTO: 0.9 K/UL (ref 1–4.8)
LYMPHOCYTES NFR BLD: 16.9 % (ref 18–48)
MCH RBC QN AUTO: 28.7 PG (ref 27–31)
MCHC RBC AUTO-ENTMCNC: 30.2 G/DL (ref 32–36)
MCV RBC AUTO: 95 FL (ref 82–98)
MONOCYTES # BLD AUTO: 0.8 K/UL (ref 0.3–1)
MONOCYTES NFR BLD: 14.3 % (ref 4–15)
NEUTROPHILS # BLD AUTO: 3.4 K/UL (ref 1.8–7.7)
NEUTROPHILS NFR BLD: 63.4 % (ref 38–73)
NRBC BLD-RTO: 0 /100 WBC
PLATELET # BLD AUTO: 93 K/UL (ref 150–350)
PMV BLD AUTO: 10.9 FL (ref 9.2–12.9)
POTASSIUM SERPL-SCNC: 3.6 MMOL/L (ref 3.5–5.1)
PROT SERPL-MCNC: 6 G/DL (ref 6–8.4)
RBC # BLD AUTO: 3 M/UL (ref 4.6–6.2)
SODIUM SERPL-SCNC: 136 MMOL/L (ref 136–145)
TB INDURATION 48 - 72 HR READ: 0 MM
WBC # BLD AUTO: 5.38 K/UL (ref 3.9–12.7)

## 2020-01-09 PROCEDURE — 25000003 PHARM REV CODE 250: Performed by: PHYSICIAN ASSISTANT

## 2020-01-09 PROCEDURE — 25000003 PHARM REV CODE 250: Performed by: NURSE PRACTITIONER

## 2020-01-09 PROCEDURE — 85025 COMPLETE CBC W/AUTO DIFF WBC: CPT

## 2020-01-09 PROCEDURE — 25000003 PHARM REV CODE 250: Performed by: ANESTHESIOLOGY

## 2020-01-09 PROCEDURE — 97116 GAIT TRAINING THERAPY: CPT

## 2020-01-09 PROCEDURE — 11000001 HC ACUTE MED/SURG PRIVATE ROOM

## 2020-01-09 PROCEDURE — 99024 POSTOP FOLLOW-UP VISIT: CPT | Mod: ,,, | Performed by: PHYSICIAN ASSISTANT

## 2020-01-09 PROCEDURE — 80053 COMPREHEN METABOLIC PANEL: CPT

## 2020-01-09 PROCEDURE — 97535 SELF CARE MNGMENT TRAINING: CPT

## 2020-01-09 PROCEDURE — 63600175 PHARM REV CODE 636 W HCPCS: Performed by: PSYCHIATRY & NEUROLOGY

## 2020-01-09 PROCEDURE — 94640 AIRWAY INHALATION TREATMENT: CPT

## 2020-01-09 PROCEDURE — 99024 PR POST-OP FOLLOW-UP VISIT: ICD-10-PCS | Mod: ,,, | Performed by: PHYSICIAN ASSISTANT

## 2020-01-09 PROCEDURE — 94761 N-INVAS EAR/PLS OXIMETRY MLT: CPT

## 2020-01-09 PROCEDURE — A4216 STERILE WATER/SALINE, 10 ML: HCPCS | Performed by: ANESTHESIOLOGY

## 2020-01-09 PROCEDURE — 25000242 PHARM REV CODE 250 ALT 637 W/ HCPCS: Performed by: PHYSICIAN ASSISTANT

## 2020-01-09 PROCEDURE — 25000003 PHARM REV CODE 250: Performed by: PSYCHIATRY & NEUROLOGY

## 2020-01-09 RX ADMIN — IPRATROPIUM BROMIDE AND ALBUTEROL SULFATE 3 ML: .5; 3 SOLUTION RESPIRATORY (INHALATION) at 08:01

## 2020-01-09 RX ADMIN — GABAPENTIN 300 MG: 300 CAPSULE ORAL at 09:01

## 2020-01-09 RX ADMIN — GABAPENTIN 300 MG: 300 CAPSULE ORAL at 03:01

## 2020-01-09 RX ADMIN — CEFEPIME 2 G: 2 INJECTION, POWDER, FOR SOLUTION INTRAVENOUS at 06:01

## 2020-01-09 RX ADMIN — FAMOTIDINE 20 MG: 20 TABLET ORAL at 09:01

## 2020-01-09 RX ADMIN — POLYETHYLENE GLYCOL 3350 17 G: 17 POWDER, FOR SOLUTION ORAL at 09:01

## 2020-01-09 RX ADMIN — METOPROLOL TARTRATE 50 MG: 50 TABLET ORAL at 09:01

## 2020-01-09 RX ADMIN — HEPARIN SODIUM 5000 UNITS: 5000 INJECTION, SOLUTION INTRAVENOUS; SUBCUTANEOUS at 09:01

## 2020-01-09 RX ADMIN — CASTOR OIL AND BALSAM, PERU: 788; 87 OINTMENT TOPICAL at 09:01

## 2020-01-09 RX ADMIN — SENNOSIDES AND DOCUSATE SODIUM 1 TABLET: 8.6; 5 TABLET ORAL at 09:01

## 2020-01-09 RX ADMIN — LOSARTAN POTASSIUM AND HYDROCHLOROTHIAZIDE TABLETS 1 TABLET: 100; 25 TABLET, FILM COATED ORAL at 09:01

## 2020-01-09 RX ADMIN — CEFEPIME 2 G: 2 INJECTION, POWDER, FOR SOLUTION INTRAVENOUS at 11:01

## 2020-01-09 RX ADMIN — Medication 250 MG: at 09:01

## 2020-01-09 RX ADMIN — Medication 10 ML: at 12:01

## 2020-01-09 RX ADMIN — CEFEPIME 2 G: 2 INJECTION, POWDER, FOR SOLUTION INTRAVENOUS at 03:01

## 2020-01-09 RX ADMIN — SODIUM CHLORIDE SOLN NEBU 3% 4 ML: 3 NEBU SOLN at 08:01

## 2020-01-09 RX ADMIN — HEPARIN SODIUM 5000 UNITS: 5000 INJECTION, SOLUTION INTRAVENOUS; SUBCUTANEOUS at 06:01

## 2020-01-09 RX ADMIN — LEVOTHYROXINE SODIUM 50 MCG: 50 TABLET ORAL at 06:01

## 2020-01-09 RX ADMIN — SODIUM CHLORIDE SOLN NEBU 3% 4 ML: 3 NEBU SOLN at 07:01

## 2020-01-09 RX ADMIN — IPRATROPIUM BROMIDE AND ALBUTEROL SULFATE 3 ML: .5; 3 SOLUTION RESPIRATORY (INHALATION) at 07:01

## 2020-01-09 RX ADMIN — FERROUS SULFATE TAB EC 325 MG (65 MG FE EQUIVALENT) 325 MG: 325 (65 FE) TABLET DELAYED RESPONSE at 09:01

## 2020-01-09 RX ADMIN — Medication 10 ML: at 06:01

## 2020-01-09 RX ADMIN — Medication 10 ML: at 03:01

## 2020-01-09 RX ADMIN — HEPARIN SODIUM 5000 UNITS: 5000 INJECTION, SOLUTION INTRAVENOUS; SUBCUTANEOUS at 03:01

## 2020-01-09 RX ADMIN — CLOPIDOGREL BISULFATE 75 MG: 75 TABLET ORAL at 09:01

## 2020-01-09 RX ADMIN — AMLODIPINE BESYLATE 10 MG: 10 TABLET ORAL at 09:01

## 2020-01-09 RX ADMIN — LIDOCAINE 1 PATCH: 50 PATCH CUTANEOUS at 09:01

## 2020-01-09 NOTE — PLAN OF CARE
CM placed call x2 to JC Romero at the [facility] to inquire about meeting facility had with patient's wife at 1PM. JC romero reports patient did not call to reschedule or show up for 1PM appt. Called wife's phone and number rang x2 and then disconnected. Called again and message left requesting a call back. Called a third time and spoke with patient's wife who reports she had a ride to facility through medicaid but she canceled it because her niece agreed to bring her but now the niece is out of town. Asked wife to reschedule appt for tomorrow at 1030AM. Wife states she will call and schedule ride. Notified nursing home of new arrangement.

## 2020-01-09 NOTE — ASSESSMENT & PLAN NOTE
74 y.o. male with PMH of HTN, HLD, Hepatitis C, and CAD s/p two stents on plavix who presents with C6-T2 osteomyelitis with suspected epidural abscess.  Now s/p C7/T1 corpectomies on 12/10 and C2-T2  posterior instrumented fusion 12/17.    -Patient neurologically stable on exam  -Continue  brace at all times.   -Staples removed 12/31/19. Incision well healed. Leave incision open to air. Turn q2h to continue to promote wound healing.   -Elevated LFTs: Stable. Abdominal US on 1/7 revealed splenomegaly and hepatic steatosis. Will continue to trend LFTs and hold statin. Per , restart statin at lower dose (40 mg) upon discharge. Follow-up with hepatology on 1/29. No further inpatient workup indicated.  -Thrombocytopenia: Plt decreasing throughout admission, 93k today. Ranges 108-186k throughout admission, and previously down to 86k per chart review in 2017. Will monitor closely with CBC daily. No active signs of bleeding.   -ID: Remains afebrile. PICC in place. Continue Cefepime 2g q 8 hours. Estimated end date of therapy 1/28/20-2/11/20. FU scheduled with ID on 1/14.  -2nd left toe wound care: Continue betadine daily and dress with foam bandage.   -Pain: Pain well controlled on current regimen. No adjustments needed at this time.   -Anemia: Continue iron for replacement x 2 weeks. F/u at next lab draw.  -HTN: Continue Amlodipine 10 mg daily, Losartan-HCTZ 100-25 mg daily, and Metoprolol 50 mg BID.   -CAD s/p stent placement: Continue home dose of Plavix 75 mg daily.   -Hyponatremia: resolved. Na tabs discontinued 1/5. Will continue to monitor.   -Hypoalbuminemia: Continue Jose BID to promote wound healing.   -HLD: Continue to hold high dose atorvastatin in setting of elevated LFTs. Per  okay to resume at discharge at lower dose (40 mg).   -DVT prophylaxis: TUCKER's, SCD's, SQH  -Bowel regimen: senna BID and miralax daily  -Atelectasis prevention: IS hourly while awake, PT/OT, OOB > 6 hours per day    DISPO: Pending  SNF placement, medically stable for discharge.

## 2020-01-09 NOTE — PT/OT/SLP PROGRESS
Occupational Therapy   Treatment    Name: Junaid Muñoz  MRN: [id number]  Admitting Diagnosis:  Spinal epidural abscess  23 Days Post-Op    Recommendations:     Discharge Recommendations: nursing facility, skilled  Discharge Equipment Recommendations:  bedside commode, shower chair, walker, rolling  Barriers to discharge:  None    Assessment:     Junaid Muñoz is a 74 y.o. male with a medical diagnosis of Spinal epidural abscess.  He presents with the following performance deficits affecting function are weakness, impaired endurance, impaired self care skills, gait instability, decreased lower extremity function, orthopedic precautions, impaired balance, decreased safety awareness. Patient completed bed mobility and functional transfers with minimal assist, continues to require cues for rolling walker management, safety, and self-pacing. Pt would benefit from continued skilled OT services and placement in skilled nursing facility to maximize functional independence prior to returning home.     Rehab Prognosis:  Good; patient would benefit from acute skilled OT services to address these deficits and reach maximum level of function.       Plan:     Patient to be seen 5 x/week to address the above listed problems via self-care/home management, therapeutic activities, therapeutic exercises  · Plan of Care Expires: 01/27/20  · Plan of Care Reviewed with: patient    Subjective     Pain/Comfort:  · Pain Rating 1: 0/10  · Pain Rating Post-Intervention 1: 0/10    Objective:     Communicated with: Nsg prior to session.  Patient found supine with SCD upon OT entry to room.    General Precautions: Standard, fall, contact   Orthopedic Precautions:spinal precautions   Braces:      Occupational Performance:     Bed Mobility:    · Patient completed Rolling/Turning to Right with minimum assistance  · Patient completed Scooting/Bridging with minimum assistance  · Patient completed Supine to Sit with minimum assistance      Functional Mobility/Transfers:  · Patient completed Sit <> Stand Transfer with minimum assistance  with  rolling walker   · Patient completed Bed <> Chair Transfer using Step Transfer technique with minimum assistance with rolling walker  · Patient completed Toilet Transfer Step Transfer technique with minimum assistance with  rolling walker  · Functional Mobility: Minimum assistance with rolling walker, cues for RW management, safety, self-pacing, and navigating environment    Activities of Daily Living:  · Grooming: stand by assistance seated on toilet  · Upper Body Dressing: minimum assistance seated edge of bed, due to impaired balance upon repositioning of alternating BUE's  · Lower Body Dressing: stand by assistance to pull up socks in supine prior to transferring to edge of bed  · Toileting: minimum assistance for clothing management due to balance impairments and impaired motor planning      [facility] 6 Click ADL: 17    Treatment & Education:  -Patient educated on roles/goals of OT and POC.  -White board updated.  -Therapist provided time for questions and answered within scope of practice.  -Patient educated on importance of EOB/OOB activity to maximize recovery.    Patient left up in chair with all lines intact, call button in reach and nsg notifiedEducation:      GOALS:   Multidisciplinary Problems     Occupational Therapy Goals        Problem: Occupational Therapy Goal    Goal Priority Disciplines Outcome Interventions   Occupational Therapy Goal     OT, PT/OT Ongoing, Progressing    Description:  Goals to be met by: 1/10/2019     Patient will increase functional independence with ADLs by performing:    UE Dressing with Minimal Assistance.  LE Dressing with Moderate Assistance.  Grooming while standing with Stand-by Assistance.  Toileting from toilet with Minimal Assistance for hygiene (met - 1/3) and clothing management.   Bathing from  edge of bed with Moderate Assistance.  Supine to sit with Minimal  Assistance. -- Met (1/3)                         Time Tracking:     OT Date of Treatment: 01/09/20  OT Start Time: 1116  OT Stop Time: 1132  OT Total Time (min): 16 min    Billable Minutes:Self Care/Home Management 16 mins    Shirley Zaragoza, OT  1/9/2020

## 2020-01-09 NOTE — SUBJECTIVE & OBJECTIVE
Interval History:  NAEON. LFTs stable. Plt decreasing throughout admission, 93k today. Ranges 108-186k throughout admission, and previously down to 86k per chart review in 2017. Will monitor closely with CBC daily. No active signs of bleeding. Denies back pain. Patient found without  brace sitting upright in bed when I entered the room, states it is too painful to wear. He was agreeable to the  brace after adjustments made. Denies weakness, paresthesias, b/b dysfunction. Tolerating PO. Demonstrates good and independent bed mobility on assessment, medically stable for discharge to SNF.     Medications:  Continuous Infusions:  Scheduled Meds:   albuterol-ipratropium  3 mL Nebulization Q12H    amLODIPine  10 mg Oral Daily    ascorbic acid (vitamin C)  250 mg Oral BID    balsam peru-castor oil   Topical (Top) BID    ceFEPime (MAXIPIME) IVPB  2 g Intravenous Q8H    clopidogrel  75 mg Oral Daily    famotidine  20 mg Oral BID    ferrous sulfate  325 mg Oral BID    gabapentin  300 mg Oral TID    heparin (porcine)  5,000 Units Subcutaneous Q8H    levothyroxine  50 mcg Oral Before breakfast    lidocaine  1 patch Transdermal Daily    losartan-hydrochlorothiazide 100-25 mg  1 tablet Oral Daily    metoprolol tartrate  50 mg Oral BID    polyethylene glycol  17 g Oral Daily    senna-docusate 8.6-50 mg  1 tablet Oral BID    sodium chloride 0.9%  10 mL Intravenous Q6H    sodium chloride 3%  4 mL Nebulization Q12H     PRN Meds:acetaminophen, bisacodyl, labetalol, naloxone, ondansetron, oxyCODONE, promethazine (PHENERGAN) IVPB, Flushing PICC Protocol **AND** sodium chloride 0.9% **AND** sodium chloride 0.9%       Objective:     Weight: 75.2 kg (165 lb 12.6 oz)  Body mass index is 24.48 kg/m².  Vital Signs (Most Recent):  Temp: 96.4 °F (35.8 °C) (01/09/20 0315)  Pulse: 80 (01/09/20 0703)  Resp: 18 (01/09/20 0703)  BP: (!) 107/53 (01/09/20 0516)  SpO2: 95 % (01/09/20 0703) Vital Signs (24h Range):  Temp:  [96.4  °F (35.8 °C)-98.2 °F (36.8 °C)] 96.4 °F (35.8 °C)  Pulse:  [75-99] 80  Resp:  [16-18] 18  SpO2:  [95 %-97 %] 95 %  BP: ()/(52-60) 107/53                     Male External Urinary Catheter 12/19/19 1200 Small (Active)   Collection Container Urimeter 12/27/2019  7:50 PM   Securement Method secured to lower ABD w/ tape 12/27/2019  7:50 PM   Skin no redness;no breakdown 12/27/2019  7:50 PM   Tolerance no signs/symptoms of discomfort 12/27/2019  7:50 PM   Output (mL) 250 mL 12/28/2019  9:00 AM   Catheter Change Date 12/24/19 12/26/2019  4:24 PM   Catheter Change Time 1100 12/26/2019  4:24 PM       Neurosurgery Physical Exam    General: well developed, well nourished, no distress.   Head: normocephalic, atraumatic  Neck: No tracheal deviation. No palpable masses.  brace in place.   Neurologic: Alert and oriented. Thought content appropriate.  GCS: Motor: 6/Verbal: 5/Eyes: 4 GCS Total: 15  Mental Status: Awake, Alert, Oriented x 4  Language: No aphasia  Speech: No dysarthria  Cranial nerves: face symmetric, tongue midline, CN II-XII grossly intact.   Eyes: pupils equal, round, reactive to light with accomodation, EOMI.    Ears: No drainage.   Pulmonary: normal respirations, no signs of respiratory distress  Abdomen: soft, non-distended, not tender to palpation  Sensory: intact to light touch throughout  Motor Strength: Moves all extremities spontaneously with good tone.  Full strength upper and lower extremities. No abnormal movements seen.     Strength  Deltoids Triceps Biceps Wrist Extension Wrist Flexion Hand    Upper: R 5/5 5/5 5/5 5/5 5/5 5/5    L 5/5 5/5 5/5 5/5 5/5 5/5     Iliopsoas Quadriceps Knee  Flexion Tibialis  anterior Gastro- cnemius EHL   Lower: R 5/5 5/5 5/5 5/5 5/5 5/5    L 5/5 5/5 5/5 5/5 5/5 5/5     Damian: absent  Clonus: absent  Babinski: absent  Vascular: Pulses 2+ and symmetric radial and dorsalis pedis. No LE edema.   Skin: Skin is warm, dry and intact.    Incision c/d/I with skin  edges well approximated, well healed. No surrounding erythema or edema. No drainage from incision.       Significant Labs:  Recent Labs   Lab 01/08/20  0439 01/09/20  0430   GLU 94 97    136   K 4.0 3.6    104   CO2 27 24   BUN 20 24*   CREATININE 0.8 0.8   CALCIUM 9.2 9.6     Recent Labs   Lab 01/07/20  2354 01/09/20  0430   WBC 5.13 5.38   HGB 8.9* 8.6*   HCT 29.0* 28.5*   * 93*     No results for input(s): LABPT, INR, APTT in the last 48 hours.  Microbiology Results (last 7 days)     ** No results found for the last 168 hours. **        Recent Lab Results       01/09/20 0430        Albumin 2.5     Alkaline Phosphatase 460          Anion Gap 8          Baso # 0.04     Basophil% 0.7     BILIRUBIN TOTAL 0.3  Comment:  For infants and newborns, interpretation of results should be based  on gestational age, weight and in agreement with clinical  observations.  Premature Infant recommended reference ranges:  Up to 24 hours.............<8.0 mg/dL  Up to 48 hours............<12.0 mg/dL  3-5 days..................<15.0 mg/dL  6-29 days.................<15.0 mg/dL       BUN, Bld 24     Calcium 9.6     Chloride 104     CO2 24     Creatinine 0.8     Differential Method Automated     eGFR if African American >60.0     eGFR if non  >60.0  Comment:  Calculation used to obtain the estimated glomerular filtration  rate (eGFR) is the CKD-EPI equation.        Eos # 0.2     Eosinophil% 4.1     Glucose 97     Gran # (ANC) 3.4     Gran% 63.4     Hematocrit 28.5     Hemoglobin 8.6     Immature Grans (Abs) 0.03  Comment:  Mild elevation in immature granulocytes is non specific and   can be seen in a variety of conditions including stress response,   acute inflammation, trauma and pregnancy. Correlation with other   laboratory and clinical findings is essential.       Immature Granulocytes 0.6     Lymph # 0.9     Lymph% 16.9     MCH 28.7     MCHC 30.2     MCV 95     Mono # 0.8     Mono%  14.3     MPV 10.9     nRBC 0     Platelets 93     Potassium 3.6     PROTEIN TOTAL 6.0     RBC 3.00     RDW 20.9     Sodium 136     WBC 5.38         All pertinent labs from the last 24 hours have been reviewed.    Significant Diagnostics:  No new imaging to review.

## 2020-01-09 NOTE — PLAN OF CARE
Pt is AAOX3,VSS. Pt is progressing towards plan of care goals. Pain management has been assessed. Pt does not report any pain. Frequent checks to assess pain. SCDs in place with frequent checks for skin breakdown. Wound care completed per order, for L 2nd toe, and stage 1 sacral ulcer. Pressure redistributing mattress in place.Fall precautions in place, no report of falls. Tele-sitter at bedside. Safety measures are in place bed in lowest position, wheels locked, call light within reach.

## 2020-01-09 NOTE — PLAN OF CARE
01/09/20 0920   Post-Acute Status   Post-Acute Authorization Placement   Post-Acute Placement Status Pending Payor Review  (and pending wife completing paperwork)     Appt scheduled for wife to meet with facility admissions to complete paperwork and go over financials.    Bijal Nciolas, LCSW [id number]

## 2020-01-09 NOTE — PT/OT/SLP PROGRESS
"Physical Therapy Treatment    Patient Name:  Junaid Muñoz   MRN:  [id number]    Recommendations:     Discharge Recommendations:  nursing facility, skilled   Discharge Equipment Recommendations: bedside commode, shower chair, walker, rolling   Barriers to discharge: Decreased caregiver support at current functional status    Assessment:     Junaid Muñoz is a 74 y.o. male admitted with a medical diagnosis of Spinal epidural abscess.  He presents with the following impairments/functional limitations:  weakness, impaired functional mobilty, impaired balance, decreased safety awareness, impaired endurance, impaired self care skills, gait instability, decreased lower extremity function, orthopedic precautions. Patient pleasant and cooperative with therapy, agreeable to treatment. Pt able to perform bed mobility, transfers and ambulation with Val x 1 but continues to be limited by noted balance deviations. Requires cuing for safety as pt with decreased insight and safety awareness. Continues to be SNF at this time to maximize functional potential.     Spoke with E-tommy who confirmed pt in sight     Rehab Prognosis: Good; patient would benefit from acute skilled PT services to address these deficits and reach maximum level of function.    Recent Surgery: Procedure(s) (LRB):  FUSION, SPINE, CERVICAL, POSTERIOR APPROACH C2-T3 posterior instrumented fusion (N/A) 23 Days Post-Op    Plan:     During this hospitalization, patient to be seen 6 x/week to address the identified rehab impairments via gait training, therapeutic activities, therapeutic exercises, neuromuscular re-education and progress toward the following goals:    · Plan of Care Expires:  01/28/20    Subjective     Chief Complaint: None stated   Patient/Family Comments/goals: "I have to go to the bathroom"   Pain/Comfort:  · Pain Rating 1: (Did not rate)  · Location - Orientation 1: generalized  · Location 1: back  · Pain Addressed 1: Pre-medicate for " activity, Distraction  · Pain Rating Post-Intervention 1: (Did not rate)      Objective:     Communicated with RN prior to session.  Patient found supine with SCD upon PT entry to room.     General Precautions: fall, contact   Orthopedic Precautions:spinal precautions   Braces:      Functional Mobility:  · Bed Mobility:     · Supine to Sit: minimum assistance via log roll, HOB slightly elevated  · Sit to supine: NT, pt in chair at end of session   · Transfers:     · Sit to Stand:  minimum assistance with rolling walker  · Gait: 15' x 2 with RW with Val x 1 (to and from bathroom);  Required cues for sequencing and walker management; Pt also with narrow ADRIA, flexed posture and asymmetrical step length.       AM-PAC 6 CLICK MOBILITY  Turning over in bed (including adjusting bedclothes, sheets and blankets)?: 3  Sitting down on and standing up from a chair with arms (e.g., wheelchair, bedside commode, etc.): 3  Moving from lying on back to sitting on the side of the bed?: 3  Moving to and from a bed to a chair (including a wheelchair)?: 3  Need to walk in hospital room?: 3  Climbing 3-5 steps with a railing?: 2  Basic Mobility Total Score: 17       Therapeutic Activities and Exercises:   Pt educated on: PT role/POC; safety with mobility; benefits of OOB activities; discharge recommendations. Pt verbalized understanding.       Patient left up in chair with all lines intact, call button in reach and RN notified..    GOALS:   Multidisciplinary Problems     Physical Therapy Goals        Problem: Physical Therapy Goal    Goal Priority Disciplines Outcome Goal Variances Interventions   Physical Therapy Goal     PT, PT/OT Ongoing, Progressing     Description:  Goals to be met by: 19     Patient will increase functional independence with mobility by performin. Supine to sit with MInimal Assistance - met   2. Sit to supine with MInimal Assistance  3. Rolling to Left and Right with Modified [place].  4.  Sit to stand transfer with Minimal Assistance - met 1/6  5. Bed to chair transfer with Minimal Assistance using Rolling Walker  6. Gait  x 40 feet with Minimal Assistance using Rolling Walker.   7. Sitting at edge of bed x 8 minutes with Minimal Assistance  8. Stand for 5 minutes with Minimal Assistance using Rolling Walker  9. Lower extremity exercise program x15 reps per handout, with independence    10. Supine to sit with Stand-by Assistance  11. Sit to stand transfer with Stand-by Assistance.                         Time Tracking:     PT Received On: 01/09/20  PT Start Time: 1116     PT Stop Time: 1132  PT Total Time (min): 16 min     Billable Minutes: Gait Training 16 min    Treatment Type: Treatment  PT/PTA: PT     PTA Visit Number: 0     Samantha Curtis PT, DPT  01/09/2020

## 2020-01-09 NOTE — PROGRESS NOTES
[facility]  Neurosurgery  Progress Note    Subjective:     History of Present Illness: Junaid Muñoz is a 74 y.o. male with PMH of HTN, HLD, Hepatitis C, and CAD s/p two stents on plavix who presents with 1 month history of back pain between his shoulders. MRI at OSH demonstrates likely epidural abscess. Pt denies hx of trauma and reports slow onset of symptoms.     Post-Op Info:  Procedure(s) (LRB):  FUSION, SPINE, CERVICAL, POSTERIOR APPROACH C2-T3 posterior instrumented fusion (N/A)   23 Days Post-Op     Interval History:  NAEON. LFTs stable. Plt decreasing throughout admission, 93k today. Ranges 108-186k throughout admission, and previously down to 86k per chart review in 2017. Will monitor closely with CBC daily. No active signs of bleeding. Denies back pain. Patient found without  brace sitting upright in bed when I entered the room, states it is too painful to wear. He was agreeable to the  brace after adjustments made. Denies weakness, paresthesias, b/b dysfunction. Tolerating PO. Demonstrates good and independent bed mobility on assessment, medically stable for discharge to SNF.     Medications:  Continuous Infusions:  Scheduled Meds:   albuterol-ipratropium  3 mL Nebulization Q12H    amLODIPine  10 mg Oral Daily    ascorbic acid (vitamin C)  250 mg Oral BID    balsam peru-castor oil   Topical (Top) BID    ceFEPime (MAXIPIME) IVPB  2 g Intravenous Q8H    clopidogrel  75 mg Oral Daily    famotidine  20 mg Oral BID    ferrous sulfate  325 mg Oral BID    gabapentin  300 mg Oral TID    heparin (porcine)  5,000 Units Subcutaneous Q8H    levothyroxine  50 mcg Oral Before breakfast    lidocaine  1 patch Transdermal Daily    losartan-hydrochlorothiazide 100-25 mg  1 tablet Oral Daily    metoprolol tartrate  50 mg Oral BID    polyethylene glycol  17 g Oral Daily    senna-docusate 8.6-50 mg  1 tablet Oral BID    sodium chloride 0.9%  10 mL Intravenous Q6H    sodium  chloride 3%  4 mL Nebulization Q12H     PRN Meds:acetaminophen, bisacodyl, labetalol, naloxone, ondansetron, oxyCODONE, promethazine (PHENERGAN) IVPB, Flushing PICC Protocol **AND** sodium chloride 0.9% **AND** sodium chloride 0.9%       Objective:     Weight: 75.2 kg (165 lb 12.6 oz)  Body mass index is 24.48 kg/m².  Vital Signs (Most Recent):  Temp: 96.4 °F (35.8 °C) (01/09/20 0315)  Pulse: 80 (01/09/20 0703)  Resp: 18 (01/09/20 0703)  BP: (!) 107/53 (01/09/20 0516)  SpO2: 95 % (01/09/20 0703) Vital Signs (24h Range):  Temp:  [96.4 °F (35.8 °C)-98.2 °F (36.8 °C)] 96.4 °F (35.8 °C)  Pulse:  [75-99] 80  Resp:  [16-18] 18  SpO2:  [95 %-97 %] 95 %  BP: ()/(52-60) 107/53                     Male External Urinary Catheter 12/19/19 1200 Small (Active)   Collection Container Urimeter 12/27/2019  7:50 PM   Securement Method secured to lower ABD w/ tape 12/27/2019  7:50 PM   Skin no redness;no breakdown 12/27/2019  7:50 PM   Tolerance no signs/symptoms of discomfort 12/27/2019  7:50 PM   Output (mL) 250 mL 12/28/2019  9:00 AM   Catheter Change Date 12/24/19 12/26/2019  4:24 PM   Catheter Change Time 1100 12/26/2019  4:24 PM       Neurosurgery Physical Exam    General: well developed, well nourished, no distress.   Head: normocephalic, atraumatic  Neck: No tracheal deviation. No palpable masses.  brace in place.   Neurologic: Alert and oriented. Thought content appropriate.  GCS: Motor: 6/Verbal: 5/Eyes: 4 GCS Total: 15  Mental Status: Awake, Alert, Oriented x 4  Language: No aphasia  Speech: No dysarthria  Cranial nerves: face symmetric, tongue midline, CN II-XII grossly intact.   Eyes: pupils equal, round, reactive to light with accomodation, EOMI.    Ears: No drainage.   Pulmonary: normal respirations, no signs of respiratory distress  Abdomen: soft, non-distended, not tender to palpation  Sensory: intact to light touch throughout  Motor Strength: Moves all extremities spontaneously with good tone.  Full strength  upper and lower extremities. No abnormal movements seen.     Strength  Deltoids Triceps Biceps Wrist Extension Wrist Flexion Hand    Upper: R 5/5 5/5 5/5 5/5 5/5 5/5    L 5/5 5/5 5/5 5/5 5/5 5/5     Iliopsoas Quadriceps Knee  Flexion Tibialis  anterior Gastro- cnemius EHL   Lower: R 5/5 5/5 5/5 5/5 5/5 5/5    L 5/5 5/5 5/5 5/5 5/5 5/5     Damian: absent  Clonus: absent  Babinski: absent  Vascular: Pulses 2+ and symmetric radial and dorsalis pedis. No LE edema.   Skin: Skin is warm, dry and intact.    Incision c/d/I with skin edges well approximated, well healed. No surrounding erythema or edema. No drainage from incision.       Significant Labs:  Recent Labs   Lab 01/08/20  0439 01/09/20  0430   GLU 94 97    136   K 4.0 3.6    104   CO2 27 24   BUN 20 24*   CREATININE 0.8 0.8   CALCIUM 9.2 9.6     Recent Labs   Lab 01/07/20  2354 01/09/20  0430   WBC 5.13 5.38   HGB 8.9* 8.6*   HCT 29.0* 28.5*   * 93*     No results for input(s): LABPT, INR, APTT in the last 48 hours.  Microbiology Results (last 7 days)     ** No results found for the last 168 hours. **        Recent Lab Results       01/09/20  0430        Albumin 2.5     Alkaline Phosphatase 460          Anion Gap 8          Baso # 0.04     Basophil% 0.7     BILIRUBIN TOTAL 0.3  Comment:  For infants and newborns, interpretation of results should be based  on gestational age, weight and in agreement with clinical  observations.  Premature Infant recommended reference ranges:  Up to 24 hours.............<8.0 mg/dL  Up to 48 hours............<12.0 mg/dL  3-5 days..................<15.0 mg/dL  6-29 days.................<15.0 mg/dL       BUN, Bld 24     Calcium 9.6     Chloride 104     CO2 24     Creatinine 0.8     Differential Method Automated     eGFR if African American >60.0     eGFR if non  >60.0  Comment:  Calculation used to obtain the estimated glomerular filtration  rate (eGFR) is the CKD-EPI equation.         Eos # 0.2     Eosinophil% 4.1     Glucose 97     Gran # (ANC) 3.4     Gran% 63.4     Hematocrit 28.5     Hemoglobin 8.6     Immature Grans (Abs) 0.03  Comment:  Mild elevation in immature granulocytes is non specific and   can be seen in a variety of conditions including stress response,   acute inflammation, trauma and pregnancy. Correlation with other   laboratory and clinical findings is essential.       Immature Granulocytes 0.6     Lymph # 0.9     Lymph% 16.9     MCH 28.7     MCHC 30.2     MCV 95     Mono # 0.8     Mono% 14.3     MPV 10.9     nRBC 0     Platelets 93     Potassium 3.6     PROTEIN TOTAL 6.0     RBC 3.00     RDW 20.9     Sodium 136     WBC 5.38         All pertinent labs from the last 24 hours have been reviewed.    Significant Diagnostics:  No new imaging to review.     Assessment/Plan:     * Spinal epidural abscess  74 y.o. male with PMH of HTN, HLD, Hepatitis C, and CAD s/p two stents on plavix who presents with C6-T2 osteomyelitis with suspected epidural abscess.  Now s/p C7/T1 corpectomies on 12/10 and C2-T2  posterior instrumented fusion 12/17.    -Patient neurologically stable on exam  -Continue  brace at all times.   -Staples removed 12/31/19. Incision well healed. Leave incision open to air. Turn q2h to continue to promote wound healing.   -Elevated LFTs: Stable. Abdominal US on 1/7 revealed splenomegaly and hepatic steatosis. Will continue to trend LFTs and hold statin. Per , restart statin at lower dose (40 mg) upon discharge. Follow-up with hepatology on 1/29. No further inpatient workup indicated.  -Thrombocytopenia: Plt decreasing throughout admission, 93k today. Ranges 108-186k throughout admission, and previously down to 86k per chart review in 2017. Will monitor closely with CBC daily. No active signs of bleeding.   -ID: Remains afebrile. PICC in place. Continue Cefepime 2g q 8 hours. Estimated end date of therapy 1/28/20-2/11/20. FU scheduled with ID on 1/14.  -2nd left  toe wound care: Continue betadine daily and dress with foam bandage.   -Pain: Pain well controlled on current regimen. No adjustments needed at this time.   -Anemia: Continue iron for replacement x 2 weeks. F/u at next lab draw.  -HTN: Continue Amlodipine 10 mg daily, Losartan-HCTZ 100-25 mg daily, and Metoprolol 50 mg BID.   -CAD s/p stent placement: Continue home dose of Plavix 75 mg daily.   -Hyponatremia: resolved. Na tabs discontinued 1/5. Will continue to monitor.   -Hypoalbuminemia: Continue Jose BID to promote wound healing.   -HLD: Continue to hold high dose atorvastatin in setting of elevated LFTs. Per HM okay to resume at discharge at lower dose (40 mg).   -DVT prophylaxis: TUCKER's, SCD's, SQH  -Bowel regimen: senna BID and miralax daily  -Atelectasis prevention: IS hourly while awake, PT/OT, OOB > 6 hours per day    DISPO: Pending SNF placement, medically stable for discharge.         Miley Wood PA-C  Neurosurgery  [facility]

## 2020-01-09 NOTE — PLAN OF CARE
Problem: Physical Therapy Goal  Goal: Physical Therapy Goal  Description  Goals to be met by: 19     Patient will increase functional independence with mobility by performin. Supine to sit with MInimal Assistance - met   2. Sit to supine with MInimal Assistance  3. Rolling to Left and Right with Modified [place].  4. Sit to stand transfer with Minimal Assistance - met   5. Bed to chair transfer with Minimal Assistance using Rolling Walker  6. Gait  x 40 feet with Minimal Assistance using Rolling Walker.   7. Sitting at edge of bed x 8 minutes with Minimal Assistance  8. Stand for 5 minutes with Minimal Assistance using Rolling Walker  9. Lower extremity exercise program x15 reps per handout, with independence  10. Supine to sit with Stand-by Assistance  11. Sit to stand transfer with Stand-by Assistance.        Outcome: Ongoing, Progressing   Patient is cooperative and agreeable to therapy. Pt presents with fair functional mobility secondary to weakness, impaired balance, and decreased safety awareness. Will continue to benefit from skilled physical therapy in an acute care setting.     Samantha Curtis, PT, DPT  2020

## 2020-01-10 PROBLEM — R23.9 ALTERATION IN SKIN INTEGRITY: Status: ACTIVE | Noted: 2020-01-10

## 2020-01-10 LAB
ALBUMIN SERPL BCP-MCNC: 2.3 G/DL (ref 3.5–5.2)
ALP SERPL-CCNC: 441 U/L (ref 55–135)
ALT SERPL W/O P-5'-P-CCNC: 134 U/L (ref 10–44)
ANION GAP SERPL CALC-SCNC: 8 MMOL/L (ref 8–16)
AST SERPL-CCNC: 114 U/L (ref 10–40)
BASOPHILS # BLD AUTO: 0.04 K/UL (ref 0–0.2)
BASOPHILS # BLD AUTO: 0.05 K/UL (ref 0–0.2)
BASOPHILS NFR BLD: 0.9 % (ref 0–1.9)
BASOPHILS NFR BLD: 0.9 % (ref 0–1.9)
BILIRUB SERPL-MCNC: 0.2 MG/DL (ref 0.1–1)
BUN SERPL-MCNC: 24 MG/DL (ref 8–23)
CALCIUM SERPL-MCNC: 9 MG/DL (ref 8.7–10.5)
CHLORIDE SERPL-SCNC: 108 MMOL/L (ref 95–110)
CO2 SERPL-SCNC: 23 MMOL/L (ref 23–29)
CREAT SERPL-MCNC: 0.9 MG/DL (ref 0.5–1.4)
DIFFERENTIAL METHOD: ABNORMAL
DIFFERENTIAL METHOD: ABNORMAL
EOSINOPHIL # BLD AUTO: 0.2 K/UL (ref 0–0.5)
EOSINOPHIL # BLD AUTO: 0.2 K/UL (ref 0–0.5)
EOSINOPHIL NFR BLD: 3.5 % (ref 0–8)
EOSINOPHIL NFR BLD: 3.6 % (ref 0–8)
ERYTHROCYTE [DISTWIDTH] IN BLOOD BY AUTOMATED COUNT: 21 % (ref 11.5–14.5)
ERYTHROCYTE [DISTWIDTH] IN BLOOD BY AUTOMATED COUNT: 21.1 % (ref 11.5–14.5)
EST. GFR  (AFRICAN AMERICAN): >60 ML/MIN/1.73 M^2
EST. GFR  (NON AFRICAN AMERICAN): >60 ML/MIN/1.73 M^2
FERRITIN SERPL-MCNC: 148 NG/ML (ref 20–300)
FOLATE SERPL-MCNC: 5.3 NG/ML (ref 4–24)
GLUCOSE SERPL-MCNC: 127 MG/DL (ref 70–110)
HAPTOGLOB SERPL-MCNC: 186 MG/DL (ref 30–250)
HCT VFR BLD AUTO: 26.7 % (ref 40–54)
HCT VFR BLD AUTO: 29.8 % (ref 40–54)
HGB BLD-MCNC: 8.1 G/DL (ref 14–18)
HGB BLD-MCNC: 9 G/DL (ref 14–18)
IMM GRANULOCYTES # BLD AUTO: 0.02 K/UL (ref 0–0.04)
IMM GRANULOCYTES # BLD AUTO: 0.02 K/UL (ref 0–0.04)
IMM GRANULOCYTES NFR BLD AUTO: 0.4 % (ref 0–0.5)
IMM GRANULOCYTES NFR BLD AUTO: 0.4 % (ref 0–0.5)
LDH SERPL L TO P-CCNC: 246 U/L (ref 110–260)
LYMPHOCYTES # BLD AUTO: 0.7 K/UL (ref 1–4.8)
LYMPHOCYTES # BLD AUTO: 0.9 K/UL (ref 1–4.8)
LYMPHOCYTES NFR BLD: 14.7 % (ref 18–48)
LYMPHOCYTES NFR BLD: 17.1 % (ref 18–48)
MCH RBC QN AUTO: 28.8 PG (ref 27–31)
MCH RBC QN AUTO: 28.8 PG (ref 27–31)
MCHC RBC AUTO-ENTMCNC: 30.2 G/DL (ref 32–36)
MCHC RBC AUTO-ENTMCNC: 30.3 G/DL (ref 32–36)
MCV RBC AUTO: 95 FL (ref 82–98)
MCV RBC AUTO: 95 FL (ref 82–98)
MONOCYTES # BLD AUTO: 0.7 K/UL (ref 0.3–1)
MONOCYTES # BLD AUTO: 0.8 K/UL (ref 0.3–1)
MONOCYTES NFR BLD: 14 % (ref 4–15)
MONOCYTES NFR BLD: 14.7 % (ref 4–15)
NEUTROPHILS # BLD AUTO: 3 K/UL (ref 1.8–7.7)
NEUTROPHILS # BLD AUTO: 3.5 K/UL (ref 1.8–7.7)
NEUTROPHILS NFR BLD: 64 % (ref 38–73)
NEUTROPHILS NFR BLD: 65.8 % (ref 38–73)
NRBC BLD-RTO: 0 /100 WBC
NRBC BLD-RTO: 0 /100 WBC
PATH REV BLD -IMP: NORMAL
PLATELET # BLD AUTO: 125 K/UL (ref 150–350)
PLATELET # BLD AUTO: 85 K/UL (ref 150–350)
PMV BLD AUTO: 10.8 FL (ref 9.2–12.9)
PMV BLD AUTO: 11.4 FL (ref 9.2–12.9)
POTASSIUM SERPL-SCNC: 3.8 MMOL/L (ref 3.5–5.1)
PROT SERPL-MCNC: 5.9 G/DL (ref 6–8.4)
RBC # BLD AUTO: 2.81 M/UL (ref 4.6–6.2)
RBC # BLD AUTO: 3.13 M/UL (ref 4.6–6.2)
RETICS/RBC NFR AUTO: 3.5 % (ref 0.4–2)
SODIUM SERPL-SCNC: 139 MMOL/L (ref 136–145)
VIT B12 SERPL-MCNC: 476 PG/ML (ref 210–950)
WBC # BLD AUTO: 4.62 K/UL (ref 3.9–12.7)
WBC # BLD AUTO: 5.5 K/UL (ref 3.9–12.7)

## 2020-01-10 PROCEDURE — 25000003 PHARM REV CODE 250: Performed by: PHYSICIAN ASSISTANT

## 2020-01-10 PROCEDURE — 85045 AUTOMATED RETICULOCYTE COUNT: CPT

## 2020-01-10 PROCEDURE — 85060 BLOOD SMEAR INTERPRETATION: CPT | Mod: ,,, | Performed by: PATHOLOGY

## 2020-01-10 PROCEDURE — 83010 ASSAY OF HAPTOGLOBIN QUANT: CPT

## 2020-01-10 PROCEDURE — 82607 VITAMIN B-12: CPT

## 2020-01-10 PROCEDURE — 80053 COMPREHEN METABOLIC PANEL: CPT

## 2020-01-10 PROCEDURE — 99900035 HC TECH TIME PER 15 MIN (STAT)

## 2020-01-10 PROCEDURE — A4216 STERILE WATER/SALINE, 10 ML: HCPCS | Performed by: ANESTHESIOLOGY

## 2020-01-10 PROCEDURE — 63600175 PHARM REV CODE 636 W HCPCS: Performed by: PSYCHIATRY & NEUROLOGY

## 2020-01-10 PROCEDURE — 85060 PATHOLOGIST REVIEW: ICD-10-PCS | Mod: ,,, | Performed by: PATHOLOGY

## 2020-01-10 PROCEDURE — 97530 THERAPEUTIC ACTIVITIES: CPT

## 2020-01-10 PROCEDURE — 82728 ASSAY OF FERRITIN: CPT

## 2020-01-10 PROCEDURE — 82746 ASSAY OF FOLIC ACID SERUM: CPT

## 2020-01-10 PROCEDURE — 25000003 PHARM REV CODE 250: Performed by: PSYCHIATRY & NEUROLOGY

## 2020-01-10 PROCEDURE — 25000003 PHARM REV CODE 250: Performed by: NURSE PRACTITIONER

## 2020-01-10 PROCEDURE — 94640 AIRWAY INHALATION TREATMENT: CPT

## 2020-01-10 PROCEDURE — 63600175 PHARM REV CODE 636 W HCPCS: Mod: JG | Performed by: PHYSICIAN ASSISTANT

## 2020-01-10 PROCEDURE — 25000003 PHARM REV CODE 250: Performed by: ANESTHESIOLOGY

## 2020-01-10 PROCEDURE — 85025 COMPLETE CBC W/AUTO DIFF WBC: CPT | Mod: 91

## 2020-01-10 PROCEDURE — 99223 1ST HOSP IP/OBS HIGH 75: CPT | Mod: ,,, | Performed by: INTERNAL MEDICINE

## 2020-01-10 PROCEDURE — 97112 NEUROMUSCULAR REEDUCATION: CPT

## 2020-01-10 PROCEDURE — 99024 POSTOP FOLLOW-UP VISIT: CPT | Mod: ,,, | Performed by: PHYSICIAN ASSISTANT

## 2020-01-10 PROCEDURE — 83615 LACTATE (LD) (LDH) ENZYME: CPT

## 2020-01-10 PROCEDURE — 99223 PR INITIAL HOSPITAL CARE,LEVL III: ICD-10-PCS | Mod: ,,, | Performed by: INTERNAL MEDICINE

## 2020-01-10 PROCEDURE — 25000242 PHARM REV CODE 250 ALT 637 W/ HCPCS: Performed by: PHYSICIAN ASSISTANT

## 2020-01-10 PROCEDURE — 99024 PR POST-OP FOLLOW-UP VISIT: ICD-10-PCS | Mod: ,,, | Performed by: PHYSICIAN ASSISTANT

## 2020-01-10 PROCEDURE — 11000001 HC ACUTE MED/SURG PRIVATE ROOM

## 2020-01-10 PROCEDURE — 97116 GAIT TRAINING THERAPY: CPT

## 2020-01-10 PROCEDURE — 94761 N-INVAS EAR/PLS OXIMETRY MLT: CPT

## 2020-01-10 RX ORDER — ATORVASTATIN CALCIUM 80 MG/1
40 TABLET, FILM COATED ORAL DAILY
Start: 2020-01-10 | End: 2023-03-23

## 2020-01-10 RX ORDER — CEFEPIME HYDROCHLORIDE 2 G/1
2 INJECTION, POWDER, FOR SOLUTION INTRAVENOUS EVERY 8 HOURS
Start: 2020-01-10 | End: 2021-06-03

## 2020-01-10 RX ORDER — BALSAM PERU/CASTOR OIL
OINTMENT (GRAM) TOPICAL 2 TIMES DAILY
Qty: 30 G | Refills: 1 | Status: ON HOLD | OUTPATIENT
Start: 2020-01-10 | End: 2022-10-21 | Stop reason: HOSPADM

## 2020-01-10 RX ADMIN — SENNOSIDES AND DOCUSATE SODIUM 1 TABLET: 8.6; 5 TABLET ORAL at 10:01

## 2020-01-10 RX ADMIN — Medication 10 ML: at 11:01

## 2020-01-10 RX ADMIN — Medication 10 ML: at 05:01

## 2020-01-10 RX ADMIN — ALTEPLASE 2 MG: 2.2 INJECTION, POWDER, LYOPHILIZED, FOR SOLUTION INTRAVENOUS at 09:01

## 2020-01-10 RX ADMIN — CEFEPIME 2 G: 2 INJECTION, POWDER, FOR SOLUTION INTRAVENOUS at 07:01

## 2020-01-10 RX ADMIN — MICONAZOLE NITRATE: 20 OINTMENT TOPICAL at 10:01

## 2020-01-10 RX ADMIN — POLYETHYLENE GLYCOL 3350 17 G: 17 POWDER, FOR SOLUTION ORAL at 08:01

## 2020-01-10 RX ADMIN — CASTOR OIL AND BALSAM, PERU: 788; 87 OINTMENT TOPICAL at 09:01

## 2020-01-10 RX ADMIN — Medication 250 MG: at 10:01

## 2020-01-10 RX ADMIN — SENNOSIDES AND DOCUSATE SODIUM 1 TABLET: 8.6; 5 TABLET ORAL at 08:01

## 2020-01-10 RX ADMIN — HEPARIN SODIUM 5000 UNITS: 5000 INJECTION, SOLUTION INTRAVENOUS; SUBCUTANEOUS at 05:01

## 2020-01-10 RX ADMIN — GABAPENTIN 300 MG: 300 CAPSULE ORAL at 03:01

## 2020-01-10 RX ADMIN — Medication 250 MG: at 08:01

## 2020-01-10 RX ADMIN — IPRATROPIUM BROMIDE AND ALBUTEROL SULFATE 3 ML: .5; 3 SOLUTION RESPIRATORY (INHALATION) at 07:01

## 2020-01-10 RX ADMIN — METOPROLOL TARTRATE 50 MG: 50 TABLET ORAL at 08:01

## 2020-01-10 RX ADMIN — CLOPIDOGREL BISULFATE 75 MG: 75 TABLET ORAL at 08:01

## 2020-01-10 RX ADMIN — FAMOTIDINE 20 MG: 20 TABLET ORAL at 08:01

## 2020-01-10 RX ADMIN — AMLODIPINE BESYLATE 10 MG: 10 TABLET ORAL at 08:01

## 2020-01-10 RX ADMIN — Medication 10 ML: at 12:01

## 2020-01-10 RX ADMIN — IPRATROPIUM BROMIDE AND ALBUTEROL SULFATE 3 ML: .5; 3 SOLUTION RESPIRATORY (INHALATION) at 09:01

## 2020-01-10 RX ADMIN — SODIUM CHLORIDE SOLN NEBU 3% 4 ML: 3 NEBU SOLN at 09:01

## 2020-01-10 RX ADMIN — HEPARIN SODIUM 5000 UNITS: 5000 INJECTION, SOLUTION INTRAVENOUS; SUBCUTANEOUS at 03:01

## 2020-01-10 RX ADMIN — METOPROLOL TARTRATE 50 MG: 50 TABLET ORAL at 10:01

## 2020-01-10 RX ADMIN — FERROUS SULFATE TAB EC 325 MG (65 MG FE EQUIVALENT) 325 MG: 325 (65 FE) TABLET DELAYED RESPONSE at 10:01

## 2020-01-10 RX ADMIN — LIDOCAINE 1 PATCH: 50 PATCH CUTANEOUS at 08:01

## 2020-01-10 RX ADMIN — CEFEPIME 2 G: 2 INJECTION, POWDER, FOR SOLUTION INTRAVENOUS at 03:01

## 2020-01-10 RX ADMIN — FERROUS SULFATE TAB EC 325 MG (65 MG FE EQUIVALENT) 325 MG: 325 (65 FE) TABLET DELAYED RESPONSE at 08:01

## 2020-01-10 RX ADMIN — CASTOR OIL AND BALSAM, PERU: 788; 87 OINTMENT TOPICAL at 10:01

## 2020-01-10 RX ADMIN — Medication 10 ML: at 06:01

## 2020-01-10 RX ADMIN — FAMOTIDINE 20 MG: 20 TABLET ORAL at 10:01

## 2020-01-10 RX ADMIN — GABAPENTIN 300 MG: 300 CAPSULE ORAL at 08:01

## 2020-01-10 RX ADMIN — Medication 10 ML: at 03:01

## 2020-01-10 RX ADMIN — LEVOTHYROXINE SODIUM 50 MCG: 50 TABLET ORAL at 05:01

## 2020-01-10 RX ADMIN — LOSARTAN POTASSIUM AND HYDROCHLOROTHIAZIDE TABLETS 1 TABLET: 100; 25 TABLET, FILM COATED ORAL at 08:01

## 2020-01-10 RX ADMIN — HEPARIN SODIUM 5000 UNITS: 5000 INJECTION, SOLUTION INTRAVENOUS; SUBCUTANEOUS at 10:01

## 2020-01-10 RX ADMIN — GABAPENTIN 300 MG: 300 CAPSULE ORAL at 10:01

## 2020-01-10 NOTE — CONSULTS
Consult Note    Inpatient consult to Hematology/Oncology  Consult performed by: Shira Goldman MD  Consult ordered by: Miley Wood PA-C        SUBJECTIVE:     History of Present Illness:  Junaid Muñoz, 74/M with anemia, HTN, HLD, CAD s/p two stents on plavix, Hepatitis C with biopsy proven cirrhosis likely 2/2 IVDU, hypothyroidism and wound over left toe, presented to OSH with sx of progressively worsening back pain since one month. Was found to have OM of C6-T2 with epidural abscess on MRI, and was transferred here for Neurosurgery evaluation. He underwent C7-T1 Corpectomy on 12/10 and posterior fusion on 12/17. Wound cultures positive for pseudomonas, being treated with iv Cefepime 2 g Q8H. Hematology consulted for thrombocytopenia.    Patient seen today, plt count 85. Plt count was normal at 153 on 12/30/19. Patient has been on subQ heparin since admission. He is on Plavix. $T score is 3 (low probability). Patient denies any bleeding (hematemesis, melena, BRBPR, hemoptysis), rashes or bruising. Reports back pain has improved.    Review of patient's allergies indicates:   Allergen Reactions    Penicillins Rash     Tolerated cefepime December 2019     Past Medical History:   Diagnosis Date    CAD (coronary artery disease)     s/p stent 2005, on plavix    Chronic hepatitis C     Cirrhosis     biopsy proven - 2007    History of colon polyps 06/2008    1 polyp - tubular adenoma    HTN (hypertension)     Hyperlipidemia     Hypothyroidism      Past Surgical History:   Procedure Laterality Date    COLONOSCOPY W/ POLYPECTOMY  06/2008    Dr Wagoner: fair prep, 3mm polyp - tubular adenoma    CORONARY ANGIOPLASTY WITH STENT PLACEMENT      FUSION OF CERVICAL SPINE BY POSTERIOR APPROACH N/A 12/17/2019    Procedure: FUSION, SPINE, CERVICAL, POSTERIOR APPROACH C2-T3 posterior instrumented fusion;  Surgeon: Elian Deluca MD;  Location: [facility] OR [address] [place] [address];  Service: Neurosurgery;  Laterality: N/A;    hydrocele  repair  teenager    pyloric stenosis repair  age 1    SURGICAL REMOVAL OF VERTEBRAL BODY OF THORACIC SPINE N/A 12/10/2019    Procedure: CORPECTOMY, SPINE, CERVICAL AND THORACIC, C7 and T1 with Globus;  Surgeon: Elian Deluca MD;  Location: [facility] OR [address] [place] [address];  Service: Neurosurgery;  Laterality: N/A;    TONSILLECTOMY       History reviewed. No pertinent family history.  Social History     Tobacco Use    Smoking status: Current Every Day Smoker   Substance Use Topics    Alcohol use: Not on file    Drug use: Yes     Types: IV     Comment: MORPHINE     Review of Systems   Constitutional: Negative for chills and fever.   HENT: Negative for nosebleeds and sore throat.    Eyes: Negative for blurred vision and double vision.   Respiratory: Negative for cough and hemoptysis.    Cardiovascular: Negative for chest pain and leg swelling.   Gastrointestinal: Negative for abdominal pain, blood in stool, melena, nausea and vomiting.   Genitourinary: Negative for hematuria.   Musculoskeletal: Positive for back pain. Negative for joint pain and myalgias.   Skin: Negative for rash.   Neurological: Negative for dizziness, sensory change and headaches.   Endo/Heme/Allergies: Does not bruise/bleed easily.     OBJECTIVE:     Vital Signs:  Temp:  [96.8 °F (36 °C)]   Pulse:  [73-80]   Resp:  [14-18]   BP: (115)/(60)   SpO2:  [97 %-98 %]     Physical Exam   Constitutional: He appears well-developed and well-nourished.   Sitting in chair, no acute distress   HENT:   Head: Normocephalic and atraumatic.   Eyes: Pupils are equal, round, and reactive to light. EOM are normal. No scleral icterus.   Neck: Neck supple.   Cardiovascular: Normal rate and regular rhythm.   Pulmonary/Chest: Effort normal and breath sounds normal. No respiratory distress.   Abdominal: Soft. He exhibits no distension. There is no tenderness.   Musculoskeletal: Normal range of motion. He exhibits no edema.   Lymphadenopathy:     He has no cervical adenopathy.    Neurological: He is alert.   Skin: Skin is warm and dry.   Sacral pressure injury seems to be healing. Contusion present over left elbow - pt unclear about onset. No petechiae or purpura noted. No spider angiomata or caput medusa    Psychiatric: He has a normal mood and affect. His behavior is normal.     Laboratory:  CBC:   Recent Labs   Lab 01/10/20  0435   WBC 4.62   RBC 2.81*   HGB 8.1*   HCT 26.7*   PLT 85*   MCV 95   MCH 28.8   MCHC 30.3*     CMP:   Recent Labs   Lab 01/10/20  0435   *   CALCIUM 9.0   ALBUMIN 2.3*   PROT 5.9*      K 3.8   CO2 23      BUN 24*   CREATININE 0.9   ALKPHOS 441*   *   *   BILITOT 0.2       Diagnostic Results:  Reading Physician Reading Date Result Priority   Daren Webster MD 1/7/2020 Routine   Ryne Márquez MD 1/7/2020       Narrative     EXAMINATION:  US ABDOMEN LIMITED WITH DOPPLER (XPD)    CLINICAL HISTORY:  elevated lfts;    TECHNIQUE:  Complete abdominal ultrasound with doppler.  Color and spectral Doppler were performed.    COMPARISON:  U/S abdomen limited 01/10/2018.    FINDINGS:  The visualized portion of the pancreas is unremarkable.    The liver is normal in size, measuring 14.1 cm.  The liver demonstrates diffusely increased parenchymal echogenicity compatible with hepatic steatosis.  The HRI is elevated measuring 1.75. No focal hepatic lesions are seen.    The gallbladder is unremarkable with no evidence of calculi.  The common duct is not dilated, measuring 3 mm.  The gallbladder wall is not thickened measuring 1 mm in diameter.  There is no intrahepatic biliary duct dilation.    The spleen is enlarged in size measuring 14.3 x 3.9 cm with a homogeneous echotexture.    The aorta tapers normally.    The kidneys are normal in size without focal abnormality or evidence of hydronephrosis.    There is no evidence of ascites.    The main portal vein, right portal vein, left portal vein, middle hepatic vein, right hepatic vein, left  hepatic vein, SMV, and IVC are patent with proper directional flow.  The main portal vein measures 12 mm in diameter.  The main hepatic artery is patent. The umbilical vein is not patent.      Impression       Hepatic steatosis.    Splenomegaly.    Electronically signed by resident: Daren Webster  Date: 01/07/2020  Time: 08:03    Electronically signed by: Ryne Márquez MD  Date: 01/07/2020  Time: 11:51         ASSESSMENT/PLAN:   Thrombocytopenia, likely multifactorial, splenic sequestration in setting of splenomegaly/cirrhosis/HCV, medications (cefepime), active infection  -Plt count 85. Plt count was normal at 153 on 12/30/19. Patient has been on subQ heparin since admission, but low 4T score of 3. He is on Plavix.  -PT and PTT were normal  -Fibrinogen last month was normal  -U/S demonstrates splenomegaly  -HCV RNA 1.6 million    Normocytic anemia  -Hb 8.1    Recommendations  -Added on iron studies, B12, folate  -Added on hemolytic labs, peripheral blood smear  -If plt count <50, would stop heparin  -Transfuse for Hb<7, plt<10 or active bleeding  -Patient will need treatment of his hepatitis C    Please contact Hematology Consult Fellow with any additional questions. The following was staffed and discussed with supervising physician Dr. Chapman.    hSira Goldman MD PGY-V  Hematology/Oncology Fellow

## 2020-01-10 NOTE — PLAN OF CARE
Problem: Physical Therapy Goal  Goal: Physical Therapy Goal  Description  Goals to be met by: 19     Patient will increase functional independence with mobility by performin. Supine to sit with MInimal Assistance - met   2. Sit to supine with MInimal Assistance  3. Rolling to Left and Right with Modified Stokes.  4. Sit to stand transfer with Minimal Assistance - met   5. Bed to chair transfer with Minimal Assistance using Rolling Walker -Met 1/10  6. Gait  x 40 feet with Minimal Assistance using Rolling Walker.  -MET 1/10   Gait x 100 feet with CGA using RW.  7. Sitting at edge of bed x 8 minutes with Minimal Assistance  8. Stand for 5 minutes with Minimal Assistance using Rolling Walker  9. Lower extremity exercise program x15 reps per handout, with independence    10. Supine to sit with Stand-by Assistance  11. Sit to stand transfer with Stand-by Assistance.         1/10/2020 1201 by Michelle Grey, PT  Outcome: Ongoing, Progressing  Met Gait goal. Added new gait goal.    Michelle Grey, PT, DPT  1/10/2020

## 2020-01-10 NOTE — PLAN OF CARE
Pt is AAOX4,VSS. Pt is progressing towards plan of care goals. Pain management has been assessed. Pt reports no pain. Pain reassessed throughout shift. SCDs in place with frequent checks for skin breakdown. Wound care completed per order. Fall precautions in place, no report of falls. Tele-sitter at bedside.Safety measures are in place bed in lowest position, wheels locked, call light within reach. Will continue to monitor.

## 2020-01-10 NOTE — PLAN OF CARE
"CM received call from accounts department at [facility]. They report patient's wife did not show up for scheduled meeting at 1030Am. CM explained that wife reported this morning that meeting had been rescheduled to 130PM. Facility reports no contact had been had with wife since initial appt was made and that wife had not rescheduled appt as she stated. Facility now unwilling to accept patient for skilled care due to several missed appts with wife. Spoke with wife to inform her that patient no longer accepted at facility. Wife states"That's fine he will come home", "You need to call his God daughter Jacqueline because she will be taking him home with her". Spoke with Jacqueline and discussed patient will have equipment ordered, HH and IVAB will need to be set up prior to discharge. Team to place HH and discharge orders. Jacqueline to arrive to facility by 130PM to take patient home following his discharge. RW and BSC ordered per [facility].  "

## 2020-01-10 NOTE — PLAN OF CARE
Continue per OT POC, all goals remain appropriate.    Problem: Occupational Therapy Goal  Goal: Occupational Therapy Goal  Description  Goals to be met by: 1/31/2019     Patient will increase functional independence with ADLs by performing:    UE Dressing with Minimal Assistance. (Met - 1/9/20)  LE Dressing with Moderate Assistance.  Grooming while standing with Stand-by Assistance.  Toileting from toilet with Minimal Assistance for hygiene (met - 1/3) and clothing management.   Bathing from  edge of bed with Moderate Assistance.  Supine to sit with Minimal Assistance. -- Met (1/3)        Outcome: Ongoing, Progressing

## 2020-01-10 NOTE — PT/OT/SLP PROGRESS
"Physical Therapy Treatment    Patient Name:  Junaid Muñoz   MRN:  [id number]    Recommendations:     Discharge Recommendations:  nursing facility, skilled   Discharge Equipment Recommendations: walker, rolling, shower chair, bedside commode   Barriers to discharge: Inaccessible home and Decreased caregiver support    Assessment:     Junaid Muñoz is a 74 y.o. male admitted with a medical diagnosis of Spinal epidural abscess.  He presents with the following impairments/functional limitations:  weakness, impaired endurance, impaired self care skills, impaired functional mobilty, decreased lower extremity function, gait instability, impaired balance, decreased coordination, decreased safety awareness, pain, orthopedic precautions . Patient willing to participate in therapy. Primarily limited by decreased safety awareness, decreased incite to deficits, impaired balance. Ayo for supine to sit to maintain spinal precautions. CGA sit to stand. Ayo Gait, RW, chair to follow, several LOB requiring Ayo from PT to correct especially with turns. Patient did not react or acknowledge the LOB's. Patient is not safe to return home when medically ready at current level of mobility and would benefit from skilled nursing facility to improve functional mobility and safety.      Rehab Prognosis: Fair; patient would benefit from acute skilled PT services to address these deficits and reach maximum level of function.    Recent Surgery: Procedure(s) (LRB):  FUSION, SPINE, CERVICAL, POSTERIOR APPROACH C2-T3 posterior instrumented fusion (N/A) 24 Days Post-Op    Plan:     During this hospitalization, patient to be seen 6 x/week to address the identified rehab impairments via gait training, therapeutic activities, therapeutic exercises, neuromuscular re-education and progress toward the following goals:    · Plan of Care Expires:  01/28/20    Subjective     Chief Complaint: CTLSO Brace  Patient/Family Comments/goals: "I need to take " "this thing off"  Pain/Comfort:  · Pain Rating 1: 0/10  · Pain Rating Post-Intervention 1: 0/10      Objective:     Communicated with nurse prior to session.  Patient found HOB elevated with SCD upon PT entry to room.     General Precautions: Standard, fall, contact   Orthopedic Precautions:spinal precautions   Braces:      Functional Mobility:  · Bed Mobility:  Rolling Left:  modified independence  · Supine to Sit: minimum assistance  · Transfers:  Sit to Stand:  contact guard assistance with no AD  · Bed to Chair: minimum assistance with  rolling walker  using  Step Transfer,  Patient instructed on proper hand placement with RW for safe transfers. V/t cues to stay within walker, bring walker back to chair, feel chair with back of legs, reach back for surface and descend slowly. Patient does not demonstrate understanding.   · Gait: 125', RW, Ayo, narrow ADRIA, scissoring gait, v/t cues to stay within RW, push RW rather than . Patient with several LOB's especially while turning requiring Ayo to correct. Patient with significant decreased safety awareness. Patient did not acknowledge or react to LOB and required verbal cueing to stop walking to allow for regaining balance.       AM-PAC 6 CLICK MOBILITY  Turning over in bed (including adjusting bedclothes, sheets and blankets)?: 3  Sitting down on and standing up from a chair with arms (e.g., wheelchair, bedside commode, etc.): 3  Moving from lying on back to sitting on the side of the bed?: 3  Moving to and from a bed to a chair (including a wheelchair)?: 3  Need to walk in hospital room?: 3  Climbing 3-5 steps with a railing?: 2  Basic Mobility Total Score: 17       Therapeutic Activities and Exercises:   Pt educated on importance of OOB activity to decrease the risks associated with bed rest. Reviewed spinal precautions and importance of wearing  brace at all times when patient is sitting up and out of bed.  Sat EOB ~5 min, CGA, donned  brace. "     Patient left up in chair with call button in reach and physician present..    GOALS:   Multidisciplinary Problems     Physical Therapy Goals        Problem: Physical Therapy Goal    Goal Priority Disciplines Outcome Goal Variances Interventions   Physical Therapy Goal     PT, PT/OT Ongoing, Progressing     Description:  Goals to be met by: 19     Patient will increase functional independence with mobility by performin. Supine to sit with MInimal Assistance - met   2. Sit to supine with MInimal Assistance  3. Rolling to Left and Right with Modified Mack.  4. Sit to stand transfer with Minimal Assistance - met   5. Bed to chair transfer with Minimal Assistance using Rolling Walker  6. Gait  x 40 feet with Minimal Assistance using Rolling Walker.   7. Sitting at edge of bed x 8 minutes with Minimal Assistance  8. Stand for 5 minutes with Minimal Assistance using Rolling Walker  9. Lower extremity exercise program x15 reps per handout, with independence    10. Supine to sit with Stand-by Assistance  11. Sit to stand transfer with Stand-by Assistance.                         Time Tracking:     PT Received On: 01/10/20  PT Start Time: 1024     PT Stop Time: 1048  PT Total Time (min): 24 min     Billable Minutes: Gait Training 12 and Therapeutic Activity 12    Treatment Type: Treatment  PT/PTA: PT     PTA Visit Number: 0     Michelle Grey, PT  01/10/2020

## 2020-01-10 NOTE — PLAN OF CARE
01/10/20 1148   Post-Acute Status   Post-Acute Authorization Placement     Patient has been declined SNF placement with [facility]. CM is following up with medical team in regard to the discharge plan of home with .    Kristi Davis LMSW  [facility]   [id number]

## 2020-01-10 NOTE — PLAN OF CARE
01/10/20 1619   Post-Acute Status   Post-Acute Authorization Placement;Other   Other Status See Comments     Patient accepted by [facility] for iv antibiotics  post discharge. Patient expected to discharge home with God daughter Jacqueline tomorrow.    Kristi Davis LMSW  [facility]   [id number]

## 2020-01-10 NOTE — PLAN OF CARE
01/10/20 1402   Post-Acute Status   Post-Acute Authorization Placement   Post-Acute Placement Status Referrals Sent     SW faxed the referral for home health and home infusion to [facility] and will follow up.    Kristi Davis LMSW  [facility]   [id number]

## 2020-01-10 NOTE — NURSING
Bedside report and patient care received from RALEIGH Kelsey. Pt sleeping in bed with no s/s of distress. Pt on weight distributing mattress. SCDs in place. PICC line saline locked to right upper arm. No needs at this time. Tele sitter in room due to patient with history of falls this admission. Will monitor pt closely.

## 2020-01-10 NOTE — PLAN OF CARE
01/10/20 1329   Post-Acute Status   Post-Acute Authorization Placement   Post-Acute Placement Status Referrals Sent     Patient expected to discharge with iv antibiotics and home health. SW sent the referral to [facility] and will follow up.    Kristi Davis LMSW  [facility]   [id number]

## 2020-01-10 NOTE — PLAN OF CARE
General Outpatient  [facility]      HOME HEALTH ORDERS  FACE TO FACE ENCOUNTER    Patient Name: Junaid Muñoz  Date of Birth:  1945    PCP: Oskar Whitman MD   PCP Address: [address] [place], [place] [address] / [place] [address]  PCP Phone Number: [phone]  PCP Fax: [phone]    Encounter Date: 12/6/19    Admit to Home Health    Diagnoses:   Epidural abscess  (primary encounter diagnosis)   SNOMED CT(R): Epidural abscess     Osteomyelitis of cervical spine   SNOMED CT(R): Osteomyelitis of vertebra     Chest pain   SNOMED CT(R): Chest pain     Spinal epidural abscess   SNOMED CT(R): Spinal epidural abscess     CAD (coronary artery disease)   SNOMED CT(R): Coronary arteriosclerosis      Allergies:Review of patient's allergies indicates:   -- Penicillins -- Rash    --  Tolerated cefepime December 2019    Medications: Review discharge medications with patient and family and provide education.      Current Facility-Administered Medications:  acetaminophen tablet 500 mg, 500 mg, Oral, Q8H PRN, PERRY Saez, 500 mg at 01/05/20 0937  albuterol-ipratropium 2.5 mg-0.5 mg/3 mL nebulizer solution 3 mL, 3 mL, Nebulization, Q12H, PERRY Saez, 3 mL at 01/10/20 0710  amLODIPine tablet 10 mg, 10 mg, Oral, Daily, Huey Kruse NP, 10 mg at 01/10/20 0801  ascorbic acid (vitamin C) tablet 250 mg, 250 mg, Oral, BID, PERRY Saez, 250 mg at 01/10/20 0802  balsam peru-castor oil Oint, , Topical (Top), BID, Jerri Ibarra PA-C  bisacodyl suppository 10 mg, 10 mg, Rectal, Daily PRN, PERRY Saez  ceFEPIme injection 2 g, 2 g, Intravenous, Q8H, Mary Preciado MD, 2 g at 01/10/20 0758  clopidogrel tablet 75 mg, 75 mg, Oral, Daily, Huey Kruse NP, 75 mg at 01/10/20 0800  famotidine tablet 20 mg, 20 mg, Oral, BID, Huey Kruse NP, 20 mg at 01/10/20 0802  ferrous sulfate EC tablet 325 mg, 325 mg, Oral, BID, PERRY Saez, 325 mg at 01/10/20 0801  gabapentin capsule 300 mg, 300 mg,  Oral, TID, Lamont Lira PA-C, 300 mg at 01/10/20 0801  heparin (porcine) injection 5,000 Units, 5,000 Units, Subcutaneous, Q8H, Lamont Lira PA-C, 5,000 Units at 01/10/20 0542  labetalol 20 mg/4 mL (5 mg/mL) IV syring, 10 mg, Intravenous, Q4H PRN, Marily Brooks PA-C, 10 mg at 12/21/19 1819  levothyroxine tablet 50 mcg, 50 mcg, Oral, Before breakfast, Huey Kruse NP, 50 mcg at 01/10/20 0542  lidocaine 5 % patch 1 patch, 1 patch, Transdermal, Daily, Marily Brooks PA-C, 1 patch at 01/10/20 0800  losartan-hydrochlorothiazide 100-25 mg per tablet 1 tablet, 1 tablet, Oral, Daily, Huey Kruse NP, 1 tablet at 01/10/20 0800  metoprolol tartrate (LOPRESSOR) tablet 50 mg, 50 mg, Oral, BID, Huey Kruse NP, 50 mg at 01/10/20 0801  naloxone 0.4 mg/mL injection 0.4 mg, 0.4 mg, Intravenous, PRN, Reg Spears MD  ondansetron injection 8 mg, 8 mg, Intravenous, Q8H PRN, PERRY Saez  oxyCODONE immediate release tablet 10 mg, 10 mg, Oral, Q6H PRN, Huey Kruse NP, 10 mg at 01/01/20 1241  polyethylene glycol packet 17 g, 17 g, Oral, Daily, Huey Kruse NP, 17 g at 01/10/20 0800  promethazine (PHENERGAN) 6.25 mg in dextrose 5 % 50 mL IVPB, 6.25 mg, Intravenous, Q6H PRN, PERRY Saez  senna-docusate 8.6-50 mg per tablet 1 tablet, 1 tablet, Oral, BID, Huey Kruse NP, 1 tablet at 01/10/20 0801  sodium chloride 0.9% flush 10 mL, 10 mL, Intravenous, Q6H, Roque Zhang MD, 10 mL at 01/10/20 1127    And  sodium chloride 0.9% flush 10 mL, 10 mL, Intravenous, PRN, Roque Zhang MD  sodium chloride 3% nebulizer solution 4 mL, 4 mL, Nebulization, Q12H, PERRY Saez, 4 mL at 01/10/20 0923      Current Discharge Medication List    START taking these medications    albuterol-ipratropium (DUO-NEB) 2.5 mg-0.5 mg/3 mL nebulizer solution  Take 3 mLs by nebulization every 12 (twelve) hours. Rescue  Qty: 1 Box Refills: 0    amLODIPine (NORVASC) 10 MG tablet  Take 1  tablet (10 mg total) by mouth once daily.  Qty: 30 tablet Refills: 11    ascorbic acid, vitamin C, (VITAMIN C) 250 MG tablet  Take 1 tablet (250 mg total) by mouth 2 (two) times daily.    balsam peru-castor oil Oint  Apply topically 2 (two) times daily.  Qty: 30 g Refills: 1    ceFEPIme (MAXIPIME) 2 gram injection  Inject 2 g into the vein every 8 (eight) hours.    ferrous sulfate 325 (65 FE) MG EC tablet  Take 1 tablet (325 mg total) by mouth 2 (two) times daily.  Refills: 0    gabapentin (NEURONTIN) 300 MG capsule  Take 1 capsule (300 mg total) by mouth 3 (three) times daily.  Qty: 90 capsule Refills: 11    metoprolol tartrate (LOPRESSOR) 50 MG tablet  Take 1 tablet (50 mg total) by mouth 2 (two) times daily.  Qty: 60 tablet Refills: 11    oxyCODONE (ROXICODONE) 10 mg Tab immediate release tablet  Take 1 tablet (10 mg total) by mouth every 6 (six) hours as needed for Pain.   Qty: 56 tablet Refills: 0  Comments: Quantity prescribed more than 7 day supply? Yes, quantity medically necessary    !! sodium chloride 0.9% (NORMAL SALINE FLUSH) injection  Inject 10 mLs into the vein every 6 (six) hours.    !! sodium chloride 0.9% (NORMAL SALINE FLUSH) injection  Inject 10 mLs into the vein as needed.    sodium chloride 1 gram tablet  Take 2 tablets (2 g total) by mouth once daily.    !! - Potential duplicate medications found. Please discuss with provider.      CONTINUE these medications which have CHANGED    atorvastatin (LIPITOR) 80 MG tablet  Take 0.5 tablets (40 mg total) by mouth once daily.      CONTINUE these medications which have NOT CHANGED    clopidogrel (PLAVIX) 75 mg tablet  Take 75 mg by mouth once daily at 6am.    levothyroxine (SYNTHROID) 50 MCG tablet  Take 50 mcg by mouth once daily at 6am.    losartan-hydrochlorothiazide 100-25 mg (HYZAAR) 100-25 mg per tablet  Take 1 tablet by mouth once daily at 6am.       STOP taking these medications    ibuprofen (ADVIL,MOTRIN) 600 MG tablet  Comments:  Reason for  Stopping:    metoprolol succinate (TOPROL-XL) 100 MG 24 hr tablet  Comments:  Reason for Stopping:            Follow Up Appointments:  Future Appointments  1/14/2020  3:00 PM    Mikayla Koehler MD    [facility] ID        [address]  1/23/2020  2:00 PM    Elian Deluca MD          [facility] NEUROS7   [address]  1/29/2020  8:00 AM    Avel Cantu MD     [facility] HEPAT     [address]  3/12/2020  2:30 PM    Elian Deluca MD          [facility] NEUROS7   [address]      I have seen and examined this patient within the last 30 days. My clinical findings that support the need for the home health skilled services and home bound status are the following:  Weakness/numbness causing balance and gait disturbance due to Surgery making it taxing to leave home.  Requiring assistive device to leave home due to unsteady gait caused by  Surgery.  Patient with complicated medications requiring assistance due to need of IV antibiotic use three times daily via PICC line.   Medical restrictions requiring assistance of another human to leave home due to  Unstable ambulation and Post surgery monitoring.     Nursing:   SN to complete comprehensive assessment including routine vital signs. Instruct on disease process and s/s of complications to report to MD. Review/verify medication list sent home with the patient at time of discharge  and instruct patient/caregiver as needed. Frequency may be adjusted depending on start of care date. Notify MD if SBP > 160 or < 90; DBP > 90 or < 50; HR > 120 or < 50; Temp > 101; O2 < 88%    Home Infusion Therapy:   SN to perform Infusion Therapy/Central Line Care.  Review Central Line Care & Central Line Flush with patient.    Administer (drug and dose): Cefepime 2 g injection. Inject 2 g into the vein every 8 (eight) hours.  Estimated end date 2/11/2020.  Last dose given: 8 am on 1/10/2020                         Home dose due: 4 pm on 1/10/2020    Scrub the Hub: Prior to accessing the line, always perform a 30 second  alcohol scrub  Each lumen of the central line is to be flushed at least daily with 10 mL Normal Saline and 3 mL [unfilled]  Skilled Nurse (SN) may draw blood from IV access  Blood Draw Procedure:  - Aspirate at least 5 mL of blood  - Discard  - Obtain specimen  - Change injection cap  - Flush with 20 mL Normal Saline followed by a                 3-5 mL [unfilled]  Central :  - Sterile dressing changes are done weekly and as needed.  - [unfilled]  - Injection caps are changed weekly and after EVERY lab draw.  - If sterile gauze is under dressing to control oozing,                 dressing change must be performed every 24 hours until gauze is not needed.    Diet:   regular diet    Activities:   activity as tolerated    Labs:  SN to perform labs:  ESR, CRP, CBC, CMP weekly to the ID department at [phone] cc Dr. Mikayla Koehler.    Referrals/ Consults  Physical Therapy to evaluate and treat. Evaluate for home safety and equipment needs; Establish/upgrade home exercise program. Perform / instruct on therapeutic exercises, gait training, transfer training, and Range of Motion.  Occupational Therapy to evaluate and treat. Evaluate home environment for safety and equipment needs. Perform/Instruct on transfers, ADL training, ROM, and therapeutic exercises.  Aide to provide assistance with personal care, ADLs, and vital signs.    Home Health Aide:  Nursing Three times weekly, Physical Therapy Three times weekly, Occupational Therapy Three times weekly and Home Health Aide Three times weekly    I certify that this patient is confined to his home and needs intermittent skilled nursing care, physical therapy and occupational therapy.      Miley Wood PA-C  Pager: [phone]  Neurosurgery  [facility]

## 2020-01-10 NOTE — PLAN OF CARE
01/10/20 1044   Post-Acute Status   Post-Acute Authorization Placement;Other   Other Status See Comments      SW received a call from Jacqueline Alberts,(Goddaughter) [phone] for patient and she reports that she takes care of patient and does not want him to be discharged home. SW informed her of the discharge plan to get patient to [facility]. JC will follow up with patient and the medical team.    Kristi Davis LMSW  [facility]   [id number]

## 2020-01-10 NOTE — PLAN OF CARE
CM placed call to patient's wife Irene to confirm that she will go to 1030am appt set with admissions office at the [facility]. Wife reports she spoke with facility yesterday afternoon and appt time has been changed to 130PM. Wife reports that her neighbor will drive her to and from facility for appt regarding patient's SNF admission.

## 2020-01-10 NOTE — PLAN OF CARE
Patient's God daughter Jacqueline at Bedside. Requesting that d/c be held till am so she can provide a comfortable sleeping place for patient in her home. Neurosurgery PAC [facility] spoke with Jacqueline and will d/c patient in am. Blast referrals sent to [facility], awaiting acceptance. Infusion plus running benefits for home IVAB. Liaison Yesica to provide teaching to patient and family today. DME ordered and  has made contact with family.

## 2020-01-10 NOTE — ASSESSMENT & PLAN NOTE
74 y.o. male with PMH of HTN, HLD, Hepatitis C, and CAD s/p two stents on plavix who presents with C6-T2 osteomyelitis with suspected epidural abscess.  Now s/p C7/T1 corpectomies on 12/10 and C2-T2  posterior instrumented fusion 12/17.    -Patient neurologically stable on exam  -Continue  brace at all times.   -Staples removed 12/31/19. Incision well healed. Leave incision open to air. Turn q2h to continue to promote wound healing.   -Elevated LFTs: Stable. Abdominal US on 1/7 revealed splenomegaly and hepatic steatosis. Will continue to trend LFTs and hold statin. Per , restart statin at lower dose (40 mg) upon discharge. Follow-up with hepatology on 1/29. No further inpatient workup indicated.  -Thrombocytopenia: Plt decreasing throughout admission, 85 today. Ranges 108-186 throughout admission, and previously down to 86 per chart review in 2017. Hematology consulted, appreciate assistance. Added on iron studies, likely attribute to Hepatitis C. Will monitor closely with CBC daily while inpatient. No active signs of bleeding. Transfuse for Hb <7, Plt <10, or active bleeding. Will dc heparin per rec if Plt <50.   -ID: Remains afebrile. PICC in place. Continue Cefepime 2g q 8 hours. Estimated end date of therapy 1/28/20-2/11/20. FU scheduled with ID on 1/14.  -2nd left toe wound care: Continue betadine daily and dress with foam bandage.   -Pain: Pain well controlled on current regimen. No adjustments needed at this time.   -Anemia: Continue iron for replacement x 2 weeks. F/u at next lab draw.  -HTN: Continue Amlodipine 10 mg daily, Losartan-HCTZ 100-25 mg daily, and Metoprolol 50 mg BID.   -CAD s/p stent placement: Continue home dose of Plavix 75 mg daily.   -Hyponatremia: resolved. Na tabs discontinued 1/5. Will continue to monitor.   -Hypoalbuminemia: Continue Jose BID to promote wound healing.   -HLD: Continue to hold high dose atorvastatin in setting of elevated LFTs. Per  okay to resume at discharge  at lower dose (40 mg).   -DVT prophylaxis: TUCKER's, SCD's, SQH  -Bowel regimen: senna BID and miralax daily  -Atelectasis prevention: IS hourly while awake, PT/OT, OOB > 6 hours per day    DISPO: Denied by [facility]. Patient has been denied by all referred facilities. He has been progressing well with PT/OT however continues to demonstrate gait instability and will require 24/7 assistance at discharge. Discussed with patient's goddaughter, Jacqueline, over the phone who stated she has been his primary caretaker and would resume care for him at discharge. She discussed getting a sitter for him when she goes back to work. From case management's note today, patient's wife Irene was informed that he had been denied from the facility, wife stated he will come home and that we needed to contact patient's god daughter Jacqueline because she will be taking him home. See note dated 1/10 by Kimberly from case management for further detail. Plan currently to discharge patient home with assistance from his god daughter and possibly a sitter. He is medically stable for discharge and has all follow-up scheduled. Home equipment ordered. All questions answered. Encouraged to call the clinic with questions/concerns prior to next visit.

## 2020-01-10 NOTE — PROGRESS NOTES
[facility]  Neurosurgery  Progress Note    Subjective:     History of Present Illness: Junaid Muñoz is a 74 y.o. male with PMH of HTN, HLD, Hepatitis C, and CAD s/p two stents on plavix who presents with 1 month history of back pain between his shoulders. MRI at OSH demonstrates likely epidural abscess. Pt denies hx of trauma and reports slow onset of symptoms.     Post-Op Info:  Procedure(s) (LRB):  FUSION, SPINE, CERVICAL, POSTERIOR APPROACH C2-T3 posterior instrumented fusion (N/A)   24 Days Post-Op     Interval History: NAEON. LFTs stable. 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 Exam stable. Patient sitting up in chair on exam, tolerated walking in halls with therapy well however demonstrated gait imbalance and required minimum assistance.  brace on. Denies pain, weakness, paresthesias, b/b dysfunction.     Medications:  Continuous Infusions:  Scheduled Meds:   albuterol-ipratropium  3 mL Nebulization Q12H    amLODIPine  10 mg Oral Daily    ascorbic acid (vitamin C)  250 mg Oral BID    balsam peru-castor oil   Topical (Top) BID    ceFEPime (MAXIPIME) IVPB  2 g Intravenous Q8H    clopidogrel  75 mg Oral Daily    famotidine  20 mg Oral BID    ferrous sulfate  325 mg Oral BID    gabapentin  300 mg Oral TID    heparin (porcine)  5,000 Units Subcutaneous Q8H    levothyroxine  50 mcg Oral Before breakfast    lidocaine  1 patch Transdermal Daily    losartan-hydrochlorothiazide 100-25 mg  1 tablet Oral Daily    metoprolol tartrate  50 mg Oral BID    miconazole nitrate 2%   Topical (Top) BID    polyethylene glycol  17 g Oral Daily    senna-docusate 8.6-50 mg  1 tablet Oral BID    sodium chloride 0.9%  10 mL Intravenous Q6H    sodium chloride 3%  4 mL Nebulization Q12H     PRN Meds:acetaminophen, bisacodyl, labetalol, naloxone, ondansetron, oxyCODONE, promethazine (PHENERGAN) IVPB, Flushing PICC  Protocol **AND** sodium chloride 0.9% **AND** sodium chloride 0.9%       Objective:     Weight: 75.2 kg (165 lb 12.6 oz)  Body mass index is 24.48 kg/m².  Vital Signs (Most Recent):  Temp: 96.6 °F (35.9 °C) (01/10/20 1135)  Pulse: 77 (01/10/20 1135)  Resp: 14 (01/10/20 0923)  BP: (!) 92/55 (01/10/20 1135)  SpO2: 96 % (01/10/20 1135) Vital Signs (24h Range):  Temp:  [96.6 °F (35.9 °C)-96.8 °F (36 °C)] 96.6 °F (35.9 °C)  Pulse:  [71-88] 77  Resp:  [14-18] 14  SpO2:  [96 %-98 %] 96 %  BP: ()/(55-60) 92/55     Date 01/10/20 0700 - 01/11/20 0659   Shift 2790-2473 5321-5420 1271-4326 24 Hour Total   INTAKE   P.O. 240   240   I.V.(mL/kg) 10(0.1)   10(0.1)   Other 0   0   Shift Total(mL/kg) 250(3.3)   250(3.3)   OUTPUT   Urine(mL/kg/hr) 175   175   Emesis/NG output 0   0   Other 0   0   Stool 0   0   Blood 0   0   Shift Total(mL/kg) 175(2.3)   175(2.3)   Weight (kg) 75.2 75.2 75.2 75.2                   Male External Urinary Catheter 12/19/19 1200 Small (Active)   Collection Container Urimeter 12/27/2019  7:50 PM   Securement Method secured to lower ABD w/ tape 12/27/2019  7:50 PM   Skin no redness;no breakdown 12/27/2019  7:50 PM   Tolerance no signs/symptoms of discomfort 12/27/2019  7:50 PM   Output (mL) 250 mL 12/28/2019  9:00 AM   Catheter Change Date 12/24/19 12/26/2019  4:24 PM   Catheter Change Time 1100 12/26/2019  4:24 PM       Neurosurgery Physical Exam    General: well developed, well nourished, no distress.   Head: normocephalic, atraumatic  Neck:  brace in place.   Neurologic: Alert and oriented. Thought content appropriate.  GCS: Motor: 6/Verbal: 5/Eyes: 4 GCS Total: 15  Mental Status: Awake, Alert, Oriented x 4  Language: No aphasia  Speech: No dysarthria  Cranial nerves: face symmetric, tongue midline, CN II-XII grossly intact.   Eyes: pupils equal, round, reactive to light with accomodation, EOMI.   Ears: No drainage.   Pulmonary: normal respirations, no signs of respiratory distress  Abdomen:  soft, non-distended, not tender to palpation  Sensory: intact to light touch throughout  Motor Strength: Moves all extremities spontaneously with good tone.  Full strength upper and lower extremities. No abnormal movements seen.     Strength  Deltoids Triceps Biceps Wrist Extension Wrist Flexion Hand    Upper: R 5/5 5/5 5/5 5/5 5/5 5/5    L 5/5 5/5 5/5 5/5 5/5 5/5     Iliopsoas Quadriceps Knee  Flexion Tibialis  anterior Gastro- cnemius EHL   Lower: R 5-/5 5/5 5/5 5/5 5/5 5/5    L 5-/5 5/5 5/5 5/5 5/5 5/5     Damian: absent  Clonus: absent  Babinski: absent  Vascular: Pulses 2+ and symmetric radial and dorsalis pedis. No LE edema.   Skin: Skin is warm, dry and intact.    Incision c/d/I with skin edges well approximated, well healed. No surrounding erythema or edema. No drainage from incision. No palpable underlying fluid collection.      Significant Labs:  Recent Labs   Lab 01/09/20  0430 01/10/20  0435   GLU 97 127*    139   K 3.6 3.8    108   CO2 24 23   BUN 24* 24*   CREATININE 0.8 0.9   CALCIUM 9.6 9.0     Recent Labs   Lab 01/09/20  0430 01/10/20  0435 01/10/20  1125   WBC 5.38 4.62 5.50   HGB 8.6* 8.1* 9.0*   HCT 28.5* 26.7* 29.8*   PLT 93* 85* 125*     No results for input(s): LABPT, INR, APTT in the last 48 hours.  Microbiology Results (last 7 days)     ** No results found for the last 168 hours. **        Recent Lab Results       01/10/20  1125   01/10/20  0435   01/09/20  1911        TB Induration 48 - 72 hr read     0     Albumin   2.3       Alkaline Phosphatase   441       ALT   134       Anion Gap   8       AST   114       Baso # 0.05 0.04       Basophil% 0.9 0.9       BILIRUBIN TOTAL   0.2  Comment:  For infants and newborns, interpretation of results should be based  on gestational age, weight and in agreement with clinical  observations.  Premature Infant recommended reference ranges:  Up to 24 hours.............<8.0 mg/dL  Up to 48 hours............<12.0 mg/dL  3-5  days..................<15.0 mg/dL  6-29 days.................<15.0 mg/dL         BUN, Bld   24       Calcium   9.0       Chloride   108       CO2   23       Creatinine   0.9       Differential Method Automated Automated       eGFR if    >60.0       eGFR if non    >60.0  Comment:  Calculation used to obtain the estimated glomerular filtration  rate (eGFR) is the CKD-EPI equation.          Eos # 0.2 0.2       Eosinophil% 3.6 3.5       Ferritin 148         Folate 5.3         Glucose   127       Gran # (ANC) 3.5 3.0       Gran% 64.0 65.8       Haptoglobin 186         Hematocrit 29.8 26.7       Hemoglobin 9.0 8.1       Immature Grans (Abs) 0.02  Comment:  Mild elevation in immature granulocytes is non specific and   can be seen in a variety of conditions including stress response,   acute inflammation, trauma and pregnancy. Correlation with other   laboratory and clinical findings is essential.   0.02  Comment:  Mild elevation in immature granulocytes is non specific and   can be seen in a variety of conditions including stress response,   acute inflammation, trauma and pregnancy. Correlation with other   laboratory and clinical findings is essential.         Immature Granulocytes 0.4 0.4         Comment:  Results are increased in hemolyzed samples.         Lymph # 0.9 0.7       Lymph% 17.1 14.7       MCH 28.8 28.8       MCHC 30.2 30.3       MCV 95 95       Mono # 0.8 0.7       Mono% 14.0 14.7       MPV 10.8 11.4       nRBC 0 0       Pathologist Review Review required         Platelets 125 85       Potassium   3.8       PROTEIN TOTAL   5.9       RBC 3.13 2.81       RDW 21.1 21.0       Retic 3.5         Sodium   139       Vitamin B-12 476         WBC 5.50 4.62           All pertinent labs from the last 24 hours have been reviewed.    Significant Diagnostics:  No new imaging to review.     Assessment/Plan:     * Spinal epidural abscess  74 y.o. male with PMH of HTN, HLD, Hepatitis C,  and CAD s/p two stents on plavix who presents with C6-T2 osteomyelitis with suspected epidural abscess.  Now s/p C7/T1 corpectomies on 12/10 and C2-T2  posterior instrumented fusion 12/17.    -Patient neurologically stable on exam  -Continue  brace at all times.   -Staples removed 12/31/19. Incision well healed. Leave incision open to air. Turn q2h to continue to promote wound healing.   -Elevated LFTs: Stable. Abdominal US on 1/7 revealed splenomegaly and hepatic steatosis. Will continue to trend LFTs and hold statin. Per , restart statin at lower dose (40 mg) upon discharge. Follow-up with hepatology on 1/29. No further inpatient workup indicated.  -Thrombocytopenia: Plt decreasing throughout admission, 85 today. Ranges 108-186 throughout admission, and previously down to 86 per chart review in 2017. Hematology consulted, appreciate assistance. Added on iron studies, likely attribute to Hepatitis C. Will monitor closely with CBC daily while inpatient. No active signs of bleeding. Transfuse for Hb <7, Plt <10, or active bleeding. Will dc heparin per rec if Plt <50.   -ID: Remains afebrile. PICC in place. Continue Cefepime 2g q 8 hours. Estimated end date of therapy 1/28/20-2/11/20. FU scheduled with ID on 1/14.  -2nd left toe wound care: Continue betadine daily and dress with foam bandage.   -Pain: Pain well controlled on current regimen. No adjustments needed at this time.   -Anemia: Continue iron for replacement x 2 weeks. F/u at next lab draw.  -HTN: Continue Amlodipine 10 mg daily, Losartan-HCTZ 100-25 mg daily, and Metoprolol 50 mg BID.   -CAD s/p stent placement: Continue home dose of Plavix 75 mg daily.   -Hyponatremia: resolved. Na tabs discontinued 1/5. Will continue to monitor.   -Hypoalbuminemia: Continue Jose BID to promote wound healing.   -HLD: Continue to hold high dose atorvastatin in setting of elevated LFTs. Per  okay to resume at discharge at lower dose (40 mg).   -DVT prophylaxis: TUCKER's,  SCD's, SQH  -Bowel regimen: senna BID and miralax daily  -Atelectasis prevention: IS hourly while awake, PT/OT, OOB > 6 hours per day    DISPO: Denied by [facility]. Patient has been denied by all referred facilities. He has been progressing well with PT/OT however continues to demonstrate gait instability and will require 24/7 assistance at discharge. Discussed with patient's goddaughter, Jacqueline, over the phone who stated she has been his primary caretaker and would resume care for him at discharge. She discussed getting a sitter for him when she goes back to work. From case management's note today, patient's wife Irene was informed that he had been denied from the facility, wife stated he will come home and that we needed to contact patient's god daughter Jacqueline because she will be taking him home. See note dated 1/10 by Kimberly from case management for further detail. Plan currently to discharge patient home with assistance from his god daughter and possibly a sitter. He is medically stable for discharge and has all follow-up scheduled. Home equipment ordered. All questions answered. Encouraged to call the clinic with questions/concerns prior to next visit.        Miley Wood PA-C  Neurosurgery  [facility]

## 2020-01-10 NOTE — PLAN OF CARE
01/10/20 1551   Post-Acute Status   Post-Acute Authorization Placement   Post-Acute Placement Status Referrals Sent     Patients current insurance is [employer], JC sent the referral via [facility] to [facility] and will follow up with Juliana in the intake department.    Kristi Davis LMSW  [facility]   [id number]

## 2020-01-10 NOTE — PROGRESS NOTES
Wound care follow up and new consult for inner thighs redness and rash.    Sacrum- Healing Stage 1 with less redness than 4 days ago.  Inner thighs/perineum with red fungal-like appearance.  Pt verbalized increased comfort with the SHERLYN surface.  Discussed recommendations with pt, Nurse Naima, and JACOB AMADOR and entered additional orders accordingly.  Recommendations:  - Continue pressure injury prevention interventions and Venelex to sacrum BID  - Begin antifungal moisture barrier to inner thighs/perineum  Wound care team to follow prn  [id number]       01/10/20 1154        Wound 01/10/20 Intertrigo Perineum   Date First Assessed: 01/10/20   Primary Wound Type: Intertrigo  Location: Perineum   Wound Image    Wound WDL ex   Dressing Appearance Open to air   Drainage Amount None   Appearance Red;Moist  (yeast- appearance)   Care   (ordered antifungal)        Pressure Injury 01/07/20 Sacral spine Stage 1   Date First Assessed: 01/07/20   Pressure Injury Present on Admission: suspected hospital acquired  Location: Sacral spine  Staging: Stage 1   Wound Image    Staging Stage 1   Dressing Appearance Open to air   Drainage Amount None   Appearance Pink;Red   Red (%), Wound Tissue Color 100 %   Wound Length (cm) 10 cm   Wound Width (cm) 10 cm   Wound Depth (cm) 0.1 cm   Wound Volume (cm^3) 10 cm^3   Wound Surface Area (cm^2) 100 cm^2   Care   (continue Venelex)   Skin Interventions   Pressure Reduction Devices specialty bed utilized

## 2020-01-10 NOTE — MEDICAL/APP STUDENT
[facility] Medicine  Hematology Consult Note    Patient Name: Junaid Muñoz  MRN: [id number]  Patient Class: IP- Inpatient     Admission Date: 12/6/2019  Length of Stay: 35 days  Attending Physician: Elian Deluca MD  Primary Care Provider: Oskar Whitman MD    Physician performing consult: Dr Chapman  Reason for Consult: Thrombocytopenia    Principal Problem: Epidural abscess    HPI:   Mr Muñoz, 74/M with PMH significant for Anemia, HTN, HLD, CAD s/p two stents on plavix, Hepatitis C with biopsy proven cirrhosis likely 2/2 IVDU, hypothyroidism and wound over left toe, presented to OSH with sx of progressively worsening back pain since one month. Was found to have OM of C6-T2 with epidural abscess on MRI, and was transferred here for Neurosurgery evaluation. He underwent C7-T1 Corpectomy on 12/10 and posterior fusion on 12/17. Wound cultures positive for pseudomonas, being treated with iv Cefepime 2 g Q8H.   His home meds include plavix, amlodipine, losartan-hctz, metoprolol, atorvastatin, levothyroxine however pt states he has not been taking any of them for some time.     We are being consulted I/v/o new onset thrombocytopenia.  Serial monitoring of CBCs showed Platelet count falling below 100 since the last 2 days. Last episode of similar thrombocytopenia was in 2017.    Patient seems to be doing well today. Denies any bleeding (hematemesis, melena, BRBPR, hemoptysis), rashes or bruising. Reports betterment of back and neck pain. C/o cough since yesterday with no expectoration.     Past Medical History:   Diagnosis Date    CAD (coronary artery disease)     s/p stent 2005, on plavix    Chronic hepatitis C     Cirrhosis     biopsy proven - 2007    History of colon polyps 06/2008    1 polyp - tubular adenoma    HTN (hypertension)     Hyperlipidemia     Hypothyroidism      Past Surgical History:   Procedure Laterality Date    COLONOSCOPY W/ POLYPECTOMY  06/2008      Girgrah: fair prep, 3mm polyp - tubular adenoma    CORONARY ANGIOPLASTY WITH STENT PLACEMENT      FUSION OF CERVICAL SPINE BY POSTERIOR APPROACH N/A 12/17/2019    Procedure: FUSION, SPINE, CERVICAL, POSTERIOR APPROACH C2-T3 posterior instrumented fusion;  Surgeon: Elian Deluca MD;  Location: [address];  Service: Neurosurgery;  Laterality: N/A;    hydrocele repair  teenager    pyloric stenosis repair  age 1    SURGICAL REMOVAL OF VERTEBRAL BODY OF THORACIC SPINE N/A 12/10/2019    Procedure: CORPECTOMY, SPINE, CERVICAL AND THORACIC, C7 and T1 with Globus;  Surgeon: Elian Deluca MD;  Location: [address];  Service: Neurosurgery;  Laterality: N/A;    TONSILLECTOMY       Review of patient's allergies indicates:   Allergen Reactions    Penicillins Rash     Tolerated cefepime December 2019          Current Outpatient Medications on File Prior to Encounter   Medication Sig    atorvastatin (LIPITOR) 80 MG tablet Take 80 mg by mouth once daily at 6am.    clopidogrel (PLAVIX) 75 mg tablet Take 75 mg by mouth once daily at 6am.    ibuprofen (ADVIL,MOTRIN) 600 MG tablet Take 1 tablet (600 mg total) by mouth every 6 (six) hours as needed for Pain.    levothyroxine (SYNTHROID) 50 MCG tablet Take 50 mcg by mouth once daily at 6am.    losartan-hydrochlorothiazide 100-25 mg (HYZAAR) 100-25 mg per tablet Take 1 tablet by mouth once daily at 6am.     metoprolol succinate (TOPROL-XL) 100 MG 24 hr tablet Take 100 mg by mouth once daily at 6am.        Family History  History reviewed. No pertinent family history.      Social History     Socioeconomic History    Marital status:      Spouse name: Not on file    Number of children: Not on file    Years of education: Not on file    Highest education level: Not on file   Occupational History    Not on file   Social Needs    Financial resource strain: Not on file    Food insecurity:     Worry: Not on file     Inability: Not on file    Transportation  needs:     Medical: Not on file     Non-medical: Not on file   Tobacco Use    Smoking status: Current Every Day Smoker   Substance and Sexual Activity    Alcohol use: Not on file    Drug use: Yes     Types: IV     Comment: MORPHINE    Sexual activity: Not on file   Lifestyle    Physical activity:     Days per week: Not on file     Minutes per session: Not on file    Stress: Not on file   Relationships    Social connections:     Talks on phone: Not on file     Gets together: Not on file     Attends Ten[employer]ism service: Not on file     Active member of club or organization: Not on file     Attends meetings of clubs or organizations: Not on file     Relationship status: Not on file   Other Topics Concern    Not on file   Social History Narrative    Not on file       ROS:  Constitutional: Negative for chills, fatigue and fever.   HENT: Negative for congestion and trouble swallowing.    Eyes: Negative for visual disturbance.   Respiratory: Positive for cough. Negative for expectoration, hemoptysis, shortness of breath.    Cardiovascular: Negative for chest pain.   Gastrointestinal: Negative for abdominal pain, nausea and vomiting.   Genitourinary: Negative for dysuria.   Musculoskeletal: Positive for back pain and neck pain.   Skin: Negative for rash.   Neurological: Positive for weakness. Negative for dizziness, seizures and headaches.   Psychiatric/Behavioral: Negative for agitation and hallucinations. The patient is not nervous/anxious.      Objective     Vital Signs (Most Recent):  Temp: 96.8 °F (01/10/20 0701)  Pulse: 71 (01/10/20 0901)  Resp: 14 (01/10/20 0901)  BP: 115/60 (01/10/20 0701)  SpO2: 96 % (01/10/20 0901) Vital Signs (24h Range):  Temp:  96 °F -96.8 °F   Pulse: 71-88  Resp:  14-18  BP: 112-115/53-60   SpO2:  95%-96%     Weight: 75.2 kg (165 lb 12.6 oz)  Body mass index is 24.48 kg/m².  Body surface area is 1.91 meters squared.    Intake/Output Summary (Last 24 hours) at 1/10/2020 0945  Last data  filed at 1/10/2020 0800  Gross per 24 hour   Intake 250 ml   Output 1025 ml   Net -775 ml     Physical Exam   Constitutional: He is oriented to person, place, and time. He appears well-developed and well-nourished. No distress.   HENT:   Head: Normocephalic and atraumatic.   Mouth/Throat: Oropharynx is clear and moist.   Eyes: Conjunctivae and EOM are normal. No scleral icterus.   Neck: Supported by  brace.   Cardiovascular: RRR. No MRG.   Pulmonary/Chest: CTAB, no added sounds.   Abdominal: Soft. He exhibits no distension. There is no tenderness. There is no guarding. Midline abdominal scar in place from childhood pyloric stenosis repair   Musculoskeletal: Normal range of motion. He exhibits no edema or tenderness.   Ext: No asterixis or tremor. Ataxic gait - able to walk using walker with assistance.   Lymphadenopathy:  He has no cervical adenopathy.   Skin: Skin is warm and dry. He is not diaphoretic. Erythema present over back at area of contact with brace. Sacral pressure injury seems to be healing. Contusion present over left elbow - pt unclear about onset. No petechiae or purpura noted. No spider angiomata or caput medusa     Diagnostic Studies:   I have reviewed the following relevant findings as noted below:  CBC:   Recent Labs   Lab 01/09/20 0430 01/10/20  0435   WBC 5.38 4.62   HGB 8.6* 8.1*   HCT 28.5* 26.7*   PLT 93* 85*     BMP:   Recent Labs   Lab 01/09/20 0430 01/10/20  0435   GLU 97 127*    139   K 3.6 3.8    108   CO2 24 23   BUN 24* 24*   CREATININE 0.8 0.9   CALCIUM 9.6 9.0     LFTs:   Recent Labs   Lab 01/09/20 0430 01/10/20  0435   * 134*   * 114*   ALKPHOS 460* 441*   BILITOT 0.3 0.2   PROT 6.0 5.9*   ALBUMIN 2.5* 2.3*     Imaging Studies:  USG Abdomen on 01/06/2020   The liver is normal in size, measuring 14.1 cm.  The liver demonstrates diffusely increased parenchymal echogenicity compatible with hepatic steatosis.  The HRI is elevated measuring 1.75. No focal  hepatic lesions are seen.  The spleen is enlarged in size measuring 14.3 x 3.9 cm with a homogeneous echotexture.  Impression: Hepatic Steatosis and Splenomegaly.    Last USG abdomen in January 2018 s/o Cirrhosis. No e/o cirrhosis in current study.     Assessment/Plan:  Mr Muñoz, 74/M with PMH significant for Anemia, HTN, HLD, CAD s/p two stents on plavix, Hepatitis C with biopsy proven cirrhosis likely 2/2 IVDU, hypothyroidism and wound over left toe, presented to OSH with sx of progressively worsening back pain since one month. Was found to have OM of C6-T2 with epidural abscess on MRI, and was transferred here for Neurosurgery evaluation. He underwent C7-T1 Corpectomy on 12/10 and posterior fusion on 12/17. Hemonc consulted I/v/o new onset Thrombocytopenia.    - Thrombocytopenia  Serial monitoring of CBCs showed Platelet count falling below 100 since the last 2 days. Last episode of similar thrombocytopenia was in 2017 when it fell to 86,000/mm3.   Most recent platelet count was 85,000/mm3. Plt count was normal at 153 on 12/30/19. Patient has been on 5000 IU heparin TID since admission, but has a low 4T score of 3. He is on Plavix.  No signs or symptoms of active bleeding or rashes. Sacral pressure wound present with bruising.   USG Abdomen shows splenomegaly.   Falling counts likely d/t splenic sequestration, treatment of Pseudomonas with Cefepime. Should resolve upon resolution of other medical conditions.     - Chronic Hep C  Had elevated LFTs on restarting high dose statins in-patient. LFTs have plateaud once statins were held, but are still high. USG done on 01/07 shows hepatic steatosis with no e/o cirrhosis. HCV RNA study done on 01/02 was elevated at 1.6 million.     RECOMMENDATIONS:    - Hemolytic labs ordered - LDH, Haptoglobin, Retic Count   - Nutritional labs ordered - Ferritin, Folate, Vit B12   - SPEP ordered   - Continue daily CBC monitoring   - F/u with OP hepatology as per primary for treatment  of Hepatitis.      - Continue all other medications as per primary      Patient case to be discussed with staff. Please call Hematology with any questions.    Shirley Hicks MS  Hematology/Oncology  [facility]

## 2020-01-10 NOTE — NURSING
Wound care completed to 2nd left toe. Cleansed with saline, patted dry, and betadine applied. Foam dressing applied to left 2nd toe.

## 2020-01-10 NOTE — SUBJECTIVE & OBJECTIVE
Interval History: NAEON. LFTs stable. 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 Exam stable. Patient sitting up in chair on exam, tolerated walking in halls with therapy well however demonstrated gait imbalance and required minimum assistance.  brace on. Denies pain, weakness, paresthesias, b/b dysfunction.     Medications:  Continuous Infusions:  Scheduled Meds:   albuterol-ipratropium  3 mL Nebulization Q12H    amLODIPine  10 mg Oral Daily    ascorbic acid (vitamin C)  250 mg Oral BID    balsam peru-castor oil   Topical (Top) BID    ceFEPime (MAXIPIME) IVPB  2 g Intravenous Q8H    clopidogrel  75 mg Oral Daily    famotidine  20 mg Oral BID    ferrous sulfate  325 mg Oral BID    gabapentin  300 mg Oral TID    heparin (porcine)  5,000 Units Subcutaneous Q8H    levothyroxine  50 mcg Oral Before breakfast    lidocaine  1 patch Transdermal Daily    losartan-hydrochlorothiazide 100-25 mg  1 tablet Oral Daily    metoprolol tartrate  50 mg Oral BID    miconazole nitrate 2%   Topical (Top) BID    polyethylene glycol  17 g Oral Daily    senna-docusate 8.6-50 mg  1 tablet Oral BID    sodium chloride 0.9%  10 mL Intravenous Q6H    sodium chloride 3%  4 mL Nebulization Q12H     PRN Meds:acetaminophen, bisacodyl, labetalol, naloxone, ondansetron, oxyCODONE, promethazine (PHENERGAN) IVPB, Flushing PICC Protocol **AND** sodium chloride 0.9% **AND** sodium chloride 0.9%       Objective:     Weight: 75.2 kg (165 lb 12.6 oz)  Body mass index is 24.48 kg/m².  Vital Signs (Most Recent):  Temp: 96.6 °F (35.9 °C) (01/10/20 1135)  Pulse: 77 (01/10/20 1135)  Resp: 14 (01/10/20 0923)  BP: (!) 92/55 (01/10/20 1135)  SpO2: 96 % (01/10/20 1135) Vital Signs (24h Range):  Temp:  [96.6 °F (35.9 °C)-96.8 °F (36 °C)] 96.6 °F (35.9 °C)  Pulse:  [71-88] 77  Resp:  [14-18] 14  SpO2:  [96 %-98 %] 96 %  BP: ()/(55-60) 92/55     Date 01/10/20 0700 -  01/11/20 0659   Shift 4966-5621 3061-7298 8960-5141 24 Hour Total   INTAKE   P.O. 240   240   I.V.(mL/kg) 10(0.1)   10(0.1)   Other 0   0   Shift Total(mL/kg) 250(3.3)   250(3.3)   OUTPUT   Urine(mL/kg/hr) 175   175   Emesis/NG output 0   0   Other 0   0   Stool 0   0   Blood 0   0   Shift Total(mL/kg) 175(2.3)   175(2.3)   Weight (kg) 75.2 75.2 75.2 75.2                   Male External Urinary Catheter 12/19/19 1200 Small (Active)   Collection Container Urimeter 12/27/2019  7:50 PM   Securement Method secured to lower ABD w/ tape 12/27/2019  7:50 PM   Skin no redness;no breakdown 12/27/2019  7:50 PM   Tolerance no signs/symptoms of discomfort 12/27/2019  7:50 PM   Output (mL) 250 mL 12/28/2019  9:00 AM   Catheter Change Date 12/24/19 12/26/2019  4:24 PM   Catheter Change Time 1100 12/26/2019  4:24 PM       Neurosurgery Physical Exam    General: well developed, well nourished, no distress.   Head: normocephalic, atraumatic  Neck:  brace in place.   Neurologic: Alert and oriented. Thought content appropriate.  GCS: Motor: 6/Verbal: 5/Eyes: 4 GCS Total: 15  Mental Status: Awake, Alert, Oriented x 4  Language: No aphasia  Speech: No dysarthria  Cranial nerves: face symmetric, tongue midline, CN II-XII grossly intact.   Eyes: pupils equal, round, reactive to light with accomodation, EOMI.   Ears: No drainage.   Pulmonary: normal respirations, no signs of respiratory distress  Abdomen: soft, non-distended, not tender to palpation  Sensory: intact to light touch throughout  Motor Strength: Moves all extremities spontaneously with good tone.  Full strength upper and lower extremities. No abnormal movements seen.     Strength  Deltoids Triceps Biceps Wrist Extension Wrist Flexion Hand    Upper: R 5/5 5/5 5/5 5/5 5/5 5/5    L 5/5 5/5 5/5 5/5 5/5 5/5     Iliopsoas Quadriceps Knee  Flexion Tibialis  anterior Gastro- cnemius EHL   Lower: R 5-/5 5/5 5/5 5/5 5/5 5/5    L 5-/5 5/5 5/5 5/5 5/5 5/5     Nati:  absent  Clonus: absent  Babinski: absent  Vascular: Pulses 2+ and symmetric radial and dorsalis pedis. No LE edema.   Skin: Skin is warm, dry and intact.    Incision c/d/I with skin edges well approximated, well healed. No surrounding erythema or edema. No drainage from incision. No palpable underlying fluid collection.      Significant Labs:  Recent Labs   Lab 01/09/20  0430 01/10/20  0435   GLU 97 127*    139   K 3.6 3.8    108   CO2 24 23   BUN 24* 24*   CREATININE 0.8 0.9   CALCIUM 9.6 9.0     Recent Labs   Lab 01/09/20  0430 01/10/20  0435 01/10/20  1125   WBC 5.38 4.62 5.50   HGB 8.6* 8.1* 9.0*   HCT 28.5* 26.7* 29.8*   PLT 93* 85* 125*     No results for input(s): LABPT, INR, APTT in the last 48 hours.  Microbiology Results (last 7 days)     ** No results found for the last 168 hours. **        Recent Lab Results       01/10/20  1125   01/10/20  0435   01/09/20  1911        TB Induration 48 - 72 hr read     0     Albumin   2.3       Alkaline Phosphatase   441       ALT   134       Anion Gap   8       AST   114       Baso # 0.05 0.04       Basophil% 0.9 0.9       BILIRUBIN TOTAL   0.2  Comment:  For infants and newborns, interpretation of results should be based  on gestational age, weight and in agreement with clinical  observations.  Premature Infant recommended reference ranges:  Up to 24 hours.............<8.0 mg/dL  Up to 48 hours............<12.0 mg/dL  3-5 days..................<15.0 mg/dL  6-29 days.................<15.0 mg/dL         BUN, Bld   24       Calcium   9.0       Chloride   108       CO2   23       Creatinine   0.9       Differential Method Automated Automated       eGFR if    >60.0       eGFR if non    >60.0  Comment:  Calculation used to obtain the estimated glomerular filtration  rate (eGFR) is the CKD-EPI equation.          Eos # 0.2 0.2       Eosinophil% 3.6 3.5       Ferritin 148         Folate 5.3         Glucose   127       Gran # (ANC) 3.5  3.0       Gran% 64.0 65.8       Haptoglobin 186         Hematocrit 29.8 26.7       Hemoglobin 9.0 8.1       Immature Grans (Abs) 0.02  Comment:  Mild elevation in immature granulocytes is non specific and   can be seen in a variety of conditions including stress response,   acute inflammation, trauma and pregnancy. Correlation with other   laboratory and clinical findings is essential.   0.02  Comment:  Mild elevation in immature granulocytes is non specific and   can be seen in a variety of conditions including stress response,   acute inflammation, trauma and pregnancy. Correlation with other   laboratory and clinical findings is essential.         Immature Granulocytes 0.4 0.4         Comment:  Results are increased in hemolyzed samples.         Lymph # 0.9 0.7       Lymph% 17.1 14.7       MCH 28.8 28.8       MCHC 30.2 30.3       MCV 95 95       Mono # 0.8 0.7       Mono% 14.0 14.7       MPV 10.8 11.4       nRBC 0 0       Pathologist Review Review required         Platelets 125 85       Potassium   3.8       PROTEIN TOTAL   5.9       RBC 3.13 2.81       RDW 21.1 21.0       Retic 3.5         Sodium   139       Vitamin B-12 476         WBC 5.50 4.62           All pertinent labs from the last 24 hours have been reviewed.    Significant Diagnostics:  No new imaging to review.

## 2020-01-10 NOTE — PT/OT/SLP PROGRESS
Occupational Therapy   Treatment    Name: Junaid Muñoz  MRN: [id number]  Admitting Diagnosis:  Spinal epidural abscess  24 Days Post-Op    Recommendations:     Discharge Recommendations: nursing facility, skilled  Discharge Equipment Recommendations:  walker, rolling, shower chair, bedside commode  Barriers to discharge:  None    Assessment:     Junaid Muñoz is a 74 y.o. male with a medical diagnosis of Spinal epidural abscess.  He presents with the following performance deficits affecting function are weakness, impaired endurance, impaired self care skills, impaired functional mobilty, decreased lower extremity function, orthopedic precautions, impaired balance, gait instability.     Rehab Prognosis:  Good; patient would benefit from acute skilled OT services to address these deficits and reach maximum level of function.       Plan:     Patient to be seen 5 x/week to address the above listed problems via self-care/home management, therapeutic activities, therapeutic exercises  · Plan of Care Expires: 01/31/20  · Plan of Care Reviewed with: patient    Subjective     Pain/Comfort:  · Pain Rating 1: 0/10  · Location - Side 1: Bilateral  · Location - Orientation 1: generalized  · Location 1: back  · Pain Addressed 1: Pre-medicate for activity, Distraction  · Pain Rating Post-Intervention 1: 0/10  · Pain Rating Post-Intervention 2: 0/10    Objective:     Communicated with: Nsg prior to session.  Patient found supine with SCD upon OT entry to room.    General Precautions: Standard, fall, contact   Orthopedic Precautions:spinal precautions   Braces:      Occupational Performance:     Bed Mobility:    · Patient completed Rolling/Turning to Left with  minimum assistance  · Patient completed Scooting/Bridging with minimum assistance  · Patient completed Supine to Sit with minimum assistance     Functional Mobility/Transfers:  · Patient completed Sit <> Stand Transfer with contact guard assistance  with  hand-held  assist   · Patient completed Bed <> Chair Transfer using Step Transfer technique with minimum assistance with rolling walker  · Functional Mobility: Minimum assist for functional mobility in hallway with use of RW  · Max cues for RW mgmt, sequencing, self-pacing  · Max cues for safety awareness, navigating environement  · Multiple losses of balance during functional mobility, patient demonstrating delayed insight into proprioceptive     Activities of Daily Living:  · Grooming: contact guard assistance seated edge of bed due to impaired sitting balance  · Upper Body Dressing: minimum assistance [id number] edge of bed to don gown, 2* impaired sitting balance  · Lower Body Dressing: moderate assistance seated due to patient with impaired balance and motor planning, present with difficulty with BUE integration and need for inc'd time      [facility] 6 Click ADL: 17    Treatment & Education:    -Patient educated on roles/goals of OT and POC.  -White board updated.  -Therapist provided time for questions and answered within scope of practice.  -Patient educated on importance of EOB/OOB activity to maximize recovery.    Patient left up in chair with all lines intact, call button in reach and nsg notifiedEducation:   and [employer] camera called     GOALS:   Multidisciplinary Problems     Occupational Therapy Goals        Problem: Occupational Therapy Goal    Goal Priority Disciplines Outcome Interventions   Occupational Therapy Goal     OT, PT/OT Ongoing, Progressing    Description:  Goals to be met by: 1/10/2019     Patient will increase functional independence with ADLs by performing:    UE Dressing with Minimal Assistance.  LE Dressing with Moderate Assistance.  Grooming while standing with Stand-by Assistance.  Toileting from toilet with Minimal Assistance for hygiene (met - 1/3) and clothing management.   Bathing from  edge of bed with Moderate Assistance.  Supine to sit with Minimal Assistance. -- Met (1/3)                          Time Tracking:     OT Date of Treatment: 01/10/20  OT Start Time: 1015  OT Stop Time: 1048  OT Total Time (min): 33 min    Billable Minutes:Self Care/Home Management 15  Neuromuscular Re-education 18    Shirley Zaragoza, OT  1/10/2020

## 2020-01-10 NOTE — PLAN OF CARE
01/10/20 1642   Post-Acute Status   Post-Acute Authorization Placement;Other   Post-Acute Placement Status Set-up Complete     Patient will discharge tomorrow with outpatient therapy with [facility] and for iv antibiotics. Patient has been provided with information that he can also go to [facility] for wound care as well. Patient was explained this information by Yesica with [facility] and JC.    Kristi Davis, LMSW [facility]   [phone]

## 2020-01-10 NOTE — PLAN OF CARE
01/10/20 1607   Post-Acute Status   Post-Acute Authorization Placement;Other   Other Status See Comments     Patient expected to discharge with home infusion through [facility], Patient declined by 3 home health agencies and SW has sent 3 referrals via [facility].    Kristi Davis LMSW  [facility]   [id number]

## 2020-01-11 VITALS
DIASTOLIC BLOOD PRESSURE: 58 MMHG | SYSTOLIC BLOOD PRESSURE: 103 MMHG | WEIGHT: 165.81 LBS | OXYGEN SATURATION: 98 % | RESPIRATION RATE: 16 BRPM | TEMPERATURE: 97 F | BODY MASS INDEX: 24.56 KG/M2 | HEIGHT: 69 IN | HEART RATE: 77 BPM

## 2020-01-11 LAB
ALBUMIN SERPL BCP-MCNC: 2.4 G/DL (ref 3.5–5.2)
ALP SERPL-CCNC: 462 U/L (ref 55–135)
ALT SERPL W/O P-5'-P-CCNC: 145 U/L (ref 10–44)
ANION GAP SERPL CALC-SCNC: 6 MMOL/L (ref 8–16)
AST SERPL-CCNC: 106 U/L (ref 10–40)
BASOPHILS # BLD AUTO: 0.03 K/UL (ref 0–0.2)
BASOPHILS NFR BLD: 0.7 % (ref 0–1.9)
BILIRUB SERPL-MCNC: 0.2 MG/DL (ref 0.1–1)
BUN SERPL-MCNC: 22 MG/DL (ref 8–23)
CALCIUM SERPL-MCNC: 9 MG/DL (ref 8.7–10.5)
CHLORIDE SERPL-SCNC: 108 MMOL/L (ref 95–110)
CO2 SERPL-SCNC: 25 MMOL/L (ref 23–29)
CREAT SERPL-MCNC: 0.8 MG/DL (ref 0.5–1.4)
CRP SERPL-MCNC: 8.6 MG/L (ref 0–8.2)
DIFFERENTIAL METHOD: ABNORMAL
EOSINOPHIL # BLD AUTO: 0.2 K/UL (ref 0–0.5)
EOSINOPHIL NFR BLD: 4.9 % (ref 0–8)
ERYTHROCYTE [DISTWIDTH] IN BLOOD BY AUTOMATED COUNT: 21.2 % (ref 11.5–14.5)
ERYTHROCYTE [SEDIMENTATION RATE] IN BLOOD BY WESTERGREN METHOD: 21 MM/HR (ref 0–23)
EST. GFR  (AFRICAN AMERICAN): >60 ML/MIN/1.73 M^2
EST. GFR  (NON AFRICAN AMERICAN): >60 ML/MIN/1.73 M^2
GLUCOSE SERPL-MCNC: 105 MG/DL (ref 70–110)
HCT VFR BLD AUTO: 26.6 % (ref 40–54)
HGB BLD-MCNC: 8.2 G/DL (ref 14–18)
IMM GRANULOCYTES # BLD AUTO: 0.02 K/UL (ref 0–0.04)
IMM GRANULOCYTES NFR BLD AUTO: 0.4 % (ref 0–0.5)
LYMPHOCYTES # BLD AUTO: 0.9 K/UL (ref 1–4.8)
LYMPHOCYTES NFR BLD: 19 % (ref 18–48)
MCH RBC QN AUTO: 29.7 PG (ref 27–31)
MCHC RBC AUTO-ENTMCNC: 30.8 G/DL (ref 32–36)
MCV RBC AUTO: 96 FL (ref 82–98)
MONOCYTES # BLD AUTO: 0.7 K/UL (ref 0.3–1)
MONOCYTES NFR BLD: 15.9 % (ref 4–15)
NEUTROPHILS # BLD AUTO: 2.7 K/UL (ref 1.8–7.7)
NEUTROPHILS NFR BLD: 59.1 % (ref 38–73)
NRBC BLD-RTO: 0 /100 WBC
PLATELET # BLD AUTO: 95 K/UL (ref 150–350)
PMV BLD AUTO: 11.3 FL (ref 9.2–12.9)
POTASSIUM SERPL-SCNC: 3.7 MMOL/L (ref 3.5–5.1)
PROT SERPL-MCNC: 5.9 G/DL (ref 6–8.4)
RBC # BLD AUTO: 2.76 M/UL (ref 4.6–6.2)
SODIUM SERPL-SCNC: 139 MMOL/L (ref 136–145)
WBC # BLD AUTO: 4.52 K/UL (ref 3.9–12.7)

## 2020-01-11 PROCEDURE — 86334 IMMUNOFIX E-PHORESIS SERUM: CPT

## 2020-01-11 PROCEDURE — 86334 PATHOLOGIST INTERPRETATION IFE: ICD-10-PCS | Mod: 26,,, | Performed by: PATHOLOGY

## 2020-01-11 PROCEDURE — 94799 UNLISTED PULMONARY SVC/PX: CPT

## 2020-01-11 PROCEDURE — 97116 GAIT TRAINING THERAPY: CPT | Mod: CQ

## 2020-01-11 PROCEDURE — 85652 RBC SED RATE AUTOMATED: CPT

## 2020-01-11 PROCEDURE — 94761 N-INVAS EAR/PLS OXIMETRY MLT: CPT

## 2020-01-11 PROCEDURE — 63600175 PHARM REV CODE 636 W HCPCS: Performed by: PSYCHIATRY & NEUROLOGY

## 2020-01-11 PROCEDURE — 25000003 PHARM REV CODE 250: Performed by: PSYCHIATRY & NEUROLOGY

## 2020-01-11 PROCEDURE — 25000003 PHARM REV CODE 250: Performed by: ANESTHESIOLOGY

## 2020-01-11 PROCEDURE — 25000003 PHARM REV CODE 250: Performed by: PHYSICIAN ASSISTANT

## 2020-01-11 PROCEDURE — 25000242 PHARM REV CODE 250 ALT 637 W/ HCPCS: Performed by: PHYSICIAN ASSISTANT

## 2020-01-11 PROCEDURE — 83520 IMMUNOASSAY QUANT NOS NONAB: CPT | Mod: 59

## 2020-01-11 PROCEDURE — 25000003 PHARM REV CODE 250: Performed by: NURSE PRACTITIONER

## 2020-01-11 PROCEDURE — 80053 COMPREHEN METABOLIC PANEL: CPT

## 2020-01-11 PROCEDURE — 85025 COMPLETE CBC W/AUTO DIFF WBC: CPT

## 2020-01-11 PROCEDURE — A4216 STERILE WATER/SALINE, 10 ML: HCPCS | Performed by: ANESTHESIOLOGY

## 2020-01-11 PROCEDURE — 86334 IMMUNOFIX E-PHORESIS SERUM: CPT | Mod: 26,,, | Performed by: PATHOLOGY

## 2020-01-11 PROCEDURE — 84165 PROTEIN E-PHORESIS SERUM: CPT

## 2020-01-11 PROCEDURE — 84165 PATHOLOGIST INTERPRETATION SPE: ICD-10-PCS | Mod: 26,,, | Performed by: PATHOLOGY

## 2020-01-11 PROCEDURE — 86140 C-REACTIVE PROTEIN: CPT

## 2020-01-11 PROCEDURE — 84165 PROTEIN E-PHORESIS SERUM: CPT | Mod: 26,,, | Performed by: PATHOLOGY

## 2020-01-11 PROCEDURE — 97530 THERAPEUTIC ACTIVITIES: CPT | Mod: CQ

## 2020-01-11 PROCEDURE — 94640 AIRWAY INHALATION TREATMENT: CPT

## 2020-01-11 RX ADMIN — Medication 10 ML: at 12:01

## 2020-01-11 RX ADMIN — LIDOCAINE 1 PATCH: 50 PATCH CUTANEOUS at 09:01

## 2020-01-11 RX ADMIN — Medication 10 ML: at 06:01

## 2020-01-11 RX ADMIN — METOPROLOL TARTRATE 50 MG: 50 TABLET ORAL at 09:01

## 2020-01-11 RX ADMIN — CEFEPIME 2 G: 2 INJECTION, POWDER, FOR SOLUTION INTRAVENOUS at 09:01

## 2020-01-11 RX ADMIN — FAMOTIDINE 20 MG: 20 TABLET ORAL at 09:01

## 2020-01-11 RX ADMIN — CEFEPIME 2 G: 2 INJECTION, POWDER, FOR SOLUTION INTRAVENOUS at 01:01

## 2020-01-11 RX ADMIN — Medication 250 MG: at 09:01

## 2020-01-11 RX ADMIN — IPRATROPIUM BROMIDE AND ALBUTEROL SULFATE 3 ML: .5; 3 SOLUTION RESPIRATORY (INHALATION) at 07:01

## 2020-01-11 RX ADMIN — LEVOTHYROXINE SODIUM 50 MCG: 50 TABLET ORAL at 06:01

## 2020-01-11 RX ADMIN — LOSARTAN POTASSIUM AND HYDROCHLOROTHIAZIDE TABLETS 1 TABLET: 100; 25 TABLET, FILM COATED ORAL at 09:01

## 2020-01-11 RX ADMIN — SENNOSIDES AND DOCUSATE SODIUM 1 TABLET: 8.6; 5 TABLET ORAL at 09:01

## 2020-01-11 RX ADMIN — CASTOR OIL AND BALSAM, PERU: 788; 87 OINTMENT TOPICAL at 09:01

## 2020-01-11 RX ADMIN — SODIUM CHLORIDE SOLN NEBU 3% 4 ML: 3 NEBU SOLN at 07:01

## 2020-01-11 RX ADMIN — CLOPIDOGREL BISULFATE 75 MG: 75 TABLET ORAL at 09:01

## 2020-01-11 RX ADMIN — GABAPENTIN 300 MG: 300 CAPSULE ORAL at 09:01

## 2020-01-11 RX ADMIN — MICONAZOLE NITRATE: 20 OINTMENT TOPICAL at 09:01

## 2020-01-11 RX ADMIN — POLYETHYLENE GLYCOL 3350 17 G: 17 POWDER, FOR SOLUTION ORAL at 09:01

## 2020-01-11 RX ADMIN — FERROUS SULFATE TAB EC 325 MG (65 MG FE EQUIVALENT) 325 MG: 325 (65 FE) TABLET DELAYED RESPONSE at 09:01

## 2020-01-11 RX ADMIN — HEPARIN SODIUM 5000 UNITS: 5000 INJECTION, SOLUTION INTRAVENOUS; SUBCUTANEOUS at 06:01

## 2020-01-11 NOTE — DISCHARGE SUMMARY
[facility]  Neurosurgery  Discharge Summary      Patient Name: Junaid Muñoz  MRN: [id number]  Admission Date: 12/6/2019  Hospital Length of Stay: 36 days  Discharge Date and Time:  01/11/2020 10:08 AM  Attending Physician: Elian Deluca MD   Discharging Provider: Kaelyn Sparrow MD  Primary Care Provider: Oskar Whitman MD    HPI:   Junaid Mñuoz is a 74 y.o. male with PMH of HTN, HLD, Hepatitis C, and CAD s/p two stents on plavix who presents with 1 month history of back pain between his shoulders. MRI at OSH demonstrates likely epidural abscess. Pt denies hx of trauma and reports slow onset of symptoms.     Procedure(s) (LRB):  FUSION, SPINE, CERVICAL, POSTERIOR APPROACH C2-T3 posterior instrumented fusion (N/A)     Hospital Course: 12/6: Transfer from OSH to [facility] ED. NSGY consulted. MRI cervical spine w/wo contrast ordered for evaluation of suspected epidural abscess. CT C/T spine ordered for further evaluation of bony anatomy. Hospital medicine consulted.   12/7: Admit to HM service. ID consulted for antibiotic recommendations.  BCx and toxicology ordered. Home meds restarted.   12/8: No acute events overnight. NSGY not recommending surgery. IR consulted for bone biopsy of lesion. Plan for IR bone biopsy/abscess aspiration tomorrow; pending results, will consult ID for long-term antibiotic management.  12/9: No acute events overnight. IR cancelled biopsy today. NSGY will perform corpectomy tomorrow. NPO at midnight. Cervical traction in place.   12/10: OR for C7-T1 corpectomy and C6-T2 anterior fusion with lumbar drain placed 2/2 CSF leak intra-op. Patient tolerated procedure well. Recovered in the neuro ICU.   12/12:  No significant events over night, per nsgy will keep lumbar drain another day. 2nd stage of sugery to occur next Tuesday. Remains NPO while having to lie flat with  lumbar drain. Lumbar drain in place with 41 cc output. Getting confused after receiving valium decreased  dose.  12/13: Restless o/n, c/o back pain. LD dropped to floor by NSGY, 8-10cc/hr. HV drain in place as well. Mild bloody drainage from base of drain site. Lumbar drain with slow flow. Will drop drain to ground level to encourage flow with maximum 10cc/hr and no minimum. Will attempt to resend CSF today. Neurologically stable. AF, no leukocytosis, H/H stable.   12/14: calm at present, slight agitation overnight, can elevate head for swallow trials and po today,pending picc placement for long term abx  12/15: less responsive this am, wean scheduled ativan, begin to advance diet and taper ivfs  12/16: NAEO. Stage II surgery tomorrow for posterior cervical fusion. Leukocytosis increasing, is on cefepime for pseudomonas in CSF, will reach out to ID to see if any further abx pre-op should be given. Lumbar drain in place with minimal output. Increase normal saline to 100 cc/hr. NPO after midnight.   12/17: OR today for stage II posterior cervical instrumentation. Required 3 units PRBCs and 2 units FFP. 2 hemovacs in place to gravity with large output. Will repeat CBC. Patient still intubated on arrival to ICU with cervical and facial edema, will start decadron. ID recommended repeating blood cultures as patient's WBC increasing. Continue cefepime.   12/18: POD 1 C2-T3 posterior instrumentation. Patient is still intubated, will wean to extubate. Facial and cervical swelling greatly improved. Lumbar drain in place. Hemovac x 2. Hemodynamically stable.  12/19: subQ heparin,d/c howellJODI brace for neck, DAPT, HOB restrictions    12/20: HOB clarified with NSGY keep flat, neuro status drowsy, labetolol 2x overnight, hydralazine not effective, respiratory, weaning paramaters on SIMV, wean to extubate, d/c protime INR   12/21: Output is 16 cc and 6cc from HVs, 31cc from LD. Lumbar drain and hemovac pulled today without complication, no more HOB restrictions. Will attempt to wean ventilator and fentanyl. Scheduled oxycodone 5mg  q 4h for fentanyl wean.   12/22: Continues to fail weaning trials. Off fentanyl. D/C scheduled valium. Oxycodone PRN. Weaning parameters daily.   12/23: Failed multiple spontaneous trials, attempts to wean ETT, hyponatremia-start salt tabs 1 gm q8h  12/24: no events, attempting SBT today, Na+ increased, NS weaned  12/25: NAEON. Extubated yesterday. Continue to monitor for 24 hours before step down.   12/26: NAEON. Stable for step down to  NSGY.  12/27: PT/OT ordered for dispo recs. Diet advanced to dysphagia with thin liquids per ST. Na 138. Decrease Na tabs from TID to BID. Decrease frequency of labs to q72 hours. ESR and CRP ordered for tomorrow. Albumin 2.6. Jose ordered BID to promote wound healing. Alk phos 336 today, was 302. AST/ALT WNL. Will continue to monitor closely.   12/28 - 12/29: NAEON. Exam stable. Pt pending SNF.  12/30: NAEON. Pain well controlled. Na 140. Salt tabs decreased to 2g daily. Wound care consulted for toe on left foot. Recs pending. Albumin and H&H slowly improving. Pending SNF. Stable for discharge.   12/31: NAEON. Pain remains controlled. Podiatry recommending wound care for left toe. Denies weakness, paresthesias, b/b dysfunction. Tolerating PO. Staples removed from posterior incision today. Pending SNF, medically stable for discharge.   1/1: NAEON. Pending SNF.  1/2: NAEON. LFTs trending up.  consulted regarding management and work up. Recommend d/c high dose statin, Hep C antibody and viral load, INR, PTT, and ammonia pending. Appreciate their assistance. Pain well controlled. Denies weakness, paresthesias, or bowel/bladder issues.   1/3: NAEON. LFTs improving after d/c statin. Spoke with hepatology, which recommend outpatient follow up, they will schedule the patient. INR/PTT/ammonia WNL. Denies weakness, paresthesias, or bowel/bladder issues. Medically stable for discharge pending SNF  1/4: had fall overnight without brace. XR C spine showed no acute changes. Patient without  complaints this morning.  1/5: naeon. Pt without complaint  1/6: NAEON. Patient resting in bed without complaints. Sat up to edge of bed with minimal assistance with therapy on assessment today. Pain controlled. Strength exam stable. Pending placement, medically stable for discharge.  1/7: NAEON. LFTs remain elevated.  Hospital Medicine recommending outpatient follow-up with hepatology.  No further inpatient workup required, patient is medically stable for discharge to SNF.  He has an appointment scheduled 1/29 for hepatology follow-up.  He has been compliant with his  brace.  He denies any pain at this time. BM yesterday, voiding appropriately.  1/8: NAEON. LFT's stable. Anterior and posterior incisions remain well healed. Pending SNF. Stable for discharge.   1/9: NAEON. LFTs stable. Plt decreasing throughout admission, 93k today. Ranges 108-186k throughout admission, and previously down to 86k per chart review in 2017. Will monitor closely with CBC daily. No active signs of bleeding. Denies back pain. Patient found without  brace sitting upright in bed when I entered the room, states it is too painful to wear. He was agreeable to the  brace after adjustments made. Denies weakness, paresthesias, b/b dysfunction. Tolerating PO. Demonstrates good and independent bed mobility on assessment, medically stable for discharge to SNF.   1/10: NAEON. LFTs stable. 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 Exam stable. Patient sitting up in chair on exam, tolerated walking in halls with therapy well however demonstrated gait imbalance and required minimum assistance.  brace on. Denies pain, weakness, paresthesias, b/b dysfunction.   1/11: NAEON. Plan for discharge today w/home abx. Discharge pending delivery of medications to bedside.     Consults:   Consults (From admission, onward)        Status Ordering Provider     Inpatient consult  to Hematology/Oncology  Once     Provider:  (Not yet assigned)    Completed KELBY GOLDEN     Inpatient consult to [facility] Medicine-General  Once     Provider:  (Not yet assigned)    Completed LARISSA CAMPOS     Inpatient consult to Infectious Diseases  Once     Provider:  (Not yet assigned)    Completed JACKIE CORONEL     Inpatient consult to Infectious Diseases  Once     Provider:  (Not yet assigned)    Completed JAGUAR WHEELER     Inpatient consult to Interventional Radiology  Once     Provider:  (Not yet assigned)    Completed YEISON GAYLE     Inpatient consult to Midline team  Once     Provider:  (Not yet assigned)    Completed DAKOTA, HIMA     Inpatient consult to Midline team  Once     Provider:  (Not yet assigned)    Completed DAKOTA, HIMA     Inpatient consult to Midline team  Once     Provider:  (Not yet assigned)    Completed JACKIE CORONEL     Inpatient consult to Midline team  Once     Provider:  (Not yet assigned)    Completed PAIGE LEOS     Inpatient consult to Neurosurgery  Once     Provider:  (Not yet assigned)    Acknowledged COOKIE ROBERTS     Inpatient consult to PICC team ([facility])  Once     Provider:  (Not yet assigned)    Completed JACKIE CORONEL     Inpatient consult to Podiatry  Once     Provider:  (Not yet assigned)    Completed TAB BROWN     IP consult to case management  Once     Provider:  (Not yet assigned)    Acknowledged AD IVY          Significant Diagnostic Studies: Labs:   BMP:   Recent Labs   Lab 01/10/20  0435 01/11/20  0500   * 105    139   K 3.8 3.7    108   CO2 23 25   BUN 24* 22   CREATININE 0.9 0.8   CALCIUM 9.0 9.0    and CBC   Recent Labs   Lab 01/10/20  0435 01/10/20  1125 01/11/20  0500   WBC 4.62 5.50 4.52   HGB 8.1* 9.0* 8.2*   HCT 26.7* 29.8* 26.6*   PLT 85* 125* 95*       Pending Diagnostic Studies:     Procedure Component Value Units Date/Time    CBC auto differential [[id number]]  Collected:  12/24/19 0922    Order Status:  Sent Lab Status:  In process Updated:  12/24/19 0923    Specimen:  Blood     CBC auto differential [[id number]] Collected:  12/23/19 0851    Order Status:  Sent Lab Status:  In process Updated:  12/23/19 0852    Specimen:  Blood     Immunofixation electrophoresis [[id number]] Collected:  01/11/20 0500    Order Status:  Sent Lab Status:  In process Updated:  01/11/20 0525    Specimen:  Blood     Immunoglobulin free LT chains blood [[id number]] Collected:  01/11/20 0500    Order Status:  Sent Lab Status:  In process Updated:  01/11/20 0524    Specimen:  Blood         Final Active Diagnoses:    Diagnosis Date Noted POA    PRINCIPAL PROBLEM:  Spinal epidural abscess [G06.1] 12/06/2019 Yes    Alteration in skin integrity [R23.9] 01/10/2020 Yes    Pressure injury of sacral region, stage 1 [L89.151] 01/07/2020 Yes    Nail avulsion of toe [S91.209A] 12/30/2019 No    Hyponatremia [E87.1] 12/23/2019 Unknown    Pseudomonas infection [A49.8] 12/18/2019 Yes    Leukocytosis [D72.829] 12/17/2019 Yes    Acute blood loss anemia [D62] 12/17/2019 No    Restlessness and agitation [R45.1] 12/14/2019 Unknown    Gram negative sepsis [A41.50] 12/11/2019 Yes    Mild tetrahydrocannabinol (THC) abuse [F12.10] 12/11/2019 Yes    Malignant hypertension requiring acute intensive management [I10] 12/11/2019 Yes    Tobacco abuse [Z72.0] 12/07/2019 Yes    Elevated alkaline phosphatase level [R74.8] 12/07/2019 Yes    Acute osteomyelitis of cervical spine [M46.22] 12/06/2019 Yes    Acute osteomyelitis of thoracic spine [M46.24] 12/06/2019 Yes    Chronic hepatitis C without hepatic coma [B18.2] 12/06/2019 Yes    Coronary artery disease involving native coronary artery of native heart without angina pectoris [I25.10] 12/06/2019 Yes    Essential hypertension [I10] 12/06/2019 Yes    Other specified hypothyroidism [E03.8] 12/06/2019 Yes    Hyperlipidemia [E78.5] 12/06/2019 Yes      Problems  "Resolved During this Admission:    Diagnosis Date Noted Date Resolved POA    On mechanically assisted ventilation [Z99.11] 12/18/2019 12/25/2019 Not Applicable    Pre-op evaluation [Z01.818] 12/09/2019 01/02/2020 Not Applicable      Discharged Condition: stable    Disposition:     Follow Up:  Follow-up Information     Oskar Whitman MD.    Specialty:  Internal Medicine  Contact information:  [address] [place] [address] [phone]             Edmund Nguyen - Hepatology.    Specialty:  Hepatology  Contact information:  [address]  [facility] [address] [phone]  Additional information:  1st Floor - [facility], located by clinic tower elevators           Elian Deluca MD On 1/23/2020.    Specialty:  Neurosurgery  Contact information:  [address]  [facility] [id number] [phone]                 Patient Instructions:      WALKER FOR HOME USE     Order Specific Question Answer Comments   Type of Walker: Adult (5'4"-6'6")    With wheels? Yes    Height: 5' 9" (1.753 m)    Weight: 75.2 kg (165 lb 12.6 oz)    Length of need (1-99 months): 99    Does patient have medical equipment at home? none    Please check all that apply: Patient's condition impairs ambulation.    Vendor: Other (use comments) [employer]   Expected Date of Delivery: 1/10/2020      COMMODE FOR HOME USE     Order Specific Question Answer Comments   Type: Standard    Height: 5' 9" (1.753 m)    Weight: 75.2 kg (165 lb 12.6 oz)    Does patient have medical equipment at home? none    Length of need (1-99 months): 99    Vendor: Other (use comments) [employer]   Expected Date of Delivery: 1/10/2020      Sedimentation rate   Standing Status: Future Standing Exp. Date: 03/10/21   Order Comments: Please collect at [facility].     C-REACTIVE PROTEIN   Standing Status: Future Standing Exp. Date: 03/10/21   Order Comments: Please collect at [facility].     CBC W/ AUTO " DIFFERENTIAL   Standing Status: Future Standing Exp. Date: 03/10/21   Order Comments: Please collect at [facility].     COMPREHENSIVE METABOLIC PANEL   Standing Status: Future Standing Exp. Date: 03/10/21   Order Comments: Please collect at [facility].     Ambulatory referral to Home Health   Referral Priority: Routine Referral Type: Home Health   Referral Reason: Specialty Services Required   Requested Specialty: Home Health Services   Number of Visits Requested: 1     Ambulatory Referral to Physical/Occupational Therapy   Referral Priority: Routine Referral Type: Physical Medicine   Referral Reason: Specialty Services Required   Number of Visits Requested: 1     Diet Cardiac     Notify your health care provider if you experience any of the following:  temperature >100.4     Notify your health care provider if you experience any of the following:  persistent nausea and vomiting or diarrhea     Notify your health care provider if you experience any of the following:  severe uncontrolled pain     Notify your health care provider if you experience any of the following:  redness, tenderness, or signs of infection (pain, swelling, redness, odor or green/yellow discharge around incision site)     Notify your health care provider if you experience any of the following:  difficulty breathing or increased cough     Notify your health care provider if you experience any of the following:  severe persistent headache     Notify your health care provider if you experience any of the following:  worsening rash     Notify your health care provider if you experience any of the following:  persistent dizziness, light-headedness, or visual disturbances     Notify your health care provider if you experience any of the following:  increased confusion or weakness     No dressing needed   Order Comments: See patient instructions  Keep incisions clean and dry     Activity as tolerated   Order Comments: See patient  instructions     Medications:  Reconciled Home Medications:      Medication List      START taking these medications    albuterol-ipratropium 2.5 mg-0.5 mg/3 mL nebulizer solution  Commonly known as:  DUO-NEB  Take 3 mLs by nebulization every 12 (twelve) hours. Rescue     amLODIPine 10 MG tablet  Commonly known as:  NORVASC  Take 1 tablet (10 mg total) by mouth once daily.     ascorbic acid (vitamin C) 250 MG tablet  Commonly known as:  VITAMIN C  Take 1 tablet (250 mg total) by mouth 2 (two) times daily.     balsam peru-castor oil Oint  Apply topically 2 (two) times daily.     ceFEPIme 2 gram injection  Commonly known as:  MAXIPIME  Inject 2 g into the vein every 8 (eight) hours.     ferrous sulfate 325 (65 FE) MG EC tablet  Take 1 tablet (325 mg total) by mouth 2 (two) times daily.     gabapentin 300 MG capsule  Commonly known as:  NEURONTIN  Take 1 capsule (300 mg total) by mouth 3 (three) times daily.     metoprolol tartrate 50 MG tablet  Commonly known as:  LOPRESSOR  Take 1 tablet (50 mg total) by mouth 2 (two) times daily.     oxyCODONE 10 mg Tab immediate release tablet  Commonly known as:  ROXICODONE  Take 1 tablet (10 mg total) by mouth every 6 (six) hours as needed for Pain.     * sodium chloride 0.9% injection  Commonly known as:  NORMAL SALINE FLUSH  Inject 10 mLs into the vein every 6 (six) hours.     * sodium chloride 0.9% injection  Commonly known as:  NORMAL SALINE FLUSH  Inject 10 mLs into the vein as needed.     sodium chloride 1 gram tablet  Take 2 tablets (2 g total) by mouth once daily.         * This list has 2 medication(s) that are the same as other medications prescribed for you. Read the directions carefully, and ask your doctor or other care provider to review them with you.            CHANGE how you take these medications    atorvastatin 80 MG tablet  Commonly known as:  LIPITOR  Take 0.5 tablets (40 mg total) by mouth once daily.  What changed:    · how much to take  · when to take  this        CONTINUE taking these medications    clopidogrel 75 mg tablet  Commonly known as:  PLAVIX  Take 75 mg by mouth once daily at 6am.     levothyroxine 50 MCG tablet  Commonly known as:  SYNTHROID  Take 50 mcg by mouth once daily at 6am.     losartan-hydrochlorothiazide 100-25 mg 100-25 mg per tablet  Commonly known as:  HYZAAR  Take 1 tablet by mouth once daily at 6am.        STOP taking these medications    ibuprofen 600 MG tablet  Commonly known as:  ADVIL,MOTRIN     metoprolol succinate 100 MG 24 hr tablet  Commonly known as:  TOPROL-XL            Kaelyn Sparrow MD  Neurosurgery  [facility]

## 2020-01-11 NOTE — SUBJECTIVE & OBJECTIVE
Interval History: NAEON. Plan for discharge today w/home abx. Discharge pending delivery of medications to bedside.     Medications:  Continuous Infusions:  Scheduled Meds:   albuterol-ipratropium  3 mL Nebulization Q12H    amLODIPine  10 mg Oral Daily    ascorbic acid (vitamin C)  250 mg Oral BID    balsam peru-castor oil   Topical (Top) BID    ceFEPime (MAXIPIME) IVPB  2 g Intravenous Q8H    clopidogrel  75 mg Oral Daily    famotidine  20 mg Oral BID    ferrous sulfate  325 mg Oral BID    gabapentin  300 mg Oral TID    heparin (porcine)  5,000 Units Subcutaneous Q8H    levothyroxine  50 mcg Oral Before breakfast    lidocaine  1 patch Transdermal Daily    losartan-hydrochlorothiazide 100-25 mg  1 tablet Oral Daily    metoprolol tartrate  50 mg Oral BID    miconazole nitrate 2%   Topical (Top) BID    polyethylene glycol  17 g Oral Daily    senna-docusate 8.6-50 mg  1 tablet Oral BID    sodium chloride 0.9%  10 mL Intravenous Q6H    sodium chloride 3%  4 mL Nebulization Q12H     PRN Meds:acetaminophen, bisacodyl, labetalol, naloxone, ondansetron, oxyCODONE, promethazine (PHENERGAN) IVPB, Flushing PICC Protocol **AND** sodium chloride 0.9% **AND** sodium chloride 0.9%     Review of Systems  Objective:     Weight: 75.2 kg (165 lb 12.6 oz)  Body mass index is 24.48 kg/m².  Vital Signs (Most Recent):  Temp: 97.5 °F (36.4 °C) (01/10/20 2049)  Pulse: 95 (01/10/20 2143)  Resp: 16 (01/10/20 2143)  BP: (!) 116/55 (01/10/20 2049)  SpO2: 97 % (01/10/20 2143) Vital Signs (24h Range):  Temp:  [95.7 °F (35.4 °C)-97.5 °F (36.4 °C)] 97.5 °F (36.4 °C)  Pulse:  [71-95] 95  Resp:  [14-18] 16  SpO2:  [96 %-98 %] 97 %  BP: ()/(55-60) 116/55                     Male External Urinary Catheter 12/19/19 1200 Small (Active)   Collection Container Urimeter 12/27/2019  7:50 PM   Securement Method secured to lower ABD w/ tape 12/27/2019  7:50 PM   Skin no redness;no breakdown 12/27/2019  7:50 PM   Tolerance no  signs/symptoms of discomfort 12/27/2019  7:50 PM   Output (mL) 250 mL 12/28/2019  9:00 AM   Catheter Change Date 12/24/19 12/26/2019  4:24 PM   Catheter Change Time 1100 12/26/2019  4:24 PM       Neurosurgery Physical Exam  General: well developed, well nourished, no distress.   Head: normocephalic, atraumatic  Neck:  brace in place.   Neurologic: Alert and oriented. Thought content appropriate.  GCS: Motor: 6/Verbal: 5/Eyes: 4 GCS Total: 15  Mental Status: Awake, Alert, Oriented x 4  Language: No aphasia  Speech: No dysarthria  Cranial nerves: face symmetric, tongue midline, CN II-XII grossly intact.   Eyes: pupils equal, round, reactive to light with accomodation, EOMI.   Ears: No drainage.   Pulmonary: normal respirations, no signs of respiratory distress  Abdomen: soft, non-distended, not tender to palpation  Sensory: intact to light touch throughout  Motor Strength: Moves all extremities spontaneously with good tone.  Full strength upper and lower extremities. No abnormal movements seen.      Strength   Deltoids Triceps Biceps Wrist Extension Wrist Flexion Hand    Upper: R 5/5 5/5 5/5 5/5 5/5 5/5     L 5/5 5/5 5/5 5/5 5/5 5/5       Iliopsoas Quadriceps Knee  Flexion Tibialis  anterior Gastro- cnemius EHL   Lower: R 5-/5 5/5 5/5 5/5 5/5 5/5     L 5-/5 5/5 5/5 5/5 5/5 5/5      Damian: absent  Clonus: absent  Babinski: absent  Vascular: Pulses 2+ and symmetric radial and dorsalis pedis. No LE edema.   Skin: Skin is warm, dry and intact.     Incision c/d/I with skin edges well approximated, well healed. No surrounding erythema or edema. No drainage from incision. No palpable underlying fluid collection.      Significant Labs:  Recent Labs   Lab 01/09/20  0430 01/10/20  0435   GLU 97 127*    139   K 3.6 3.8    108   CO2 24 23   BUN 24* 24*   CREATININE 0.8 0.9   CALCIUM 9.6 9.0     Recent Labs   Lab 01/09/20  0430 01/10/20  0435 01/10/20  1125   WBC 5.38 4.62 5.50   HGB 8.6* 8.1* 9.0*   HCT 28.5*  26.7* 29.8*   PLT 93* 85* 125*         Significant Diagnostics:  No results found in the last 24 hours.

## 2020-01-11 NOTE — PT/OT/SLP PROGRESS
Physical Therapy Treatment    Patient Name:  Junaid Muñoz   MRN:  [id number]    Recommendations:     Discharge Recommendations:  nursing facility, skilled   Discharge Equipment Recommendations: shower chair   Barriers to discharge: Inaccessible home    Assessment:     Junaid Muñoz is a 74 y.o. male admitted with a medical diagnosis of Spinal epidural abscess.  He presents with the following impairments/functional limitations:  weakness, impaired endurance, impaired self care skills, impaired functional mobilty, gait instability, impaired balance, decreased safety awareness, decreased lower extremity function, decreased coordination, orthopedic precautions . Patient showed improved ability to follow instructions and awareness. The Right knee had a tendency to buckle as patient started to fatigue, but no signs of distress were noted.    Rehab Prognosis: Good; patient would benefit from acute skilled PT services to address these deficits and reach maximum level of function.    Recent Surgery: Procedure(s) (LRB):  FUSION, SPINE, CERVICAL, POSTERIOR APPROACH C2-T3 posterior instrumented fusion (N/A) 25 Days Post-Op    Plan:     During this hospitalization, patient to be seen 6 x/week to address the identified rehab impairments via gait training, therapeutic activities, neuromuscular re-education and progress toward the following goals:    · Plan of Care Expires:  01/28/20    Subjective     Chief Complaint: fatigue  Patient/Family Comments/goals: to go home.  Pain/Comfort:  · Pain Rating 1: 0/10  · Location - Orientation 1: generalized  · Location 1: back  · Pain Addressed 1: Pre-medicate for activity  · Pain Rating Post-Intervention 1: 0/10      Objective:     Communicated with nsg prior to session.  Patient found HOB elevated with SCD upon PT entry to room.     General Precautions: Standard, fall   Orthopedic Precautions:spinal precautions   Braces:      Functional Mobility:  · Bed Mobility:     · Rolling  Right: minimum assistance  · Supine to Sit: minimum assistance  · Sit to Supine: stand by assistance  · Transfers:     · Sit to Stand:  contact guard assistance and minimum assistance with rolling walker  · Bed to Chair: contact guard assistance and minimum assistance with  rolling walker  using  Stand Pivot  · Gait: 62 ft and 40 ft with RW and min assistance, with chair follow and cues to correct fwd/flexed posture.      AM-PAC 6 CLICK MOBILITY  Turning over in bed (including adjusting bedclothes, sheets and blankets)?: 3  Sitting down on and standing up from a chair with arms (e.g., wheelchair, bedside commode, etc.): 3  Moving from lying on back to sitting on the side of the bed?: 3  Moving to and from a bed to a chair (including a wheelchair)?: 3  Need to walk in hospital room?: 3  Climbing 3-5 steps with a railing?: 2  Basic Mobility Total Score: 17       Therapeutic Activities and Exercises:   Donned/Doffed a gown and . Patient transferred to bedside chair.    Patient left HOB elevated with all lines intact and call button in reach..    GOALS:   Multidisciplinary Problems     Physical Therapy Goals     Not on file          Multidisciplinary Problems (Resolved)        Problem: Physical Therapy Goal    Goal Priority Disciplines Outcome Goal Variances Interventions   Physical Therapy Goal   (Resolved)     PT, PT/OT Met     Description:  Goals to be met by: 19     Patient will increase functional independence with mobility by performin. Supine to sit with MInimal Assistance - met   2. Sit to supine with MInimal Assistance  3. Rolling to Left and Right with Modified Hubbard.  4. Sit to stand transfer with Minimal Assistance - met   5. Bed to chair transfer with Minimal Assistance using Rolling Walker -Met 1/10  6. Gait  x 40 feet with Minimal Assistance using Rolling Walker.  -MET 1/10   Gait x 100 feet with CGA using RW.  7. Sitting at edge of bed x 8 minutes with Minimal Assistance  8.  Stand for 5 minutes with Minimal Assistance using Rolling Walker  9. Lower extremity exercise program x15 reps per handout, with independence    10. Supine to sit with Stand-by Assistance  11. Sit to stand transfer with Stand-by Assistance.                          Time Tracking:     PT Received On: 01/11/20  PT Start Time: 1017     PT Stop Time: 1041  PT Total Time (min): 24 min     Billable Minutes: Gait Training 12 and Therapeutic Activity 12    Treatment Type: Treatment  PT/PTA: PTA     PTA Visit Number: Dillan Elaine, PTA  01/11/2020

## 2020-01-11 NOTE — NURSING
Pt and family received discharge instructions. Verbalized understanding of all instructions. IV antibiotics delivered to bedside. PICC in place to right upper arm for home use. Awaiting pharmacy bedside delivery, then will put in for transport.

## 2020-01-11 NOTE — PROGRESS NOTES
[facility]  Neurosurgery  Progress Note    Subjective:     History of Present Illness: Junaid Muñoz is a 74 y.o. male with PMH of HTN, HLD, Hepatitis C, and CAD s/p two stents on plavix who presents with 1 month history of back pain between his shoulders. MRI at OSH demonstrates likely epidural abscess. Pt denies hx of trauma and reports slow onset of symptoms.     Post-Op Info:  Procedure(s) (LRB):  FUSION, SPINE, CERVICAL, POSTERIOR APPROACH C2-T3 posterior instrumented fusion (N/A)   25 Days Post-Op     Interval History: NAEON. Plan for discharge today w/home abx. Discharge pending delivery of medications to bedside.     Medications:  Continuous Infusions:  Scheduled Meds:   albuterol-ipratropium  3 mL Nebulization Q12H    amLODIPine  10 mg Oral Daily    ascorbic acid (vitamin C)  250 mg Oral BID    balsam peru-castor oil   Topical (Top) BID    ceFEPime (MAXIPIME) IVPB  2 g Intravenous Q8H    clopidogrel  75 mg Oral Daily    famotidine  20 mg Oral BID    ferrous sulfate  325 mg Oral BID    gabapentin  300 mg Oral TID    heparin (porcine)  5,000 Units Subcutaneous Q8H    levothyroxine  50 mcg Oral Before breakfast    lidocaine  1 patch Transdermal Daily    losartan-hydrochlorothiazide 100-25 mg  1 tablet Oral Daily    metoprolol tartrate  50 mg Oral BID    miconazole nitrate 2%   Topical (Top) BID    polyethylene glycol  17 g Oral Daily    senna-docusate 8.6-50 mg  1 tablet Oral BID    sodium chloride 0.9%  10 mL Intravenous Q6H    sodium chloride 3%  4 mL Nebulization Q12H     PRN Meds:acetaminophen, bisacodyl, labetalol, naloxone, ondansetron, oxyCODONE, promethazine (PHENERGAN) IVPB, Flushing PICC Protocol **AND** sodium chloride 0.9% **AND** sodium chloride 0.9%     Review of Systems  Objective:     Weight: 75.2 kg (165 lb 12.6 oz)  Body mass index is 24.48 kg/m².  Vital Signs (Most Recent):  Temp: 97.5 °F (36.4 °C) (01/10/20 2049)  Pulse: 95 (01/10/20 2143)  Resp:  16 (01/10/20 2143)  BP: (!) 116/55 (01/10/20 2049)  SpO2: 97 % (01/10/20 2143) Vital Signs (24h Range):  Temp:  [95.7 °F (35.4 °C)-97.5 °F (36.4 °C)] 97.5 °F (36.4 °C)  Pulse:  [71-95] 95  Resp:  [14-18] 16  SpO2:  [96 %-98 %] 97 %  BP: ()/(55-60) 116/55                     Male External Urinary Catheter 12/19/19 1200 Small (Active)   Collection Container Urimeter 12/27/2019  7:50 PM   Securement Method secured to lower ABD w/ tape 12/27/2019  7:50 PM   Skin no redness;no breakdown 12/27/2019  7:50 PM   Tolerance no signs/symptoms of discomfort 12/27/2019  7:50 PM   Output (mL) 250 mL 12/28/2019  9:00 AM   Catheter Change Date 12/24/19 12/26/2019  4:24 PM   Catheter Change Time 1100 12/26/2019  4:24 PM       Neurosurgery Physical Exam  General: well developed, well nourished, no distress.   Head: normocephalic, atraumatic  Neck:  brace in place.   Neurologic: Alert and oriented. Thought content appropriate.  GCS: Motor: 6/Verbal: 5/Eyes: 4 GCS Total: 15  Mental Status: Awake, Alert, Oriented x 4  Language: No aphasia  Speech: No dysarthria  Cranial nerves: face symmetric, tongue midline, CN II-XII grossly intact.   Eyes: pupils equal, round, reactive to light with accomodation, EOMI.   Ears: No drainage.   Pulmonary: normal respirations, no signs of respiratory distress  Abdomen: soft, non-distended, not tender to palpation  Sensory: intact to light touch throughout  Motor Strength: Moves all extremities spontaneously with good tone.  Full strength upper and lower extremities. No abnormal movements seen.      Strength   Deltoids Triceps Biceps Wrist Extension Wrist Flexion Hand    Upper: R 5/5 5/5 5/5 5/5 5/5 5/5     L 5/5 5/5 5/5 5/5 5/5 5/5       Iliopsoas Quadriceps Knee  Flexion Tibialis  anterior Gastro- cnemius EHL   Lower: R 5-/5 5/5 5/5 5/5 5/5 5/5     L 5-/5 5/5 5/5 5/5 5/5 5/5      Daiman: absent  Clonus: absent  Babinski: absent  Vascular: Pulses 2+ and symmetric radial and dorsalis pedis.  No LE edema.   Skin: Skin is warm, dry and intact.     Incision c/d/I with skin edges well approximated, well healed. No surrounding erythema or edema. No drainage from incision. No palpable underlying fluid collection.      Significant Labs:  Recent Labs   Lab 01/09/20  0430 01/10/20  0435   GLU 97 127*    139   K 3.6 3.8    108   CO2 24 23   BUN 24* 24*   CREATININE 0.8 0.9   CALCIUM 9.6 9.0     Recent Labs   Lab 01/09/20  0430 01/10/20  0435 01/10/20  1125   WBC 5.38 4.62 5.50   HGB 8.6* 8.1* 9.0*   HCT 28.5* 26.7* 29.8*   PLT 93* 85* 125*         Significant Diagnostics:  No results found in the last 24 hours.      Assessment/Plan:     * Spinal epidural abscess  74 y.o. male with PMH of HTN, HLD, Hepatitis C, and CAD s/p two stents on plavix who presents with C6-T2 osteomyelitis with suspected epidural abscess.  Now s/p C7/T1 corpectomies on 12/10 and C2-T2  posterior instrumented fusion 12/17.    -Patient neurologically stable on exam  -Continue  brace at all times.   -Staples removed 12/31/19. Incision well healed. Leave incision open to air. Turn q2h to continue to promote wound healing.   -Elevated LFTs: Stable. Abdominal US on 1/7 revealed splenomegaly and hepatic steatosis. Will continue to trend LFTs and hold statin. Per , restart statin at lower dose (40 mg) upon discharge. Follow-up with hepatology on 1/29. No further inpatient workup indicated.  -Thrombocytopenia: Plt decreasing throughout admission, 95 today. Ranges 108-186 throughout admission, and previously down to 86 per chart review in 2017. Hematology consulted, appreciate assistance. Added on iron studies, likely attribute to Hepatitis C. Will monitor closely with CBC daily while inpatient. No active signs of bleeding. Transfuse for Hb <7, Plt <10, or active bleeding. Will dc heparin per rec if Plt <50. May follow up outpatient.   -ID: Remains afebrile. PICC in place. Continue Cefepime 2g q 8 hours. Estimated end date of  therapy 1/28/20-2/11/20. FU scheduled with ID on 1/14.  -2nd left toe wound care: Continue betadine daily and dress with foam bandage.   -Pain: Pain well controlled on current regimen. No adjustments needed at this time.   -Anemia: Continue iron for replacement x 2 weeks. F/u at next lab draw.  -HTN: Continue Amlodipine 10 mg daily, Losartan-HCTZ 100-25 mg daily, and Metoprolol 50 mg BID.   -CAD s/p stent placement: Continue home dose of Plavix 75 mg daily.   -Hyponatremia: resolved. Na tabs discontinued 1/5. Will continue to monitor.   -Hypoalbuminemia: Continue Jose BID to promote wound healing.   -HLD: Continue to hold high dose atorvastatin in setting of elevated LFTs. Per HM okay to resume at discharge at lower dose (40 mg).   -DVT prophylaxis: TUCKER's, SCD's, SQH  -Bowel regimen: senna BID and miralax daily  -Atelectasis prevention: IS hourly while awake, PT/OT, OOB > 6 hours per day    DISPO: Denied by [facility]. Patient has been denied by all referred facilities. He has been progressing well with PT/OT however continues to demonstrate gait instability and will require 24/7 assistance at discharge. Discussed with patient's goddaughter, Jacqueline, over the phone who stated she has been his primary caretaker and would resume care for him at discharge. She discussed getting a sitter for him when she goes back to work. From case management's note today, patient's wife Irene was informed that he had been denied from the facility, wife stated he will come home and that we needed to contact patient's god daughter Jacqueline because she will be taking him home. See note dated 1/10 by Kimberly from case management for further detail. Plan currently to discharge patient home with assistance from his god daughter and possibly a sitter. He is medically stable for discharge and has all follow-up scheduled. Home equipment ordered. All questions answered. Encouraged to call the clinic with questions/concerns prior to next  visit.        Kaelyn Sparrow MD  Neurosurgery  [facility]

## 2020-01-11 NOTE — PLAN OF CARE
JC spoke with Raven with [facility] to inform of the patient's d/c plans. JC faxed the patient's orders to [facility] via . Per Raven, the patient will receive his medication at bedside. JC updated the patient's Care Team. JC will continue to follow.     Sandhya Edwards LMSW   - [facility]  Ext. [phone]

## 2020-01-11 NOTE — ASSESSMENT & PLAN NOTE
74 y.o. male with PMH of HTN, HLD, Hepatitis C, and CAD s/p two stents on plavix who presents with C6-T2 osteomyelitis with suspected epidural abscess.  Now s/p C7/T1 corpectomies on 12/10 and C2-T2  posterior instrumented fusion 12/17.    -Patient neurologically stable on exam  -Continue  brace at all times.   -Staples removed 12/31/19. Incision well healed. Leave incision open to air. Turn q2h to continue to promote wound healing.   -Elevated LFTs: Stable. Abdominal US on 1/7 revealed splenomegaly and hepatic steatosis. Will continue to trend LFTs and hold statin. Per , restart statin at lower dose (40 mg) upon discharge. Follow-up with hepatology on 1/29. No further inpatient workup indicated.  -Thrombocytopenia: Plt decreasing throughout admission, 95 today. Ranges 108-186 throughout admission, and previously down to 86 per chart review in 2017. Hematology consulted, appreciate assistance. Added on iron studies, likely attribute to Hepatitis C. Will monitor closely with CBC daily while inpatient. No active signs of bleeding. Transfuse for Hb <7, Plt <10, or active bleeding. Will dc heparin per rec if Plt <50. May follow up outpatient.   -ID: Remains afebrile. PICC in place. Continue Cefepime 2g q 8 hours. Estimated end date of therapy 1/28/20-2/11/20. FU scheduled with ID on 1/14.  -2nd left toe wound care: Continue betadine daily and dress with foam bandage.   -Pain: Pain well controlled on current regimen. No adjustments needed at this time.   -Anemia: Continue iron for replacement x 2 weeks. F/u at next lab draw.  -HTN: Continue Amlodipine 10 mg daily, Losartan-HCTZ 100-25 mg daily, and Metoprolol 50 mg BID.   -CAD s/p stent placement: Continue home dose of Plavix 75 mg daily.   -Hyponatremia: resolved. Na tabs discontinued 1/5. Will continue to monitor.   -Hypoalbuminemia: Continue Jose BID to promote wound healing.   -HLD: Continue to hold high dose atorvastatin in setting of elevated LFTs. Per   okay to resume at discharge at lower dose (40 mg).   -DVT prophylaxis: TUCKER's, SCD's, SQH  -Bowel regimen: senna BID and miralax daily  -Atelectasis prevention: IS hourly while awake, PT/OT, OOB > 6 hours per day    DISPO: Denied by [facility]. Patient has been denied by all referred facilities. He has been progressing well with PT/OT however continues to demonstrate gait instability and will require 24/7 assistance at discharge. Discussed with patient's goddaughter, Jacqueline, over the phone who stated she has been his primary caretaker and would resume care for him at discharge. She discussed getting a sitter for him when she goes back to work. From case management's note today, patient's wife Irene was informed that he had been denied from the facility, wife stated he will come home and that we needed to contact patient's god daughter Jacqueline because she will be taking him home. See note dated 1/10 by Kimberly from case management for further detail. Plan currently to discharge patient home with assistance from his god daughter and possibly a sitter. He is medically stable for discharge and has all follow-up scheduled. Home equipment ordered. All questions answered. Encouraged to call the clinic with questions/concerns prior to next visit.

## 2020-01-11 NOTE — PLAN OF CARE
Plan of care discussed and reviewed with patient. Pt verbalizes understanding. Pt AA&O x 4, VSS, and with no s/s of distress of pain. PICC line to right upper arm saline locked. All safety precautions in place.Tele sitter in room. Call light in reach. No falls this shift.

## 2020-01-13 DIAGNOSIS — G06.1 SPINAL EPIDURAL ABSCESS: Primary | ICD-10-CM

## 2020-01-13 LAB
ALBUMIN SERPL ELPH-MCNC: 2.8 G/DL (ref 3.35–5.55)
ALPHA1 GLOB SERPL ELPH-MCNC: 0.31 G/DL (ref 0.17–0.41)
ALPHA2 GLOB SERPL ELPH-MCNC: 0.75 G/DL (ref 0.43–0.99)
B-GLOBULIN SERPL ELPH-MCNC: 0.64 G/DL (ref 0.5–1.1)
GAMMA GLOB SERPL ELPH-MCNC: 1.09 G/DL (ref 0.67–1.58)
INTERPRETATION SERPL IFE-IMP: NORMAL
KAPPA LC SER QL IA: 4.49 MG/DL (ref 0.33–1.94)
KAPPA LC/LAMBDA SER IA: 0.16 (ref 0.26–1.65)
LAMBDA LC SER QL IA: 28.01 MG/DL (ref 0.57–2.63)
PATH REV BLD -IMP: NORMAL
PATHOLOGIST INTERPRETATION IFE: NORMAL
PATHOLOGIST INTERPRETATION SPE: NORMAL
PROT SERPL-MCNC: 5.6 G/DL (ref 6–8.4)

## 2020-01-13 NOTE — PLAN OF CARE
Patient discharged home on 1/11/20 to care of God daughter Jacqueline with [facility] from [facility] and outpatient pt/ot.     01/13/20 0828   Final Note   Assessment Type Final Discharge Note   Anticipated Discharge Disposition Home   What phone number can be called within the next 1-3 days to see how you are doing after discharge?   ([phone])   Hospital Follow Up  Appt(s) scheduled? Yes   Discharge plans and expectations educations in teach back method with documentation complete? Yes   Right Care Referral Info   Post Acute Recommendation Other   Referral Type Home Infusion   Facility Name [facility]

## 2020-01-14 ENCOUNTER — OFFICE VISIT (OUTPATIENT)
Dept: INFECTIOUS DISEASES | Facility: CLINIC | Age: 75
End: 2020-01-14
Payer: MEDICARE

## 2020-01-14 VITALS
HEART RATE: 106 BPM | WEIGHT: 158.5 LBS | BODY MASS INDEX: 22.69 KG/M2 | SYSTOLIC BLOOD PRESSURE: 144 MMHG | HEIGHT: 70 IN | DIASTOLIC BLOOD PRESSURE: 81 MMHG | TEMPERATURE: 99 F

## 2020-01-14 DIAGNOSIS — M46.22 ACUTE OSTEOMYELITIS OF CERVICAL SPINE: Primary | ICD-10-CM

## 2020-01-14 DIAGNOSIS — B18.2 CHRONIC HEPATITIS C WITHOUT HEPATIC COMA: ICD-10-CM

## 2020-01-14 DIAGNOSIS — E03.8 OTHER SPECIFIED HYPOTHYROIDISM: ICD-10-CM

## 2020-01-14 DIAGNOSIS — I10 ESSENTIAL HYPERTENSION: ICD-10-CM

## 2020-01-14 DIAGNOSIS — M46.24 ACUTE OSTEOMYELITIS OF THORACIC SPINE: ICD-10-CM

## 2020-01-14 DIAGNOSIS — G06.1 SPINAL EPIDURAL ABSCESS: ICD-10-CM

## 2020-01-14 DIAGNOSIS — A49.8 PSEUDOMONAS INFECTION: ICD-10-CM

## 2020-01-14 DIAGNOSIS — I25.10 CORONARY ARTERY DISEASE INVOLVING NATIVE CORONARY ARTERY OF NATIVE HEART WITHOUT ANGINA PECTORIS: ICD-10-CM

## 2020-01-14 DIAGNOSIS — Z79.2 RECEIVING INTRAVENOUS ANTIBIOTIC TREATMENT AS OUTPATIENT: ICD-10-CM

## 2020-01-14 PROCEDURE — 99999 PR PBB SHADOW E&M-EST. PATIENT-LVL III: CPT | Mod: PBBFAC,,, | Performed by: INTERNAL MEDICINE

## 2020-01-14 PROCEDURE — 99999 PR PBB SHADOW E&M-EST. PATIENT-LVL III: ICD-10-PCS | Mod: PBBFAC,,, | Performed by: INTERNAL MEDICINE

## 2020-01-14 PROCEDURE — 99214 OFFICE O/P EST MOD 30 MIN: CPT | Mod: S$GLB,,, | Performed by: INTERNAL MEDICINE

## 2020-01-14 PROCEDURE — 99214 PR OFFICE/OUTPT VISIT, EST, LEVL IV, 30-39 MIN: ICD-10-PCS | Mod: S$GLB,,, | Performed by: INTERNAL MEDICINE

## 2020-01-14 RX ORDER — METOPROLOL TARTRATE 50 MG/1
50 TABLET ORAL 2 TIMES DAILY
Qty: 60 TABLET | Refills: 0
Start: 2020-01-14 | End: 2021-06-03

## 2020-01-14 RX ORDER — LOSARTAN POTASSIUM AND HYDROCHLOROTHIAZIDE 25; 100 MG/1; MG/1
1 TABLET ORAL DAILY
Qty: 30 TABLET | Refills: 0 | Status: SHIPPED | OUTPATIENT
Start: 2020-01-14 | End: 2021-06-03

## 2020-01-14 RX ORDER — AMLODIPINE BESYLATE 10 MG/1
10 TABLET ORAL DAILY
Qty: 30 TABLET | Refills: 0
Start: 2020-01-14 | End: 2021-06-03

## 2020-01-14 RX ORDER — CLOPIDOGREL BISULFATE 75 MG/1
75 TABLET ORAL DAILY
Qty: 30 TABLET | Refills: 0 | Status: SHIPPED | OUTPATIENT
Start: 2020-01-14 | End: 2021-06-03

## 2020-01-14 RX ORDER — LEVOTHYROXINE SODIUM 50 UG/1
50 TABLET ORAL
Qty: 30 TABLET | Refills: 0 | Status: SHIPPED | OUTPATIENT
Start: 2020-01-14 | End: 2021-06-03

## 2020-01-14 NOTE — PROGRESS NOTES
Subjective:      Patient ID: Junaid Muñoz is a 74 y.o. male.    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 Mr. Muñoz is scheduled to end 6 weeks of IV cefepime on 1/28/2020. He has been experiencing neck pain and pain between shoulder blades when not using his neck brace. He does not wear his neck brace consistently. He has re-started smoking cigarettes.     Review of Systems   Constitution: Negative for chills, decreased appetite, fever, malaise/fatigue, night sweats, weight gain and weight loss.   HENT: Negative for congestion, ear pain, hearing loss, hoarse voice, sore throat and tinnitus.    Eyes: Negative for blurred vision, redness and visual disturbance.   Cardiovascular: Negative for chest pain, leg swelling and palpitations.   Respiratory: Negative for cough, hemoptysis, shortness of breath and sputum production.    Hematologic/Lymphatic: Negative for adenopathy. Does not bruise/bleed easily.   Skin: Negative for dry skin, itching, rash and suspicious lesions.   Musculoskeletal: Positive for neck pain. Negative for back pain, joint pain and myalgias.   Gastrointestinal: Negative for abdominal pain, constipation, diarrhea, heartburn, nausea and vomiting.   Genitourinary: Negative for dysuria, flank pain, frequency, hematuria, hesitancy and urgency.   Neurological: Negative for dizziness, headaches, numbness, paresthesias and weakness.   Psychiatric/Behavioral: Positive for altered mental status (intermittent confusion.). Negative for depression, hallucinations and memory loss. The patient does not have insomnia and is  not nervous/anxious.      Objective:   Physical Exam   Constitutional: He is oriented to person, place, and time. He appears well-developed and well-nourished.   Falls asleep easily.    HENT:   Head: Normocephalic and atraumatic.   Right Ear: External ear normal.   Left Ear: External ear normal.   Eyes: Conjunctivae are normal. Right eye exhibits no discharge.   Neck: Neck supple. No thyromegaly present.       Cardiovascular: Normal rate, regular rhythm and normal heart sounds.   No murmur heard.  Pulmonary/Chest: Effort normal and breath sounds normal. He has no wheezes. He has no rales.   Abdominal: Soft. Bowel sounds are normal. He exhibits no mass. There is no tenderness. There is no rebound.   Musculoskeletal: Normal range of motion.   RUE PICC in place without erythema or tenderness to palpation.    Lymphadenopathy:     He has no cervical adenopathy.   Neurological: He is alert and oriented to person, place, and time.   Skin: Skin is warm and dry.   Psychiatric: He has a normal mood and affect. His behavior is normal.   Vitals reviewed.      Assessment:       1. Acute osteomyelitis of cervical spine    2. Essential hypertension    3. Spinal epidural abscess    4. Acute osteomyelitis of thoracic spine    5. Pseudomonas infection    6. Chronic hepatitis C without hepatic coma    7. Coronary artery disease involving native coronary artery of native heart without angina pectoris    8. Other specified hypothyroidism        Plan:   Patient with spinal osteomyelitis due to Pseudomonas aeruginosa and epidural abscess s/p C7-T1 corpectomy and fusion w/ lumbar drain placed secondary to intra op CSF leak on 12/10, with s/p stage 2 posterior fusion on 12/17. He is tolerating antibiotic therapy thus far although his family has noted some intermittent confusion and somnolence. He remains oriented. I reviewed his laboratory work up which shows decrease in ESR and CRP. His family whishes to change primary care providers. He  has not had antihypertensive therapy since discharged as he is no longer living with his wife. His HTN, CAD, hypothyroidism are chronic and stable.   · Right basilic PICC in place (37 cm) since 12/14/19.  · Cefepime 2 gm every 8 hours.   · Continue routine laboratory monitoring. Today's labs pending.   · [facility] doing lab work and dressing change.  · Referral to Internal Medicine to establish care with new provider.   · I have refilled his Plavix, levothyroxine, Hyzaar, Norvasc, and Lopressor for 30 days while he establishes care with new PCP as medication refills were not provided at discharge.   · Pending follow up with Hepatology for management of chronic HCV  · RTC in 2 weeks.

## 2020-01-15 LAB
FUNGUS SPEC CULT: NORMAL
FUNGUS SPEC CULT: NORMAL

## 2020-01-16 ENCOUNTER — TELEPHONE (OUTPATIENT)
Dept: HEMATOLOGY/ONCOLOGY | Facility: HOSPITAL | Age: 75
End: 2020-01-16

## 2020-01-16 DIAGNOSIS — D62 ACUTE BLOOD LOSS ANEMIA: Primary | ICD-10-CM

## 2020-01-16 NOTE — TELEPHONE ENCOUNTER
In light of faint STEPHANIE gamma irregularity, FLC ratio 0.16, will repeat in 3 months and f/u in Hematology Clinic. Msg sent to schedulers.

## 2020-01-20 ENCOUNTER — TELEPHONE (OUTPATIENT)
Dept: NEUROSURGERY | Facility: CLINIC | Age: 75
End: 2020-01-20

## 2020-01-20 ENCOUNTER — TELEPHONE (OUTPATIENT)
Dept: INFECTIOUS DISEASES | Facility: CLINIC | Age: 75
End: 2020-01-20

## 2020-01-20 DIAGNOSIS — G06.1 SPINAL EPIDURAL ABSCESS: Primary | ICD-10-CM

## 2020-01-20 NOTE — TELEPHONE ENCOUNTER
Spoke to Pedro hoskins/[facility] (ph# [phone]) and Per Dr Koehler to switch from Cefepime to Meropenem 2 gram Q 8 hrs. [facility] will reach out to the patient and try to get him in for first dosing by tomorrow.

## 2020-01-20 NOTE — TELEPHONE ENCOUNTER
Tried calling patients niece back (ph# [phone]), no answer and was not able to leave message.      ----- Message from Hailey Sykes MA sent at 1/20/2020 11:44 AM CST -----  Contact: Self/[phone]  PT niece calling and stated that her uncle was at [facility] to have his picc line dressing changed, but ended up going to [facility] in [place] to have it changed and  Was stated by the niece that they have no orders/referrals on the Pt to have them to change it. Per niece she is the only one he has, she is doing all of this by herself.

## 2020-01-20 NOTE — TELEPHONE ENCOUNTER
----- Message from Mikayla Koehler MD sent at 1/18/2020 12:41 PM CST -----  Anju  Sorry if this is a double message. Can you call his infusion company and see if we can switch him to meropenem 2 gm every 8 hours? He is having rash, headache and progressive confusion with cefepime.   Thanks  Mikayla

## 2020-01-21 ENCOUNTER — TELEPHONE (OUTPATIENT)
Dept: INFECTIOUS DISEASES | Facility: CLINIC | Age: 75
End: 2020-01-21

## 2020-01-21 NOTE — TELEPHONE ENCOUNTER
----- Message from Jaki Robert sent at 1/21/2020 10:20 AM CST -----  Contact: Pedro hoskins/ [facility]    tel:  [phone]   Pt. Cancelled on them today and did not start the meds as planned.  Trying to reschedule the pt.     Pls call to discuss.

## 2020-01-21 NOTE — TELEPHONE ENCOUNTER
Spoke with Pedro who wanted to let Dr Koehler know that patient did not have his first dose this morning, as the niece who was to bring him got called into work and was unable to bring him. Pedro stated he is working with the niece to see if she can bring him first thing tomorrow morning. Message forwarded to Dr Koehler.

## 2020-01-23 ENCOUNTER — HOSPITAL ENCOUNTER (OUTPATIENT)
Dept: RADIOLOGY | Facility: HOSPITAL | Age: 75
Discharge: HOME | End: 2020-01-23
Attending: NEUROLOGICAL SURGERY
Payer: MEDICARE

## 2020-01-23 ENCOUNTER — OFFICE VISIT (OUTPATIENT)
Dept: NEUROSURGERY | Facility: CLINIC | Age: 75
End: 2020-01-23
Payer: MEDICARE

## 2020-01-23 VITALS — TEMPERATURE: 98 F | HEART RATE: 89 BPM | DIASTOLIC BLOOD PRESSURE: 77 MMHG | SYSTOLIC BLOOD PRESSURE: 148 MMHG

## 2020-01-23 DIAGNOSIS — Z98.1 HISTORY OF FUSION OF CERVICAL SPINE: Primary | ICD-10-CM

## 2020-01-23 DIAGNOSIS — Z98.1 S/P CERVICAL SPINAL FUSION: ICD-10-CM

## 2020-01-23 DIAGNOSIS — Z98.1 S/P CERVICAL SPINAL FUSION: Primary | ICD-10-CM

## 2020-01-23 PROCEDURE — 99024 PR POST-OP FOLLOW-UP VISIT: ICD-10-PCS | Mod: S$GLB,,, | Performed by: NEUROLOGICAL SURGERY

## 2020-01-23 PROCEDURE — 99999 PR PBB SHADOW E&M-EST. PATIENT-LVL III: ICD-10-PCS | Mod: PBBFAC,,, | Performed by: NEUROLOGICAL SURGERY

## 2020-01-23 PROCEDURE — 99024 POSTOP FOLLOW-UP VISIT: CPT | Mod: S$GLB,,, | Performed by: NEUROLOGICAL SURGERY

## 2020-01-23 PROCEDURE — 72040 XR CERVICAL SPINE AP LATERAL: ICD-10-PCS | Mod: 26,,, | Performed by: RADIOLOGY

## 2020-01-23 PROCEDURE — 72040 X-RAY EXAM NECK SPINE 2-3 VW: CPT | Mod: 26,,, | Performed by: RADIOLOGY

## 2020-01-23 PROCEDURE — 99999 PR PBB SHADOW E&M-EST. PATIENT-LVL III: CPT | Mod: PBBFAC,,, | Performed by: NEUROLOGICAL SURGERY

## 2020-01-23 PROCEDURE — 72040 X-RAY EXAM NECK SPINE 2-3 VW: CPT | Mod: TC

## 2020-01-23 RX ORDER — MEROPENEM 1 G/1
INJECTION, POWDER, FOR SOLUTION INTRAVENOUS
COMMUNITY
Start: 2020-01-22 | End: 2021-06-03

## 2020-01-23 NOTE — PROGRESS NOTES
CHIEF COMPLAINT:  Post op evaluation 6 weeks after C7 and T1 corpectomy and C2-T3 posterior fusion    I, Tay Norton, attest that this documentation has been prepared under the direction and in the presence of Elian Deluca MD.    HPI:  Junaid Muñoz is a 74 y.o.  male who presents today for a 6 week post op evaluation. Pt is s/p C7 and T1 corpectomy and C2-T3 posterior fusion on 12/17/2019. Pt reports posterior neck soreness. It seems to be improving week to week. He denies any fevers, chills, or night sweats. Pt is taking antibiotics every day. Pt presents today using a walker. Pt is not wearing his cervical collar.       Review of patient's allergies indicates:   Allergen Reactions    Penicillins Rash     Tolerated cefepime December 2019       Past Medical History:   Diagnosis Date    CAD (coronary artery disease)     s/p stent 2005, on plavix    Chronic hepatitis C     Cirrhosis     biopsy proven - 2007    History of colon polyps 06/2008    1 polyp - tubular adenoma    HTN (hypertension)     Hyperlipidemia     Hypothyroidism      Past Surgical History:   Procedure Laterality Date    COLONOSCOPY W/ POLYPECTOMY  06/2008    Dr Wagoner: fair prep, 3mm polyp - tubular adenoma    CORONARY ANGIOPLASTY WITH STENT PLACEMENT      FUSION OF CERVICAL SPINE BY POSTERIOR APPROACH N/A 12/17/2019    Procedure: FUSION, SPINE, CERVICAL, POSTERIOR APPROACH C2-T3 posterior instrumented fusion;  Surgeon: Elian Deluca MD;  Location: [facility] OR [address] [place] [address];  Service: Neurosurgery;  Laterality: N/A;    hydrocele repair  teenager    pyloric stenosis repair  age 1    SURGICAL REMOVAL OF VERTEBRAL BODY OF THORACIC SPINE N/A 12/10/2019    Procedure: CORPECTOMY, SPINE, CERVICAL AND THORACIC, C7 and T1 with Globus;  Surgeon: Elian Deulca MD;  Location: [facility] OR [address] [place] [address];  Service: Neurosurgery;  Laterality: N/A;    TONSILLECTOMY       No family history on file.  Social History     Tobacco Use    Smoking status: Current  Every Day Smoker   Substance Use Topics    Alcohol use: Not on file    Drug use: Yes     Types: IV     Comment: MORPHINE        Review of Systems   Constitutional: Negative.  Negative for chills, diaphoresis and fever.   HENT: Negative.    Eyes: Negative.    Respiratory: Negative.    Cardiovascular: Negative.    Gastrointestinal: Negative.    Endocrine: Negative.    Genitourinary: Negative.    Musculoskeletal: Positive for gait problem (walker) and neck pain. Negative for back pain.   Skin: Negative.    Allergic/Immunologic: Negative.    Neurological: Negative for weakness, light-headedness, numbness and headaches.   Hematological: Negative.    Psychiatric/Behavioral: Negative.        OBJECTIVE:   Vital Signs:  Temp: 98.1 °F (36.7 °C) (01/23/20 1446)  Pulse: 89 (01/23/20 1446)  BP: (!) 148/77 (01/23/20 1446)    Physical Exam:    Vital signs: All nursing notes and vital signs reviewed -- afebrile, vital signs stable.  Constitutional: Patient sitting comfortably in chair. Appears well developed and well nourished.  Skin: Exposed areas are intact without abnormal markings, rashes or other lesions.  HEENT: Normocephalic. Normal conjunctivae.  Cardiovascular: Normal rate and regular rhythm.  Respiratory: Chest wall rises and falls symmetrically, without signs of respiratory distress.  Abdomen: Soft and non-tender.  Extremities: Warm and without edema. Calves supple, non-tender.  Psych/Behavior: Normal affect.    Neurological:    Mental status: Alert and oriented. Conversational and appropriate.       Cranial Nerves: Grossly intact.     Motor:    Upper:  Deltoids Triceps Biceps WE WF     R 5/5 5/5 5/5 5/5 5/5 5/5    L 5/5 5/5 5/5 5/5 5/5 5/5      Lower:  HF KE KF DF PF EHL    R 5/5 5/5 5/5 5/5 5/5 5/5    L 5/5 5/5 5/5 5/5 5/5 5/5     Sensory: Intact sensation to light touch in all extremities. Romberg negative.    Reflexes:          DTR: 2+ symmetrically throughout.     Damian's: Negative.     Babinski's:  Negative.     Clonus: Negative.    Cerebellar: Finger-to-nose and rapid alternating movements normal. Gait stable, fluid.    Spine:    Posture: Head well aligned over pelvis in front and side views.  No focal or global spinal deformity visible on inspection. Shoulders and hips even. No obvious leg length discrepancy. No scapula winging.    Bending: Full ROM with forward, back and lateral bending. No rib prominence with forward bend.    Cervical:      ROM: Full with flexion, extension, lateral rotation and ear-to-shoulder bend.      Midline TTP: Negative.     Spurling's test: Negative.     Lhermitte's: Negative.    Thoracic:     Midline TTP: Negative    Lumbar:     Midline TTP: Negative     Straight Leg Test: Negative     Crossed Straight Leg Test: Negative     Sciatic notch tenderness: Negative.    Other:     SI joint TTP: Negative.     Greater trochanter TTP: Negative.     Tenderness with external/internal hip rotation: Negative.    Diagnostic Results:  All imaging was independently reviewed by me.    AP and Lateral X-ray C-spine, dated 01/23/2020:  1. Good spinal alignment and hardware position    ASSESSMENT/PLAN:     Junaid Muñoz is doing well 6 weeks after front back cervical surgery for spondylodiscitis. His xrays look great. We will see him in 3 months with a CT.    The patient understands and agrees with the plan of care. All questions were answered.     1. RTC 6 week with CT C-spine      I, Dr. Elian Deluca personally performed the services described in this documentation. All medical record entries made by the scribe, Tay Norton, were at my direction and in my presence.  I have reviewed the chart and agree that the record reflects my personal performance and is accurate and complete.      Elian Deluca M.D.  Department of Neurosurgery  [facility]      .

## 2020-01-24 ENCOUNTER — TELEPHONE (OUTPATIENT)
Dept: INFECTIOUS DISEASES | Facility: CLINIC | Age: 75
End: 2020-01-24

## 2020-01-24 NOTE — TELEPHONE ENCOUNTER
Weekly laboratory work up reviewed.     Date 1/22   WBC 3.7   BUN 15   Creat 0.76   AST 39   ALT 53      ESR 13   CRP 3 (0-10)     Assessment:  P aeruginosa cervical and thoracic osteomyelitis       Plan:  · Continue meropenem  · Routine laboratory monitoring.   · EOC: 1/28/2020

## 2020-01-29 ENCOUNTER — TELEPHONE (OUTPATIENT)
Dept: INFECTIOUS DISEASES | Facility: CLINIC | Age: 75
End: 2020-01-29

## 2020-01-29 NOTE — TELEPHONE ENCOUNTER
Patient's daughter came in this morning with her dad a hour and 20 minutes late for his appointment. Patient called our office and the  rescheduled patient to tomorrow. Patient daughter came into our clinic upset due to her wanting her father seen now. When it was explaining to the patient's daughter he will have to be seen tomorrow, she then walked away in the middle of me explaining things to her.

## 2020-01-30 ENCOUNTER — OFFICE VISIT (OUTPATIENT)
Dept: INFECTIOUS DISEASES | Facility: CLINIC | Age: 75
End: 2020-01-30
Payer: MEDICARE

## 2020-01-30 ENCOUNTER — INFUSION (OUTPATIENT)
Dept: INFECTIOUS DISEASES | Facility: HOSPITAL | Age: 75
End: 2020-01-30
Attending: INTERNAL MEDICINE
Payer: MEDICARE

## 2020-01-30 VITALS
TEMPERATURE: 98 F | WEIGHT: 159.81 LBS | BODY MASS INDEX: 22.88 KG/M2 | HEART RATE: 74 BPM | DIASTOLIC BLOOD PRESSURE: 64 MMHG | SYSTOLIC BLOOD PRESSURE: 104 MMHG | HEIGHT: 70 IN

## 2020-01-30 DIAGNOSIS — M46.22 ACUTE OSTEOMYELITIS OF CERVICAL SPINE: Primary | ICD-10-CM

## 2020-01-30 DIAGNOSIS — G06.1 SPINAL EPIDURAL ABSCESS: ICD-10-CM

## 2020-01-30 DIAGNOSIS — Z79.2 RECEIVING INTRAVENOUS ANTIBIOTIC TREATMENT AS OUTPATIENT: ICD-10-CM

## 2020-01-30 DIAGNOSIS — M46.24 ACUTE OSTEOMYELITIS OF THORACIC SPINE: ICD-10-CM

## 2020-01-30 DIAGNOSIS — A49.8 PSEUDOMONAS INFECTION: ICD-10-CM

## 2020-01-30 PROCEDURE — 99999 PR PBB SHADOW E&M-EST. PATIENT-LVL III: ICD-10-PCS | Mod: PBBFAC,,, | Performed by: INTERNAL MEDICINE

## 2020-01-30 PROCEDURE — 99213 PR OFFICE/OUTPT VISIT, EST, LEVL III, 20-29 MIN: ICD-10-PCS | Mod: S$GLB,,, | Performed by: INTERNAL MEDICINE

## 2020-01-30 PROCEDURE — 99999 PR PBB SHADOW E&M-EST. PATIENT-LVL III: CPT | Mod: PBBFAC,,, | Performed by: INTERNAL MEDICINE

## 2020-01-30 PROCEDURE — 99213 OFFICE O/P EST LOW 20 MIN: CPT | Mod: S$GLB,,, | Performed by: INTERNAL MEDICINE

## 2020-01-30 NOTE — PROGRESS NOTES
Subjective:      Patient ID: Junaid Muñoz is a 74 y.o. male.    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 Mr. Muñoz was initially on a 6 week course of IV cefepime however he was transitioned to meropenem due to developing a pruritic rash and ongoing mental status changes concerning for antibiotic related adverse effects. He has continue smoking cigarettes however is now wearing his neck brace. His only complaint is neck pain.     Review of Systems   Constitution: Positive for malaise/fatigue. Negative for chills, decreased appetite, fever, night sweats, weight gain and weight loss.   HENT: Negative for congestion, ear pain, hearing loss, hoarse voice, sore throat and tinnitus.    Eyes: Negative for blurred vision, redness and visual disturbance.   Cardiovascular: Negative for chest pain, leg swelling and palpitations.   Respiratory: Negative for cough, hemoptysis, shortness of breath and sputum production.    Hematologic/Lymphatic: Negative for adenopathy. Does not bruise/bleed easily.   Skin: Positive for dry skin, itching and rash. Negative for suspicious lesions.   Musculoskeletal: Positive for neck pain. Negative for back pain, joint pain and myalgias.   Gastrointestinal: Negative for abdominal pain, constipation, diarrhea, heartburn, nausea and vomiting.   Genitourinary: Negative for dysuria, flank pain, frequency, hematuria, hesitancy and urgency.   Neurological: Positive for dizziness. Negative for headaches, numbness, paresthesias and weakness.   Psychiatric/Behavioral: Negative for altered  mental status (\), depression, hallucinations and memory loss. The patient is nervous/anxious. The patient does not have insomnia.      Objective:   Physical Exam   Constitutional: He is oriented to person, place, and time. He appears well-developed and well-nourished.   Falls asleep easily.    HENT:   Head: Normocephalic and atraumatic.   Right Ear: External ear normal.   Left Ear: External ear normal.   Eyes: Conjunctivae are normal. Right eye exhibits no discharge.   Neck: Neck supple. No thyromegaly present.   Wearing neck brace.    Cardiovascular: Normal rate, regular rhythm and normal heart sounds.   No murmur heard.  Pulmonary/Chest: Effort normal and breath sounds normal. He has no wheezes. He has no rales.   Abdominal: Soft. Bowel sounds are normal. He exhibits no mass. There is no tenderness. There is no rebound.   Musculoskeletal: Normal range of motion.   RUE PICC.   Lymphadenopathy:     He has no cervical adenopathy.   Neurological: He is alert and oriented to person, place, and time.   Skin: Skin is warm and dry.   Psychiatric: He has a normal mood and affect. His behavior is normal.   Vitals reviewed.    Date 1/22 1/29   WBC 3.7 -   BUN 15 -   Creat 0.76 -   AST 39 -   ALT 53 -    -   ESR 13 18 (0-30)   CRP 3 (0-10) 1 (0-10)     Assessment:       1. Acute osteomyelitis of cervical spine    2. Acute osteomyelitis of thoracic spine    3. Spinal epidural abscess    4. Pseudomonas infection    5. Receiving intravenous antibiotic treatment as outpatient        Plan:   Patient with spinal osteomyelitis due to Pseudomonas aeruginosa and epidural abscess s/p C7-T1 corpectomy and fusion w/ lumbar drain placed secondary to intra op CSF leak on 12/10, with s/p stage 2 posterior fusion on 12/17. Appears to be improving after discontinuation of cefepime. Completed 6 weeks of therapy on 1/28. Labs above given as verbal read from [facility] while awaiting written copies.   · Completed therapy with  meropenem due to adverse effects from cefepime.  · ESR and CRP now in jacqueline range for > 2 weeks.   · Remove PICC line today.   · Discussed signs and symptoms of recurrent infection. Patient and family will call if they develop.   · RTC as needed.

## 2020-02-11 LAB
ACID FAST MOD KINY STN SPEC: NORMAL
ACID FAST MOD KINY STN SPEC: NORMAL
MYCOBACTERIUM SPEC QL CULT: NORMAL
MYCOBACTERIUM SPEC QL CULT: NORMAL

## 2020-03-09 ENCOUNTER — TELEPHONE (OUTPATIENT)
Dept: SPINE | Facility: CLINIC | Age: 75
End: 2020-03-09

## 2020-03-12 ENCOUNTER — TELEPHONE (OUTPATIENT)
Dept: NEUROSURGERY | Facility: CLINIC | Age: 75
End: 2020-03-12

## 2020-03-12 NOTE — TELEPHONE ENCOUNTER
Pt was a no call/no show for his CT and 3MO P/O.  Caregiver claims she just got off work and could not bring the pt.  Pt rescheduled for 03.17.2020 in [facility] for his CT and a P/O on 03.26.2020.  Appt letter in the mail, V/U.

## 2020-03-17 ENCOUNTER — TELEPHONE (OUTPATIENT)
Dept: NEUROSURGERY | Facility: CLINIC | Age: 75
End: 2020-03-17

## 2020-03-19 ENCOUNTER — TELEPHONE (OUTPATIENT)
Dept: NEUROSURGERY | Facility: CLINIC | Age: 75
End: 2020-03-19

## 2020-03-19 NOTE — TELEPHONE ENCOUNTER
Spoke with patient regarding up coming appointment. Inform patient due to the COVID 19 outbreak and the safety of patients and staff we are changing patient visit to Virtual visit. Instructions given to patient along with pass code to get active on the portal. Patient verbalized understanding.

## 2020-03-23 ENCOUNTER — TELEPHONE (OUTPATIENT)
Dept: NEUROSURGERY | Facility: CLINIC | Age: 75
End: 2020-03-23

## 2020-03-23 NOTE — TELEPHONE ENCOUNTER
Spoke with patient regarding setting up the [facility] portal. Inform patient in order for virtual visit scheduled on 3/26/20 patient would need to have to portal set up on phone. Inform patient that if portal is not set up we would have to cancel appointment scheduled 3/26/20. Patient verbalized understanding.

## 2020-03-26 ENCOUNTER — NURSE TRIAGE (OUTPATIENT)
Dept: ADMINISTRATIVE | Facility: CLINIC | Age: 75
End: 2020-03-26

## 2020-03-26 ENCOUNTER — TELEPHONE (OUTPATIENT)
Dept: NEUROSURGERY | Facility: CLINIC | Age: 75
End: 2020-03-26

## 2020-03-26 NOTE — TELEPHONE ENCOUNTER
Spoke with caregiver Jacqueline she stated that she was not with patient informed Jacqueline that she needed to be present with patient for call to be triaged.  Jacqueline was asked to calm down during call and I informed her I could call her back once she got home she stated it would take 15 minutes.   Jacqueline began to use profanity during call as she stated patient did not need to be triaged.  Call ended due to the profanity use and the aggressiveness of the caller.     Reason for Disposition   [1] Caller continues to be verbally abusive AND [2] after triager sets BOUNDARIES AND [3] Triager ends call    Protocols used: DIFFICULT CALLER-A-

## 2020-03-27 NOTE — TELEPHONE ENCOUNTER
JC Phillips and provided the appt info for the pt's XR and CT scheduled at [facility] on 04.09.2020.  Provided instructions to set up [employer] and the number for tech support if she requires further assistance ([phone]).  Appt letters in the mail.    Explained I can schedule the pt's P/O visit w/Dr. Deluca after she sets up the portal and requested she complete this before the close of the weekend.    Provided the COVID-19 hotline ([phone]).  Denies any needs at this time, all Q/C addressed.  V/U.

## 2020-04-01 ENCOUNTER — TELEPHONE (OUTPATIENT)
Dept: NEUROSURGERY | Facility: CLINIC | Age: 75
End: 2020-04-01

## 2020-04-01 NOTE — TELEPHONE ENCOUNTER
Provided pt's caregiverJacqueline the number for financial services and tech support to address $95 co-pay for the CT and issues w/setting up the PP.  V/U.----- Message from Clemencia Peñaloza sent at 3/31/2020  1:43 PM CDT -----  Contact: Caregiver -Jacqueline [phone]  Would like to receive medical advice.    Would they like a call back or a response via [facility]:  Call back    Additional information:  Calling to get pt setup for a virtual visit. Jacqueline is requesting a call back regarding message.

## 2020-04-02 ENCOUNTER — TELEPHONE (OUTPATIENT)
Dept: HEMATOLOGY/ONCOLOGY | Facility: CLINIC | Age: 75
End: 2020-04-02

## 2020-04-07 ENCOUNTER — TELEPHONE (OUTPATIENT)
Dept: NEUROSURGERY | Facility: CLINIC | Age: 75
End: 2020-04-07

## 2020-04-07 NOTE — TELEPHONE ENCOUNTER
Relayed to Shirley in the imagining dept the pt is claiming [facility] will charge the pt a $95 co-pay for imaging there but they will not require a co-pay at the Imaging Center at [facility].  Requested a return call from Licha to discuss.  V/U.

## 2020-04-16 ENCOUNTER — OFFICE VISIT (OUTPATIENT)
Dept: HEMATOLOGY/ONCOLOGY | Facility: CLINIC | Age: 75
End: 2020-04-16
Payer: MEDICARE

## 2020-04-16 DIAGNOSIS — D62 ACUTE BLOOD LOSS ANEMIA: ICD-10-CM

## 2020-04-16 DIAGNOSIS — D69.6 THROMBOCYTOPENIA: Primary | ICD-10-CM

## 2020-04-16 DIAGNOSIS — D72.829 LEUKOCYTOSIS, UNSPECIFIED TYPE: ICD-10-CM

## 2020-04-16 DIAGNOSIS — B18.2 CHRONIC HEPATITIS C WITHOUT HEPATIC COMA: ICD-10-CM

## 2020-04-16 DIAGNOSIS — I25.10 CORONARY ARTERY DISEASE INVOLVING NATIVE CORONARY ARTERY OF NATIVE HEART WITHOUT ANGINA PECTORIS: ICD-10-CM

## 2020-04-16 PROCEDURE — 99441 PR PHYSICIAN TELEPHONE EVALUATION 5-10 MIN: ICD-10-PCS | Mod: 95,GC,, | Performed by: STUDENT IN AN ORGANIZED HEALTH CARE EDUCATION/TRAINING PROGRAM

## 2020-04-16 PROCEDURE — 99441 PR PHYSICIAN TELEPHONE EVALUATION 5-10 MIN: CPT | Mod: 95,GC,, | Performed by: STUDENT IN AN ORGANIZED HEALTH CARE EDUCATION/TRAINING PROGRAM

## 2020-04-16 NOTE — PROGRESS NOTES
PATIENT: Junaid Muñoz  MRN: [id number]  DATE: 4/16/2020      Diagnosis:   1. Thrombocytopenia    2. Acute blood loss anemia    3. Leukocytosis, unspecified type    4. Chronic hepatitis C without hepatic coma    5. Coronary artery disease involving native coronary artery of native heart without angina pectoris        Chief Complaint: No chief complaint on file.    Subjective:   Junaid Muñoz is a 75 y.o. male with anemia, HTN, HLD, CAD s/p two stents on plavix, Hepatitis C with biopsy proven cirrhosis likely 2/2 IVDU, hypothyroidism. Here for follow-up of thrombocytopenia & anemia.    While hospitalized in January 2020, was found to have OM of C6-T2 with epidural abscess on MRI, and was transferred here for Neurosurgery evaluation. He underwent C7-T1 Corpectomy on 12/10 and posterior fusion on 12/17. Wound cultures positive for pseudomonas, being treated with iv Cefepime 2 g Q8H. Hematology consulted while patient was in hospital for thrombocytopenia. Also noted to have normocytic anemia. Thrombocytopenia felt to be multi-factorial d/t antibiotics (cefepime),  hep C, ongoing infection, splenic sequestration.    Spoke to patient today, denies any issues including weight loss, fevers/chills, night sweats, bleeding/bruising.  The patient location is: home  The chief complaint leading to consultation is: thrombocytopenia/anemia  Visit type: audio only  Total time spent with patient: 15 min  Each patient to whom he or she provides medical services by telemedicine is:  (1) informed of the relationship between the physician and patient and the respective role of any other health care provider with respect to management of the patient; and (2) notified that he or she may decline to receive medical services by telemedicine and may withdraw from such care at any time.    Past Medical History:   Past Medical History:   Diagnosis Date    CAD (coronary artery disease)     s/p stent 2005, on plavix    Chronic hepatitis C      Cirrhosis     biopsy proven - 2007    History of colon polyps 06/2008    1 polyp - tubular adenoma    HTN (hypertension)     Hyperlipidemia     Hypothyroidism        Past Surgical HIstory:   Past Surgical History:   Procedure Laterality Date    COLONOSCOPY W/ POLYPECTOMY  06/2008    Dr Wagoner: fair prep, 3mm polyp - tubular adenoma    CORONARY ANGIOPLASTY WITH STENT PLACEMENT      FUSION OF CERVICAL SPINE BY POSTERIOR APPROACH N/A 12/17/2019    Procedure: FUSION, SPINE, CERVICAL, POSTERIOR APPROACH C2-T3 posterior instrumented fusion;  Surgeon: Elian Deluca MD;  Location: [facility] OR [address] [place] [address];  Service: Neurosurgery;  Laterality: N/A;    hydrocele repair  teenager    pyloric stenosis repair  age 1    SURGICAL REMOVAL OF VERTEBRAL BODY OF THORACIC SPINE N/A 12/10/2019    Procedure: CORPECTOMY, SPINE, CERVICAL AND THORACIC, C7 and T1 with Globus;  Surgeon: Elian Deluca MD;  Location: [facility] OR [address] [place] [address];  Service: Neurosurgery;  Laterality: N/A;    TONSILLECTOMY         Family History: No family history on file.    Social History:  reports that he has been smoking. He does not have any smokeless tobacco history on file. He reports that he has current or past drug history. Drug: IV.    Allergies:  Review of patient's allergies indicates:   Allergen Reactions    Penicillins Rash     Tolerated cefepime December 2019       Medications:  Current Outpatient Medications   Medication Sig Dispense Refill    albuterol-ipratropium (DUO-NEB) 2.5 mg-0.5 mg/3 mL nebulizer solution Take 3 mLs by nebulization every 12 (twelve) hours. Rescue 1 Box 0    amLODIPine (NORVASC) 10 MG tablet Take 1 tablet (10 mg total) by mouth once daily. 30 tablet 0    ascorbic acid, vitamin C, (VITAMIN C) 250 MG tablet Take 1 tablet (250 mg total) by mouth 2 (two) times daily.      atorvastatin (LIPITOR) 80 MG tablet Take 0.5 tablets (40 mg total) by mouth once daily.      balsam peru-castor oil Oint Apply topically 2 (two) times daily.  30 g 1    ceFEPIme (MAXIPIME) 2 gram injection Inject 2 g into the vein every 8 (eight) hours.      clopidogrel (PLAVIX) 75 mg tablet Take 1 tablet (75 mg total) by mouth once daily. 30 tablet 0    ferrous sulfate 325 (65 FE) MG EC tablet Take 1 tablet (325 mg total) by mouth 2 (two) times daily.  0    gabapentin (NEURONTIN) 300 MG capsule Take 1 capsule (300 mg total) by mouth 3 (three) times daily. 90 capsule 11    levothyroxine (SYNTHROID) 50 MCG tablet Take 1 tablet (50 mcg total) by mouth before breakfast. 30 tablet 0    losartan-hydrochlorothiazide 100-25 mg (HYZAAR) 100-25 mg per tablet Take 1 tablet by mouth once daily. 30 tablet 0    meropenem (MERREM) 1 gram injection       metoprolol tartrate (LOPRESSOR) 50 MG tablet Take 1 tablet (50 mg total) by mouth 2 (two) times daily. 60 tablet 0    oxyCODONE (ROXICODONE) 10 mg Tab immediate release tablet Take 1 tablet (10 mg total) by mouth every 6 (six) hours as needed for Pain. 56 tablet 0    sodium chloride 0.9% (NORMAL SALINE FLUSH) injection Inject 10 mLs into the vein every 6 (six) hours.      sodium chloride 0.9% (NORMAL SALINE FLUSH) injection Inject 10 mLs into the vein as needed.      sodium chloride 1 gram tablet Take 2 tablets (2 g total) by mouth once daily.       No current facility-administered medications for this visit.        Review of Systems   Constitutional: Negative for chills, fever and unexpected weight change.   HENT: Negative for nosebleeds and sore throat.    Respiratory: Negative for cough and shortness of breath.    Cardiovascular: Negative for chest pain and leg swelling.   Gastrointestinal: Negative for abdominal pain, blood in stool, nausea and vomiting.   Genitourinary: Negative for dysuria and hematuria.   Musculoskeletal: Negative for arthralgias and back pain.   Skin: Negative for rash.   Neurological: Negative for light-headedness and headaches.   Hematological: Negative for adenopathy. Does not bruise/bleed easily.        ECOG Performance Status: 1   Objective:      Vitals: There were no vitals filed for this visit.    Physical Exam   Patient not examined d/t COVID-19 precautions    Laboratory Data:  No visits with results within 1 Week(s) from this visit.   Latest known visit with results is:   No results displayed because visit has over 200 results.        Assessment:       1. Thrombocytopenia    2. Acute blood loss anemia    3. Leukocytosis, unspecified type    4. Chronic hepatitis C without hepatic coma    5. Coronary artery disease involving native coronary artery of native heart without angina pectoris           Plan:   Normocytic anemia, thrombocytopenia. Felt to be multi-factorial, d/t hep C, splenic sequestration in setting of cirrhosis and splenomegaly, chronic disease.  -FLC ratio 0.16, faint irregularity in gamma  -Ferritin 148  -B12 476, folate 5.3  -Haptoglobin 146, retic 3.5,   -U/S Jan 2020: The spleen is enlarged in size measuring 14.3 x 3.9 cm with a homogeneous echotexture.  -Repeat labs at patient's convenience: CBC, CMP, SPEP, STEPHANIE, FLC, 24 hr urine  -Consider BM biopsy given FLC ratio of 0.16, and normocytic anemia pending repeat labs    Follow-up: Repeat labs at patient's convenience close to [place]: CBC, CMP, SPEP, STEPHANIE, FLC, 24 hr urine, see Dr. Chapman at [facility] on a Tuesday    The following was staffed and discussed with supervising physician Dr. Chapman.    Shira Goldman MD PGY-V  Hematology/Oncology Fellow

## 2020-04-16 NOTE — Clinical Note
Repeat labs at patient's convenience close to [place]: CBC, CMP, SPEP, STEPHANIE, FLC, 24 hr urine (needs instructions on collecting this), see Dr. Chapman at [facility] on a Tuesday in 3-4 weeks

## 2020-04-17 ENCOUNTER — TELEPHONE (OUTPATIENT)
Dept: HEMATOLOGY/ONCOLOGY | Facility: CLINIC | Age: 75
End: 2020-04-17

## 2020-04-17 NOTE — TELEPHONE ENCOUNTER
----- Message from Shira Goldman MD sent at 4/16/2020  2:16 PM CDT -----  Repeat labs at patient's convenience close to [place]: CBC, CMP, SPEP, STEPHANIE, FLC, 24 hr urine (needs instructions on collecting this), see Dr. Chapman at [facility] on a Tuesday in 3-4 weeks

## 2020-04-21 ENCOUNTER — TELEPHONE (OUTPATIENT)
Dept: HEMATOLOGY/ONCOLOGY | Facility: CLINIC | Age: 75
End: 2020-04-21

## 2020-04-21 NOTE — TELEPHONE ENCOUNTER
Called pt to inform him about up coming appts. While doing this pt informed me that he tripped over something in his laundry room and landed face first. Pt stated that he did not go get checked out, but he has a light discoloration on his left forehead and two scratches on left arm. I informed him that I will provide this information with the nurse.

## 2021-01-01 NOTE — PLAN OF CARE
CM met with patient at the bedside to discuss discharge planning assessment. Pt lives with his spouse and has no transportation home.  Pt verified PCP and Pharmacy. CM will continue to follow for discharge needs.        12/09/19 0936   Discharge Assessment   Assessment Type Discharge Planning Assessment   Confirmed/corrected address and phone number on facesheet? Yes   Assessment information obtained from? Patient   Expected Length of Stay (days) 5   Communicated expected length of stay with patient/caregiver yes   Prior to hospitilization cognitive status: Alert/Oriented   Prior to hospitalization functional status: Independent   Current cognitive status: Alert/Oriented   Current Functional Status: Independent   Facility Arrived From:  [facility]   Lives With spouse   Able to Return to Prior Arrangements yes   Is patient able to care for self after discharge? Yes   Who are your caregiver(s) and their phone number(s)? Irene Muñoz- spouse- [phone]   Patient's perception of discharge disposition rehab facility   Readmission Within the Last 30 Days planned readmission   If yes, most recent facility name:  [facility]   Patient currently being followed by outpatient case management? No   Patient currently receives any other outside agency services? No   Equipment Currently Used at Home none   Do you have any problems affording any of your prescribed medications? No   Is the patient taking medications as prescribed? yes   Does the patient have transportation home? No   Transportation Anticipated health plan transportation   Does the patient receive services at the [facility]? No   Discharge Plan A Rehab   Discharge Plan B Long-term acute care facility (LTAC)   DME Needed Upon Discharge  none   Patient/Family in Agreement with Plan yes   Readmission Questionnaire   At the time of your discharge, did someone talk to you about what your health problems were? Yes   At  the time of discharge, did someone talk to you about what to watch out for regarding worsening of your health problem? Yes   At the time of discharge, did someone talk to you about what to do if you experienced worsening of your health problem? Yes   At the time of discharge, did someone talk to you about which medication to take when you left the hospital and which ones to stop taking? Yes   At the time of discharge, did someone talk to you about when and where to follow up with a doctor after you left the hospital? Yes      Fellow

## 2021-05-06 ENCOUNTER — PATIENT MESSAGE (OUTPATIENT)
Dept: RESEARCH | Facility: HOSPITAL | Age: 76
End: 2021-05-06

## 2021-06-03 PROBLEM — R29.810 FACIAL DROOP: Status: ACTIVE | Noted: 2021-06-03

## 2021-06-03 PROBLEM — I63.512 ACUTE ISCHEMIC LEFT MCA STROKE: Status: ACTIVE | Noted: 2021-06-03

## 2021-06-03 PROBLEM — I63.9 CVA (CEREBRAL VASCULAR ACCIDENT): Status: ACTIVE | Noted: 2021-06-03

## 2021-06-07 PROBLEM — I65.23 BILATERAL CAROTID ARTERY STENOSIS: Status: ACTIVE | Noted: 2021-06-07

## 2021-06-07 PROBLEM — K76.0 HEPATIC STEATOSIS: Status: ACTIVE | Noted: 2021-06-07

## 2021-06-07 PROBLEM — D50.9 MICROCYTIC ANEMIA: Status: ACTIVE | Noted: 2021-06-07

## 2021-06-07 PROBLEM — R13.12 OROPHARYNGEAL DYSPHAGIA: Status: ACTIVE | Noted: 2021-06-07

## 2021-06-07 PROBLEM — R73.03 PREDIABETES: Status: ACTIVE | Noted: 2021-06-07

## 2021-06-07 PROBLEM — E03.9 HYPOTHYROIDISM: Status: ACTIVE | Noted: 2019-12-06

## 2021-07-01 ENCOUNTER — PATIENT MESSAGE (OUTPATIENT)
Dept: ADMINISTRATIVE | Facility: OTHER | Age: 76
End: 2021-07-01

## 2021-07-05 PROBLEM — E78.5 HYPERLIPIDEMIA: Chronic | Status: ACTIVE | Noted: 2019-12-06

## 2021-07-05 PROBLEM — I10 ESSENTIAL HYPERTENSION: Chronic | Status: ACTIVE | Noted: 2019-12-06

## 2021-07-07 NOTE — ASSESSMENT & PLAN NOTE
74 y.o. male with PMH of HTN, HLD, Hepatitis C, and CAD s/p two stents on plavix who presents with C6-T2 osteomyelitis with suspected epidural abscess.  Now s/p C7/T1 corpectomies on 12/10 and C2-T2  posterior instrumented fusion 12/17.    -Patient neurologically stable on exam  -Brace on when upright, OOB, or working with therapy. OK to remove when lying in bed. Pt sitting up in bed without brace on, explained to patient that brace must be worn when sitting up and when OOB. He voiced understanding.   -Staples removed 12/31/19. Incision intact without signs of infection. Leave incision open to air. Turn q2h to continue to promote wound healing.   - Elevated LFTs: HM consulted and believe most likely 2/2 resuming high dose statin in hospital. Recommend holding, and okay to resume once LFTs down trending. ALP most likely elevated 2/2 osteomyelitis. No signs of cirrhosis on exam. LFTs improving after holding high dose statin. HM recommend restarting statin at lower dose (40 mg) upon discharge. PTT/INR/ammonia all within normal limits. HCV ab and viral load pending. Spoke with Dr. Giles from hepatology who will set the patient up for ouptatient follow up in hepatology clinic. Appreciate  and Hepatology assistance.   -ID: Afebrile. No leukocytosis. Continue Cefepime 2g q 8 hours. Estimated end date of therapy 1/28/20-2/11/20. FU in ID clinic in 2-3 weeks.   -2nd left toe wound care: Seen by Podiatry for left 2nd toe, recommend betadine daily. Dress with foam bandage. Wound care on board. Appreciate assistance.  -Pain: Pain well controlled on current regimen. No adjustments needed at this time.   -Anemia: Continue iron for replacement x 2 weeks. F/u at next lab draw.  -HTN: Continue Amlodipine 10 mg daily, Losartan-HCTZ 100-25 mg daily, and Metoprolol 50 mg BID.   -CAD s/p stent placement: Continue home dose of Plavix 75 mg daily.   -Hyponatremia: Na 134 today, Na tabs increased to bid. Will continue to monitor and wean  as tolerated.   -Hypoalbuminemia: Continue Jose BID to promote wound healing.   -HLD: Will d/c high dose atorvastatin in setting of elevated LFTs. Will continue to monitor daily, and restart when downtrending. Per HM okay to resume at discharge at lower dose (40 mg).   -DVT prophylaxis: TUCKER's, SCD's, SQH  -Bowel regimen: senna BID and miralax daily  -Atelectasis prevention: IS hourly while awake, PT/OT, OOB > 6 hours per day       DISPO: Pending SNF placement   Statement Selected

## 2022-08-04 PROBLEM — Z66 DNR (DO NOT RESUSCITATE): Status: ACTIVE | Noted: 2022-08-04

## 2022-08-04 PROBLEM — D64.9 NORMOCYTIC ANEMIA: Status: ACTIVE | Noted: 2022-08-04

## 2022-08-04 PROBLEM — I96 DRY GANGRENE: Status: ACTIVE | Noted: 2022-08-04

## 2022-08-04 PROBLEM — F17.200 SMOKER: Status: ACTIVE | Noted: 2019-12-07

## 2022-08-04 PROBLEM — I73.9 PAD (PERIPHERAL ARTERY DISEASE): Status: ACTIVE | Noted: 2022-08-04

## 2022-08-06 PROBLEM — E87.6 HYPOKALEMIA: Status: ACTIVE | Noted: 2022-08-06

## 2022-08-07 PROBLEM — M79.672 LEFT FOOT PAIN: Status: ACTIVE | Noted: 2022-08-07

## 2022-09-01 NOTE — PROGRESS NOTES
Patient transported to room 19 via cart with anesthesia.  Bedside report of procedure and plan of care discussed with ASHLEE Reynolds.  Questions encouraged and answered.  RN in agreement with plan.  Assessment of dressing to right radial site: prior drainage from sheath removal present with hemostasis achieved from TR band.     Pre-op checklist completed.

## 2022-10-14 PROBLEM — I70.229 CRITICAL LOWER LIMB ISCHEMIA: Status: ACTIVE | Noted: 2022-10-14

## 2022-10-15 PROBLEM — M86.272 SUBACUTE OSTEOMYELITIS OF LEFT FOOT: Status: ACTIVE | Noted: 2022-10-15

## 2022-12-23 ENCOUNTER — LAB VISIT (OUTPATIENT)
Dept: LAB | Facility: HOSPITAL | Age: 77
End: 2022-12-23
Attending: NURSE PRACTITIONER
Payer: MEDICARE

## 2022-12-23 DIAGNOSIS — Z79.899 ENCOUNTER FOR LONG-TERM (CURRENT) USE OF OTHER MEDICATIONS: Primary | ICD-10-CM

## 2022-12-23 LAB
25(OH)D3+25(OH)D2 SERPL-MCNC: 29 NG/ML (ref 30–96)
ALBUMIN SERPL BCP-MCNC: 3.6 G/DL (ref 3.5–5.2)
ALBUMIN/CREAT UR: 42.4 UG/MG (ref 0–30)
ALP SERPL-CCNC: 146 U/L (ref 55–135)
ALT SERPL W/O P-5'-P-CCNC: 118 U/L (ref 10–44)
ANION GAP SERPL CALC-SCNC: 8 MMOL/L (ref 8–16)
AST SERPL-CCNC: 93 U/L (ref 10–40)
BACTERIA #/AREA URNS HPF: ABNORMAL /HPF
BASOPHILS # BLD AUTO: 0.06 K/UL (ref 0–0.2)
BASOPHILS NFR BLD: 0.9 % (ref 0–1.9)
BILIRUB SERPL-MCNC: 0.6 MG/DL (ref 0.1–1)
BILIRUB UR QL STRIP: NEGATIVE
BUN SERPL-MCNC: 16 MG/DL (ref 8–23)
CALCIUM SERPL-MCNC: 9.6 MG/DL (ref 8.7–10.5)
CHLORIDE SERPL-SCNC: 107 MMOL/L (ref 95–110)
CHOLEST SERPL-MCNC: 122 MG/DL (ref 120–199)
CHOLEST/HDLC SERPL: 3.1 {RATIO} (ref 2–5)
CLARITY UR: CLEAR
CO2 SERPL-SCNC: 25 MMOL/L (ref 23–29)
COLOR UR: YELLOW
CREAT SERPL-MCNC: 1 MG/DL (ref 0.5–1.4)
CREAT UR-MCNC: 223.8 MG/DL (ref 23–375)
DIFFERENTIAL METHOD: ABNORMAL
EOSINOPHIL # BLD AUTO: 0.6 K/UL (ref 0–0.5)
EOSINOPHIL NFR BLD: 9.4 % (ref 0–8)
ERYTHROCYTE [DISTWIDTH] IN BLOOD BY AUTOMATED COUNT: 15.6 % (ref 11.5–14.5)
EST. GFR  (NO RACE VARIABLE): >60 ML/MIN/1.73 M^2
GLUCOSE SERPL-MCNC: 103 MG/DL (ref 70–110)
GLUCOSE UR QL STRIP: NEGATIVE
HCT VFR BLD AUTO: 42 % (ref 40–54)
HDLC SERPL-MCNC: 39 MG/DL (ref 40–75)
HDLC SERPL: 32 % (ref 20–50)
HGB BLD-MCNC: 13.7 G/DL (ref 14–18)
HGB UR QL STRIP: NEGATIVE
HYALINE CASTS #/AREA URNS LPF: 0 /LPF
IMM GRANULOCYTES # BLD AUTO: 0.01 K/UL (ref 0–0.04)
IMM GRANULOCYTES NFR BLD AUTO: 0.1 % (ref 0–0.5)
KETONES UR QL STRIP: NEGATIVE
LDLC SERPL CALC-MCNC: 69.6 MG/DL (ref 63–159)
LEUKOCYTE ESTERASE UR QL STRIP: NEGATIVE
LYMPHOCYTES # BLD AUTO: 1.4 K/UL (ref 1–4.8)
LYMPHOCYTES NFR BLD: 21.4 % (ref 18–48)
MCH RBC QN AUTO: 30.1 PG (ref 27–31)
MCHC RBC AUTO-ENTMCNC: 32.6 G/DL (ref 32–36)
MCV RBC AUTO: 92 FL (ref 82–98)
MICROALBUMIN UR DL<=1MG/L-MCNC: 95 UG/ML
MICROSCOPIC COMMENT: ABNORMAL
MONOCYTES # BLD AUTO: 0.6 K/UL (ref 0.3–1)
MONOCYTES NFR BLD: 8.2 % (ref 4–15)
NEUTROPHILS # BLD AUTO: 4 K/UL (ref 1.8–7.7)
NEUTROPHILS NFR BLD: 60 % (ref 38–73)
NITRITE UR QL STRIP: NEGATIVE
NONHDLC SERPL-MCNC: 83 MG/DL
NRBC BLD-RTO: 0 /100 WBC
PH UR STRIP: 6 [PH] (ref 5–8)
PLATELET # BLD AUTO: 112 K/UL (ref 150–450)
PMV BLD AUTO: 10.6 FL (ref 9.2–12.9)
POTASSIUM SERPL-SCNC: 4.2 MMOL/L (ref 3.5–5.1)
PROT SERPL-MCNC: 7.4 G/DL (ref 6–8.4)
PROT UR QL STRIP: ABNORMAL
RBC # BLD AUTO: 4.55 M/UL (ref 4.6–6.2)
RBC #/AREA URNS HPF: 2 /HPF (ref 0–4)
SODIUM SERPL-SCNC: 140 MMOL/L (ref 136–145)
SP GR UR STRIP: >=1.03 (ref 1–1.03)
T3 SERPL-MCNC: 112 NG/DL (ref 60–180)
T4 FREE SERPL-MCNC: 1.12 NG/DL (ref 0.71–1.51)
TRIGL SERPL-MCNC: 67 MG/DL (ref 30–150)
TSH SERPL DL<=0.005 MIU/L-ACNC: 3.5 UIU/ML (ref 0.4–4)
URN SPEC COLLECT METH UR: ABNORMAL
UROBILINOGEN UR STRIP-ACNC: NEGATIVE EU/DL
VIT B12 SERPL-MCNC: 601 PG/ML (ref 210–950)
WBC # BLD AUTO: 6.72 K/UL (ref 3.9–12.7)
WBC #/AREA URNS HPF: 1 /HPF (ref 0–5)

## 2022-12-23 PROCEDURE — 84439 ASSAY OF FREE THYROXINE: CPT | Performed by: NURSE PRACTITIONER

## 2022-12-23 PROCEDURE — 82306 VITAMIN D 25 HYDROXY: CPT | Performed by: NURSE PRACTITIONER

## 2022-12-23 PROCEDURE — 85025 COMPLETE CBC W/AUTO DIFF WBC: CPT | Performed by: NURSE PRACTITIONER

## 2022-12-23 PROCEDURE — 87086 URINE CULTURE/COLONY COUNT: CPT | Performed by: NURSE PRACTITIONER

## 2022-12-23 PROCEDURE — 82570 ASSAY OF URINE CREATININE: CPT | Performed by: NURSE PRACTITIONER

## 2022-12-23 PROCEDURE — 82607 VITAMIN B-12: CPT | Performed by: NURSE PRACTITIONER

## 2022-12-23 PROCEDURE — 36415 COLL VENOUS BLD VENIPUNCTURE: CPT | Performed by: NURSE PRACTITIONER

## 2022-12-23 PROCEDURE — 84443 ASSAY THYROID STIM HORMONE: CPT | Performed by: NURSE PRACTITIONER

## 2022-12-23 PROCEDURE — 80053 COMPREHEN METABOLIC PANEL: CPT | Performed by: NURSE PRACTITIONER

## 2022-12-23 PROCEDURE — 81000 URINALYSIS NONAUTO W/SCOPE: CPT | Performed by: NURSE PRACTITIONER

## 2022-12-23 PROCEDURE — 84480 ASSAY TRIIODOTHYRONINE (T3): CPT | Performed by: NURSE PRACTITIONER

## 2022-12-23 PROCEDURE — 80061 LIPID PANEL: CPT | Performed by: NURSE PRACTITIONER

## 2022-12-25 LAB — BACTERIA UR CULT: NO GROWTH

## 2023-01-04 ENCOUNTER — LAB VISIT (OUTPATIENT)
Dept: LAB | Facility: HOSPITAL | Age: 78
End: 2023-01-04
Attending: INTERNAL MEDICINE
Payer: MEDICARE

## 2023-01-04 DIAGNOSIS — M86.60 CHRONIC OSTEOMYELITIS: ICD-10-CM

## 2023-01-04 DIAGNOSIS — B18.2 CHRONIC HEPATITIS C WITHOUT HEPATIC COMA: ICD-10-CM

## 2023-01-04 DIAGNOSIS — E11.42 TYPE 2 DIABETES MELLITUS WITH DIABETIC POLYNEUROPATHY, WITHOUT LONG-TERM CURRENT USE OF INSULIN: ICD-10-CM

## 2023-01-04 LAB
ALBUMIN SERPL BCP-MCNC: 3.6 G/DL (ref 3.5–5.2)
ALP SERPL-CCNC: 117 U/L (ref 55–135)
ALT SERPL W/O P-5'-P-CCNC: 77 U/L (ref 10–44)
ANION GAP SERPL CALC-SCNC: 10 MMOL/L (ref 8–16)
AST SERPL-CCNC: 59 U/L (ref 10–40)
BASOPHILS # BLD AUTO: 0.07 K/UL (ref 0–0.2)
BASOPHILS NFR BLD: 0.8 % (ref 0–1.9)
BILIRUB SERPL-MCNC: 0.5 MG/DL (ref 0.1–1)
BUN SERPL-MCNC: 16 MG/DL (ref 8–23)
CALCIUM SERPL-MCNC: 9.6 MG/DL (ref 8.7–10.5)
CHLORIDE SERPL-SCNC: 108 MMOL/L (ref 95–110)
CO2 SERPL-SCNC: 24 MMOL/L (ref 23–29)
CREAT SERPL-MCNC: 1 MG/DL (ref 0.5–1.4)
CRP SERPL-MCNC: 1.6 MG/L (ref 0–8.2)
DIFFERENTIAL METHOD: ABNORMAL
EOSINOPHIL # BLD AUTO: 1 K/UL (ref 0–0.5)
EOSINOPHIL NFR BLD: 11 % (ref 0–8)
ERYTHROCYTE [DISTWIDTH] IN BLOOD BY AUTOMATED COUNT: 15.7 % (ref 11.5–14.5)
ERYTHROCYTE [SEDIMENTATION RATE] IN BLOOD BY WESTERGREN METHOD: 3 MM/HR (ref 0–10)
EST. GFR  (NO RACE VARIABLE): >60 ML/MIN/1.73 M^2
ESTIMATED AVG GLUCOSE: 108 MG/DL (ref 68–131)
GLUCOSE SERPL-MCNC: 95 MG/DL (ref 70–110)
HBA1C MFR BLD: 5.4 % (ref 4–5.6)
HCT VFR BLD AUTO: 40.9 % (ref 40–54)
HGB BLD-MCNC: 13.2 G/DL (ref 14–18)
IMM GRANULOCYTES # BLD AUTO: 0.02 K/UL (ref 0–0.04)
IMM GRANULOCYTES NFR BLD AUTO: 0.2 % (ref 0–0.5)
LYMPHOCYTES # BLD AUTO: 1.6 K/UL (ref 1–4.8)
LYMPHOCYTES NFR BLD: 18.1 % (ref 18–48)
MCH RBC QN AUTO: 30.5 PG (ref 27–31)
MCHC RBC AUTO-ENTMCNC: 32.3 G/DL (ref 32–36)
MCV RBC AUTO: 95 FL (ref 82–98)
MONOCYTES # BLD AUTO: 0.7 K/UL (ref 0.3–1)
MONOCYTES NFR BLD: 7.7 % (ref 4–15)
NEUTROPHILS # BLD AUTO: 5.5 K/UL (ref 1.8–7.7)
NEUTROPHILS NFR BLD: 62.2 % (ref 38–73)
NRBC BLD-RTO: 0 /100 WBC
PLATELET # BLD AUTO: 116 K/UL (ref 150–450)
PMV BLD AUTO: 11.1 FL (ref 9.2–12.9)
POTASSIUM SERPL-SCNC: 4.1 MMOL/L (ref 3.5–5.1)
PROT SERPL-MCNC: 7.3 G/DL (ref 6–8.4)
RBC # BLD AUTO: 4.33 M/UL (ref 4.6–6.2)
SODIUM SERPL-SCNC: 142 MMOL/L (ref 136–145)
WBC # BLD AUTO: 8.83 K/UL (ref 3.9–12.7)

## 2023-01-04 PROCEDURE — 85651 RBC SED RATE NONAUTOMATED: CPT | Performed by: INTERNAL MEDICINE

## 2023-01-04 PROCEDURE — 86140 C-REACTIVE PROTEIN: CPT | Performed by: INTERNAL MEDICINE

## 2023-01-04 PROCEDURE — 80053 COMPREHEN METABOLIC PANEL: CPT | Performed by: INTERNAL MEDICINE

## 2023-01-04 PROCEDURE — 85025 COMPLETE CBC W/AUTO DIFF WBC: CPT | Performed by: INTERNAL MEDICINE

## 2023-01-04 PROCEDURE — 36415 COLL VENOUS BLD VENIPUNCTURE: CPT | Performed by: INTERNAL MEDICINE

## 2023-01-04 PROCEDURE — 83036 HEMOGLOBIN GLYCOSYLATED A1C: CPT | Performed by: INTERNAL MEDICINE

## 2023-01-27 ENCOUNTER — HOSPITAL ENCOUNTER (EMERGENCY)
Facility: HOSPITAL | Age: 78
Discharge: HOME | End: 2023-01-27
Attending: STUDENT IN AN ORGANIZED HEALTH CARE EDUCATION/TRAINING PROGRAM
Payer: MEDICARE

## 2023-01-27 VITALS
WEIGHT: 155 LBS | SYSTOLIC BLOOD PRESSURE: 127 MMHG | DIASTOLIC BLOOD PRESSURE: 73 MMHG | TEMPERATURE: 97 F | RESPIRATION RATE: 20 BRPM | BODY MASS INDEX: 22.19 KG/M2 | HEIGHT: 70 IN | HEART RATE: 80 BPM | OXYGEN SATURATION: 95 %

## 2023-01-27 DIAGNOSIS — S51.011A LACERATION OF SKIN OF RIGHT ELBOW, INITIAL ENCOUNTER: Primary | ICD-10-CM

## 2023-01-27 DIAGNOSIS — M25.521 RIGHT ELBOW PAIN: ICD-10-CM

## 2023-01-27 PROCEDURE — 25000003 PHARM REV CODE 250: Performed by: STUDENT IN AN ORGANIZED HEALTH CARE EDUCATION/TRAINING PROGRAM

## 2023-01-27 PROCEDURE — 99283 EMERGENCY DEPT VISIT LOW MDM: CPT

## 2023-01-27 RX ORDER — BENZONATATE 100 MG/1
200 CAPSULE ORAL
COMMUNITY
Start: 2023-01-17 | End: 2023-03-23

## 2023-01-27 RX ORDER — ERGOCALCIFEROL 1.25 MG/1
50000 CAPSULE ORAL
COMMUNITY
Start: 2023-01-17 | End: 2023-03-23

## 2023-01-27 RX ORDER — CEPHALEXIN 500 MG/1
500 CAPSULE ORAL EVERY 8 HOURS
Qty: 15 CAPSULE | Refills: 0 | Status: SHIPPED | OUTPATIENT
Start: 2023-01-27 | End: 2023-02-01

## 2023-01-27 RX ORDER — VALSARTAN AND HYDROCHLOROTHIAZIDE 160; 12.5 MG/1; MG/1
1 TABLET, FILM COATED ORAL
COMMUNITY
Start: 2023-01-17 | End: 2023-03-23 | Stop reason: DRUGHIGH

## 2023-01-27 RX ORDER — ACETAMINOPHEN 325 MG/1
650 TABLET ORAL
Status: COMPLETED | OUTPATIENT
Start: 2023-01-27 | End: 2023-01-27

## 2023-01-27 RX ORDER — LIDOCAINE HYDROCHLORIDE 10 MG/ML
5 INJECTION, SOLUTION EPIDURAL; INFILTRATION; INTRACAUDAL; PERINEURAL
Status: COMPLETED | OUTPATIENT
Start: 2023-01-27 | End: 2023-01-27

## 2023-01-27 RX ADMIN — ACETAMINOPHEN 650 MG: 325 TABLET ORAL at 06:01

## 2023-01-27 RX ADMIN — LIDOCAINE HYDROCHLORIDE 50 MG: 10 SOLUTION INTRAVENOUS at 05:01

## 2023-01-27 NOTE — ED PROVIDER NOTES
Encounter Date: 1/27/2023       History     Chief Complaint   Patient presents with    Fall    Elbow Pain     Pt slipped and fell while getting ut of bed.  Pt denies LOC, or hitting head.  Pt A&O in triage.  C/o right elbow pain. Skin tears noted to right elbow.        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    CAD (coronary artery disease)     s/p stent 2005, on plavix    Chronic hepatitis C     Cirrhosis     biopsy proven - 2007    History of colon polyps 06/2008    1 polyp - tubular adenoma    HTN (hypertension)     Hyperlipidemia     Hypothyroidism     Peripheral vascular disease, unspecified     Wound of left foot 10/06/2022    currently covered in pre-admit assessment unable to visualize     Past Surgical History:   Procedure Laterality Date    COLONOSCOPY W/ POLYPECTOMY  06/2008    Dr Wagoner: fair prep, 3mm polyp - tubular adenoma    CORONARY ANGIOPLASTY WITH STENT PLACEMENT      FUSION OF CERVICAL SPINE BY POSTERIOR APPROACH N/A 12/17/2019    Procedure: FUSION, SPINE, CERVICAL, POSTERIOR APPROACH C2-T3 posterior instrumented fusion;  Surgeon: Elian Deluca MD;  Location: [facility] OR [address] [place] [address];  Service: Neurosurgery;  Laterality: N/A;    hydrocele repair  teenager    PERCUTANEOUS TRANSLUMINAL ANGIOPLASTY (PTA) OF PERIPHERAL VESSEL Left 10/14/2022    Procedure: PTA, PERIPHERAL VESSEL;  Surgeon: Jem Soriano MD;  Location: [facility] CATH;  Service: Cardiology;  Laterality: Left;  peripheral angiogram    pyloric stenosis repair  age 1    SURGICAL REMOVAL OF VERTEBRAL BODY OF THORACIC SPINE N/A 12/10/2019    Procedure: CORPECTOMY, SPINE, CERVICAL AND THORACIC, C7 and T1 with Globus;  Surgeon: Elian Deluca MD;  Location: [facility] OR [address] [place] [address];  Service: Neurosurgery;  Laterality: N/A;    TOE AMPUTATION Left 8/15/2022    Procedure: AMPUTATION, TOE First,  second, and third toes;  Surgeon: Lindsey Alberts III, DPM;  Location: [facility];  Service: Podiatry;  Laterality: Left;  Req. 11am per MD    TONSILLECTOMY       Family History   Problem Relation Age of Onset    No Known Problems Mother     No Known Problems Father     No Known Problems Son     No Known Problems Son     No Known Problems Son      Social History     Tobacco Use    Smoking status: Former     Packs/day: 0.25     Types: Cigarettes     Quit date: 10/14/2022     Years since quittin.2    Smokeless tobacco: Never   Substance Use Topics    Alcohol use: Not Currently     Comment: maybe a couple times a year    Drug use: Yes     Types: IV, Marijuana     Comment: MORPHINE (denies); A few times a month     Review of Systems    Physical Exam     Initial Vitals [23]   BP Pulse Resp Temp SpO2   127/73 80 20 96.9 °F (36.1 °C) 95 %      MAP       --         Physical Exam    ED Course   Procedures  Labs Reviewed - No data to display       Imaging Results              X-Ray Elbow Complete Right (In process)                      Medications   LIDOcaine (PF) 10 mg/ml (1%) injection 50 mg (50 mg Infiltration Given by Provider 23 0545)   acetaminophen tablet 650 mg (650 mg Oral Given 23 0615)                              Clinical Impression:   Final diagnoses:  [M25.521] Right elbow pain  [S51.011A] Laceration of skin of right elbow, initial encounter (Primary)        ED Disposition Condition    Discharge Stable          ED Prescriptions       Medication Sig Dispense Start Date End Date Auth. Provider    cephALEXin (KEFLEX) 500 MG capsule Take 1 capsule (500 mg total) by mouth every 8 (eight) hours. for 5 days 15 capsule 2023 EFRAÍN Alvarez III, MD          Follow-up Information    None          EFRAÍN Alvarez III, MD  23 [id number]

## 2023-01-27 NOTE — ED PROVIDER NOTES
Encounter Date: 1/27/2023       History     Chief Complaint   Patient presents with    Fall    Elbow Pain     Pt slipped and fell while getting ut of bed.  Pt denies LOC, or hitting head.  Pt A&O in triage.  C/o right elbow pain. Skin tears noted to right elbow.        77-year-old male with history of CAD, cirrhosis, hypertension, prior stroke with reduced strength in right arm, presenting with right elbow pain.  Patient had a mechanical fall today while at home.  Denies head strike.  Patient fell only onto his right elbow.  Denies any other complaints of pain.  No chest pain or shortness breath.  No shoulder or wrist pain.  Small amount of bleeding from right elbow.  Patient reports he is able to fully extend his right elbow.  Denies any numbness or weakness.    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    CAD (coronary artery disease)     s/p stent 2005, on plavix    Chronic hepatitis C     Cirrhosis     biopsy proven - 2007    History of colon polyps 06/2008    1 polyp - tubular adenoma    HTN (hypertension)     Hyperlipidemia     Hypothyroidism     Peripheral vascular disease, unspecified     Wound of left foot 10/06/2022    currently covered in pre-admit assessment unable to visualize     Past Surgical History:   Procedure Laterality Date    COLONOSCOPY W/ POLYPECTOMY  06/2008    Dr Wagoner: fair prep, 3mm polyp - tubular adenoma    CORONARY ANGIOPLASTY WITH STENT PLACEMENT      FUSION OF CERVICAL SPINE BY POSTERIOR APPROACH N/A 12/17/2019    Procedure: FUSION, SPINE, CERVICAL, POSTERIOR APPROACH C2-T3 posterior instrumented fusion;  Surgeon: Elian Deluca MD;  Location[facility] [place] [address] [place] [address];  Service: Neurosurgery;  Laterality: N/A;    hydrocele repair  teenager    PERCUTANEOUS TRANSLUMINAL ANGIOPLASTY (PTA) OF PERIPHERAL VESSEL Left 10/14/2022     Procedure: PTA, PERIPHERAL VESSEL;  Surgeon: Jem Soriano MD;  Location: [facility] CATH;  Service: Cardiology;  Laterality: Left;  peripheral angiogram    pyloric stenosis repair  age 1    SURGICAL REMOVAL OF VERTEBRAL BODY OF THORACIC SPINE N/A 12/10/2019    Procedure: CORPECTOMY, SPINE, CERVICAL AND THORACIC, C7 and T1 with Globus;  Surgeon: Elian Deluca MD;  [facility];  Service: Neurosurgery;  Laterality: N/A;    TOE AMPUTATION Left 8/15/2022    Procedure: AMPUTATION, TOE First, second, and third toes;  Surgeon: Lindsey Alberts III, DPM;  Location: [facility] OR;  Service: Podiatry;  Laterality: Left;  Req. 11am per MD    TONSILLECTOMY       Family History   Problem Relation Age of Onset    No Known Problems Mother     No Known Problems Father     No Known Problems Son     No Known Problems Son     No Known Problems Son      Social History     Tobacco Use    Smoking status: Former     Packs/day: 0.25     Types: Cigarettes     Quit date: 10/14/2022     Years since quittin.2    Smokeless tobacco: Never   Substance Use Topics    Alcohol use: Not Currently     Comment: maybe a couple times a year    Drug use: Yes     Types: IV, Marijuana     Comment: MORPHINE (denies); A few times a month     Review of Systems   Constitutional:  Negative for fever.   HENT:  Negative for sore throat.    Respiratory:  Negative for shortness of breath.    Cardiovascular:  Negative for chest pain.   Gastrointestinal:  Negative for nausea.   Genitourinary:  Negative for dysuria.   Musculoskeletal:  Negative for back pain.        Right elbow pain   Skin:  Negative for rash.   Neurological:  Negative for weakness.   Hematological:  Does not bruise/bleed easily.     Physical Exam     Initial Vitals [23 0528]   BP Pulse Resp Temp SpO2   127/73 80 20 96.9 °F (36.1 °C) 95 %      MAP       --         Physical Exam    Nursing note and vitals reviewed.  Constitutional: He appears well-developed.   Eyes: EOM are normal.    Neck:   Normal range of motion.  Cardiovascular:            No murmur heard.  Pulmonary/Chest: No respiratory distress.   Abdominal: He exhibits no distension.   Musculoskeletal:         General: Normal range of motion.      Cervical back: Normal range of motion.      Comments: Visual inspection of right upper extremity displays superficial skin tears over right elbow, no wounds that will require repair.  No other gross deformity, lacerations, effusions, ecchymosis or overlying skin lesions. FROM in all extremity joints in passive and active ROM displaying 5/5 MS. No palpable ingris TTP or masses. Compartments soft. Sensation, pulses and cap refill are normal for extremity.        Neurological: He is alert.   Skin: Skin is warm.   Psychiatric: He has a normal mood and affect.       ED Course   Procedures  Labs Reviewed - No data to display       Imaging Results    None          Medications - No data to display  Medical Decision Making:   Differential Diagnosis:   DDX: R/o fracture vs. sprain/contusion.  DX: XR.  TX: Analgesia PRN. Treatment/consult as indicated by studies.  Dispo: Pending studies. If studies WNL or pathology stabilized for discharge, discharge to f/u with PMD vs. orthopedics with precautions for RTED and supportive care recommendations.                            Clinical Impression:   Final diagnoses:  [M25.521] Right elbow pain               Real Dominguez MD  01/27/23 0524

## 2023-02-20 NOTE — NURSING
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
See other plan G62.9858 TB test placed to [facility] to be read in 72 hours

## 2023-03-13 ENCOUNTER — TELEPHONE (OUTPATIENT)
Dept: HEPATOLOGY | Facility: CLINIC | Age: 78
End: 2023-03-13
Payer: MEDICARE

## 2023-03-13 ENCOUNTER — TELEPHONE (OUTPATIENT)
Dept: TRANSPLANT | Facility: CLINIC | Age: 78
End: 2023-03-13
Payer: MEDICARE

## 2023-03-13 NOTE — TELEPHONE ENCOUNTER
----- Message from Justice Davis sent at 3/13/2023  2:16 PM CDT -----    HCV referral rec'chilango and scanned into media. Sent to HCV staff for scheduling.     Recs also in Epic.    Referring Provider: Jose D Fishman MD   Phone: [phone]   Fax: [phone]      .

## 2023-03-13 NOTE — TELEPHONE ENCOUNTER
Dr. Jose D Dean ordered that patient be scheduled for a hepatology consult visit for hep c.  Medical records in Media.  Patient last seen by PA Scheuermann in 2017.  I spoke with his oldest son April Muñoz.  He states that he has power of  rights and takes care of his dad because his dad has some memory issues.  Appt with PA Scheuermann scheduled 3/14/23.

## 2023-03-13 NOTE — TELEPHONE ENCOUNTER
----- Message from Justice Davis sent at 3/13/2023  2:16 PM CDT -----    HCV referral rec'chilango and scanned into media. Sent to HCV staff for scheduling.     Recs also in Epic.    Referring Provider: Jose D Fishman MD   Phone: [phone]   Fax: [phone]      .

## 2023-03-14 ENCOUNTER — TELEPHONE (OUTPATIENT)
Dept: HEPATOLOGY | Facility: CLINIC | Age: 78
End: 2023-03-14
Payer: MEDICARE

## 2023-03-14 NOTE — TELEPHONE ENCOUNTER
Patient scheduled to see PA Scheuermann on 3/14/23. Provider out sick.  I spoke with his son April and the above relayed.  Patient scheduled to see provider again on 3/23/23.

## 2023-03-23 ENCOUNTER — PATIENT MESSAGE (OUTPATIENT)
Dept: HEPATOLOGY | Facility: CLINIC | Age: 78
End: 2023-03-23

## 2023-03-23 ENCOUNTER — OFFICE VISIT (OUTPATIENT)
Dept: HEPATOLOGY | Facility: CLINIC | Age: 78
End: 2023-03-23
Payer: MEDICARE

## 2023-03-23 DIAGNOSIS — B18.2 CHRONIC HEPATITIS C WITHOUT HEPATIC COMA: ICD-10-CM

## 2023-03-23 DIAGNOSIS — K76.6 PORTAL VENOUS HYPERTENSION: ICD-10-CM

## 2023-03-23 DIAGNOSIS — R41.3 MEMORY DEFICIT: ICD-10-CM

## 2023-03-23 DIAGNOSIS — D69.6 THROMBOCYTOPENIA: ICD-10-CM

## 2023-03-23 DIAGNOSIS — K74.60 HEPATIC CIRRHOSIS, UNSPECIFIED HEPATIC CIRRHOSIS TYPE, UNSPECIFIED WHETHER ASCITES PRESENT: Primary | ICD-10-CM

## 2023-03-23 PROCEDURE — 1160F RVW MEDS BY RX/DR IN RCRD: CPT | Mod: CPTII,95,, | Performed by: PHYSICIAN ASSISTANT

## 2023-03-23 PROCEDURE — 1159F MED LIST DOCD IN RCRD: CPT | Mod: CPTII,95,, | Performed by: PHYSICIAN ASSISTANT

## 2023-03-23 PROCEDURE — 99205 PR OFFICE/OUTPT VISIT, NEW, LEVL V, 60-74 MIN: ICD-10-PCS | Mod: 95,,, | Performed by: PHYSICIAN ASSISTANT

## 2023-03-23 PROCEDURE — 1159F PR MEDICATION LIST DOCUMENTED IN MEDICAL RECORD: ICD-10-PCS | Mod: CPTII,95,, | Performed by: PHYSICIAN ASSISTANT

## 2023-03-23 PROCEDURE — 99205 OFFICE O/P NEW HI 60 MIN: CPT | Mod: 95,,, | Performed by: PHYSICIAN ASSISTANT

## 2023-03-23 PROCEDURE — 1160F PR REVIEW ALL MEDS BY PRESCRIBER/CLIN PHARMACIST DOCUMENTED: ICD-10-PCS | Mod: CPTII,95,, | Performed by: PHYSICIAN ASSISTANT

## 2023-03-23 RX ORDER — METOPROLOL SUCCINATE 100 MG/1
100 TABLET, EXTENDED RELEASE ORAL DAILY
COMMUNITY

## 2023-03-23 RX ORDER — VALSARTAN AND HYDROCHLOROTHIAZIDE 160; 25 MG/1; MG/1
1 TABLET ORAL DAILY
Status: ON HOLD | COMMUNITY
End: 2023-09-22 | Stop reason: HOSPADM

## 2023-03-23 RX ORDER — ATORVASTATIN CALCIUM 40 MG/1
20 TABLET, FILM COATED ORAL DAILY
COMMUNITY

## 2023-03-23 RX ORDER — LEVOTHYROXINE SODIUM 50 UG/1
50 TABLET ORAL
COMMUNITY

## 2023-03-23 RX ORDER — METRONIDAZOLE 500 MG/1
500 TABLET ORAL 3 TIMES DAILY
Status: ON HOLD | COMMUNITY
End: 2023-09-22 | Stop reason: HOSPADM

## 2023-03-23 NOTE — Clinical Note
1.) schedule labs next week.    2.) schedule visit w/ me in ~ 1 month.   3.) call Dr Fishman - need to find out date of last EGD.  Also, their referral note mentions pt has been treated for HCV twice but pt not recalling that. Can you have someone clarify what they have in their notes (or send them over) regarding his prior treatment. I only know of the time I treated him in 2009 w/ PIFN+RBV

## 2023-03-23 NOTE — PROGRESS NOTES
HEPATOLOGY VIDEO VISIT NOTE - HCV clinic  Visit type: audiovisual  Convert to audio due to technical issues    Each patient to whom he or she provides medical services by telemedicine is:  (1) informed of the relationship between the physician and patient and the respective role of any other health care provider with respect to management of the patient; and (2) notified that he or she may decline to receive medical services by telemedicine and may withdraw from such care at any time.      OUTSIDE REFERRING PROVIDER: Jose D Fishman MD  CHIEF COMPLAINT: Hepatitis C   accompanied by: son Leopoldo and daughter-n-law Loreta (with whom pt lives)    HISTORY       This is a 77 y.o. White male with HCV Cirrhosis, last seen by me in 2017, but subsequently lost to f/u.       HCV history:  -- Original infection - cured (Linda 3a: s/p 28 weeks of PegIFN + RBV w/ SVR 6/2009)  -- Reinfection noted in 2017: Genotype 1a, treatment naive.   Of note - referral paperwork mentions prior HCV Rx x 2. Pt does not recall this but needs clarification prior to consideration of retreatment    Cirrhosis history:  Liver biopsy 9/2007 - mild activity and cirrhosis   Overall appears well compensated but possible mild HE?  (+) Portal HTN: PHG on prior EGD, SM, low plt    Current symptoms of hepatic decompensation:  Ascites / PRO - no  Diuretic use - no  TBili elevation - no  HE / confusion / memory problems - possible?   Decreased memory. ?confusion.   Per son pt now very quiet, not participating in conversations as he used to  No sleep reversal or excess daytime sleepiness  Son questions possibility of dementia?   Prescribed lactulose by Dr Fishman - takes 1-2doses daily, 2 BMs daily    (Can't increase dose and sometimes can't get in 2 doses if they have appts due to diarrhea)  EV bleed / hematemesis melena - no    MELD ?    Cirrhosis health maintenance:  - HCC screening - U/S 3/2023 w/ no liver lesions; AFP 2/2023 - 27.5 (baseline 2017 - 10)  -  Varices screening - EGD 2008 - PHG; EGD appears overdue unless done by Dr Fishman  - HAV immunity - documented 2017  - HBV immunity - s/p twinrix doses 2007, 2008. S/p HBV vaccine dose 11/2022 - will assess status      PMH, PSH, SOCIAL HX, FAMILY HX      Reviewed in Epic  Pertinent findings:  FAMILY HX: neg for liver diease  SOCIAL HX: now residing w/ son and DIL (as of 7/2023)  Alcohol - no  Drugs - prior use      ROS: as per HPI, in addition:      PHYSICAL EXAM:  Friendly White male, in no acute distress  LUNGS: Normal respiratory effort.  NEURO/PSYCH: Answered direct questions appropriately but quite throughout visit. Behavior normal. No depression or anxiety noted.      PERTINENT DIAGNOSTIC RESULTS      Lab Results   Component Value Date    WBC 8.83 01/04/2023    HGB 13.2 (L) 01/04/2023     (L) 01/04/2023     Lab Results   Component Value Date    INR 1.1 06/03/2021     Lab Results   Component Value Date    AST 59 (H) 01/04/2023    ALT 77 (H) 01/04/2023    BILITOT 0.5 01/04/2023    ALBUMIN 3.6 01/04/2023    ALKPHOS 117 01/04/2023    CREATININE 1.0 01/04/2023    BUN 16 01/04/2023     01/04/2023    K 4.1 01/04/2023    AFP 27.5 (H) 02/28/2023       US ABDOMEN LIMITED_LIVER - 3/8/23   CLINICAL HISTORY:Cirrhosis   COMPARISON:Liver ultrasound 01/07/2020     FINDINGS:  There is a subtle nodular contour of the liver, raising the possibility of cirrhotic change.  No hepatic mass identified.  Portal and hepatic veins are patent with appropriate flow.  Gallbladder is unremarkable.  CBD measures within normal limits at 6 mm.  Visualized aspects of the pancreas are unremarkable.     Impression:   Subtle nodular contour of the liver, raising the possibility of cirrhotic change.    ASSESSMENT        77 y.o. White male with HCV Cirrhosis, overall well compensated but possibly now w/ some HE (r/o dementia or even depression). Has elevated AFP requiring f/u but ultimately, if no HCC, goal is HCV treatment.      1.  HEPATITIS C, GENOTYPE 1a (reinfection)  -- need to clarify whether this has been treated - will check w/ Dr Fishman  -- (+) Immunity to HAV  -- s/p several HBV vaccine doses - will assess for immunity    2. PREVIOUS HCV INFECTION, GENOTYPE 3A - cured 2009  -- s/p 28 wks PegIFN + RBV - 2009    3. CIRRHOSIS  -- biopsy proven 2007  -- HCC screening - U/S okay 3/2023 but elevated AFP, needs f/u    4. PORTAL HYPERTENSION  -- varices screening - 2008 EGD w/ PHG. Overdue??   -- thrombocytopenia    5. MEMORY TROUBLE   -- possible HE? - See above.   -- R/o other causes as pt has had no improvement w/ lactulose trial and description of sx not classic for HE      PLAN        1. Labs next week:   Orders Placed This Encounter   Procedures    AFP Tumor Marker    Ammonia    Hepatitis B Surface Ab, Qualitative    Protime-INR    Comprehensive Metabolic Panel     2. Neurology referral for eval of memory trouble  3. F/U visit. Goal of HCV therapy  4. Will have staff contact Dr Fishman's office to clarify last EGD and prior HCV rx    ADDENDUM:  AFP trending up 27 (2/28) -> 30 (3/27)  Will get tpCT    ADDENDUM:  4/5/23 tpCT   - 8mm enhancing liver lesion, indeterminate.  - right renal lesion, indeterminate - solid vs cystic?     PLAN -  AFP ~ 4/27/23  Renal u/s  Keep appt w/ me in May  __________________________________________________________________    Duration of encounter: 62 min  This includes face-to-face time and non face-to-face time preparing to see the patient (eg, review of tests), obtaining and/or reviewing separately obtained history, documenting clinical information in the electronic or other health record, independently interpreting resultsand communicating results to the patient/family/caregiver, or care coordination.

## 2023-03-24 ENCOUNTER — TELEPHONE (OUTPATIENT)
Dept: HEPATOLOGY | Facility: CLINIC | Age: 78
End: 2023-03-24
Payer: MEDICARE

## 2023-03-24 NOTE — TELEPHONE ENCOUNTER
Attempt made to reach April to setup testing and f/u visit.  LVM asking that he call hepatology back.      I spoke with Nurse Ahuja at Dr. Fishman's office.  She states that patient saw Dr. Fishman for the 1st time on 2/28/23 and that patient reported having history of hep c treatment X 2 (they have no documentation of this).  They have not performed an EGD and she states that patient was referred to them by his cardiologist, Dr. Jem Soriano.  I spoke with Nurse Oscar at Dr. Jem Soriano's office.  She states that patient was 1st seen by them in 2022 and was later referred to Dr. Fishman after they noted that he needed treatment for hep c.  Unsure of who followed patient for liver issues from 2017 until he saw Dr. Fishman in 2023.  No mention of who treated patient for hep c the 2nd time and when last EGD was performed.  Will attempt to get additional info from April when he calls back.

## 2023-03-24 NOTE — TELEPHONE ENCOUNTER
I spoke with Dayanara and April (daughter in law/son) and they could not provide any additional information regarding 2nd round of hep c treatment or EGD being done.  Patient was speaking in the background during this call and he could not provide any additional information.   He stated that he did not remember having a test where they placed a light down his throat.  Labs and f/u appt scheduled; appt reminder notice mailed.

## 2023-03-24 NOTE — TELEPHONE ENCOUNTER
----- Message from Jennifer B. Scheuermann, PA sent at 3/23/2023  4:59 PM CDT -----  1.) schedule labs next week.     2.) schedule visit w/ me in ~ 1 month.    3.) call Dr Fishman - need to find out date of last EGD.   Also, their referral note mentions pt has been treated for HCV twice but pt not recalling that. Can you have someone clarify what they have in their notes (or send them over) regarding his prior treatment. I only know of the time I treated him in 2009 w/ PIFN+RBV

## 2023-03-27 ENCOUNTER — LAB VISIT (OUTPATIENT)
Dept: LAB | Facility: HOSPITAL | Age: 78
End: 2023-03-27
Attending: PHYSICIAN ASSISTANT
Payer: MEDICARE

## 2023-03-27 DIAGNOSIS — K74.60 HEPATIC CIRRHOSIS, UNSPECIFIED HEPATIC CIRRHOSIS TYPE, UNSPECIFIED WHETHER ASCITES PRESENT: ICD-10-CM

## 2023-03-27 LAB
ALBUMIN SERPL BCP-MCNC: 3.8 G/DL (ref 3.5–5.2)
ALP SERPL-CCNC: 124 U/L (ref 55–135)
ALT SERPL W/O P-5'-P-CCNC: 33 U/L (ref 10–44)
AMMONIA PLAS-SCNC: 28 UMOL/L (ref 10–50)
ANION GAP SERPL CALC-SCNC: 9 MMOL/L (ref 8–16)
AST SERPL-CCNC: 41 U/L (ref 10–40)
BILIRUB SERPL-MCNC: 0.5 MG/DL (ref 0.1–1)
BUN SERPL-MCNC: 14 MG/DL (ref 8–23)
CALCIUM SERPL-MCNC: 10.1 MG/DL (ref 8.7–10.5)
CHLORIDE SERPL-SCNC: 108 MMOL/L (ref 95–110)
CO2 SERPL-SCNC: 23 MMOL/L (ref 23–29)
CREAT SERPL-MCNC: 1.1 MG/DL (ref 0.5–1.4)
EST. GFR  (NO RACE VARIABLE): >60 ML/MIN/1.73 M^2
GLUCOSE SERPL-MCNC: 73 MG/DL (ref 70–110)
INR PPP: 1.6 (ref 0.8–1.2)
POTASSIUM SERPL-SCNC: 4.1 MMOL/L (ref 3.5–5.1)
PROT SERPL-MCNC: 7.5 G/DL (ref 6–8.4)
PROTHROMBIN TIME: 16.2 SEC (ref 9–12.5)
SODIUM SERPL-SCNC: 140 MMOL/L (ref 136–145)

## 2023-03-27 PROCEDURE — 82140 ASSAY OF AMMONIA: CPT | Performed by: PHYSICIAN ASSISTANT

## 2023-03-27 PROCEDURE — 36415 COLL VENOUS BLD VENIPUNCTURE: CPT | Performed by: PHYSICIAN ASSISTANT

## 2023-03-27 PROCEDURE — 80053 COMPREHEN METABOLIC PANEL: CPT | Performed by: PHYSICIAN ASSISTANT

## 2023-03-27 PROCEDURE — 86706 HEP B SURFACE ANTIBODY: CPT | Mod: 91 | Performed by: PHYSICIAN ASSISTANT

## 2023-03-27 PROCEDURE — 82105 ALPHA-FETOPROTEIN SERUM: CPT | Performed by: PHYSICIAN ASSISTANT

## 2023-03-27 PROCEDURE — 85610 PROTHROMBIN TIME: CPT | Performed by: PHYSICIAN ASSISTANT

## 2023-03-28 LAB
AFP SERPL-MCNC: 30 NG/ML (ref 0–8.4)
HBV SURFACE AB SER-ACNC: <3 MIU/ML
HBV SURFACE AB SER-ACNC: NORMAL M[IU]/ML

## 2023-03-29 ENCOUNTER — TELEPHONE (OUTPATIENT)
Dept: HEPATOLOGY | Facility: CLINIC | Age: 78
End: 2023-03-29
Payer: MEDICARE

## 2023-03-29 DIAGNOSIS — K74.60 HEPATIC CIRRHOSIS, UNSPECIFIED HEPATIC CIRRHOSIS TYPE, UNSPECIFIED WHETHER ASCITES PRESENT: Primary | ICD-10-CM

## 2023-03-29 DIAGNOSIS — R77.2 ELEVATED AFP: ICD-10-CM

## 2023-03-29 NOTE — TELEPHONE ENCOUNTER
April called back.  He states that Dr. Quinonez provided patient with 1st hep b shot and they will schedule patient to have 2nd shot at Dr. Quinonez' office.

## 2023-03-29 NOTE — TELEPHONE ENCOUNTER
I spoke with April and msg from PA Scheuermann relayed.  He states that he will check with local provider to see if 2nd Hep B vaccine can be provided and call us back.  CT scheduled 4/5/23; appt reminder notice mailed.

## 2023-03-29 NOTE — TELEPHONE ENCOUNTER
3/27 labs  INR 1.6 (on xarelto)  CMP stable  AFP 30 (from  27.5 a month ago)  HBsAb neg    Pls call pt - may need to speak to son / DIL  Labs show  He does not have protection against Hep B. Looks like he got first shot (of 2 shot series) for HBV vaccine in 11/2022. Due for second one. Does he remember where he got first? He needs to go back to get 2nd. Or we can try to set up for him.  AFP tumor marker has gone up slightly from 27 -> 30. Even though u/s looked okay I recommend tpCT. Pls schedule      Keep f/u vist

## 2023-04-05 ENCOUNTER — HOSPITAL ENCOUNTER (OUTPATIENT)
Dept: RADIOLOGY | Facility: HOSPITAL | Age: 78
Discharge: HOME | End: 2023-04-05
Attending: PHYSICIAN ASSISTANT
Payer: MEDICARE

## 2023-04-05 ENCOUNTER — TELEPHONE (OUTPATIENT)
Dept: HEPATOLOGY | Facility: CLINIC | Age: 78
End: 2023-04-05
Payer: MEDICARE

## 2023-04-05 DIAGNOSIS — K74.60 HEPATIC CIRRHOSIS, UNSPECIFIED HEPATIC CIRRHOSIS TYPE, UNSPECIFIED WHETHER ASCITES PRESENT: Primary | ICD-10-CM

## 2023-04-05 DIAGNOSIS — N28.9 RENAL LESION: ICD-10-CM

## 2023-04-05 DIAGNOSIS — R77.2 ELEVATED AFP: ICD-10-CM

## 2023-04-05 DIAGNOSIS — K74.60 HEPATIC CIRRHOSIS, UNSPECIFIED HEPATIC CIRRHOSIS TYPE, UNSPECIFIED WHETHER ASCITES PRESENT: ICD-10-CM

## 2023-04-05 PROCEDURE — 74170 CT ABD WO CNTRST FLWD CNTRST: CPT | Mod: 26,,, | Performed by: RADIOLOGY

## 2023-04-05 PROCEDURE — 74170 CT ABD WO CNTRST FLWD CNTRST: CPT | Mod: TC

## 2023-04-05 PROCEDURE — 74170 CT ABDOMEN W WO CONTRAST: ICD-10-PCS | Mod: 26,,, | Performed by: RADIOLOGY

## 2023-04-05 PROCEDURE — 25500020 PHARM REV CODE 255: Performed by: PHYSICIAN ASSISTANT

## 2023-04-05 RX ADMIN — IOHEXOL 75 ML: 350 INJECTION, SOLUTION INTRAVENOUS at 03:04

## 2023-04-06 NOTE — TELEPHONE ENCOUNTER
4/5/23 tpCT   - 8mm enhancing liver lesion, indeterminate.  - right renal lesion, indeterminate - solid vs cystic?    Pls call pt / son / DIL  CT showed 2 things:  - small spot on his liver that we need to continue watching. At this time it does not look like a cancer, but we will continue monitoring it. Keep f/u visit in May to discuss more  - spot on kidney, unclear if a cyst or something else. Needs kidney u/s to evaluate more    Pls schedule:  AFP ~ 4/27/23  Renal u/s    Keep appt w/ me in May

## 2023-04-06 NOTE — TELEPHONE ENCOUNTER
I spoke with April (son) and his wife Loreta and msg from PA Scheuermann relayed.  US scheduled 4/18/23 and lab scheduled 4/27/23; appt reminder notice mailed.

## 2023-04-18 ENCOUNTER — HOSPITAL ENCOUNTER (OUTPATIENT)
Dept: RADIOLOGY | Facility: HOSPITAL | Age: 78
Discharge: HOME | End: 2023-04-18
Attending: PHYSICIAN ASSISTANT
Payer: MEDICARE

## 2023-04-18 DIAGNOSIS — N28.9 RENAL LESION: ICD-10-CM

## 2023-04-18 PROCEDURE — 76770 US RETROPERITONEAL COMPLETE: ICD-10-PCS | Mod: 26,,, | Performed by: RADIOLOGY

## 2023-04-18 PROCEDURE — 76770 US EXAM ABDO BACK WALL COMP: CPT | Mod: TC

## 2023-04-18 PROCEDURE — 76770 US EXAM ABDO BACK WALL COMP: CPT | Mod: 26,,, | Performed by: RADIOLOGY

## 2023-04-27 ENCOUNTER — LAB VISIT (OUTPATIENT)
Dept: LAB | Facility: HOSPITAL | Age: 78
End: 2023-04-27
Attending: PHYSICIAN ASSISTANT
Payer: MEDICARE

## 2023-04-27 DIAGNOSIS — K74.60 HEPATIC CIRRHOSIS, UNSPECIFIED HEPATIC CIRRHOSIS TYPE, UNSPECIFIED WHETHER ASCITES PRESENT: ICD-10-CM

## 2023-04-27 LAB — AFP SERPL-MCNC: 29 NG/ML (ref 0–8.4)

## 2023-04-27 PROCEDURE — 36415 COLL VENOUS BLD VENIPUNCTURE: CPT | Performed by: PHYSICIAN ASSISTANT

## 2023-04-27 PROCEDURE — 82105 ALPHA-FETOPROTEIN SERUM: CPT | Performed by: PHYSICIAN ASSISTANT

## 2023-04-28 ENCOUNTER — TELEPHONE (OUTPATIENT)
Dept: HEPATOLOGY | Facility: CLINIC | Age: 78
End: 2023-04-28
Payer: MEDICARE

## 2023-04-28 NOTE — TELEPHONE ENCOUNTER
Patient: Junaid Muñoz       MRN: [id number]      : 1945     Age: 78 y.o.  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 No liver lesions on U/S but CT reveals indeterminate lesion.    Need recs on liver lesion and on whether HCV Rx should be initiated now or postponed     Imaging to be reviewed: tpCT 23    HCC Treatment History: n/a    Platelets:   Lab Results   Component Value Date/Time     (L) 2023 10:11 AM     Creatinine:   Lab Results   Component Value Date/Time    CREATININE 1.1 2023 02:43 PM     Bilirubin:   Lab Results   Component Value Date/Time    BILITOT 0.5 2023 02:43 PM     AFP Last 3 each if available:   Lab Results   Component Value Date/Time    AFP 29 (H) 2023 09:28 AM    AFP 30 (H) 2023 02:43 PM    AFP 27.5 (H) 2023 03:40 PM       MELD: MELD-Na score: 13 at 3/27/2023  2:43 PM  MELD score: 13 at 3/27/2023  2:43 PM  Calculated from:  Serum Creatinine: 1.1 mg/dL at 3/27/2023  2:43 PM  Serum Sodium: 140 mmol/L (Using max of 137 mmol/L) at 3/27/2023  2:43 PM  Total Bilirubin: 0.5 mg/dL (Using min of 1 mg/dL) at 3/27/2023  2:43 PM  INR(ratio): 1.6 at 3/27/2023  2:43 PM  Age: 77 years    *on xarelto, likely contributing to INR elevation    Plan:     Follow-up Provider:

## 2023-05-01 ENCOUNTER — TELEPHONE (OUTPATIENT)
Dept: HEPATOLOGY | Facility: CLINIC | Age: 78
End: 2023-05-01
Payer: MEDICARE

## 2023-05-01 NOTE — TELEPHONE ENCOUNTER
Per orders from PA Scheuermann, patient's clinic visit on 5/1/23 cancelled.  I spoke with Loreta (patient's daughter-in-law) and appt with provider scheduled again on 5/19/23.

## 2023-05-02 ENCOUNTER — CONFERENCE (OUTPATIENT)
Dept: TRANSPLANT | Facility: CLINIC | Age: 78
End: 2023-05-02
Payer: MEDICARE

## 2023-05-02 ENCOUNTER — TELEPHONE (OUTPATIENT)
Dept: HEPATOLOGY | Facility: CLINIC | Age: 78
End: 2023-05-02
Payer: MEDICARE

## 2023-05-02 DIAGNOSIS — K76.9 LIVER LESION: ICD-10-CM

## 2023-05-02 DIAGNOSIS — K74.60 HEPATIC CIRRHOSIS, UNSPECIFIED HEPATIC CIRRHOSIS TYPE, UNSPECIFIED WHETHER ASCITES PRESENT: Primary | ICD-10-CM

## 2023-05-02 NOTE — TELEPHONE ENCOUNTER
IR conference review 5/2/23:  Discussion/Plan:  No definite HCC, Surveillance based on risk factors    Per Baca Major PA report:  Less than 1 cm tiny arterially enhancing lesion, does not meet criteria for HCC. Hepatologists suggest treat HCV now. MRI in 3 months for f/u.      Pls call pt / son / DIL:  Recent CT was reviewed in radiology conference  There is a small spot on the liver we need to watch closely. No evidence of cancer at this time.    Pls schedule:  Visit now so we can talk about HCV rx. Video is fine  CBC, CMP, INR, AFP, MRI - 7/2023

## 2023-05-03 NOTE — TELEPHONE ENCOUNTER
Patient: Junaid Muñoz       MRN: [id number]      : 1945     Age: 78 y.o.  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 No liver lesions on U/S but CT reveals indeterminate lesion.    Need recs on liver lesion and on whether HCV Rx should be initiated now or postponed     Imaging to be reviewed: tpCT 23    HCC Treatment History: n/a    Platelets:   Lab Results   Component Value Date/Time     (L) 2023 10:11 AM     Creatinine:   Lab Results   Component Value Date/Time    CREATININE 1.1 2023 02:43 PM     Bilirubin:   Lab Results   Component Value Date/Time    BILITOT 0.5 2023 02:43 PM     AFP Last 3 each if available:   Lab Results   Component Value Date/Time    AFP 29 (H) 2023 09:28 AM    AFP 30 (H) 2023 02:43 PM    AFP 27.5 (H) 2023 03:40 PM       MELD: MELD-Na score: 13 at 3/27/2023  2:43 PM  MELD score: 13 at 3/27/2023  2:43 PM  Calculated from:  Serum Creatinine: 1.1 mg/dL at 3/27/2023  2:43 PM  Serum Sodium: 140 mmol/L (Using max of 137 mmol/L) at 3/27/2023  2:43 PM  Total Bilirubin: 0.5 mg/dL (Using min of 1 mg/dL) at 3/27/2023  2:43 PM  INR(ratio): 1.6 at 3/27/2023  2:43 PM  Age: 77 years    *on xarelto, likely contributing to INR elevation    Plan: No definite HCC, Surveillance based on risk factors    Committee Discussion: Subcentimeter arterial enhancing lesion; indeterminate.     Plan: MRI in 3 months. OK to treat HCV    Follow-up Provider: Jennifer Scheuermann, PA

## 2023-05-03 NOTE — TELEPHONE ENCOUNTER
Attempt made to reach Napoleon unsuccessful.  Patient already scheduled to see PA Scheuermann on 5/19/23.  Testing scheduled 7/6/23; appt reminder notice mailed.

## 2023-05-04 ENCOUNTER — HOSPITAL ENCOUNTER (EMERGENCY)
Facility: HOSPITAL | Age: 78
Discharge: HOME | End: 2023-05-04
Attending: SURGERY
Payer: MEDICARE

## 2023-05-04 VITALS
WEIGHT: 151.88 LBS | HEART RATE: 91 BPM | BODY MASS INDEX: 21.74 KG/M2 | RESPIRATION RATE: 18 BRPM | OXYGEN SATURATION: 100 % | SYSTOLIC BLOOD PRESSURE: 188 MMHG | TEMPERATURE: 98 F | DIASTOLIC BLOOD PRESSURE: 90 MMHG | HEIGHT: 70 IN

## 2023-05-04 DIAGNOSIS — F03.90 DEMENTIA, UNSPECIFIED DEMENTIA SEVERITY, UNSPECIFIED DEMENTIA TYPE, UNSPECIFIED WHETHER BEHAVIORAL, PSYCHOTIC, OR MOOD DISTURBANCE OR ANXIETY: Primary | ICD-10-CM

## 2023-05-04 LAB
ALBUMIN SERPL BCP-MCNC: 3.4 G/DL (ref 3.5–5.2)
ALP SERPL-CCNC: 95 U/L (ref 55–135)
ALT SERPL W/O P-5'-P-CCNC: 33 U/L (ref 10–44)
AMMONIA PLAS-SCNC: 21 UMOL/L (ref 10–50)
AMPHET+METHAMPHET UR QL: NEGATIVE
ANION GAP SERPL CALC-SCNC: 11 MMOL/L (ref 8–16)
APAP SERPL-MCNC: <3 UG/ML (ref 10–20)
APTT PPP: 26 SEC (ref 21–32)
AST SERPL-CCNC: 34 U/L (ref 10–40)
BACTERIA #/AREA URNS HPF: ABNORMAL /HPF
BARBITURATES UR QL SCN>200 NG/ML: NEGATIVE
BASOPHILS # BLD AUTO: 0.03 K/UL (ref 0–0.2)
BASOPHILS NFR BLD: 0.4 % (ref 0–1.9)
BENZODIAZ UR QL SCN>200 NG/ML: NEGATIVE
BILIRUB SERPL-MCNC: 0.4 MG/DL (ref 0.1–1)
BILIRUB UR QL STRIP: NEGATIVE
BNP SERPL-MCNC: 87 PG/ML (ref 0–99)
BUN SERPL-MCNC: 20 MG/DL (ref 8–23)
BZE UR QL SCN: NEGATIVE
CALCIUM SERPL-MCNC: 8.9 MG/DL (ref 8.7–10.5)
CANNABINOIDS UR QL SCN: ABNORMAL
CHLORIDE SERPL-SCNC: 108 MMOL/L (ref 95–110)
CK SERPL-CCNC: 75 U/L (ref 20–200)
CLARITY UR: CLEAR
CO2 SERPL-SCNC: 21 MMOL/L (ref 23–29)
COLOR UR: YELLOW
CREAT SERPL-MCNC: 1.2 MG/DL (ref 0.5–1.4)
CREAT UR-MCNC: 275.6 MG/DL (ref 23–375)
DIFFERENTIAL METHOD: ABNORMAL
EOSINOPHIL # BLD AUTO: 0.1 K/UL (ref 0–0.5)
EOSINOPHIL NFR BLD: 1.5 % (ref 0–8)
ERYTHROCYTE [DISTWIDTH] IN BLOOD BY AUTOMATED COUNT: 18.1 % (ref 11.5–14.5)
EST. GFR  (NO RACE VARIABLE): >60 ML/MIN/1.73 M^2
ETHANOL SERPL-MCNC: <10 MG/DL
GLUCOSE SERPL-MCNC: 112 MG/DL (ref 70–110)
GLUCOSE UR QL STRIP: NEGATIVE
HCT VFR BLD AUTO: 31.5 % (ref 40–54)
HGB BLD-MCNC: 9.6 G/DL (ref 14–18)
HGB UR QL STRIP: NEGATIVE
HYALINE CASTS #/AREA URNS LPF: 0 /LPF
IMM GRANULOCYTES # BLD AUTO: 0.02 K/UL (ref 0–0.04)
IMM GRANULOCYTES NFR BLD AUTO: 0.3 % (ref 0–0.5)
INFLUENZA A, MOLECULAR: NEGATIVE
INFLUENZA B, MOLECULAR: NEGATIVE
INR PPP: 1.2 (ref 0.8–1.2)
KETONES UR QL STRIP: ABNORMAL
LACTATE SERPL-SCNC: 2.8 MMOL/L (ref 0.5–2.2)
LEUKOCYTE ESTERASE UR QL STRIP: NEGATIVE
LYMPHOCYTES # BLD AUTO: 0.5 K/UL (ref 1–4.8)
LYMPHOCYTES NFR BLD: 7.5 % (ref 18–48)
MAGNESIUM SERPL-MCNC: 1.7 MG/DL (ref 1.6–2.6)
MCH RBC QN AUTO: 28.8 PG (ref 27–31)
MCHC RBC AUTO-ENTMCNC: 30.5 G/DL (ref 32–36)
MCV RBC AUTO: 95 FL (ref 82–98)
METHADONE UR QL SCN>300 NG/ML: NEGATIVE
MICROSCOPIC COMMENT: ABNORMAL
MONOCYTES # BLD AUTO: 0.5 K/UL (ref 0.3–1)
MONOCYTES NFR BLD: 6.9 % (ref 4–15)
NEUTROPHILS # BLD AUTO: 5.6 K/UL (ref 1.8–7.7)
NEUTROPHILS NFR BLD: 83.4 % (ref 38–73)
NITRITE UR QL STRIP: NEGATIVE
NRBC BLD-RTO: 0 /100 WBC
OPIATES UR QL SCN: NEGATIVE
PCP UR QL SCN>25 NG/ML: NEGATIVE
PH UR STRIP: 6 [PH] (ref 5–8)
PHOSPHATE SERPL-MCNC: 2 MG/DL (ref 2.7–4.5)
PLATELET # BLD AUTO: 124 K/UL (ref 150–450)
PMV BLD AUTO: 10.7 FL (ref 9.2–12.9)
POTASSIUM SERPL-SCNC: 3.5 MMOL/L (ref 3.5–5.1)
PROT SERPL-MCNC: 6.3 G/DL (ref 6–8.4)
PROT UR QL STRIP: ABNORMAL
PROTHROMBIN TIME: 12.6 SEC (ref 9–12.5)
RBC # BLD AUTO: 3.33 M/UL (ref 4.6–6.2)
RBC #/AREA URNS HPF: 5 /HPF (ref 0–4)
SALICYLATES SERPL-MCNC: <5 MG/DL (ref 15–30)
SARS-COV-2 RDRP RESP QL NAA+PROBE: NEGATIVE
SODIUM SERPL-SCNC: 140 MMOL/L (ref 136–145)
SP GR UR STRIP: 1.02 (ref 1–1.03)
SPECIMEN SOURCE: NORMAL
TOXICOLOGY INFORMATION: ABNORMAL
TROPONIN I SERPL DL<=0.01 NG/ML-MCNC: 0.01 NG/ML (ref 0–0.03)
URN SPEC COLLECT METH UR: ABNORMAL
UROBILINOGEN UR STRIP-ACNC: NEGATIVE EU/DL
WBC # BLD AUTO: 6.7 K/UL (ref 3.9–12.7)
WBC #/AREA URNS HPF: 5 /HPF (ref 0–5)

## 2023-05-04 PROCEDURE — 83735 ASSAY OF MAGNESIUM: CPT | Performed by: SURGERY

## 2023-05-04 PROCEDURE — 85025 COMPLETE CBC W/AUTO DIFF WBC: CPT | Performed by: SURGERY

## 2023-05-04 PROCEDURE — 93010 ELECTROCARDIOGRAM REPORT: CPT | Mod: ,,, | Performed by: INTERNAL MEDICINE

## 2023-05-04 PROCEDURE — 80053 COMPREHEN METABOLIC PANEL: CPT | Performed by: SURGERY

## 2023-05-04 PROCEDURE — 93005 ELECTROCARDIOGRAM TRACING: CPT

## 2023-05-04 PROCEDURE — 80179 DRUG ASSAY SALICYLATE: CPT | Performed by: SURGERY

## 2023-05-04 PROCEDURE — 25000003 PHARM REV CODE 250: Performed by: SURGERY

## 2023-05-04 PROCEDURE — 87040 BLOOD CULTURE FOR BACTERIA: CPT | Mod: 59 | Performed by: SURGERY

## 2023-05-04 PROCEDURE — 84484 ASSAY OF TROPONIN QUANT: CPT | Performed by: SURGERY

## 2023-05-04 PROCEDURE — 36415 COLL VENOUS BLD VENIPUNCTURE: CPT | Performed by: SURGERY

## 2023-05-04 PROCEDURE — 96360 HYDRATION IV INFUSION INIT: CPT

## 2023-05-04 PROCEDURE — 84100 ASSAY OF PHOSPHORUS: CPT | Performed by: SURGERY

## 2023-05-04 PROCEDURE — 85610 PROTHROMBIN TIME: CPT | Performed by: SURGERY

## 2023-05-04 PROCEDURE — 80307 DRUG TEST PRSMV CHEM ANLYZR: CPT | Performed by: SURGERY

## 2023-05-04 PROCEDURE — 83605 ASSAY OF LACTIC ACID: CPT | Performed by: SURGERY

## 2023-05-04 PROCEDURE — 85730 THROMBOPLASTIN TIME PARTIAL: CPT | Performed by: SURGERY

## 2023-05-04 PROCEDURE — 82140 ASSAY OF AMMONIA: CPT | Performed by: SURGERY

## 2023-05-04 PROCEDURE — 82607 VITAMIN B-12: CPT | Performed by: SURGERY

## 2023-05-04 PROCEDURE — 99900031 HC PATIENT EDUCATION (STAT)

## 2023-05-04 PROCEDURE — 99285 EMERGENCY DEPT VISIT HI MDM: CPT | Mod: 25

## 2023-05-04 PROCEDURE — 80143 DRUG ASSAY ACETAMINOPHEN: CPT | Performed by: SURGERY

## 2023-05-04 PROCEDURE — 82077 ASSAY SPEC XCP UR&BREATH IA: CPT | Performed by: SURGERY

## 2023-05-04 PROCEDURE — 84425 ASSAY OF VITAMIN B-1: CPT | Performed by: SURGERY

## 2023-05-04 PROCEDURE — 82306 VITAMIN D 25 HYDROXY: CPT | Performed by: SURGERY

## 2023-05-04 PROCEDURE — 93010 EKG 12-LEAD: ICD-10-PCS | Mod: ,,, | Performed by: INTERNAL MEDICINE

## 2023-05-04 PROCEDURE — 99900035 HC TECH TIME PER 15 MIN (STAT)

## 2023-05-04 PROCEDURE — 81000 URINALYSIS NONAUTO W/SCOPE: CPT | Mod: 59 | Performed by: SURGERY

## 2023-05-04 PROCEDURE — 87502 INFLUENZA DNA AMP PROBE: CPT | Performed by: SURGERY

## 2023-05-04 PROCEDURE — 83880 ASSAY OF NATRIURETIC PEPTIDE: CPT | Performed by: SURGERY

## 2023-05-04 PROCEDURE — U0002 COVID-19 LAB TEST NON-CDC: HCPCS | Performed by: SURGERY

## 2023-05-04 PROCEDURE — 82550 ASSAY OF CK (CPK): CPT | Performed by: SURGERY

## 2023-05-04 PROCEDURE — 82746 ASSAY OF FOLIC ACID SERUM: CPT | Performed by: SURGERY

## 2023-05-04 RX ADMIN — SODIUM CHLORIDE 500 ML: 9 INJECTION, SOLUTION INTRAVENOUS at 06:05

## 2023-05-05 LAB
25(OH)D3+25(OH)D2 SERPL-MCNC: 54 NG/ML (ref 30–96)
FOLATE SERPL-MCNC: 15.1 NG/ML (ref 4–24)
VIT B12 SERPL-MCNC: 745 PG/ML (ref 210–950)

## 2023-05-05 NOTE — ED PROVIDER NOTES
Encounter Date: 5/4/2023       History     Chief Complaint   Patient presents with    Altered Mental Status     Junaid Muñoz is a 78 y.o. 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    CAD (coronary artery disease)     s/p stent 2005, on plavix    Chronic hepatitis C     Cirrhosis     biopsy proven - 2007    CVA (cerebral vascular accident) 2021    History of colon polyps 06/2008    1 polyp - tubular adenoma    HTN (hypertension)     Hyperlipidemia     Hypothyroidism     Peripheral vascular disease, unspecified     Wound of left foot 10/06/2022    currently covered in pre-admit assessment unable to visualize     Past Surgical History:   Procedure Laterality Date    COLONOSCOPY W/ POLYPECTOMY  06/2008    Dr Wagoner: fair prep, 3mm polyp - tubular adenoma    CORONARY ANGIOPLASTY WITH STENT PLACEMENT      FUSION OF CERVICAL SPINE BY POSTERIOR APPROACH N/A 12/17/2019    Procedure: FUSION, SPINE, CERVICAL, POSTERIOR APPROACH C2-T3 posterior instrumented fusion;  Surgeon: Elian Deluca MD;  Location[facility] OR [address] [place] [address];  Service: Neurosurgery;  Laterality: N/A;    hydrocele repair  teenager    PERCUTANEOUS TRANSLUMINAL ANGIOPLASTY (PTA) OF PERIPHERAL VESSEL Left 10/14/2022    Procedure: PTA, PERIPHERAL VESSEL;  Surgeon: Jem Soriano MD;  Location: [facility] CATH;  Service: Cardiology;  Laterality: Left;  peripheral angiogram    pyloric  stenosis repair  age 1    SURGICAL REMOVAL OF VERTEBRAL BODY OF THORACIC SPINE N/A 12/10/2019    Procedure: CORPECTOMY, SPINE, CERVICAL AND THORACIC, C7 and T1 with Globus;  Surgeon: Elian Delcua MD;  Location: [address];  Service: Neurosurgery;  Laterality: N/A;    TOE AMPUTATION Left 8/15/2022    Procedure: AMPUTATION, TOE First, second, and third toes;  Surgeon: Lindsey Alberts III, VANITA;  [facility];  Service: Podiatry;  Laterality: Left;  Req. 11am per MD    TONSILLECTOMY       Family History   Problem Relation Age of Onset    No Known Problems Mother     No Known Problems Father     No Known Problems Son     No Known Problems Son     No Known Problems Son      Social History     Tobacco Use    Smoking status: Former     Packs/day: 0.25     Types: Cigarettes     Quit date: 10/14/2022     Years since quittin.5    Smokeless tobacco: Never   Substance Use Topics    Alcohol use: Not Currently     Comment: maybe a couple times a year    Drug use: Yes     Types: IV, Marijuana     Comment: MORPHINE (denies); A few times a month     Review of Systems   Constitutional:  Negative for activity change, appetite change, fatigue, fever and unexpected weight change.   HENT:  Negative for congestion, ear pain, mouth sores, nosebleeds, rhinorrhea, sinus pressure, sneezing and sore throat.    Eyes:  Negative for pain, discharge, redness and itching.   Respiratory:  Negative for apnea, cough, chest tightness and shortness of breath.    Cardiovascular:  Negative for chest pain, palpitations and leg swelling.   Gastrointestinal:  Negative for abdominal distention, abdominal pain, anal bleeding, constipation, diarrhea, nausea and vomiting.   Endocrine: Negative.    Genitourinary:  Negative for dysuria, enuresis, flank pain and frequency.   Musculoskeletal:  Negative for arthralgias, back pain, neck pain and neck stiffness.   Skin:  Negative for color change and wound.   Allergic/Immunologic: Negative.     Neurological:  Negative for dizziness, tremors, syncope, facial asymmetry, speech difficulty, weakness, light-headedness, numbness and headaches.   Hematological:  Negative for adenopathy. Does not bruise/bleed easily.   Psychiatric/Behavioral:  Negative for agitation, behavioral problems, hallucinations, self-injury and suicidal ideas. The patient is not nervous/anxious.      Physical Exam     Initial Vitals [05/04/23 1759]   BP Pulse Resp Temp SpO2   (!) 154/73 95 18 98.5 °F (36.9 °C) 97 %      MAP       --         Physical Exam    Nursing note and vitals reviewed.  Constitutional: Vital signs are normal. He appears well-developed and well-nourished. He is cooperative.   HENT:   Head: Normocephalic and atraumatic.   Right Ear: Hearing, tympanic membrane, external ear and ear canal normal.   Left Ear: Hearing, tympanic membrane, external ear and ear canal normal.   Nose: Nose normal.   Mouth/Throat: Uvula is midline, oropharynx is clear and moist and mucous membranes are normal.   Eyes: Conjunctivae, EOM and lids are normal. Pupils are equal, round, and reactive to light.   Neck: Neck supple. No JVD present.   Normal range of motion.   Full passive range of motion without pain.     Cardiovascular:  Normal rate, regular rhythm, S1 normal, S2 normal, normal heart sounds, intact distal pulses and normal pulses.           Pulmonary/Chest: Effort normal and breath sounds normal.   Abdominal: Abdomen is soft and flat. Bowel sounds are normal.   Musculoskeletal:         General: Normal range of motion.      Cervical back: Full passive range of motion without pain, normal range of motion and neck supple.     Neurological: He is alert and oriented to person, place, and time. He has normal strength.   Skin: Skin is warm, dry and intact. Capillary refill takes less than 2 seconds.       ED Course   Procedures  Labs Reviewed   LACTIC ACID, PLASMA - Abnormal; Notable for the following components:       Result Value    Lactate  (Lactic Acid) 2.8 (*)     All other components within normal limits   CBC W/ AUTO DIFFERENTIAL - Abnormal; Notable for the following components:    RBC 3.33 (*)     Hemoglobin 9.6 (*)     Hematocrit 31.5 (*)     MCHC 30.5 (*)     RDW 18.1 (*)     Platelets 124 (*)     Lymph # 0.5 (*)     Gran % 83.4 (*)     Lymph % 7.5 (*)     All other components within normal limits   COMPREHENSIVE METABOLIC PANEL - Abnormal; Notable for the following components:    CO2 21 (*)     Glucose 112 (*)     Albumin 3.4 (*)     All other components within normal limits   ACETAMINOPHEN LEVEL - Abnormal; Notable for the following components:    Acetaminophen (Tylenol), Serum <3.0 (*)     All other components within normal limits   SALICYLATE LEVEL - Abnormal; Notable for the following components:    Salicylate Lvl <5.0 (*)     All other components within normal limits   URINALYSIS, REFLEX TO URINE CULTURE - Abnormal; Notable for the following components:    Protein, UA 3+ (*)     Ketones, UA Trace (*)     All other components within normal limits    Narrative:     Specimen Source->Urine   DRUG SCREEN PANEL, URINE EMERGENCY - Abnormal; Notable for the following components:    THC Presumptive Positive (*)     All other components within normal limits    Narrative:     Specimen Source->Urine   PHOSPHORUS - Abnormal; Notable for the following components:    Phosphorus 2.0 (*)     All other components within normal limits   PROTIME-INR - Abnormal; Notable for the following components:    Prothrombin Time 12.6 (*)     All other components within normal limits   URINALYSIS MICROSCOPIC - Abnormal; Notable for the following components:    RBC, UA 5 (*)     All other components within normal limits    Narrative:     Specimen Source->Urine   INFLUENZA A & B BY MOLECULAR   CULTURE, BLOOD   CULTURE, BLOOD   SARS-COV-2 RNA AMPLIFICATION, QUAL   TROPONIN I   CK   B-TYPE NATRIURETIC PEPTIDE   ALCOHOL,MEDICAL (ETHANOL)   MAGNESIUM   AMMONIA   APTT   VITAMIN  D   VITAMIN B1   FOLATE   VITAMIN B12     EKG Readings: (Independently Interpreted)   No STEMI  Normal sinus rhythm  No ectopy  Normal conduction  Normal ST segments  Normal T-wave  Normal axis  Heart rate in the 80s     Imaging Results              X-Ray Chest 1 View (Final result)  Result time 05/04/23 19:25:16      Final result by Carlos Molina MD (05/04/23 19:25:16)                   Impression:      No acute abnormality.      Electronically signed by: Carlos Molina  Date:    05/04/2023  Time:    19:25               Narrative:    EXAMINATION:  XR CHEST 1 VIEW    CLINICAL HISTORY:  COVID;    TECHNIQUE:  Single frontal view of the chest was performed.    COMPARISON:  10/06/2022    FINDINGS:  Fusion hardware of the cervicothoracic junction.    The lungs are clear, with normal appearance of pulmonary vasculature and no pleural effusion or pneumothorax.  Mild right upper lobe scarring.    The cardiac silhouette is normal in size. The hilar and mediastinal contours are unremarkable.    Bones are intact.                                       CT Head Without Contrast (Final result)  Result time 05/04/23 19:24:11      Final result by Carlos Molina MD (05/04/23 19:24:11)                   Impression:      1. No acute intracranial process.  2. Involutional changes with chronic microvascular ischemic changes.  Small remote lacunar infarcts of the bilateral thalamus, right lateral basal ganglia and right caudate.  See above comments.      Electronically signed by: Carlos Molina  Date:    05/04/2023  Time:    19:24               Narrative:    EXAMINATION:  CT HEAD WITHOUT CONTRAST    CLINICAL HISTORY:  Dizziness, persistent/recurrent, cardiac or vascular cause suspected;    TECHNIQUE:  Low dose axial CT images obtained throughout the head without intravenous contrast. Sagittal and coronal reconstructions were performed.    COMPARISON:  05/03/2022    FINDINGS:  Intracranial compartment:    No midline shift or  hydrocephalus.  No extra-axial blood or fluid collections.    Small remote lacunar infarcts of the bilateral thalamus, right lateral basal ganglia and right caudate.  MRI follow-up may better characterize if there is high clinical suspicion.    Moderate involutional changes and chronic microvascular ischemic changes in the periventricular white matter.    No parenchymal mass, hemorrhage, edema or major vascular distribution infarct.    Skull/extracranial contents (limited evaluation): No fracture. Mastoid air cells and paranasal sinuses are essentially clear.    No significant change.                                       Medications   sodium chloride 0.9% bolus 500 mL 500 mL (0 mLs Intravenous Stopped 23 [id number])     Medical Decision Makin-year-old male found wandering with his walker around his local neighborhood  There was no family member around to question, EMS states no one at home  Patient has no complaints on arrival with no obvious new neurologic deficit noted  Alert to person but not to place, generally alert to time but not encephalopathic    Lab work within normal limits, EKG normal, urinalysis shows no UTI this evening  CT head shows remote infarcts with nothing acute, normal neuro exam in the ER  Patient has THC in his urine drug screen with a history of chronic use on review    I spoke with the patient's daughter Loreta Molina who states a common occurrence  The patient has been found wandering several times in the past couple years  Loreta is currently at her daughter's house but will come get the patient soon  Loreta states that this happens all the time, usually son is home but is working                        Clinical Impression:   Final diagnoses:  [F03.90] Dementia, unspecified dementia severity, unspecified dementia type, unspecified whether behavioral, psychotic, or mood disturbance or anxiety (Primary)        ED Disposition Condition    Discharge Stable          ED Prescriptions    None        Follow-up Information       Follow up With Specialties Details Why Contact Info    Frederick Rocha MD Internal Medicine Schedule an appointment as soon as possible for a visit in 2 days  [address] [facility] [address]  [phone]               Julio César Marroquin MD  05/04/23 2032

## 2023-05-10 LAB
BACTERIA BLD CULT: NORMAL
BACTERIA BLD CULT: NORMAL
VIT B1 BLD-MCNC: 78 UG/L (ref 38–122)

## 2023-05-14 ENCOUNTER — HOSPITAL ENCOUNTER (EMERGENCY)
Facility: HOSPITAL | Age: 78
Discharge: HOME | End: 2023-05-15
Attending: STUDENT IN AN ORGANIZED HEALTH CARE EDUCATION/TRAINING PROGRAM
Payer: MEDICARE

## 2023-05-14 DIAGNOSIS — S00.83XA CONTUSION OF FOREHEAD, INITIAL ENCOUNTER: ICD-10-CM
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
## 2023-05-14 DIAGNOSIS — Z79.01 CURRENT USE OF LONG TERM ANTICOAGULATION: ICD-10-CM

## 2023-05-14 DIAGNOSIS — S51.011A SKIN TEAR OF RIGHT ELBOW WITHOUT COMPLICATION, INITIAL ENCOUNTER: ICD-10-CM

## 2023-05-14 PROCEDURE — 99284 EMERGENCY DEPT VISIT MOD MDM: CPT

## 2023-05-15 VITALS
RESPIRATION RATE: 18 BRPM | WEIGHT: 154.63 LBS | DIASTOLIC BLOOD PRESSURE: 69 MMHG | BODY MASS INDEX: 22.19 KG/M2 | SYSTOLIC BLOOD PRESSURE: 154 MMHG | TEMPERATURE: 98 F | OXYGEN SATURATION: 98 % | HEART RATE: 59 BPM

## 2023-05-15 NOTE — ED TRIAGE NOTES
Patient arrived to ED with c/o laceration to right elbow and hematoma to right eyebrow after tripping and falling on wooden steps about 40 min PTA. Pt is on blood thinners. No LOC. NADN in triage. Rates pain 2/10.

## 2023-05-15 NOTE — ED NOTES
Skin tear to right elbow cleaned with sterile water and dressed with nonadherent dressing, sterile gauze, kerlix, and ace wrap. Pt tolerated well.

## 2023-05-15 NOTE — ED PROVIDER NOTES
Encounter Date: 5/14/2023       History     Chief Complaint   Patient presents with    Laceration     Patient arrived to ED with c/o laceration to right elbow and hematoma to right eyebrow after tripping and falling on wooden steps about 40 min PTA. Pt is on blood thinners. No LOC. NADN in triage. Rates pain 2/10.      78 year old male with a PMHx of CAD, CVA, HLD, HTN, xarelto use, dementia presents to the ED with his daughter after a fall. He was on wooden steps when he fell and landed on his right elbow and hit his head. Seen by daughter who is with him. No LOC. Able to ambulate afterwards. Last Tdap 2019. Mentally at baseline per daughter.      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    CAD (coronary artery disease)     s/p stent 2005, on plavix    Chronic hepatitis C     Cirrhosis     biopsy proven - 2007    CVA (cerebral vascular accident) 2021    History of colon polyps 06/2008    1 polyp - tubular adenoma    HTN (hypertension)     Hyperlipidemia     Hypothyroidism     Peripheral vascular disease, unspecified     Wound of left foot 10/06/2022    currently covered in pre-admit assessment unable to visualize     Past Surgical History:   Procedure Laterality Date    COLONOSCOPY W/ POLYPECTOMY  06/2008    Dr Wagoner: fair prep, 3mm polyp - tubular adenoma    CORONARY ANGIOPLASTY WITH STENT PLACEMENT      FUSION OF CERVICAL SPINE BY POSTERIOR APPROACH N/A 12/17/2019    Procedure: FUSION, SPINE, CERVICAL, POSTERIOR APPROACH C2-T3 posterior instrumented fusion;  Surgeon: Elian Deluca MD;  Location: [facility] OR [address] [place] [address];  Service: Neurosurgery;  Laterality: N/A;    hydrocele repair  teenager    PERCUTANEOUS TRANSLUMINAL ANGIOPLASTY (PTA) OF PERIPHERAL VESSEL Left 10/14/2022    Procedure: PTA, PERIPHERAL VESSEL;  Surgeon: Jem Soriano MD;  Location: [facility]  CATH;  Service: Cardiology;  Laterality: Left;  peripheral angiogram    pyloric stenosis repair  age 1    SURGICAL REMOVAL OF VERTEBRAL BODY OF THORACIC SPINE N/A 12/10/2019    Procedure: CORPECTOMY, SPINE, CERVICAL AND THORACIC, C7 and T1 with Globus;  Surgeon: Elian Deluca MD;  Location: [facility] OR [address] [place] [address];  Service: Neurosurgery;  Laterality: N/A;    TOE AMPUTATION Left 8/15/2022    Procedure: AMPUTATION, TOE First, second, and third toes;  Surgeon: Lindsey Alberts III, VANITA;  Location: [facility] OR;  Service: Podiatry;  Laterality: Left;  Req. 11am per MD    TONSILLECTOMY       Family History   Problem Relation Age of Onset    No Known Problems Mother     No Known Problems Father     No Known Problems Son     No Known Problems Son     No Known Problems Son      Social History     Tobacco Use    Smoking status: Former     Packs/day: 0.25     Types: Cigarettes     Quit date: 10/14/2022     Years since quittin.5    Smokeless tobacco: Never   Substance Use Topics    Alcohol use: Not Currently     Comment: maybe a couple times a year    Drug use: Yes     Types: IV, Marijuana     Comment: MORPHINE (denies); A few times a month     Review of Systems   Constitutional:  Negative for chills and fever.   HENT:  Positive for facial swelling. Negative for congestion, rhinorrhea and sneezing.    Eyes:  Negative for discharge and redness.   Respiratory:  Negative for cough and shortness of breath.    Cardiovascular:  Negative for chest pain and palpitations.   Gastrointestinal:  Negative for abdominal pain, diarrhea and vomiting.   Genitourinary:  Negative for difficulty urinating, flank pain and urgency.   Musculoskeletal:  Negative for back pain and neck pain.   Skin:  Positive for wound. Negative for rash.   Neurological:  Negative for weakness, numbness and headaches.     Physical Exam     Initial Vitals [23 2157]   BP Pulse Resp Temp SpO2   138/69 67 18 97.6 °F (36.4 °C) 97 %      MAP       --         Physical  Exam    Nursing note and vitals reviewed.  Constitutional: He appears well-developed. He is not diaphoretic. No distress.   HENT:   Head: Normocephalic. Head is with contusion (right forehead).   Right Ear: External ear normal.   Left Ear: External ear normal.   Nose: Nose normal.   Eyes: Conjunctivae are normal. Right eye exhibits no discharge. Left eye exhibits no discharge. No scleral icterus.   Cardiovascular:  Normal rate and regular rhythm.           Pulmonary/Chest: Breath sounds normal. No stridor. No respiratory distress. He has no wheezes. He has no rhonchi. He has no rales.   Abdominal: Abdomen is soft. He exhibits no distension. There is no abdominal tenderness. There is no guarding.   Musculoskeletal:         General: No edema.      Right elbow: Tenderness present.      Comments: Right elbow skin tear  Patient has a history of stroke so ROM of the right arm is already limited at baseline     Neurological: He is alert and oriented to person, place, and time. GCS score is 15. GCS eye subscore is 4. GCS verbal subscore is 5. GCS motor subscore is 6.   Skin: Skin is warm and dry. Capillary refill takes less than 2 seconds.   Psychiatric: He has a normal mood and affect.             ED Course   Procedures  Labs Reviewed - No data to display       Imaging Results              CT Cervical Spine Without Contrast (Final result)  Result time 05/15/23 01:36:02      Final result by Harris Garcia MD (05/15/23 01:36:02)                   Impression:      No acute fracture or listhesis.      Electronically signed by: Harris Garcia  Date:    05/15/2023  Time:    01:36               Narrative:    EXAMINATION:  CT CERVICAL SPINE WITHOUT CONTRAST    CLINICAL HISTORY:  Neck trauma (Age >= 65y);    TECHNIQUE:  Low dose axial images, sagittal and coronal reformations were performed though the cervical spine.  Contrast was not administered.    COMPARISON:  Radiographs 01/23/2020    FINDINGS:  Bones are  osteopenic.  Reversal of the normal cervical lordosis.  Postsurgical changes of C2 to T3 posterior spinal fusion with intact hardware.  Postsurgical changes of C6 to T2 anterior cervical discectomy and fusion with associated corpectomies and interbody grafting.  No acute fracture or listhesis.  Ununited posterior arch of C1.   Unremarkable prevertebral soft tissues.  Bilateral carotid atherosclerosis.                                       CT Head Without Contrast (Final result)  Result time 05/15/23 01:20:43      Final result by Harris Garcia MD (05/15/23 01:20:43)                   Impression:      No acute intracranial hemorrhage.    Moderate right supraorbital soft tissue contusion.      Electronically signed by: Harris Garcia  Date:    05/15/2023  Time:    01:20               Narrative:    EXAMINATION:  CT HEAD WITHOUT CONTRAST    CLINICAL HISTORY:  Head trauma, minor (Age >= 65y);    TECHNIQUE:  Low dose axial CT images obtained throughout the head without intravenous contrast. Sagittal and coronal reconstructions were performed.    COMPARISON:  05/04/2023    FINDINGS:  Intracranial compartment:    Ventricles and sulci are normal in size for age without evidence of hydrocephalus. No extra-axial blood or fluid collections.    Redemonstration of old lacunar infarcts in the bilateral thalami, bilateral basal ganglia, and right caudate.  Moderate periventricular and deep white matter changes of chronic microvascular ischemia.  No parenchymal mass, hemorrhage, edema or major vascular distribution infarct.    Skull/extracranial contents (limited evaluation): Moderate right supraorbital soft tissue swelling.  No fracture. Mastoid air cells and paranasal sinuses are essentially clear.                                       X-Ray Elbow Complete Right (Final result)  Result time 05/15/23 00:56:36      Final result by Harris Garcia MD (05/15/23 00:56:36)                   Impression:      No acute  fracture or dislocation.      Electronically signed by: Harris Garcia  Date:    05/15/2023  Time:    00:56               Narrative:    EXAMINATION:  XR ELBOW COMPLETE 3 VIEW RIGHT    CLINICAL HISTORY:  . Unspecified fall, initial encounter    TECHNIQUE:  AP, lateral, and oblique views of the right elbow were performed.    COMPARISON:  01/27/2023    FINDINGS:  No acute fracture, dislocation, or traumatic malalignment.  Joint spaces are maintained.  Olecranon enthesopathy.  No joint effusion.                                       Medications - No data to display  Medical Decision Making:   Differential Diagnosis:   Ddx: head bleed, skull fx, cspine fx, facial bone fx, elbow fx, laceration  ED Management:  Negative CT head, cspine, and right elbow x-ray. Skin tear cleaned and dressed. Discharging home with supportive care. Daughter and patient are in agreement.                        Clinical Impression:   Final diagnoses:  [W19.XXXA] Fall (Primary)  [S51.011A] Skin tear of right elbow without complication, initial encounter  [Z79.01] Current use of long term anticoagulation  [S00.83XA] Contusion of forehead, initial encounter        ED Disposition Condition    Discharge Stable          ED Prescriptions    None       Follow-up Information       Follow up With Specialties Details Why Contact Info    Frederick Rocha MD Internal Medicine Schedule an appointment as soon as possible for a visit in 1 week As needed [address] [facility] [place] [address]  [phone]                  Tyson Guzman DO  05/15/23 0150

## 2023-05-15 NOTE — ED NOTES
MD reviewed results and dc instructions with pts family, family verbalized understanding. AVS printed and given.

## 2023-05-19 ENCOUNTER — TELEPHONE (OUTPATIENT)
Dept: HEPATOLOGY | Facility: CLINIC | Age: 78
End: 2023-05-19
Payer: MEDICARE

## 2023-05-26 ENCOUNTER — TELEPHONE (OUTPATIENT)
Dept: HEPATOLOGY | Facility: CLINIC | Age: 78
End: 2023-05-26
Payer: MEDICARE

## 2023-05-26 NOTE — TELEPHONE ENCOUNTER
Loreta bansal to help patient connect for a virtual visit with PA Scheuermann today.  She states that her  will be back in town on next week and he will have to call to setup an in person visit for patient.  Letter sent reminding them to call hepatology back for scheduling.   DISCHARGE

## 2023-07-06 DIAGNOSIS — K74.60 HEPATIC CIRRHOSIS, UNSPECIFIED HEPATIC CIRRHOSIS TYPE, UNSPECIFIED WHETHER ASCITES PRESENT: Primary | ICD-10-CM

## 2023-10-30 PROBLEM — I80.292: Status: ACTIVE | Noted: 2023-10-30

## 2024-04-29 NOTE — TELEPHONE ENCOUNTER
----- Message from Salima Le sent at 1/31/2018 12:57 PM CST -----  Contact: self  Pt is calling in regards to cancelling his appt for now and he will call back to reschedule.    Pt can be reached at [phone].    Thank you    No.

## 2024-05-25 ENCOUNTER — LAB VISIT (OUTPATIENT)
Dept: LAB | Facility: HOSPITAL | Age: 79
End: 2024-05-25
Attending: HOSPITALIST
Payer: MEDICARE

## 2024-05-25 DIAGNOSIS — K92.1 BLOOD IN STOOL: Primary | ICD-10-CM

## 2024-05-25 LAB — OB PNL STL: POSITIVE

## 2024-05-25 PROCEDURE — 82272 OCCULT BLD FECES 1-3 TESTS: CPT

## 2024-06-04 ENCOUNTER — HOSPITAL ENCOUNTER (EMERGENCY)
Facility: HOSPITAL | Age: 79
Discharge: ANOTHER HOSPITAL | End: 2024-06-05
Attending: EMERGENCY MEDICINE
Payer: MEDICARE

## 2024-06-04 DIAGNOSIS — K92.2 LOWER GI BLEED: Primary | ICD-10-CM

## 2024-06-04 DIAGNOSIS — D64.9 ANEMIA: ICD-10-CM

## 2024-06-04 LAB
ABO + RH BLD: NORMAL
ALBUMIN SERPL BCP-MCNC: 2.9 G/DL (ref 3.5–5.2)
ALP SERPL-CCNC: 101 U/L (ref 55–135)
ALT SERPL W/O P-5'-P-CCNC: 24 U/L (ref 10–44)
ANION GAP SERPL CALC-SCNC: 9 MMOL/L (ref 8–16)
APTT PPP: 39.6 SEC (ref 21–32)
AST SERPL-CCNC: 25 U/L (ref 10–40)
BASOPHILS # BLD AUTO: 0.02 K/UL (ref 0–0.2)
BASOPHILS NFR BLD: 0.3 % (ref 0–1.9)
BILIRUB SERPL-MCNC: 0.3 MG/DL (ref 0.1–1)
BLD GP AB SCN CELLS X3 SERPL QL: NORMAL
BLD PROD TYP BPU: NORMAL
BLD PROD TYP BPU: NORMAL
BLOOD UNIT EXPIRATION DATE: NORMAL
BLOOD UNIT EXPIRATION DATE: NORMAL
BLOOD UNIT TYPE CODE: 6200
BLOOD UNIT TYPE CODE: 6200
BLOOD UNIT TYPE: NORMAL
BLOOD UNIT TYPE: NORMAL
BNP SERPL-MCNC: 156 PG/ML (ref 0–99)
BUN SERPL-MCNC: 32 MG/DL (ref 8–23)
CALCIUM SERPL-MCNC: 9 MG/DL (ref 8.7–10.5)
CHLORIDE SERPL-SCNC: 112 MMOL/L (ref 95–110)
CK SERPL-CCNC: 105 U/L (ref 20–200)
CO2 SERPL-SCNC: 18 MMOL/L (ref 23–29)
CODING SYSTEM: NORMAL
CODING SYSTEM: NORMAL
CREAT SERPL-MCNC: 1.4 MG/DL (ref 0.5–1.4)
CROSSMATCH INTERPRETATION: NORMAL
CROSSMATCH INTERPRETATION: NORMAL
DIFFERENTIAL METHOD BLD: ABNORMAL
DISPENSE STATUS: NORMAL
DISPENSE STATUS: NORMAL
EOSINOPHIL # BLD AUTO: 0.1 K/UL (ref 0–0.5)
EOSINOPHIL NFR BLD: 1.7 % (ref 0–8)
ERYTHROCYTE [DISTWIDTH] IN BLOOD BY AUTOMATED COUNT: 15.5 % (ref 11.5–14.5)
EST. GFR  (NO RACE VARIABLE): 51 ML/MIN/1.73 M^2
GLUCOSE SERPL-MCNC: 108 MG/DL (ref 70–110)
HCT VFR BLD AUTO: 22.8 % (ref 40–54)
HGB BLD-MCNC: 6.8 G/DL (ref 14–18)
IMM GRANULOCYTES # BLD AUTO: 0.02 K/UL (ref 0–0.04)
IMM GRANULOCYTES NFR BLD AUTO: 0.3 % (ref 0–0.5)
INR PPP: 1.8 (ref 0.8–1.2)
LYMPHOCYTES # BLD AUTO: 1.3 K/UL (ref 1–4.8)
LYMPHOCYTES NFR BLD: 18.4 % (ref 18–48)
MCH RBC QN AUTO: 27.3 PG (ref 27–31)
MCHC RBC AUTO-ENTMCNC: 29.8 G/DL (ref 32–36)
MCV RBC AUTO: 92 FL (ref 82–98)
MONOCYTES # BLD AUTO: 0.9 K/UL (ref 0.3–1)
MONOCYTES NFR BLD: 12.7 % (ref 4–15)
NEUTROPHILS # BLD AUTO: 4.6 K/UL (ref 1.8–7.7)
NEUTROPHILS NFR BLD: 66.6 % (ref 38–73)
NRBC BLD-RTO: 0 /100 WBC
NUM UNITS TRANS PACKED RBC: NORMAL
NUM UNITS TRANS PACKED RBC: NORMAL
OB PNL STL: POSITIVE
PLATELET # BLD AUTO: 122 K/UL (ref 150–450)
PMV BLD AUTO: 11.9 FL (ref 9.2–12.9)
POTASSIUM SERPL-SCNC: 3.7 MMOL/L (ref 3.5–5.1)
PROT SERPL-MCNC: 6.3 G/DL (ref 6–8.4)
PROTHROMBIN TIME: 19.3 SEC (ref 9–12.5)
RBC # BLD AUTO: 2.49 M/UL (ref 4.6–6.2)
SODIUM SERPL-SCNC: 139 MMOL/L (ref 136–145)
SPECIMEN OUTDATE: NORMAL
TROPONIN I SERPL DL<=0.01 NG/ML-MCNC: 0.01 NG/ML (ref 0–0.03)
WBC # BLD AUTO: 6.95 K/UL (ref 3.9–12.7)

## 2024-06-04 PROCEDURE — 85730 THROMBOPLASTIN TIME PARTIAL: CPT | Performed by: SURGERY

## 2024-06-04 PROCEDURE — 85025 COMPLETE CBC W/AUTO DIFF WBC: CPT | Performed by: EMERGENCY MEDICINE

## 2024-06-04 PROCEDURE — 80053 COMPREHEN METABOLIC PANEL: CPT | Performed by: EMERGENCY MEDICINE

## 2024-06-04 PROCEDURE — 93010 ELECTROCARDIOGRAM REPORT: CPT | Mod: ,,, | Performed by: INTERNAL MEDICINE

## 2024-06-04 PROCEDURE — 85610 PROTHROMBIN TIME: CPT | Performed by: SURGERY

## 2024-06-04 PROCEDURE — C9113 INJ PANTOPRAZOLE SODIUM, VIA: HCPCS | Performed by: SURGERY

## 2024-06-04 PROCEDURE — 93005 ELECTROCARDIOGRAM TRACING: CPT

## 2024-06-04 PROCEDURE — 99900035 HC TECH TIME PER 15 MIN (STAT)

## 2024-06-04 PROCEDURE — 96365 THER/PROPH/DIAG IV INF INIT: CPT

## 2024-06-04 PROCEDURE — 25000003 PHARM REV CODE 250: Performed by: EMERGENCY MEDICINE

## 2024-06-04 PROCEDURE — 96376 TX/PRO/DX INJ SAME DRUG ADON: CPT

## 2024-06-04 PROCEDURE — 84484 ASSAY OF TROPONIN QUANT: CPT | Performed by: SURGERY

## 2024-06-04 PROCEDURE — 63600175 PHARM REV CODE 636 W HCPCS: Performed by: SURGERY

## 2024-06-04 PROCEDURE — 25000003 PHARM REV CODE 250: Performed by: SURGERY

## 2024-06-04 PROCEDURE — 83880 ASSAY OF NATRIURETIC PEPTIDE: CPT | Performed by: SURGERY

## 2024-06-04 PROCEDURE — 36415 COLL VENOUS BLD VENIPUNCTURE: CPT | Performed by: EMERGENCY MEDICINE

## 2024-06-04 PROCEDURE — 99285 EMERGENCY DEPT VISIT HI MDM: CPT | Mod: 25

## 2024-06-04 PROCEDURE — 36415 COLL VENOUS BLD VENIPUNCTURE: CPT | Performed by: SURGERY

## 2024-06-04 PROCEDURE — 82550 ASSAY OF CK (CPK): CPT | Performed by: EMERGENCY MEDICINE

## 2024-06-04 PROCEDURE — 96375 TX/PRO/DX INJ NEW DRUG ADDON: CPT

## 2024-06-04 PROCEDURE — 86901 BLOOD TYPING SEROLOGIC RH(D): CPT | Performed by: EMERGENCY MEDICINE

## 2024-06-04 PROCEDURE — 36430 TRANSFUSION BLD/BLD COMPNT: CPT

## 2024-06-04 PROCEDURE — 86920 COMPATIBILITY TEST SPIN: CPT | Performed by: SURGERY

## 2024-06-04 PROCEDURE — P9016 RBC LEUKOCYTES REDUCED: HCPCS | Performed by: SURGERY

## 2024-06-04 PROCEDURE — 82272 OCCULT BLD FECES 1-3 TESTS: CPT | Performed by: EMERGENCY MEDICINE

## 2024-06-04 RX ORDER — PANTOPRAZOLE SODIUM 40 MG/10ML
80 INJECTION, POWDER, LYOPHILIZED, FOR SOLUTION INTRAVENOUS
Status: COMPLETED | OUTPATIENT
Start: 2024-06-04 | End: 2024-06-04

## 2024-06-04 RX ORDER — ACETAMINOPHEN 500 MG
1000 TABLET ORAL
Status: COMPLETED | OUTPATIENT
Start: 2024-06-04 | End: 2024-06-04

## 2024-06-04 RX ORDER — HYDROCODONE BITARTRATE AND ACETAMINOPHEN 500; 5 MG/1; MG/1
TABLET ORAL
Status: DISCONTINUED | OUTPATIENT
Start: 2024-06-04 | End: 2024-06-05 | Stop reason: HOSPADM

## 2024-06-04 RX ADMIN — PANTOPRAZOLE SODIUM 8 MG/HR: 40 INJECTION, POWDER, FOR SOLUTION INTRAVENOUS at 06:06

## 2024-06-04 RX ADMIN — PANTOPRAZOLE SODIUM 80 MG: 40 INJECTION, POWDER, FOR SOLUTION INTRAVENOUS at 06:06

## 2024-06-04 RX ADMIN — CEFTRIAXONE SODIUM 2 G: 2 INJECTION, POWDER, FOR SOLUTION INTRAMUSCULAR; INTRAVENOUS at 08:06

## 2024-06-04 RX ADMIN — SODIUM CHLORIDE: 9 INJECTION, SOLUTION INTRAVENOUS at 09:06

## 2024-06-04 RX ADMIN — ACETAMINOPHEN 1000 MG: 500 TABLET ORAL at 05:06

## 2024-06-04 RX ADMIN — PANTOPRAZOLE SODIUM 8 MG/HR: 40 INJECTION, POWDER, FOR SOLUTION INTRAVENOUS at 10:06

## 2024-06-04 NOTE — ED PROVIDER NOTES
Encounter Date: 6/4/2024       History     Chief Complaint   Patient presents with    Abnormal Labs      Patient to ER via [facility] from the [facility] for evaluation of low H & H which was drawn this morning   HGB-6.0  HCT-18.7     HPI    Patient is a 79y WM hx HVC, HTN, CVA on xarelto and baby aspirin sent in from [facility] with H/H 6/18.7.  He denies chest pain, shortness of breath, blood in vomit or stool.  States his right thigh started hurting since arriving in our facility about 5 minutes ago.  No other complaints.     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    Anticoagulant long-term use     CAD (coronary artery disease)     s/p stent 2005, on plavix    Cellulitis     Chronic hepatitis C     Cirrhosis     biopsy proven - 2007    CVA (cerebral vascular accident) 2021    Elevated LDL cholesterol level     History of colon polyps 06/2008    1 polyp - tubular adenoma    History of fall     HTN (hypertension)     Hyperlipidemia     Hypothyroidism     Multiple bruises     Peripheral vascular disease, unspecified     Skin tear of forearm without complication, initial encounter     skin tear to hand dressing in place    Wound of left foot 10/06/2022    currently covered in pre-admit assessment unable to visualize     Past Surgical History:   Procedure Laterality Date    COLONOSCOPY W/ POLYPECTOMY  06/2008    Dr Wagoner: fair prep, 3mm polyp - tubular adenoma    CORONARY ANGIOPLASTY WITH STENT PLACEMENT      DEBRIDEMENT OF AMPUTATION SITE Left 10/23/2023    Procedure: DEBRIDEMENT, AMPUTATION SITE;  Surgeon: Lindsey Alberts III, DPM;  Location: [facility] OR;  Service: Orthopedics;  Laterality: Left;    FUSION OF CERVICAL SPINE BY POSTERIOR APPROACH N/A 12/17/2019    Procedure: FUSION, SPINE, CERVICAL, POSTERIOR APPROACH C2-T3 posterior  instrumented fusion;  Surgeon: Elian Deluca MD;  Location: [facility] OR 2ND FLR;  Service: Neurosurgery;  Laterality: N/A;    hydrocele repair  teenager    INSERTION, PICC, AGE 5 YEARS OR OLDER N/A 10/25/2023    Procedure: INSERTION, PICC, AGE 5 YEARS OR OLDER;  Surgeon: Provider, Dosc Diagnostic;  Location: [facility] OR;  Service: General;  Laterality: N/A;    INSERTION, PICC, AGE 5 YEARS OR OLDER N/A 11/2/2023    Procedure: INSERTION, PICC, AGE 5 YEARS OR OLDER;  Surgeon: Provider, Dosc Diagnostic;  Location: [facility] OR;  Service: General;  Laterality: N/A;  OPP #24    PERCUTANEOUS TRANSLUMINAL ANGIOPLASTY (PTA) OF PERIPHERAL VESSEL Left 10/14/2022    Procedure: PTA, PERIPHERAL VESSEL;  Surgeon: Jem Soriano MD;  Location: [facility] CATH;  Service: Cardiology;  Laterality: Left;  peripheral angiogram    PERIPHERALLY INSERTED CENTRAL CATHETER INSERTION Left 10/23/2023    Procedure: INSERTION, PICC;  Surgeon: Lindsey Alberts III, DPM;  Location: [facility] OR;  Service: Orthopedics;  Laterality: Left;    pyloric stenosis repair  age 1    SURGICAL REMOVAL OF VERTEBRAL BODY OF THORACIC SPINE N/A 12/10/2019    Procedure: CORPECTOMY, SPINE, CERVICAL AND THORACIC, C7 and T1 with Globus;  Surgeon: Elian Deluca MD;  Location: [facility] OR 2ND FLR;  Service: Neurosurgery;  Laterality: N/A;    TOE AMPUTATION Left 8/15/2022    Procedure: AMPUTATION, TOE First, second, and third toes;  Surgeon: Lindsey Alberts III, DPM;  Location: [facility] OR;  Service: Podiatry;  Laterality: Left;  Req. 11am per MD    TOE AMPUTATION Left 9/22/2023    Procedure: AMPUTATION, TOE, 4TH AND 5TH DIGITS, LEFT;  Surgeon: Lindsey Alberts III, DPM;  Location: [facility] OR;  Service: Podiatry;  Laterality: Left;  7am per [place]    TONSILLECTOMY       Family History   Problem Relation Name Age of Onset    No Known Problems Mother      No Known Problems Father      No Known Problems Son      No Known Problems Son      No Known Problems Son       Social History     Tobacco Use     Smoking status: Former     Current packs/day: 0.00     Types: Cigarettes     Quit date: 10/14/2022     Years since quittin.6    Smokeless tobacco: Never   Substance Use Topics    Alcohol use: Not Currently     Comment: maybe a couple times a year    Drug use: Yes     Types: IV, Marijuana     Comment: MORPHINE (denies); A few times a month     Review of Systems   Constitutional:  Negative for chills and fever.   Respiratory:  Negative for cough.    Cardiovascular:  Negative for chest pain.   Gastrointestinal:  Negative for abdominal pain.   Musculoskeletal:  Negative for back pain.   All other systems reviewed and are negative.    Social Determinants of Health     Tobacco Use: Medium Risk (2024)    Patient History     Smoking Tobacco Use: Former     Smokeless Tobacco Use: Never     Passive Exposure: Not on file   Alcohol Use: Not on file   Financial Resource Strain: Low Risk  (10/19/2023)    Overall Financial Resource Strain (CARDIA)     Difficulty of Paying Living Expenses: Not hard at all   Food Insecurity: No Food Insecurity (10/19/2023)    Hunger Vital Sign     Worried About Running Out of Food in the Last Year: Never true     Ran Out of Food in the Last Year: Never true   Transportation Needs: No Transportation Needs (10/19/2023)    PRAPARE - Transportation     Lack of Transportation (Medical): No     Lack of Transportation (Non-Medical): No   Physical Activity: Not on file   Stress: Not on file   Housing Stability: Unknown (10/19/2023)    Housing Stability Vital Sign     Unable to Pay for Housing in the Last Year: No     Number of Places Lived in the Last Year: Not on file     Unstable Housing in the Last Year: No   Depression: Low Risk  (10/4/2023)    Depression     Last PHQ-4: Flowsheet Data: 0   Utilities: Not on file   Health Literacy: Not on file   Social Isolation: Not on file       Physical Exam     Initial Vitals [24 1640]   BP Pulse Resp Temp SpO2   (!) 142/68 78 18 97 °F (36.1 °C) 100  %      MAP       --         Physical Exam    Nursing note and vitals reviewed.  Constitutional: He appears well-developed and well-nourished.   HENT:   Head: Normocephalic and atraumatic.   Mouth/Throat: Oropharynx is clear and moist.   Eyes: EOM are normal. Pupils are equal, round, and reactive to light.   Neck:   Normal range of motion.  Cardiovascular:  Normal rate and intact distal pulses.           Pulmonary/Chest: Breath sounds normal.   Abdominal: Abdomen is soft.   Genitourinary:    Genitourinary Comments: External hemorrhoids  Brown soft stool on rectal exam     Musculoskeletal:         General: Normal range of motion.      Cervical back: Normal range of motion.     Neurological: He is alert and oriented to person, place, and time. GCS score is 15. GCS eye subscore is 4. GCS verbal subscore is 5. GCS motor subscore is 6.   Skin: Skin is warm. Capillary refill takes less than 2 seconds.         ED Course   Procedures  Labs Reviewed   CBC W/ AUTO DIFFERENTIAL - Abnormal; Notable for the following components:       Result Value    RBC 2.49 (*)     Hemoglobin 6.8 (*)     Hematocrit 22.8 (*)     MCHC 29.8 (*)     RDW 15.5 (*)     Platelets 122 (*)     All other components within normal limits   COMPREHENSIVE METABOLIC PANEL   OCCULT BLOOD X 1, STOOL   CK   TYPE & SCREEN     EKG Readings: (Independently Interpreted)   Initial Reading: No STEMI. Rhythm: Normal Sinus Rhythm. Heart Rate: 63. Conduction: Normal. ST Segments: Non-Specific ST Segment Depression. Axis: Normal.       Imaging Results    None          Medications   acetaminophen tablet 1,000 mg (1,000 mg Oral Given 6/4/24 1703)     Medical Decision Making    This is an emergent evaluation of a 79y WM hx HTN, HCV, CVA anticoagulated on xarelto and aspirin BIB EMS with anemia.  Exam he is non toxic, afebrile with no abdominal tenderness and brown stool on rectal exam.   Labs remarkable pending.   DDX; GI bleed, symptomatic anemia, metabolic derangement,  liver failure    Amount and/or Complexity of Data Reviewed  Labs: ordered.    Risk  OTC drugs.                                      Clinical Impression:  Final diagnoses:  [D64.9] Anemia                 Lanette Russ MD  06/04/24 [id number]

## 2024-06-05 ENCOUNTER — HOSPITAL ENCOUNTER (INPATIENT)
Facility: HOSPITAL | Age: 79
LOS: 4 days | Discharge: NURSING, REHAB OR LONG-TERM CARE | DRG: 378 | End: 2024-06-09
Attending: STUDENT IN AN ORGANIZED HEALTH CARE EDUCATION/TRAINING PROGRAM | Admitting: HOSPITALIST
Payer: MEDICARE

## 2024-06-05 ENCOUNTER — ANESTHESIA (OUTPATIENT)
Dept: ENDOSCOPY | Facility: HOSPITAL | Age: 79
DRG: 378 | End: 2024-06-05
Payer: MEDICARE

## 2024-06-05 ENCOUNTER — ANESTHESIA EVENT (OUTPATIENT)
Dept: ENDOSCOPY | Facility: HOSPITAL | Age: 79
DRG: 378 | End: 2024-06-05
Payer: MEDICARE

## 2024-06-05 VITALS
WEIGHT: 158 LBS | BODY MASS INDEX: 22.62 KG/M2 | RESPIRATION RATE: 18 BRPM | DIASTOLIC BLOOD PRESSURE: 63 MMHG | HEART RATE: 65 BPM | SYSTOLIC BLOOD PRESSURE: 136 MMHG | OXYGEN SATURATION: 100 % | HEIGHT: 70 IN | TEMPERATURE: 99 F

## 2024-06-05 DIAGNOSIS — D64.9 SYMPTOMATIC ANEMIA: Primary | ICD-10-CM

## 2024-06-05 DIAGNOSIS — K92.2 UPPER GI BLEED: ICD-10-CM

## 2024-06-05 PROBLEM — N40.0 BPH (BENIGN PROSTATIC HYPERPLASIA): Status: ACTIVE | Noted: 2024-06-05

## 2024-06-05 PROBLEM — M79.651 RIGHT THIGH PAIN: Status: ACTIVE | Noted: 2024-06-05

## 2024-06-05 PROBLEM — K74.60 CIRRHOSIS: Status: ACTIVE | Noted: 2024-06-05

## 2024-06-05 LAB
ABO + RH BLD: NORMAL
ALBUMIN SERPL BCP-MCNC: 3 G/DL (ref 3.5–5.2)
ALP SERPL-CCNC: 90 U/L (ref 55–135)
ALT SERPL W/O P-5'-P-CCNC: 19 U/L (ref 10–44)
ANION GAP SERPL CALC-SCNC: 10 MMOL/L (ref 8–16)
ANISOCYTOSIS BLD QL SMEAR: SLIGHT
AST SERPL-CCNC: 29 U/L (ref 10–40)
BASOPHILS # BLD AUTO: 0.04 K/UL (ref 0–0.2)
BASOPHILS # BLD AUTO: 0.05 K/UL (ref 0–0.2)
BASOPHILS # BLD AUTO: 0.05 K/UL (ref 0–0.2)
BASOPHILS NFR BLD: 0.6 % (ref 0–1.9)
BASOPHILS NFR BLD: 0.7 % (ref 0–1.9)
BASOPHILS NFR BLD: 0.9 % (ref 0–1.9)
BILIRUB SERPL-MCNC: 0.7 MG/DL (ref 0.1–1)
BLD GP AB SCN CELLS X3 SERPL QL: NORMAL
BUN SERPL-MCNC: 32 MG/DL (ref 8–23)
BURR CELLS BLD QL SMEAR: ABNORMAL
CALCIUM SERPL-MCNC: 8.7 MG/DL (ref 8.7–10.5)
CHLORIDE SERPL-SCNC: 113 MMOL/L (ref 95–110)
CO2 SERPL-SCNC: 17 MMOL/L (ref 23–29)
CREAT SERPL-MCNC: 1.4 MG/DL (ref 0.5–1.4)
DIFFERENTIAL METHOD BLD: ABNORMAL
EOSINOPHIL # BLD AUTO: 0.1 K/UL (ref 0–0.5)
EOSINOPHIL NFR BLD: 1.7 % (ref 0–8)
EOSINOPHIL NFR BLD: 1.8 % (ref 0–8)
EOSINOPHIL NFR BLD: 2.2 % (ref 0–8)
ERYTHROCYTE [DISTWIDTH] IN BLOOD BY AUTOMATED COUNT: 18.3 % (ref 11.5–14.5)
ERYTHROCYTE [DISTWIDTH] IN BLOOD BY AUTOMATED COUNT: 18.5 % (ref 11.5–14.5)
ERYTHROCYTE [DISTWIDTH] IN BLOOD BY AUTOMATED COUNT: 19.1 % (ref 11.5–14.5)
EST. GFR  (NO RACE VARIABLE): 51 ML/MIN/1.73 M^2
GLUCOSE SERPL-MCNC: 104 MG/DL (ref 70–110)
HCT VFR BLD AUTO: 26.7 % (ref 40–54)
HCT VFR BLD AUTO: 28.4 % (ref 40–54)
HCT VFR BLD AUTO: 32.7 % (ref 40–54)
HGB BLD-MCNC: 10.2 G/DL (ref 14–18)
HGB BLD-MCNC: 8.2 G/DL (ref 14–18)
HGB BLD-MCNC: 9.1 G/DL (ref 14–18)
HYPOCHROMIA BLD QL SMEAR: ABNORMAL
IMM GRANULOCYTES # BLD AUTO: 0.02 K/UL (ref 0–0.04)
IMM GRANULOCYTES # BLD AUTO: 0.02 K/UL (ref 0–0.04)
IMM GRANULOCYTES # BLD AUTO: 0.04 K/UL (ref 0–0.04)
IMM GRANULOCYTES NFR BLD AUTO: 0.3 % (ref 0–0.5)
IMM GRANULOCYTES NFR BLD AUTO: 0.3 % (ref 0–0.5)
IMM GRANULOCYTES NFR BLD AUTO: 0.6 % (ref 0–0.5)
LYMPHOCYTES # BLD AUTO: 0.7 K/UL (ref 1–4.8)
LYMPHOCYTES # BLD AUTO: 0.7 K/UL (ref 1–4.8)
LYMPHOCYTES # BLD AUTO: 1.2 K/UL (ref 1–4.8)
LYMPHOCYTES NFR BLD: 10.8 % (ref 18–48)
LYMPHOCYTES NFR BLD: 11.7 % (ref 18–48)
LYMPHOCYTES NFR BLD: 16.4 % (ref 18–48)
MCH RBC QN AUTO: 26.6 PG (ref 27–31)
MCH RBC QN AUTO: 26.7 PG (ref 27–31)
MCH RBC QN AUTO: 26.8 PG (ref 27–31)
MCHC RBC AUTO-ENTMCNC: 30.7 G/DL (ref 32–36)
MCHC RBC AUTO-ENTMCNC: 31.2 G/DL (ref 32–36)
MCHC RBC AUTO-ENTMCNC: 32 G/DL (ref 32–36)
MCV RBC AUTO: 83 FL (ref 82–98)
MCV RBC AUTO: 85 FL (ref 82–98)
MCV RBC AUTO: 87 FL (ref 82–98)
MONOCYTES # BLD AUTO: 0.5 K/UL (ref 0.3–1)
MONOCYTES # BLD AUTO: 0.7 K/UL (ref 0.3–1)
MONOCYTES # BLD AUTO: 1 K/UL (ref 0.3–1)
MONOCYTES NFR BLD: 11.2 % (ref 4–15)
MONOCYTES NFR BLD: 13.9 % (ref 4–15)
MONOCYTES NFR BLD: 8.5 % (ref 4–15)
NEUTROPHILS # BLD AUTO: 4.4 K/UL (ref 1.8–7.7)
NEUTROPHILS # BLD AUTO: 4.8 K/UL (ref 1.8–7.7)
NEUTROPHILS # BLD AUTO: 4.8 K/UL (ref 1.8–7.7)
NEUTROPHILS NFR BLD: 66.6 % (ref 38–73)
NEUTROPHILS NFR BLD: 74.2 % (ref 38–73)
NEUTROPHILS NFR BLD: 77.6 % (ref 38–73)
NRBC BLD-RTO: 0 /100 WBC
OVALOCYTES BLD QL SMEAR: ABNORMAL
PLATELET # BLD AUTO: 108 K/UL (ref 150–450)
PLATELET # BLD AUTO: 118 K/UL (ref 150–450)
PLATELET # BLD AUTO: 77 K/UL (ref 150–450)
PLATELET BLD QL SMEAR: ABNORMAL
PLATELET BLD QL SMEAR: ABNORMAL
PMV BLD AUTO: 10.7 FL (ref 9.2–12.9)
PMV BLD AUTO: 10.9 FL (ref 9.2–12.9)
PMV BLD AUTO: 11.7 FL (ref 9.2–12.9)
POIKILOCYTOSIS BLD QL SMEAR: SLIGHT
POIKILOCYTOSIS BLD QL SMEAR: SLIGHT
POLYCHROMASIA BLD QL SMEAR: ABNORMAL
POLYCHROMASIA BLD QL SMEAR: ABNORMAL
POTASSIUM SERPL-SCNC: 4.3 MMOL/L (ref 3.5–5.1)
PROT SERPL-MCNC: 6.1 G/DL (ref 6–8.4)
RBC # BLD AUTO: 3.06 M/UL (ref 4.6–6.2)
RBC # BLD AUTO: 3.41 M/UL (ref 4.6–6.2)
RBC # BLD AUTO: 3.84 M/UL (ref 4.6–6.2)
SODIUM SERPL-SCNC: 140 MMOL/L (ref 136–145)
SPECIMEN OUTDATE: NORMAL
WBC # BLD AUTO: 5.88 K/UL (ref 3.9–12.7)
WBC # BLD AUTO: 6.23 K/UL (ref 3.9–12.7)
WBC # BLD AUTO: 7.18 K/UL (ref 3.9–12.7)

## 2024-06-05 PROCEDURE — 25000003 PHARM REV CODE 250: Performed by: NURSE ANESTHETIST, CERTIFIED REGISTERED

## 2024-06-05 PROCEDURE — 80053 COMPREHEN METABOLIC PANEL: CPT | Performed by: HOSPITALIST

## 2024-06-05 PROCEDURE — 43235 EGD DIAGNOSTIC BRUSH WASH: CPT | Mod: ,,, | Performed by: INTERNAL MEDICINE

## 2024-06-05 PROCEDURE — 25000003 PHARM REV CODE 250: Performed by: SURGERY

## 2024-06-05 PROCEDURE — 43235 EGD DIAGNOSTIC BRUSH WASH: CPT | Performed by: INTERNAL MEDICINE

## 2024-06-05 PROCEDURE — 11000001 HC ACUTE MED/SURG PRIVATE ROOM

## 2024-06-05 PROCEDURE — 25000003 PHARM REV CODE 250: Performed by: HOSPITALIST

## 2024-06-05 PROCEDURE — 63600175 PHARM REV CODE 636 W HCPCS: Performed by: STUDENT IN AN ORGANIZED HEALTH CARE EDUCATION/TRAINING PROGRAM

## 2024-06-05 PROCEDURE — 0DJ08ZZ INSPECTION OF UPPER INTESTINAL TRACT, VIA NATURAL OR ARTIFICIAL OPENING ENDOSCOPIC: ICD-10-PCS | Performed by: INTERNAL MEDICINE

## 2024-06-05 PROCEDURE — 37000008 HC ANESTHESIA 1ST 15 MINUTES: Performed by: INTERNAL MEDICINE

## 2024-06-05 PROCEDURE — 25000003 PHARM REV CODE 250: Performed by: INTERNAL MEDICINE

## 2024-06-05 PROCEDURE — 25000003 PHARM REV CODE 250: Performed by: STUDENT IN AN ORGANIZED HEALTH CARE EDUCATION/TRAINING PROGRAM

## 2024-06-05 PROCEDURE — 36415 COLL VENOUS BLD VENIPUNCTURE: CPT | Mod: XB | Performed by: STUDENT IN AN ORGANIZED HEALTH CARE EDUCATION/TRAINING PROGRAM

## 2024-06-05 PROCEDURE — 63600175 PHARM REV CODE 636 W HCPCS: Mod: JA | Performed by: SURGERY

## 2024-06-05 PROCEDURE — 86901 BLOOD TYPING SEROLOGIC RH(D): CPT | Performed by: STUDENT IN AN ORGANIZED HEALTH CARE EDUCATION/TRAINING PROGRAM

## 2024-06-05 PROCEDURE — 99223 1ST HOSP IP/OBS HIGH 75: CPT | Mod: 25,,, | Performed by: INTERNAL MEDICINE

## 2024-06-05 PROCEDURE — D9220A PRA ANESTHESIA: Mod: ANES,,, | Performed by: STUDENT IN AN ORGANIZED HEALTH CARE EDUCATION/TRAINING PROGRAM

## 2024-06-05 PROCEDURE — 85025 COMPLETE CBC W/AUTO DIFF WBC: CPT | Mod: 91 | Performed by: STUDENT IN AN ORGANIZED HEALTH CARE EDUCATION/TRAINING PROGRAM

## 2024-06-05 PROCEDURE — C9113 INJ PANTOPRAZOLE SODIUM, VIA: HCPCS | Performed by: STUDENT IN AN ORGANIZED HEALTH CARE EDUCATION/TRAINING PROGRAM

## 2024-06-05 PROCEDURE — 36415 COLL VENOUS BLD VENIPUNCTURE: CPT | Performed by: HOSPITALIST

## 2024-06-05 PROCEDURE — D9220A PRA ANESTHESIA: Mod: CRNA,,, | Performed by: NURSE ANESTHETIST, CERTIFIED REGISTERED

## 2024-06-05 PROCEDURE — 37000009 HC ANESTHESIA EA ADD 15 MINS: Performed by: INTERNAL MEDICINE

## 2024-06-05 RX ORDER — PROPOFOL 10 MG/ML
VIAL (ML) INTRAVENOUS
Status: DISCONTINUED | OUTPATIENT
Start: 2024-06-05 | End: 2024-06-05

## 2024-06-05 RX ORDER — LACTULOSE 10 G/15ML
20 SOLUTION ORAL EVERY 6 HOURS PRN
Status: DISCONTINUED | OUTPATIENT
Start: 2024-06-05 | End: 2024-06-09 | Stop reason: HOSPADM

## 2024-06-05 RX ORDER — ACETAMINOPHEN 325 MG/1
325 TABLET ORAL EVERY 6 HOURS PRN
Status: DISCONTINUED | OUTPATIENT
Start: 2024-06-05 | End: 2024-06-09 | Stop reason: HOSPADM

## 2024-06-05 RX ORDER — ATORVASTATIN CALCIUM 20 MG/1
20 TABLET, FILM COATED ORAL DAILY
Status: DISCONTINUED | OUTPATIENT
Start: 2024-06-05 | End: 2024-06-09 | Stop reason: HOSPADM

## 2024-06-05 RX ORDER — MUPIROCIN 20 MG/G
OINTMENT TOPICAL 2 TIMES DAILY
Status: DISCONTINUED | OUTPATIENT
Start: 2024-06-05 | End: 2024-06-09 | Stop reason: HOSPADM

## 2024-06-05 RX ORDER — LEVOTHYROXINE SODIUM 50 UG/1
50 TABLET ORAL
Status: DISCONTINUED | OUTPATIENT
Start: 2024-06-05 | End: 2024-06-09 | Stop reason: HOSPADM

## 2024-06-05 RX ORDER — METOPROLOL SUCCINATE 50 MG/1
100 TABLET, EXTENDED RELEASE ORAL DAILY
Status: DISCONTINUED | OUTPATIENT
Start: 2024-06-05 | End: 2024-06-09 | Stop reason: HOSPADM

## 2024-06-05 RX ORDER — LIDOCAINE HYDROCHLORIDE 20 MG/ML
INJECTION, SOLUTION EPIDURAL; INFILTRATION; INTRACAUDAL; PERINEURAL
Status: DISCONTINUED | OUTPATIENT
Start: 2024-06-05 | End: 2024-06-05

## 2024-06-05 RX ORDER — POLYETHYLENE GLYCOL 3350, SODIUM SULFATE ANHYDROUS, SODIUM BICARBONATE, SODIUM CHLORIDE, POTASSIUM CHLORIDE 236; 22.74; 6.74; 5.86; 2.97 G/4L; G/4L; G/4L; G/4L; G/4L
4000 POWDER, FOR SOLUTION ORAL ONCE
Status: COMPLETED | OUTPATIENT
Start: 2024-06-05 | End: 2024-06-05

## 2024-06-05 RX ORDER — PANTOPRAZOLE SODIUM 40 MG/10ML
40 INJECTION, POWDER, LYOPHILIZED, FOR SOLUTION INTRAVENOUS 2 TIMES DAILY
Status: DISCONTINUED | OUTPATIENT
Start: 2024-06-05 | End: 2024-06-06

## 2024-06-05 RX ORDER — MEMANTINE HYDROCHLORIDE 5 MG/1
5 TABLET ORAL 2 TIMES DAILY
Status: DISCONTINUED | OUTPATIENT
Start: 2024-06-05 | End: 2024-06-09 | Stop reason: HOSPADM

## 2024-06-05 RX ORDER — HYDROCODONE BITARTRATE AND ACETAMINOPHEN 7.5; 325 MG/1; MG/1
1 TABLET ORAL EVERY 6 HOURS PRN
Status: DISCONTINUED | OUTPATIENT
Start: 2024-06-05 | End: 2024-06-09 | Stop reason: HOSPADM

## 2024-06-05 RX ORDER — TAMSULOSIN HYDROCHLORIDE 0.4 MG/1
0.4 CAPSULE ORAL DAILY
Status: DISCONTINUED | OUTPATIENT
Start: 2024-06-05 | End: 2024-06-09 | Stop reason: HOSPADM

## 2024-06-05 RX ORDER — MEGESTROL ACETATE 40 MG/1
40 TABLET ORAL 2 TIMES DAILY
Status: DISCONTINUED | OUTPATIENT
Start: 2024-06-05 | End: 2024-06-09 | Stop reason: HOSPADM

## 2024-06-05 RX ADMIN — MEGESTROL ACETATE 40 MG: 40 TABLET ORAL at 08:06

## 2024-06-05 RX ADMIN — OCTREOTIDE ACETATE 50 MCG/HR: 500 INJECTION, SOLUTION INTRAVENOUS; SUBCUTANEOUS at 12:06

## 2024-06-05 RX ADMIN — CEFTRIAXONE SODIUM 1 G: 1 INJECTION, POWDER, FOR SOLUTION INTRAMUSCULAR; INTRAVENOUS at 08:06

## 2024-06-05 RX ADMIN — MEMANTINE HYDROCHLORIDE 5 MG: 5 TABLET ORAL at 08:06

## 2024-06-05 RX ADMIN — MUPIROCIN: 20 OINTMENT TOPICAL at 08:06

## 2024-06-05 RX ADMIN — LIDOCAINE HYDROCHLORIDE 40 MG: 20 INJECTION, SOLUTION EPIDURAL; INFILTRATION; INTRACAUDAL; PERINEURAL at 03:06

## 2024-06-05 RX ADMIN — TAMSULOSIN HYDROCHLORIDE 0.4 MG: 0.4 CAPSULE ORAL at 08:06

## 2024-06-05 RX ADMIN — PANTOPRAZOLE SODIUM 40 MG: 40 INJECTION, POWDER, FOR SOLUTION INTRAVENOUS at 05:06

## 2024-06-05 RX ADMIN — Medication 20 MG: at 03:06

## 2024-06-05 RX ADMIN — ATORVASTATIN CALCIUM 20 MG: 20 TABLET, FILM COATED ORAL at 08:06

## 2024-06-05 RX ADMIN — Medication 40 MG: at 03:06

## 2024-06-05 RX ADMIN — SODIUM CHLORIDE: 0.9 INJECTION, SOLUTION INTRAVENOUS at 03:06

## 2024-06-05 RX ADMIN — LEVOTHYROXINE SODIUM 50 MCG: 50 TABLET ORAL at 05:06

## 2024-06-05 RX ADMIN — METOPROLOL SUCCINATE 100 MG: 50 TABLET, EXTENDED RELEASE ORAL at 08:06

## 2024-06-05 RX ADMIN — COLLAGENASE SANTYL: 250 OINTMENT TOPICAL at 04:06

## 2024-06-05 RX ADMIN — POLYETHYLENE GLYCOL 3350, SODIUM SULFATE ANHYDROUS, SODIUM BICARBONATE, SODIUM CHLORIDE, POTASSIUM CHLORIDE 4000 ML: 236; 22.74; 6.74; 5.86; 2.97 POWDER, FOR SOLUTION ORAL at 06:06

## 2024-06-05 RX ADMIN — PANTOPRAZOLE SODIUM 40 MG: 40 INJECTION, POWDER, FOR SOLUTION INTRAVENOUS at 08:06

## 2024-06-05 NOTE — CONSULTS
[facility] - Telemetry  Gastroenterology  Consult Note    Patient Name: Junaid Muñoz  MRN: [id number]  Admission Date: 6/5/2024  Hospital Length of Stay: 0 days  Code Status: Full Code   Attending Provider: Alex Eddy MD   Consulting Provider: Mark Hunt MD  Primary Care Physician: Frederick Rocha MD  Principal Problem:Symptomatic anemia    Inpatient consult to Gastroenterology  Consult performed by: Mark Hunt MD  Consult ordered by: Evangelista Sargent MD        Subjective:     HPI:  This is 80 y/o male hx cad, pad, anticoag with xarelto , cirrhosis , osteo, here for anemia and abnormal low hgb on labs.  Gi consulted for anemia  Pt denies any recent blood in stool. No vomiting, no melena, no abd pain, no radiating symptoms. + lethargy and weakness.   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    Anticoagulant long-term use     CAD (coronary artery disease)     s/p stent 2005, on plavix    Cellulitis     Chronic hepatitis C     Cirrhosis     biopsy proven - 2007    CVA (cerebral vascular accident) 2021    Elevated LDL cholesterol level     History of colon polyps 06/2008    1 polyp - tubular adenoma    History of fall     HTN (hypertension)     Hyperlipidemia     Hypothyroidism     Multiple bruises     Peripheral vascular disease, unspecified     Skin tear of forearm without complication, initial encounter     skin tear to hand dressing in place    Wound of left foot 10/06/2022    currently covered in pre-admit assessment unable to visualize       Past Surgical History:   Procedure Laterality Date    COLONOSCOPY W/ POLYPECTOMY  06/2008    Dr Wagoner: fair prep, 3mm polyp - tubular adenoma    CORONARY ANGIOPLASTY WITH STENT PLACEMENT      DEBRIDEMENT OF AMPUTATION SITE  Left 10/23/2023    Procedure: DEBRIDEMENT, AMPUTATION SITE;  Surgeon: Lindsey Alberts III, DPM;  Location: [facility] OR;  Service: Orthopedics;  Laterality: Left;    FUSION OF CERVICAL SPINE BY POSTERIOR APPROACH N/A 12/17/2019    Procedure: FUSION, SPINE, CERVICAL, POSTERIOR APPROACH C2-T3 posterior instrumented fusion;  Surgeon: Elian Deluca MD;  Location: [facility] OR 2ND FLR;  Service: Neurosurgery;  Laterality: N/A;    hydrocele repair  teenager    INSERTION, PICC, AGE 5 YEARS OR OLDER N/A 10/25/2023    Procedure: INSERTION, PICC, AGE 5 YEARS OR OLDER;  Surgeon: Provider, Dosc Diagnostic;  Location: [facility] OR;  Service: General;  Laterality: N/A;    INSERTION, PICC, AGE 5 YEARS OR OLDER N/A 11/2/2023    Procedure: INSERTION, PICC, AGE 5 YEARS OR OLDER;  Surgeon: Provider, Dosc Diagnostic;  Location: [facility] OR;  Service: General;  Laterality: N/A;  OPP #24    PERCUTANEOUS TRANSLUMINAL ANGIOPLASTY (PTA) OF PERIPHERAL VESSEL Left 10/14/2022    Procedure: PTA, PERIPHERAL VESSEL;  Surgeon: Jem Soriano MD;  Location: [facility] CATH;  Service: Cardiology;  Laterality: Left;  peripheral angiogram    PERIPHERALLY INSERTED CENTRAL CATHETER INSERTION Left 10/23/2023    Procedure: INSERTION, PICC;  Surgeon: Lindsey Alberts III, DPM;  Location: [facility] OR;  Service: Orthopedics;  Laterality: Left;    pyloric stenosis repair  age 1    SURGICAL REMOVAL OF VERTEBRAL BODY OF THORACIC SPINE N/A 12/10/2019    Procedure: CORPECTOMY, SPINE, CERVICAL AND THORACIC, C7 and T1 with Globus;  Surgeon: Elian Deluca MD;  Location: [facility] OR 2ND FLR;  Service: Neurosurgery;  Laterality: N/A;    TOE AMPUTATION Left 8/15/2022    Procedure: AMPUTATION, TOE First, second, and third toes;  Surgeon: Lindsey Alberts III, DPM;  Location: [facility] OR;  Service: Podiatry;  Laterality: Left;  Req. 11am per MD    TOE AMPUTATION Left 9/22/2023    Procedure: AMPUTATION, TOE, 4TH AND 5TH DIGITS, LEFT;  Surgeon: Lindsey Alberts III, DPM;  Location: [facility] OR;   Service: Podiatry;  Laterality: Left;  7am per [place]    TONSILLECTOMY         Review of patient's allergies indicates:   Allergen Reactions    Erythromycin Other (See Comments)    Penicillins Rash     Tolerated cefepime 2019     Family History       Problem Relation (Age of Onset)    No Known Problems Mother, Father, Son, Son, Son          Tobacco Use    Smoking status: Former     Current packs/day: 0.00     Types: Cigarettes     Quit date: 10/14/2022     Years since quittin.6    Smokeless tobacco: Never   Substance and Sexual Activity    Alcohol use: Not Currently     Comment: maybe a couple times a year    Drug use: Yes     Types: IV, Marijuana     Comment: MORPHINE (denies); A few times a month    Sexual activity: Not on file     Review of Systems   Constitutional:  Negative for chills and fever.   HENT:  Negative for facial swelling, mouth sores and trouble swallowing.    Eyes:  Negative for pain and redness.   Respiratory:  Negative for cough and shortness of breath.    Cardiovascular:  Negative for chest pain and leg swelling.   Gastrointestinal:  Negative for blood in stool and vomiting.   Genitourinary:  Negative for dysuria and hematuria.   Musculoskeletal:  Negative for gait problem and neck stiffness.   Skin:  Positive for pallor. Negative for rash and wound.   Neurological:  Positive for weakness. Negative for seizures and headaches.   Psychiatric/Behavioral:  Negative for confusion and hallucinations.      Objective:     Vital Signs (Most Recent):  Temp: 98.5 °F (36.9 °C) (24 1208)  Pulse: 61 (24 1208)  Resp: 18 (24 1208)  BP: (!) 148/62 (24 1208)  SpO2: 98 % (24 1208) Vital Signs (24h Range):  Temp:  [97 °F (36.1 °C)-98.8 °F (37.1 °C)] 98.5 °F (36.9 °C)  Pulse:  [60-78] 61  Resp:  [15-20] 18  SpO2:  [94 %-100 %] 98 %  BP: (103-166)/(53-72) 148/62     Weight: 75.5 kg (166 lb 7.2 oz) (24 0148)  Body mass index is 23.88 kg/m².      Intake/Output Summary  (Last 24 hours) at 6/5/2024 1505  Last data filed at 6/5/2024 1440  Gross per 24 hour   Intake 0 ml   Output --   Net 0 ml       Lines/Drains/Airways       Drain  Duration             Male External Urinary Catheter 06/05/24 0600 <1 day              Peripheral Intravenous Line  Duration                  Peripheral IV - Single Lumen 06/04/24 20 G Left Wrist 1 day         Peripheral IV - Single Lumen 06/04/24 20 G Right Wrist 1 day                     Physical Exam  Vitals reviewed.   Constitutional:       General: He is not in acute distress.     Appearance: He is well-developed.   HENT:      Head: Normocephalic and atraumatic.   Eyes:      General: No scleral icterus.     Conjunctiva/sclera: Conjunctivae normal.   Neck:      Thyroid: No thyromegaly.   Cardiovascular:      Rate and Rhythm: Normal rate and regular rhythm.   Pulmonary:      Effort: Pulmonary effort is normal. No respiratory distress.      Breath sounds: Normal breath sounds.   Abdominal:      Palpations: Abdomen is soft. There is no mass.      Tenderness: There is no abdominal tenderness.   Musculoskeletal:         General: No tenderness. Normal range of motion.      Cervical back: Neck supple.   Lymphadenopathy:      Cervical: No cervical adenopathy.   Skin:     General: Skin is warm and dry.      Coloration: Skin is pale.      Findings: No rash.   Neurological:      Mental Status: He is alert and oriented to person, place, and time.      Gait: Gait normal.   Psychiatric:         Mood and Affect: Mood normal.         Behavior: Behavior normal.          Significant Labs:  CBC:   Recent Labs   Lab 06/04/24  1733 06/05/24  0954 06/05/24  1338   WBC 6.95 5.88 6.23   HGB 6.8* 8.2* 10.2*   HCT 22.8* 26.7* 32.7*   * 77* 118*       Significant Imaging:  Imaging results within the past 24 hours have been reviewed.  Assessment/Plan:     Oncology  * Symptomatic anemia  Without reported overt bleeding.  Hx of xarelto use and cirrhosis.    Recs  Will plan  EGD for further eval today  Protonix IV BID  Monitor stool output and hgb  npo        Thank you for your consult. I will follow-up with patient. Please contact us if you have any additional questions.    Mark Hunt MD  Gastroenterology  [facility]

## 2024-06-05 NOTE — ASSESSMENT & PLAN NOTE
Without reported overt bleeding.  Hx of xarelto use and cirrhosis.    Recs  Will plan EGD for further eval today  Protonix IV BID  Monitor stool output and hgb  npo

## 2024-06-05 NOTE — TRANSFER OF CARE
"Anesthesia Transfer of Care Note    Patient: Junaid Muñoz    Procedure(s) Performed: Procedure(s) (LRB):  EGD (ESOPHAGOGASTRODUODENOSCOPY) (N/A)    Patient location: GI    Anesthesia Type: general    Transport from OR: Transported from OR on room air with adequate spontaneous ventilation    Post pain: adequate analgesia    Post assessment: no apparent anesthetic complications    Post vital signs: stable    Level of consciousness: responds to stimulation    Nausea/Vomiting: no nausea/vomiting    Complications: none    Transfer of care protocol was followed      Last vitals: Visit Vitals  BP (!) 152/72 (BP Location: Left arm, Patient Position: Lying)   Pulse 63   Temp 36.6 °C (97.9 °F) (Temporal)   Resp 19   Ht 5' 10" (1.778 m)   Wt 75.5 kg (166 lb 7.2 oz)   SpO2 98%   BMI 23.88 kg/m²     " [id number]

## 2024-06-05 NOTE — PROVIDER TRANSFER
Outside Transfer Acceptance Note / Regional Referral Center    Referring facility: [facility]   Referring provider: JOAQUINA STEVENS RYAN M.  Accepting facility:   [facility]  Accepting provider:   Olayinka Quintanilla  Admitting provider: Dr. Perez  Reason for transfer:   Gastroenterology  Transfer diagnosis:   Upper GI bleed  Transfer specialty requested: Gastroenterology  Transfer specialty notified: Yes  Transfer level: NUMBER 1-5: 2  Bed type requested:  Med tele  Isolation status: No active isolations   Admission class or status: Emergency  IP- Inpatient      Narrative     Patient is a 79-year-old male with past medical history significant for long-term anticoagulant use on Xarelto, amputation of toes, CAD, type 2 diabetes, cirrhosis, CVA, hyperlipidemia, hypertension, hypothyroidism, PAD, CAD on aspirin who was brought in by EMS for evaluation following outpatient labs revealing profound anemia suspected to be from GI source.  Patient's initial labs showed a hemoglobin of 13 approximately 6 months ago, since then he has been having generalized fatigue as well as dark stools.  His outpatient primary care doctor ordered some routine labs which revealed a profound drop in his hemoglobin.  He denies any overt bleeding however he is accompanied by his daughter who does endorse black stools.    Initial vital signs in the emergency department were afebrile, non tachycardic with a heart rate of 74, respiratory rate 20, blood pressure 136/63 saturating 100% on room air.  labs in the emergency department were notable for CBC with hemoglobin 6.8 (13) as well as a mild thrombocytopenia of 122 (baseline 90s-100s).  INR 1.8, PTT was 36.6 mildly elevated.  Metabolic panel showed a sodium of 139/3.5/112/18/32/1.4 (baseline 1.0-1.4)/108.     LFTs were all within normal limits although patient has biopsy-proven cirrhosis.  BNP was 156 with normal troponins and a CPK of 104.     Given  "symptoms of active bleeding, he was transfused 2 units of blood of which he has receiving the 1st currently.  Plan is to transfer him to [facility] for gastroenterology evaluation will make NPO and continue evaluation for upper GI bleed.  Will recommend addition of octreotide and ceftriaxone.    Objective     Vitals: Temp: 98.8 °F (37.1 °C) (06/04/24 1915)  Pulse: 73 (06/04/24 1915)  Resp: 16 (06/04/24 1915)  BP: (!) 115/58 (06/04/24 1915)  SpO2: 100 % (06/04/24 1915)  Recent Labs: All pertinent labs within the past 24 hours have been reviewed.  CBC:   Recent Labs   Lab 06/04/24  1733   WBC 6.95   HGB 6.8*   HCT 22.8*   *     CMP:   Recent Labs   Lab 06/04/24  1733      K 3.7   *   CO2 18*      BUN 32*   CREATININE 1.4   CALCIUM 9.0   PROT 6.3   ALBUMIN 2.9*   BILITOT 0.3   ALKPHOS 101   AST 25   ALT 24   ANIONGAP 9     Lactic Acid: No results for input(s): "LACTATE" in the last 48 hours.  Recent imaging:  No imaging done   Airway:   Room air  Vent settings:         IV access:        Peripheral IV - Single Lumen 06/04/24 1658 20 G Left Wrist (Active)   Site Assessment Clean;Dry;Intact;No redness;No swelling 06/04/24 1659   Extremity Assessment Distal to IV No warmth;No swelling;No redness;No abnormal discoloration 06/04/24 1659   Line Status Blood return noted;Flushed;Saline locked 06/04/24 1659   Dressing Status Dry;Intact;Clean 06/04/24 1659   Dressing Intervention First dressing 06/04/24 1659            Peripheral IV - Single Lumen 06/04/24 1823 20 G Anterior;Distal;Right Wrist (Active)   Site Assessment Clean;Dry;Intact;No redness;No swelling 06/04/24 1823   Extremity Assessment Distal to IV No abnormal discoloration;No redness;No swelling;No warmth 06/04/24 1823   Line Status Blood return noted;Flushed;Saline locked 06/04/24 1823   Dressing Status Clean;Dry;Intact 06/04/24 1823   Dressing Intervention First dressing 06/04/24 1823     Infusions:  Pantoprazole 8 " milligrams/hour  Allergies:   Review of patient's allergies indicates:   Allergen Reactions    Penicillins Rash     Tolerated cefepime December 2019      NPO:  Yes    Anticoagulation:   Anticoagulants       None             Instructions      Will make NPO, start ceftriaxone and octreotide given history of cirrhosis.    [facility]  Admit to Hospital Medicine  Upon patient arrival to floor, please contact Hospital Medicine on call.

## 2024-06-05 NOTE — [employer]ATION PATIENT INSTRUCTIONS
Discharge Summary/Instructions after an Endoscopic Procedure  Patient Name: Junaid Muñoz  Patient MRN: [id number]  Patient YOB: 1945 Wednesday, June 5, 2024  Mark Hunt MD  Dear patient,  As a result of recent federal legislation (The Federal Cures Act), you may   receive lab or pathology results from your procedure in your [facility]   account before your physician is able to contact you. Your physician or   their representative will relay the results to you with their   recommendations at their soonest availability.  Thank you,  Your health is very important to us during the Covid Crisis. Following your   procedure today, you will receive a daily text for 2 weeks asking about   signs or symptoms of Covid 19.  Please respond to this text when you   receive it so we can follow up and keep you as safe as possible.   RESTRICTIONS:  During your procedure today, you received medications for sedation.  These   medications may affect your judgment, balance and coordination.  Therefore,   for 24 hours, you have the following restrictions:   - DO NOT drive a car, operate machinery, make legal/financial decisions,   sign important papers or drink alcohol.    ACTIVITY:  Today: no heavy lifting, straining or running due to procedural   sedation/anesthesia.  The following day: return to full activity including work.  DIET:  Eat and drink normally unless instructed otherwise.     TREATMENT FOR COMMON SIDE EFFECTS:  - Mild abdominal pain, nausea, belching, bloating or excessive gas:  rest,   eat lightly and use a heating pad.  - Sore Throat: treat with throat lozenges and/or gargle with warm salt   water.  - Because air was used during the procedure, expelling large amounts of air   from your rectum or belching is normal.  - If a bowel prep was taken, you may not have a bowel movement for 1-3 days.    This is normal.  SYMPTOMS TO WATCH FOR AND REPORT TO YOUR PHYSICIAN:  1. Abdominal pain or bloating, other  than gas cramps.  2. Chest pain.  3. Back pain.  4. Signs of infection such as: chills or fever occurring within 24 hours   after the procedure.  5. Rectal bleeding, which would show as bright red, maroon, or black stools.   (A tablespoon of blood from the rectum is not serious, especially if   hemorrhoids are present.)  6. Vomiting.  7. Weakness or dizziness.  GO DIRECTLY TO THE NEAREST EMERGENCY ROOM IF YOU HAVE ANY OF THE FOLLOWING:      Difficulty breathing              Chills and/or fever over 101 F   Persistent vomiting and/or vomiting blood   Severe abdominal pain   Severe chest pain   Black, tarry stools   Bleeding- more than one tablespoon   Any other symptom or condition that you feel may need urgent attention  Your doctor recommends these additional instructions:  If any biopsies were taken, your doctors clinic will contact you in 1 to 2   weeks with any results.  - [facility].   - Advance diet as tolerated.   - Continue present medications.   - Will have to discuss with family , no obvious cause of anemia on todays   EGD, however this is likely chronic anemia. Can offer Colonoscopy,   preferred as outpatient , will discuss with family. I doubt he will   tolerate a bowel prep inpatient at present given ongoing confusion.  For questions, problems or results please call your physician - Mark Hunt MD.  EMERGENCY PHONE NUMBER: [phone],  LAB RESULTS: [phone]  IF A COMPLICATION OR EMERGENCY SITUATION ARISES AND YOU ARE UNABLE TO REACH   YOUR PHYSICIAN - GO DIRECTLY TO THE EMERGENCY ROOM.  Mark Hunt MD  6/5/2024 3:58:19 PM  This report has been verified and signed electronically.  Dear patient,  As a result of recent federal legislation (The Federal Cures Act), you may   receive lab or pathology results from your procedure in your [facility]   account before your physician is able to contact you. Your physician or   their representative will  relay the results to you with their   recommendations at their soonest availability.  Thank you,  [employer]ATION

## 2024-06-05 NOTE — HPI
Junaid Muñoz is a 79-year-old male with a past medical history of CAD, significant PAD, type 2 diabetes, CVA, hypertension, hypothyroidism, cirrhosis, long-term anticoagulation with Xarelto, recent hospitalization for osteomyelitis of toe, who presents to an outside hospital due to abnormal labs.    Patient states that he has had feelings of generalized fatigue, dark stools for the past few days.  He denies any overt bleeding from hematuria, hematochezia. He was told to get labs outpatient by his PCP where he was found to have a significant drop in his hemoglobin.  He was told to go to the ER and then he was transferred over for GI evaluation at [facility].    At the outside hospital, triage vitals were fairly unremarkable patient was satting well on room air.  Review of systems significant for fatigue.  Physical exam fairly unremarkable.    CBC was significant for hemoglobin of 6.8, mild thrombocytopenia.  PT and INR were slightly elevated.  BMP was fairly unremarkable.  BNP slightly elevated with normal troponins.    Due to concerns for symptomatic anemia, patient was transfused 2 units of blood.  He was given a dose of ceftriaxone and started on octreotide.     Patient transferred for concern for GI bleed.

## 2024-06-05 NOTE — ASSESSMENT & PLAN NOTE
Plan:  Continue home Synthroid     [facility] Emergency Department    [address]     [address] [place] [address]    Phone:  [phone]    Fax:  [address]   MRN: [id number]    Department:  [facility] Emergency Department   Date of Visit:  1/ SUPRAVENTRICULAR TACHYCARDIA (SVT), UNDERSTANDING  (Upper sorbian)      Disclosure     Insurance plans vary and the physician(s) referred by the ER may not be covered by your plan.  Please contact your insurance company to determine coverage and benefits availab If you have been prescribed any medication(s), please fill your prescription right away and begin taking the medication(s) as directed.   If you believe that any of the medications or instructions on this list is different from what your Primary Care doctor coverage. Patient [address] is a Federal Navigator program that can help with your Affordable Care Act coverage, as well as all types of Medicaid plans.   To get signed up and covered, please call [phone] and ask to get set up for an insuran

## 2024-06-05 NOTE — SUBJECTIVE & OBJECTIVE
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    Anticoagulant long-term use     CAD (coronary artery disease)     s/p stent 2005, on plavix    Cellulitis     Chronic hepatitis C     Cirrhosis     biopsy proven - 2007    CVA (cerebral vascular accident) 2021    Elevated LDL cholesterol level     History of colon polyps 06/2008    1 polyp - tubular adenoma    History of fall     HTN (hypertension)     Hyperlipidemia     Hypothyroidism     Multiple bruises     Peripheral vascular disease, unspecified     Skin tear of forearm without complication, initial encounter     skin tear to hand dressing in place    Wound of left foot 10/06/2022    currently covered in pre-admit assessment unable to visualize       Past Surgical History:   Procedure Laterality Date    COLONOSCOPY W/ POLYPECTOMY  06/2008    Dr Wagoner: fair prep, 3mm polyp - tubular adenoma    CORONARY ANGIOPLASTY WITH STENT PLACEMENT      DEBRIDEMENT OF AMPUTATION SITE Left 10/23/2023    Procedure: DEBRIDEMENT, AMPUTATION SITE;  Surgeon: Lindsey Alberts III DPHAZEL;  Location: [facility] OR;  Service: Orthopedics;  Laterality: Left;    FUSION OF CERVICAL SPINE BY POSTERIOR APPROACH N/A 12/17/2019    Procedure: FUSION, SPINE, CERVICAL, POSTERIOR APPROACH C2-T3 posterior instrumented fusion;  Surgeon: Elian Deluca MD;  Location: [facility] OR [address] [place] [address];  Service: Neurosurgery;  Laterality: N/A;    hydrocele repair  teenager    INSERTION, PICC, AGE 5 YEARS OR OLDER N/A 10/25/2023    Procedure: INSERTION, PICC, AGE 5 YEARS OR OLDER;  Surgeon: Provider, Eugenio Diagnostic;  Location: [facility] OR;  Service: General;  Laterality: N/A;    INSERTION, PICC, AGE 5 YEARS OR OLDER N/A 11/2/2023    Procedure: INSERTION, PICC, AGE 5 YEARS OR OLDER;  Surgeon: Provider, Dos Diagnostic;  Location: [facility] OR;  Service: General;  Laterality: N/A;  OPP #24    PERCUTANEOUS TRANSLUMINAL ANGIOPLASTY (PTA) OF PERIPHERAL VESSEL Left 10/14/2022    Procedure: PTA, PERIPHERAL  VESSEL;  Surgeon: Jem Soriano MD;  Location: [facility] CATH;  Service: Cardiology;  Laterality: Left;  peripheral angiogram    PERIPHERALLY INSERTED CENTRAL CATHETER INSERTION Left 10/23/2023    Procedure: INSERTION, PICC;  Surgeon: Lindsey Alberts III, DPM;  Location: [facility] OR;  Service: Orthopedics;  Laterality: Left;    pyloric stenosis repair  age 1    SURGICAL REMOVAL OF VERTEBRAL BODY OF THORACIC SPINE N/A 12/10/2019    Procedure: CORPECTOMY, SPINE, CERVICAL AND THORACIC, C7 and T1 with Globus;  Surgeon: Elian Deluca MD;  Location: [address];  Service: Neurosurgery;  Laterality: N/A;    TOE AMPUTATION Left 8/15/2022    Procedure: AMPUTATION, TOE First, second, and third toes;  Surgeon: Lindsey Alberts III, DPM;  Location: [facility] OR;  Service: Podiatry;  Laterality: Left;  Req. 11am per MD    TOE AMPUTATION Left 2023    Procedure: AMPUTATION, TOE, 4TH AND 5TH DIGITS, LEFT;  Surgeon: Lindsey Alberts III, DPM;  Location: [facility] OR;  Service: Podiatry;  Laterality: Left;  7am per Carley    TONSILLECTOMY         Review of patient's allergies indicates:   Allergen Reactions    Erythromycin Other (See Comments)    Penicillins Rash     Tolerated cefepime 2019     Family History       Problem Relation (Age of Onset)    No Known Problems Mother, Father, Son, Son, Son          Tobacco Use    Smoking status: Former     Current packs/day: 0.00     Types: Cigarettes     Quit date: 10/14/2022     Years since quittin.6    Smokeless tobacco: Never   Substance and Sexual Activity    Alcohol use: Not Currently     Comment: maybe a couple times a year    Drug use: Yes     Types: IV, Marijuana     Comment: MORPHINE (denies); A few times a month    Sexual activity: Not on file     Review of Systems   Constitutional:  Negative for chills and fever.   HENT:  Negative for facial swelling, mouth sores and trouble swallowing.    Eyes:  Negative for pain and redness.   Respiratory:  Negative for cough and  shortness of breath.    Cardiovascular:  Negative for chest pain and leg swelling.   Gastrointestinal:  Negative for blood in stool and vomiting.   Genitourinary:  Negative for dysuria and hematuria.   Musculoskeletal:  Negative for gait problem and neck stiffness.   Skin:  Positive for pallor. Negative for rash and wound.   Neurological:  Positive for weakness. Negative for seizures and headaches.   Psychiatric/Behavioral:  Negative for confusion and hallucinations.      Objective:     Vital Signs (Most Recent):  Temp: 98.5 °F (36.9 °C) (06/05/24 1208)  Pulse: 61 (06/05/24 1208)  Resp: 18 (06/05/24 1208)  BP: (!) 148/62 (06/05/24 1208)  SpO2: 98 % (06/05/24 1208) Vital Signs (24h Range):  Temp:  [97 °F (36.1 °C)-98.8 °F (37.1 °C)] 98.5 °F (36.9 °C)  Pulse:  [60-78] 61  Resp:  [15-20] 18  SpO2:  [94 %-100 %] 98 %  BP: (103-166)/(53-72) 148/62     Weight: 75.5 kg (166 lb 7.2 oz) (06/05/24 0148)  Body mass index is 23.88 kg/m².      Intake/Output Summary (Last 24 hours) at 6/5/2024 1505  Last data filed at 6/5/2024 1440  Gross per 24 hour   Intake 0 ml   Output --   Net 0 ml       Lines/Drains/Airways       Drain  Duration             Male External Urinary Catheter 06/05/24 0600 <1 day              Peripheral Intravenous Line  Duration                  Peripheral IV - Single Lumen 06/04/24 20 G Left Wrist 1 day         Peripheral IV - Single Lumen 06/04/24 20 G Right Wrist 1 day                     Physical Exam  Vitals reviewed.   Constitutional:       General: He is not in acute distress.     Appearance: He is well-developed.   HENT:      Head: Normocephalic and atraumatic.   Eyes:      General: No scleral icterus.     Conjunctiva/sclera: Conjunctivae normal.   Neck:      Thyroid: No thyromegaly.   Cardiovascular:      Rate and Rhythm: Normal rate and regular rhythm.   Pulmonary:      Effort: Pulmonary effort is normal. No respiratory distress.      Breath sounds: Normal breath sounds.   Abdominal:      Palpations:  Abdomen is soft. There is no mass.      Tenderness: There is no abdominal tenderness.   Musculoskeletal:         General: No tenderness. Normal range of motion.      Cervical back: Neck supple.   Lymphadenopathy:      Cervical: No cervical adenopathy.   Skin:     General: Skin is warm and dry.      Coloration: Skin is pale.      Findings: No rash.   Neurological:      Mental Status: He is alert and oriented to person, place, and time.      Gait: Gait normal.   Psychiatric:         Mood and Affect: Mood normal.         Behavior: Behavior normal.          Significant Labs:  CBC:   Recent Labs   Lab 06/04/24  1733 06/05/24  0954 06/05/24  1338   WBC 6.95 5.88 6.23   HGB 6.8* 8.2* 10.2*   HCT 22.8* 26.7* 32.7*   * 77* 118*       Significant Imaging:  Imaging results within the past 24 hours have been reviewed.

## 2024-06-05 NOTE — ASSESSMENT & PLAN NOTE
Refill Decision Note   Calvin Luis  is requesting a refill authorization.  Brief Assessment and Rationale for Refill:  Approve     Medication Therapy Plan:         Comments:     Note composed:6:05 PM 03/08/2024            Pt c/o recent onset right thigh and calf pain. Right leg in flexed position and unable to passively extend. Pt is a poor historian w/ baseline confusion. Concern for potential fall. Will obtain XR Hip and Knee  -XR Hip and Knee negative for fracture  -Pt not c/o pain today

## 2024-06-05 NOTE — NURSING
Plan of care reviewed with patient and family.  Family verbalized understanding and had no further questions.  Patient remains confused at baseline.  Wound care consult placed for patient's right heel wound.  Patient now leaving floor for EGD at this time.

## 2024-06-05 NOTE — HPI
This is 80 y/o male hx cad, pad, anticoag with xarelto , cirrhosis , osteo, here for anemia and abnormal low hgb on labs.  Gi consulted for anemia  Pt denies any recent blood in stool. No vomiting, no melena, no abd pain, no radiating symptoms. + lethargy and weakness.   Does take xarelto    He does not specifically recall having an EGD in past      Per chart review  Egd and colon 2008  Egd showed portal gastropathy  Colon showed 3mm tiny polyp

## 2024-06-05 NOTE — SUBJECTIVE & OBJECTIVE
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    Anticoagulant long-term use     CAD (coronary artery disease)     s/p stent 2005, on plavix    Cellulitis     Chronic hepatitis C     Cirrhosis     biopsy proven - 2007    CVA (cerebral vascular accident) 2021    Elevated LDL cholesterol level     History of colon polyps 06/2008    1 polyp - tubular adenoma    History of fall     HTN (hypertension)     Hyperlipidemia     Hypothyroidism     Multiple bruises     Peripheral vascular disease, unspecified     Skin tear of forearm without complication, initial encounter     skin tear to hand dressing in place    Wound of left foot 10/06/2022    currently covered in pre-admit assessment unable to visualize       Past Surgical History:   Procedure Laterality Date    COLONOSCOPY W/ POLYPECTOMY  06/2008    Dr Wagoner: fair prep, 3mm polyp - tubular adenoma    CORONARY ANGIOPLASTY WITH STENT PLACEMENT      DEBRIDEMENT OF AMPUTATION SITE Left 10/23/2023    Procedure: DEBRIDEMENT, AMPUTATION SITE;  Surgeon: Lindsey Alberts III DPHAZEL;  Location: [facility] OR;  Service: Orthopedics;  Laterality: Left;    FUSION OF CERVICAL SPINE BY POSTERIOR APPROACH N/A 12/17/2019    Procedure: FUSION, SPINE, CERVICAL, POSTERIOR APPROACH C2-T3 posterior instrumented fusion;  Surgeon: Elian Deluca MD;  Location: [facility] OR [address] [place] [address];  Service: Neurosurgery;  Laterality: N/A;    hydrocele repair  teenager    INSERTION, PICC, AGE 5 YEARS OR OLDER N/A 10/25/2023    Procedure: INSERTION, PICC, AGE 5 YEARS OR OLDER;  Surgeon: Provider, Eugenio Diagnostic;  Location: [facility] OR;  Service: General;  Laterality: N/A;    INSERTION, PICC, AGE 5 YEARS OR OLDER N/A 11/2/2023    Procedure: INSERTION, PICC, AGE 5 YEARS OR OLDER;  Surgeon: Provider, Dos Diagnostic;  Location: [facility] OR;  Service: General;  Laterality: N/A;  OPP #24    PERCUTANEOUS TRANSLUMINAL ANGIOPLASTY (PTA) OF PERIPHERAL VESSEL Left 10/14/2022    Procedure: PTA, PERIPHERAL  VESSEL;  Surgeon: Jem Soriano MD;  Location: [facility] CATH;  Service: Cardiology;  Laterality: Left;  peripheral angiogram    PERIPHERALLY INSERTED CENTRAL CATHETER INSERTION Left 10/23/2023    Procedure: INSERTION, PICC;  Surgeon: Lindsey Alberts III, DPM;  Location: [facility] OR;  Service: Orthopedics;  Laterality: Left;    pyloric stenosis repair  age 1    SURGICAL REMOVAL OF VERTEBRAL BODY OF THORACIC SPINE N/A 12/10/2019    Procedure: CORPECTOMY, SPINE, CERVICAL AND THORACIC, C7 and T1 with Globus;  Surgeon: Elian Deluca MD;  Location: [address];  Service: Neurosurgery;  Laterality: N/A;    TOE AMPUTATION Left 8/15/2022    Procedure: AMPUTATION, TOE First, second, and third toes;  Surgeon: Lindsey Alberts III, DPM;  Location: [facility] OR;  Service: Podiatry;  Laterality: Left;  Req. 11am per MD    TOE AMPUTATION Left 9/22/2023    Procedure: AMPUTATION, TOE, 4TH AND 5TH DIGITS, LEFT;  Surgeon: Lindsey Alberts III, DPM;  Location: [facility] OR;  Service: Podiatry;  Laterality: Left;  7am per Carley    TONSILLECTOMY         Review of patient's allergies indicates:   Allergen Reactions    Erythromycin Other (See Comments)    Penicillins Rash     Tolerated cefepime December 2019       Current Facility-Administered Medications on File Prior to Encounter   Medication    [COMPLETED] acetaminophen tablet 1,000 mg    [COMPLETED] cefTRIAXone (ROCEPHIN) 2 g in dextrose 5 % in water (D5W) 100 mL IVPB (MB+)    [COMPLETED] pantoprazole injection 80 mg    [DISCONTINUED] 0.9%  NaCl infusion (for blood administration)    [DISCONTINUED] octreotide (SANDOSTATIN) 500 mcg in sodium chloride 0.9% 100 mL infusion    [DISCONTINUED] pantoprazole (PROTONIX) 40 mg in sodium chloride 0.9 % 100 mL IVPB (MB+)     Current Outpatient Medications on File Prior to Encounter   Medication Sig    acetaminophen (TYLENOL) 325 MG tablet Take 325 mg by mouth every 6 (six) hours as needed for Pain.    ascorbic acid, vitamin C, (VITAMIN C) 500 MG  tablet Take 1 tablet (500 mg total) by mouth once daily.    aspirin (ECOTRIN) 81 MG EC tablet Take 1 tablet (81 mg total) by mouth once daily.    atorvastatin (LIPITOR) 40 MG tablet Take 20 mg by mouth once daily.    ergocalciferol, vitamin D2, (VITAMIN D2 ORAL) Take 50,000 Units by mouth. TWICE WEEKLY    ferrous sulfate (FEOSOL) 325 mg (65 mg iron) Tab tablet Take 1 tablet (325 mg total) by mouth every other day.    HYDROcodone-acetaminophen (NORCO) 7.5-325 mg per tablet Take 1 tablet by mouth every 6 (six) hours as needed for Pain.    lactulose (CEPHULAC) 10 gram packet Take 30 g by mouth 2 (two) times a day.    levothyroxine (SYNTHROID) 50 MCG tablet Take 50 mcg by mouth before breakfast.    megestroL (MEGACE) 20 MG Tab Take 40 mg by mouth 2 (two) times daily.    memantine (NAMENDA) 5 MG Tab Take 5 mg by mouth 2 (two) times daily.    metoprolol succinate (TOPROL-XL) 100 MG 24 hr tablet Take 100 mg by mouth once daily.    ondansetron (ZOFRAN-ODT) 8 MG TbDL Take 8 mg by mouth once.    rivaroxaban (XARELTO) 20 mg Tab Take 20 mg by mouth daily with dinner or evening meal.    tamsulosin (FLOMAX) 0.4 mg Cap Take 0.4 mg by mouth once daily.    valsartan-hydrochlorothiazide (DIOVAN-HCT) 160-25 mg per tablet Take 1 tablet by mouth once daily.     Family History       Problem Relation (Age of Onset)    No Known Problems Mother, Father, Son, Son, Son          Tobacco Use    Smoking status: Former     Current packs/day: 0.00     Types: Cigarettes     Quit date: 10/14/2022     Years since quittin.6    Smokeless tobacco: Never   Substance and Sexual Activity    Alcohol use: Not Currently     Comment: maybe a couple times a year    Drug use: Yes     Types: IV, Marijuana     Comment: MORPHINE (denies); A few times a month    Sexual activity: Not on file     Review of Systems   Constitutional:  Positive for fatigue. Negative for activity change and appetite change.   HENT:  Negative for ear discharge and ear pain.    Eyes:   Negative for pain and redness.   Respiratory:  Negative for chest tightness.    Cardiovascular:  Negative for chest pain.   Gastrointestinal:  Negative for anal bleeding and blood in stool.   Endocrine: Negative for polydipsia and polyphagia.   Genitourinary:  Negative for frequency and genital sores.   Musculoskeletal:  Negative for gait problem and joint swelling.   Skin:  Negative for pallor and rash.   Allergic/Immunologic: Negative for environmental allergies and food allergies.   Neurological:  Negative for seizures and numbness.   Hematological:  Negative for adenopathy. Does not bruise/bleed easily.   Psychiatric/Behavioral:  Negative for confusion and decreased concentration.      Objective:     Vital Signs (Most Recent):  Temp: 98.2 °F (36.8 °C) (06/05/24 0357)  Pulse: 62 (06/05/24 0426)  Resp: 18 (06/05/24 0357)  BP: (!) 149/63 (06/05/24 0357)  SpO2: 97 % (06/05/24 0357) Vital Signs (24h Range):  Temp:  [97 °F (36.1 °C)-98.8 °F (37.1 °C)] 98.2 °F (36.8 °C)  Pulse:  [60-78] 62  Resp:  [15-20] 18  SpO2:  [94 %-100 %] 97 %  BP: (103-156)/(53-71) 149/63     Weight: 75.5 kg (166 lb 7.2 oz)  Body mass index is 23.88 kg/m².     Physical Exam  Constitutional:       General: He is not in acute distress.     Appearance: Normal appearance. He is not ill-appearing.   HENT:      Head: Normocephalic and atraumatic.      Right Ear: There is no impacted cerumen.      Left Ear: Tympanic membrane normal. There is no impacted cerumen.      Nose: No congestion or rhinorrhea.      Mouth/Throat:      Pharynx: No oropharyngeal exudate or posterior oropharyngeal erythema.   Eyes:      General:         Right eye: No discharge.         Left eye: No discharge.   Cardiovascular:      Rate and Rhythm: Tachycardia present.      Heart sounds: No murmur heard.     No gallop.   Abdominal:      Tenderness: There is no abdominal tenderness. There is no guarding or rebound.      Hernia: No hernia is present.   Musculoskeletal:          General: No deformity.      Cervical back: No rigidity.      Right lower leg: No edema.   Lymphadenopathy:      Cervical: No cervical adenopathy.   Skin:     Findings: No bruising or lesion.   Neurological:      Mental Status: He is alert.      Motor: No weakness.      Gait: Gait normal.   Psychiatric:         Mood and Affect: Mood normal.         Behavior: Behavior normal.                Significant Labs: All pertinent labs within the past 24 hours have been reviewed.  BMP:   Recent Labs   Lab 06/04/24  1733         K 3.7   *   CO2 18*   BUN 32*   CREATININE 1.4   CALCIUM 9.0     CBC:   Recent Labs   Lab 06/04/24  1733   WBC 6.95   HGB 6.8*   HCT 22.8*   *     CMP:   Recent Labs   Lab 06/04/24  1733      K 3.7   *   CO2 18*      BUN 32*   CREATININE 1.4   CALCIUM 9.0   PROT 6.3   ALBUMIN 2.9*   BILITOT 0.3   ALKPHOS 101   AST 25   ALT 24   ANIONGAP 9       Significant Imaging: I have reviewed all pertinent imaging results/findings within the past 24 hours.  I have reviewed and interpreted all pertinent imaging results/findings within the past 24 hours.

## 2024-06-05 NOTE — CONSULTS
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    Anticoagulant long-term use     CAD (coronary artery disease)     s/p stent , on plavix    Cellulitis     Chronic hepatitis C     Cirrhosis     biopsy proven -     CVA (cerebral vascular accident)     Elevated LDL cholesterol level     History of colon polyps 2008    1 polyp - tubular adenoma    History of fall     HTN (hypertension)     Hyperlipidemia     Hypothyroidism     Multiple bruises     Peripheral vascular disease, unspecified     Skin tear of forearm without complication, initial encounter     skin tear to hand dressing in place    Wound of left foot 10/06/2022    currently covered in pre-admit assessment unable to visualize       Social History     Socioeconomic History    Marital status:    Tobacco Use    Smoking status: Former     Current packs/day: 0.00     Types: Cigarettes     Quit date: 10/14/2022     Years since quittin.6    Smokeless tobacco: Never   Substance and Sexual Activity    Alcohol use: Not Currently     Comment: maybe a couple times a year    Drug use: Yes     Types: IV, Marijuana     Comment: MORPHINE (denies); A few times a month     Social Determinants of Health     Financial Resource Strain: Low Risk  (10/19/2023)    Overall Financial Resource Strain (CARDIA)     Difficulty of Paying Living Expenses: Not hard at all   Food Insecurity: No Food Insecurity (10/19/2023)    Hunger Vital Sign     Worried About Running Out of Food in the Last Year: Never true     Ran Out of Food in the Last Year: Never true   Transportation Needs: No Transportation Needs (10/19/2023)    PRAPARE - Transportation     Lack of Transportation (Medical): No     Lack of Transportation (Non-Medical): No   Housing Stability: Unknown (10/19/2023)    Housing  Stability Vital Sign     Unable to Pay for Housing in the Last Year: No     Unstable Housing in the Last Year: No       Precautions:     Allergies as of 06/04/2024 - Reviewed 06/04/2024   Allergen Reaction Noted    Penicillins Rash 06/16/2017       [facility] Assessment Details/Treatment     R heel- full thickness ulceration- present on admit- 1x0.3x0.2cm slough/pink tissue- scant serosanguinous drainage- no odor- no erythema       L heel intact with no redness  Nurse reports sacrum/buttocks intact    Recommendations discussed with pt, nurse and Dr. Eddy:  - Pressure injury prevention interventions- heel boots   - Santyl ointment daily to R heel    06/05/2024

## 2024-06-05 NOTE — ASSESSMENT & PLAN NOTE
Chronic, controlled. Latest blood pressure and vitals reviewed-     Temp:  [97 °F (36.1 °C)-98.8 °F (37.1 °C)]   Pulse:  [60-78]   Resp:  [15-20]   BP: (103-156)/(53-71)   SpO2:  [94 %-100 %] .   Home meds for hypertension were reviewed and noted below.   Hypertension Medications               metoprolol succinate (TOPROL-XL) 100 MG 24 hr tablet Take 100 mg by mouth once daily.    valsartan-hydrochlorothiazide (DIOVAN-HCT) 160-25 mg per tablet Take 1 tablet by mouth once daily.            While in the hospital, will manage blood pressure as follows; Continue home antihypertensive regimen    Will utilize p.r.n. blood pressure medication only if patient's blood pressure greater than 180/110 and he develops symptoms such as worsening chest pain or shortness of breath.

## 2024-06-05 NOTE — PLAN OF CARE
Gi update    Egd negative for source of anemia.    Discussed with son - Leopoldo Beck  [phone] - [phone]    (He also gives decision making to Loreta who is listed on charT)    Leopoldo is ok with colonoscopy , he would like us to ask the patient himself (despite some short term memory issues)    Pt is in agreement and would like to proceed with colnooscopy tomorrow    Clear liquids today  Bowel prep ordered    Colon tomorrow    Hold anticoag

## 2024-06-05 NOTE — ASSESSMENT & PLAN NOTE
Chronic, controlled. Latest blood pressure and vitals reviewed-     Temp:  [97 °F (36.1 °C)-98.8 °F (37.1 °C)]   Pulse:  [60-78]   Resp:  [15-20]   BP: (103-166)/(53-72)   SpO2:  [94 %-100 %] .   Home meds for hypertension were reviewed and noted below.   Hypertension Medications               metoprolol succinate (TOPROL-XL) 100 MG 24 hr tablet Take 100 mg by mouth once daily.    valsartan-hydrochlorothiazide (DIOVAN-HCT) 160-25 mg per tablet Take 1 tablet by mouth once daily.            While in the hospital, will manage blood pressure as follows; Continue home antihypertensive regimen    Will utilize p.r.n. blood pressure medication only if patient's blood pressure greater than 180/110 and he develops symptoms such as worsening chest pain or shortness of breath.

## 2024-06-05 NOTE — PLAN OF CARE
06/05/24 0255   Admission   Initial VN Admission Questions Complete   Communication Issues? None   Shift   Pain Management Interventions quiet environment facilitated;relaxation techniques promoted   Virtual Nurse - Patient Verbalized Approval Of VN Rounding   Type of Frequent Check   Type Patient Rounds   Safety/Activity   Patient Rounds bed in low position;bed wheels locked;call light in patient/parent reach;clutter free environment maintained;ID band on;visualized patient;placement of personal items at bedside   Safety Promotion/Fall Prevention assistive device/personal item within reach;bed alarm set;side rails raised x 2;room near unit station;nonskid shoes/socks when out of bed;instructed to call staff for mobility;Fall Risk reviewed with patient/family   Safety Precautions emergency equipment at bedside   Safety Bands on Patient Allergy Band;Fall Risk Band   Activity Management Rolling - L1   Activity Assistance Provided assistance, 2 people   Positioning   Body Position side-lying   Head of Bed (HOB) Positioning HOB elevated   Positioning/Transfer Devices pillows;in use        VIRTUAL NURSE: Pt arrived to unit. Admission per health record . VIP model explained; patient informed this VN will be working with bedside nurse and the rest of the care team. Plan of care reviewed with patient.  Educated patient on VTE, fall risk and fall risk precautions in place. Call light within reach, side rails up x2. Admission questions completed. Patient instucted to ask staff for assistance. Patient verbalized complete understanding. Patient denies complaints or any needs at this time. Will continue to be available and intervene as needed.

## 2024-06-05 NOTE — PROGRESS NOTES
[facility]  Progress Note    Patient Name: Junaid Muñoz  MRN: [id number]  Patient Class: IP- Inpatient   Admission Date: 6/5/2024  Length of Stay: 0 days  Attending Physician: Alex Eddy MD  Primary Care Provider: Frederick Rocha MD        Subjective:     Principal Problem:Symptomatic anemia        HPI:  Junaid Muñoz is a 79-year-old male with a past medical history of CAD, significant PAD, type 2 diabetes, CVA, hypertension, hypothyroidism, cirrhosis, long-term anticoagulation with Xarelto, recent hospitalization for osteomyelitis of toe, who presents to an outside hospital due to abnormal labs.    Patient states that he has had feelings of generalized fatigue, dark stools for the past few days.  He denies any overt bleeding from hematuria, hematochezia. He was told to get labs outpatient by his PCP where he was found to have a significant drop in his hemoglobin.  He was told to go to the ER and then he was transferred over for GI evaluation at [place].    At the outside hospital, triage vitals were fairly unremarkable patient was satting well on room air.  Review of systems significant for fatigue.  Physical exam fairly unremarkable.    CBC was significant for hemoglobin of 6.8, mild thrombocytopenia.  PT and INR were slightly elevated.  BMP was fairly unremarkable.  BNP slightly elevated with normal troponins.    Due to concerns for symptomatic anemia, patient was transfused 2 units of blood.  He was given a dose of ceftriaxone and started on octreotide.     Patient transferred for concern for GI bleed.    Overview/Hospital Course:  No notes on file    Interval History: Nursing notes reviewed and no acute events overnight. VSS. Pt confused and oriented only to name. Pt c/o right thigh and calf pain, will obtain xrays. Trending Hg, improved to 10.2. GI consulted, plan for EGD today.    Review of Systems   Constitutional:  Positive for fatigue. Negative for activity  change, appetite change, chills and fever.   Respiratory:  Negative for chest tightness and shortness of breath.    Cardiovascular:  Negative for chest pain.   Gastrointestinal:  Negative for abdominal pain, anal bleeding, blood in stool and constipation.   Genitourinary:  Negative for dysuria, frequency and genital sores.   Skin:  Negative for pallor and rash.   Neurological:  Positive for weakness.     Objective:     Vital Signs (Most Recent):  Temp: 98.5 °F (36.9 °C) (06/05/24 1208)  Pulse: 61 (06/05/24 1208)  Resp: 18 (06/05/24 1208)  BP: (!) 148/62 (06/05/24 1208)  SpO2: 98 % (06/05/24 1208) Vital Signs (24h Range):  Temp:  [97 °F (36.1 °C)-98.8 °F (37.1 °C)] 98.5 °F (36.9 °C)  Pulse:  [60-78] 61  Resp:  [15-20] 18  SpO2:  [94 %-100 %] 98 %  BP: (103-166)/(53-72) 148/62     Weight: 75.5 kg (166 lb 7.2 oz)  Body mass index is 23.88 kg/m².    Intake/Output Summary (Last 24 hours) at 6/5/2024 1519  Last data filed at 6/5/2024 1440  Gross per 24 hour   Intake 0 ml   Output --   Net 0 ml         Physical Exam  Vitals and nursing note reviewed.   Constitutional:       General: He is not in acute distress.     Appearance: Normal appearance.   HENT:      Head: Normocephalic and atraumatic.      Nose: Nose normal.   Eyes:      Extraocular Movements: Extraocular movements intact.      Pupils: Pupils are equal, round, and reactive to light.   Cardiovascular:      Rate and Rhythm: Normal rate and regular rhythm.      Heart sounds: Normal heart sounds. No murmur heard.  Pulmonary:      Effort: Pulmonary effort is normal. No respiratory distress.      Breath sounds: Normal breath sounds.   Abdominal:      General: Abdomen is flat.      Palpations: Abdomen is soft.      Tenderness: There is no abdominal tenderness.   Musculoskeletal:      Right lower leg: No edema.      Left lower leg: No edema.   Skin:     General: Skin is warm and dry.   Neurological:      General: No focal deficit present.      Mental Status: He is alert.              Significant Labs: All pertinent labs within the past 24 hours have been reviewed.    Significant Imaging: I have reviewed all pertinent imaging results/findings within the past 24 hours.    Assessment/Plan:      * Symptomatic anemia  Patient's anemia is currently controlled. Has received 2 units of PRBCs on 06/4-06/5 . Etiology likely d/t acute blood loss which was from upper GI source  Current CBC reviewed-   Lab Results   Component Value Date    HGB 6.8 (L) 06/04/2024    HCT 22.8 (L) 06/04/2024     Monitor serial CBC and transfuse if patient becomes hemodynamically unstable, symptomatic or H/H drops below 7/21.    Plan:  Patient likely with upper GI bleed.  GI consulted. Appreciate rec's  We will hold anticoagulation and antiplatelet therapy.  NPO  Continue IV pantoprazole b.i.d.  2 large-bore IVs  CBC every 8 hours  EGD today    Right thigh pain  Pt c/o recent onset right thigh and calf pain. Right leg in flexed position and unable to passively extend. Pt is a poor historian w/ baseline confusion. Concern for potential fall. Will obtain XR Hip and Knee      Cirrhosis    MELD-Na score calculated; MELD 3.0: 16 at 6/4/2024  6:23 PM  MELD-Na: 16 at 6/4/2024  6:23 PM  Calculated from:  Serum Creatinine: 1.4 mg/dL at 6/4/2024  5:33 PM  Serum Sodium: 139 mmol/L (Using max of 137 mmol/L) at 6/4/2024  5:33 PM  Total Bilirubin: 0.3 mg/dL (Using min of 1 mg/dL) at 6/4/2024  5:33 PM  Serum Albumin: 2.9 g/dL at 6/4/2024  5:33 PM  INR(ratio): 1.8 at 6/4/2024  6:23 PM  Age at listing (hypothetical): 79 years  Sex: Male at 6/4/2024  6:23 PM    Plts 118    Continue chronic meds. Etiology likely Hepatitis vs Fatty Liver. Will avoid any hepatotoxic meds, and monitor CBC/CMP/INR for synthetic function.     BPH (benign prostatic hyperplasia)  No current complaints    Plan:  Continue home Flomax      Peripheral artery disease      Plan:  Hold off on aspirin and Xarelto      Hyperlipidemia  Continue home  statin      Hypothyroidism      Plan:  Continue home Synthroid      Essential hypertension  Chronic, controlled. Latest blood pressure and vitals reviewed-     Temp:  [97 °F (36.1 °C)-98.8 °F (37.1 °C)]   Pulse:  [60-78]   Resp:  [15-20]   BP: (103-166)/(53-72)   SpO2:  [94 %-100 %] .   Home meds for hypertension were reviewed and noted below.   Hypertension Medications               metoprolol succinate (TOPROL-XL) 100 MG 24 hr tablet Take 100 mg by mouth once daily.    valsartan-hydrochlorothiazide (DIOVAN-HCT) 160-25 mg per tablet Take 1 tablet by mouth once daily.            While in the hospital, will manage blood pressure as follows; Continue home antihypertensive regimen    Will utilize p.r.n. blood pressure medication only if patient's blood pressure greater than 180/110 and he develops symptoms such as worsening chest pain or shortness of breath.      VTE Risk Mitigation (From admission, onward)           Ordered     IP VTE HIGH RISK PATIENT  Once         06/05/24 0218     Place sequential compression device  Until discontinued         06/05/24 0218                    Discharge Planning   CARRIE:      Code Status: Full Code   Is the patient medically ready for discharge?:     Reason for patient still in hospital (select all that apply): Patient trending condition, Laboratory test, Treatment, and Consult recommendations  Discharge Plan A: Return to nursing home                  Simone Barraza PA-C  Department of Hospital Medicine   [place] [facility]

## 2024-06-05 NOTE — ASSESSMENT & PLAN NOTE
MELD-Na score calculated; MELD 3.0: 16 at 6/4/2024  6:23 PM  MELD-Na: 16 at 6/4/2024  6:23 PM  Calculated from:  Serum Creatinine: 1.4 mg/dL at 6/4/2024  5:33 PM  Serum Sodium: 139 mmol/L (Using max of 137 mmol/L) at 6/4/2024  5:33 PM  Total Bilirubin: 0.3 mg/dL (Using min of 1 mg/dL) at 6/4/2024  5:33 PM  Serum Albumin: 2.9 g/dL at 6/4/2024  5:33 PM  INR(ratio): 1.8 at 6/4/2024  6:23 PM  Age at listing (hypothetical): 79 years  Sex: Male at 6/4/2024  6:23 PM    Plts 118    Continue chronic meds. Etiology likely Hepatitis vs Fatty Liver. Will avoid any hepatotoxic meds, and monitor CBC/CMP/INR for synthetic function.

## 2024-06-05 NOTE — PROVIDER PROGRESS NOTES - EMERGENCY DEPT.
ER Physician Progress/Interval Note     79-year-old male presented with low hemoglobin at the nursing home  Patient was on Xarelto for atrial fibrillation, no history of GI bleed noted  Nursing home Outpatient lab work showed a hemoglobin of 6 per report   We confirmed this hemoglobin today with a 6.8 hemoglobin in the ER  This patient had a previous hemoglobin of 13 on labs less than 6 months     No abdominal pain or tenderness on my examination in the ER today  Indeed he had a black stool earlier today per the daughter at bedside  Patient with a likely GI bleed, upper versus lower in the differential dx    IV fluids given, type & screen, transfusion of 2 units packed red blood cells  IV Protonix bolus with the IV Protonix drip for possible upper GI bleeding  Hemodynamically stable, 2 large bore IVs were placed in the ER today  Transfuse with transfer for higher level care eval by Gastroenterology    Critical Care ED Physician Time (minutes):  -- Performed by: Julio César Marroquin M.D.  -- Date/Time: 7:04 PM 6/4/2024   -- Direct Patient Care (Face Time): 15  -- Additional History from Records or Taking Additional History: 15  -- Ordering, Reviewing, and Interpreting Diagnostic Studies: 15  -- Total Time in Documentation: 15  -- Consultation with Other Physicians: 15  -- Consultation with Family Related to Condition: 15  -- Total Critical Care Time: 75  -- Critical care was necessary to treat GI bleeding  -- Critical care was time spent personally by me on the following activities:   -- discussions with consultants regarding treatment plan today  -- development of treatment plan with patient & their family  -- examination of patient, ordering and performing treatments   -- review of radiographic studies, re-evaluation of pt's condition       Julio César Marroquin M.D. 7:02 PM 6/4/2024

## 2024-06-05 NOTE — PLAN OF CARE
Reported off to GERALD Hancock,  v/s  98.2  59  19  102/55  100%O2 sats on RA.  Pt. Will be transported back to room 469  via stretcher, NAD noted.

## 2024-06-05 NOTE — PLAN OF CARE
CM met with pt at bedside to discuss discharge planning, pt confused. Contacted daughter Loreta Robbins [phone]. Pt is a resident at [facility] in [facility] And plans to return there after medically ready for discharge. Daughter stated pt is unable to walk and has been WC bound for years. Upon discharge, hospital will provide transport back to NH. Pt made aware to contact CM with any questions or concerns. Cm will continue to follow and assist with any discharge needs.   [place] - Telemetry  Initial Discharge Assessment       Primary Care Provider: Frederick Rocha MD    Admission Diagnosis: Upper GI bleed [K92.2]    Admission Date: 6/5/2024  Expected Discharge Date:     Transition of Care Barriers: (P) None    Payor: MEDICARE / Plan: MEDICARE PART A & B / Product Type: Government /     Extended Emergency Contact Information  Primary Emergency Contact: Loreta Robbins   [facility]  Home Phone: [phone]  Mobile Phone: [phone]  Relation: Daughter  Secondary Emergency Contact: Jarrod Muñoz  Home Phone: [phone]  Mobile Phone: [phone]  Relation: Son    Discharge Plan A: (P) Return to nursing home         [facility] - [address] [address]  Phone: [phone] Fax: [phone]      Initial Assessment (most recent)       Adult Discharge Assessment - 06/05/24 1236          Discharge Assessment    Assessment Type Discharge Planning Assessment     Confirmed/corrected address, phone number and insurance Yes (P)      Confirmed Demographics Correct on Facesheet (P)      Source of Information family (P)      If unable to respond/provide information was family/caregiver contacted? Yes (P)      Contact Name/Number Loreta Robbins (daughter) [phone] (P)      Communicated CARRIE with patient/caregiver Date not available/Unable to determine (P)      Reason For Admission Upper GI Bleed (P)      People in Home facility resident (P)       Facility Arrived From: [facility] [place] (P)      Do you expect to return to your current living situation? Yes (P)      Do you have help at home or someone to help you manage your care at home? Yes (P)      Who are your caregiver(s) and their phone number(s)? Facility care givers (P)      Prior to hospitilization cognitive status: Unable to Assess (P)      Current cognitive status: Not Oriented to Place;Not Oriented to Time (P)      Walking or Climbing Stairs Difficulty yes (P)      Walking or Climbing Stairs transferring difficulty, dependent;transferring difficulty, assistance 1 person (P)      Mobility Management Unable to walk (P)      Dressing/Bathing Difficulty yes (P)      Dressing/Bathing bathing difficulty, assistance 1 person;bathing difficulty, requires equipment (P)      Are you on dialysis? No (P)      Do you take coumadin? No (P)    Xarelto    Discharge Plan A Return to nursing home (P)      DME Needed Upon Discharge  none (P)      Discharge Plan discussed with: Adult children (P)      Transition of Care Barriers None (P)

## 2024-06-05 NOTE — SUBJECTIVE & OBJECTIVE
Interval History: Nursing notes reviewed and no acute events overnight. VSS. Pt confused and oriented only to name. Trending Hg, improved to 10.2. GI consulted, plan for EGD today.    Review of Systems   Constitutional:  Positive for fatigue. Negative for activity change, appetite change, chills and fever.   Respiratory:  Negative for chest tightness and shortness of breath.    Cardiovascular:  Negative for chest pain.   Gastrointestinal:  Negative for abdominal pain, anal bleeding, blood in stool and constipation.   Genitourinary:  Negative for dysuria, frequency and genital sores.   Skin:  Negative for pallor and rash.   Neurological:  Positive for weakness.     Objective:     Vital Signs (Most Recent):  Temp: 98.5 °F (36.9 °C) (06/05/24 1208)  Pulse: 61 (06/05/24 1208)  Resp: 18 (06/05/24 1208)  BP: (!) 148/62 (06/05/24 1208)  SpO2: 98 % (06/05/24 1208) Vital Signs (24h Range):  Temp:  [97 °F (36.1 °C)-98.8 °F (37.1 °C)] 98.5 °F (36.9 °C)  Pulse:  [60-78] 61  Resp:  [15-20] 18  SpO2:  [94 %-100 %] 98 %  BP: (103-166)/(53-72) 148/62     Weight: 75.5 kg (166 lb 7.2 oz)  Body mass index is 23.88 kg/m².    Intake/Output Summary (Last 24 hours) at 6/5/2024 1519  Last data filed at 6/5/2024 1440  Gross per 24 hour   Intake 0 ml   Output --   Net 0 ml         Physical Exam  Vitals and nursing note reviewed.   Constitutional:       General: He is not in acute distress.     Appearance: Normal appearance.   HENT:      Head: Normocephalic and atraumatic.      Nose: Nose normal.   Eyes:      Extraocular Movements: Extraocular movements intact.      Pupils: Pupils are equal, round, and reactive to light.   Cardiovascular:      Rate and Rhythm: Normal rate and regular rhythm.      Heart sounds: Normal heart sounds. No murmur heard.  Pulmonary:      Effort: Pulmonary effort is normal. No respiratory distress.      Breath sounds: Normal breath sounds.   Abdominal:      General: Abdomen is flat.      Palpations: Abdomen is soft.       Tenderness: There is no abdominal tenderness.   Musculoskeletal:      Right lower leg: No edema.      Left lower leg: No edema.   Skin:     General: Skin is warm and dry.   Neurological:      General: No focal deficit present.      Mental Status: He is alert.             Significant Labs: All pertinent labs within the past 24 hours have been reviewed.    Significant Imaging: I have reviewed all pertinent imaging results/findings within the past 24 hours.

## 2024-06-05 NOTE — ANESTHESIA PREPROCEDURE EVALUATION
06/05/2024  Junaid Muñoz is a 79 y.o., male.      Pre-op Assessment    I have reviewed the Patient Summary Reports.     I have reviewed the Nursing Notes. I have reviewed the NPO Status.      Review of Systems  Anesthesia Hx:   History of prior surgery of interest to airway management or planning:            Denies Personal Hx of Anesthesia complications.                    Cardiovascular:     Hypertension   CAD                  Coronary Artery Disease:                            Hypertension         Hepatic/GI:      Liver Disease, Hepatitis        Liver Disease, Hepatitis        Neurological:   CVA                       CVA - Cerebrovasular Accident                 Endocrine:   Hypothyroidism       Hypothyroidism              Physical Exam  General: Well nourished    Airway:  Mallampati: II   Mouth Opening: Normal  Neck ROM: Normal ROM        Anesthesia Plan  Type of Anesthesia, risks & benefits discussed:    Anesthesia Type: Gen Natural Airway  Informed Consent: Informed consent signed with the Patient and all parties understand the risks and agree with anesthesia plan.  All questions answered.   ASA Score: 3    Ready For Surgery From Anesthesia Perspective.     .

## 2024-06-05 NOTE — H&P
[facility] Medicine  History & Physical    Patient Name: Junaid Muñoz  MRN: [id number]  Patient Class: IP- Inpatient  Admission Date: 6/5/2024  Attending Physician: Alex Eddy MD   Primary Care Provider: Frederick Rocha MD         Patient information was obtained from patient and ER records.     Subjective:     Principal Problem:Symptomatic anemia    Chief Complaint:   Chief Complaint   Patient presents with    GI Bleeding        HPI: Junaid Muñoz is a 79-year-old male with a past medical history of CAD, significant PAD, type 2 diabetes, CVA, hypertension, hypothyroidism, cirrhosis, long-term anticoagulation with Xarelto, recent hospitalization for osteomyelitis of toe, who presents to an outside hospital due to abnormal labs.    Patient states that he has had feelings of generalized fatigue, dark stools for the past few days.  He denies any overt bleeding from hematuria, hematochezia. He was told to get labs outpatient by his PCP where he was found to have a significant drop in his hemoglobin.  He was told to go to the ER and then he was transferred over for GI evaluation at [place].    At the outside hospital, triage vitals were fairly unremarkable patient was satting well on room air.  Review of systems significant for fatigue.  Physical exam fairly unremarkable.    CBC was significant for hemoglobin of 6.8, mild thrombocytopenia.  PT and INR were slightly elevated.  BMP was fairly unremarkable.  BNP slightly elevated with normal troponins.    Due to concerns for symptomatic anemia, patient was transfused 2 units of blood.  He was given a dose of ceftriaxone and started on octreotide.     Patient transferred for concern for GI bleed.    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    Anticoagulant long-term use     CAD (coronary artery disease)     s/p stent 2005, on plavix    Cellulitis     Chronic hepatitis C     Cirrhosis     biopsy  proven - 2007    CVA (cerebral vascular accident) 2021    Elevated LDL cholesterol level     History of colon polyps 06/2008    1 polyp - tubular adenoma    History of fall     HTN (hypertension)     Hyperlipidemia     Hypothyroidism     Multiple bruises     Peripheral vascular disease, unspecified     Skin tear of forearm without complication, initial encounter     skin tear to hand dressing in place    Wound of left foot 10/06/2022    currently covered in pre-admit assessment unable to visualize       Past Surgical History:   Procedure Laterality Date    COLONOSCOPY W/ POLYPECTOMY  06/2008    Dr Wagoner: fair prep, 3mm polyp - tubular adenoma    CORONARY ANGIOPLASTY WITH STENT PLACEMENT      DEBRIDEMENT OF AMPUTATION SITE Left 10/23/2023    Procedure: DEBRIDEMENT, AMPUTATION SITE;  Surgeon: Lindsey Alberts III DPM;  Location: [facility] OR;  Service: Orthopedics;  Laterality: Left;    FUSION OF CERVICAL SPINE BY POSTERIOR APPROACH N/A 12/17/2019    Procedure: FUSION, SPINE, CERVICAL, POSTERIOR APPROACH C2-T3 posterior instrumented fusion;  Surgeon: Elian Deluca MD;  Location: [facility] OR [address] [place] [address];  Service: Neurosurgery;  Laterality: N/A;    hydrocele repair  teenager    INSERTION, PICC, AGE 5 YEARS OR OLDER N/A 10/25/2023    Procedure: INSERTION, PICC, AGE 5 YEARS OR OLDER;  Surgeon: Provider, Dosc Diagnostic;  Location: [facility] OR;  Service: General;  Laterality: N/A;    INSERTION, PICC, AGE 5 YEARS OR OLDER N/A 11/2/2023    Procedure: INSERTION, PICC, AGE 5 YEARS OR OLDER;  Surgeon: Provider, Dosc Diagnostic;  Location: [facility] OR;  Service: General;  Laterality: N/A;  OPP #24    PERCUTANEOUS TRANSLUMINAL ANGIOPLASTY (PTA) OF PERIPHERAL VESSEL Left 10/14/2022    Procedure: PTA, PERIPHERAL VESSEL;  Surgeon: Jem Soriano MD;  Location: [facility] CATH;  Service: Cardiology;  Laterality: Left;  peripheral angiogram    PERIPHERALLY INSERTED CENTRAL CATHETER INSERTION Left 10/23/2023    Procedure: INSERTION, PICC;  Surgeon:  Lindsey Alberts III, DPM;  Location: [facility];  Service: Orthopedics;  Laterality: Left;    pyloric stenosis repair  age 1    SURGICAL REMOVAL OF VERTEBRAL BODY OF THORACIC SPINE N/A 12/10/2019    Procedure: CORPECTOMY, SPINE, CERVICAL AND THORACIC, C7 and T1 with Globus;  Surgeon: Elian Deluca MD;  Location: [address];  Service: Neurosurgery;  Laterality: N/A;    TOE AMPUTATION Left 8/15/2022    Procedure: AMPUTATION, TOE First, second, and third toes;  Surgeon: Lindsey Alberts III, DPM;  Location: [facility];  Service: Podiatry;  Laterality: Left;  Req. 11am per MD    TOE AMPUTATION Left 9/22/2023    Procedure: AMPUTATION, TOE, 4TH AND 5TH DIGITS, LEFT;  Surgeon: Lindsey Alberts III, DPM;  Location: [facility];  Service: Podiatry;  Laterality: Left;  7am per [place]    TONSILLECTOMY         Review of patient's allergies indicates:   Allergen Reactions    Erythromycin Other (See Comments)    Penicillins Rash     Tolerated cefepime December 2019       Current Facility-Administered Medications on File Prior to Encounter   Medication    [COMPLETED] acetaminophen tablet 1,000 mg    [COMPLETED] cefTRIAXone (ROCEPHIN) 2 g in dextrose 5 % in water (D5W) 100 mL IVPB (MB+)    [COMPLETED] pantoprazole injection 80 mg    [DISCONTINUED] 0.9%  NaCl infusion (for blood administration)    [DISCONTINUED] octreotide (SANDOSTATIN) 500 mcg in sodium chloride 0.9% 100 mL infusion    [DISCONTINUED] pantoprazole (PROTONIX) 40 mg in sodium chloride 0.9 % 100 mL IVPB (MB+)     Current Outpatient Medications on File Prior to Encounter   Medication Sig    acetaminophen (TYLENOL) 325 MG tablet Take 325 mg by mouth every 6 (six) hours as needed for Pain.    ascorbic acid, vitamin C, (VITAMIN C) 500 MG tablet Take 1 tablet (500 mg total) by mouth once daily.    aspirin (ECOTRIN) 81 MG EC tablet Take 1 tablet (81 mg total) by mouth once daily.    atorvastatin (LIPITOR) 40 MG tablet Take 20 mg by mouth once daily.    ergocalciferol,  vitamin D2, (VITAMIN D2 ORAL) Take 50,000 Units by mouth. TWICE WEEKLY    ferrous sulfate (FEOSOL) 325 mg (65 mg iron) Tab tablet Take 1 tablet (325 mg total) by mouth every other day.    HYDROcodone-acetaminophen (NORCO) 7.5-325 mg per tablet Take 1 tablet by mouth every 6 (six) hours as needed for Pain.    lactulose (CEPHULAC) 10 gram packet Take 30 g by mouth 2 (two) times a day.    levothyroxine (SYNTHROID) 50 MCG tablet Take 50 mcg by mouth before breakfast.    megestroL (MEGACE) 20 MG Tab Take 40 mg by mouth 2 (two) times daily.    memantine (NAMENDA) 5 MG Tab Take 5 mg by mouth 2 (two) times daily.    metoprolol succinate (TOPROL-XL) 100 MG 24 hr tablet Take 100 mg by mouth once daily.    ondansetron (ZOFRAN-ODT) 8 MG TbDL Take 8 mg by mouth once.    rivaroxaban (XARELTO) 20 mg Tab Take 20 mg by mouth daily with dinner or evening meal.    tamsulosin (FLOMAX) 0.4 mg Cap Take 0.4 mg by mouth once daily.    valsartan-hydrochlorothiazide (DIOVAN-HCT) 160-25 mg per tablet Take 1 tablet by mouth once daily.     Family History       Problem Relation (Age of Onset)    No Known Problems Mother, Father, Son, Son, Son          Tobacco Use    Smoking status: Former     Current packs/day: 0.00     Types: Cigarettes     Quit date: 10/14/2022     Years since quittin.6    Smokeless tobacco: Never   Substance and Sexual Activity    Alcohol use: Not Currently     Comment: maybe a couple times a year    Drug use: Yes     Types: IV, Marijuana     Comment: MORPHINE (denies); A few times a month    Sexual activity: Not on file     Review of Systems   Constitutional:  Positive for fatigue. Negative for activity change and appetite change.   HENT:  Negative for ear discharge and ear pain.    Eyes:  Negative for pain and redness.   Respiratory:  Negative for chest tightness.    Cardiovascular:  Negative for chest pain.   Gastrointestinal:  Negative for anal bleeding and blood in stool.   Endocrine: Negative for polydipsia and  polyphagia.   Genitourinary:  Negative for frequency and genital sores.   Musculoskeletal:  Negative for gait problem and joint swelling.   Skin:  Negative for pallor and rash.   Allergic/Immunologic: Negative for environmental allergies and food allergies.   Neurological:  Negative for seizures and numbness.   Hematological:  Negative for adenopathy. Does not bruise/bleed easily.   Psychiatric/Behavioral:  Negative for confusion and decreased concentration.      Objective:     Vital Signs (Most Recent):  Temp: 98.2 °F (36.8 °C) (06/05/24 0357)  Pulse: 62 (06/05/24 0426)  Resp: 18 (06/05/24 0357)  BP: (!) 149/63 (06/05/24 0357)  SpO2: 97 % (06/05/24 0357) Vital Signs (24h Range):  Temp:  [97 °F (36.1 °C)-98.8 °F (37.1 °C)] 98.2 °F (36.8 °C)  Pulse:  [60-78] 62  Resp:  [15-20] 18  SpO2:  [94 %-100 %] 97 %  BP: (103-156)/(53-71) 149/63     Weight: 75.5 kg (166 lb 7.2 oz)  Body mass index is 23.88 kg/m².     Physical Exam  Constitutional:       General: He is not in acute distress.     Appearance: Normal appearance. He is not ill-appearing.   HENT:      Head: Normocephalic and atraumatic.      Right Ear: There is no impacted cerumen.      Left Ear: Tympanic membrane normal. There is no impacted cerumen.      Nose: No congestion or rhinorrhea.      Mouth/Throat:      Pharynx: No oropharyngeal exudate or posterior oropharyngeal erythema.   Eyes:      General:         Right eye: No discharge.         Left eye: No discharge.   Cardiovascular:      Rate and Rhythm: Tachycardia present.      Heart sounds: No murmur heard.     No gallop.   Abdominal:      Tenderness: There is no abdominal tenderness. There is no guarding or rebound.      Hernia: No hernia is present.   Musculoskeletal:         General: No deformity.      Cervical back: No rigidity.      Right lower leg: No edema.   Lymphadenopathy:      Cervical: No cervical adenopathy.   Skin:     Findings: No bruising or lesion.   Neurological:      Mental Status: He is  alert.      Motor: No weakness.      Gait: Gait normal.   Psychiatric:         Mood and Affect: Mood normal.         Behavior: Behavior normal.                Significant Labs: All pertinent labs within the past 24 hours have been reviewed.  BMP:   Recent Labs   Lab 06/04/24  1733         K 3.7   *   CO2 18*   BUN 32*   CREATININE 1.4   CALCIUM 9.0     CBC:   Recent Labs   Lab 06/04/24  1733   WBC 6.95   HGB 6.8*   HCT 22.8*   *     CMP:   Recent Labs   Lab 06/04/24  1733      K 3.7   *   CO2 18*      BUN 32*   CREATININE 1.4   CALCIUM 9.0   PROT 6.3   ALBUMIN 2.9*   BILITOT 0.3   ALKPHOS 101   AST 25   ALT 24   ANIONGAP 9       Significant Imaging: I have reviewed all pertinent imaging results/findings within the past 24 hours.  I have reviewed and interpreted all pertinent imaging results/findings within the past 24 hours.  Assessment/Plan:     * Symptomatic anemia  Patient's anemia is currently controlled. Has received 2 units of PRBCs on 06/4-06/5 . Etiology likely d/t acute blood loss which was from upper GI source  Current CBC reviewed-   Lab Results   Component Value Date    HGB 6.8 (L) 06/04/2024    HCT 22.8 (L) 06/04/2024     Monitor serial CBC and transfuse if patient becomes hemodynamically unstable, symptomatic or H/H drops below 7/21.    Plan:  Patient likely with upper GI bleed.  We will hold anticoagulation and antiplatelet therapy.  NPO  Start IV pantoprazole b.i.d.  May finish octreotide  2 large-bore IVs  CBC every 8 hours      Cirrhosis    MELD-Na score calculated; MELD 3.0: 16 at 6/4/2024  6:23 PM  MELD-Na: 16 at 6/4/2024  6:23 PM  Calculated from:  Serum Creatinine: 1.4 mg/dL at 6/4/2024  5:33 PM  Serum Sodium: 139 mmol/L (Using max of 137 mmol/L) at 6/4/2024  5:33 PM  Total Bilirubin: 0.3 mg/dL (Using min of 1 mg/dL) at 6/4/2024  5:33 PM  Serum Albumin: 2.9 g/dL at 6/4/2024  5:33 PM  INR(ratio): 1.8 at 6/4/2024  6:23 PM  Age at listing  (hypothetical): 79 years  Sex: Male at 6/4/2024  6:23 PM      Continue chronic meds. Etiology likely Hepatitis vs Fatty Liver. Will avoid any hepatotoxic meds, and monitor CBC/CMP/INR for synthetic function.     BPH (benign prostatic hyperplasia)  No current complaints    Plan:  Continue home Flomax      Peripheral artery disease      Plan:  Hold off on aspirin and Xarelto      Hypothyroidism      Plan:  Continue home Synthroid      Essential hypertension  Chronic, controlled. Latest blood pressure and vitals reviewed-     Temp:  [97 °F (36.1 °C)-98.8 °F (37.1 °C)]   Pulse:  [60-78]   Resp:  [15-20]   BP: (103-156)/(53-71)   SpO2:  [94 %-100 %] .   Home meds for hypertension were reviewed and noted below.   Hypertension Medications               metoprolol succinate (TOPROL-XL) 100 MG 24 hr tablet Take 100 mg by mouth once daily.    valsartan-hydrochlorothiazide (DIOVAN-HCT) 160-25 mg per tablet Take 1 tablet by mouth once daily.            While in the hospital, will manage blood pressure as follows; Continue home antihypertensive regimen    Will utilize p.r.n. blood pressure medication only if patient's blood pressure greater than 180/110 and he develops symptoms such as worsening chest pain or shortness of breath.      VTE Risk Mitigation (From admission, onward)           Ordered     IP VTE HIGH RISK PATIENT  Once         06/05/24 0218     Place sequential compression device  Until discontinued         06/05/24 0218                                    Evangelista Sargent MD  Department of Hospital Medicine  [facility]

## 2024-06-05 NOTE — ASSESSMENT & PLAN NOTE
MELD-Na score calculated; MELD 3.0: 16 at 6/4/2024  6:23 PM  MELD-Na: 16 at 6/4/2024  6:23 PM  Calculated from:  Serum Creatinine: 1.4 mg/dL at 6/4/2024  5:33 PM  Serum Sodium: 139 mmol/L (Using max of 137 mmol/L) at 6/4/2024  5:33 PM  Total Bilirubin: 0.3 mg/dL (Using min of 1 mg/dL) at 6/4/2024  5:33 PM  Serum Albumin: 2.9 g/dL at 6/4/2024  5:33 PM  INR(ratio): 1.8 at 6/4/2024  6:23 PM  Age at listing (hypothetical): 79 years  Sex: Male at 6/4/2024  6:23 PM      Continue chronic meds. Etiology likely Hepatitis vs Fatty Liver. Will avoid any hepatotoxic meds, and monitor CBC/CMP/INR for synthetic function.

## 2024-06-05 NOTE — ASSESSMENT & PLAN NOTE
Patient's anemia is currently controlled. Has received 2 units of PRBCs on 06/4-06/5 . Etiology likely d/t acute blood loss which was from upper GI source  Current CBC reviewed-   Lab Results   Component Value Date    HGB 6.8 (L) 06/04/2024    HCT 22.8 (L) 06/04/2024     Monitor serial CBC and transfuse if patient becomes hemodynamically unstable, symptomatic or H/H drops below 7/21.    Plan:  Patient likely with upper GI bleed.  GI consulted. Appreciate rec's  We will hold anticoagulation and antiplatelet therapy.  NPO  Continue IV pantoprazole b.i.d.  2 large-bore IVs  CBC every 8 hours  EGD today

## 2024-06-05 NOTE — ASSESSMENT & PLAN NOTE
Patient's anemia is currently controlled. Has received 2 units of PRBCs on 06/4-06/5 . Etiology likely d/t acute blood loss which was from upper GI source  Current CBC reviewed-   Lab Results   Component Value Date    HGB 6.8 (L) 06/04/2024    HCT 22.8 (L) 06/04/2024     Monitor serial CBC and transfuse if patient becomes hemodynamically unstable, symptomatic or H/H drops below 7/21.    Plan:  Patient likely with upper GI bleed.  We will hold anticoagulation and antiplatelet therapy.  NPO  Start IV pantoprazole b.i.d.  May finish octreotide  2 large-bore IVs  CBC every 8 hours

## 2024-06-05 NOTE — NURSING
Patient arrived to room 469 via stretcher with transport.  Patient is awake, alert and confused per baseline.  Patient wound care completed per MD orders.  Patient now resting comfortably in bed locked in lowest position, side rails up x3, and call bell in reach.  Will continue to monitor.

## 2024-06-05 NOTE — ANESTHESIA POSTPROCEDURE EVALUATION
Anesthesia Post Evaluation    Patient: Junaid Muñoz    Procedure(s) Performed: Procedure(s) (LRB):  EGD (ESOPHAGOGASTRODUODENOSCOPY) (N/A)    Final Anesthesia Type: general      Patient location during evaluation: PACU  Patient participation: Yes- Able to Participate  Level of consciousness: awake and alert  Post-procedure vital signs: reviewed and stable  Pain management: adequate  Airway patency: patent    PONV status at discharge: No PONV  Anesthetic complications: no      Cardiovascular status: stable  Respiratory status: room air  Hydration status: euvolemic  Follow-up not needed.              Vitals Value Taken Time   /74 06/05/24 1605   Temp 36.8 °C (98.2 °F) 06/05/24 1605   Pulse 58 06/05/24 1614   Resp 18 06/05/24 1605   SpO2 100 % 06/05/24 1605         No case tracking events are documented in the log.      Pain/Saturnino Score: Pain Rating Prior to Med Admin: 5 (6/4/2024  5:03 PM)  Pain Rating Post Med Admin: 0 (6/4/2024  6:02 PM)  Saturnino Score: 9 (6/5/2024  4:05 PM)

## 2024-06-06 ENCOUNTER — ANESTHESIA (OUTPATIENT)
Dept: ENDOSCOPY | Facility: HOSPITAL | Age: 79
DRG: 378 | End: 2024-06-06
Payer: MEDICARE

## 2024-06-06 ENCOUNTER — ANESTHESIA EVENT (OUTPATIENT)
Dept: ENDOSCOPY | Facility: HOSPITAL | Age: 79
DRG: 378 | End: 2024-06-06
Payer: MEDICARE

## 2024-06-06 LAB
ALBUMIN SERPL BCP-MCNC: 3.1 G/DL (ref 3.5–5.2)
ALP SERPL-CCNC: 98 U/L (ref 55–135)
ALT SERPL W/O P-5'-P-CCNC: 21 U/L (ref 10–44)
ANION GAP SERPL CALC-SCNC: 13 MMOL/L (ref 8–16)
AST SERPL-CCNC: 32 U/L (ref 10–40)
BASOPHILS # BLD AUTO: 0.04 K/UL (ref 0–0.2)
BASOPHILS NFR BLD: 0.7 % (ref 0–1.9)
BILIRUB SERPL-MCNC: 0.6 MG/DL (ref 0.1–1)
BUN SERPL-MCNC: 29 MG/DL (ref 8–23)
CALCIUM SERPL-MCNC: 8.8 MG/DL (ref 8.7–10.5)
CHLORIDE SERPL-SCNC: 112 MMOL/L (ref 95–110)
CO2 SERPL-SCNC: 14 MMOL/L (ref 23–29)
CREAT SERPL-MCNC: 1.3 MG/DL (ref 0.5–1.4)
DIFFERENTIAL METHOD BLD: ABNORMAL
EOSINOPHIL # BLD AUTO: 0.1 K/UL (ref 0–0.5)
EOSINOPHIL NFR BLD: 2.1 % (ref 0–8)
ERYTHROCYTE [DISTWIDTH] IN BLOOD BY AUTOMATED COUNT: 18.2 % (ref 11.5–14.5)
EST. GFR  (NO RACE VARIABLE): 56 ML/MIN/1.73 M^2
GLUCOSE SERPL-MCNC: 84 MG/DL (ref 70–110)
HCT VFR BLD AUTO: 30.3 % (ref 40–54)
HGB BLD-MCNC: 9.6 G/DL (ref 14–18)
IMM GRANULOCYTES # BLD AUTO: 0.01 K/UL (ref 0–0.04)
IMM GRANULOCYTES NFR BLD AUTO: 0.2 % (ref 0–0.5)
INR PPP: 1.2 (ref 0.8–1.2)
LYMPHOCYTES # BLD AUTO: 0.9 K/UL (ref 1–4.8)
LYMPHOCYTES NFR BLD: 15.4 % (ref 18–48)
MCH RBC QN AUTO: 26.9 PG (ref 27–31)
MCHC RBC AUTO-ENTMCNC: 31.7 G/DL (ref 32–36)
MCV RBC AUTO: 85 FL (ref 82–98)
MONOCYTES # BLD AUTO: 0.8 K/UL (ref 0.3–1)
MONOCYTES NFR BLD: 12.4 % (ref 4–15)
NEUTROPHILS # BLD AUTO: 4.2 K/UL (ref 1.8–7.7)
NEUTROPHILS NFR BLD: 69.2 % (ref 38–73)
NRBC BLD-RTO: 0 /100 WBC
PLATELET # BLD AUTO: 120 K/UL (ref 150–450)
PMV BLD AUTO: 11.4 FL (ref 9.2–12.9)
POTASSIUM SERPL-SCNC: 4.3 MMOL/L (ref 3.5–5.1)
PROT SERPL-MCNC: 6.4 G/DL (ref 6–8.4)
PROTHROMBIN TIME: 13.3 SEC (ref 9–12.5)
RBC # BLD AUTO: 3.57 M/UL (ref 4.6–6.2)
SODIUM SERPL-SCNC: 139 MMOL/L (ref 136–145)
WBC # BLD AUTO: 6.12 K/UL (ref 3.9–12.7)

## 2024-06-06 PROCEDURE — 85025 COMPLETE CBC W/AUTO DIFF WBC: CPT | Performed by: STUDENT IN AN ORGANIZED HEALTH CARE EDUCATION/TRAINING PROGRAM

## 2024-06-06 PROCEDURE — 80053 COMPREHEN METABOLIC PANEL: CPT

## 2024-06-06 PROCEDURE — 99232 SBSQ HOSP IP/OBS MODERATE 35: CPT | Mod: ,,, | Performed by: INTERNAL MEDICINE

## 2024-06-06 PROCEDURE — C9113 INJ PANTOPRAZOLE SODIUM, VIA: HCPCS | Performed by: STUDENT IN AN ORGANIZED HEALTH CARE EDUCATION/TRAINING PROGRAM

## 2024-06-06 PROCEDURE — 85610 PROTHROMBIN TIME: CPT

## 2024-06-06 PROCEDURE — 25000003 PHARM REV CODE 250

## 2024-06-06 PROCEDURE — 36415 COLL VENOUS BLD VENIPUNCTURE: CPT

## 2024-06-06 PROCEDURE — 63600175 PHARM REV CODE 636 W HCPCS: Performed by: STUDENT IN AN ORGANIZED HEALTH CARE EDUCATION/TRAINING PROGRAM

## 2024-06-06 PROCEDURE — 11000001 HC ACUTE MED/SURG PRIVATE ROOM

## 2024-06-06 PROCEDURE — 25000003 PHARM REV CODE 250: Performed by: STUDENT IN AN ORGANIZED HEALTH CARE EDUCATION/TRAINING PROGRAM

## 2024-06-06 RX ORDER — HYDROCHLOROTHIAZIDE 25 MG/1
25 TABLET ORAL DAILY
Status: DISCONTINUED | OUTPATIENT
Start: 2024-06-06 | End: 2024-06-09 | Stop reason: HOSPADM

## 2024-06-06 RX ORDER — VALSARTAN 160 MG/1
160 TABLET ORAL DAILY
Status: DISCONTINUED | OUTPATIENT
Start: 2024-06-06 | End: 2024-06-09 | Stop reason: HOSPADM

## 2024-06-06 RX ADMIN — CEFTRIAXONE SODIUM 1 G: 1 INJECTION, POWDER, FOR SOLUTION INTRAMUSCULAR; INTRAVENOUS at 08:06

## 2024-06-06 RX ADMIN — PANTOPRAZOLE SODIUM 40 MG: 40 INJECTION, POWDER, FOR SOLUTION INTRAVENOUS at 09:06

## 2024-06-06 RX ADMIN — METOPROLOL SUCCINATE 100 MG: 50 TABLET, EXTENDED RELEASE ORAL at 09:06

## 2024-06-06 RX ADMIN — ATORVASTATIN CALCIUM 20 MG: 20 TABLET, FILM COATED ORAL at 09:06

## 2024-06-06 RX ADMIN — MEGESTROL ACETATE 40 MG: 40 TABLET ORAL at 08:06

## 2024-06-06 RX ADMIN — MEMANTINE HYDROCHLORIDE 5 MG: 5 TABLET ORAL at 09:06

## 2024-06-06 RX ADMIN — COLLAGENASE SANTYL: 250 OINTMENT TOPICAL at 10:06

## 2024-06-06 RX ADMIN — LEVOTHYROXINE SODIUM 50 MCG: 50 TABLET ORAL at 05:06

## 2024-06-06 RX ADMIN — VALSARTAN 160 MG: 160 TABLET, FILM COATED ORAL at 01:06

## 2024-06-06 RX ADMIN — TAMSULOSIN HYDROCHLORIDE 0.4 MG: 0.4 CAPSULE ORAL at 09:06

## 2024-06-06 RX ADMIN — MEGESTROL ACETATE 40 MG: 40 TABLET ORAL at 09:06

## 2024-06-06 RX ADMIN — HYDROCHLOROTHIAZIDE 25 MG: 25 TABLET ORAL at 01:06

## 2024-06-06 RX ADMIN — MEMANTINE HYDROCHLORIDE 5 MG: 5 TABLET ORAL at 08:06

## 2024-06-06 RX ADMIN — MUPIROCIN: 20 OINTMENT TOPICAL at 08:06

## 2024-06-06 NOTE — PROGRESS NOTES
[facility] Medicine  Progress Note    Patient Name: Junaid Muñoz  MRN: [id number]  Patient Class: IP- Inpatient   Admission Date: 6/5/2024  Length of Stay: 1 days  Attending Physician: Alex Eddy MD  Primary Care Provider: Frederick Rocha MD        Subjective:     Principal Problem:Symptomatic anemia        HPI:  Junaid Muñoz is a 79-year-old male with a past medical history of CAD, significant PAD, type 2 diabetes, CVA, hypertension, hypothyroidism, cirrhosis, long-term anticoagulation with Xarelto, recent hospitalization for osteomyelitis of toe, who presents to an outside hospital due to abnormal labs.    Patient states that he has had feelings of generalized fatigue, dark stools for the past few days.  He denies any overt bleeding from hematuria, hematochezia. He was told to get labs outpatient by his PCP where he was found to have a significant drop in his hemoglobin.  He was told to go to the ER and then he was transferred over for GI evaluation at [place].    At the outside hospital, triage vitals were fairly unremarkable patient was satting well on room air.  Review of systems significant for fatigue.  Physical exam fairly unremarkable.    CBC was significant for hemoglobin of 6.8, mild thrombocytopenia.  PT and INR were slightly elevated.  BMP was fairly unremarkable.  BNP slightly elevated with normal troponins.    Due to concerns for symptomatic anemia, patient was transfused 2 units of blood.  He was given a dose of ceftriaxone and started on octreotide.     Patient transferred for concern for GI bleed.    Overview/Hospital Course:  No notes on file    Interval History: Nursing notes reviewed and no acute events overnight. Pt w/ high blood pressures overnight (Max 192/81) now 156/72 and mild bradycardia. Hg stable at 9.6, will continue to monitor. EGD yesterday negative for upper GI bleed, Pt did not successfully complete bowel prep...Colonoscopy tomorrow. XR Knee  and Hip negative for fractures.    Review of Systems   Constitutional:  Positive for fatigue. Negative for activity change, appetite change, chills and fever.   Respiratory:  Negative for chest tightness and shortness of breath.    Cardiovascular:  Negative for chest pain.   Gastrointestinal:  Negative for abdominal pain, anal bleeding, blood in stool and constipation.   Genitourinary:  Negative for dysuria and frequency.   Skin:  Positive for pallor. Negative for rash.   Neurological:  Positive for weakness.     Objective:     Vital Signs (Most Recent):  Temp: 98.2 °F (36.8 °C) (06/06/24 1124)  Pulse: 61 (06/06/24 1220)  Resp: 17 (06/06/24 1124)  BP: (!) 161/72 (06/06/24 1124)  SpO2: 96 % (06/06/24 1124) Vital Signs (24h Range):  Temp:  [97.5 °F (36.4 °C)-98.7 °F (37.1 °C)] 98.2 °F (36.8 °C)  Pulse:  [54-81] 61  Resp:  [10-19] 17  SpO2:  [96 %-100 %] 96 %  BP: (102-192)/(62-81) 161/72     Weight: 75.5 kg (166 lb 7.2 oz)  Body mass index is 23.88 kg/m².    Intake/Output Summary (Last 24 hours) at 6/6/2024 1507  Last data filed at 6/6/2024 0501  Gross per 24 hour   Intake 50 ml   Output 750 ml   Net -700 ml         Physical Exam  Vitals and nursing note reviewed.   Constitutional:       General: He is not in acute distress.     Appearance: Normal appearance.   HENT:      Head: Normocephalic and atraumatic.      Nose: Nose normal.   Eyes:      Extraocular Movements: Extraocular movements intact.      Pupils: Pupils are equal, round, and reactive to light.   Cardiovascular:      Rate and Rhythm: Normal rate and regular rhythm.      Heart sounds: Normal heart sounds. No murmur heard.  Pulmonary:      Effort: Pulmonary effort is normal. No respiratory distress.      Breath sounds: Normal breath sounds.   Abdominal:      General: Abdomen is flat.      Palpations: Abdomen is soft.      Tenderness: There is no abdominal tenderness.   Musculoskeletal:      Right lower leg: No edema.      Left lower leg: No edema.   Skin:      General: Skin is warm and dry.      Coloration: Skin is pale.      Comments: Dressing to right heel noted.   Neurological:      General: No focal deficit present.      Mental Status: He is alert.             Significant Labs: All pertinent labs within the past 24 hours have been reviewed.    Significant Imaging: I have reviewed all pertinent imaging results/findings within the past 24 hours.    Assessment/Plan:      * Symptomatic anemia  Patient's anemia is currently controlled. Has received 2 units of PRBCs on 06/4-06/5 . Etiology likely d/t acute blood loss which was from lower GI bleed source  Current CBC reviewed-   Lab Results   Component Value Date    HGB 9.6 (L) 06/06/2024    HCT 30.3 (L) 06/06/2024     Monitor serial CBC and transfuse if patient becomes hemodynamically unstable, symptomatic or H/H drops below 7/21.    Plan:  Hg stable, 9.6 today  GI consulted. Appreciate rec's  EGD negative for upper GI bleed  We will hold anticoagulation and antiplatelet therapy.  Clear liquids and bowel prep for Colonoscopy today  Discontinue IV pantoprazole b.i.d.  2 large-bore IVs  CBC every 8 hours    Right thigh pain  Pt c/o recent onset right thigh and calf pain. Right leg in flexed position and unable to passively extend. Pt is a poor historian w/ baseline confusion. Concern for potential fall. Will obtain XR Hip and Knee  -XR Hip and Knee negative for fracture  -Pt not c/o pain today      Cirrhosis    MELD-Na score calculated; MELD 3.0: 11 at 6/6/2024  3:06 AM  MELD-Na: 11 at 6/6/2024  3:06 AM  Calculated from:  Serum Creatinine: 1.3 mg/dL at 6/6/2024  3:06 AM  Serum Sodium: 139 mmol/L (Using max of 137 mmol/L) at 6/6/2024  3:06 AM  Total Bilirubin: 0.6 mg/dL (Using min of 1 mg/dL) at 6/6/2024  3:06 AM  Serum Albumin: 3.1 g/dL at 6/6/2024  3:06 AM  INR(ratio): 1.2 at 6/6/2024  3:06 AM  Age at listing (hypothetical): 79 years  Sex: Male at 6/6/2024  3:06 AM      Continue chronic meds. Etiology likely Hepatitis vs  Fatty Liver. Will avoid any hepatotoxic meds, and monitor CBC/CMP/INR for synthetic function.     BPH (benign prostatic hyperplasia)  No current complaints    Plan:  Continue home Flomax      Peripheral artery disease      Plan:  Hold off on aspirin and Xarelto      Hyperlipidemia  Continue home statin      Hypothyroidism      Plan:  Continue home Synthroid      Essential hypertension  Chronic, controlled. Latest blood pressure and vitals reviewed-     Temp:  [97.5 °F (36.4 °C)-98.7 °F (37.1 °C)]   Pulse:  [54-81]   Resp:  [10-19]   BP: (102-192)/(62-81)   SpO2:  [96 %-100 %] .   Home meds for hypertension were reviewed and noted below.   Hypertension Medications               metoprolol succinate (TOPROL-XL) 100 MG 24 hr tablet Take 100 mg by mouth once daily.    valsartan-hydrochlorothiazide (DIOVAN-HCT) 160-25 mg per tablet Take 1 tablet by mouth once daily.            While in the hospital, will manage blood pressure as follows; Continue home antihypertensive regimen    Will utilize p.r.n. blood pressure medication only if patient's blood pressure greater than 180/110 and he develops symptoms such as worsening chest pain or shortness of breath.      VTE Risk Mitigation (From admission, onward)           Ordered     IP VTE HIGH RISK PATIENT  Once         06/05/24 0218     Place sequential compression device  Until discontinued         06/05/24 0218                    Discharge Planning   CARRIE:      Code Status: Full Code   Is the patient medically ready for discharge?:     Reason for patient still in hospital (select all that apply): Patient trending condition, Treatment, Imaging, and Consult recommendations  Discharge Plan A: Return to nursing home                  Simone Barraza PA-C  Department of [facility] Medicine   [facility]

## 2024-06-06 NOTE — ASSESSMENT & PLAN NOTE
Chronic, controlled. Latest blood pressure and vitals reviewed-     Temp:  [97.5 °F (36.4 °C)-98.7 °F (37.1 °C)]   Pulse:  [54-81]   Resp:  [10-19]   BP: (102-192)/(62-81)   SpO2:  [96 %-100 %] .   Home meds for hypertension were reviewed and noted below.   Hypertension Medications               metoprolol succinate (TOPROL-XL) 100 MG 24 hr tablet Take 100 mg by mouth once daily.    valsartan-hydrochlorothiazide (DIOVAN-HCT) 160-25 mg per tablet Take 1 tablet by mouth once daily.            While in the hospital, will manage blood pressure as follows; Continue home antihypertensive regimen    Will utilize p.r.n. blood pressure medication only if patient's blood pressure greater than 180/110 and he develops symptoms such as worsening chest pain or shortness of breath.

## 2024-06-06 NOTE — PLAN OF CARE
VN note: Chart review completed. Patient labs and notes reviewed. Care plan and goals updated. VN available to intervene as needed.

## 2024-06-06 NOTE — SUBJECTIVE & OBJECTIVE
Subjective:     Interval History:   Nursing reports didn't complete but half the prep  Still some solid stool  No vomtiing  No abd pain    Pt states he will re-try to complete prep tonight  Slightly confused      Review of Systems   Constitutional:  Negative for chills and fever.   Respiratory:  Negative for choking and chest tightness.    Gastrointestinal:  Negative for abdominal pain and vomiting.   Neurological:  Negative for dizziness and headaches.   Psychiatric/Behavioral:  Negative for agitation and behavioral problems.      Objective:     Vital Signs (Most Recent):  Temp: 98.2 °F (36.8 °C) (06/06/24 1124)  Pulse: (!) 59 (06/06/24 1124)  Resp: 17 (06/06/24 1124)  BP: (!) 161/72 (06/06/24 1124)  SpO2: 96 % (06/06/24 1124) Vital Signs (24h Range):  Temp:  [97.5 °F (36.4 °C)-98.7 °F (37.1 °C)] 98.2 °F (36.8 °C)  Pulse:  [54-81] 59  Resp:  [10-19] 17  SpO2:  [96 %-100 %] 96 %  BP: (102-192)/(62-81) 161/72     Weight: 75.5 kg (166 lb 7.2 oz) (06/05/24 0148)  Body mass index is 23.88 kg/m².      Intake/Output Summary (Last 24 hours) at 6/6/2024 1211  Last data filed at 6/6/2024 0501  Gross per 24 hour   Intake 50 ml   Output 750 ml   Net -700 ml       Lines/Drains/Airways       Peripheral Intravenous Line  Duration                  Peripheral IV - Single Lumen 06/04/24 20 G Left Wrist 2 days         Peripheral IV - Single Lumen 06/04/24 20 G Right Wrist 2 days                     Physical Exam  Vitals reviewed.   Constitutional:       Appearance: He is not toxic-appearing.   HENT:      Head: Normocephalic and atraumatic.   Eyes:      General: No scleral icterus.     Conjunctiva/sclera: Conjunctivae normal.   Abdominal:      General: There is no distension.      Palpations: Abdomen is soft.   Skin:     Coloration: Skin is pale.   Neurological:      Mental Status: He is alert and oriented to person, place, and time.      Gait: Gait normal.   Psychiatric:         Mood and Affect: Mood normal.         Behavior:  Behavior normal.          Significant Labs:  CBC:   Recent Labs   Lab 06/05/24  1338 06/05/24  1841 06/06/24  0306   WBC 6.23 7.18 6.12   HGB 10.2* 9.1* 9.6*   HCT 32.7* 28.4* 30.3*   * 108* 120*     BMP:   Recent Labs   Lab 06/06/24  0306   GLU 84      K 4.3   *   CO2 14*   BUN 29*   CREATININE 1.3   CALCIUM 8.8     CMP:   Recent Labs   Lab 06/06/24  0306   GLU 84   CALCIUM 8.8   ALBUMIN 3.1*   PROT 6.4      K 4.3   CO2 14*   *   BUN 29*   CREATININE 1.3   ALKPHOS 98   ALT 21   AST 32   BILITOT 0.6     Coagulation:   Recent Labs   Lab 06/04/24  1823 06/06/24  0306   INR 1.8* 1.2   APTT 39.6*  --          Significant Imaging:  Imaging results within the past 24 hours have been reviewed.

## 2024-06-06 NOTE — ASSESSMENT & PLAN NOTE
Patient's anemia is currently controlled. Has received 2 units of PRBCs on 06/4-06/5 . Etiology likely d/t acute blood loss which was from lower GI bleed source  Current CBC reviewed-   Lab Results   Component Value Date    HGB 9.6 (L) 06/06/2024    HCT 30.3 (L) 06/06/2024     Monitor serial CBC and transfuse if patient becomes hemodynamically unstable, symptomatic or H/H drops below 7/21.    Plan:  Hg stable, 9.6 today  GI consulted. Appreciate rec's  EGD negative for upper GI bleed  We will hold anticoagulation and antiplatelet therapy.  Clear liquids and bowel prep for Colonoscopy today  Discontinue IV pantoprazole b.i.d.  2 large-bore IVs  CBC every 8 hours

## 2024-06-06 NOTE — PLAN OF CARE
Pt is scheduled for colonoscopy today. When medically ready for discharge, plan is dc back to [facility]. Pt will be transported back by hospital transportation. CM will continue to follow pt and provide any discharge needs.    06/06/24 1030   Rounds   Attendance Provider;Nurse    Discharge Plan A Return to nursing home   Why the patient remains in the hospital Requires continued medical care   Transition of Care Barriers None

## 2024-06-06 NOTE — ASSESSMENT & PLAN NOTE
Without reported overt bleeding.  Hx of xarelto use and cirrhosis.    Egd 6/5 wihtout obvious source    Recs  Re-attempt colon prep tonight for colonscopy potentially tomorrow   If fails prep tonight, will not retry further during this hopsitalization  Ok for clear liquids now  Npo mn  Re-attempt prep tonight , prep still at bedside    Discussed personally with nurse

## 2024-06-06 NOTE — ASSESSMENT & PLAN NOTE
MELD-Na score calculated; MELD 3.0: 11 at 6/6/2024  3:06 AM  MELD-Na: 11 at 6/6/2024  3:06 AM  Calculated from:  Serum Creatinine: 1.3 mg/dL at 6/6/2024  3:06 AM  Serum Sodium: 139 mmol/L (Using max of 137 mmol/L) at 6/6/2024  3:06 AM  Total Bilirubin: 0.6 mg/dL (Using min of 1 mg/dL) at 6/6/2024  3:06 AM  Serum Albumin: 3.1 g/dL at 6/6/2024  3:06 AM  INR(ratio): 1.2 at 6/6/2024  3:06 AM  Age at listing (hypothetical): 79 years  Sex: Male at 6/6/2024  3:06 AM      Continue chronic meds. Etiology likely Hepatitis vs Fatty Liver. Will avoid any hepatotoxic meds, and monitor CBC/CMP/INR for synthetic function.

## 2024-06-06 NOTE — SUBJECTIVE & OBJECTIVE
Interval History: Nursing notes reviewed and no acute events overnight. Pt w/ high blood pressures overnight (Max 192/81) now 156/72 and mild bradycardia. Hg stable at 9.6, will continue to monitor. EGD yesterday negative for upper GI bleed, Pt did not successfully complete bowel prep...Colonoscopy tomorrow. XR Knee and Hip negative for fractures.    Review of Systems   Constitutional:  Positive for fatigue. Negative for activity change, appetite change, chills and fever.   Respiratory:  Negative for chest tightness and shortness of breath.    Cardiovascular:  Negative for chest pain.   Gastrointestinal:  Negative for abdominal pain, anal bleeding, blood in stool and constipation.   Genitourinary:  Negative for dysuria and frequency.   Skin:  Positive for pallor. Negative for rash.   Neurological:  Positive for weakness.     Objective:     Vital Signs (Most Recent):  Temp: 98.2 °F (36.8 °C) (06/06/24 1124)  Pulse: 61 (06/06/24 1220)  Resp: 17 (06/06/24 1124)  BP: (!) 161/72 (06/06/24 1124)  SpO2: 96 % (06/06/24 1124) Vital Signs (24h Range):  Temp:  [97.5 °F (36.4 °C)-98.7 °F (37.1 °C)] 98.2 °F (36.8 °C)  Pulse:  [54-81] 61  Resp:  [10-19] 17  SpO2:  [96 %-100 %] 96 %  BP: (102-192)/(62-81) 161/72     Weight: 75.5 kg (166 lb 7.2 oz)  Body mass index is 23.88 kg/m².    Intake/Output Summary (Last 24 hours) at 6/6/2024 1507  Last data filed at 6/6/2024 0501  Gross per 24 hour   Intake 50 ml   Output 750 ml   Net -700 ml         Physical Exam  Vitals and nursing note reviewed.   Constitutional:       General: He is not in acute distress.     Appearance: Normal appearance.   HENT:      Head: Normocephalic and atraumatic.      Nose: Nose normal.   Eyes:      Extraocular Movements: Extraocular movements intact.      Pupils: Pupils are equal, round, and reactive to light.   Cardiovascular:      Rate and Rhythm: Normal rate and regular rhythm.      Heart sounds: Normal heart sounds. No murmur heard.  Pulmonary:      Effort:  Pulmonary effort is normal. No respiratory distress.      Breath sounds: Normal breath sounds.   Abdominal:      General: Abdomen is flat.      Palpations: Abdomen is soft.      Tenderness: There is no abdominal tenderness.   Musculoskeletal:      Right lower leg: No edema.      Left lower leg: No edema.   Skin:     General: Skin is warm and dry.      Coloration: Skin is pale.      Comments: Dressing to right heel noted.   Neurological:      General: No focal deficit present.      Mental Status: He is alert.             Significant Labs: All pertinent labs within the past 24 hours have been reviewed.    Significant Imaging: I have reviewed all pertinent imaging results/findings within the past 24 hours.

## 2024-06-06 NOTE — PROGRESS NOTES
[facility] - Telemetry  Gastroenterology  Progress Note    Patient Name: Junaid Muñoz  MRN: [id number]  Admission Date: 6/5/2024  Hospital Length of Stay: 1 days  Code Status: Full Code   Attending Provider: Alex Eddy MD  Consulting Provider: Mark Hunt MD  Primary Care Physician: Frederick Rocha MD  Principal Problem: Symptomatic anemia        Subjective:     Interval History:   Nursing reports didn't complete but half the prep  Still some solid stool  No vomtiing  No abd pain    Pt states he will re-try to complete prep tonight  Slightly confused      Review of Systems   Constitutional:  Negative for chills and fever.   Respiratory:  Negative for choking and chest tightness.    Gastrointestinal:  Negative for abdominal pain and vomiting.   Neurological:  Negative for dizziness and headaches.   Psychiatric/Behavioral:  Negative for agitation and behavioral problems.      Objective:     Vital Signs (Most Recent):  Temp: 98.2 °F (36.8 °C) (06/06/24 1124)  Pulse: (!) 59 (06/06/24 1124)  Resp: 17 (06/06/24 1124)  BP: (!) 161/72 (06/06/24 1124)  SpO2: 96 % (06/06/24 1124) Vital Signs (24h Range):  Temp:  [97.5 °F (36.4 °C)-98.7 °F (37.1 °C)] 98.2 °F (36.8 °C)  Pulse:  [54-81] 59  Resp:  [10-19] 17  SpO2:  [96 %-100 %] 96 %  BP: (102-192)/(62-81) 161/72     Weight: 75.5 kg (166 lb 7.2 oz) (06/05/24 0148)  Body mass index is 23.88 kg/m².      Intake/Output Summary (Last 24 hours) at 6/6/2024 1211  Last data filed at 6/6/2024 0501  Gross per 24 hour   Intake 50 ml   Output 750 ml   Net -700 ml       Lines/Drains/Airways       Peripheral Intravenous Line  Duration                  Peripheral IV - Single Lumen 06/04/24 20 G Left Wrist 2 days         Peripheral IV - Single Lumen 06/04/24 20 G Right Wrist 2 days                     Physical Exam  Vitals reviewed.   Constitutional:       Appearance: He is not toxic-appearing.   HENT:      Head: Normocephalic and atraumatic.   Eyes:      General: No scleral  icterus.     Conjunctiva/sclera: Conjunctivae normal.   Abdominal:      General: There is no distension.      Palpations: Abdomen is soft.   Skin:     Coloration: Skin is pale.   Neurological:      Mental Status: He is alert and oriented to person, place, and time.      Gait: Gait normal.   Psychiatric:         Mood and Affect: Mood normal.         Behavior: Behavior normal.          Significant Labs:  CBC:   Recent Labs   Lab 06/05/24  1338 06/05/24  1841 06/06/24  0306   WBC 6.23 7.18 6.12   HGB 10.2* 9.1* 9.6*   HCT 32.7* 28.4* 30.3*   * 108* 120*     BMP:   Recent Labs   Lab 06/06/24  0306   GLU 84      K 4.3   *   CO2 14*   BUN 29*   CREATININE 1.3   CALCIUM 8.8     CMP:   Recent Labs   Lab 06/06/24  0306   GLU 84   CALCIUM 8.8   ALBUMIN 3.1*   PROT 6.4      K 4.3   CO2 14*   *   BUN 29*   CREATININE 1.3   ALKPHOS 98   ALT 21   AST 32   BILITOT 0.6     Coagulation:   Recent Labs   Lab 06/04/24  1823 06/06/24  0306   INR 1.8* 1.2   APTT 39.6*  --          Significant Imaging:  Imaging results within the past 24 hours have been reviewed.  Assessment/Plan:     Oncology  * Symptomatic anemia  Without reported overt bleeding.  Hx of xarelto use and cirrhosis.    Egd 6/5 wihtout obvious source    Recs  Re-attempt colon prep tonight for colonscopy potentially tomorrow   If fails prep tonight, will not retry further during this hopsitalization  Ok for clear liquids now  Npo mn  Re-attempt prep tonight , prep still at bedside    Discussed personally with nurse          Thank you for your consult. I will follow-up with patient. Please contact us if you have any additional questions.    Mark Hunt MD  Gastroenterology  [place] [facility], [facility]

## 2024-06-06 NOTE — ANESTHESIA PREPROCEDURE EVALUATION
06/06/2024  Junaid Muñoz is a 79 y.o., male with extensive medical hx on Xarelto     CAD s/p PCI on Plavix  Hep C with Cirrhosis and Portal HTN  Currently admitted for osteomyelitis and symptomatic anemia    EGD yesterday negative  Hx of anesthetics in past without complications  NPO>8 hours  No food or drug allergies  Amenable to proceed with scheduled procedure    Procedure: COLONOSCOPY (N/A)         Patient Active Problem List   Diagnosis    Spinal epidural abscess    Acute osteomyelitis of cervical spine    Acute osteomyelitis of thoracic spine    Chronic hepatitis C without hepatic coma    Coronary artery disease involving native coronary artery of native heart without angina pectoris    Essential hypertension    Hypothyroidism    Hyperlipidemia    Smoker    Elevated alkaline phosphatase level    Gram negative sepsis    Mild tetrahydrocannabinol (THC) abuse    Malignant hypertension requiring acute intensive management    Restlessness and agitation    Leukocytosis    Acute blood loss anemia    Pseudomonas infection    Hyponatremia    Nail avulsion of toe    Pressure injury of sacral region, stage 1    Alteration in skin integrity    Thrombocytopenia    Pure motor lacunar bilateral thalami small-vessel CVA    Facial droop    Bilateral carotid artery stenosis <70%    Hepatic steatosis    Oropharyngeal dysphagia    Microcytic anemia    Prediabetes    Occluded left popliteal artery-related dry gangrene left foot 2nd digit with cellulitis of left foot    Peripheral artery disease    DNR (do not resuscitate) / DNI (do not intubate)    Normocytic anemia    Hypokalemia    Left foot pain    Critical lower limb ischemia    Subacute osteomyelitis of left foot    Phlebitis and thrombophlebitis of other deep vessels of left lower extremity    Symptomatic anemia    BPH (benign prostatic hyperplasia)    Cirrhosis     Right thigh pain       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    Anticoagulant long-term use     CAD (coronary artery disease)     s/p stent 2005, on plavix    Cellulitis     Chronic hepatitis C     Cirrhosis     biopsy proven - 2007    CVA (cerebral vascular accident) 2021    Elevated LDL cholesterol level     History of colon polyps 06/2008    1 polyp - tubular adenoma    History of fall     HTN (hypertension)     Hyperlipidemia     Hypothyroidism     Multiple bruises     Peripheral vascular disease, unspecified     Skin tear of forearm without complication, initial encounter     skin tear to hand dressing in place    Wound of left foot 10/06/2022    currently covered in pre-admit assessment unable to visualize       ECHO: Results for orders placed during the hospital encounter of 12/06/19    Echo Color Flow Doppler? Yes; Bubble Contrast? Yes    Interpretation Summary  · Normal left ventricular systolic function. The estimated ejection fraction is 63%  · Concentric left ventricular remodeling.  · No wall motion abnormalities.  · Normal LV diastolic function.  · Normal right ventricular systolic function.  · Mild aortic valve stenosis.  · Aortic valve area is 1.77 cm2; peak velocity is 2.19 m/s; mean gradient is 11 mmHg.  · Normal central venous pressure (3 mm Hg).      Body mass index is 23.88 kg/m².    Tobacco Use: Medium Risk (6/4/2024)    Patient History     Smoking Tobacco Use: Former     Smokeless Tobacco Use: Never     Passive Exposure: Not on file       Social History     Substance and Sexual Activity   Drug Use Yes    Types: IV, Marijuana    Comment: MORPHINE (denies); A few times a month        Alcohol Use: Not on file       Review of patient's allergies indicates:   Allergen Reactions    Erythromycin Other (See Comments)    Penicillins Rash     Tolerated cefepime December 2019         Airway:  No value filed.         Pre-op Assessment    I have reviewed the  Patient Summary Reports.     I have reviewed the Nursing Notes. I have reviewed the NPO Status.   I have reviewed the Medications.     Review of Systems  Anesthesia Hx:  No problems with previous Anesthesia   History of prior surgery of interest to airway management or planning:          Denies Family Hx of Anesthesia complications.    Denies Personal Hx of Anesthesia complications.                    Hematology/Oncology:       -- Anemia: Iron Deficiency Anemia Blood Loss Anemia  Anemia of Chronic Disease   acute and acute on chronic                                 Cardiovascular:     Hypertension   CAD   CABG/stent       PVD hyperlipidemia                             Hepatic/GI:  Bowel Prep.    Liver Disease, Hepatitis, C  Hepatic/GI Symptoms: hematochezia, hematemesis.       Liver Disease, Hepatitis, Viral Hepatitis Type C , Cirrhosis  Portal Hypertension    Musculoskeletal:       Bone Disorders:    Osteomyelitis        Endocrine:   Hypothyroidism                 Anesthesia Plan  Type of Anesthesia, risks & benefits discussed:    Anesthesia Type: Gen Natural Airway, MAC  Intra-op Monitoring Plan: Standard ASA Monitors  Post Op Pain Control Plan: multimodal analgesia and IV/PO Opioids PRN  Induction:  IV  Informed Consent: Informed consent signed with the Patient and all parties understand the risks and agree with anesthesia plan.  All questions answered. Patient consented to blood products? No  ASA Score: 4    Ready For Surgery From Anesthesia Perspective.     .

## 2024-06-07 LAB
ALBUMIN SERPL BCP-MCNC: 2.8 G/DL (ref 3.5–5.2)
ALP SERPL-CCNC: 84 U/L (ref 55–135)
ALT SERPL W/O P-5'-P-CCNC: 16 U/L (ref 10–44)
ANION GAP SERPL CALC-SCNC: 10 MMOL/L (ref 8–16)
AST SERPL-CCNC: 26 U/L (ref 10–40)
BASOPHILS # BLD AUTO: 0.03 K/UL (ref 0–0.2)
BASOPHILS NFR BLD: 0.5 % (ref 0–1.9)
BILIRUB SERPL-MCNC: 0.5 MG/DL (ref 0.1–1)
BUN SERPL-MCNC: 20 MG/DL (ref 8–23)
CALCIUM SERPL-MCNC: 8.7 MG/DL (ref 8.7–10.5)
CHLORIDE SERPL-SCNC: 109 MMOL/L (ref 95–110)
CO2 SERPL-SCNC: 18 MMOL/L (ref 23–29)
CREAT SERPL-MCNC: 1 MG/DL (ref 0.5–1.4)
DIFFERENTIAL METHOD BLD: ABNORMAL
EOSINOPHIL # BLD AUTO: 0.1 K/UL (ref 0–0.5)
EOSINOPHIL NFR BLD: 2 % (ref 0–8)
ERYTHROCYTE [DISTWIDTH] IN BLOOD BY AUTOMATED COUNT: 17.6 % (ref 11.5–14.5)
EST. GFR  (NO RACE VARIABLE): >60 ML/MIN/1.73 M^2
GLUCOSE SERPL-MCNC: 78 MG/DL (ref 70–110)
HCT VFR BLD AUTO: 30.2 % (ref 40–54)
HGB BLD-MCNC: 9.4 G/DL (ref 14–18)
IMM GRANULOCYTES # BLD AUTO: 0.02 K/UL (ref 0–0.04)
IMM GRANULOCYTES NFR BLD AUTO: 0.3 % (ref 0–0.5)
LYMPHOCYTES # BLD AUTO: 0.9 K/UL (ref 1–4.8)
LYMPHOCYTES NFR BLD: 14.6 % (ref 18–48)
MCH RBC QN AUTO: 26.3 PG (ref 27–31)
MCHC RBC AUTO-ENTMCNC: 31.1 G/DL (ref 32–36)
MCV RBC AUTO: 85 FL (ref 82–98)
MONOCYTES # BLD AUTO: 0.8 K/UL (ref 0.3–1)
MONOCYTES NFR BLD: 12.7 % (ref 4–15)
NEUTROPHILS # BLD AUTO: 4.5 K/UL (ref 1.8–7.7)
NEUTROPHILS NFR BLD: 69.9 % (ref 38–73)
NRBC BLD-RTO: 0 /100 WBC
PLATELET # BLD AUTO: 99 K/UL (ref 150–450)
PMV BLD AUTO: 10.7 FL (ref 9.2–12.9)
POTASSIUM SERPL-SCNC: 3.7 MMOL/L (ref 3.5–5.1)
PROT SERPL-MCNC: 5.8 G/DL (ref 6–8.4)
RBC # BLD AUTO: 3.57 M/UL (ref 4.6–6.2)
SODIUM SERPL-SCNC: 137 MMOL/L (ref 136–145)
WBC # BLD AUTO: 6.45 K/UL (ref 3.9–12.7)

## 2024-06-07 PROCEDURE — 25000003 PHARM REV CODE 250: Performed by: NURSE ANESTHETIST, CERTIFIED REGISTERED

## 2024-06-07 PROCEDURE — 85025 COMPLETE CBC W/AUTO DIFF WBC: CPT | Performed by: STUDENT IN AN ORGANIZED HEALTH CARE EDUCATION/TRAINING PROGRAM

## 2024-06-07 PROCEDURE — 25000003 PHARM REV CODE 250

## 2024-06-07 PROCEDURE — 11000001 HC ACUTE MED/SURG PRIVATE ROOM

## 2024-06-07 PROCEDURE — 0DJD8ZZ INSPECTION OF LOWER INTESTINAL TRACT, VIA NATURAL OR ARTIFICIAL OPENING ENDOSCOPIC: ICD-10-PCS | Performed by: INTERNAL MEDICINE

## 2024-06-07 PROCEDURE — 25000003 PHARM REV CODE 250: Performed by: STUDENT IN AN ORGANIZED HEALTH CARE EDUCATION/TRAINING PROGRAM

## 2024-06-07 PROCEDURE — 45378 DIAGNOSTIC COLONOSCOPY: CPT | Mod: ,,, | Performed by: INTERNAL MEDICINE

## 2024-06-07 PROCEDURE — D9220A PRA ANESTHESIA: Mod: CRNA,,, | Performed by: NURSE ANESTHETIST, CERTIFIED REGISTERED

## 2024-06-07 PROCEDURE — 37000008 HC ANESTHESIA 1ST 15 MINUTES: Performed by: INTERNAL MEDICINE

## 2024-06-07 PROCEDURE — 80053 COMPREHEN METABOLIC PANEL: CPT

## 2024-06-07 PROCEDURE — 36415 COLL VENOUS BLD VENIPUNCTURE: CPT | Performed by: STUDENT IN AN ORGANIZED HEALTH CARE EDUCATION/TRAINING PROGRAM

## 2024-06-07 PROCEDURE — D9220A PRA ANESTHESIA: Mod: ANES,,, | Performed by: ANESTHESIOLOGY

## 2024-06-07 PROCEDURE — 37000009 HC ANESTHESIA EA ADD 15 MINS: Performed by: INTERNAL MEDICINE

## 2024-06-07 PROCEDURE — 45378 DIAGNOSTIC COLONOSCOPY: CPT | Performed by: INTERNAL MEDICINE

## 2024-06-07 PROCEDURE — 63600175 PHARM REV CODE 636 W HCPCS: Performed by: STUDENT IN AN ORGANIZED HEALTH CARE EDUCATION/TRAINING PROGRAM

## 2024-06-07 PROCEDURE — 63600175 PHARM REV CODE 636 W HCPCS: Performed by: NURSE ANESTHETIST, CERTIFIED REGISTERED

## 2024-06-07 PROCEDURE — 25000003 PHARM REV CODE 250: Performed by: INTERNAL MEDICINE

## 2024-06-07 RX ORDER — PHENYLEPHRINE HYDROCHLORIDE 10 MG/ML
INJECTION INTRAVENOUS
Status: DISCONTINUED | OUTPATIENT
Start: 2024-06-07 | End: 2024-06-07

## 2024-06-07 RX ORDER — PROPOFOL 10 MG/ML
VIAL (ML) INTRAVENOUS CONTINUOUS PRN
Status: DISCONTINUED | OUTPATIENT
Start: 2024-06-07 | End: 2024-06-07

## 2024-06-07 RX ORDER — LIDOCAINE HYDROCHLORIDE 20 MG/ML
INJECTION INTRAVENOUS
Status: DISCONTINUED | OUTPATIENT
Start: 2024-06-07 | End: 2024-06-07

## 2024-06-07 RX ORDER — SYRING-NEEDL,DISP,INSUL,0.3 ML 29 G X1/2"
296 SYRINGE, EMPTY DISPOSABLE MISCELLANEOUS ONCE
Status: COMPLETED | OUTPATIENT
Start: 2024-06-07 | End: 2024-06-07

## 2024-06-07 RX ORDER — PROPOFOL 10 MG/ML
VIAL (ML) INTRAVENOUS
Status: DISCONTINUED | OUTPATIENT
Start: 2024-06-07 | End: 2024-06-07

## 2024-06-07 RX ADMIN — COLLAGENASE SANTYL: 250 OINTMENT TOPICAL at 08:06

## 2024-06-07 RX ADMIN — LEVOTHYROXINE SODIUM 50 MCG: 50 TABLET ORAL at 05:06

## 2024-06-07 RX ADMIN — MEGESTROL ACETATE 40 MG: 40 TABLET ORAL at 08:06

## 2024-06-07 RX ADMIN — PROPOFOL 100 MCG/KG/MIN: 10 INJECTION, EMULSION INTRAVENOUS at 04:06

## 2024-06-07 RX ADMIN — PROPOFOL 50 MG: 10 INJECTION, EMULSION INTRAVENOUS at 04:06

## 2024-06-07 RX ADMIN — MAGNESIUM CITRATE 296 ML: 1.75 LIQUID ORAL at 08:06

## 2024-06-07 RX ADMIN — MEMANTINE HYDROCHLORIDE 5 MG: 5 TABLET ORAL at 08:06

## 2024-06-07 RX ADMIN — MUPIROCIN: 20 OINTMENT TOPICAL at 08:06

## 2024-06-07 RX ADMIN — CEFTRIAXONE SODIUM 1 G: 1 INJECTION, POWDER, FOR SOLUTION INTRAMUSCULAR; INTRAVENOUS at 08:06

## 2024-06-07 RX ADMIN — PHENYLEPHRINE HYDROCHLORIDE 100 MCG: 10 INJECTION INTRAVENOUS at 04:06

## 2024-06-07 RX ADMIN — ATORVASTATIN CALCIUM 20 MG: 20 TABLET, FILM COATED ORAL at 08:06

## 2024-06-07 RX ADMIN — LIDOCAINE HYDROCHLORIDE 50 MG: 20 INJECTION, SOLUTION INTRAVENOUS at 04:06

## 2024-06-07 RX ADMIN — VALSARTAN 160 MG: 160 TABLET, FILM COATED ORAL at 08:06

## 2024-06-07 RX ADMIN — METOPROLOL SUCCINATE 100 MG: 50 TABLET, EXTENDED RELEASE ORAL at 08:06

## 2024-06-07 RX ADMIN — HYDROCHLOROTHIAZIDE 25 MG: 25 TABLET ORAL at 08:06

## 2024-06-07 RX ADMIN — TAMSULOSIN HYDROCHLORIDE 0.4 MG: 0.4 CAPSULE ORAL at 08:06

## 2024-06-07 RX ADMIN — SODIUM CHLORIDE: 0.9 INJECTION, SOLUTION INTRAVENOUS at 03:06

## 2024-06-07 NOTE — ASSESSMENT & PLAN NOTE
MELD-Na score calculated; MELD 3.0: 9 at 6/7/2024  2:40 AM  MELD-Na: 8 at 6/7/2024  2:40 AM  Calculated from:  Serum Creatinine: 1.0 mg/dL at 6/7/2024  2:40 AM  Serum Sodium: 137 mmol/L at 6/7/2024  2:40 AM  Total Bilirubin: 0.5 mg/dL (Using min of 1 mg/dL) at 6/7/2024  2:40 AM  Serum Albumin: 2.8 g/dL at 6/7/2024  2:40 AM  INR(ratio): 1.2 at 6/6/2024  3:06 AM  Age at listing (hypothetical): 79 years  Sex: Male at 6/7/2024  2:40 AM      Continue chronic meds. Etiology likely Hepatitis vs Fatty Liver. Will avoid any hepatotoxic meds, and monitor CBC/CMP/INR for synthetic function.

## 2024-06-07 NOTE — TRANSFER OF CARE
"Anesthesia Transfer of Care Note    Patient: Junaid Muñoz    Procedure(s) Performed: Procedure(s) (LRB):  COLONOSCOPY (N/A)    Patient location: GI    Anesthesia Type: general    Transport from OR: Transported from OR on room air with adequate spontaneous ventilation    Post pain: adequate analgesia    Post assessment: no apparent anesthetic complications    Post vital signs: stable    Level of consciousness: awake    Nausea/Vomiting: no nausea/vomiting    Complications: none    Transfer of care protocol was followed      Last vitals: Visit Vitals  /63 (BP Location: Left arm, Patient Position: Lying)   Pulse (!) 58   Temp 37.1 °C (98.7 °F) (Oral)   Resp 18   Ht 5' 10" (1.778 m)   Wt 75.5 kg (166 lb 7.2 oz)   SpO2 97%   BMI 23.88 kg/m²     "

## 2024-06-07 NOTE — NURSING
Entire container Golytely completed. Tolerated well. 3 watery brown stools noted at this point. Pt denies any complaints. Remains NPO.

## 2024-06-07 NOTE — [employer]ATION PATIENT INSTRUCTIONS
Discharge Summary/Instructions after an Endoscopic Procedure  Patient Name: Junaid Muñoz  Patient MRN: [id number]  Patient YOB: 1945 Friday, June 7, 2024  Adilson Soto MD  Dear patient,  As a result of recent federal legislation (The Federal Cures Act), you may   receive lab or pathology results from your procedure in your [facility]   account before your physician is able to contact you. Your physician or   their representative will relay the results to you with their   recommendations at their soonest availability.  Thank you,  Your health is very important to us during the Covid Crisis. Following your   procedure today, you will receive a daily text for 2 weeks asking about   signs or symptoms of Covid 19.  Please respond to this text when you   receive it so we can follow up and keep you as safe as possible.   RESTRICTIONS:  During your procedure today, you received medications for sedation.  These   medications may affect your judgment, balance and coordination.  Therefore,   for 24 hours, you have the following restrictions:   - DO NOT drive a car, operate machinery, make legal/financial decisions,   sign important papers or drink alcohol.    ACTIVITY:  Today: no heavy lifting, straining or running due to procedural   sedation/anesthesia.  The following day: return to full activity including work.  DIET:  Eat and drink normally unless instructed otherwise.     TREATMENT FOR COMMON SIDE EFFECTS:  - Mild abdominal pain, nausea, belching, bloating or excessive gas:  rest,   eat lightly and use a heating pad.  - Sore Throat: treat with throat lozenges and/or gargle with warm salt   water.  - Because air was used during the procedure, expelling large amounts of air   from your rectum or belching is normal.  - If a bowel prep was taken, you may not have a bowel movement for 1-3 days.    This is normal.  SYMPTOMS TO WATCH FOR AND REPORT TO YOUR PHYSICIAN:  1. Abdominal pain or bloating,  other than gas cramps.  2. Chest pain.  3. Back pain.  4. Signs of infection such as: chills or fever occurring within 24 hours   after the procedure.  5. Rectal bleeding, which would show as bright red, maroon, or black stools.   (A tablespoon of blood from the rectum is not serious, especially if   hemorrhoids are present.)  6. Vomiting.  7. Weakness or dizziness.  GO DIRECTLY TO THE NEAREST EMERGENCY ROOM IF YOU HAVE ANY OF THE FOLLOWING:      Difficulty breathing              Chills and/or fever over 101 F   Persistent vomiting and/or vomiting blood   Severe abdominal pain   Severe chest pain   Black, tarry stools   Bleeding- more than one tablespoon   Any other symptom or condition that you feel may need urgent attention  Your doctor recommends these additional instructions:  If any biopsies were taken, your doctors clinic will contact you in 1 to 2   weeks with any results.  - Return patient to [facility] for ongoing care.   - Resume previous diet.   - Continue present medications.   - Repeat colonoscopy at appointment to be scheduled because the bowel   preparation was poor.   - Observe clinical course.  For questions, problems or results please call your physician - Adilson Soto MD.  EMERGENCY PHONE NUMBER: [phone],  LAB RESULTS: [phone]  IF A COMPLICATION OR EMERGENCY SITUATION ARISES AND YOU ARE UNABLE TO REACH   YOUR PHYSICIAN - GO DIRECTLY TO THE EMERGENCY ROOM.  Adilson Soto MD  6/7/2024 4:19:25 PM  This report has been verified and signed electronically.  Dear patient,  As a result of recent federal legislation (The Federal Cures Act), you may   receive lab or pathology results from your procedure in your [facility]   account before your physician is able to contact you. Your physician or   their representative will relay the results to you with their   recommendations at their soonest availability.  Thank you,  PROVATION

## 2024-06-07 NOTE — SUBJECTIVE & OBJECTIVE
Interval History: Nursing notes reviewed and no acute events overnight. VSS. Pt condition improved, more alert and conversational today. Hg stable. Pt w/ no complaints today. Successfully completed bowel prep and awaiting Colonoscopy today.    Review of Systems   Constitutional:  Positive for fatigue. Negative for activity change, appetite change, chills and fever.   Respiratory:  Negative for chest tightness and shortness of breath.    Cardiovascular:  Negative for chest pain.   Gastrointestinal:  Negative for abdominal pain, anal bleeding, blood in stool and constipation.   Genitourinary:  Negative for dysuria and frequency.   Skin:  Positive for pallor. Negative for rash.   Neurological:  Positive for weakness.     Objective:     Vital Signs (Most Recent):  Temp: 98.7 °F (37.1 °C) (06/07/24 1153)  Pulse: (!) 58 (06/07/24 1153)  Resp: 18 (06/07/24 1153)  BP: 135/63 (06/07/24 1153)  SpO2: 97 % (06/07/24 1153) Vital Signs (24h Range):  Temp:  [97.3 °F (36.3 °C)-98.7 °F (37.1 °C)] 98.7 °F (37.1 °C)  Pulse:  [56-95] 58  Resp:  [17-18] 18  SpO2:  [94 %-97 %] 97 %  BP: (135-170)/(55-77) 135/63     Weight: 75.5 kg (166 lb 7.2 oz)  Body mass index is 23.88 kg/m².    Intake/Output Summary (Last 24 hours) at 6/7/2024 1519  Last data filed at 6/7/2024 0553  Gross per 24 hour   Intake 3200 ml   Output --   Net 3200 ml         Physical Exam  Vitals and nursing note reviewed.   Constitutional:       General: He is not in acute distress.     Appearance: Normal appearance.   HENT:      Head: Normocephalic and atraumatic.      Nose: Nose normal.   Eyes:      Extraocular Movements: Extraocular movements intact.      Pupils: Pupils are equal, round, and reactive to light.   Cardiovascular:      Rate and Rhythm: Normal rate and regular rhythm.      Heart sounds: Normal heart sounds. No murmur heard.  Pulmonary:      Effort: Pulmonary effort is normal. No respiratory distress.      Breath sounds: Normal breath sounds.   Abdominal:       General: Abdomen is flat.      Palpations: Abdomen is soft.      Tenderness: There is no abdominal tenderness.   Musculoskeletal:      Right lower leg: No edema.      Left lower leg: No edema.   Skin:     General: Skin is warm and dry.      Coloration: Skin is pale.      Comments: Dressing to right heel noted.   Neurological:      General: No focal deficit present.      Mental Status: He is alert.             Significant Labs: All pertinent labs within the past 24 hours have been reviewed.    Significant Imaging: I have reviewed all pertinent imaging results/findings within the past 24 hours.

## 2024-06-07 NOTE — PLAN OF CARE
Talked to Nurse Reta, report was given. Patient awake and stable, ready to transfer back to [facility].

## 2024-06-07 NOTE — ASSESSMENT & PLAN NOTE
Chronic, controlled. Latest blood pressure and vitals reviewed-     Temp:  [97.3 °F (36.3 °C)-98.7 °F (37.1 °C)]   Pulse:  [56-95]   Resp:  [17-18]   BP: (142-170)/(55-77)   SpO2:  [94 %-97 %] .   Home meds for hypertension were reviewed and noted below.   Hypertension Medications               metoprolol succinate (TOPROL-XL) 100 MG 24 hr tablet Take 100 mg by mouth once daily.    valsartan-hydrochlorothiazide (DIOVAN-HCT) 160-25 mg per tablet Take 1 tablet by mouth once daily.            While in the hospital, will manage blood pressure as follows; Continue home antihypertensive regimen    Will utilize p.r.n. blood pressure medication only if patient's blood pressure greater than 180/110 and he develops symptoms such as worsening chest pain or shortness of breath.

## 2024-06-07 NOTE — PROGRESS NOTES
[facility] Medicine  Progress Note    Patient Name: Junaid Muñoz  MRN: [id number]  Patient Class: IP- Inpatient   Admission Date: 6/5/2024  Length of Stay: 2 days  Attending Physician: Alex Eddy MD  Primary Care Provider: Frederick Rocha MD        Subjective:     Principal Problem:Symptomatic anemia        HPI:  Junaid Muñoz is a 79-year-old male with a past medical history of CAD, significant PAD, type 2 diabetes, CVA, hypertension, hypothyroidism, cirrhosis, long-term anticoagulation with Xarelto, recent hospitalization for osteomyelitis of toe, who presents to an outside hospital due to abnormal labs.    Patient states that he has had feelings of generalized fatigue, dark stools for the past few days.  He denies any overt bleeding from hematuria, hematochezia. He was told to get labs outpatient by his PCP where he was found to have a significant drop in his hemoglobin.  He was told to go to the ER and then he was transferred over for GI evaluation at [place].    At the outside hospital, triage vitals were fairly unremarkable patient was satting well on room air.  Review of systems significant for fatigue.  Physical exam fairly unremarkable.    CBC was significant for hemoglobin of 6.8, mild thrombocytopenia.  PT and INR were slightly elevated.  BMP was fairly unremarkable.  BNP slightly elevated with normal troponins.    Due to concerns for symptomatic anemia, patient was transfused 2 units of blood.  He was given a dose of ceftriaxone and started on octreotide.     Patient transferred for concern for GI bleed.    Overview/Hospital Course:  No notes on file    Interval History: Nursing notes reviewed and no acute events overnight. VSS. Pt condition improved, more alert and conversational today. Hg stable. Pt w/ no complaints today. Successfully completed bowel prep and awaiting Colonoscopy today.    Review of Systems   Constitutional:  Positive for fatigue. Negative for  activity change, appetite change, chills and fever.   Respiratory:  Negative for chest tightness and shortness of breath.    Cardiovascular:  Negative for chest pain.   Gastrointestinal:  Negative for abdominal pain, anal bleeding, blood in stool and constipation.   Genitourinary:  Negative for dysuria and frequency.   Skin:  Positive for pallor. Negative for rash.   Neurological:  Positive for weakness.     Objective:     Vital Signs (Most Recent):  Temp: 98.7 °F (37.1 °C) (06/07/24 1153)  Pulse: (!) 58 (06/07/24 1153)  Resp: 18 (06/07/24 1153)  BP: 135/63 (06/07/24 1153)  SpO2: 97 % (06/07/24 1153) Vital Signs (24h Range):  Temp:  [97.3 °F (36.3 °C)-98.7 °F (37.1 °C)] 98.7 °F (37.1 °C)  Pulse:  [56-95] 58  Resp:  [17-18] 18  SpO2:  [94 %-97 %] 97 %  BP: (135-170)/(55-77) 135/63     Weight: 75.5 kg (166 lb 7.2 oz)  Body mass index is 23.88 kg/m².    Intake/Output Summary (Last 24 hours) at 6/7/2024 1519  Last data filed at 6/7/2024 0553  Gross per 24 hour   Intake 3200 ml   Output --   Net 3200 ml         Physical Exam  Vitals and nursing note reviewed.   Constitutional:       General: He is not in acute distress.     Appearance: Normal appearance.   HENT:      Head: Normocephalic and atraumatic.      Nose: Nose normal.   Eyes:      Extraocular Movements: Extraocular movements intact.      Pupils: Pupils are equal, round, and reactive to light.   Cardiovascular:      Rate and Rhythm: Normal rate and regular rhythm.      Heart sounds: Normal heart sounds. No murmur heard.  Pulmonary:      Effort: Pulmonary effort is normal. No respiratory distress.      Breath sounds: Normal breath sounds.   Abdominal:      General: Abdomen is flat.      Palpations: Abdomen is soft.      Tenderness: There is no abdominal tenderness.   Musculoskeletal:      Right lower leg: No edema.      Left lower leg: No edema.   Skin:     General: Skin is warm and dry.      Coloration: Skin is pale.      Comments: Dressing to right heel noted.    Neurological:      General: No focal deficit present.      Mental Status: He is alert.             Significant Labs: All pertinent labs within the past 24 hours have been reviewed.    Significant Imaging: I have reviewed all pertinent imaging results/findings within the past 24 hours.    Assessment/Plan:      * Symptomatic anemia  Patient's anemia is currently controlled. Has received 2 units of PRBCs on 06/4-06/5 . Etiology likely d/t acute blood loss which was from lower GI bleed source  Current CBC reviewed-   Lab Results   Component Value Date    HGB 9.4 (L) 06/07/2024    HCT 30.2 (L) 06/07/2024     Monitor serial CBC and transfuse if patient becomes hemodynamically unstable, symptomatic or H/H drops below 7/21.    Plan:  Hg stable, 9.4 today  GI consulted. Appreciate rec's  EGD negative for upper GI bleed  We will hold anticoagulation and antiplatelet therapy.  Clear liquids and bowel prep for Colonoscopy today  Discontinue IV pantoprazole b.i.d.   2 large-bore IVs  Daily CBC     Right thigh pain  Pt c/o recent onset right thigh and calf pain. Right leg in flexed position and unable to passively extend. Pt is a poor historian w/ baseline confusion. Concern for potential fall. Will obtain XR Hip and Knee  -XR Hip and Knee negative for fracture  -Pt not c/o pain today      Cirrhosis    MELD-Na score calculated; MELD 3.0: 9 at 6/7/2024  2:40 AM  MELD-Na: 8 at 6/7/2024  2:40 AM  Calculated from:  Serum Creatinine: 1.0 mg/dL at 6/7/2024  2:40 AM  Serum Sodium: 137 mmol/L at 6/7/2024  2:40 AM  Total Bilirubin: 0.5 mg/dL (Using min of 1 mg/dL) at 6/7/2024  2:40 AM  Serum Albumin: 2.8 g/dL at 6/7/2024  2:40 AM  INR(ratio): 1.2 at 6/6/2024  3:06 AM  Age at listing (hypothetical): 79 years  Sex: Male at 6/7/2024  2:40 AM      Continue chronic meds. Etiology likely Hepatitis vs Fatty Liver. Will avoid any hepatotoxic meds, and monitor CBC/CMP/INR for synthetic function.     BPH (benign prostatic hyperplasia)  No current  complaints    Plan:  Continue home Flomax      Peripheral artery disease      Plan:  Hold off on aspirin and Xarelto      Hyperlipidemia  Continue home statin      Hypothyroidism      Plan:  Continue home Synthroid      Essential hypertension  Chronic, controlled. Latest blood pressure and vitals reviewed-     Temp:  [97.3 °F (36.3 °C)-98.7 °F (37.1 °C)]   Pulse:  [56-95]   Resp:  [17-18]   BP: (142-170)/(55-77)   SpO2:  [94 %-97 %] .   Home meds for hypertension were reviewed and noted below.   Hypertension Medications               metoprolol succinate (TOPROL-XL) 100 MG 24 hr tablet Take 100 mg by mouth once daily.    valsartan-hydrochlorothiazide (DIOVAN-HCT) 160-25 mg per tablet Take 1 tablet by mouth once daily.            While in the hospital, will manage blood pressure as follows; Continue home antihypertensive regimen    Will utilize p.r.n. blood pressure medication only if patient's blood pressure greater than 180/110 and he develops symptoms such as worsening chest pain or shortness of breath.      VTE Risk Mitigation (From admission, onward)           Ordered     IP VTE HIGH RISK PATIENT  Once         06/05/24 0218     Place sequential compression device  Until discontinued         06/05/24 0218                    Discharge Planning   CARRIE:      Code Status: Full Code   Is the patient medically ready for discharge?:     Reason for patient still in hospital (select all that apply): Patient trending condition, Laboratory test, and Imaging  Discharge Plan A: Return to nursing home                  Simone Barraza PA-C  Department of Hospital Medicine   [facility]

## 2024-06-07 NOTE — ASSESSMENT & PLAN NOTE
Patient's anemia is currently controlled. Has received 2 units of PRBCs on 06/4-06/5 . Etiology likely d/t acute blood loss which was from lower GI bleed source  Current CBC reviewed-   Lab Results   Component Value Date    HGB 9.4 (L) 06/07/2024    HCT 30.2 (L) 06/07/2024     Monitor serial CBC and transfuse if patient becomes hemodynamically unstable, symptomatic or H/H drops below 7/21.    Plan:  Hg stable, 9.4 today  GI consulted. Appreciate rec's  EGD negative for upper GI bleed  We will hold anticoagulation and antiplatelet therapy.  Clear liquids and bowel prep for Colonoscopy today  Discontinue IV pantoprazole b.i.d.   2 large-bore IVs  Daily CBC

## 2024-06-07 NOTE — PLAN OF CARE
Rounded at bedside, When medically ready for discharge, plan is dc back to [facility]. Pt will need to be transported back by hospital transportation. CM will continue to follow pt and provide any discharge needs.    06/07/24 1928   Rounds   Attendance Nurse    Discharge Plan A Return to nursing home   Why the patient remains in the hospital Requires continued medical care   Transition of Care Barriers None

## 2024-06-07 NOTE — ANESTHESIA POSTPROCEDURE EVALUATION
Anesthesia Post Evaluation    Patient: Junaid Muñoz    Procedure(s) Performed: Procedure(s) (LRB):  COLONOSCOPY (N/A)    Final Anesthesia Type: general      Patient location during evaluation: PACU  Patient participation: Yes- Able to Participate  Level of consciousness: awake and alert  Post-procedure vital signs: reviewed and stable  Pain management: adequate  Airway patency: patent    PONV status at discharge: No PONV  Anesthetic complications: no      Cardiovascular status: stable  Respiratory status: spontaneous ventilation  Hydration status: euvolemic  Follow-up not needed.              Vitals Value Taken Time   /67 06/07/24 1655   Temp 37 °C (98.6 °F) 06/07/24 1655   Pulse 61 06/07/24 1655   Resp 18 06/07/24 1655   SpO2 100 % 06/07/24 1655         Event Time   Out of Recovery 06/07/2024 16:45:36         Pain/Saturnino Score: Saturnino Score: 10 (6/7/2024  4:35 PM)

## 2024-06-07 NOTE — NURSING
"Called Dr Hunt P/T no order for tonight's bowel prep. MD ordered ok to give the remainder golytely prep that is currently at BS that was previously order and what he did not complete. Informed MD I will do my best in encouraging pt throughout the shift to complete prep. MD stated "that's fine, he can drink what he can."  "

## 2024-06-07 NOTE — ASSESSMENT & PLAN NOTE
Pt c/o recent onset right thigh and calf pain. Right leg in flexed position and unable to passively extend. Pt is a poor historian w/ baseline confusion. Concern for potential fall. Will obtain XR Hip and Knee  -XR Hip and Knee negative for fracture  -Pt not c/o pain today

## 2024-06-08 LAB
BASOPHILS # BLD AUTO: 0.06 K/UL (ref 0–0.2)
BASOPHILS NFR BLD: 0.9 % (ref 0–1.9)
DIFFERENTIAL METHOD BLD: ABNORMAL
EOSINOPHIL # BLD AUTO: 0.1 K/UL (ref 0–0.5)
EOSINOPHIL NFR BLD: 1.6 % (ref 0–8)
ERYTHROCYTE [DISTWIDTH] IN BLOOD BY AUTOMATED COUNT: 17.9 % (ref 11.5–14.5)
HCT VFR BLD AUTO: 29.7 % (ref 40–54)
HGB BLD-MCNC: 9 G/DL (ref 14–18)
IMM GRANULOCYTES # BLD AUTO: 0.04 K/UL (ref 0–0.04)
IMM GRANULOCYTES NFR BLD AUTO: 0.6 % (ref 0–0.5)
LYMPHOCYTES # BLD AUTO: 1.2 K/UL (ref 1–4.8)
LYMPHOCYTES NFR BLD: 17.2 % (ref 18–48)
MCH RBC QN AUTO: 27.2 PG (ref 27–31)
MCHC RBC AUTO-ENTMCNC: 30.3 G/DL (ref 32–36)
MCV RBC AUTO: 90 FL (ref 82–98)
MONOCYTES # BLD AUTO: 1 K/UL (ref 0.3–1)
MONOCYTES NFR BLD: 14.9 % (ref 4–15)
NEUTROPHILS # BLD AUTO: 4.4 K/UL (ref 1.8–7.7)
NEUTROPHILS NFR BLD: 64.8 % (ref 38–73)
NRBC BLD-RTO: 0 /100 WBC
PLATELET # BLD AUTO: 90 K/UL (ref 150–450)
PMV BLD AUTO: 11.8 FL (ref 9.2–12.9)
RBC # BLD AUTO: 3.31 M/UL (ref 4.6–6.2)
WBC # BLD AUTO: 6.85 K/UL (ref 3.9–12.7)

## 2024-06-08 PROCEDURE — 99232 SBSQ HOSP IP/OBS MODERATE 35: CPT | Mod: ,,, | Performed by: INTERNAL MEDICINE

## 2024-06-08 PROCEDURE — 36415 COLL VENOUS BLD VENIPUNCTURE: CPT | Performed by: STUDENT IN AN ORGANIZED HEALTH CARE EDUCATION/TRAINING PROGRAM

## 2024-06-08 PROCEDURE — 63600175 PHARM REV CODE 636 W HCPCS: Performed by: STUDENT IN AN ORGANIZED HEALTH CARE EDUCATION/TRAINING PROGRAM

## 2024-06-08 PROCEDURE — 11000001 HC ACUTE MED/SURG PRIVATE ROOM

## 2024-06-08 PROCEDURE — 25000003 PHARM REV CODE 250: Performed by: STUDENT IN AN ORGANIZED HEALTH CARE EDUCATION/TRAINING PROGRAM

## 2024-06-08 PROCEDURE — 85025 COMPLETE CBC W/AUTO DIFF WBC: CPT | Performed by: STUDENT IN AN ORGANIZED HEALTH CARE EDUCATION/TRAINING PROGRAM

## 2024-06-08 PROCEDURE — 25000003 PHARM REV CODE 250: Performed by: NURSE PRACTITIONER

## 2024-06-08 PROCEDURE — 25000003 PHARM REV CODE 250

## 2024-06-08 RX ORDER — ASPIRIN 81 MG/1
81 TABLET ORAL DAILY
Status: DISCONTINUED | OUTPATIENT
Start: 2024-06-08 | End: 2024-06-09 | Stop reason: HOSPADM

## 2024-06-08 RX ORDER — LANOLIN ALCOHOL/MO/W.PET/CERES
1 CREAM (GRAM) TOPICAL DAILY
Status: DISCONTINUED | OUTPATIENT
Start: 2024-06-08 | End: 2024-06-09 | Stop reason: HOSPADM

## 2024-06-08 RX ADMIN — MEMANTINE HYDROCHLORIDE 5 MG: 5 TABLET ORAL at 08:06

## 2024-06-08 RX ADMIN — ATORVASTATIN CALCIUM 20 MG: 20 TABLET, FILM COATED ORAL at 08:06

## 2024-06-08 RX ADMIN — MEGESTROL ACETATE 40 MG: 40 TABLET ORAL at 08:06

## 2024-06-08 RX ADMIN — MUPIROCIN: 20 OINTMENT TOPICAL at 08:06

## 2024-06-08 RX ADMIN — CEFTRIAXONE SODIUM 1 G: 1 INJECTION, POWDER, FOR SOLUTION INTRAMUSCULAR; INTRAVENOUS at 08:06

## 2024-06-08 RX ADMIN — HYDROCHLOROTHIAZIDE 25 MG: 25 TABLET ORAL at 08:06

## 2024-06-08 RX ADMIN — FERROUS SULFATE TAB 325 MG (65 MG ELEMENTAL FE) 1 EACH: 325 (65 FE) TAB at 11:06

## 2024-06-08 RX ADMIN — LEVOTHYROXINE SODIUM 50 MCG: 50 TABLET ORAL at 05:06

## 2024-06-08 RX ADMIN — HYDROCODONE BITARTRATE AND ACETAMINOPHEN 1 TABLET: 7.5; 325 TABLET ORAL at 04:06

## 2024-06-08 RX ADMIN — METOPROLOL SUCCINATE 100 MG: 50 TABLET, EXTENDED RELEASE ORAL at 08:06

## 2024-06-08 RX ADMIN — RIVAROXABAN 20 MG: 20 TABLET, FILM COATED ORAL at 04:06

## 2024-06-08 RX ADMIN — TAMSULOSIN HYDROCHLORIDE 0.4 MG: 0.4 CAPSULE ORAL at 08:06

## 2024-06-08 RX ADMIN — ASPIRIN 81 MG: 81 TABLET, COATED ORAL at 11:06

## 2024-06-08 RX ADMIN — VALSARTAN 160 MG: 160 TABLET, FILM COATED ORAL at 08:06

## 2024-06-08 NOTE — HOSPITAL COURSE
Junaid Muñoz is a 79-year-old male with a past medical history of CAD, significant PAD, type 2 diabetes, CVA, hypertension, hypothyroidism, cirrhosis, long-term anticoagulation with Xarelto, recent hospitalization for osteomyelitis of toe, who presented to an outside hospital due to abnormal labs. Patient was transferred to [place] for GI evaluation 2/2 c/o generalized fatigue, dark stools for a few days, and significantly decreased hgb per PCP. Over the course of his stay, patient received 2U PRBCs, ocreotide, and ceftriaxone for concerns of intra-abd infection in the setting of cirrhosis. He underwent an EGD and colonoscopy that did not show any obvious source of bleeding. ASA and xarelto restarted 6/8 and patient kept overnight for observation and repeat CBC with no further signs of bleeding noted and stable H/H. Patient denies any complaints except not getting enough sleep overnight. Otherwise no CP, SOB, abd pain, N/V, dizziness, lightheadedness, fever, chills, or other complaints.     Patient stable for discharge and will f/u with GI outpatient for VCE.            Outcome goals for stay achieved; patient to be discharged home with family assistance today.

## 2024-06-08 NOTE — PROGRESS NOTES
[facility] Medicine  Progress Note    Patient Name: Junaid Muñoz  MRN: [id number]  Patient Class: IP- Inpatient   Admission Date: 6/5/2024  Length of Stay: 3 days  Attending Physician: Chandra Mclean MD  Primary Care Provider: Frederick Rocha MD        Subjective:     Principal Problem:Symptomatic anemia        HPI:  Junaid Muñoz is a 79-year-old male with a past medical history of CAD, significant PAD, type 2 diabetes, CVA, hypertension, hypothyroidism, cirrhosis, long-term anticoagulation with Xarelto, recent hospitalization for osteomyelitis of toe, who presents to an outside hospital due to abnormal labs.    Patient states that he has had feelings of generalized fatigue, dark stools for the past few days.  He denies any overt bleeding from hematuria, hematochezia. He was told to get labs outpatient by his PCP where he was found to have a significant drop in his hemoglobin.  He was told to go to the ER and then he was transferred over for GI evaluation at [place].    At the outside hospital, triage vitals were fairly unremarkable patient was satting well on room air.  Review of systems significant for fatigue.  Physical exam fairly unremarkable.    CBC was significant for hemoglobin of 6.8, mild thrombocytopenia.  PT and INR were slightly elevated.  BMP was fairly unremarkable.  BNP slightly elevated with normal troponins.    Due to concerns for symptomatic anemia, patient was transfused 2 units of blood.  He was given a dose of ceftriaxone and started on octreotide.     Patient transferred for concern for GI bleed.    Overview/Hospital Course:  Patient assessed at bedside. NAD and VSS. H/H remains stable with no further bleeding noted. Patient went for c-scope yesterday which was unsuccessful. He will f/u with GI at discharge for VCE.    Interval History: No recurrent bleeding today. Will restart home xarelto and ASA today with tentative plans for discharge  tomorrow.    Review of Systems   Constitutional:  Negative for chills and fever.   HENT:  Negative for congestion.    Eyes:  Negative for visual disturbance.   Respiratory:  Negative for shortness of breath.    Cardiovascular:  Negative for chest pain.   Gastrointestinal:  Negative for abdominal pain, blood in stool, nausea and vomiting.   Genitourinary:  Negative for dysuria.   Musculoskeletal:  Negative for myalgias.   Skin:  Positive for pallor.   Neurological:  Negative for dizziness and light-headedness.   Psychiatric/Behavioral:  Negative for agitation. The patient is not nervous/anxious.      Objective:     Vital Signs (Most Recent):  Temp: 97.8 °F (36.6 °C) (06/08/24 0758)  Pulse: 65 (06/08/24 0758)  Resp: 18 (06/08/24 0758)  BP: (!) 158/98 (06/08/24 0758)  SpO2: 95 % (06/08/24 0758) Vital Signs (24h Range):  Temp:  [97.8 °F (36.6 °C)-98.7 °F (37.1 °C)] 97.8 °F (36.6 °C)  Pulse:  [56-66] 65  Resp:  [12-18] 18  SpO2:  [95 %-100 %] 95 %  BP: (103-167)/(58-98) 158/98     Weight: 75.5 kg (166 lb 7.2 oz)  Body mass index is 23.88 kg/m².    Intake/Output Summary (Last 24 hours) at 6/8/2024 1121  Last data filed at 6/8/2024 0622  Gross per 24 hour   Intake 220 ml   Output --   Net 220 ml         Physical Exam  Constitutional:       General: He is not in acute distress.  HENT:      Head: Normocephalic.      Mouth/Throat:      Mouth: Mucous membranes are moist.   Eyes:      Extraocular Movements: Extraocular movements intact.      Pupils: Pupils are equal, round, and reactive to light.   Cardiovascular:      Rate and Rhythm: Normal rate and regular rhythm.      Pulses: Normal pulses.   Pulmonary:      Effort: Pulmonary effort is normal. No respiratory distress.      Breath sounds: Normal breath sounds. No wheezing, rhonchi or rales.   Abdominal:      General: Bowel sounds are normal. There is no distension.      Palpations: Abdomen is soft.      Tenderness: There is no abdominal tenderness. There is no guarding or  rebound.   Musculoskeletal:      Cervical back: No rigidity.      Right lower leg: No edema.      Left lower leg: No edema.   Skin:     Coloration: Skin is pale.   Neurological:      Mental Status: He is alert and oriented to person, place, and time.   Psychiatric:         Mood and Affect: Mood normal.         Behavior: Behavior normal.         Thought Content: Thought content normal.         Judgment: Judgment normal.             Significant Labs: All pertinent labs within the past 24 hours have been reviewed.    Significant Imaging: I have reviewed all pertinent imaging results/findings within the past 24 hours.    Assessment/Plan:      * Symptomatic anemia  Patient's anemia is currently controlled. Has received 2 units of PRBCs on 06/4-06/5 . Etiology likely d/t acute blood loss which was from lower GI bleed source  Current CBC reviewed-   Lab Results   Component Value Date    HGB 9.0 (L) 06/08/2024    HCT 29.7 (L) 06/08/2024     Monitor serial CBC and transfuse if patient becomes hemodynamically unstable, symptomatic or H/H drops below 7/21.    Plan:  Hg stable, 9.0 today  GI has signed off. Appreciate rec's  -- Plan for outpatient VCE  -- Consider supplemental iron therapy  -- Advance diet  EGD negative for upper GI bleed  Cscope performed w/ poor prep - patient will need outpatient VCE  Will restart ASA/xarelto today and repeat CBC in the am      Right thigh pain  Pt c/o recent onset right thigh and calf pain. Right leg in flexed position and unable to passively extend. Pt is a poor historian w/ baseline confusion. Concern for potential fall. Will obtain XR Hip and Knee  -XR Hip and Knee negative for fracture  No further c/o pain or difficulty with ROM      Cirrhosis    MELD-Na score calculated; MELD 3.0: 9 at 6/7/2024  2:40 AM  MELD-Na: 8 at 6/7/2024  2:40 AM  Calculated from:  Serum Creatinine: 1.0 mg/dL at 6/7/2024  2:40 AM  Serum Sodium: 137 mmol/L at 6/7/2024  2:40 AM  Total Bilirubin: 0.5 mg/dL (Using  min of 1 mg/dL) at 6/7/2024  2:40 AM  Serum Albumin: 2.8 g/dL at 6/7/2024  2:40 AM  INR(ratio): 1.2 at 6/6/2024  3:06 AM  Age at listing (hypothetical): 79 years  Sex: Male at 6/7/2024  2:40 AM      Continue chronic meds. Etiology likely Hepatitis vs Fatty Liver. Will avoid any hepatotoxic meds, and monitor CBC/CMP/INR for synthetic function.       BPH (benign prostatic hyperplasia)  No current complaints    Plan:  Continue home Flomax      Peripheral artery disease  Continue statin  Restart xarelto and ASA      Hyperlipidemia  Continue home statin      Hypothyroidism  Continue home Synthroid      Essential hypertension  Chronic, controlled. Latest blood pressure and vitals reviewed-     Temp:  [97.8 °F (36.6 °C)-98.7 °F (37.1 °C)]   Pulse:  [56-66]   Resp:  [12-18]   BP: (103-167)/(58-98)   SpO2:  [95 %-100 %] .   Home meds for hypertension were reviewed and noted below.   Hypertension Medications               metoprolol succinate (TOPROL-XL) 100 MG 24 hr tablet Take 100 mg by mouth once daily.    valsartan-hydrochlorothiazide (DIOVAN-HCT) 160-25 mg per tablet Take 1 tablet by mouth once daily.            While in the hospital, will manage blood pressure as follows; Continue home antihypertensive regimen    Will utilize p.r.n. blood pressure medication only if patient's blood pressure greater than 180/110 and he develops symptoms such as worsening chest pain or shortness of breath.    Coronary artery disease involving native coronary artery of native heart without angina pectoris  Patient with known CAD s/p  med mgmt , which is controlled Will continue ASA and Statin and monitor for S/Sx of angina/ACS. Continue to monitor on telemetry.       VTE Risk Mitigation (From admission, onward)           Ordered     rivaroxaban tablet 20 mg  With dinner         06/08/24 1149     IP VTE HIGH RISK PATIENT  Once         06/05/24 0218     Place sequential compression device  Until discontinued         06/05/24 0218                     Discharge Planning   CARRIE:      Code Status: Full Code   Is the patient medically ready for discharge?:     Reason for patient still in hospital (select all that apply): Patient trending condition  Discharge Plan A: Return to nursing home                  Irene Costello NP  [facility]   [facility]

## 2024-06-08 NOTE — SUBJECTIVE & OBJECTIVE
Subjective:     Interval History:   S/p colonoscopy yesterday. No source of bleeding noted. Hgb stable overnight. Pt resting comfortably at time of interview    Review of Systems   Constitutional:  Negative for chills and fever.   Respiratory:  Negative for choking and chest tightness.    Gastrointestinal:  Negative for abdominal pain and vomiting.   Neurological:  Negative for dizziness and headaches.   Psychiatric/Behavioral:  Negative for agitation and behavioral problems.      Objective:     Vital Signs (Most Recent):  Temp: 97.8 °F (36.6 °C) (06/08/24 0758)  Pulse: 65 (06/08/24 0758)  Resp: 18 (06/08/24 0758)  BP: (!) 158/98 (06/08/24 0758)  SpO2: 95 % (06/08/24 0758) Vital Signs (24h Range):  Temp:  [97.8 °F (36.6 °C)-98.7 °F (37.1 °C)] 97.8 °F (36.6 °C)  Pulse:  [56-66] 65  Resp:  [12-18] 18  SpO2:  [95 %-100 %] 95 %  BP: (103-167)/(58-98) 158/98     Weight: 75.5 kg (166 lb 7.2 oz) (06/05/24 0148)  Body mass index is 23.88 kg/m².      Intake/Output Summary (Last 24 hours) at 6/8/2024 1025  Last data filed at 6/8/2024 0622  Gross per 24 hour   Intake 220 ml   Output --   Net 220 ml       Lines/Drains/Airways       Peripheral Intravenous Line  Duration                  Peripheral IV - Single Lumen 06/04/24 20 G Left Wrist 4 days         Peripheral IV - Single Lumen 06/04/24 20 G Right Wrist 4 days                     Physical Exam  Vitals reviewed.   Constitutional:       Appearance: He is not toxic-appearing.   HENT:      Head: Normocephalic and atraumatic.   Eyes:      General: No scleral icterus.     Conjunctiva/sclera: Conjunctivae normal.   Abdominal:      General: There is no distension.      Palpations: Abdomen is soft.   Skin:     Coloration: Skin is pale.   Neurological:      Mental Status: He is alert and oriented to person, place, and time.      Gait: Gait normal.   Psychiatric:         Mood and Affect: Mood normal.         Behavior: Behavior normal.          Significant Labs:  CBC:   Recent Labs  "  Lab 06/07/24  0240 06/08/24  0327   WBC 6.45 6.85   HGB 9.4* 9.0*   HCT 30.2* 29.7*   PLT 99* 90*     BMP:   Recent Labs   Lab 06/07/24  0240   GLU 78      K 3.7      CO2 18*   BUN 20   CREATININE 1.0   CALCIUM 8.7     CMP:   Recent Labs   Lab 06/07/24  0240   GLU 78   CALCIUM 8.7   ALBUMIN 2.8*   PROT 5.8*      K 3.7   CO2 18*      BUN 20   CREATININE 1.0   ALKPHOS 84   ALT 16   AST 26   BILITOT 0.5     Coagulation:   No results for input(s): "PT", "INR", "APTT" in the last 48 hours.        Significant Imaging:  Imaging results within the past 24 hours have been reviewed.  "

## 2024-06-08 NOTE — ASSESSMENT & PLAN NOTE
Chronic, controlled. Latest blood pressure and vitals reviewed-     Temp:  [97.8 °F (36.6 °C)-98.7 °F (37.1 °C)]   Pulse:  [56-66]   Resp:  [12-18]   BP: (103-167)/(58-98)   SpO2:  [95 %-100 %] .   Home meds for hypertension were reviewed and noted below.   Hypertension Medications               metoprolol succinate (TOPROL-XL) 100 MG 24 hr tablet Take 100 mg by mouth once daily.    valsartan-hydrochlorothiazide (DIOVAN-HCT) 160-25 mg per tablet Take 1 tablet by mouth once daily.            While in the hospital, will manage blood pressure as follows; Continue home antihypertensive regimen    Will utilize p.r.n. blood pressure medication only if patient's blood pressure greater than 180/110 and he develops symptoms such as worsening chest pain or shortness of breath.

## 2024-06-08 NOTE — ASSESSMENT & PLAN NOTE
Patient's anemia is currently controlled. Has received 2 units of PRBCs on 06/4-06/5 . Etiology likely d/t acute blood loss which was from lower GI bleed source  Current CBC reviewed-   Lab Results   Component Value Date    HGB 9.0 (L) 06/08/2024    HCT 29.7 (L) 06/08/2024     Monitor serial CBC and transfuse if patient becomes hemodynamically unstable, symptomatic or H/H drops below 7/21.    Plan:  Hg stable, 9.0 today  GI has signed off. Appreciate rec's  -- Plan for outpatient VCE  -- Consider supplemental iron therapy  -- Advance diet  EGD negative for upper GI bleed  Cscope performed w/ poor prep - patient will need outpatient VCE  Will restart ASA/xarelto today and repeat CBC in the am

## 2024-06-08 NOTE — CARE UPDATE
Attempt made to contact patient's daughter. Voicemail left.    ETA: Patient's daughter updated on POC. All questions answered.

## 2024-06-08 NOTE — NURSING
daughter called, requesting Md/ Np to call her has some questions and needs updates. SC Bridges(NP) on the same

## 2024-06-08 NOTE — PROGRESS NOTES
[facility]  [facility]  Progress Note    Patient Name: Junaid Muñoz  MRN: [id number]  Admission Date: 6/5/2024  Hospital Length of Stay: 3 days  Code Status: Full Code   Attending Provider: Chandra Mclean MD  Consulting Provider: Adilson Soto MD  Primary Care Physician: Frederick Rocha MD  Principal Problem: Symptomatic anemia        Subjective:     Interval History:   S/p colonoscopy yesterday. No source of bleeding noted. Hgb stable overnight. Pt resting comfortably at time of interview    Review of Systems   Constitutional:  Negative for chills and fever.   Respiratory:  Negative for choking and chest tightness.    Gastrointestinal:  Negative for abdominal pain and vomiting.   Neurological:  Negative for dizziness and headaches.   Psychiatric/Behavioral:  Negative for agitation and behavioral problems.      Objective:     Vital Signs (Most Recent):  Temp: 97.8 °F (36.6 °C) (06/08/24 0758)  Pulse: 65 (06/08/24 0758)  Resp: 18 (06/08/24 0758)  BP: (!) 158/98 (06/08/24 0758)  SpO2: 95 % (06/08/24 0758) Vital Signs (24h Range):  Temp:  [97.8 °F (36.6 °C)-98.7 °F (37.1 °C)] 97.8 °F (36.6 °C)  Pulse:  [56-66] 65  Resp:  [12-18] 18  SpO2:  [95 %-100 %] 95 %  BP: (103-167)/(58-98) 158/98     Weight: 75.5 kg (166 lb 7.2 oz) (06/05/24 0148)  Body mass index is 23.88 kg/m².      Intake/Output Summary (Last 24 hours) at 6/8/2024 1025  Last data filed at 6/8/2024 0622  Gross per 24 hour   Intake 220 ml   Output --   Net 220 ml       Lines/Drains/Airways       Peripheral Intravenous Line  Duration                  Peripheral IV - Single Lumen 06/04/24 20 G Left Wrist 4 days         Peripheral IV - Single Lumen 06/04/24 20 G Right Wrist 4 days                     Physical Exam  Vitals reviewed.   Constitutional:       Appearance: He is not toxic-appearing.   HENT:      Head: Normocephalic and atraumatic.   Eyes:      General: No scleral icterus.     Conjunctiva/sclera: Conjunctivae  "normal.   Abdominal:      General: There is no distension.      Palpations: Abdomen is soft.   Skin:     Coloration: Skin is pale.   Neurological:      Mental Status: He is alert and oriented to person, place, and time.      Gait: Gait normal.   Psychiatric:         Mood and Affect: Mood normal.         Behavior: Behavior normal.          Significant Labs:  CBC:   Recent Labs   Lab 06/07/24  0240 06/08/24  0327   WBC 6.45 6.85   HGB 9.4* 9.0*   HCT 30.2* 29.7*   PLT 99* 90*     BMP:   Recent Labs   Lab 06/07/24  0240   GLU 78      K 3.7      CO2 18*   BUN 20   CREATININE 1.0   CALCIUM 8.7     CMP:   Recent Labs   Lab 06/07/24 0240   GLU 78   CALCIUM 8.7   ALBUMIN 2.8*   PROT 5.8*      K 3.7   CO2 18*      BUN 20   CREATININE 1.0   ALKPHOS 84   ALT 16   AST 26   BILITOT 0.5     Coagulation:   No results for input(s): "PT", "INR", "APTT" in the last 48 hours.        Significant Imaging:  Imaging results within the past 24 hours have been reviewed.  Assessment/Plan:     Oncology  * Symptomatic anemia  Without reported overt bleeding.  Hx of xarelto use and cirrhosis.    Egd/colonoscopy wihtout obvious source    Recs  Plan for outpatient VCE  Consider supplemental iron therapy  Advance diet          Thank you for your consult. I will sign off. Please contact us if you have any additional questions.    Adilson Soto MD  Gastroenterology  [place] [facility]  "

## 2024-06-08 NOTE — ASSESSMENT & PLAN NOTE
Without reported overt bleeding.  Hx of xarelto use and cirrhosis.    Egd/colonoscopy wihtout obvious source    Recs  Plan for outpatient VCE  Consider supplemental iron therapy  Advance diet

## 2024-06-08 NOTE — ASSESSMENT & PLAN NOTE
Patient with known CAD s/p  med mgmt , which is controlled Will continue ASA and Statin and monitor for S/Sx of angina/ACS. Continue to monitor on telemetry.

## 2024-06-08 NOTE — SUBJECTIVE & OBJECTIVE
Interval History: No recurrent bleeding today. Will restart home xarelto and ASA today with tentative plans for discharge tomorrow.    Review of Systems   Constitutional:  Negative for chills and fever.   HENT:  Negative for congestion.    Eyes:  Negative for visual disturbance.   Respiratory:  Negative for shortness of breath.    Cardiovascular:  Negative for chest pain.   Gastrointestinal:  Negative for abdominal pain, blood in stool, nausea and vomiting.   Genitourinary:  Negative for dysuria.   Musculoskeletal:  Negative for myalgias.   Skin:  Positive for pallor.   Neurological:  Negative for dizziness and light-headedness.   Psychiatric/Behavioral:  Negative for agitation. The patient is not nervous/anxious.      Objective:     Vital Signs (Most Recent):  Temp: 97.8 °F (36.6 °C) (06/08/24 0758)  Pulse: 65 (06/08/24 0758)  Resp: 18 (06/08/24 0758)  BP: (!) 158/98 (06/08/24 0758)  SpO2: 95 % (06/08/24 0758) Vital Signs (24h Range):  Temp:  [97.8 °F (36.6 °C)-98.7 °F (37.1 °C)] 97.8 °F (36.6 °C)  Pulse:  [56-66] 65  Resp:  [12-18] 18  SpO2:  [95 %-100 %] 95 %  BP: (103-167)/(58-98) 158/98     Weight: 75.5 kg (166 lb 7.2 oz)  Body mass index is 23.88 kg/m².    Intake/Output Summary (Last 24 hours) at 6/8/2024 1121  Last data filed at 6/8/2024 0622  Gross per 24 hour   Intake 220 ml   Output --   Net 220 ml         Physical Exam  Constitutional:       General: He is not in acute distress.  HENT:      Head: Normocephalic.      Mouth/Throat:      Mouth: Mucous membranes are moist.   Eyes:      Extraocular Movements: Extraocular movements intact.      Pupils: Pupils are equal, round, and reactive to light.   Cardiovascular:      Rate and Rhythm: Normal rate and regular rhythm.      Pulses: Normal pulses.   Pulmonary:      Effort: Pulmonary effort is normal. No respiratory distress.      Breath sounds: Normal breath sounds. No wheezing, rhonchi or rales.   Abdominal:      General: Bowel sounds are normal. There is no  distension.      Palpations: Abdomen is soft.      Tenderness: There is no abdominal tenderness. There is no guarding or rebound.   Musculoskeletal:      Cervical back: No rigidity.      Right lower leg: No edema.      Left lower leg: No edema.   Skin:     Coloration: Skin is pale.   Neurological:      Mental Status: He is alert and oriented to person, place, and time.   Psychiatric:         Mood and Affect: Mood normal.         Behavior: Behavior normal.         Thought Content: Thought content normal.         Judgment: Judgment normal.             Significant Labs: All pertinent labs within the past 24 hours have been reviewed.    Significant Imaging: I have reviewed all pertinent imaging results/findings within the past 24 hours.

## 2024-06-08 NOTE — ASSESSMENT & PLAN NOTE
Pt c/o recent onset right thigh and calf pain. Right leg in flexed position and unable to passively extend. Pt is a poor historian w/ baseline confusion. Concern for potential fall. Will obtain XR Hip and Knee  -XR Hip and Knee negative for fracture  No further c/o pain or difficulty with ROM

## 2024-06-09 VITALS
TEMPERATURE: 97 F | HEIGHT: 70 IN | RESPIRATION RATE: 18 BRPM | WEIGHT: 166.44 LBS | BODY MASS INDEX: 23.83 KG/M2 | DIASTOLIC BLOOD PRESSURE: 65 MMHG | HEART RATE: 60 BPM | SYSTOLIC BLOOD PRESSURE: 140 MMHG | OXYGEN SATURATION: 98 %

## 2024-06-09 PROBLEM — M79.651 RIGHT THIGH PAIN: Status: RESOLVED | Noted: 2024-06-05 | Resolved: 2024-06-09

## 2024-06-09 PROBLEM — D64.9 SYMPTOMATIC ANEMIA: Chronic | Status: RESOLVED | Noted: 2024-06-05 | Resolved: 2024-06-09

## 2024-06-09 LAB
ANION GAP SERPL CALC-SCNC: 8 MMOL/L (ref 8–16)
BASOPHILS # BLD AUTO: 0.02 K/UL (ref 0–0.2)
BASOPHILS NFR BLD: 0.4 % (ref 0–1.9)
BUN SERPL-MCNC: 18 MG/DL (ref 8–23)
CALCIUM SERPL-MCNC: 8.4 MG/DL (ref 8.7–10.5)
CHLORIDE SERPL-SCNC: 113 MMOL/L (ref 95–110)
CO2 SERPL-SCNC: 18 MMOL/L (ref 23–29)
CREAT SERPL-MCNC: 1.1 MG/DL (ref 0.5–1.4)
DIFFERENTIAL METHOD BLD: ABNORMAL
EOSINOPHIL # BLD AUTO: 0.1 K/UL (ref 0–0.5)
EOSINOPHIL NFR BLD: 2.6 % (ref 0–8)
ERYTHROCYTE [DISTWIDTH] IN BLOOD BY AUTOMATED COUNT: 17.4 % (ref 11.5–14.5)
EST. GFR  (NO RACE VARIABLE): >60 ML/MIN/1.73 M^2
GLUCOSE SERPL-MCNC: 114 MG/DL (ref 70–110)
HCT VFR BLD AUTO: 28.2 % (ref 40–54)
HGB BLD-MCNC: 8.8 G/DL (ref 14–18)
IMM GRANULOCYTES # BLD AUTO: 0.01 K/UL (ref 0–0.04)
IMM GRANULOCYTES NFR BLD AUTO: 0.2 % (ref 0–0.5)
LYMPHOCYTES # BLD AUTO: 0.9 K/UL (ref 1–4.8)
LYMPHOCYTES NFR BLD: 17.2 % (ref 18–48)
MCH RBC QN AUTO: 26.7 PG (ref 27–31)
MCHC RBC AUTO-ENTMCNC: 31.2 G/DL (ref 32–36)
MCV RBC AUTO: 86 FL (ref 82–98)
MONOCYTES # BLD AUTO: 0.7 K/UL (ref 0.3–1)
MONOCYTES NFR BLD: 13.3 % (ref 4–15)
NEUTROPHILS # BLD AUTO: 3.6 K/UL (ref 1.8–7.7)
NEUTROPHILS NFR BLD: 66.3 % (ref 38–73)
NRBC BLD-RTO: 0 /100 WBC
PLATELET # BLD AUTO: 87 K/UL (ref 150–450)
PMV BLD AUTO: 11 FL (ref 9.2–12.9)
POTASSIUM SERPL-SCNC: 3.9 MMOL/L (ref 3.5–5.1)
RBC # BLD AUTO: 3.3 M/UL (ref 4.6–6.2)
SODIUM SERPL-SCNC: 139 MMOL/L (ref 136–145)
WBC # BLD AUTO: 5.42 K/UL (ref 3.9–12.7)

## 2024-06-09 PROCEDURE — 25000003 PHARM REV CODE 250

## 2024-06-09 PROCEDURE — 25000003 PHARM REV CODE 250: Performed by: STUDENT IN AN ORGANIZED HEALTH CARE EDUCATION/TRAINING PROGRAM

## 2024-06-09 PROCEDURE — 25000003 PHARM REV CODE 250: Performed by: NURSE PRACTITIONER

## 2024-06-09 PROCEDURE — 85025 COMPLETE CBC W/AUTO DIFF WBC: CPT | Performed by: STUDENT IN AN ORGANIZED HEALTH CARE EDUCATION/TRAINING PROGRAM

## 2024-06-09 PROCEDURE — 80048 BASIC METABOLIC PNL TOTAL CA: CPT | Performed by: NURSE PRACTITIONER

## 2024-06-09 RX ADMIN — HYDROCODONE BITARTRATE AND ACETAMINOPHEN 1 TABLET: 7.5; 325 TABLET ORAL at 11:06

## 2024-06-09 RX ADMIN — MUPIROCIN: 20 OINTMENT TOPICAL at 08:06

## 2024-06-09 RX ADMIN — MEMANTINE HYDROCHLORIDE 5 MG: 5 TABLET ORAL at 08:06

## 2024-06-09 RX ADMIN — VALSARTAN 160 MG: 160 TABLET, FILM COATED ORAL at 08:06

## 2024-06-09 RX ADMIN — MEGESTROL ACETATE 40 MG: 40 TABLET ORAL at 08:06

## 2024-06-09 RX ADMIN — METOPROLOL SUCCINATE 100 MG: 50 TABLET, EXTENDED RELEASE ORAL at 08:06

## 2024-06-09 RX ADMIN — TAMSULOSIN HYDROCHLORIDE 0.4 MG: 0.4 CAPSULE ORAL at 08:06

## 2024-06-09 RX ADMIN — LEVOTHYROXINE SODIUM 50 MCG: 50 TABLET ORAL at 05:06

## 2024-06-09 RX ADMIN — HYDROCHLOROTHIAZIDE 25 MG: 25 TABLET ORAL at 08:06

## 2024-06-09 RX ADMIN — ATORVASTATIN CALCIUM 20 MG: 20 TABLET, FILM COATED ORAL at 08:06

## 2024-06-09 RX ADMIN — FERROUS SULFATE TAB 325 MG (65 MG ELEMENTAL FE) 1 EACH: 325 (65 FE) TAB at 08:06

## 2024-06-09 RX ADMIN — ASPIRIN 81 MG: 81 TABLET, COATED ORAL at 08:06

## 2024-06-09 NOTE — NURSING
[facility] at bedside to transport pt to [facility]. PIV and tele discontinued. Tele returned to [place]. Glasses and clothes sent with pt. Transporters given facesheet and AVS.

## 2024-06-09 NOTE — DISCHARGE SUMMARY
[facility] Medicine  Discharge Summary      Patient Name: Junaid Muñoz  MRN: [id number]  MAGUE: [id number]  Patient Class: IP- Inpatient  Admission Date: 6/5/2024  Hospital Length of Stay: 4 days  Discharge Date and Time:  06/09/2024 10:51 AM  Attending Physician: Chandra Mclean MD   Discharging Provider: Irene Costello NP  Primary Care Provider: Frederick Rocha MD    Primary Care Team: Networked reference to record PCT     HPI:   Junaid Muñoz is a 79-year-old male with a past medical history of CAD, significant PAD, type 2 diabetes, CVA, hypertension, hypothyroidism, cirrhosis, long-term anticoagulation with Xarelto, recent hospitalization for osteomyelitis of toe, who presents to an outside hospital due to abnormal labs.    Patient states that he has had feelings of generalized fatigue, dark stools for the past few days.  He denies any overt bleeding from hematuria, hematochezia. He was told to get labs outpatient by his PCP where he was found to have a significant drop in his hemoglobin.  He was told to go to the ER and then he was transferred over for GI evaluation at [place].    At the outside hospital, triage vitals were fairly unremarkable patient was satting well on room air.  Review of systems significant for fatigue.  Physical exam fairly unremarkable.    CBC was significant for hemoglobin of 6.8, mild thrombocytopenia.  PT and INR were slightly elevated.  BMP was fairly unremarkable.  BNP slightly elevated with normal troponins.    Due to concerns for symptomatic anemia, patient was transfused 2 units of blood.  He was given a dose of ceftriaxone and started on octreotide.     Patient transferred for concern for GI bleed.    Procedure(s) (LRB):  COLONOSCOPY (N/A)      Hospital Course:   Junaid Muñoz is a 79-year-old male with a past medical history of CAD, significant PAD, type 2 diabetes, CVA, hypertension, hypothyroidism, cirrhosis, long-term anticoagulation with  Xarelto, recent hospitalization for osteomyelitis of toe, who presented to an outside hospital due to abnormal labs. Patient was transferred to [place] for GI evaluation 2/2 c/o generalized fatigue, dark stools for a few days, and significantly decreased hgb per PCP. Over the course of his stay, patient received 2U PRBCs, ocreotide, and ceftriaxone for concerns of intra-abd infection in the setting of cirrhosis. He underwent an EGD and colonoscopy that did not show any obvious source of bleeding. ASA and xarelto restarted 6/8 and patient kept overnight for observation and repeat CBC with no further signs of bleeding noted and stable H/H. Patient denies any complaints except not getting enough sleep overnight. Otherwise no CP, SOB, abd pain, N/V, dizziness, lightheadedness, fever, chills, or other complaints.     Patient stable for discharge and will f/u with GI outpatient for VCE.              Goals of Care Treatment Preferences:  Code Status: Full Code      Consults:   Consults (From admission, onward)          Status Ordering Provider     Inpatient consult to Gastroenterology  Once        Provider:  (Not yet assigned)    Completed JADA FATIMA            Cardiac/Vascular  Peripheral artery disease  Continue statin  Restart xarelto and ASA      Hyperlipidemia  Continue home statin      Essential hypertension  Chronic, controlled. Latest blood pressure and vitals reviewed-     Temp:  [97.5 °F (36.4 °C)-98.5 °F (36.9 °C)]   Pulse:  [60-89]   Resp:  [16-18]   BP: (115-157)/(56-93)   SpO2:  [93 %-97 %] .   Home meds for hypertension were reviewed and noted below.   Hypertension Medications               metoprolol succinate (TOPROL-XL) 100 MG 24 hr tablet Take 100 mg by mouth once daily.    valsartan-hydrochlorothiazide (DIOVAN-HCT) 160-25 mg per tablet Take 1 tablet by mouth once daily.            While in the hospital, will manage blood pressure as follows; Continue home antihypertensive regimen    Will  utilize p.r.n. blood pressure medication only if patient's blood pressure greater than 180/110 and he develops symptoms such as worsening chest pain or shortness of breath.    Coronary artery disease involving native coronary artery of native heart without angina pectoris  Patient with known CAD s/p  med mgmt , which is controlled Will continue ASA and Statin and monitor for S/Sx of angina/ACS. Continue to monitor on telemetry.     Renal/  BPH (benign prostatic hyperplasia)  No current complaints    Plan:  Continue home Flomax      Oncology  * Symptomatic anemia-resolved as of 6/9/2024  Patient's anemia is currently controlled. Has received 2 units of PRBCs on 06/4-06/5 . Etiology likely d/t acute blood loss which was from lower GI bleed source  Current CBC reviewed-   Lab Results   Component Value Date    HGB 8.8 (L) 06/09/2024    HCT 28.2 (L) 06/09/2024     Monitor serial CBC and transfuse if patient becomes hemodynamically unstable, symptomatic or H/H drops below 7/21.    Plan:  Hg stable, 9.0 today  GI has signed off. Appreciate rec's  -- Plan for outpatient VCE  -- Consider supplemental iron therapy  -- Advance diet  EGD negative for upper GI bleed  Cscope performed w/ poor prep - patient will need outpatient VCE  ASA/xarelto restarted 6/8 and patient w/o further signs of bleeding, H/H stable      Endocrine  Hypothyroidism  Continue home Synthroid      GI  Cirrhosis    MELD-Na score calculated; MELD 3.0: 9 at 6/7/2024  2:40 AM  MELD-Na: 8 at 6/7/2024  2:40 AM  Calculated from:  Serum Creatinine: 1.0 mg/dL at 6/7/2024  2:40 AM  Serum Sodium: 137 mmol/L at 6/7/2024  2:40 AM  Total Bilirubin: 0.5 mg/dL (Using min of 1 mg/dL) at 6/7/2024  2:40 AM  Serum Albumin: 2.8 g/dL at 6/7/2024  2:40 AM  INR(ratio): 1.2 at 6/6/2024  3:06 AM  Age at listing (hypothetical): 79 years  Sex: Male at 6/7/2024  2:40 AM      Continue chronic meds. Etiology likely Hepatitis vs Fatty Liver. Will avoid any hepatotoxic meds, and monitor  CBC/CMP/INR for synthetic function.         Final Active Diagnoses:    Diagnosis Date Noted POA    BPH (benign prostatic hyperplasia) [N40.0] 06/05/2024 Yes    Cirrhosis [K74.60] 06/05/2024 Yes    Peripheral artery disease [I73.9] 08/04/2022 Yes    Essential hypertension [I10] 12/06/2019 Yes     Chronic    Hypothyroidism [E03.9] 12/06/2019 Yes    Hyperlipidemia [E78.5] 12/06/2019 Yes     Chronic    Coronary artery disease involving native coronary artery of native heart without angina pectoris [I25.10] 12/06/2019 Yes      Problems Resolved During this Admission:    Diagnosis Date Noted Date Resolved POA    PRINCIPAL PROBLEM:  Symptomatic anemia [D64.9] 06/05/2024 06/09/2024 Yes     Chronic    Right thigh pain [M79.651] 06/05/2024 06/09/2024 No       Discharged Condition: stable    Disposition: Skilled Nursing Facility    Follow Up:   Follow-up Information       Scheduling Navigator Follow up.    Why: Patient will need a GI f/u appointment after discharge. The appointment has been requested, [facility] will be contacted with follow-up appointment date and time.                         Patient Instructions:      Ambulatory referral/consult to Gastroenterology   Standing Status: Future   Referral Priority: Routine Referral Type: Consultation   Referral Reason: Specialty Services Required   Requested Specialty: Gastroenterology   Number of Visits Requested: 1       Significant Diagnostic Studies: Labs: BMP:   Recent Labs   Lab 06/09/24  0234   *      K 3.9   *   CO2 18*   BUN 18   CREATININE 1.1   CALCIUM 8.4*    and CBC   Recent Labs   Lab 06/08/24  0327 06/09/24  0234   WBC 6.85 5.42   HGB 9.0* 8.8*   HCT 29.7* 28.2*   PLT 90* 87*       Pending Diagnostic Studies:       None           Medications:  Reconciled Home Medications:      Medication List        CONTINUE taking these medications      acetaminophen 325 MG tablet  Commonly known as: TYLENOL  Take 325 mg by mouth every 6 (six)  hours as needed for Pain.     ascorbic acid (vitamin C) 500 MG tablet  Commonly known as: VITAMIN C  Take 1 tablet (500 mg total) by mouth once daily.     aspirin 81 MG EC tablet  Commonly known as: ECOTRIN  Take 1 tablet (81 mg total) by mouth once daily.     atorvastatin 40 MG tablet  Commonly known as: LIPITOR  Take 20 mg by mouth once daily.     ferrous sulfate 325 mg (65 mg iron) Tab tablet  Commonly known as: FEOSOL  Take 1 tablet (325 mg total) by mouth every other day.     FLOMAX 0.4 mg Cap  Generic drug: tamsulosin  Take 0.4 mg by mouth once daily.     HYDROcodone-acetaminophen 7.5-325 mg per tablet  Commonly known as: NORCO  Take 1 tablet by mouth every 6 (six) hours as needed for Pain.     lactulose 10 gram packet  Commonly known as: CEPHULAC  Take 30 g by mouth 2 (two) times a day.     levothyroxine 50 MCG tablet  Commonly known as: SYNTHROID  Take 50 mcg by mouth before breakfast.     megestroL 20 MG Tab  Commonly known as: MEGACE  Take 40 mg by mouth 2 (two) times daily.     memantine 5 MG Tab  Commonly known as: NAMENDA  Take 5 mg by mouth 2 (two) times daily.     metoprolol succinate 100 MG 24 hr tablet  Commonly known as: TOPROL-XL  Take 100 mg by mouth once daily.     ondansetron 8 MG Tbdl  Commonly known as: ZOFRAN-ODT  Take 8 mg by mouth once.     valsartan-hydrochlorothiazide 160-25 mg per tablet  Commonly known as: DIOVAN-HCT  Take 1 tablet by mouth once daily.     VITAMIN D2 ORAL  Take 50,000 Units by mouth. TWICE WEEKLY     XARELTO 20 mg Tab  Generic drug: rivaroxaban  Take 20 mg by mouth daily with dinner or evening meal.              Indwelling Lines/Drains at time of discharge:   Lines/Drains/Airways       None                   Time spent on the discharge of patient: 40 minutes         Irene Costello NP  Department of Hospital Medicine  [facility]

## 2024-06-09 NOTE — ASSESSMENT & PLAN NOTE
Patient's anemia is currently controlled. Has received 2 units of PRBCs on 06/4-06/5 . Etiology likely d/t acute blood loss which was from lower GI bleed source  Current CBC reviewed-   Lab Results   Component Value Date    HGB 8.8 (L) 06/09/2024    HCT 28.2 (L) 06/09/2024     Monitor serial CBC and transfuse if patient becomes hemodynamically unstable, symptomatic or H/H drops below 7/21.    Plan:  Hg stable, 9.0 today  GI has signed off. Appreciate rec's  -- Plan for outpatient VCE  -- Consider supplemental iron therapy  -- Advance diet  EGD negative for upper GI bleed  Cscope performed w/ poor prep - patient will need outpatient VCE  ASA/xarelto restarted 6/8 and patient w/o further signs of bleeding, H/H stable

## 2024-06-09 NOTE — PLAN OF CARE
1010  JC notified by MD that pt is medically ready to return to nursing home. JC spoke with the weekend supervisor, Carlene, at [facility] ([phone]), facility transfer orders faxed to Carlene. JC pending okay from Carlene for pt to return.        06/09/24 1017   Post-Acute Status   Post-Acute Authorization Placement  (Return to NH)   Discharge Delays (!) Post-Acute Set-up  (Pending clearance from NH to return today)   Discharge Plan   Discharge Plan A Return to nursing home

## 2024-06-09 NOTE — ASSESSMENT & PLAN NOTE
Chronic, controlled. Latest blood pressure and vitals reviewed-     Temp:  [97.5 °F (36.4 °C)-98.5 °F (36.9 °C)]   Pulse:  [60-89]   Resp:  [16-18]   BP: (115-157)/(56-93)   SpO2:  [93 %-97 %] .   Home meds for hypertension were reviewed and noted below.   Hypertension Medications               metoprolol succinate (TOPROL-XL) 100 MG 24 hr tablet Take 100 mg by mouth once daily.    valsartan-hydrochlorothiazide (DIOVAN-HCT) 160-25 mg per tablet Take 1 tablet by mouth once daily.            While in the hospital, will manage blood pressure as follows; Continue home antihypertensive regimen    Will utilize p.r.n. blood pressure medication only if patient's blood pressure greater than 180/110 and he develops symptoms such as worsening chest pain or shortness of breath.

## 2024-06-09 NOTE — PLAN OF CARE
Problem: Adult Inpatient Plan of Care  Goal: Plan of Care Review  6/9/2024 1220 by Pema Vargas RN  Outcome: Met  6/9/2024 0719 by Pema Vargas RN  Outcome: Progressing  Goal: Patient-Specific Goal (Individualized)  Outcome: Met  Goal: Absence of Hospital-Acquired Illness or Injury  Outcome: Met  Goal: Optimal Comfort and Wellbeing  Outcome: Met  Goal: Readiness for Transition of Care  Outcome: Met     Problem: Infection  Goal: Absence of Infection Signs and Symptoms  Outcome: Met     Problem: Wound  Goal: Optimal Coping  Outcome: Met  Goal: Optimal Functional Ability  Outcome: Met  Goal: Absence of Infection Signs and Symptoms  Outcome: Met  Goal: Improved Oral Intake  Outcome: Met  Goal: Optimal Pain Control and Function  Outcome: Met  Goal: Skin Health and Integrity  Outcome: Met  Goal: Optimal Wound Healing  Outcome: Met     Problem: Gastrointestinal Bleeding  Goal: Optimal Coping with Acute Illness  Outcome: Met  Goal: Hemostasis  Outcome: Met     Problem: Comorbidity Management  Goal: Blood Pressure in Desired Range  Outcome: Met     Problem: Diabetes Comorbidity  Goal: Blood Glucose Level Within Targeted Range  Outcome: Met     Problem: Skin Injury Risk Increased  Goal: Skin Health and Integrity  Outcome: Met     Problem: Fall Injury Risk  Goal: Absence of Fall and Fall-Related Injury  Outcome: Met     Problem: Anemia  Goal: Anemia Symptom Improvement  Outcome: Met

## 2024-06-09 NOTE — PROGRESS NOTES
[facility]    FACILITY TRANSFER ORDERS      Patient Name: Junaid Muñoz  YOB: 1945    PCP: Frederick Rocha MD   PCP Address: [address] [place] [address] / [place] [address]  PCP Phone Number: [phone]  PCP Fax: [phone]    Encounter Date: 06/09/2024    Admit to: [facility]    Vital Signs:  Routine    Diagnoses:   Active Hospital Problems    Diagnosis  POA    *Symptomatic anemia [D64.9]  Yes     Chronic    BPH (benign prostatic hyperplasia) [N40.0]  Yes    Cirrhosis [K74.60]  Yes    Right thigh pain [M79.651]  No    Peripheral artery disease [I73.9]  Yes    Essential hypertension [I10]  Yes     Chronic    Hypothyroidism [E03.9]  Yes    Hyperlipidemia [E78.5]  Yes     Chronic    Coronary artery disease involving native coronary artery of native heart without angina pectoris [I25.10]  Yes      Resolved Hospital Problems   No resolved problems to display.       Allergies:  Review of patient's allergies indicates:   Allergen Reactions    Erythromycin Other (See Comments)    Penicillins Rash     Tolerated cefepime December 2019       Diet: cardiac diet    Activities: Activity as tolerated    Goals of Care Treatment Preferences:  Code Status: Full Code      Nursing: care per policy     Labs: CBC Once     CONSULTS:    Physical Therapy to evaluate and treat.  and Occupational Therapy to evaluate and treat.    MISCELLANEOUS CARE:  Monitor for s/sx of bleeding    WOUND CARE ORDERS  None    Medications: Review discharge medications with patient and family and provide education.      Current Discharge Medication List        CONTINUE these medications which have NOT CHANGED    Details   acetaminophen (TYLENOL) 325 MG tablet Take 325 mg by mouth every 6 (six) hours as needed for Pain.      ascorbic acid, vitamin C, (VITAMIN C) 500 MG tablet Take 1 tablet (500 mg total) by mouth once daily.      aspirin (ECOTRIN) 81 MG EC tablet Take 1 tablet (81 mg total) by mouth once  daily.  Qty: 30 tablet, Refills: 0      atorvastatin (LIPITOR) 40 MG tablet Take 20 mg by mouth once daily.      ergocalciferol, vitamin D2, (VITAMIN D2 ORAL) Take 50,000 Units by mouth. TWICE WEEKLY      ferrous sulfate (FEOSOL) 325 mg (65 mg iron) Tab tablet Take 1 tablet (325 mg total) by mouth every other day.  Refills: 0      HYDROcodone-acetaminophen (NORCO) 7.5-325 mg per tablet Take 1 tablet by mouth every 6 (six) hours as needed for Pain.      lactulose (CEPHULAC) 10 gram packet Take 30 g by mouth 2 (two) times a day.      levothyroxine (SYNTHROID) 50 MCG tablet Take 50 mcg by mouth before breakfast.      megestroL (MEGACE) 20 MG Tab Take 40 mg by mouth 2 (two) times daily.      memantine (NAMENDA) 5 MG Tab Take 5 mg by mouth 2 (two) times daily.      metoprolol succinate (TOPROL-XL) 100 MG 24 hr tablet Take 100 mg by mouth once daily.      ondansetron (ZOFRAN-ODT) 8 MG TbDL Take 8 mg by mouth once.      rivaroxaban (XARELTO) 20 mg Tab Take 20 mg by mouth daily with dinner or evening meal.      tamsulosin (FLOMAX) 0.4 mg Cap Take 0.4 mg by mouth once daily.      valsartan-hydrochlorothiazide (DIOVAN-HCT) 160-25 mg per tablet Take 1 tablet by mouth once daily.                Immunizations Administered as of 6/9/2024       Name Date Dose VIS Date Route Exp Date    COVID-19, MRNA, LN-S, PF (Moderna) 8/24/2021 -- -- -- --    : Modern[employer]     Lot: [id number]     Comment: Adminis     COVID-19, MRNA, LN-S, PF (Moderna) 7/29/2021 -- -- -- --    : Moderna [employer]     Lot: [id number]     Comment: Adminis             This patient has had both covid vaccinations    Some patients may experience side effects after vaccination.  These may include fever, headache, muscle or joint aches.  Most symptoms resolve with 24-48 hours and do not require urgent medical evaluation unless they persist for more than 72 hours or symptoms are concerning for an unrelated medical condition.           _________________________________  Irene Costello NP  06/09/2024

## 2024-06-09 NOTE — PLAN OF CARE
JC spoke with pt daughter Loreta (number in chart) to discuss discharge planning. Pt will return to [facility]. Loreta expressed understanding of discharge plan. SW met with patient at bedside, pt agreeable to discharge today back to [facility]. Transportation packet left at nurse's station. SW requested pt f/u appt. Report information given to pt bedside nurse. SW requested [facility] transportation.    SW requested GI f/u appt.    Report Number: [phone]  Pt nurse: Marie No Transportation: 6/9/2024 @ 2:00pm    Pt cleared from CM standpoint. Bedside nurse and VN notified.     06/09/24 1104   Final Note   Assessment Type Final Discharge Note   Anticipated Discharge Disposition [facility]   What phone number can be called within the next 1-3 days to see how you are doing after discharge? [phone]   Post-Acute Status   Post-Acute Authorization Placement   Discharge Delays None known at this time

## 2024-06-10 LAB
OHS QRS DURATION: 86 MS
OHS QTC CALCULATION: 434 MS

## 2024-10-24 ENCOUNTER — LAB VISIT (OUTPATIENT)
Dept: LAB | Facility: HOSPITAL | Age: 79
End: 2024-10-24
Attending: HOSPITALIST
Payer: MEDICARE

## 2024-10-24 DIAGNOSIS — K92.1 BLOOD IN STOOL: ICD-10-CM

## 2024-10-24 DIAGNOSIS — K92.1 BLOOD IN STOOL: Primary | ICD-10-CM

## 2024-10-24 LAB — OB PNL STL: POSITIVE

## 2024-10-24 PROCEDURE — 82272 OCCULT BLD FECES 1-3 TESTS: CPT | Performed by: HOSPITALIST

## (undated) DEVICE — DRAPE STERI INSTRUMENT 1018

## (undated) DEVICE — SEE MEDLINE ITEM 156905

## (undated) DEVICE — SUT VICRYL PLUS 0 CT1 18IN

## (undated) DEVICE — NDL SPINAL 18GX3.5 SPINOCAN

## (undated) DEVICE — GAUZE SPONGE PEANUT STRL

## (undated) DEVICE — STAPLER SKIN ROTATING HEAD

## (undated) DEVICE — IMPLANTABLE DEVICE
Type: IMPLANTABLE DEVICE | Site: SPINE CERVICAL | Status: NON-FUNCTIONAL
Removed: 2019-12-17

## (undated) DEVICE — FRAZIER 18FR

## (undated) DEVICE — CORD BIPOLAR 12 FOOT

## (undated) DEVICE — DRESSING TELFA N ADH 3X8IN

## (undated) DEVICE — BIT DRILL VAR MOUNTAINEER 24MM

## (undated) DEVICE — DRAPE INCISE IOBAN 2 23X17IN

## (undated) DEVICE — NDL 22GA X1 1/2 REG BEVEL

## (undated) DEVICE — BLADE 4IN EDGE INSULATED

## (undated) DEVICE — MARKER SKIN STND TIP BLUE BARR

## (undated) DEVICE — ELECTRODE REM PLYHSV RETURN 9

## (undated) DEVICE — KIT EXTERNAL DRAIN(CRAINIAL)

## (undated) DEVICE — TIP KERRISON RONGEUR THIN 3MM

## (undated) DEVICE — TUBE FRAZIER 5MM 2FT SOFT TIP

## (undated) DEVICE — ELECTRODE BLADE INSULATED 1 IN

## (undated) DEVICE — SEE MEDLINE ITEM 157131

## (undated) DEVICE — PACK SET UP CONVERTORS

## (undated) DEVICE — SEALANT EVICEL FIBRIN HUMN 2ML

## (undated) DEVICE — TAPE SILK 3IN

## (undated) DEVICE — TRAY EPIDURAL CONT

## (undated) DEVICE — DRESSING TEGADERM 4.4X5IN

## (undated) DEVICE — SUT MONOCRYL 4-0 PS-1 UND

## (undated) DEVICE — SEALER AQUAMANTYS 2.3 BIPOLAR

## (undated) DEVICE — ADHESIVE DERMABOND ADVANCED

## (undated) DEVICE — DRAPE THYROID WITH ARMBOARD

## (undated) DEVICE — BUR BONE CUT MICRO TPS 3X3.8MM

## (undated) DEVICE — Device

## (undated) DEVICE — KIT FIBRIN SEALANT EVICEL 5 ML

## (undated) DEVICE — KIT DRAIN HEMOVAC 3/16IN 400ML

## (undated) DEVICE — SUT STRATAFIX PDS PLUS VIO

## (undated) DEVICE — BURR RND FLUT SFT TOUCH 2.0MM

## (undated) DEVICE — SUT CTD VICRYL 2-0 CR/CT-2

## (undated) DEVICE — DRESSING AQUACEL AG ADV 3.5X12

## (undated) DEVICE — PIN DISTRACTOR 14MM
Type: IMPLANTABLE DEVICE | Site: SPINE CERVICAL | Status: NON-FUNCTIONAL
Removed: 2019-12-10

## (undated) DEVICE — SUT ETHIBOND 0 CR/CT-1 8-18

## (undated) DEVICE — RUBBERBAND STERILE 3X1/8IN

## (undated) DEVICE — SUT CTD VICRYL 3-0 CR/SH

## (undated) DEVICE — SEE MEDLINE ITEM 146292

## (undated) DEVICE — BURR PRECISION ROUND 6.0MM

## (undated) DEVICE — KIT SURGIFLO HEMOSTATIC MATRIX

## (undated) DEVICE — DRESSING SURGICAL 1/2X1/2

## (undated) DEVICE — 4.35 TAP

## (undated) DEVICE — DRAPE OPMI STERILE

## (undated) DEVICE — DRAPE C ARM 42 X 120 10/BX